# Patient Record
Sex: FEMALE | Race: BLACK OR AFRICAN AMERICAN
[De-identification: names, ages, dates, MRNs, and addresses within clinical notes are randomized per-mention and may not be internally consistent; named-entity substitution may affect disease eponyms.]

---

## 2021-05-03 ENCOUNTER — HOSPITAL ENCOUNTER (EMERGENCY)
Dept: HOSPITAL 53 - M ED | Age: 21
Discharge: HOME | End: 2021-05-03
Payer: COMMERCIAL

## 2021-05-03 VITALS — HEIGHT: 68 IN | BODY MASS INDEX: 24.69 KG/M2 | WEIGHT: 162.92 LBS

## 2021-05-03 VITALS — SYSTOLIC BLOOD PRESSURE: 133 MMHG | DIASTOLIC BLOOD PRESSURE: 69 MMHG

## 2021-05-03 DIAGNOSIS — O34.81: ICD-10-CM

## 2021-05-03 DIAGNOSIS — O26.891: Primary | ICD-10-CM

## 2021-05-03 DIAGNOSIS — R10.32: ICD-10-CM

## 2021-05-03 DIAGNOSIS — Z3A.01: ICD-10-CM

## 2021-05-03 LAB
ALBUMIN SERPL BCG-MCNC: 3.8 GM/DL (ref 3.2–5.2)
ALT SERPL W P-5'-P-CCNC: 23 U/L (ref 12–78)
B-HCG SERPL-ACNC: 2172 MIU/ML
BASOPHILS # BLD AUTO: 0 10^3/UL (ref 0–0.2)
BASOPHILS NFR BLD AUTO: 0.3 % (ref 0–1)
BILIRUB CONJ SERPL-MCNC: 0.2 MG/DL (ref 0–0.2)
BILIRUB SERPL-MCNC: 0.9 MG/DL (ref 0.2–1)
BUN SERPL-MCNC: 7 MG/DL (ref 7–18)
CALCIUM SERPL-MCNC: 9.3 MG/DL (ref 8.5–10.1)
CHLORIDE SERPL-SCNC: 106 MEQ/L (ref 98–107)
CO2 SERPL-SCNC: 26 MEQ/L (ref 21–32)
CREAT SERPL-MCNC: 0.74 MG/DL (ref 0.55–1.3)
EOSINOPHIL # BLD AUTO: 0.1 10^3/UL (ref 0–0.5)
EOSINOPHIL NFR BLD AUTO: 0.8 % (ref 0–3)
GFR SERPL CREATININE-BSD FRML MDRD: > 60 ML/MIN/{1.73_M2} (ref 60–?)
GLUCOSE SERPL-MCNC: 97 MG/DL (ref 70–100)
HCT VFR BLD AUTO: 35.6 % (ref 36–47)
HGB BLD-MCNC: 11.6 G/DL (ref 12–15.5)
LYMPHOCYTES # BLD AUTO: 2.6 10^3/UL (ref 1.5–5)
LYMPHOCYTES NFR BLD AUTO: 30.4 % (ref 24–44)
MCH RBC QN AUTO: 26.7 PG (ref 27–33)
MCHC RBC AUTO-ENTMCNC: 32.6 G/DL (ref 32–36.5)
MCV RBC AUTO: 81.8 FL (ref 80–96)
MONOCYTES # BLD AUTO: 0.9 10^3/UL (ref 0–0.8)
MONOCYTES NFR BLD AUTO: 10.6 % (ref 2–8)
NEUTROPHILS # BLD AUTO: 5 10^3/UL (ref 1.5–8.5)
NEUTROPHILS NFR BLD AUTO: 57.8 % (ref 36–66)
PLATELET # BLD AUTO: 281 10^3/UL (ref 150–450)
POTASSIUM SERPL-SCNC: 3.8 MEQ/L (ref 3.5–5.1)
PROT SERPL-MCNC: 6.9 GM/DL (ref 6.4–8.2)
RBC # BLD AUTO: 4.35 10^6/UL (ref 4–5.4)
SODIUM SERPL-SCNC: 140 MEQ/L (ref 136–145)
WBC # BLD AUTO: 8.6 10^3/UL (ref 4–10)

## 2021-05-03 NOTE — REP
INDICATION:

L sided lower abd pain, positive home preg test.



COMPARISON:

None.



TECHNIQUE:

Real-time sonographic evaluation of pelvis performed utilizing transabdominal and

endovaginal technique.



FINDINGS:

The uterus measures 7.9 x 3.3 x 4.9 cm.  Within the endometrial canal there is a 5 mm

sac-like structure.  There is no internal yolk sac or fetal pole.  This could

represent a viable intrauterine pregnancy.



Right ovary measures 3.8 x 1.7 x 2.3 cm and left ovary 4.0 x 2.4 x 2.4 cm.  Blood flow

is seen in each ovary with duplex Doppler evaluation.  Complex structure in the left

ovary probably represents a complex hemorrhagic corpus luteum 1.8 cm in diameter.

There is mild free fluid in the cul-de-sac.



IMPRESSION:

Sac-like structure in the endometrial canal 5 mm in diameter may represent a very

early viable intrauterine gestation with an estimated gestational age of 5 weeks 1

day.  There is no internal yolk sac or fetal pole.  Recommend follow-up ultrasound in

10-14 days to ensure fetal viability.  Recommend correlation with serial quantitative

beta HCG values to rule out ectopic pregnancy.



No evidence of ovarian torsion.  Mild free fluid.  There is a probable hemorrhagic

corpus luteum in the left ovary 1.8 cm.





<Electronically signed by Fernando Burns > 05/03/21 [id number]

## 2021-05-05 ENCOUNTER — HOSPITAL ENCOUNTER (OUTPATIENT)
Dept: HOSPITAL 53 - M LAB | Age: 21
End: 2021-05-05
Attending: PHYSICIAN ASSISTANT
Payer: COMMERCIAL

## 2021-05-05 DIAGNOSIS — O26.899: Primary | ICD-10-CM

## 2021-05-05 DIAGNOSIS — R10.30: ICD-10-CM

## 2021-05-05 DIAGNOSIS — Z3A.00: ICD-10-CM

## 2021-05-17 ENCOUNTER — HOSPITAL ENCOUNTER (EMERGENCY)
Dept: HOSPITAL 53 - M ED | Age: 21
Discharge: HOME | End: 2021-05-17
Payer: COMMERCIAL

## 2021-05-17 VITALS — SYSTOLIC BLOOD PRESSURE: 128 MMHG | DIASTOLIC BLOOD PRESSURE: 60 MMHG

## 2021-05-17 VITALS — BODY MASS INDEX: 23.41 KG/M2 | WEIGHT: 158.07 LBS | HEIGHT: 69 IN

## 2021-05-17 DIAGNOSIS — Z3A.01: ICD-10-CM

## 2021-05-17 DIAGNOSIS — O23.599: Primary | ICD-10-CM

## 2021-05-17 DIAGNOSIS — O21.1: ICD-10-CM

## 2021-05-17 LAB
ALBUMIN SERPL BCG-MCNC: 4 GM/DL (ref 3.2–5.2)
ALT SERPL W P-5'-P-CCNC: 18 U/L (ref 12–78)
BASOPHILS # BLD AUTO: 0.1 10^3/UL (ref 0–0.2)
BASOPHILS NFR BLD AUTO: 0.4 % (ref 0–1)
BILIRUB CONJ SERPL-MCNC: 0.3 MG/DL (ref 0–0.2)
BILIRUB SERPL-MCNC: 1.3 MG/DL (ref 0.2–1)
BUN SERPL-MCNC: 7 MG/DL (ref 7–18)
CALCIUM SERPL-MCNC: 9.4 MG/DL (ref 8.5–10.1)
CHLAMYDIA DNA AMPLIFICATION: NEGATIVE
CHLORIDE SERPL-SCNC: 104 MEQ/L (ref 98–107)
CO2 SERPL-SCNC: 27 MEQ/L (ref 21–32)
CREAT SERPL-MCNC: 0.61 MG/DL (ref 0.55–1.3)
EOSINOPHIL # BLD AUTO: 0.1 10^3/UL (ref 0–0.5)
EOSINOPHIL NFR BLD AUTO: 0.4 % (ref 0–3)
GFR SERPL CREATININE-BSD FRML MDRD: > 60 ML/MIN/{1.73_M2} (ref 60–?)
GLUCOSE SERPL-MCNC: 85 MG/DL (ref 70–100)
HCT VFR BLD AUTO: 38.5 % (ref 36–47)
HGB BLD-MCNC: 12.4 G/DL (ref 12–15.5)
LIPASE SERPL-CCNC: 82 U/L (ref 73–393)
LYMPHOCYTES # BLD AUTO: 3 10^3/UL (ref 1.5–5)
LYMPHOCYTES NFR BLD AUTO: 25 % (ref 24–44)
MCH RBC QN AUTO: 25.9 PG (ref 27–33)
MCHC RBC AUTO-ENTMCNC: 32.2 G/DL (ref 32–36.5)
MCV RBC AUTO: 80.4 FL (ref 80–96)
MONOCYTES # BLD AUTO: 1.1 10^3/UL (ref 0–0.8)
MONOCYTES NFR BLD AUTO: 9.1 % (ref 2–8)
N GONORRHOEA RRNA SPEC QL NAA+PROBE: NEGATIVE
NEUTROPHILS # BLD AUTO: 7.8 10^3/UL (ref 1.5–8.5)
NEUTROPHILS NFR BLD AUTO: 64.7 % (ref 36–66)
PLATELET # BLD AUTO: 338 10^3/UL (ref 150–450)
POTASSIUM SERPL-SCNC: 3.8 MEQ/L (ref 3.5–5.1)
PROT SERPL-MCNC: 7.3 GM/DL (ref 6.4–8.2)
RBC # BLD AUTO: 4.79 10^6/UL (ref 4–5.4)
SODIUM SERPL-SCNC: 137 MEQ/L (ref 136–145)
WBC # BLD AUTO: 12.1 10^3/UL (ref 4–10)

## 2021-05-17 PROCEDURE — 85025 COMPLETE CBC W/AUTO DIFF WBC: CPT

## 2021-05-17 PROCEDURE — 93041 RHYTHM ECG TRACING: CPT

## 2021-05-17 PROCEDURE — 86850 RBC ANTIBODY SCREEN: CPT

## 2021-05-17 PROCEDURE — 99284 EMERGENCY DEPT VISIT MOD MDM: CPT

## 2021-05-17 PROCEDURE — 86900 BLOOD TYPING SEROLOGIC ABO: CPT

## 2021-05-17 PROCEDURE — 80048 BASIC METABOLIC PNL TOTAL CA: CPT

## 2021-05-17 PROCEDURE — 96374 THER/PROPH/DIAG INJ IV PUSH: CPT

## 2021-05-17 PROCEDURE — 81001 URINALYSIS AUTO W/SCOPE: CPT

## 2021-05-17 PROCEDURE — 93976 VASCULAR STUDY: CPT

## 2021-05-17 PROCEDURE — 83690 ASSAY OF LIPASE: CPT

## 2021-05-17 PROCEDURE — 84702 CHORIONIC GONADOTROPIN TEST: CPT

## 2021-05-17 PROCEDURE — 87661 TRICHOMONAS VAGINALIS AMPLIF: CPT

## 2021-05-17 PROCEDURE — 96361 HYDRATE IV INFUSION ADD-ON: CPT

## 2021-05-17 PROCEDURE — 76801 OB US < 14 WKS SINGLE FETUS: CPT

## 2021-05-17 PROCEDURE — 86901 BLOOD TYPING SEROLOGIC RH(D): CPT

## 2021-05-17 PROCEDURE — 87210 SMEAR WET MOUNT SALINE/INK: CPT

## 2021-05-17 PROCEDURE — 80076 HEPATIC FUNCTION PANEL: CPT

## 2021-05-17 NOTE — REP
INDICATION:

VAGINAL BLEEDING.



COMPARISON:

05/03/2021.



TECHNIQUE:

Real-time sonographic evaluation of gravid uterus performed.



FINDINGS:

There is a single living intrauterine gestation.  The estimated gestational age is 7

weeks 1 day based on a crown-rump length of 10 mm, EDC 01/02/2022. Fetal heart rate is

129 beats per minute. There is no subchorionic hemorrhage.  A complex cystic structure

in the left ovary measures 2.4 cm in maximum diameter probably representing a corpus

luteum.  Blood flow seen in each ovary with Doppler evaluation, with no torsion.



IMPRESSION:

Viable intrauterine gestation with estimated gestational age 7 weeks 1 day, fetal

heart rate 129 beats per minute.





<Electronically signed by Fernando Burns > 05/17/21 [id number]

## 2021-05-20 ENCOUNTER — HOSPITAL ENCOUNTER (EMERGENCY)
Dept: HOSPITAL 53 - M ED | Age: 21
Discharge: HOME | End: 2021-05-20
Payer: COMMERCIAL

## 2021-05-20 VITALS — HEIGHT: 68 IN | BODY MASS INDEX: 25.06 KG/M2 | WEIGHT: 165.35 LBS

## 2021-05-20 VITALS — SYSTOLIC BLOOD PRESSURE: 121 MMHG | DIASTOLIC BLOOD PRESSURE: 59 MMHG

## 2021-05-20 DIAGNOSIS — O21.1: Primary | ICD-10-CM

## 2021-05-20 DIAGNOSIS — E86.0: ICD-10-CM

## 2021-05-20 DIAGNOSIS — Z3A.01: ICD-10-CM

## 2021-05-20 DIAGNOSIS — O99.281: ICD-10-CM

## 2021-05-20 LAB
BUN SERPL-MCNC: 6 MG/DL (ref 7–18)
CALCIUM SERPL-MCNC: 8.3 MG/DL (ref 8.5–10.1)
CHLORIDE SERPL-SCNC: 107 MEQ/L (ref 98–107)
CO2 SERPL-SCNC: 28 MEQ/L (ref 21–32)
CREAT SERPL-MCNC: 0.52 MG/DL (ref 0.55–1.3)
GFR SERPL CREATININE-BSD FRML MDRD: > 60 ML/MIN/{1.73_M2} (ref 60–?)
GLUCOSE SERPL-MCNC: 80 MG/DL (ref 70–100)
POTASSIUM SERPL-SCNC: 4.1 MEQ/L (ref 3.5–5.1)
SODIUM SERPL-SCNC: 138 MEQ/L (ref 136–145)

## 2021-10-20 ENCOUNTER — HOSPITAL ENCOUNTER (EMERGENCY)
Dept: HOSPITAL 53 - M ED | Age: 21
Discharge: HOME | End: 2021-10-20
Payer: COMMERCIAL

## 2021-10-20 VITALS — WEIGHT: 185.19 LBS | HEIGHT: 68 IN | BODY MASS INDEX: 28.07 KG/M2

## 2021-10-20 VITALS — DIASTOLIC BLOOD PRESSURE: 66 MMHG | SYSTOLIC BLOOD PRESSURE: 117 MMHG

## 2021-10-20 DIAGNOSIS — A60.00: Primary | ICD-10-CM

## 2021-10-20 DIAGNOSIS — N93.9: ICD-10-CM

## 2021-10-20 LAB
ALBUMIN SERPL BCG-MCNC: 3.3 GM/DL (ref 3.2–5.2)
ALT SERPL W P-5'-P-CCNC: 20 U/L (ref 12–78)
B-HCG SERPL QL: NEGATIVE
BASOPHILS # BLD AUTO: 0 10^3/UL (ref 0–0.2)
BASOPHILS NFR BLD AUTO: 0.3 % (ref 0–1)
BILIRUB CONJ SERPL-MCNC: 0.1 MG/DL (ref 0–0.2)
BILIRUB SERPL-MCNC: 0.4 MG/DL (ref 0.2–1)
EOSINOPHIL # BLD AUTO: 0.1 10^3/UL (ref 0–0.5)
EOSINOPHIL NFR BLD AUTO: 1.6 % (ref 0–3)
HCT VFR BLD AUTO: 37.7 % (ref 36–47)
HGB BLD-MCNC: 12.3 G/DL (ref 12–15.5)
LIPASE SERPL-CCNC: 78 U/L (ref 73–393)
LYMPHOCYTES # BLD AUTO: 1.6 10^3/UL (ref 1.5–5)
LYMPHOCYTES NFR BLD AUTO: 23.5 % (ref 24–44)
MCH RBC QN AUTO: 26.1 PG (ref 27–33)
MCHC RBC AUTO-ENTMCNC: 32.6 G/DL (ref 32–36.5)
MCV RBC AUTO: 80 FL (ref 80–96)
MONOCYTES # BLD AUTO: 0.7 10^3/UL (ref 0–0.8)
MONOCYTES NFR BLD AUTO: 10.5 % (ref 2–8)
N GONORRHOEA RRNA SPEC QL NAA+PROBE: NEGATIVE
NEUTROPHILS # BLD AUTO: 4.3 10^3/UL (ref 1.5–8.5)
NEUTROPHILS NFR BLD AUTO: 63.5 % (ref 36–66)
PLATELET # BLD AUTO: 262 10^3/UL (ref 150–450)
PROT SERPL-MCNC: 7.2 GM/DL (ref 6.4–8.2)
RBC # BLD AUTO: 4.71 10^6/UL (ref 4–5.4)
WBC # BLD AUTO: 6.8 10^3/UL (ref 4–10)

## 2021-10-20 NOTE — CCD
Summarization of Episode Note

                             Created on: 10/06/2021



[place] [employer]

External Reference #: [id number]

: 2000

Sex: Female



Demographics





                          Address                   [address]

[place]  [address]

 

                          Home Phone                [phone]

 

                          Preferred Language        Unknown

 

                          Marital Status            Unknown

 

                          Oriental orthodox Affiliation     Unknown

 

                          Race                      Unknown

 

                          Ethnic Group              Not  or 





Author





                          Author                    [facility]

ems

 

                          Organization              [facility]

ems

 

                          Address                   Unknown

 

                          Phone                     Unavailable







Support





                Name            Relationship    Address         Phone

 

                    [place] [employer]     GUAR                [address]

[place], [place]  [address] [phone]

 

                    [place] [employer]   [employer]ON                [address]

[place]  [address]                 [phone]







Care Team Providers





                    Care Team Member Name Role                Phone

 

                    Halina Wong    Unavailable         [phone]







PROBLEMS





          Type      Condition ICD9-CM Code FLT72-BY Code Onset Dates Condition S

tatus W/U 

Status              Risk                SNOMED Code         Notes

 

          Problem   Symptoms consistent with irritable bowel syndrome           

K58.9               Active    

confirmed                               [id number]             

 

       Problem Exercise-induced asthma        J45.990        Active confirmed   

     [id number]  







ALLERGIES





                    Allergen (clinical drug ingredient) Drug/Non Drug Allergy do

cumented on EMR 

Reaction            Allergy Type        Onset Date          Status

 

           Pollen     Pollen     Unknown    Drug Allergy            Active







ENCOUNTERS from 2000 to 2021-10-05





             Encounter    Location     Date         Provider     Diagnosis

 

                    [facility]          [id number] [place] [address] [phone] [place] [address] 05 

Oct, 2021                 Halina Wong            Low TSH level R79.89 and Low

 thyroxine (T4) level 

R79.89







IMMUNIZATIONS

No Information



SOCIAL HISTORY

Tobacco Use:



                    Social History Observation Description         Date

 

                    Details (start date - stop date) Never Smoker         



Sex Assigned At Birth:



                          Social History Observation Description

 

                          Sex Assigned At Birth     Unknown



Education:



                    Question            Answer              Notes

 

                    Level of Education: Finished College     



Audit



                    Question            Answer              Notes

 

                    Total Score:        0                    

 

                    Interpretation:     Alcohol Education    



Language:



                    Question            Answer              Notes

 

                    Languages spoken:   English              



Jew:



                    Question            Answer              Notes

 

                    Jew            33 None              



Sexual Hx:



                    Question            Answer              Notes

 

                    Had sex in the last 12 months (vaginal, oral, or anal)? Yes 

                 

 

                    LMP:                2021           

 

                    Have you ever had an STD? Yes                  

 

                    with                Men only             

 

                    Syphilis?           Yes                  

 

                    Chlamydia?          Yes                  



Drug and Alcohol



                    Question            Answer              Notes

 

                    Total Score:        2                    

 

                    Interpretation:     Low level            



Alcohol Screening:



                    Question            Answer              Notes

 

                    Did you have a drink containing alcohol in the past year? No

                   

 

                    Points              0                    

 

                    Interpretation      Negative             



Tobacco Use:



                    Question            Answer              Notes

 

                    Are you a:          never smoker         







REASON FOR REFERRAL from 2000 to 2021-10-05





                          Reason                    21 y old F with low TSH and 

low FT4 for further evaluation and management

of central vs subclinical hypothyroidism.

 

                          Diagnosis 1               Low TSH level (R79.89)

 

                          Diagnosis 2               Low thyroxine (T4) level (R7

9.89)

 

                          Referral Organization     [facility]

 

                          Referring Provider First Name Halina

 

                          Referring Provider Last Name Judith

 

                          Referring Provider Specialty Family Medicine

 

                          Referred Provider Specialty Endocrinology

 

                          Referral Priority         Routine







VITAL SIGNS

No information



MEDICATIONS





           Medication SIG (Take, Route, Frequency, Duration) Notes      Start Da

te End Date   

Status

 

                          Albuterol Sulfate  (90 Base) MCG/ACT 1 puff as 

needed Inhalation every 4 

hrs for 30 days                 01 Oct, 2021                    Active







PROCEDURES

No Information



RESULTS

No Results



REASON FOR VISIT

lab results 



MEDICAL (GENERAL) HISTORY





                    Type                Description         Date

 

                    Medical History     exercise-induced asthma  

 

                    Medical History     anxiety              

 

                    Surgical History    tonsillectomy       2017

 

                    Surgical History    D & C               2021

 

                    Hospitalization History see above            







Goals Section

No Information



Health Concerns

No Information



MEDICAL EQUIPMENT

No Information



MENTAL STATUS

No Information



FUNCTIONAL STATUS

No Information



ASSESSMENTS





             Encounter Date Diagnosis    Assessment Notes Treatment Notes Treatm

ent Clinical 

Notes

 

                05 Oct, 2021    Low TSH level (ICD-10 - R79.89)                 



                                        





 

                05 Oct, 2021    Low thyroxine (T4) level (ICD-10 - R79.89)      

           



                                        











PLAN OF TREATMENT

Medication



                Medication Name Sig             Start Date      Stop Date

 

                          Albuterol Sulfate  (90 Base) MCG/ACT 1 puff as 

needed Inhalation every 4 

hrs for 30 days           01 Oct, 2021               



Referrals



                          Referral Date             Details

 

                                                    21 y old F with low TSH and 

low FT4 for further evaluation and management of 

central vs subclinical hypothyroidism.



Next Appt



                                        Details

 

                                        Provider Name:Halina Wong, 2021

 07:30:00 AM, [address] [facility], 

[phone], [place], [address], [phone]







Insurance Providers





             Payer Name   Payer Address Payer Phone  Insured Name Patient Relati

onship to 

Insured                   Coverage Start Date       Coverage End Date

 

                    [place] [phone]

 [phone]

                [place] [employer]  [employer]

## 2021-10-20 NOTE — CCD
Summarization Of Episode

                             Created on: 10/20/2021



AZ RAYA

External Reference #: [id number]

: 2000

Sex: Undifferentiated



Demographics





                          Address                   [address]

[place]  [address]

 

                          Home Phone                [phone]

 

                          Preferred Language        English

 

                          Marital Status            Unknown

 

                          Rastafarian Affiliation     Unknown

 

                          Race                      Unknown

 

                          Ethnic Group              Not  or 





Author





                          Author                    [facility]

 

                          Organization              [facility]

 

                          Address                   Unknown

 

                          Phone                     Unavailable







Support





                Name            Relationship    Address         Phone

 

                    [facility] Next Of Kin         [address] [place]  [address]                    [phone]

 

                    AZ RAYA    Next Of Kin         [address]

[place]  [address]                 [phone]

 

                    DIPESHCOARIEL          Next Of Kin         [address] [facility]

[address]

[place]  [address]                    [phone]

 

                    ANGELIQUE RAYARIDALTON  Next Of Kin         [address]

[address]

[place]  [address]                 [phone]

 

                    ALBERT  DABRIEN  ECON                [address]

[place]  [address]                 Unavailable







Care Team Providers





                    Care Team Member Name Role                Phone

 

                    NO,  PCP            Unavailable         Unavailable

 

                    GINA AUGUSTINE        Unavailable         Unavailable

 

                    RAFI GONZALEZ    Unavailable         Unavailable

 

                    River, L Kashif CNM  Unavailable         Unavailable

 

                    River, L Kashif CNM  Unavailable         Unavailable

 

                    River, L Kashif CNM  Unavailable         Unavailable

 

                    River, L Kashif CNM  Unavailable         Unavailable

 

                    River, L Kashif CNM  Unavailable         Unavailable

 

                    River, L Kashif CNM  Unavailable         Unavailable

 

                    River, L Kashif CNM  Unavailable         Unavailable

 

                    River, L Kashif CNM  Unavailable         Unavailable

 

                    River, L Kashif CNM  Unavailable         Unavailable

 

                    River, L Kashif CNM  Unavailable         Unavailable

 

                    River, L Kashif CNM  Unavailable         Unavailable

 

                    River, L Kashif CNM  Unavailable         Unavailable

 

                    River, L Kashif CNM  Unavailable         Unavailable

 

                    River, L Kashif CNM  Unavailable         Unavailable

 

                    River, L Kashif CNM  Unavailable         Unavailable

 

                    River, L Kashif CNM  Unavailable         Unavailable

 

                    River, L Kashif CNM  Unavailable         Unavailable

 

                    River, L Kashif CNM  Unavailable         Unavailable

 

                    DAMIEN Zuñiga MD Unavailable         Unavailable

 

                    DAMIEN Zuñiga MD Unavailable         Unavailable

 

                    DAMIEN Zuñiga MD Unavailable         Unavailable

 

                    VICKY Rangel MD Unavailable         Unavailable

 

                    VICKY Rangel MD Unavailable         Unavailable

 

                    VICKY Rangel MD Unavailable         VICKY Quispe MD Unavailable         Unavailable

 

                    APRIL Wong MD Unavailable         Unavailable

 

                    APRIL Wong MD Unavailable         Unavailable

 

                    APRIL Wong MD Unavailable         Unavailable

 

                    APRIL Wong MD Unavailable         Unavailable

 

                    APRIL Wong MD Unavailable         Unavailable

 

                    APRIL Wong MD Unavailable         Unavailable

 

                    APRIL Wong MD Unavailable         Unavailable

 

                    APRIL Wong MD Unavailable         Unavailable

 

                    APRIL Wong MD Unavailable         Unavailable

 

                    APRIL Wong MD Unavailable         Unavailable

 

                    APRIL Wong MD Unavailable         Unavailable

 

                    APRIL Wong MD Unavailable         Unavailable

 

                    APRIL Wong MD Unavailable         Unavailable

 

                    APRIL Wong MD Unavailable         Unavailable

 

                    APRIL Wong MD Unavailable         Unavailable

 

                    APRIL Wong MD Unavailable         Unavailable

 

                    APRIL Wong MD Unavailable         Unavailable

 

                    APRIL Wong MD Unavailable         Unavailable

 

                    APRIL Wong MD Unavailable         Unavailable

 

                    APRIL Wong MD Unavailable         Unavailable

 

                    APRIL Wong MD Unavailable         Unavailable

 

                    APRIL Wong MD Unavailable         Unavailable

 

                    APRIL Wong MD Unavailable         Unavailable

 

                    APRIL Wong MD Unavailable         Unavailable

 

                    APRIL Wong MD Unavailable         Unavailable

 

                    APRIL Wong MD Unavailable         Unavailable

 

                    APRIL Wong MD Unavailable         Unavailable

 

                    APRIL Wong MD Unavailable         Unavailable

 

                    APRIL Wong MD Unavailable         Unavailable

 

                    APRIL Wong MD Unavailable         Unavailable

 

                    APRIL Wong MD Unavailable         Unavailable

 

                    APRIL Wong MD Unavailable         Unavailable

 

                    APRIL Wong MD Unavailable         Unavailable

 

                    APRIL Wong MD Unavailable         Unavailable

 

                    YANNA, B VAL NP Unavailable         Unavailable

 

                    YANNA, B VAL NP Unavailable         Unavailable

 

                    YANNA, B VAL NP Unavailable         Unavailable

 

                    YANNA, B VAL NP Unavailable         Unavailable

 

                    YNANA, B AVL NP Unavailable         Unavailable

 

                    YANNA, B VAL NP Unavailable         Unavailable

 

                    YANNA, B VAL NP Unavailable         Unavailable

 

                    YANNA, B VAL NP Unavailable         Unavailable

 

                    YANNA, B VAL NP Unavailable         Unavailable

 

                    YANNA, B VAL NP Unavailable         Unavailable

 

                    YANNA, B VAL NP Unavailable         Unavailable

 

                    YANNA, B VAL NP Unavailable         Unavailable

 

                    YANNA, B VAL NP Unavailable         Unavailable

 

                    YANNA, B VAL NP Unavailable         Unavailable

 

                    YANNA, B VAL NP Unavailable         Unavailable

 

                    YANNA, B VAL NP Unavailable         Unavailable

 

                    YANNA, B VAL NP Unavailable         Unavailable

 

                    YANNA, B VAL NP Unavailable         Unavailable

 

                    YANNA, B VAL NP Unavailable         Unavailable

 

                    YANNA, B VAL NP Unavailable         Unavailable

 

                    YANNA, B VAL NP Unavailable         Unavailable

 

                    YANNA, B VAL NP Unavailable         Unavailable

 

                    YANNA, B AVL NP Unavailable         Unavailable

 

                    YANNA, B VAL NP Unavailable         Unavailable

 

                    YANNA, B VAL NP Unavailable         Unavailable

 

                    YANNA, B VAL NP Unavailable         Unavailable

 

                    YANNA, B VAL NP Unavailable         Unavailable

 

                    YANNA, B VAL NP Unavailable         Unavailable

 

                    YANNA, B VAL NP Unavailable         Unavailable

 

                    YANNA, B VAL NP Unavailable         Unavailable

 

                    YANNA, B VAL NP Unavailable         Unavailable

 

                    YANNA, B VAL NP Unavailable         Unavailable

 

                    YANNA, B VAL NP Unavailable         Unavailable

 

                    YANNA, B VAL NP Unavailable         Unavailable

 

                    YANNA, B VAL NP Unavailable         Unavailable

 

                    YANNA, B VAL NP Unavailable         Unavailable

 

                    YANNA, B VAL NP Unavailable         Unavailable

 

                    YANNA, B VAL NP Unavailable         Unavailable

 

                    YANNA, B VAL NP Unavailable         Unavailable

 

                    YANNA, B VAL NP Unavailable         Unavailable

 

                    YANNA, B VAL NP Unavailable         Unavailable

 

                    YANNA, B VAL NP Unavailable         Unavailable

 

                    YANNA, B VAL NP Unavailable         Unavailable

 

                    YANNA, B VAL NP Unavailable         Unavailable

 

                    YANNA, B VAL NP Unavailable         Unavailable

 

                    YANNA, B VAL NP Unavailable         Unavailable

 

                    YANNA, B VAL NP Unavailable         Unavailable

 

                    YANNA, B VAL NP Unavailable         Unavailable

 

                    YANNA, B VAL NP Unavailable         Unavailable

 

                    YANNA, B VAL NP Unavailable         Unavailable

 

                    YANNA, B VAL NP Unavailable         Unavailable

 

                    YANNA, B VAL NP Unavailable         Unavailable

 

                    YANNA, B VAL NP Unavailable         Unavailable

 

                    YANNA, B VAL NP Unavailable         Unavailable

 

                    YANNA, B VAL NP Unavailable         Unavailable

 

                    YANNA, B VAL NP Unavailable         Unavailable

 

                    YANNA, B VAL NP Unavailable         Unavailable

 

                    YANNA, B VAL NP Unavailable         Unavailable

 

                    YANNA, B VAL NP Unavailable         Unavailable

 

                    YANNA, B VAL NP Unavailable         Unavailable

 

                    YANNA, B VAL NP Unavailable         Unavailable

 

                    YANNA, B VAL NP Unavailable         Unavailable

 

                    River, BILL Rowland CNM  Unavailable         Unavailable

 

                    River, L Kashif CNM  Unavailable         Unavailable

 

                    River, L Kashif CNM  Unavailable         Unavailable

 

                    River, L Kashif CNM  Unavailable         Unavailable

 

                    River, L Kashif CNM  Unavailable         Unavailable

 

                    River, L Kashif CNM  Unavailable         Unavailable

 

                    River, L Kashif CNM  Unavailable         Unavailable

 

                    River, L Kashif CNM  Unavailable         Unavailable

 

                    River, L Kashif CNM  Unavailable         Unavailable

 

                    River, L Kashif CNM  Unavailable         Unavailable

 

                    River, L Kashif CNM  Unavailable         Unavailable

 

                    River, L Kashif CNM  Unavailable         Unavailable

 

                    River, L Kashif CNM  Unavailable         Unavailable

 

                    River, L Kashif CNM  Unavailable         Unavailable

 

                    River, L Kashif CNM  Unavailable         Unavailable

 

                    River, L Kashif CNM  Unavailable         Unavailable

 

                    River, L Kashif CNM  Unavailable         Unavailable

 

                    River, L Kashif CNM  Unavailable         Unavailable

 

                    Lisa,  Safiyah    Unavailable         [phone]

 

                    Lisa,  Safiyah    Unavailable         [phone]

 

                    Lisa,  Safiyah    Unavailable         [phone]

 

                    Lisa,  Safiyah    Unavailable         [phone]

 

                    Lisa,  Safiyah    Unavailable         [phone]

 

                    Lisa,  Safiyah    Unavailable         [phone]

 

                    Lisa,  Safiyah    Unavailable         [phone]

 

                    Lisa,  Safiyah    Unavailable         [phone]

 

                    NOSOVIVICKY FITZGERALD JR, MD Unavailable         Unavailable

 

                    NOSOVIVICKY FITZGERALD JR, MD Unavailable         Unavailable

 

                    NOSOVIVICKY FITZGERALD JR, MD Unavailable         Unavailable

 

                    NOSOVIVICKY FITZGERALD JR, MD Unavailable         Unavailable

 

                    NOSOVIVICKY FITZGERALD JR, MD Unavailable         Unavailable

 

                    NOSOVIVICKY FITZGERALD JR, MD Unavailable         Unavailable

 

                    NOSOVITCH VICKY TALAVERA MD Unavailable         Unavailable

 

                    NOSOVIVICKY FITZGERALD JR, MD Unavailable         Unavailable

 

                    NOSOVIVICKY FITZGERALD JR, MD Unavailable         Unavailable

 

                    NOSOVIVICKY FITZGERALD JR, MD Unavailable         Unavailable

 

                    NOSOVIVICKY FITZGERALD JR, MD Unavailable         Unavailable

 

                    NOSOVIVICKY FITZGERALD JR, MD Unavailable         Unavailable

 

                    NOSOVIVICKY FITZGERALD JR, MD Unavailable         Unavailable

 

                    NOSOVIVICKY FITZGERALD JR, MD Unavailable         Unavailable

 

                    NOSOVIVICKY FITZGERALD JR, MD Unavailable         Unavailable

 

                    NOSOVITCH VICKY TALAVERA MD Unavailable         Unavailable

 

                    NOSOVIVICKY FITZGERALD JR, MD Unavailable         Unavailable

 

                    NOSOVIVICKY FITZGERALD JR, MD Unavailable         Unavailable

 

                    NOSOVIVICKY FITZGERALD JR, MD Unavailable         Unavailable

 

                    NOSOVITCH VICKY TALAVERA MD Unavailable         Unavailable

 

                    NOSOVIVICKY FITZGERALD JR, MD Unavailable         Unavailable

 

                    NOSOVITCH VICKY TALAVERA MD Unavailable         Unavailable

 

                    NOSOVITCH JR, T APPLE MD Unavailable         Unavailable

 

                    NOSOVITCH JR, VICKY APPLE MD Unavailable         Unavailable

 

                    NOSOVITCH JR, VICKY APPLE MD Unavailable         Unavailable

 

                    NOSOVITCH JR, T APPLE MD Unavailable         Unavailable

 

                    NOSOVITCH JR, T APPLE MD Unavailable         Unavailable

 

                    NOSOVITCH JR, T APPLE MD Unavailable         Unavailable

 

                    NOSOVITCH JR, T APPLE MD Unavailable         Unavailable

 

                    NOSOVITCH JR, T APPLE MD Unavailable         Unavailable

 

                    NOSOVITCH JR, VICKY APPLE MD Unavailable         Unavailable



                                  



Re-disclosure Warning

          The records that you are about to access may contain information from 
federally-assisted alcohol or drug abuse programs. If such information is 
present, then the following federally mandated warning applies: This information
has been disclosed to you from records protected by federal confidentiality 
rules (42 CFR part 2). The federal rules prohibit you from making any further 
disclosure of this information unless further disclosure is expressly permitted 
by the written consent of the person to whom it pertains or as otherwise 
permitted by 42 CFR part 2. A general authorization for the release of medical 
or other information is NOT sufficient for this purpose. The Federal rules 
restrict any use of the information to criminally investigate or prosecute any 
alcohol or drug abuse patient.The records that you are about to access may 
contain highly sensitive health information, the redisclosure of which is 
protected by Article 27-F of the [facility] Public Health law. If you 
continue you may have access to information: Regarding HIV / AIDS; Provided by 
facilities licensed or operated by the [facility] Office of Mental Health; 
or Provided by the [facility] Office for People With Developmental 
Disabilities. If such information is present, then the following [place] 
mandated warning applies: This information has been disclosed to you from 
confidential records which are protected by state law. State law prohibits you 
from making any further disclosure of this information without the specific 
written consent of the person to whom it pertains, or as otherwise permitted by 
law. Any unauthorized further disclosure in violation of state law may result in
a fine or [facility] sentence or both. A general authorization for the release of 
medical or other information is NOT sufficient authorization for further disc
losure.                                                                         
    



Allergies and Adverse Reactions

          



           Type       Description Substance  Reaction   Status     Data Source(s

)

 

           Propensity to adverse reactions NO KNOWN ALLERGIES NO KNOWN ALLERGIES

                       

[facility]



                                                                                
       



Encounters

          



           Encounter  Providers  Location   Date       Indications Data Source(s

)

 

                Outpatient      Attender: VAL RAINES NP Physical Therapy 10

/ 10:00:00 AM 

EDT                                                 [facility] ([facility]

 

                Unknown                         [address] [facility]

[place] [address] 10/05/2021 12:00:00 AM 

EDT                                                 eCW1 ([facility])

 

                Outpatient      Attender: Halina Wong MDConsultant: PCP NO   

              10/01/2021 10:30:00 

AM EDT - 10/01/2021 11:30:00 AM EDT                           [facility]

[facility]

 

                Outpatient                      [address] [facility] [facility]

[place] [address] 10/01/2021 12:00:00 AM

EDT                                                 eCW1 ([facility])

 

                Outpatient      Attender: Kashif Holman CNMConsultant: PCP NO     

            2021 12:30:00 PM 

EDT - 2021 01:30:00 PM EDT                           [facility]



 

                Outpatient      Attender: Josee Rangel MD [id number]    2021 12:00:00 AM EDT 

- 2021 12:50:12 PM EDT                           [facility]

al

 

           Outpatient                       2021 12:00:00 AM [facility]

 

           Outpatient Attender: Chery Zuñiga MD [id number]   2021 08:59

:15 AM [facility]

 

                          Outpatient                Attender: Rafi Benson

nder: RAFI GONZALEZAdmitter: RAFI GONZALEZ              [id number]         2021 12:00:00 AM EDT - 2021 

12:00:00 AM EDT 

Missed  with fetal demise before 20 completed weeks of gestation [O02.1]
                                        [facility]

 

                                        Missed  with fetal demise before

 20 completed weeks of gestation [O02.1]

 

 

                                        Patient discharged. 

 

                          Outpatient                Attender: Rafi Benson

nder: RAFI GONZALEZReferrer: APPLE COFFMAN JR              [id number]              2021 12:00:00 AM EDT -

 2021 11:25:18 AM 

[facility]

 

                Outpatient      Attender: APPLE COFFMAN JRReferrer: Kashif Holman CNM [id number]    

2021 12:00:00 AM EDT - 2021 09:39:42 AM [facility]

 

           Outpatient Attender: GINA AUGUSTINE            2021 12:00:00 AM [facility]

 

                Outpatient      Attender: APPLE COFFMAN JRReferrer: Kashif Holman

 [facility] [id number]    

2021 12:00:00 AM EDT - 2021 01:02:42 PM [facility]

 

                Outpatient      Attender: GINA AUGUSTINEReferrer: Kashif Holman CNM   

              2021 12:00:00 

AM [facility]

 

                Outpatient      Attender: Kashif Holman CNonsultant: PCP NO     

            2021 06:39:00 PM 

EDT - 2021 07:40:00 PM EDT                           [facility]

l

 

                                        Patient discharged. 

 

                Outpatient      [facility] PCP NO     

            2021 12:51:08 PM 

EDT - 2021 07:49:00 AM EDT                           [facility]

l

 

                                        Patient discharged. 



                                                                                
                                                                                
                                                                              



Immunizations

          



             Vaccine      Date         Status       Description  Data Source(s)

 

             COVID-19 VACCINE Pfizer 10/05/2021 12:00:00 AM EDT completed       

          [facility]

 

                                        Vaccine Series Complete: YESThis Data wa

s Submitted to [facility]. 

 

             COVID-19 VACCINE Pfizer 2021 12:00:00 AM EDT completed       

          [facility]

 

                                        Vaccine Series Complete: NOThis Data was

 Submitted to [facility]. 



                                                                                
                  



Medications

          



          Medication Brand Name Start Date Product Form Dose      Route     Admi

nistrative 

Instructions Pharmacy Instructions Status     Indications Reaction   Description

 Data 

Source(s)

 

                          Albuterol Sulfate  (90 Base) MCG/ACT Albuterol 

Sulfate  (90 Base) 

MCG/ACT 10/01/2021 12:00:00 AM EDT        1.0 {puff_as_needed}                  

    active               

Albuterol Sulfate  (90 Base) MCG/ACT eCW1 ([facility])

 

                          Albuterol Sulfate  (90 Base) MCG/ACT Albuterol 

Sulfate  (90 Base) 

MCG/ACT 10/01/2021 12:00:00 AM EDT        1.0 {puff_as_needed}                  

    active               

Albuterol Sulfate  (90 Base) MCG/ACT eCW1 ([facility])

 

                                        1 ML Rho(D) Immune Globulin 0.3 MG/ML Pr

efilled Syringe rho(D) immune globulin 

(HYPERRHO/RHOGAM) injection 300 mcg = 1,500 units rho(D) immune globulin 

(HYPERRHO/RHOGAM) injection 300 mcg = 1,500 units 2021 09:15:00 AM EDT    

                 

300 ug    Intramuscular                     completed                     300 mc

g, Intramuscular, Once, On Tue 

8/3/21 at 0915, For 1 dose<br>Refrigerate&nbsp;300 mcg = 1,500 units<br> [facility]

 

                                        Medication administered onsite 

 

                          Misoprostol 0.2 MG Oral Tablet miSOPROStol (CYTOTEC) t

ablet 400 mcg miSOPROStol 

(CYTOTEC) tablet 400 mcg 2021 08:55:00 AM EDT           400 ug    Buccal  

                      

completed                                       400 mcg, Buccal, Once, On Tue 8/

3/21 at 0900, For 1 dose [facility]

 

                                        Medication administered onsite 

 

                                        Oxycodone Hydrochloride 5 MG Oral Tablet

 oxyCODONE HCl 5 MG Oral Tablet 

(Roxicodone)              oxyCODONE HCl 5 MG Oral Tablet (Roxicodone) 2021

 12:00:00 AM 

EDT             5 mg    Oral                    aborted                 Take 1 t

ablet by mouth every 8 (eight) hours as 

needed  for Pain for up to 5 doses, Max Daily Dose: 15 mg [facility]

 

                          Ibuprofen 600 MG Oral Tablet Ibuprofen 600 MG Oral Tab

let (MOTRIN) Ibuprofen 600

 MG Oral Tablet (MOTRIN) 2021 12:00:00 AM EDT         600 mg  Oral        

            completed 

                                                    Take 1 tablet by mouth every

 6 (six) hours as needed  for Pain for up to 10 

days                                    [facility]

 

                          Acetaminophen 325 MG Oral Tablet Acetaminophen 325 MG 

Oral Tablet (Tylenol) 

Acetaminophen 325 MG Oral Tablet (Tylenol) 2021 12:00:00 AM EDT           

      650 mg          

Oral                             completed                        Take 2 tablets

 by mouth every 6 (six) hours as needed  

for Pain for up to 10 days              [facility]

 

                    Norethin Ace-Eth Estrad-FE 1-20 MG-MCG Oral Tablet (Junel FE

 1/20) 3260-6267-86        

2021 12:00:00 AM EDT         1 {tbl} Oral                    active       

           Take 1 tablet by mouth 

daily                                   [facility]

 

      Prenatal 19 Oral Tablet Chewable 77182-851-63             1 {tbl} Oral    

          aborted             

Chew 1 tablet by Mouth daily            [facility]



                                                                                
                                                                    



Insurance Providers

          



             Payer name   Policy type / Coverage type Policy ID    Covered party

 ID Covered 

party's relationship to weeks Policy Weeks             Plan Information

 

          [facility]         [id number]           Se

                [id number]

 

          [facility]           [id number]           SP           

       [id number]

 

          [employer]  PPO        [id number]           [id number]           SP    

              [id number]

 

          [facility]           [id number]           SP           

       [id number]

 

          [employer] [facility]           [id number]           18             

     [id number]

 

          [employer]  PPO        [id number]           [id number]           SP    

              [id number]



                                                                                
                                                          



Problems, Conditions, and Diagnoses

          



           Code       Display Name Description Problem Type Effective Dates Data

 Source(s)

 

                    K529                Noninfective gastroenteritis and colitis

, unspecified Noninfective 

gastroenteritis and colitis, unspecified Diagnosis           [facility]

 

                                               Encounter for supervision of

 other normal pregnancy, unspecified trimester

                          Encounter for supervision of other normal pregnancy, u

nspecified trimester 

Diagnosis                 2021 12:30:00 PM [facility]

 

                                                    Missed  with fetal d

emise before 20 completed weeks of gestation 

[O02.1]                                 Missed  with fetal demise before

 20 completed weeks of gestation

 [O02.1]            Diagnosis           2021 07:56:00 AM EDT [facility]

 

             Z3A12        12 weeks gestation of pregnancy 12 weeks gestation of 

pregnancy Diagnosis    

2021 06:39:00 PM EDT              [facility]

 

                                   Encounter for supervision of normal firs

t pregnancy, first trimester 

Encounter for supervision of normal first pregnancy, first trimester Diagnosis  

               

2021 06:39:00 PM [facility]

 

                                               Procedure and treatment not 

carried out due to patient leaving prior to 

being seen by health care provider      Procedure and treatment not carried out 

due 

to patient leaving prior to being seen by health care provider Diagnosis        

         

2021 07:49:00 AM [facility]

 

           J45.990    [id number]   Exercise-induced asthma Problem    10/01/2021 1

2:00:00 AM EDT eCW1

 ([facility])

 

             K58.9        [id number]     Symptoms consistent with irritable bowel 

syndrome Problem      

10/01/2021 12:00:00 AM EDT              eCW1 ([facility])



                                                                                
                                                                                
        



Surgeries/Procedures

          



             Procedure    Description  Date         Indications  Data Source(s)

 

             OFFICE CONSULTATION NEW/ESTAB PATIENT 80 MIN              10/19/202

1 12:00:00 AM EDT              

[facility] ([facility])



                                                                                
        



Results

          



                    ID                  Date                Data Source

 

                    [id number]     10/02/2021 01:44:00 PM EDT [facility]









          Name      Value     Range     Interpretation Code Description Data Scarlett

rce(s) Supporting 

Document(s)

 

           Tissue transglutaminase IgA Ab [Units/volume] in Serum <2 U/mL    0-3

                              [facility]                            

 

                                                                                

     Negative        0 -  3             

                                Weak Positive   4 - 10                          
                   Positive           >10                Tissue Transglutaminase
 (tTG) has been identified                as the endomysial antigen.  Studies 
have demonstr-                ated that endomysial IgA antibodies have over 99% 
               specificity for gluten sensitive enteropathy. 









                    ID                  Date                Data Source

 

                    [id number]     10/01/2021 11:40:00 AM EDT [facility]









          Name      Value     Range     Interpretation Code Description Data Scarlett

rce(s) Supporting 

Document(s)

 

                    Thyroxine (T4) free index in Serum or Plasma by calculation 

0.91 NG/DL          0.93 - 

1.70            L                               [facility]  









                    ID                  Date                Data Source

 

                    [id number]     10/01/2021 11:40:00 AM EDT [facility]









          Name      Value     Range     Interpretation Code Description Data Scarlett

rce(s) Supporting 

Document(s)

 

                          Thyrotropin [Units/volume] in Serum or Plasma by Detec

tion limit <= 0.05 mIU/L 

0.46 uIU/mL  0.47 - 5.01  L                         [facility]  









                    ID                  Date                Data Source

 

                    [id number]     10/01/2021 11:31:00 AM EDT [facility]









          Name      Value     Range     Interpretation Code Description Data Scarlett

rce(s) Supporting 

Document(s)

 

          COMPREHENSIVE METABOLIC PANEL                                         

[facility]  

 

                                            COMPREHENSIVE METABOLIC PANEL 

 

           Sodium [Moles/volume] in Serum or Plasma 140 mEq/L  134 - 153        

                [facility]                                 

 

           Potassium [Moles/volume] in Serum or Plasma 4.4 mEq/L  3.6 - 5.0     

                   [facility]                            

 

           Chloride [Moles/volume] in Serum or Plasma 104 mEq/L  98 - 107       

                  [facility]                                 

 

           Carbon dioxide, total [Moles/volume] in Serum or Plasma 29 MEQ/L   22

 - 30                          

[facility]                   

 

           Glucose [Mass/volume] in Serum or Plasma 85 MG/DL   70 - 99          

                [facility]                                 

 

          BUN       7 MG/DL   7 - 21                        [facility]

al  

 

           Creatinine [Mass/volume] in Serum or Plasma 0.6 MG/DL  0.7 - 1.5  L  

                   [facility]                            

 

          BUN/CREAT 12        8 - 27                        [facility]

al  

 

           Protein [Mass/volume] in Serum or Plasma 6.7 G/DL   6.3 - 8.2        

                [facility]                                 

 

           Albumin [Mass/volume] in Serum or Plasma 4.6 G/DL   3.9 - 5.0        

                [facility]                                 

 

           Globulin [Mass/volume] in Serum by calculation 2.1 GM/DL  2.4 - 3.2  

L                     [facility]                            

 

          A/G RATIO 2.2       0.8 - 2.0 H                   [facility]  

 

           Calcium [Mass/volume] in Serum or Plasma 9.5 MG/DL  8.4 - 10.2       

                [facility]                                 

 

           Bilirubin.total [Mass/volume] in Serum or Plasma <0.7 MG/DL 0.2 - 1.3

                        

[facility]                   

 

                    Alkaline phosphatase [Enzymatic activity/volume] in Serum or

 Plasma 89 U/L              38 - 

126                                             [facility]  

 

                          Aspartate aminotransferase [Enzymatic activity/volume]

 in Serum or Plasma 16 U/L

             5 - 40                                 [facility]  

 

                    Alanine aminotransferase [Enzymatic activity/volume] in Seru

m or Plasma 14 U/L              7

- 56                                            [facility]  

 

          Anion gap 3 in Serum or Plasma 7.0 mmol/L 8.0 - 16.0 L                

   [facility] 



 

          AGE       21 yrs                                  [facility]

al  

 

          NON-AA GFR >60 mL/min                               [facility]

ital  

 

          AFR AMER GFR >60 mL/min                               [facility]

spital  

 

                                                                     Male GFR In

terprentation                  20-49 yrs

    >60 mL/min   Normal                  50-59 yrs     >56 mL/min   Normal      
           60-69 yrs     >49 mL/min   Normal                  70-79yrs      >42 
mL/min   Normal                  80 and above  >35 mL/min   Normal              
     Female GFR  Interpretation                  20-39 yrs     >60 mL/min   
Normal                  40-49 yrs     >58 mL/min   Normal                  50-59
yrs     >51 mL/min   Normal                  60-69 yrs     >45 mL/min   Normal  
               70-79 yrs     >39 mL/min   Normal                  80 and above  
>32 mL/min   Normal 









                    ID                  Date                Data Source

 

                    [id number]     10/01/2021 11:02:00 AM EDT [facility]









          Name      Value     Range     Interpretation Code Description Data Scarlett

rce(s) Supporting 

Document(s)

 

          CBC W/AUTOMATED DIFF                                         [facility]  

 

                                            COMPLETE BLOOD COUNT 

 

           Leukocytes [#/volume] in Blood by Automated count 8.3 10^3/uL 4.2 - 1

1.0                       

[facility]                   

 

             Erythrocytes [#/volume] in Blood by Automated count 4.73 10^6/uL 4.

20 - 5.40                

                          [facility]     

 

           Hemoglobin [Mass/volume] in Blood 12.5 g/dL  12.0 - 16.0             

          [facility]                                 

 

           Hematocrit [Volume Fraction] of Blood by Automated count 39.6 %     3

7.0 - 47.0                       

[facility]                   

 

                    Erythrocyte mean corpuscular volume [Entitic volume] by Auto

mated count 83.7 fL             

81.0 - 101                                      [facility]  

 

                          Erythrocyte mean corpuscular hemoglobin [Entitic mass]

 by Automated count 26.4 

pg           27.0 - 34.0  L                         [facility]  

 

                                        Erythrocyte mean corpuscular hemoglobin 

concentration [Mass/volume] by Automated

 count     31.6 g/dL  31.0 - 36.0                       [facility]  

 

             Erythrocyte distribution width [Ratio] by Automated count 12.9 %   

    11.5 - 14.5                

                          [facility]     

 

           Platelets [#/volume] in Blood by Automated count 295 10^3/uL 150 - 45

0                        

[facility]                   

 

                    Platelet mean volume [Entitic volume] in Blood by Automated 

count 9.1 fL              7.4 - 

10.4                                            [facility]  

 

           Neutrophils/100 leukocytes in Blood by Automated count 59.4 %     37.

0 - 80.0                       

[facility]                   

 

           Lymphocytes/100 leukocytes in Blood by Manual count 28.2 %     25.0 -

 40.0                       

[facility]                   

 

           Monocytes/100 leukocytes in Blood by Automated count 9.4 %      3.0 -

 8.0  H                     

[facility]                   

 

           Eosinophils/100 leukocytes in Blood by Automated count 1.8 %      0.0

 - 7.0                        

[facility]                   

 

           Basophils/100 leukocytes in Blood by Automated count 0.8 %      0.0 -

 2.5                        

[facility]                   

 

          %IG       0.4 %     0.0 - 0.0 H                   [facility]

al  

 

          %NRBC     0.0 %     0.0 - 0.0                     [facility]

al  

 

           Neutrophils [#/volume] in Blood by Automated count 4.93 10^3/uL 2.00 

- 6.90                       

[facility]                   

 

           Lymphocytes [#/volume] in Blood by Automated count 2.34 10^3/uL 0.60 

- 3.40                       

[facility]                   

 

           Monocytes [#/volume] in Blood by Automated count 0.78 10^3/uL 0.00 - 

0.90                       

[facility]                   

 

           Eosinophils [#/volume] in Blood by Automated count 0.15 10^3/uL 0.00 

- 0.70                       

[facility]                   

 

           Basophils [#/volume] in Blood by Automated count 0.07 10^3/uL 0.00 - 

0.20                       

[facility]                   

 

          #IG       0.03 10^3/uL 0.00 - 0.10                     [facility]

ospital  

 

          #NRBC     0.00 10^3/uL 0.00 - 0.00                     [facility]

ospital  

 

          MANUAL DIFF NOT INDICATED                               [facility]  

 

          RBC MORPH NOT INDICATED                               [facility]

spital  









                    ID                  Date                Data Source

 

                    [id number]     2021 01:33:00 PM EDT [facility]









          Name      Value     Range     Interpretation Code Description Data Scarlett

rce(s) Supporting 

Document(s)

 

          HCG SERUM QUAL NEGATIVE  NORMAL: NEGATIVE                     [facility]  

 

          HCG SERUM QL REENTER NEGATIVE  NORMAL: NEGATIVE                     [facility]

[facility]  

 

                                        {      KIT LOT #             [id number]    

                                   ){   

   KIT EXP DATE          22                                      ){       
   PROCEDURAL CONTROL   VALID                                      ) 









                    ID                  Date                Data Source

 

                    [id number]           2021 06:01:10 PM EDT [facility]









          Name      Value     Range     Interpretation Code Description Data Scarlett

rce(s) Supporting 

Document(s)

 

          Progress Note                                         [facility] 

BBXANy7dNpYTYxUt92/EQDhdFOWsi4RtGUhcUMb8QEpvJTEoE6BfGRR0eV4gZSF2JPdNHnHqGsUvQWFe

lbm
VnTlmMGvRqQFDkTfyNSaUjEPdfJyzjxPRkJT6VsCB0RLVlB39cFYAvGQYlR5IvMBK3QBh+Ig7IPJWepJ

CiZO5UQnwT3OhfUye0KC6+af+GeUmoUaCu8NemG0foSLDEWguw3uEz1MZEaNDcSihmwFa/e8wt6nD5vE

8IcZimtR+Sn2w/7282Yaz+9xfbURHnnJX/Y29YJjx/
mr21yrFwrGxch1+wVuAQ6Jln+kDxVQYPD+v5SCsDi4Ag990Gl8Cw45LmVgwehcKS3vgE+5MFQhQrBcwq

YRqmyiXoVOStEyJaNHFRH28URUnhbEbDQYlpGkSrZKoQQx5TqxMQpnTq65Xk0tNHI/ViIwNcvHwBFD+F

ij1AE5kLFbnGSJiwSBCCMHdvjAnU7sFmC126TqWyi4
LcEteh1SBNvLPurvammG/NYjJCwJyFLiBINd7J8sXBhHl6BQ9QAufkrY9NlkjpEQVi4o/8dmVnVRuFa6

shIu3CuQLN0kMf0JOxh1Ip6uuow8yIVwjpWhbM41T7R/aoKOPscAKvrtJ9xfTw7ECKVEJKvkWBDtTAen

BS2mSHHy4P/u0D8rCsB4EftIQdHwjtybiqyUjV7vBD
5ou/XXRvd7tvhNJt8R7lAUlwOrNubKo+g/f7/uQrqA8xSArvYyK3O34zeRqj/MP9IKRkcuT9k9Rw5s0z

lPHm0ZwvO6YSV6Khx1ckpHsArjojTtykPMkcpmCLQQaA4HcMrWRCleSQTA39lmbjvOX34aZx7sroOxO4

T4GK5ZQFtwfvOjLBBVrwZb8JooeUvS3rkFYqSslEOv
bnGGmZWVMiZqezq/XLgz2yzYw0mGsZ11V7M0cpx0ao0IsgZtfqCZ4oCQoEHj5EeTybzs2rrHCsg/H9Fq

AyVMKpfZZmAnzGYdCBH3HVTunbiTbd9KQBD3ZF4bpBCA7YxeVHgsIrwS2Cp+x/nzHUdxaqZhU7przhoI

2u5Kx7+aiJY7IzJ3m6Ld0K1hsapGY8uLVTN3hWtXQI
kdkKnkDgpixgYARxOFYHEqJ3iOFEvnhgb0wRx2Hh2vtaoJZP/vkdNbkEEsZGN8InYc6WIRwlx2G2JbFn

l1VFFwkNVgHCTuqVkLPcUORszv67WD3CZmz2YvHUu7PaAOKGhUOSXLBNqoB2TVWBpHAu4nZbQlhmmArW

jBVRgjvX0jkCEzMI+0jPUXfH9I4YeUIjRelncmV1HR
LjdgsH1PEHDySLwj7aax6VMkEiShs6gMXZDbPjmgNJSuRWJBnBkacLZBQrcvuo4d4nFcCa8fheALGr7A

2qQtkygf8gFSEwhKQM23uDZV9fVqn/yb1dhapU+hhnlIdpFUWLyMsW93N08EpJcI/r0GAyxawRQHM8yM

Z9hFzm19/k/Za52Kdhnneo/bQIYhwerHd/e5tNQSDV
vkYPFl1Jg/1x6ml+/fwIWzuSyybHeuYvfK02uxROdT5RkY0pDrcBFb/z7kQ20S+n/gwmBTsCC3o6o3sW

4hW9eEAe0UnbgY7Ok/QiT/O7uU+rdA0aRhfe1zLUudMVH265loYRBXkyaIGggZkWRkiq0i9/hOCvG4YB

G1KlcehFT7GAX8E9KXuDafIfK9CSK6Ho1JfPXh9wye
XYOKuFhUay0276jwqcfPLyqq8NSFGkqSbarh9A8AgcVbOD1KgqPX5ovnJt8Ga9mpoSFcNrZ4lUJhk+2G

govtJKEEMA3oY3s0l5pVPhRV/S/vN6Ivhoi8hR/VNoxgDJl0XyQkaEpavRyPrQfcZC56jGyEXNpI5PWx

N8oWDFOBe1X5BcJckynJyEsB12R+FmEAbyobgodOlI
EbW4pkpZOBCnIVUWYLwIVNkaZkVfS0doFyMTy0VPp245R44f/iQV/sxshKykXyYZcODHwLWk3XXilLlO

N8SjhWs97hqL3OmGbyJX9ABpOsjpNy42mdEm2qmCcyjGHouNmCcHAx6e9uVmmxWdBsrxv3oRBV0lG/Ml

gYkikHJAH2ja4daBGwAPQJCSeztFDWf1nC5Y2URA41
7TsFqil2VDP+qej2V5RIXVeODugTDdkFIZRgQ/Aqho4wdU8u14urYOhc6XRKxmTqKGS8tFqOKB5sesoN

O1QZ8xHLqOkCRG3ztRV55vRkNgte0/E993u3rf+lzsfWd/gpz5KSSvKVKBbutXB5QGAzHHgKiBiEbtoE

OS5eSWbKmFZOAruquAXVK4T9D9Xf3+Df9K+wMNCmVu
ZEJ2cmVjqS0GHE2dp2ReLMu4YKYqm9KgAQeoBGd4XFwmMXVsC0H9gQMzDGSzOI6DINIiNO7AARXbrjId

XzPpGYGGLdUlPPAwRvNdb9AmJ4KuDMCzKFDXHVyeRESiO48hXAxwCg98DDnyODJwVqQpKRf5Pu3BEsCk

HKGaX75zfWFokEBzZHHpXNCAGkXpNUHvL7YnpSMjTL
dmT9UwB2LdFG6nzTQlXQ8aqAViC7GuH9PzogyvWXKCPcWlBBQmAUsiMCHiImXcKCofEMD+Bn7LPQR+Pg

6PYS8fu5NyDVs1GSBpe6EkUMtcVHtjDpUnPSu0CSAzQqrbNvj9NMX0EWT5WJDgHPC1LSp3KME4HbhmQA

ylMFHtBgWzHyJxEdx1MKY9GFMhNorcKpKvCLW9OZFy
TrecNQW9BAM2SpK9DDOuRAD0CIB6NlI0PBIgUVO2QKQ6QaM6XWHcCWS1USH6NGTjDmjyVWh9MT9PFQD6

BQYeREj8QPE8PsRlKNA7VSA6ZlQ7VeugIoOfIRioBnV1BslgIbFaYMs8PQI4JzQfZtr7FVLnFWI8Depc

ITH0YXhhOrU5HaNcHtj8TBH7MmO2KxmmJpUdXSI5Gs
W9MMYxUkXqHGF9WgR7BAGaJtV5SZO5HnC9QHRpSNsiAWC2VHPbTrntHmu4ORR5PVP8IDGbUiMwESZ0Hp

P3DYKrYIZpZWE9YwJ2YRQxPba8SBE0UwA2NHZxCgPaXJAoKiV4QOKcZkIeUWfjClO8OKLnDOX6LGR6Bq

W6WBOsPdXiLGSzMKDnPsoaJAD0QMAhPWB8BeMaKCCz
CT8GLTY7LMMcBBJcMAEiZUGgUoBuGuM3CNA3BHX6XJYwSeAuRBf5GZZ8JMGjWeCaOGn7NME9KTLkKhOj

ODu7YRZ2QEOtDfBnPUq7ZOY6CRFmJrHcWDw3PNL1TTKpLnNoNIz4RFL0MSWoFrZkQZr5OKE4XTNgRkTb

RMs8HCUXZkGlBlCqAFt2ZDT5GYUmQvAfDRe9PJN4JE
FhQjUjPIx3IRP4AUGiHny1NZApQnO9TGVoCUX5ZJH3NdE5VPQsGaGvOOO8CrEiFfLgImF0DNK6FNZ1AV

XeUZx4ILAzGpE9YucoYSBoCNHnRXH0TTllHwXnAOZbMuLnJlGmJBswXOI4TkI7SkbfMzMiHETvZtYcFx

OuXsX8HNM1PoI5RsCiAFC5DHryOSH4JOZzGcC4POV8
InW7VkabRkB1UZQ0LaO5ErogOTQlTFG1HzJkEsI2HEU0DkM7UcytLfL1SSF4EPKpJiniUoh8SOR6GWA6

WnCpDmXkLJv7BZAFEdLnCfn5QWe7FPE9SjwcCmi0OLM7NMO2QhubSdFgPQthFtQ7YeJtQlTrDTW3UpD4

LggzCcUuCSX1FlO1HIWpNPI7EFJ7LyJ4AGVhLBH0WH
o0QWG7NMPfZWA7CMH0FmJ0GBVbVIX3NEY9ZVVnKpesNot9TIQ3BSU3YZBePDidNYM1EkT4TPOfSXY1XZ

W8GwS5PQZvCXI7YTX1YBB6LKRxDAQ8RRE2KnO1TIPfWRS1EWWlYPB3TGOiFIFzLN4rKPnrpeYtHyoDTa

bsJVZzOfzNGwOjRHiRNyVqZEKkOYofII7Zs787TPBr
U2YgpGAyfz5OIGZyUM1Ud727OwZhUQ1GzpbuaB4HIDHgJI1Hw9JswkJyTHN5G2WitKbbpDoqaVX0HCJi

YAUhC2VmnTChOmLxFObvXYHoI0LnVItlMJXzPHzlPZRhT7SuevZFSx43QOweXF6hNVXrSUCvSLC4WYMw

XBtwYSOnF7u0SQwtK8QiI3fwAXZjX9ChiCMzMM0BWL
A+Ph6INO2co8YcEDh5DMGep8GpARuwSZx6SDygNSNeN8L9iKWmIz4rbF0QvAM3fFAmA6LowBSPeDVaW8

Vxl1KTx508J5YwcTZsM5LjG41kaT2hW4udmtTbn1rHxoXgIDbaPf6GUMGuUO0IuUJesLOjVYHxPiIaAM

HduKHcQABpJwB0FYhiILYvS4iwUNWqhnO5FMEaEi2T
KQAhSZ3Kq539YGYnT9NvkYGcpzU9CWQbJu7FCHO+Zw4SAY7gr4LpJLr5GAZfx6JoIDrkYFtqYmFhKOw2

NSNiPvraEtNeNHC1GSF5OTTsJKW7ITh7BYY1ZtOiCrO4UZGpXlMtIzKvJqd2KTO4TPCmJamtFzReNHO6

SUHlIzuuRBI1BSM4UtS5IYJjTFF4NVT4GsS0YRDoXY
K8CGH1NyL2CLWkIAV4OJRwWaXoZeAaOOu8OO3WSPQ0ORSsHIf7PFDlBAL6LvKpXdEvEUieYyT6TbFzVc

DkCYD7AdK1BSAuHoc6SOgwWoKhYdfbUSR8WGkvUtB6YDSxPYPyHXmkQeT5PrqnSoY2BHm9NYC1LnHmNc

D1ODRdGIY0VzCdIqC3MIb4GYF0VudjJbO5HGXxYDVR
QiGnRzXlFID9CZHgVfEjBVu0POL5QpAlTxBsQUB2BWRdQAM1KRTdLiRvYVE1ZuCeSnTzSvEiRMFdFDUz

ZbkfXkx9WMJ9ScKgSttzFGp4HJNfKFF3OHErDbAxKNYrECXmAHclAGW0GWTnTkQ5CLQlVGH7JPi5UVU5

MLDaIZcaHRZ0FtP3OWFyJbu2EOH5OTPrNWvjCVa9YL
n1JDV3XFVgTdPmYMp6JHO5HAHpIxEvYJn0FKP9SJYvOiKmSZx1YOZ7NIBhCmFiBPy3LUU8ULWgFqChTI

q1NFJ1KNLyPdPlFEd3XPJ3YDPbAfOjMVz7RIM5KIHnVwFnHR3VTCR7PZPfYhDnTCj3YRA0HTCaOuUlSH

v5NWE8VLEyZfLiJBq6DZJsSjhbAsBtQFJ7TxU2JDAd
CQO5UDD8WaPiUEJmXJW0CJSwYvR1ZfghEkbhJBI8MoE3TUNyOkCrVGmqNfE3AKFrXUBzJRH3KPXmXkAg

VkQfTPZoFcUIEaMsKHh3TSS6ZgPrWcVfLiCzWYQuZdVxSoRnBOV8UHvcXRD2KbEtWYO3YLUoIPZ4VoZy

EoQqVIvlThP8WtVkUhSsQPeyFqKsCCBuVSrjWaW4Ri
ywZvG9QQB6VkX1MrmrCvv7XZC3EXBpHofwPep8WXytNbX9IrTrSjy8TH4PRCF1XrtzLzy8XNm4BXP9To

cwKIy7NOz3FQX3OiRiCiKzTDhaIvN5QbNeZbU1WNZ4GpV3XZGjKCZ9SPS2KlP7JSAjSAT3CWF8EpT6JS

CzGMd7MFT7JuZ1WTOjVTX0RMF0HeT0VKFjZny9GFE7
IVYzLtqhWlk6FXVaNNCUVhAlTbJmJPEcIJL7GRAxLwEaMWBlSDZ2RNGlXTY5KBFzAHY3TXTdVgSaDSOx

ZYI2DJKwAXH5SLZaBVK1SYTjUMKXVxJxSG9xef5BDBFhYZPcAepPAwSzMBjQGxTjEHEsRPkxEJ8Kv366

RLLbU2ZwrTUfrg8GWWGdAD1Ja876TeUgNI6TybmjcV
mJw7lvIHhyHEYgO8UhT9XfvQI2HVJxM2KgLZWbN8g4KUbpIP3OCZEwYL12YY9eNAZPZxIvNHJsNqnoD5

JdAwJJFsUkHEBjTz8ynUHHl2maQkMpTTUtOgDgBGUxRTqxCD1RLuSvQQErNMCelGujCA3xwHKvRL7WmY

VtViAwDQogID4+CSgiaoNwVthVUxXaXRHuy4SrOQsk
GQf2QGmjRMUaG9W4sCJwDh3rfY0OlHE5iQUqL9AbfPCYhWCfS5Mcj3ECj762C3FymFWeLYHtzVOvGS1h

f8SslkydI1dgGM7hvFJyG83tuW0tWGaqRHNvB8DsffW0R9btxsGbFC5RYKG4Q4hbihDnPSGLSjUgOFUp

Y5fggXtdNAkhADHTQWqoQDZpK4HbquIOGAEwbdrewF
5kSHEoBJIuVw5WEVD+Pt7ZMV0pb2FmLCufDbQiMD2ztw1FPUM3RV6QqQe3VJDfK7ArVYCdVQSrt3LaDS

1OUJ3taUpeUNPkTSgxT8kewqv0hEOzYpWmEUG+Pn0NWQNrwEEuPW3RUmuZ4HeGrKMKwg+tqbo3ugGVCE

Rb9uWDvRRQMAZUfXPabH7ZJDPCLCETBKg0NVnlx+Au
U3WTIHiDhK6SDWf0HOMHxVWQZV5YUFAzVQdEzCdDhbi5Q9UpUB9QDxxe//2/741bT074iyzXuJ9sw86z

TJRB7QIEOG56PO3jgGBeGh+dv9SltRYVUpUxLk9LpC0R6eaCwn66dTA1QvVX2WuNqCYc/55jSz+HvzuR

EA0hqBYcuhnhKKLE34Wpbnhnh6+o3qytUS5tk2G/7f
TZ8+aP/avs13ihkTLVD0zIxr4ZAqYuPDCzwCiHsCVO414xsra5cmj/sNImga7oR1go4f4Lid7hFAnHmH

3BrvyDU91BGAt45BgDy10hFkAt19ivYR+PVl0l29UE10xy0s6/zn0jNSc0V2IqVU0aTIT55UhqSriw8B

PmH2+QLGa10sRpNKCv5QuZt/dHyoWZNbpHAPz0pC+S
HoKOJTfHtaT8NeyRjXtxkyGbK22sKA9N5Nvtn0vqpzULRzwApXRjjGq6Xo4a8DhkEilgz9ST3bhWozMa

eIOf4rbZAOZK+BeKd1ItJjxE+rVxRDsAE41G7K4xAdCuW+d8AaN9NfrPTHLwTx7uJI2uJZJpvG7AeP/0

pwRTQvB7RVgICZJZ6yf51jJ6RJ4QsucjaIqYHBYcCL
F0ffzZ7IU6JJfStTO9FD7RpdNURVBIbJ/KfnAQs0VV5hVIe0UqzT8CEClPqJRhx9tW4lQtnWhLe8c4zW

RXTlo4IwvY3YmmRkwjq+gq+ct7Z5I9jMxIrZKIqNQ2cnrnP3922Qd8FvV7LnrYHUfhmkqLYEjGJcaaaz

u1Z+2l5MugG6YWsauJRdzNc8UezlOXvFipygSWlKFS
rp5EU4VKBvUMFMh6Sh1X/TVX6Nj55pmwaTWPS5GFtMso9No5WJRPfjkekqc8up9uz41EApDRKw6ykbK0

XdFoWcUSRkvgTJ4fv5U03nkDlaCGdHKkyPe5iS7bu9CmjUqpdArfqnuZgqEZBwCIu1+0B/Oc4Mc8z/DW

S5BbXeEb3YcZX3wUVzXrYU2dZfyH+2WzaKU4TF1X/e
RqRRsz23Ok50s/1VWyBbGU0fqDdmZq3qp2jfW/pu3Q5Y8UmgALXeL/5ILjG8F3yOJcmVoqtrbd+kQYoo

KnzL2plSxj5wX0O5ePDw8EWG8OY3YOQ+YI9ZZ3VtgrXji3QVsDHQnjQ+EbkNfJG+DM6sO4SV538T/lSa

1R7nmV9odaxkERdL1cAgShy+3K0pr1vc7Xl3GSEuSG
8sSgy3JLeNQ4CSz5/MpL3t+f/vWZdmc+sJZtpePpu0toe3gaQckwBLc39IYzQWFU6F2V4c7SPwhF/iga

xn8FTIoZKnfUuLtTX7G5Xm7o7e2uKgmtKY4p4KCxjJMAI+SZRxWO9PB2otxbMD9CxwdwKnkcuP7lbT/L

AjCY3zGN9iQ4oqx/6HvqkfBNw0lyrOV988i/1j7Rvt
fJ61hc2ljYo3Ki7pl5t70wbs2+Vj+fKcObGQ6mt6vD6nlygBtk/u2vptsRM3kbbWvDlx2dtL25fQBrmX

faGPhoBXkYO0zvViL8Oo3cv+mX7eT5aB29gxj1FSE9QI6FQ+VtYqtMN+pmlPBsXXCI9Irg20ivE/J72U

sfVdpBSVekR2g0su04qCU74D46qg7V23FDSh62Z5rV
5noDkYuBvF2w18Fem52ucY9msS6Cl/63bc7Qomamq/h8Sik46Eo1R2XM9xRB4QjuGvDahORZwFkJpGmp

Jq3OW9TTGedw0Y/WIr7AAQ/yce0SO8pY/CI6iGH4kE1OcAnE5i3SRzgKl+jpoOj8yoHgYyYn5EBz7RqJ

VGwlqT+Dt+xk1lJzRRR6sROtKzL6qAhuy1J1ZE2tZA
neH3whqWK2CGZysN2MiPlafnglK+gmo1h/E5dDebiHIotDdIzyyPd8VgJ3aYUQ3XggBRf9SY/004YgJA

D7LiA4jEbuvEH1akJp9DJTFtDMx5JXK5gxysTasc8sEwsjAVrm93zH8ennZIlwIRXJIDpAO+iDJdatyH

IWkYd53SlosKSLypprKRfjtgFWxNtmIXfmMZX5A62M
C40oW9J0Xdb7P/F9Hp4+yciYtr4Vl0wmgyq6uK6nVwv7JE7qAEDRn/q1qOUZ952xgbyULcyMu7d1/YhG

JS4iVoKQ787dgVnq9Su2Hb3kGATQR+DUiR0gAMTMyUEgZ3urqA8gFW9PLVoGJ/8s06/VF+yebyq2qGDd

e/9DMcZAgRhh6ZAauSy2ufMwc3DyizYipcyRNz6lOM
Ko3nK4z0Vjy9Bad8obikyp6FIk9fBxCxx37ScOdgFO08/fFFUdbJjPgdDHjgLOMO8ZCiMEpL0mPfVtjr

Nj/hQx4HFzPVirIaSFrk6azw1cOJCdC0VwUeGBmIcyHI4pwkAdPGyHGDO5V3vHFWQkn7bYxmq6PZdIST

BrUX1/F2ohqyioFkcKwTqFoClG9vqAVD+LyLM7SAUS
pvkDbbaeNlvnS+ywoin7pp1jzdPMOMJPihto3Z0NKBzDj1pRlwxxYxLoJTTNfU62XpyCWCfAIopUn1lI

tiyc4H6XB0KHEGRg2Cr2EDGpsRlTd4grkjPZHg10I/m1HSO/9oI9Fa1f/XkUR8naeJbL3Tr1qjy/ptvl

Xe6tGEsDeuD1lETRiuNSS/C2WC7S1LbWP/9X7UYtJE
JkBJNWNZ046E/gaMPfeYXzlrqzN6s1MK0f0f538gx9rfg70IeSoipA/flmrStIomRJrzJavpzJ0AACzb

Z7j7gFb3pftIqylFbHIn77EFgLrkgQ0jy5wjH8F5I55xy+GvnpFn+Wb12/NdUmQwYQsreRso4aVgAmQR

FqXxAfa2A9CyWM9yYmiFJEGwVzKNHE+8RzZ60TAmNS
NPw39g3rOIVwC4Iz5pg1PPdT2MrRgPlFfK5r+qd9vgVif2kD3InuXrkctfvl0fA+DRBPSBMLGguajXhD

abszYo4i3Whmj0OlaVqkD29DEjPX8F1WkNqae+elKadiqWl9m6w6oMv3NgCqnZ9PqyN7rpq9hBT/PzJ7

XgYXHo/2O4uHvqhNj+HlR+uv37AEb8v9s8XWe3pd03
SFF0tZ2qIRHpZUYyNBf6qcN+HiR+gp2KUhMZIMcnkh9ale0Rkm7nqna6Nh8MrfboP1xQPmqITigZH9a/

Faaim6qEX4xdg56GdQQ9LHUnQsFnRfCgOYYoDHATXNzBssMl1AOKlfRrKMyAxTtWo2HLb1aAzI0YiMx5

owNRSbNvEYsUzyJRXnEvBZPxpw6GpnxSyBDj0xyzBL
zrjlb3ZN1FV+8A6mLt56xrD+oodjoOyYZ7WguLCx+McZkOtzBJltlxyD5hcgfSOPn6lPQb5GR+yhjwAd

ZoKK0RyYXtmYFRUy4XsNELNOdsYBZWGvPkPdj11ejIUBpKAWxF2LnqVU4hlXIL9rCHlrGNSiov1SehZ7

0UFmC42pRg0SOyWpJag5cGMJjeiWRxBNoPRYxAp2d0
GB76LtpZV02nMHr6/+0Iii+kXJu+S3rCbbzpf3txzIHZ3vOUHdF85+8eK/ZXQIehP1YVThtVfeylFlUy

leppdQitwEmlOdMhbJYqoz2/s1zSkhwdmvFNoW1vatQFTSn5Ws5n0vy6PQbn4mCxOq445Toeh5U1cJm5

AysGWTYLxhrV0U3d95bq3idDDSk2e4G8mn199Y1m73
K9JQXLPdojaZInprO5q45xwX+zGoTAFDg2AjFd/ekGb3iC3blwbto86CMKrvji7bhWmQQWtgCMN2vhhZ

aI6y07chIiJDt9IJ94vsRaN1QR38mO5a8hwqaUzwm2D26lp5pv4rL5ZQHrucusNztRFYfASPU1WZe6Qy

p8dyj3NFdQX2XxecCQRnNCvOwrMDtx5SmEtuZ/T9qE
YMnO0jLoYFpkJanfsGTcgAwxqJ9GgwbcOlWXXcy/DFb1sTfGgAsJV4eOhG2UEiFOOQhgFORR6wlmIwti

IVyUzbLFpRUkIJzW/lu1S3gx+IVoK1W0gIGm+ss5+Rh6crJjjOVjTXLoRbHnDdx2bXHwU3THP/QvF2GO

xqAzK0j61pVCxbaaBN7wUW3WCfY10TPDYKXKg2vD/+
Ns75fO3zJw8eJ+IzcedE6qmn44SvWukAdULkEwjgCB9QEYBg4hYyXBBdZ+RXpacrnhZ+Pgr82F278Ev2

S8WJvrG1wqpNqsTeo1uY15N4YEbTWCJkvafJYlmjjyXXKOLe5buTxxEcwU3pwBwRQ6p8PnyxQbfsogME

cEY5qh8Ex3CvjXCbUYDlQQyOXNTVPdZOkfX7xagsIX
aZ0IaPTPjlCV6YepupRJE682eTZjXLffvhIAM9x5BpBOxJynUFoAH7ZMEiVTNx0HPe8BzoKrEsVUYB+S

Vb+g+/fLd4JO3oS+wY1UsCiRdqrgxH2t/qTtX7aUN/Xefiw8TZx+of7K/VQbEClysJwPd0ED+CTbfIBt

Ika4FPjXB5yYWb6nKdo5xiazVmRdTNYrBRuJFQb7Wy
kOLQGN05xrlATBcRVMCPrYVVNIFWYivDK0KMZoyPqW3ZcYK83/tw5fwbLEm+/HgEE4KlQr8Vtz+zyi/2

UUfQHQdeq2ctZTHf8zdlXV0A19ftWA5MarTxmZr1SP3nCLBZv+6PoxpwqAJjXsiNUt16LRvIQZp//a93

zB8Hfcg00lIYPQWpmEgWY2QFvESBUoIQ943quW/eAX
rTu6cqHjfxQVOG0Z4urddPKUG9mj/ZxbzrHZdTF/MrqohGytS6Lap+YFfXcWM3V2VvnsDEKShl3NTyrY

ZCzclJnwK77TvPhoTl8Lx8ecbxGvIlru1tgAS9IlCHJYrYhxNCHfh90fO7c2I+93gWry6JV+5C50u15x

DHUV6HVs8El4Fdt+0+BYCxOFn87UjmGpfa1kZPbCXa
tKkNVxrg2CgovNh9AeAlx8S/7onaJxy1zD0092xZE0FfJ0iUiY3E9y06Cy+neGCgpyXdaIylYrGExsuN

jCcQu3yN/Pv9Tbxb3y8CgdPBjx5H2HSPJwcCCFsmR3YXiBa2a6q2n9AnbxgeCdakwOmRg3ei4brOq4YH

O5JEeDTf2j+lDWYPmg3/V6u5W189Y/BqkUfiA61O+C
ZWp7XlhiNU1C8E1sTe3+P0atlxOXCjUDU8DoD0h6ix2pnVlQvizp5QyowXebRIdBjrMSmrDNJkwqX6kM

Awe3qXvqI0cCPMOuL/dMBZzEhTUL4TTN1V1lOmFiv6QzXxJC5TVQXraLfyq0oYE8Mf9d2f2JbM8jQ5ks

p4s8L9fbkrl2zrRPgn7tnZFWuxPjICSyFzHxiqXSPx
GiIct4TaHUM5HOW7cPJNJ4rNn0rrmKWnokdYoYhlr7DyY0b7cytGr0tFXTUod39yy3X52A21T1jX4xw9

XyftvqBuQYjwg3WIV2RaS//KR/79OKpW4+joL3WZiIIoJwML/qhnZ1yV1CvowLfTIOos08P2OGhmVnNt

WIEFVaqdZPaSs2iqlA/Vjw9hEpcB1rUgXtWITAM9nS
Lenfskp+U5lHxJ1Btpc1GvG38ZjBcmyJaScfF2NW6NQnHpIb4EqNq7YDgKsyGlvLgYH3IUvUVgKiXmqv

+cu2YoZP6AzpBDvqyNgMeQ3k+wVrWnPWYed/SnepOFO4zAqbSHq1yR34sWJvXsjAUI+4xLVf+epfr93/

g9uU/VUYw9/SvqzzKoMYi+4LUba3XFv6BXQExKRhir
LeQ+y/K5lOuF+8qdH3gQv/ZLa7oAgcgEukISo24okNtwL5G7Ru7QPKkMdgBr40kSLy9IlWvRIB3C8Qok

jzTEHHG8R/DG9Ij36lkcsvNcs9M/QtUnKwxXp3nFaupuWoQhSOpMtDytxhb66f8Tn6bveGHK8SohcrxH

+zwaCbHbjmPKCI/7peH/VhAqmeteyJ6Db8Yhi0Y15A
4yvjKR5UF2SYuyxiILdGpATmp2b9TSKyfZVjq+CMJHG3QFM7guCI958uEo9bNDOX2O+So5bXnbhR2Fkq

sYOYZ0HRa42CBSzuNLE1rWmt0ahV58VmKEj70xa4W0euVaw16pgOBfWaGKDAZ+Mwj69sT78Tw5qvP7vN

SeG1g/q/wHQqcZ2RKJySG4gf18DMcP9f1g+rrKiN2Z
EZJhLkV6e2X/u+WKHG1h09/1I+r77zEn38zKlIiBZZt5lbzFkyf4+BPCd6N7DtgQBRLplifTXlV0d4Rd

h7X85tbxdsEtcU1kLeb/AgzradTF/Li7exdtrxhIgz5sdxpOZ2xxFsaYvUizWnB0pCm6jXvCcbc1uydI

kSpxJd6MDnykEzkOZqXi7D2hr6PL90RdnUanuvHnLs
XMIO7D8DCKVVye6IpuVPW89jclTX1A2sz3wMj4CRT6Z/EwbMX2wnsCqVuKa9addJ88kwO9QvC3s1zX77

rfg+UctrtkC8080rADd9rm/xq45kxpqWuDALPbggQgDO9eVEMsD71C4cRFFlYR4/s9RkTuIUm+nlB1od

LDFuE+kDEPIT16Ejcgbj/oMWMMjcMYetxTQY+bYzDm
8jcM4puT0uejKYaDvUajo+hyjK+a6X3WqEOX7y/g1jirK59wEkZ0iYNoirVNwwFIPrcp7e1nh1HfLg5/

1ubN7V8zAaC0oVjAK1Fre5T1J8NVkC0KuegM4u7BwVuzlsot7Y7Iiq+frkobT7T7k+3LtgzbCDxePEFq

iyAlS4HCUNxrwLvuU5hlH9XV9V/1jG8TPKhmKhDwUb
aecj04XFrtzIGBc6O3m96tZZVFVV9kY8bqp4HCpNktkI5hiscSU/2IGXhi3NBjdcuv++rukZzRn11mDq

20EN5/F2mSOk0Sqfbwocn/Boqi05k3rWYPSyRXlLx6oxjyTj+IrfI76512c6Tg/cw9OpJu9ddjWQaNWy

pCMe6OhkltogAKrD+pDuokIHIkUkMdexL7h6QhP8vE
9bJnnzr6xztnCyD+1B5jC3Ypv/7q9BI8fKLq3DX8kRvVf4J16x7S85vYSEMIzTMOINwQXxL+xln0Kxrb

w5QObEwbke/701Rv3s/ivJt8SomP6lmHt/S4zNGy1NIqXRvTnPFeb+WHjaIzRWnvJmM07wu/Z6kzGWVf

vUUChA89boGtZjynBf0eiS1GEyj0XyPVVsXNt88aWD
YE1DgWCX0xLHkP5j/8epIDzZbTLT9Gk0/RbQ/M6m5IG7MTP4KgbynYYbgSbtWwXbPT4py8yja9KWWHa1

jlzB4Og7Pmmc58EGS/B/owpqGmwhFLJ7XhNj7GdiUz11Yzhj2Meq1PW6vBfGF0/wBjV7lOmAl8L/ADkX

0zGuuHII9X7Guq+/Cz50N6vljbQMiPC0db5ofMUYvR
IRjnASqCNy0c6v+KhvWCG0FzLD1UNRAJdzbGj4KtwRvvJEBE49Pre7geU50aL2goYrwM8dMZG0V/0EzQ

KaPyEOk5SZnJQy3rcMSIv0xDP2WwdEgAfw+yoCVoR1sXrAr/udqK9AkSIOG+ADukL+rTw4TLXodSREcx

Y70sG58QL32PnSysqT2d4fYW9T1JqMnSZlA12E1m3w
C3hE2H4ey2dqUoPtN9vSkhvnVfJgz/3YqaK48AWqauY74MBbG0tiTv+ak/h37Bhpk1BoHxwfBz8MXnRn

J/A7AT/q/zbiwilufg2NdM2uPNKG9gMrMBWlxfKirkwdqow/afJwauHtT72O2A4E7Tz8veMx94aMfOoj

Xc9q+Utk8SVEK7PbXl+xN9Ptq41zNHrrbXxf8tc1AT
iq5WrpcBbwWnynx/K/lGfCf5lugMySsXiGSnI4K2luXr69+jr6d7N2RXI7gX+ThmX46M166qB4vSxEDH

Bl9IrbHkcPQJqulS/dw3mO4zZXOOKlQukEfKBMzXCP7dOg98NecN+EqShzgSpn8mKO/H1jOn/hg0f8CG

/Umg5NvJYsNtoWNR8X8UHIBUfa3T3x5m//KdqaY1Fi
ye/rv46muvUmrU9LU7GNuCX3YpKf6BcMqov894C/0Gropg0yuETy3/EufFR/H8772RzsR4UVp0MyafB+

vvqQF4PJmdk6OlrXX7bYKAqXBTuXPq6DBEI1IaNhREx6x08V/wEG528fibD/4vVHYMhy8BkqPbe5oKlI

9tnYH/V/RY0elXt6ZWgrq7s4P/Zftc2YeoM6UH72X5
VTjqFiWYKkUee261VUeoi1oKpaeE4nyz7+iJlGbdhR2ezw3EfbQ/Wb5Z9FLmRjPj6GF6hujgpMNu9zYp

D03d6LhswYqMnnT2nhrPHpTQB8Jz0j8u21ywR6+QSntI6TkEZmzlDaqxTt529bed+Hm/oIxa1cjJmI2s

W51SjLfBpzhNXgiTkZknbY/8KG9xFiL3Rc+5mbuXzP
tTPF+OPGaN7DIA6dI/Tyj1RkSIfVG6jGuW756kw6ocedL3058AyF2Z+YDledMokdlKe04lYDm6VJ99mf

DctCCIv5AqUm2oVymfgXEtaTsb2lP3/emfJ0udA4I930nlMQi7rYnlfpi2eH48ubIl7e/UXcD0p4lHkJ

R6wkHlo92aqM4bgI0h/c3O95sykFT92O53qix2D6dI
vfIpLwvS2awJf+uLbBIw5OIfRR1AyUyiW6TYMPzH8nAtq38+7+Y3zBQc07LXdo0/XL29cIdMxMIqJEWw

s57C3gP7HV5++JUy4vhPv+9Rnc/i3yZmAx5B/yZYb1cpoSQS2x+rW8k4EQOFewzon1g+YjPPXP4u3hFg

33oeDp7l1ZeVV/PiRKil2mAt5kXJgE94Fdq6LqRZca
4CE/EoOXjtuL21X1wB47l23rn4eIEfW6oYt4i4n0tiglfoXXhPID/Ly6LwqR/5vGseZ7/N1qS3ONHvQH

bxMom4fsD6Jcu1e64mcnB64TM4sdwt6H3bWC98LxApBTdOVKqLsLvncLWD1jgOfnu0U/k06mgUKfjxr4

f095XFGV7cUmB0k2TZSioLHz97Gqgm9G6kM9M45PEU
N4+K5NPMgXg1NwfEY9FhREaBIGknGYKINUqzzj7NsLmerqIQXt08tXuo4tRmMnjSUMbHMQgtQBlyODnZ

BGwkKdpB472RnvpqDBfSq7JuFjGeso8BXC1IqxAHJT/4sOvZcVS89P+O69B0sG9RwEy/tOdKK2HyiICx

EF1aMHn3Hcgou9E+yZnvvwAdDIreV/sc8egRmst+Pz
l8/wVg/SpjWhGPzoPgHKbKzPFqTzaOMUmdz0ZV9I0QHxD5xL0xiMvOwsDIbmuWU/kLLblKS5tpCsg6wd

qYtsvvvt28zD7Axu78eqrxHXxyiH9Wy8vQGBixP1e7j4Kkj5VpEwjoalBLnHpIexaB2bwzSKcQdpv2G7

VcSvc0D0981Do+wsH2issjC25SoCYoJqurKt8FGsTi
mvQJ1b19K22gl/Nei2YN7FoDeo552QpTXMk1QzdsD3HZC5Yq/frWSybHIYjOwfWnJ41Unb6AumCvSRT/

zlR5ahWB+0Fdsxs4WcH4WT+ajhPyzaQMNykGU0L1taSywzKcLfEGFClz3cqg77axuORfZcBMBMIfaw3c

34t5/IZqh2uYP3ox2b2xRGM0DOYeZ/nMidTD+BUNcN
y79uuCBtpXLvRyydBX4wRB+Eby63+swVqopSawtQQjs26t6FRj7v2mxiTi0L5S2c+J52zLKMJk4GXopg

Ib7gu0Y1JbsOxdB/SSGVhHh4LRTZrJffG2N0Q1jizQYsc5ZRoP9yqf3o4I4YeZ3nx+hxY6zlv3lMZDmV

xISLo6nyuHfq1VE6PO+TzO3Q+A8uc3Op7Rj4NzAUnC
Y4xmlj676cCIj/djALTe06rGnSfRr6mCa+affy6ud7112+/JRTyV6wAEAmg3spY2AA/faYDGSlQ7vikb

dmqhQh54qRCZK75OdmsUbEShLMREsPO31pKnbl5hzJ0f3Ee0akzT6nDT7QQxTYBbnkotLLPUcP/FrBJn

nNTj1ozOIHpUW71u7utWp4lMLQ3RRFo837p6utX1+P
dc6tNyEHfcnFszdC70/M+V7kU0VIIbV/rozxfag9hrphOi3J67d2C37x/cQ+AykJjeBnNSI3gaHT7q+m

HnfYQxej/S+mKOafKucQ0e0e+xxhUMtT3y2W4oA3IwT6JP4i+82F2Ia5Tz2AQe3uYkody9Phpo8l6FrX

yF30M5Cv3E5Qr+zSp9jf8wtyctyJ/I0wF6Q72x8s/h
TYRMzM5lz/n6GTULv7joveA8i6h+qB0qAxDr+B5YrVKpQcD7nq5Rwm5/WWk3HE7r9MVH+umENz0fm4hQ

lFoY8oX8dwO6pW4FGpqqSjh+F4ekVnddIB2VP869NlG5/bRFJGSZR2b8LmGY99MySBuXquz5u7duHTrC

kHn5Th02ytv0Cl46lD9je0xu51+3Hf/ddvlPvfdPyR
4H544kC/nunnlBudE+Cv+mqe0LvWOqBdhU55cPLsP/wsfCHYfBpEuTy0p+OXMxW8gQz05/RNO3SjZyA/

lcAFYHyNnVXpo520zNfBmvwmbA4x7ltJ2Rc7l0Z4zny+D5Emd2ySr1pg02hqz21soI8iWohwbB24bB/j

TLS8gza3vK39ceOeCDU/xNjHlUKF+2ib9sWM9sbvej
rVzrEIDr0jovj6Y3gf7jbIypKAriEcVRyoDtPHevYeiWghgwVd91d+7F24zQZnBXr3jq4z9px5fSP8Dg

8ynO2bo9fP2Mg/fHwGm3bUzRW3appM2iihggtEsWjqC2D/Od+7K89/ThwI4KhZvk0wqs2x97f/heDaDu

1VX2sOL7cUqwE+nm2N9owc80K7j5ykTrdr+HC2i6gF
ivgVG4PEMXisfHwlWWmPJdC0ezCVz9+GmpSkLvUhLgtgAAl3n57Nrta+FHgx8nQ+3T5IpjUNkZfIN4l/

BT6Cc/gh/QdPh/L2gifY3w1UCG0EB7Hu96g3TNhXIf3QbcXNDbsEgBNv6YVQPatwuSeSWaNL0LpByvfI

7VQ6H8Igx4temPaE/IsEqGT5e3n2Dh4FRr5XhGMsSW
0ZjnQC9Ia774NDDoTuE8nt6COwnQOxdlRtQv4fa850tApaCiDzaHoasfbqZQrMTQzH17RB8kOQU7cedC

jb7UxhyOeexZui785hLfJ1Z3iQ9bB929bxdmMcVHvUr90YbE9zdcKjeR/ZulMWo+sn/XjdUWbbLzVPen

7wQk2CbCAn6MNN9KZbe4vnyT75MjERS2pKjisow5GW
rLjDV8xzpBnQj3PzFPM8XlaIZgL02rqshldV3LB+YdvO8UQRnSVuylXs49x8sbJbh7uy6FiF/ykbDzp5

WM/9/uXgLiLSbm28S53l6s222Vre9ivJSBtMmnvxbj1doyhcxtRa4PM0UniBExds8az0JR/heB+6Bfn2

VtRKagm9D813JgKCT168KKErVScQ3O8grOaj0A4Ttr
Aj4Hex1PLWly060ct752c0RA+6g0L5g2rIpljgzO3ZtouaSe3Yiipu6E4V1VFzLtHAuoSnQgGc/txjoN

+smKoIhlE71eeQnVkqXIlFxT5QjeZkCnXUVuatU0Zu8LaX18iyrrzRLVVhQTMyc3DxSyA2J2PDZbyz1f

akm2/fSx3EuGloleU46GQ/JZDa+m1Nhq86mOxKfdO7
tPKl3I+5Bwq9+r4BklFO6T/Randall+mazo8cfFLObbwg5jkKNOyZV7vv+5+gWq0iWLKveddR7q2oe1MGXKq

2N/otarwxAPNsgZ+12k87qaQGafL3gK8WvOc8M8jW79/W0pgkzW4A8mw30H2Q8NzOiEb32q6SjYyoIdt

wIL097woN8IIF17H32yJ+2mkODYhJ28UJ1cBKHbJYL
eJwPejt2n1l7LcANCVR96FK7gkyIcCExQ5QYG1sWuMR1E/iB2AtjoPvn5om2/Rvdinq52wVuwM+gdrvx

WhqqRJcyBoASc6gkt7pDjJ1gkgO9ihEY6snvJxd8Ed50B6KU3ZoyFlm3QJLeHAK1lulxe7dA2pYaVWu/

DrTO5U3B70R4duBHM6bvcoQ7Ild3GQI7UXPT3i2M7H
ECuvZ59sk+6dp2JafklEpopL/VBWNn9DDTJLhI4SZBDj+jn/90Pb42NlK7JtdOAQWr29EgMoe3qAUzp3

Usi5IH/Fsngf33gLJU2Ll9Q+JUku1OnVzQriI/QxfAw5NDH9n0aLqqir5Px3W/LlhQ0vGi+j7CZkM/xI

Nvp8ZoXw09Daj4ncae1pfAhbju3f/RNqLsndQdaTqr
ws1eCthX2vjUZbHJvK2dltPucaQdEQwcsYsjihgTPZHi6OG6XqLEtxODjwiiDPyI2wA32c+hjih/JfAb

9Y5W7DvXFjAtO7/rZWvl6R99tqs4sEY8FPrzEbtDnLi+GTxie8Bb++qv6BHO0TV5nSaXzk4ae6OmryT0

Y65EB1dfL4iDiSilhr/96VXb01+LxVd6RRgfJOM0Tc
C4OhqftmV7E+pelJeS5Otd8fZZeIPSkkA5ojlkLvvrcuUFw+V4rk/vF5aiu3AzMBYXGCyZCBJA1jnT6/

GusQOkt3FBPHofFziZfuPY3eeRR/X921DcR4a/g809wMm30IJnQ0VLOwbwTB4Y9P8My3M5iUazKQl0Y7

p4sbDBtEp5iUK116BRUp5N/wUeNHKI7JM2soCC0/jn
yjh9b7Y/dM5YhiD5ubSGJESvk/Ok7zOgjjEyqYK6P/BS2yaQal/zICHA5GBq5QPp/cdgp7TTVxJD7wp8

Re54JS4f/bDl021x/Wy9cXP2xI4auRJSb3YfgMp5gdk2B7v8qFmPHJjYpZUSP45cxNpM6jKyDChLrJ5g

2mdm4Fx4klRcp5UXCBwJZZ/DVySY0plgYNxNUxopl8
abyan9Unvpe848dVy/2k0B5X8A+jM9cngAgFx07KcvxQREC2n3va+1WsfBwD61IwF1FxA1cM6Y3F4hbv

2POjkNxyygnGmknD/33v/PfW++V2wtatW2vckN1ycP1MwG/iiv9CiJ7eh/WzabLn8VrJdEHeQBClo5Dx

1pADCS7wv6q6lNr1bAP36LOoxwuFb5yHHLaPI+8WvY
R6XDGfqK8Vytms0u8Q1uB60hD/ELOWvXJArMAfGdtV6p3dQHtUHs0s+IWrxXk8+2E5/Rs93q/RqYe3D2

w+jDGncViF8tLfTdYsMhTPSm/HPWrG+EWHsZVu9WM7nQ6AxygsNH62DkCU4g70Q4/ZvTo5qtoG6RTStE

ucd9DpaLXPxla4BSIJkxmks6uaRWb+z4x9XRYZvB+w
rcOSnwm63ChTi/8wf55xd3e4aDxCrgd9OHeXrcUSLOqvx2PCK8YrQY8TPeJ/Z5qdU/pj16aU0BjUcQTs

uzwS0Egd1lKbrFzPUc8/TkKbq9S/wmKDnD6217iVEgYhcbBiOLOlC1xM2Tr8OKDhnUhtBQ5UXlrICBpa

e5LQ3tFRav0mQGc/GwbqhOEgv06q7I7Y1u40MEOI9d
Hf1B7wvpZZ+P+H2u1lP9IA3mwvV2Piq/uP5hzkl3zFzvSsgpI4bD5xMt+EhjSXyKMf3afIoUO4CqmMxV

HoQ3Kqd9fuh9o628ealxmksTkW2kbkw8kU/Ske3p5niO1LKGgX8truhW2DHExWcX50b4cUoLE9S3XpOK

3KQir4ceflBFkmOXDSeDETqQ8RhLtDGj9E+YhIBJYQ
GlIT+CCs/lCflLFZv/XM4gOKdFcAZtzmAdp4j196beHdr7CwL+5RS0yN6ZgL9CEGyP/ECJP/C3to8F9v

jZedEebBoS5M5AbM8eO5aCk4Jd3NzozXGh+lnxudO9RpRfzbJS9aMR1UKHCkVEIaCcKyxMzPAytWPJCk

jxzMeWrDSFBR1cdQuQYktTAkxVdPDK0FUCCulNbIzr
MisBJQ1TdOBX7E6ToJHkmLiDIOj5MD1ots+Y95va7zz62843qEcTFmhwFMkAwI6jCAmRVrMnMF7oTQ9+

cNYBJtPZuvT4HR9bboDFNB4oHPCLe12ApJo0dHIuhpKi6/yUPeX8tFNnZQTSIhGx1bjVDk4/PYL5yHS3

JBahSD+/Cb+V/B03q8wPHS2aI3LQqyJFZ7eBcwtUEu
O42bx54nAq7EevLFXP+OyUMyxUNr0RRlID8cOwsmHhnLx2EAl7arbOyInmSehTptqryeNDw8SdcFnUjt

xlHiipVqdGO9VDPQtUSAsL+en2PHzkES/QjU6Ae9GomM0sl4tDVRKcXJ0qRoLAS7eN19GOJB6JoEiH+C

u/I/SAwNd/PTdkkhNiZsR+R+seskPL2JSumpYEIvR2
7biLePLQppCxj/J37dD+fhaULAwN1HWg+kq85gAMLg4xID5bBfifUMXRmxbQtGVEck540+TzacypH0OJ

ASrW0JUf5czdPZMlO9u2UDKwaJP4KEc+5M2s+1eXuy78VZuXfBf+I0FQ6vhtVRBhMzTE/RIYyG6uBl4+

x2fHd6+Mo5pugduRWiL6AZEIH0U6Y8BmRmPwvttNP/
Rfpt1tOrVryewaZKHvPKNSJbRnEaLahQ7zOQ70rsRvdhzDU1gsB7c3/l7n+B5Kx2WkGcG5YOk5itzeLr

G8vcsdybGGFEJuN1JJ2/o9CEmHnNbBxxopo7zMGIIwmNuSPuy0BbvM9E0DgXc2A/Gx2gaafSZGJGTiHw

CcCKaqPpxKtayf3MeBBnoIM+NyxCYNRs6WN8HayUy3
SXm2BOW7NwXRuFIXJrTJO4nTUoVGEcEBm5xD1L0hGQpauyoycX9mUysq4ZL/6WlPxKpIfznyv5fROK0h

WnoVf/G0DPHDxlGVaJOAkCCqnoamkCUxJmTMm2CKtuLZACblXowelf81arDmVWYhh5jojX7RodJABbh/

96hI+6YOXS96hYo3W9GPB5RgKMiJhEHc2TamPx56YU
bt0o1CTeHVjviPl4vJZYVlOjfsDKHZ5bNLdgrQ+mkVTPb43B0h+Clji+1wP8n6VdzkhV/llMObnFISCP

C3UZFYTJiUbPaPcTcFF7g8QQ7qzP2gzPcCLO1Xl2Of/FR/SvyqSylgl4+BbY64qqJr+HLw0CFZSpkFE4

0HPVXkI5vbsuU7J6Uo/X/4P/wvwNr/AdLuRjcNCmVu
HQG6zbMjxV3ENI4hr3FwSHkcZuXgUM9uek6LLIE6FF2MjLr6DCWdO8HjXWOwKYAuz7NlEV5PQL7xuIpj

GwU8Fn4PYnIow4PuOLKkJNpKlRBJp30ULKj0R+o/+Qihz3ETT8UsuGCTTx6x/LPQLLomqDUTMyi6D8fr

HNaKzoOKBk5XkhEU4kAjH3P6dtX4LmaiMcnBforRNb
tu9q0zibMVPUoAw5wqmt/1Ir063WRX1Uvc0bsOPZlobB6IhqBoRLH5pDZmGv2rZahwY+puAvXo/Ql7pA

LSqRAIrVjv0jA/Yh7J9Sx4mpT9qSQZReUN5A3o9KJRLnDhDt8nosQlQtOjjOHtJ3X4Dxdf/jG2D6VImE

9aoXXHK1x0c3X0FCGt73v9JK3kNZ1y1WvmqVmuD/TO
Rsq44vcfm4iLWAngerKhsIEtWD0UUwBfJZ1bvi1WEXJbUWQbDijYEmf8BFdwWM3MjHPeR2VelvCIUDNj

khuieA3qVDgnZF7Ow750JmPvFE8ZGMYQINVoUVBqTJiPDhLqN1ZcJ4UasYT5PIYjX5BiPDFsG5s6ACsr

ZC4CBWUpXK68LT6iCRWBEgNzC6DqJAjuCRZmDWfdUF
4Ka669PgAtgVKaWYNiFeMdOZPdXYSfHYR2KX6XUJObMYXicPfpBB7ivSLcGQ9HeOPsPdIeRQvyHL6Gu1

50RmlsZTIgMTIgMCBSDQo+Rj2YEW1bx6NhBHquFOAjRC1mwn8ZVAxRLpRcQ8Q4aWDvWc8akR4EyLE6tW

NuJ6NRAGOlgeOCoDUrWh5TSJNwRm5uwH9MVSLTHONz
IQQnQYsxL1hKRR5IDGQEKRDiUDVbpbTrlOsQIkNnW7KQFPX8l9QdtSykHx0tXLsiHbJepIX9czcuYWHw

g1DtBH5QpqTdipsdCcTdMWSzufNtqEitBF4OuCAfqQZlHA84VYI+MfASHiWpD0PqznHNPRYzwiivyB6c

FXJ1VDQxBb5REBElPOchVZVcJB6YEfSxOmr1PG9iRZ
g5HUhxMQSqEGDfNBr+Hx5XKP4kp1PnYTktIxQcKU7epy8OKUiBFsGzC4J6uECcQe7flC9WaJY7jVJoN2

Y2rFWaD3Tol2BPn185T7JJYXIMOkdPqmcyxD7IxxGwVViuKn8DCONfxGz5jD7UXPkgKU9RUREgVY0yXR

14Sy6sgLLePfG9VLVsNc4OOgIyP7ZbFK1kZ06bKIXr
MyAwIFINCj4+NDqadzAxWphDIoL8SHMez9ZmNBrgAE2ZRY9ps7IwOSacPCTpy6AqEBo1WR2CNNVyQRFp

E7RzjLPrV9YFYy8KWAu4C7vlPNliFh1ArOZqIOEoNYznUE0Kh420OCl7LE7OSXW8IRBnNq1QPZVwYM4R

BXYiTNOyHDBTJzOkTEAiTeHiYNGfVBEZDr9IZpQzE1
nRUzluM6LqTWjkOk8PSoXvN0F0gXhBoIY2WTJ6QK4HTsHEYeZvJPe8I9F2uKGpD7Y1kQuVnLV6VN4MVD

6GURSwKG9+HyVbF3UPBWmHQJX7DP5OkFUeIX6PpMMCY3SdaRZrHo3wCSUbbBnseWy+BkCbZ1IBCKRXSW

G0WG9LhZYbRB3LeJLSF5PudHCuGe2uLSkgLgUxXW7z
ZT4+GQ9RKKOTJbBBYiAgDSwfVPkbLQMbGNt6K9J5YYFuF8KOA9K9C0s3d5kdwn5+BR6QUEOiI0XKGGJH

WlCsQApwNPejUYSbGRj8C5V3RCMlR3NRV1phS0l1VN5+PiANCiAgID4+DQo+Ym8NHR0tn4SpHKceVEZc

WK9pby8MKWwdYGHfO4SdIBIzAIorV5LptYysPU1CYX
faIKbxYO9BRETiHEX2GU3+JGgdwGKxAZ0VQgi/sSXqS6qocXXeBLmdiz4k86p/JsFfXQ7hEcRRUY7nM1

DtpCt8hdSZbb1DJ3lrLiymUq2+PGxmAMk6SzjudC1ecWBqxPd8hFY5sc6nGs4eUPokBTyadM7uftI7sL

2sOTOyXvU3apG8fJQ9PF0bYk6NHBSwTCxxNMV4LnYO
TXipwA4sVtScIe1yeLQ4fElnM7t3pw16Iy4zqeazFSs3JD9uMj9pOm0iPAArg7xocDR4IK8aVli+DQog

ZXSsFM0fRIY6PgZIVu9QSEI5P9b9hA3bwAT1PV3TCkKbJBEkGLBrWDXvECLpHQHhWWOiTOKxWHFxKGEd

ICAgICAgICAgICAgICAgICAgICAgICAgICAgICAgIC
AgICAgICAgICAgICAgICAgICAgICAgICAgICAgICAgICAgICAgICANCiAgICAgICAgICAgICAgICAgIC

AgICAgICAgICAgICAgICAgICAgICAgICAgICAgICAgICAgICAgICAgICAgICAgICAgICAgICAgICAgIC

AgICAgICAgICAgICAgICAgICAgICANCiAgICAgICAg
ICAgICAgICAgICAgICAgICAgICAgICAgICAgICAgICAgICAgICAgICAgICAgICAgICAgICAgICAgICAg

ICAgICAgICAgICAgICAgICAgICAgICAgICAgICAgICANCiAgICAgICAgICAgICAgICAgICAgICAgICAg

ICAgICAgICAgICAgICAgICAgICAgICAgICAgICAgIC
AgICAgICAgICAgICAgICAgICAgICAgICAgICAgICAgICAgICAgICAgICANCiAgICAgICAgICAgICAgIC

AgICAgICAgICAgICAgICAgICAgICAgICAgICAgICAgICAgICAgICAgICAgICAgICAgICAgICAgICAgIC

AgICAgICAgICAgICAgICAgICAgICAgICANCiAgICAg
ICAgICAgICAgICAgICAgICAgICAgICAgICAgICAgICAgICAgICAgICAgICAgICAgICAgICAgICAgICAg

ICAgICAgICAgICAgICAgICAgICAgICAgICAgICAgICAgICANCiAgICAgICAgICAgICAgICAgICAgICAg

ICAgICAgICAgICAgICAgICAgICAgICAgICAgICAgIC
AgICAgICAgICAgICAgICAgICAgICAgICAgICAgICAgICAgICAgICAgICAgICANCiAgICAgICAgICAgIC

AgICAgICAgICAgICAgICAgICAgICAgICAgICAgICAgICAgICAgICAgICAgICAgICAgICAgICAgICAgIC

AgICAgICAgICAgICAgICAgICAgICAgICAgICANCiAg
ICAgICAgICAgICAgICAgICAgICAgICAgICAgICAgICAgICAgICAgICAgICAgICAgICAgICAgICAgICAg

ICAgICAgICAgICAgICAgICAgICAgICAgICAgICAgICAgICAgICANCiAgICAgICAgICAgICAgICAgICAg

ICAgICAgICAgICAgICAgICAgICAgICAgICAgICAgIC
AgICAgICAgICAgICAgICAgICAgICAgICAgICAgICAgICAgICAgICAgICAgICAgICANCjw/oFNvM4jrjO

PpuvT6Q8ecPd9QVf8IIY9uy9NyRXGdDAghxjWpKqpMWzOyYLGoUzlJDiv3NYywZY7IwIEnT9GcZ2UtRP

hdRT9WXSVfUQWsiTGfEJYcPLAiCcQ4VMEkMLuhSI0V
yYTmPBtoSWQaPBVfYN4LCLDdU088ojCeNH8CRb1PTsIlQD1rju3TEIoiEIRzDcvHFce5BRzgCJ3IfULc

yTQgSIAsXPIDIhNpK4zny0ApYdCbNLWMTDmzHE5Cw9RhrVIlMBx+Tl3MZZ2qi2PfITrjMTBsSM9chb8W

KIpNZzXdP5CslOeoYXJud2rlDVJmSX8afCHoMED2SO
8uvIKxEL8jYyWFlGHxRHcpYXYyHRRsMG0pUa3wJKUkZGGeHfQjARNTJT4BMNAhMWBjjASwZMGvKQETGY

1HCWdqQAI1EETckkElnEByLSwjJB4NVQJjboLsPXutLUPTOLf+Ul3XGR4nl7TpBKkpTSBsVY8jqr0AYW

hQVlTxZ7F0wTJgH7D3SUooTl0VNIDzNDBzGIedCZBL
IKhgGQ6NTE2hfgP1SK2LvXWpBHUxKXIcoWFqONn6T29xeVHiGBklST2EIHN+Kiesha+Qh4IWOAxDLMvEAMa

AuYuHMBOTrRdW0AkL0KEp7IkO1UgIH51yWkvxbRgTFylJX4OVA3iXSJcKKWVRM0UgFThbS2ihmKqMUEl

WNGLDwVwE63aiQFaJXNuUNC6KNXdNi4MDSWjF0Gjxu
DaqWxkjiRlHNHuDMFPIK6NDFijxlDwxEAnxNuqTZ62cRzmOO7EHh5TQdAdSX2nro8VuTCiGp7BAGNnXz

2POFLrMASmSSEiICV0HPQoFpGmJAjvUFZxMDWpWQL6AEOzNEZqHH6YXwNgBKUnWTicQGlwPFJjEMKcbk

6NBXEuRDOeVMkpLsQbYHBsTKPoVZupHDQvBSYyJPF8
UDYlINOnYX2GVrCjVROmZOP2TsLfCACbXIRewv5UXIGzUFJmXiOqAEPeTKIuSAMbBHvlJKAuXFQkMFk3

NUTqPRCuGU0VKpTjNMMiZRFxPWRiTDVdOLAchj6OSGWcHPZuEiP2HAPbSSHhHBPzRWyhOJUkUKN1HtZm

YZAiLKFfBF9PJsDdRCBiSJT2EqTjUVOoOAZyno3SOD
GxGTJmGDkiNNGfQUFgKJNfQTytCRRxWPL8JUl2DGKoNMEvXW8THpCvJCGrEIEqJCFvIKKoVIDimk8DQH

RgAVCcLsMyBSCiQMOtNAEwKRwxLPFqWJK9BPWrPWWdZEJrEI4AUvJsSPGxMCA2BOyzITOoMATdzd9ZNM

ObLIBoIoO7PkXrCHJvKMMmJLhwBYReRRA6URY2OYXd
LTWzCR5YOnTfXYToCNh8XZOpEQFkGUFhby1HKRMtGJFtHBI4MvPqPOZhMGNeEFf7mjFfbDApEZz2EP2G

Z9MwuqZpXfOAMr8Df198MBYuPEZpEj9OY6tqLe7oDEVwKOBPVd2EPIy3Yft6MQX8CqOiTWOkCSVcOGgp

OGJhYThhMmQzNjdhZTU+CQhnCAHdMKdyQpK0CSTsL2
YvMJJ5WkNrCTVrBHA0N9QvHQ4wUEABBm0+LVjpbXHecVcgCYNFHbB4WuluPFsoTRIKUp3X









                    ID                  Date                Data Source

 

                    [id number]           2021 06:01:05 PM EDT [facility]









          Name      Value     Range     Interpretation Code Description Data Scarlett

rce(s) Supporting 

Document(s)

 

          Progress Note                                         [facility] 

HIOEMc1kWdCDViVj44/SAOytNRVdf0HaLEooHAr3YAtaMADhG0LmFOC5xP5gKKW4DQcUHrKeZkTgASUu

lbm
EvVhmIPyGiGCOiHxiWVaSpMUndYepfzDTsEJ9GlOM2VPGfU35qGCIaYIWdT7DiMHR0AzL+We1XZODmtC

IwFX2WHelR6Xewv+PG8XuB/ofJBjZGtUwG6UNYvdvcs+JTgUXnQUBF9SnAUlZxgkEuHlNl/vddkktRM6

vpQYQzAaiiG6hL1VqdTHtETFr368/OD69yTLk6y/1T
+JxNl+hRi1JFALtya0C/otRwEkc7DsJZ9B+W+MtSqoz9dxZGxSNdF/OfwWgcdK20g0SQ3cmpxvg5QnW2

a00ttBIeXjwbCZ2tGnRfP6J7nnYEYp7iwJ4FVT4biKZUsDGdGDPrsFR5iIthQE2o+RjUksdiOU9uApX5

vTetL8ZtEghsK5nMMnAbBp2ZEGKzLOlw4LSb9/c5PJ
ihI80vcgLl20jolRjKf4tvjdA7xZ0zWnvpUriMEnBEEqYnd9FiYiNcr1mIg3JVLfnri3pHx0WesItCHm

qa3HDCbIjCsIeOIELz0IvqoajW4XwN+lwPVzGe4KgYnoCQZeAvIB+M0KS61rzlMq5Kh8Nkyvo5wxSw9J

GsDeLzmyYEgbeMdjuAhP2Cm2uu7/5wNOj0pLFoX612
kVeqITb4cMdTflwXqnf1AnwYpS00Q15DSbGDOqf1Adha3kaVzKz8E/QYn7NoDNvksvSn+xqUL652rkZ/

pyfHETsRoj+oFnPDS2QC8YmsPhyniPbArLOPMyJVeSbhdH2D2sf2+DXTByUAtIVC7LlDWoBSAFmJB9r1

y8Gt/J9ql67jS84nweHkHknJqYqyBD66JqtJPMvMCJ
mrLDEF8PzYRu4tq6nKGsfosLwUDoEC+DFaSHIaizRFz/Vzej+NurUrDVYu1mQqTxhOK4Nhoqn6BCdlEU

g2C50nWgsLlsn5cQOqQIZATsSbutuIrOvLpjMRCq59jJxuuQcKGRvt9+mkADIzDPlHKAcL3nW0nwcQ/v

rNckROwjsz+Qw2kVq2t27UfQQ4RmMXEpoM4x/RLEx0
Rp9jyM5/K5aj53iZRjVLrbVG6cRjUrOtfNt4uWhEo8GFmZySlclYLNPNIi6rCUtCFrp/sxQIh2P2Dary

op2BP+WgpZxvDMdCh7+ILcQmhMddkKeuVSgVYCxSUEPHhHEd9VlL2cXZ7qyKAne5kKpUMGADCAzfJMEC

5gcDUZehLMTBUyeiRQK7MEHUNXJyI/ZnyMMznIpiP8
hwvUGyGRdKIYsBWVxUfbfVbBVq5bfDN4Lo+fqmrGfIn2I0QS0Pi8Iq0JVxkYp7Zp75nJkDVOmW1ShLF5

CZQW3fF7xWGf43oF0DgybCWzSoBC289u+td9QjsoAU0RXKWx1/IVMWjW/OLLp0dU1rwmBa5vgHQ27qZk

P12uPxu8iTHJCnYBtLLtI4DlpSO//LP9jkp+7053HT
xQbCJq/rUKeV+EIrEnNM6CrIr4aUsk0SakdmGAjOpo4mtKS4EoqEcp+Ix1JToInyj/hSWFv5bp5BRcCP

WEsyBJt8oZxiVjThwtHtlZ1FEACEqvh8UtRWqO4aKL04emUI46T0Y5bhfa3qCEQq2VbDsS7A+GujMPu8

9buWux+eEwPCZaEc9NhJsOomhjo2PrnZjJKkOxJrj+
Rv1c9nywNdHtp8lY1e2nSVMzj1nJodmN1mYOihOT2t75HlNeKIMgZsme7wb3gCZpXXcB8bw6j46FXrdZ

YoLtq0SGyBs6ieWdrqWAX8XLaXJ4NIVDE3tFCecGpW0vpzZ9oH+5RwuLLROyxPMKGgT1gTiSYcSa1FBr

8a/S8I2yMuPEfQsejGltprjpz8l01QZZMjtbMZcInp
A8PLsk9uXO/uENG7l9UJclFQu0FjPsPSkpXgJyw7hoPxA8LsRP6PiuztqIntbBzUFjhM+lHOJ8NN4maz

+AU3kDyDhbgmGmxXEsKAZLUDJpqSmMI+yKg+d66wlWufflY9qtWvHpWGAegOhRSp5mPO5QdQyWAWyiQy

YmGT4aAdcI+TAHuTU4I1Z/ZHaNkQTUgqfqZLzvqtv3
mRQLl7Bt+RtkFTpUX/SWhTDJWQE8bvHVVDSsVdzCZSJXC4XGP4ExaMo0v/Le7MPAB+miQKWtlw/VFmCy

ubglCJ5tI3TstsRdF+w6+pO12aeJPcSwW1qOHDEwwPHMdySBSjA/o+7Szt9PB22OoN/EG8FJJ0kEtkgQ

oQ6DbZiuMMBj3bgJOXTTMP0gN2r7ROWDX8C7uxvnFM
Xj5FT7ADFfF02cU5uAKGJSBU1aRqxhQ9DwK44STXD9Bilg7FLNeY7/D/60UlzV+Dli0m+avtyjTmEbu4

+Nyg+f+mEK3+JGhiBdGyVGdA7XR4eoNS50ghiI/Af1Lsedcneu+z/rV3t42XMX7sGyumkIbaiFiCGdPz

pzJ8sURa5apBL6FJum0YkDuBo0qdTa781yAibL/xIp
6/ruEgxMkPvwktIOEK28aq98+FLVreeELsTQ9d5o/JuJcLSpxIVospQMWN8ghzEQ/5XAQhtLC9TGJ/hC

fNYH3BBa3T0Kvg6jcFzYlZVP4Js9gqrkfoOJgFzvGv5hTfW5esR+WQkyeRm5jAsw7NKl6ZFORrsn8UOR

DRTwfnLteT7LHMXVlOYEVJf1wHXyr9fSzXyY2d3BCE
9BtkKYGqr4i05eE08BcEZM5XLe46o6cK/G4LleYpqR/HnCwH4AL5Yn8QgKGlCrdM4n3iHk949uGOdxgN

k9Zc/P8dcPVGqaJ12kZYYSiVL3ZfrTWGCdlg0OeTnvN7xvoIHiuJvZE10ZBS0fWrEHll36HkS+BEqsQL

H4f37pLMfQIUCaMzNz6B3jw29o36Gppw41aRuoi1gw
aeRZGtWlB+ozdxIE/qowT73feAi0yeOTZKH+WPmplfPVmm5rz1kDRevn+2xOySi6hkAI0IHnsh9tbyoa

ylXN3Mjji8JaJq5vCyJVRszrNg+XSoDY20LD+m9+P7ghY703t+uQJ/ef54hGfMRFr7BXdDd5akJLg6yY

hh7CIj8wnsE/If+qm+32BwK/szez/QdUm3w7ZGE1EO
4Dy/vHgZBfNVvIkOfo/9UwNx7gpSc0N458IZULgOnsJad1576424P3Y9cK+lfcyUjqhj62NboWcO7Dvc

0Z/0V+EyCrQzLyyrS+FW0GY018AfzFsMd49863Z00E0Pk4GydF9J6qb4pbL9uStXi3H/JUiiYMWmrxtN

d4/pGGIKh2/D9GslbW8qMfR7wl7zd/CtxHqM0wLyvf
BbewEN1YHR9t4HhcP3My7UNqhei0VBBHTayojDIHvsvXl3EyTtIWvlFucq2oRByU5ADBxsCr4vzo6o26

yjTb3v37Qs2ENiepyNTvkgjX44wkGK6BuarTIiPDdydf41MRvpiRuBvOcDPOn6T728seRYMQ8IFM7Qnu

8O7YU5PH1casDgyioqOhPhFWryBNIlTU/4yUt1K8uD
rPLaA5VCgcJgJ0rMi5bk1pMzNZYwtmYIR6GtbkiFSbSWBQd1IfuGHc4QxUmVfpj4o9s9eg1cNQXeEOvE

Lb+oUacdx+tzwaLbA4UCQoHNxSrKstLqZppugpFtLgVT5Q2KimRm1swNh20W5h6KkcBmXdJnKzRhWhKV

cJzIaqgcZ5VWYqP9qBOj3SISHuOaP9dvnXgblxAAgI
8Hwe4uxfo9XD7a1vlOEma5yUJO5c/c3QMPtyJ4znLFJNc1g5dMcjjjox9dupz8nApJN5+ob59luY+jro

iMj5GxWWDk+jf65pOPseBRuGR4knUU+yUb7NJ8/2tQL8EbkGQ++hQJMX6PNPAYbn9gNdG7gW1KTaiWqT

erg5m5gVsH9e8E7fCpoHaIIZ+PPqN4IW7A0EEZdWVD
VJ2NbL66qqShXOtP0GQOtXw2Ok7QCAD7Dt3PkihfD7pLNI7IDzyAzjxvWadBwWlhh9mLSeMehIh4oSwb

+k2O/tei6xjHaanBSdzWtn10EqIYoExhzXEvu4rhZgp8OxMJtb6LxGwYoKkaKWH2wPohR/DnfwBAxkRG

JSbgqtSdgXNaGK4RPqMkRR7ecw2LOZXeWF7zsi8MFL
Y6QL7EMXMuZV9MeHXdJ3NdT6PIBeViXZUbRGVsCL65OQYeHYEHKTtvAWFxP1Bnq791xtGritFrBCBaLy

9RVXQcVP4ZYWPsJYFxrRHaWJXyFZLpLfC1IATzPHgwIWZiJ9HcnpUehoNcKPHnQRHQRSquZMQyE8plu0

KnUGl6ON7LCO9XsvAwp1RtrwEoQ0ywP8VFND1QCWPi
Q6TLA7BfF2tySzZef6RfI5hiZdZht6RcXx8FOnRkOt9IGcBwAR2sth8GTnGlBE0xxz8DRAX7LS5YoYl6

TUBzK6RfHERnNKUmd0NuWU7NDQ6pnHlpGdP4Wl9+JFwyIER3thFnlX9NFDNsYK1k0aKL/n4z9x/w0Z1p

LiFopmfFVzlYiegkFyfAd2Hf+9OJnCrbPKsHaN9p61
fii+Ze697EUYEzK97OUsT3s7Q+q1YhCdHH2z/k9xhbRM8a2d68A0URdiMW/PMfYvuYVpvHth/OW4zKj3

SKmn2Sx8d6do59yYG2nMHJsw+DYTb/kBM1omwM9/cqucK2wo7OZ2ecfR2kg2S6fIxpzPUff52ca+WETl

ZrKx6ovCc8f8POnTILLFOzhRv7E5Uwb+WG8KhiTE7H
qsDWG1VEbnjscxls5OjVV508syqh07pD9QNlEl+swK2WtZa3F7F7a1wU6hrHnuJ9fo4TtkspndprxtkR

KrhF+GwqozgQ6zglvMh+GkB5D61ORJq4wOn6C7f2rkFUmRQ4a/rpeP3amjd1N0G7epj2uRdHpMbZoXE9

7Gw4OatRNPeui+dY5OC1gzscob/MwsF7FDpJ2PDyi3
ogpXRVOfvUACqLDEKC/8PzE6qzb0mZHaLWEgNpIW36zEMBwbF4c1Wqb62fbd54XzWW/SpzTTPayx5+Qg

b5tfAASgaM0yVyQZwzmJvGs3ffeXjsO+GKAYkqZsei1w3F9pqpd4FGfv5PirAQUo41YfFZdXkD6qR5cP

LZAbli9kPjKDyDSj8kezU94Ov0LTcHlZwl9n/XPmfj
edaw42hE1GThQsg5KLOBFeLZCBk9lJwdo2mVugc/iuH3+5ziWQ1qCF2gV13Fl2vhyu9xyMZisocrchd+

CQy/XfUXpJM+9yYRxWLBJA2mkxyO3uub4SQEjY/5hqq4hi8j3T4q434mdE5Knfw6HM6QKHpMReI4fKBF

gUWUVtQPfqrddtCqyxjpfUnhtDMsGU+QEHA0IZJu6u
fWYln6cKfIX/qxBqrqAwaTtNA43Mh5b7tLAKtP1rpYjKnhpPC+AoXuI7dN3gmKT9HYjEa+9Ugej103KF

w/nDBos243CRAbAZHJS3n0kc3OvJQUpZZiI/nUddYpG/gzo82FgJuv1xiYLcRj4YmXWIlQwu65zDMIP8

Pm2BcnHmJr7PBh6xKKT54g0/EsRwWAGleq0ai1aJXG
u6sG8Ov9WxIle5WwXH3LriCEmyh7zUuowZP3mINtL7k5TJoWXwsKEfNVlqjcmwIFMl2bEIO7N/IpWYiz

dvBtlxFcB93lg42nb+b40Gir2ZdhKmix8KStSCFBPi6OnUHnCJKMCoPaUW7xRS+qSvE32mA9V6LldY9C

50H3flZe0gWSVoLcVs4akMLKo8nHhk9CT3uTePXGgJ
aei8TXdWZAeaSnW4g2sWEFUxUW7YpMFExqu7IQ15N7xBpVuVO84IRMTSQ4n13O6T8ROA9TaZ1J/eBwMs

qussnaUozbYsOjtEXTs+NJx+QcPUZYwrmzCiaUTwB0QFcgBn14mpGbioGl+uB+gF5pUfULnAA+XMWzeC

8G+xyuFEfYTqXWQMex9VSlmqYAIJYdVrZ5feR+aOc8
JxvtR+Y99SZuidXmVTWibLyFEaLD2zKq/V3zXFKVoWgCeNAqJS5C4a4jiix/5Sk/krysten/[id number]

ziEHHDLyZkBfa47j1jqgLJ8VGjc4uiWqYQ3lOhX9RL3KicIeTkZXPEN0U3d6U9SdJW3GMsKugKZbR7lL

NL5BAmoU5L3pUlZLK5dn29d7uHh2aOfnTwNW1Ffl6m
GyGJo8JGz/mz7qqahxrS9X3o3Bltjt9NAgAsTqXjTeC8TDLoZYkjBLeqaAYv64PUxO81stadHC2Remo+

ukz8ob4L+kG1WM9yv5qZnm22hxT9iYcPsV4EIxfcfZZliNl9W6Jl1tCLkWbXzT/KziPNmvGfS2zyviTN

1R22RXv/Vwly34ZGQM5dgtfd76Jz7rJvQRcWnR02Ko
qWzdMLyplhN5S2aU2dL8+OJHVL1MxK3npOeLwq7rXi7QNBnDaVasXP7N5appKz5abN278yGlySKsHW5V

MJNjT7dABtiOoC2Af90UL/+Q1MPc3b5lYvOpEViOXfOKZ48SaJ3w2b0a6YsN5qr+fWOoHa/FNzJosSY8

e/cQcJEn9o5zvp6dZLdmcEIQk60wlz9q39LXbXnuKX
S7koTguFfhZY49g1gqkqW5GfbmvW/YODA4p9OmnxbCt2WmQXpULQOKTQRcV4S/YggSd4eULyfpXOyfSO

5O2ZK2rwd3ynoDu2uRA9gLj82pVH/U6vqOu2PNYx1r3NA+u1wF9MqE4sxqfe8p5rFYCrxHZyPKBnBqxr

4PN1H5HJt7bqB2epUods6WEqBKAct+MMvgBWNCmDSo
8hR1k9ZQZoRWDFBW/Htv0BGPIuERXXlH9MDfYb67lV32dLOYpeob5Ft8cx4bk7giWby6+inNr5+Ztlnp

NATPXeIJxY0Mn8tgH8iMhhhQMsIKZG9/9O5woQlfVusx5Nz71NdfnYkhFd4oFpuhGQxHw3BQGB+4FAlf

SuheSmHXQReQCcI+LfX4tQS8OIGlxfNOxv3QbgWhAa
98Yi0eK6UT9o9Oxk6XxBCH3z94evyPpMb0ebFFbsw5bSVsSrBbByikn2goxEsId4VkYFWyjc19xkODLQ

6HiyQ+2PDdDSPkrD4nyr6onYl/LiGFqejpDyoNOluCVZdQqfAgBt/awR9ry6rZtLxj6ZScHYt6nA0Wnu

L5fqGa/Fddun9mAH0MqvlhLJuhCvKmMo1VmZl7xd2v
y7ZD1+ZbmWry/Opc8mVQtPefL1v8nT88Ewkh2bEaoy0AK6W1wE/LDQx91cIGXMzssWGwVg/u9chBDGH0

7F/tmev71mJv1EDkQr2NsMQcfwQQjo8cP4tJ0141HwV9pV7ljk0nW/8V+b/Cyr8EjuvSyv7XYTr/0057

XKmD7p0i6n43+eR2sa8ZeQyz27m48oF0qfT4u9i6bO
hUn9wDl3UMDLT44WDtqzQA4qKxs1Cwa4Kt/IHyhJYF3/mA0q3zpw5PV1wnRd5x/6sGN5Bgnp7IIdtkH4

wO+SxUqb99Y5deXKQ+zTONmjyfV/yaCmveK0MLnGdb9H4bHsA9Dvpj9JPW+vWGPIc+M4Tb0uVop2bbrW

LSzCcMTH4u0osiEpsG4ac/Msr0RFPZK9pQAxzy1u8t
h6l7dt65oqnSIzf5iKLwu6gA0LUWp2x1P9e7Vz/MOLlj8Fj2hjTUubqp2RAw95VDrG6ceinGQlPewGyE

gXPQnvefe9P5RGtHxdZR2xP3ycCBAohBdj3Gm3CZ8XbjrxLU4d3HxTnkDY4nsQjk9j1aauNz+CUszQ6k

3sgHtIM7ZwgWUkhSv/SW/NgioIDN83XsjC6qvSeU1Z
aVmHmd4fcx20MxZxcPymrTGrQnpxWyAm+HkM0ToGsMo4bMmc8QZ0TIcFQy8XaXXaJMz5usttNBSaoyZp

tADJ6zJx29jN4Nhj10z7QdhPxJlurEI3j7whX965vJ0y/Ek484VPvyFF5uww+3GiWGlQZ0fbg2a6srEk

vBqcsGz0eUmdk16wo7lspi1hZYTxkmFWkfsySTm7iH
xrEj3D5PvopJ1R61+XiZ9CozEAjcgnRxfWKbMN4WXkWvKR7rnj6GPdAyAZ7qoj8SJLO5XA8INHBuHL9M

xJZpW6IbQ3RICaTbIOIwUUJySO19LHIkKSMAUQddKVHwQ3Iwm422uqUjnbWwZFPzHn4SZKKeSO6JIIEp

IYBppWDlDVIlRQPtUxV4TNRuYIjtFGTiJ7LkqgVehi
CzNUCjDFPYVMkjQGElV0zav0LxRRy3XF3ODH4XudSto8ObdgLpK3yiS4NBAH4EKFLrC9NNB6PoT3tjOw

Lvu6ScZ9xzDnRjv8QyKk4ZXnMsHl3QXjPuYV7wcd2RHGZjVR6nmr1QWVI0HD6HbKz2TPOzY1LpWIFgWS

Gpi9HoSX3BLU8lbRoaGlG1Yt1+EFvrYDS4kiZjeZ5F
EGFkXFtgAffKoi2k/wOxL+cOgGBbqBbGMTSiQ775uPyyaBHi+fGwIIv6juINvP19Q/HRwsQQMpYU8u+i

XHD38OxTXtOgB+O31dOiMM8jk9E5K4QzICpg3O5COEmJrvrjU3Aht8rM9DA/sh2pmwk9d9cltxkGzPii

MtonVDlr570UF0G7986/d7p3n/uds/B7tFmPDhCmsv
/z0epuR8v03cB/ul3t0K04aGubXW+xdot8fewpX5/wlm8iCWBiM10raoRp8LzqBaKLOojAO28pLXn/Io

bsdbIaC3hVo9as13SxzLqSMvcMFoWw33Lg27fBzi+pNX9aObvQLjuOihyX0LRablt0V5NKIhhBK+pGPQ

ba+LaRZViGGu6gGmeKIV0DLmnUsuXrZ4tduFm8utST
u+Yj8zWCPtsrTt3jCvV+aRityauwhfyAbUK7bnWfmvJ42SXVVMusYcqHuKptUFLE/Jz2DrqQzcXqUu7q

9sgsJ/wv4iLd2R82J8HEVJTvcuTqK+GxjP1E3LSp9f+Fqe8/0Xxn9PCpBW3CQur7URsnCWDjNU14aSuF

6FLrnBuBx1ovxJNyVcbIVQ3AkaMhdv4n6dfYwtc8BS
5JfNGDYrdyK0wnSRXRrVk/UYUMjNUdQDZvbZrSQHcXwewFQYWFYE8SOxbcP8/EnGXkMYXPw+CAN82fAj

qhGJdVfkt6o+wN+fNvpky7V+lctgiuF1rCUCBzdtRVQXw1WSmepfMLkZycouK8jgYinu5IZ8ke+ZKEvD

Hj9oyDiVSUQrtKwZk0grrR+sR6XpCGu08nIb/JO5OZ
EZyG0zQ4DHo2pk5V+eSR8hXv7bjD4hHMExBAwDhrxN0S4fQYH6tnIDSh1dWSN4B2Yq2/RkxmiAntGtLG

wNTXoTyjKJZK/rJsqLqmbT6xMHhQtNZKppTyF9YLZ41/Ll9WdmNqH6hGcor8rC7dqbLEmUkAGdfgENaL

JmHRKQiPjz89tkZ2CY5aSf35sB48PxtvmfTuRJ6Wu7
nY+LCJWLNyWQd2iJzPwtBzhPJJ2Xp7/P2J+7YxyPrM6W0yN8XpVnQKxtHg7JsnZfLfepPWe5VX+CrViA

nv4YtJNSW4YoI1db1IPGgdgerNdmMhe3UKZq6qRCT5APWQXJexWy2AHFniUSOsfpFqaD3i0QMeXCEdHV

ow/vztwQy56Rz3PyK9um5fNncbk1dqQXPnW8VAMHFY
h1z9o4aCuRkFj9jvlz1Tc2ScIYENHkJ5X6k4vFvKL9RVKdPZyCUglpZzQh1uxbuGtUlVipKfBgT9qjV3

8uvfvEKwlAGOdUeOopSYmmwiG6G51auOw5fmSiBGhlCqEeuAzt7nOBj73Yi8FJefSq1N/i4Jfy4GIv9o

hmB8NJUaZ0LjShatE8a2BoeiOuW7Z3t68/8u64dggp
86KmJ3rEf2QR9yQK3L+q9UpC6V3XsO0qQKuMa6YtgaTMOD2evLQrHlChtukYk2Kg4U5LBBhQsCTJHyEl

k1788LwEZX7n5NCHtg2vtQ+S9Tu3yz/kpPz3Sd9q5H75keDFflf2UAqYU0rFxan19UgCYjtdQK2lg0QF

PgZqm71VoCpkUI1CKcxGP+9zlK6/J3Ym9ziC9AOsR3
tDQsnowERXpbyyCV9ue7h5QxjNG0WxeTk667qA8Vup9yyMBSP7aqWVfWVICrEztb76VWtAVW9EPUjPmT

59vrhlzXTk69GiwBMXendVJioWpUrjBk0JE6xdOUSm7I3NXXPDgnYhtZvW62gTUGkpyeqbxxrAVBs5Ed

KLn+Y837pN9y7EbBSG8Uss+mMWpHt9VgTdOxxF41qY
fxn1aZodXfKrU50BsGq0COQxzghi3I12xCr+1Qjb/imZWFPeua4fMJyCxVufaIJ6tYFgjp0ayOScywhv

VyIjKmDKteF6Wo0YTARdRjGFzYLVaTJzhu4O3k3Ks1+GM0QpQIJRmHlNMfRcn0VazJly1uIEnCUgfMdt

vOUa8w7oMRMORilW4d5gW3FCS0XGLXlI/59N5F4RXk
f7i+bloMKQ2GYa3928xYiqIoNjhfHJ9v4UMkBDWKUtnpnnkqCUOdim+LulxJIvBo16EHSg3iyuaChFxf

8OrO9GSXLOnqf2ZZLALUwd0yowXg+GYihw5hVsySQU39DKzesAB+hvgh8QCbLnVFi8t9qUinkFzEOTeJ

4LTuZ1z2otWoMlbpG/7U1cQuvqkuUgzVnKYJj//yoI
FRcnpukZX7SkKEbHj6IdNuAW1DekJ/OS4M7JxBpsAlgB4kkbWgDgZY1sTjhln660w1oWive6KEWjLjIA

PTep7ZGMOkOLudEpbeDFp25VUBSqnfUT0rLAIR0Z5D9eF1mwpg5xiKXeD1LxuFy+N+mKIoiUQeXwMu6z

As1KHL/zV631JH9SdpJMUkgKtqU+wDMuXECkNy0R+c
ixNXLUG9eqK0ICrYK7A7mWqxiaSLT+fvqpJV/Ln48PXZQX8ELFIpBHY2UK+1g0NGjB32ew/WadnY1vm4

ZP0a6HbGdctoDNf4XtJEUq3Nn5U5ndoUgHu1R+ZlF0QaqnVj9B9/APULcsWzP79KCsuNy4/RKYLNRXqc

2aD6qYYH7RWYytfPx4fqrAZEtSTRZonuK8ltgPqAWp
d9TtLBbUkwRaFiP19iUbhJeAwuN9tkS9Hxa8WgWNqwDrjwhtvNv2VW48w1Kudvmxfj1Y0OgskLxD5xQV

rKIsevn5PN0FBzYF/hpUhTO/eu979tbzq0FgQRTY3xxf2ZUK2+SzAN//qU7ktsXYJcHVBC8gXO+LacbP

mIh4KgKVINGtWbmWxSbCd2oqZzX+A+nck4i+5rGlFb
kTysia25w+88jea2cxBVD/LvNn38LqNJMX4G+8+aNXpDQvS9mTov930ZEPUq9ncmFM3Xe8dF+cJTZtWv

CCqEYj38iqIEFw4MY0IdiDH7FFFuggQKWQNbTTO/0LKj5ybJjdpGBM4xxNm71JcrdcHGenR30W9DUXzd

zmu8R+s/wsGhYcY5p4o6a8k6AE96djFkanjtJoSHaE
rw/adz43EuklCHvm/sPmSS6bM63ETPhdRPYHPuRGx7JCK4rteQOGJF/aRkq2cqecde74wR9o6SUEy1Xf

oMcEZkErSwjicWn/AbH9kXLX/7ZHgwRuIdw8nsi2332+h89d5a0+Y4ajTWkptPs5oeNMfXyA9naPuosa

NGjVBtRuqytfQmL6wKvgv3uzvLQg3LZ6NUA3VvWXnw
5D9cvQ3E07Gll5L7qu7MAY8cfY78W4GrBGHq0TwP2mW2msRiVaA2d84IfVL3R/DM8dCicOsNb9U/VOuN

WQt/8YtR6Z8ST7/EcXhcn1eQa4TtD5pyuhTJlzmZqzntGE/44WKuqsStmUbRY4wPY3JbnH9o/po55AF9

+KRl9IHwQcm4T3T7eV+Zkt4Yjq7vSPprC/9LpcY5mn
wz4tPV0bLCkxJQEQgtYJ3wzzXggZ07sKu3g4ejscfWd+2ZY2RIwrXJt5ia+No+/PTP3fROLw85S4W3bL

+QRuF0Sg/MWvjaPvwxa+Fr+/WZkCLk6kWZ6ZHJ5vF275ev3N1zSWclfmCnfLKfVJ2WUaNaRL7maa9YLJ

SrRM1bjv4QOIJ5MN0WRSJhQI6NjQOsG5XyM3ADJtEx
SOCmOKLbWA16CCTuYCBUKVcyQFMoT5Zwo113tkDqcyCcLXNwOk1ZQFSnRU9BCANoSKEqiWApCUXxXQCp

UnW3LGBoWPfqPIRuC9SdytJnfkLvBWveWCPSXTgjYIKhH5cus1TcHCk9AV2YLL0SwvIoa9IebmReO4nb

Y8HVWT1YRDQpF8PBK9KoQ0jcYkUjp0TsA0xxDlZxc4
CjRt8WKeQfRo2GKqKzAT1cek2FDHAdJVIaExrRXdIcWPllDwjqdYOnFF2DfGP1XBMbB46rONSnGQVyK9

RoIDEyMzY+Cj9FTDHlkOVjKS8AWjnNgPisgbqDVO2e2Y/AvjXgAn4JHPTdFSALts1BXp5wKZBONaaWan

SVv3k2Sv39HE9p+RlYN4IcBUiJY3X7Bsj9Azo1Eef+
+SzbGPOooOl1l/MYxUZlU/OsG8lu8fnT3iGKEB3sq5sSBxDvNbO40493AFUdHy1D6q2hMh/34GbKr4x1

fc3RCwunyf35B9/ZSm2f81e8fxro86udpb/y5LdFQ7DWm8oqC8u+dYxfy77pL0DVhAh8B4Y+oPZjr6+S

0ZvkrUr+JdpDYpSEQYP9tENgKRgt8iHSUkuiotavBg
dHeBqtDoSlEvBd8VktjGgDlLBCdVCbJYV1Cf9Yd1CSFyMXdafXQ6/YlL9uq3UNfhQPUHKNMdVAoaMHB2

0ycPhzr72Oiy7+sSdpOFrSF2xRxu19V+EHW7Tl4bZJbZ+wSw3CdvENoePM86wSaxhnUDzo3cBwje7ZXA

syGgD/c9T0f4tvMkiT7IPXdTfP6aRfaeaOr7L42NUr
1Ay5NGWS72fhJi5YBxR4QbIaFXmAqig8zvGlEOW4IMc95u1YNyneqKs6qW8SSdYdsyJUQJSDHdh6+qow

mUg1CMAS+ZGv5hP5QUMAw1Lh8XEkjw7eOdSrSJZAUeLVCa4H4jVpJ6M9M3E48ftn7fTtBs7OK9sJuaUl

pnk8j8vlpkK9T06cqK2DgK+bpc7Tn5vV8lUQeseJE5
Ot8sOe518R5gXl7mt8aAPMVOd294Bs3rwIOvWBpUZc4L9S3ytbQ5asfXJ1TqEkfWXhIuhmydb5ITM6zn

wq6FC2r7LYnh0cJWJ5xtPv6Rz8BamzEdg+c5BULgoFcl0fcmwP/rRydh0cgu7g0r5sQCGBJ8egeFBNj8

b8Wn3WR0ef5mAY1FYQyTN/ZJh0oq9iZJyhOUFVeFjT
8svpGJUZbQ6tjG1IxXjFiRiTN3HN4wW8hm/oe0H7nGkkAYC3ITZbEM1j1ndGmOg4utDV6dS8MfrM2vY1

joKnWpY2RU1e9itTyiHp7lCk+yPQQvXushB9MfAHodDg5olqWI1N7sMrnj2rb+bKGPlwRq0TWTFRqfqN

8QcVBhUJYk0zxsrbm32SYgTQ0mkRKbsXjyRv8Mpn0K
ImfgIWJpdbxi9iEDaNpf3XGPkIwmdDL9kpLbmUB3SjjkHsikwYpdjDk+Gf2fIfGB7orSx5QF4Y4ah0zx

qN+1/iIfm9khVjXmjmm0W3w+gIwGnG+gOrw7gtkirSUh88uZzY+AGgf5boKgPd9mulYFsH9H1DfRu5Ii

dN5Ru9xl8JEuaObxIwQ8tECq7sBG2jfFxrcFCs585h
CUITPI/y0TgrHPnxZ5FbEUYD+GYD0a7Q3vAIziCg8sPcW4Tla3RQvfjhvvq0XHGvdunzbZ6P3Ap9tFv2

O2TPLfktD1D+z6s7BGfpU1QjB8RW7tUkR3i4hH4dW0Ydwb2zBo7O2G+gLSf1PEoCO+SqCZAZHWt1E4ci

tMuIfD/VVBaGoTt3Teu1DQgQ6I+xyUOC0c99/7tt0a
T2Kph1Lyyp/rDDHhFLGq0rbLsNs901yl64CmjpE6qx8XOXSEspjSdc9SaVOoiK3cLVh8i8h+SLdL6rAb

ZTrzPFg3R2wWK/tYRo/YH8+uif2qObi3vw6yAg/gaJC+ubNajr0UK/F4oE9A6MRV8oe5FrPYYnMWmxmc

LaZfuMToZwKKDlw5FtYPuoAWy2VGffEBDrP7L0eYUq
OAWcEF0CCMYbSC4CTVNvhgIaChEqWDCRBaKpDEEzEeNqa8ZvE9NiROXvNAJEUXijUJHyX29jXSbjJt38

PRsyGOXiQdIhDCu3Hi1JLkFzCEKdN38seMFgdNJtIMGdRSNXSSxxWAZqX7xlj6EeBNl1DZ9CKR4KuzMb

j9LllbPnN2rpB1RRHE9YBBXgW4PMZ0IdX3wwCbHmf2
AyF4zoOtHvb3XoDi4QBeJzGc5QUgHbJZ3jfl5VLZJrBOAzIvbDBdCbMrQ4CKAfIpGhBDL5NTNpVjxrOm

L8BQZ5WtX5LUQuGVh7OWL8YpBuVIIqMfFrJPXpTjRnYQmcXFy8VHO7EIYwQpAbNus6SEP5PMZ0QLBuCV

R4NTB1TvL5DXKlZJG7PYX7IyL9BWByBBV3DLM4QdD1
FQSuFny4KWT0QXE3DLGrSTamCJP1JHP9XGLzYIQ0TYWaJQCgEtN2XFZ4MbU6KrLiZgEtYFF5RgA1YEXb

Kyo8LGnlEvNrSshrLIWiQWE2BmC3EOEbMYUpBAydXdO5EcyoIuW1SLa3KNP4AuNrEtQ0CYToURY6EwGq

SuI0TEw0IIM4LaxdPwS3IZVpNNXTFiNhSiy4ISX1TZ
PrAepwUMM5NUZ5PoRiKoLlPLF0RGQ9DrX4KIDuNDI9VNE8GtEcMnzaHXZ6RSR3DoPbFsUuNoJyAHQyKJ

NgGnQoXCJuBEP7LwT7WBSyBID9NJG7HoAhTuPgUTXtLMY1IAC9HNMsLICiMCkcObW6JXYjKMtuAIFjWS

W2UUKvLbE7VWW3UDCaGdNyIKh9GMl4FYS2WHPbRpOx
GWb2WWM2AODhXxMuWFq2AXN3ULCxAuHjEPx6JAX8KMIxQkBqQFa4RME3INYtFnDuAGi6YEK9VPOrGkOk

YKf0ZVW9KNFqEyKiZRi2XSU2PKPcCqWoDM1YATK5SLGeFeNfSKm2HRH7RBXxDoGfXQz3WJY7MOGhKcTk

HLg9LGAiWfiyTuZbMPL7MtS1LDEbIPW5OZP8FpGuGh
ZbGYI9GQPeEkT4YweoVuenYUH6KrF7IIMgGoUqWNrrUmI2MEGdNDAjWPP3IYGiBcSyGkPeETPwEhANJa

HlIXh7UUFiUaMuHdEbZjHeGRNgEnQjXvJfQLH0GNprTTT7HlPhAOP1DXOnWLM3DmgiScE1ITM9PuS4Hg

yrKgP5PVP3OcKkDXRtSFkwHhI9HpkqFvA1IOI9MkF2
PpqpTxt0HRI4MRAdMastKgl0WEbfUtY5TsGsTue0JI2CALZ4ZgjvVpp9YOg5OQN2EmdiLSq6JBa2XFO9

HvTuPjTfLIimNlO1QaPgQfX8DNV0HuQ9VIUjEEW6MTO3KwI8CLMsKNU8XSV6NaX1HCRuBSs4BDDxYRH3

TYAbRXX5RVT1SmD6LWRjSyd9SRT4JCQiMrfaCaa2WZ
R6JbIGIaAaLRD8HPD3EuJ7QTZvUXG5WRB4YaU4QQZoUVE1NXYqNXS9BGIbVDZ7IRF0OpK1RSAvLHPqPW

O7LaE2NMHpZSLYKbHjAT7srm5FGANeVNBqSacPTvTfQSiDSuEkEPDpZNuuRN0Yw525JHLzY0KyrBOwes

4QCGZcPA1Cg765EcIyYP8MgphvlV5QDHMnAW2Xe8Qp
qeYtOYH8E1BylUwjvMbfwXV9AXBbGRKkZ8JkrEEoTaKyWWhqKZJwQ7EvLFbpNOCfENaqKAYrY9EcmfQY

Kx75LHskRK8mNYLoSIGkIUK5WANtEEepHZAcM5h1QCybR8VzU7stQSSnR5RnfVGfGR9YIWD+Nd9WXL7i

n3UcMVgoYQPpBJ7pxd7DXMQ5JF9EMHTgXD1PgKBuT8
WzynNlD1JmhHwlWH0FccHfVVwqKX6TCVQnXl8nvL4JroghvC4OlmYdLArvNt9FpH8ItgDsTG2ht0Icxy

wZBkUcQYDwWxktg9BYzSLlTSVpI5nao2ZRqLTbTOI2IC5BUDMaNJ5WnSP3eDDnARZcBQLBWIzjMDQuX4

DpdzYHZXHomnqsoU8uJROyDNIjWv4UJUL+Jc3WNI8z
c1XbMSpeTPYvXY6exm2CYUIqENy2TOC0CMEyToh1ISBoSiP8DuGxTLT4ZNC7JrR0BEypIfZbVTJeFXAz

EdZmXoWiWEU5NPW3LQRcLbg1GZVxWpQrQxvnJok2OUE4RgQ8JOClRRM6DEW7NqN8HVEwWQF6HSH7ZvI7

MGOzGTX7UEC4JgDwUaYyIzEhYGK6ZYILHuHqPBm2WC
O5UOE6ZCNsBYx7FLzgDfC1MpTcLmHvNJfdLoO7LjsdOkFkPUh0FEV3IyJnGqr9UUA7FhX1WpXvNaIySD

swTsW1IqVwKas2BPY1UyN9YobvNuWaEQC3QmN5ZPPzMuKqESF9ZwC2HLLxEkR7NDL4VgU6WHMwWJfsID

VhYfOrIjgdIrFmPAS2OKL3TWTrEyNiELZ9BrE6RFAf
OGR2BEGpQUF8VVFiFiTcIUHlZJM8XPKoJco9IPB4TXS3SPJvSda7MWb9WSW5RRCrBdTfWDLhJCT3TOSs

Omc8WFF1ZqBiVeYrCuVaAJP0BtR3OnjrWAA6VV8OZWU0FZDdKALfYYO8YNBtSKFlBou5FNM3XAD1RLBf

YuRnTDc7AVU1OBEjVqUkCKa8YUF2TLJcZrIwEGb7JR
P6QWCfJqGbOAw5ZOB6DGIqYqWgEYq9MWD4XYYcJxBqWVu2PNE6BCEoFkUbIMf7JLK4QXEqRlSoVQu9XB

GRThMcWiDmIQf1QMA8HCJmNnWzIPz7WJU5UPDzFsYmOQj7GRQ9VNLrIvj9XCWhKsT4PVLzHZT1HYW9Gu

Y0ABIyMxivDOX0HnTgMvPuEaQ1IOE0ZSN4ULYqLUi1
OZIoFpQ4IrvbITSmOTNuGJA8NHlkUdGlCETpHlYyQuXlPDssIKH6TzK9NIHvEqEdTVIjUiFgIsEzAlK1

LBT0NjC6MeNpBUA7QXmlMGS6LZGaAwLlSCoxQvN1UrEtOpRrEXnyPcQ8CiFgRIGhMDO1AdPwCpA3POM5

XhE5DmrcSsT6QCW9MERfDchbHcc9XMS5NLJ5HnIvNf
SaWLa7IWHPHxLpSap2YOh2GZZ2YpdtItk1PKL1FTL3GxjvRoQnRCitOxB7RvHhTsRzARW6ZjJ3XpsgKo

AsCIU3BmL5BKDfVVT6JDZ6MzF7EJBmPCL4ZFi2XUT4NZSfSGX6OHQ6UgM7INIbKRM0OMV7KCJyAyuwSy

y8CYC8ESW2FJDuDPmbDZTeZVS0JMLvFzHeJAJpXMT5
OBAyFxQcWIY9NOU7AGOaYaHdXFWzBUF7YEWnOkBuZHB5ZhL3WVFlWFD9AF0uIPzopbApFfoAOdG2ORRx

b8BzYPdiHEq9AOmuKASnR4L0mJOhUj5uoMUzm6TsvIP7s2YOUdEvJNDpKw9auZ4dpRUjWRPoXIvaNp0d

PA2CLELwWV9Ji4CutuLaPZQ1Z8TaiZykwLlqyZT4OB
JvNNAoB0FhaLAtIlBoCNpaBJElN0RuQWzwCJMwQItcGVNbJ1JstzRWYu86WZqiAU8xFUHqPHWcXIT7FK

BwYBtoVMHbW9c3WBgsX0NrS0hrWCIeV6RqySJmXA1OCPA+Rh9TGT4ph4YyXCthRfLuZP3yau7RAYN3XP

0APXEsBV9TfBZrL1CpkjSbS3NgvLwdGR5TkaDxJWms
AD4POMFbKk4mwM1DjpguqRmDd2mpO8NfT28wdG8oI2vwqmOyj6fTexByNKfoEx9MKQFuHZ8KyPRcpSSa

TBYsCjYyFNJovGChXVWlXnF4UIguUSRxZ6ejHIExxyYrGCGoXCWJYjZhLSQbYl1kmMIdz5BzjAO8g1Aw

MTYgMCBSDQogID4+WLzlkpVtWibGDcW1NQKig5RmQG
jeFWh3O3QeyEEprfDiCohxrGRMLDZcADWzH0qeuhn9qEBwFDLnQgWyUOLgM9KrRWVpSUF4Mq2+DQogIH

Z8jtFowA9TGJXkyOs8ZICA2mz8Y9vPemPHLNDt5NYrFBDQELYedSzoB0VXWVRTGEGNNxQ8aKLlOdyhE3

ZWtyAXfVODFwNgTLXABumS4RfWxlLaXe6Z4fMQ8W8l
qXtvaBowOjP/8z///3zdee+n9DMZbrLhXcY4AUCMrpDM0L2W+dYrFK7uNqqCZsjFB2dV8PKrGyO6yaZj

gA1fWD90iEF2tRyC9b/1mnn+O44t+wz+9yPziEnGA2v18stZ3VYfc4YohymsiT91Zr+Nupqo+Y3g/6J8

9rwF4/7+2e2CTygOslnlHbFeym/hDtW15rsPg94vDp
XwEOFw4J/K+U+fe/nsO9/dz41GiCE9a09lK+qUl4g/y34icv1Vt37+5mnO4on8AJ2s/3950qkxY5eJud

PTN+66fdwCK39oTvfjCsSVkC8rKct4X+pGUk4s/bjlx+tjyQmv/9T7UZsN+Qz1p/6cLZjlpWowXYgi69

3pqUT4QrSCNVeToJa2sN4tR7IxKGqxBH8iKZX4IqNr
pTM2nS6UP+ev2w1leBYXqGO36SJ0hZfFAk6CU0E8nOPDyzmP6OWHHsFu11vXgqyNk1d09RM4K3SE8qyp

SNK0Wd5Af0I/EbYnyeRdcWOZbdg8Cqk24iHq+tMlSQ0oSO2Qi6QrNFrexk6uFGQX8k0yHmuHIiYhihrj

Aetvxp+n58qEXohMr+hDcXfUMxREKZXgfJCuprXaJb
qwLrd+hASpdC1k0GkEHRB7ZAZyn0bHRIK4UctBLr+7DdeycAXKr5F5uwBvccsMNLEGXaHP0sB4MOyLjD

saqqHt+NbS7+mt5nd2HI8bxaIP5aiM1S1v1TLDj8YvamWlSVnVTN13jQ3TqYK/TS/F9iKmmywjqAIEyb

D9ngjoeBuLzSxh6rHR+XcjuNsR552lI8z+zM6DXK97
cW3Ti/SBXOOMLrDGUeqUQDHtrwW1GMV0kJpzCzp2XAA/JRMG0iOvTYZ5Yi+fbvDx9r9hfxHS6vm+epCe

H38cez5YUN3i2mPmbOy8Hi1rR9id3R/Q3/IFdCenKeQvMG9n97Su8D1OzUuMjamMWBW1jHJwwRqtmFTH

yXFyljyulWtXab/Ts/Kz6Dd4XiGO6rovh2osyw/X/a
LkcswGLsUh7P7lqmC18Dg26P44NM/g+tN0IRkPFDXO3y7PSDO2Js+SFrYYCKkLS2I1oXA0+Nu4Gp6H65

oAWx2DwoWJota5+Ogh1nLorf67ZG3T44R8W/wZ8adzj0XhTgX+Rmg4Y5Yz6k0Q3nhnLm6VT6QwuKblV8

EdvUXY3Zj6PEaaH6v+4yXvaz/99lSnUx/LHc9locY8
CEIdeX8mYc8ImAkkWeONmUw3smM0x6XW10F/BR6St6bcikqawuWvIopM4skiUJI2t2keCjToTy08eAcx

XbqknqRXnlOlKnxEdOfG97yvTSfMzGHkLroMe+CFtfxqzYCq4WmmmzL7oQBUe8cks60VKtdz4a+1j7Xv

tJ/twsEl1ml13eY7NX8fWnggt0/Tj+iGfLeXh8tgZt
R018sl2wa/40zrElUT5BiviwGxs5c9256nDNksIrrPwi3PPgVPkmrXgt5Kt9bdirS4RL+iP0+hTh1EWZ

+OB7QwaJBFAmjRK6x+ts1EDFi1zOW7T/Ih+T8nz12BVkQQHwggij8xV41QXS58K06r75S5QYCSX3gZHe

L0iywhUzUsW0q1XryqQ9fj9chwr8NaB9si9pLviayD
Pq6NRi/2bP5VMAJD7WA7VyaQ1ZZYfKaz0hChFnmDC1Xa7FIxCnl9F1CNquOTo0xwc1KnfsY/RF9hHC+j

/xJl+uV0O/TGE0YsldgmwrHbdxqMmJk86Eq8RmQW41isE+Dt+wk0ZhEf4V5yEb9gfLx+Q/GgvU8MO5fK

TinX6syhzW3FAZxEHYTyxeN2lXMXHAlV82+La0fE7n
xIAzEtjfIEmbrY2vxpFgm5sLyO935gmTUkPECscByPI82odvOEf+2u4GdcYJxIgfnIkNNGnD6aL2jbsp

TbyOpDJv22tsuQeF5pZctfjPO0V7NZNeka2BtuJ2RaxGwsEEmtojL39TLs9mYc4FyAm4w6y1zumk/IQr

qxaNCTptlaGpsV61mwUAV0S2X329ART81M4iN25h9N
sGvoMhpGR/LWd7KSO1L05AVtvJl13mG4dQ1RvXv3nRTL9DAcTXBuYT8+6jFoiicDbRwSb+V2k3Uwkpj3

R8lGMdZ7dEiwWWhhbu/9s1y/Sl+k/2J1uRLD4jriP9jr2hMM6YgLLwGCArVSkS24Jzpm8Bt+13dpHekI

BIQYPXgmbIq27mmpmr8+EY0A1CuVejUwpWtbblmRqY
5+KBqHP9XpdY4CoOJQp9VgGqXK6NJrKz9TAIL7NgLUtOZsKx5glUFaL3nv+9YIbyyCeAFAXaDOh8MPPU

tVPLrbkPbAPpNNaDmNqvCE1JPPRRRaDKgkZpHDzQX9v/vK4M6wvlHr37hpcgM7bh4mxBxhck1FWfNRB6

stV5Ew48vtri/GLg3pnyM3hQ0op0NVfTH1IhkxVV38
axxAsyZ0Di1R/c9jhRUFM7/NFnzBwzpPQVK7ca6Fvg0cxfqdASiBODM+x81hpgm5jwvIUC78tUSYG/8m

z1Ug2SzefzfNuumEGGolxUH0M1b3LG+J3Nk1ckjVmH5aSNCaWGlqYVmLdKNFux/i+3D9rIEdwcaSVDsK

Ts7BzjRWOW/1F42o9t32RnYL3HKUNn/HM1a+h9d6D8
8Y+C1e73i6Bvzao2t2gquqGCdWjkQZ3fgeGGjLtEHSoxXYou45rcrye7EDq47D8qMN5eZ7zAtbnz5BBV

N/nm4VagTBj+KuP9zdkDAEtUAap4IcU3fQcCIMvxbNYqkatN3UuaBoiCyoRQJZ4KmY+OV+4DXgLeoGBI

HRgFe+OvFqheLhfdHz31MUFTjowaAczXPaFCTkqr2l
+ro1BaR9zB7C04ySvtl7zRMkvPRIdKHEmpTa8jJJm74Z53WMLKM2ERf+FJSCqgDaSPZkX1IF9xAqBRvf

Dpk8R/thW/rzKV2n5RQ4jQfVxo2vyj66oC4v5V4bnqcUd/wPpctg+hn16AOr/uk79lBD+q4z4WOV+vW3

vSLW5WpqxBpg6HAc3qHE+tBDnCYxkdV6xTCueootnn
sb13rGJGVzLAJyUZ7be8h3TlL4ArvB2/AdW4DQ2w2qOw3XI9pNMQ97+Gj7nrcqphTelUnaCsqPsiIx9v

QB3W1HYtydL7ViwhxtGceQatO7fVOVRjUh2nwbXaKDSwrqWM1kOnhNSboaGUNA8CAsP/BM8nmJreQ7PB

yPLQafBmKqQu8K4gcGbmqEJXX9DVkD1AjjQbpmT6TF
63EgWdnWlF4NKqWjv0l+JXXpmJXq3H35d965ORWUQWmAyqXwq03JHPgRevNwWF2qFEXEY6psBgrd3ckO

JtKkNAkY5ZldF5gkieHG7lqPmD5KETK89aE1TWKXivgd8LJhZ0OpO3KlH6OWvJzcqXt3SrAao43wIT0p

YqqIa+ZtViuabP++yT+/d8NMa0Fb2pSFWi0lV6q0I2
4AXQ1ynF35x97E7zP/hH7Xaoz8IRoUX+9MHnanh4R7KF+gFLkZNKsmZTySxdSkd/bfLW638QkdJiqIyO

+a8Vh4K7z0gfo0j2PEVa5XAHo50hlbkkGzhsuwE2U1iriEgE3+swjairqvsd2P3GLFcH9fOSYt4KkZjf

LjvNGRIk8VxUSExlHRv1o5BB9Z7LomhudjLTR4u/sG
hvtm005n8eW5041cBSRX3uyVJImhaUMWxNIOOo3iJEhly7uBx1sqZfADljekA3Na55N6CWsgqLC1yw9r

3sbeaK/Pf8y8o0Gf2lAKXnOL6TUYE2CtyFqkr6Mr1FDBx5WvshafyPrCP110QvvDjQ8GHypbAjWs/tB3

mQBeIG4nPfvLULuDYXJVuHcE6u1xzHBBN1L1MkeGxd
PJD0gRpAmUXpZhTu3fwrA6sxQjnpvrxBsyTZGNw1Bna7a0HEhnoWMXOb4bPYAu1UBidsPby/FwzB2Lpv

0QB9bvc6MVFFxJLuVvYLPcs6uuGzV3j8P/uEkVMAFWgfl6sF+AsKAv6CaBk/YLytj2SRzrhJMJrNp22O

d/Ed74xD1NrifKV/JF81kZ8rto02MqVnanHOUfJPLt
Jb57wZiNmngqc60bu1hq5e7r6xoojkqWU2SZX51gGs90eSoGd7Ns5Qw2+2iKAq7aqWTRGQESsqYNZlO3

X6WTjmEJdl3kdORNackzVEkdoqU3L8tO7+UIc9OgFArkbutlRnHHpehgz3ImlKqrzlwGQZ64nDE92wCm

CaHKy/z+8lkhLCGPyqsbj74mBgfdRxjzTH09ORDH83
/uN+ak4NA2hc2kw9xu6K7dnk3ShEKu9q/CXw5eObkn6+DRF/O4b2rh5WRzLQubdR6pgE9zaur2Afy1F1

1nnAF05lP2kJhOs1UNYlB5+Bsgdw5dp+BTdgtV2Hik/wS8AlJqz3Bq3GQj9/UBWPnFoNPGy6CscGICku

nOLcXVQAtUgN1PFIomneEhEpqXnFaT6C70mrzM6GVE
WgxJWHfa9y7eiQ2SbODp0b4XkSU0mr2MnP0CcLonU7H2rqBB0Er6QWYtSzGOdZBvZvPKicxSZiMOPgL2

mqh0vjq36zrRMUTNsoV9yV0Kc/gmGTXGl5g1+382wK0Pjzu83UIFbPSlTUFHKzcADW7JTLhxrHjnXCC2

J+jdmeWdClAIAZemTln2VIIoMNSwL92gP/TyLk4kPf
L19O0HODZacnY/qJMc2yowkujBQRmFvhF5mAJI4CFPBC+O9mvbtAItcm7Y6K9xSK1CndmZ3KkERxzpuk

IxSqwRXSkhI/a7/zy6h2a1fh+IU/1pINyxP+RJhCpva8WjjNsDH2DNse5BS3KOPcBg68OQq3U4DDVvZh

zPtHvzWGbKNk8rJ/NTN+jT+F2cUN96JiGJKV9BuOnp
Bo1kjvHtqcUPzRl++31eOymwF0D9Oxvxpo78kuInBO8Br1reAIxUqj2tl1oZVwb18rfYS/4WFczvwRb9

OX70vqNjm7XrbauCjA8iUYQzvir1QrxXykqo34LRgJoGH3rs2UH1MpSLKMJrhVvNehm+HhO3EY77h3ME

no7CcNHJWWaZHCSO3U/wWCwMzqmjiRQzcJxvG54lkk
qcNP4e52GwFqnBk9Q+CW00+9Bs+G4Tbf4HQb0KJ7qYtKvbUdXuSUcAbO0SIAa/MzbmTT25li2iAGsJLG

PZ2L3Jg62J+7qHpwhK4YkuT3sSIlTkYuqZZtGQ97TF3EVQkZnRgccumJpY2zP5PYtA1zzdAJRc9XILAx

HiByJdO/fRzA0JUScCeCWNaU5cRnpsN0Fu6Gv8/wT7
dGVSyAooal9dAX8a58voXFmLJ8z1OPUIdemRKbQZTEmCjlwG2IvKOigaXHlCUm/HyX27XqEDAxUV5vum

sgmjOyVo4wdhRCwDgxRp33cg+8ROQeBEXb8NDL3+ro0Xa38mygexT352L60JNqtV0pBf2x4zJ4hC/xZg

VkA0SXABDxlU6tgJ3xK/BA4wEhVJUZ4825kyVe2q5B
6rJhEYjIVH2OPTCTMZHYCQq5NwO9wLmi7H+Lo+0W4WzgfTdPtMlZlfc+E2uK13qMx7cTLqXirWxkE5fc

RUB+BVNjeandqY6YxPNtBpjoAMW+BTYAMwC+bHRIHiARncUI07Y/EH9fvch1FsFC+qWpCb2oS9S4sT3J

iTfHycmRhpOTQFsrAnpcUd5zX8OzSRo9tlrXD+41LV
d0seqn57ic/uU/UUY0//kgazDGoMom+7vMxkMYnqnXfU9RHMv3TT4HjYVssgkLWjP7LRLaLA/pPo1k3X

SCEOEEV71WWqnkvqnruWH1pd5AjVHvW8AyEf4WPtBymEH2B3HbswtbOGOI6HkBJ1SN683jIlvHzP9S+J

ePpKtcGgqtYP76pA6VqYRoPqFtDtdy74k3FnYpgqYy
C6VZpbXyM+v8OS6EDezUCR642t+L8C+ZaxGTpzs/FGhucx0K3039osuAp8OA9PGffNtZA5uMYOtt8sHw

spolgs+f3S4krT6ncKfBjnohM5AFT2fjVVs7pBsDh7nmdjd900DZGm9jV7mMVXV+Cg9ONBNt+iRQzOn3

pZoarNLX9ucg+67U4zjOM5jwFwpCOjsa6AN9vjjWb5
o82AnQ0VwCHT55p9C0OOA0wd/jqzvn/vh2yVf3Z7W7Q/xObOHhsB4svDzvLM0Tw0nBPh4l96i11cSkg6

htIe9shGW7IOig+Le3CAUY4zxVeQkN2sFThl/NgZ+8FMyX4ZrnBsC1+x52fQHrd84juK+0+INV1YfyfC

U0zCR1UQvUgCsXpyoGphjzENH3ra9BDVsW27kf0W0+
h4JV+EpINdUo6w4yKZqdmlfOXJ+oTcs/LK1KteXaM9YHOINigoORhQxjGdcorU2wRkJ7K7BH61R71sUq

x1yZs4mO6fjhlIyKSAhn8RLZzWyQBJXLC9kR8Xcd9GCjzYtbjx+M4CNc1172gh5d3WdrdD9hZurUMC3g

Bbr+ogSI+juoXzU8YWL103mwILdRAzsRm/x6MeYTQ2
7pDYrxjVToQQ91MjJ+QtpdVAIB62HA4VADAQN+efHsJ0bEDebGJDXbGsACVVeXvIh5No1/voYoyvZdBN

v2PjOUQ/nmREnBGyNqH3AkUMxn43Q8UKxLhuwm25K5k65JOjX7giUsv+Tj5INed3jW2XhuUJIfaQZjDc

vI1rPqPysiFhIV93AlpzN3wqr7kUDN8ANZhcjKLY1o
j8TurFxd8XOlnXfJnma3Z61TU9IXuBn8DGF7ssAs8sbj51fb2Nm03iQZjM08CKUlhO6dG89Z5J6Yxhdz

H77MbZMN88NQbcRQmxPHi1VrceH4T6NygS5gqR/XotrWC/4vh1dSQ9/zbYlhx+iEZzuHwD/tU0Ue+P9M

tmRk3mWXkB4ImqEbY+CN4YzIhv9NvdmC5h1AxJaGQZ
ffZSI9bz/dCwPIsZYmdewJi1UPQY4hYbHIPWZmQfrClYP9uTNztcSF7duEwWqxRy7th60UjJokd3+6k/

0vv7XZ4inJQhPjgMgTgrYe99sv7OW20psTD8MTgP8+o3eQ4GR22oUDnlUpiv8I7Ffvm+0D368W9mWcRj

H4mU6yJKdT9GUuRACzA40FqrQb3r74bb6TPNaH6r+J
/DYNTLf8vyoGb+2LIAVK0H/EAbUbN2xAoAiPnhxQ8gDlj7cYtJzli/yRyx1Ir124x3kvUZod11mPpqv8

A9A9jnYysrI+M3GLon8yqT/7Js/loUiy9Eqbg+/0bxdh+t2gLc521A4tLouxXnwn1A1TuBv1dmy883Qz

weAN8AjwyevJPfXL4ld8aOigtgG28Bd7aB6tuJ4NYO
dR4MCoKj9+7hDQdlOcV2eVMaf3tGIGOV6ajHJR4g/dl+gxA4R0u8oLSI51ITGhT+A8KCJQBc7D4yZKSU

LoDvl8mBmV/dQEsOcGa9yNxIW8oEt+3jtktk+0C+5utDYmGdSefIsMR8hSwQr7xdbX5Jk5XUwBzeqom2

8+X5/xMwdvYDLwMv/X3gJcF5EzDGhD/DDhkIO/Iw7J
IQpZEBFdmElkTCx8OLkoC+tENV3gHyfMXeS5AD3oRLCx18Ua4hTY8RiaM1BSmc7QCWTDectTU2+RXa6U

++ZxSxK4BES/4RZtZOnA6TPP3/+EeF16VBh/3TqKyX4SB3/tRk+K8BdsH/AuyXKDUa7nqfE5uAaR1ZAn

1cKyosE00Z/+K77CdBC4ctu8tPnF4k3zI5PZYL6Vby
855G3Au4roo+OmHnIRNOiswS6lpxp+1Qhp00qw7H51I0Q9152ZLdHXfPsbzBtb4F2nYwAj0m1d/KjkW5

TL0lloIg103hL6C5Fvcs7fSciIcLiM4JxbnzHxnpRa/PPfWGEQyaNSP6BXhrL9Y3WqQ/d8Ba1MYK/Ohj

PX8ZpPagGkpoeq5Pj9+Harman/Fxn75kzi2tffvFvHc9
p/3p0e51k/MT5JscS5XkyDy6JafNIV9Oi0r4DU1V2yj+XnmaPD5+l/1+/Z1o3hDxUKzxU4jsLe5oQf7C

AG/A3Zub/WH2l3/Gp/hF3i+jJbXfuq51EtwJZSzioFsCq73BSP/sxp29+BPBTm1120t929spiN3MJQDi

7mAWcYQbX7uVoFove+ft5dPXp0GPKDgwpOgMsHCWx1
CfFKfEI7v28U3T8Z/lw8yGChtzHDTR+N6Kxidmd5IXAw4VKvDV5pJn9AEXSllW41Kz5hIbUOfFtH75w3

ROtb/HPFQIN6PXvCHgNQ9YaKEtHt0OIcNi8zrXz7Iv77AgAw+n3M6T0+94P6ppAPxhxA78RL4Ykc2oM3

21Q6Wj6l9CJEk6I7W4nCLxRr8mYPQ318Zh4/75dYtc
7+OWl1LqzXNBnQw8qgNLev9YqJXJ23yw+mn4QKhTqbZord30C6t7mqW7Fmfoh4bQP33U3jY7+iXGCgbp

9TFfH+i/ceQzsFlmls8jjz6ZkvlmA97rygu3bma9wavPgu26DG0zP6QMBRpC2kJ2oYop4W7Z2eNOwjZ+

+L1fRU0jj1pKkVfUvQqHWiYI/BxSqzrcEzJd60Yx5K
1sxZKbr6dSTwgTV3uBE05ww9brFviv3qeOXHA5J/50fu00+iMdpCrPvcPdnHQN+iS/ZceKIRepE1YtVh

Et6SBeSfQ4zjxO6++8lR12Obj65S1vb2/hw290FyX1HVdyUFs9+3N4bK67D1CqPQsGdvQ+y9R3U+x3vD

hAG2YmvYYOaifcMWQjiwr11gskLKYgcesw6Z+9zMLA
BDIp4kJYbOi55i0SQGsV5tLOUhrHlNzF1keyQY5Zm4mxyscJYXDgZ9HKQB9CztSdDK3UuuehjU+jgA7Q

7qG6560e5FNFXFKMzVIdezqsvatcYjPDJo/Zn1KwoV3qyte5Jlq12c99lX5lC7zC/5XTqkfH2BaX6sxX

A8NwGJt1DMmDtQJUU+nJT5EXyWePB/OLp3wDrSn4q/
v6As5aorjD1SQkH9g6BGvTBvicFN8IOOpGPIFcyp5zCbeH1qZ7Hb1MEXfZZ80hmUOLfjpp9oTixCNvZG

WdMqiKT4DKVoZoLNzTbzbS0QN+nH1AXQkNqgSqEjXANc6XOvMSHrCNHdhgZkb1zlOi7ynCL+7RKIUOa1

PyDH+fINRALhgZ+RryASCC/0FNXzq67nCCHc2G3Afk
UCIWcFOLhib2QUhTtNl1KrRa/baKbuzt1JYz+r+j1nUqVTAo+fHL7/Sravan+egTAGTmq4F7r0eugCv7J/S

vJroN69jKk3jWj6NxrwemLUXPnQLpvAcyyk81kW7Vtg53wlm9EW6LbDXbkWd+xy1Nls+5cCYeT1nawb2

bvC0JNWhxSqa58VBvrSdxVtQgaBMbpnjROq8M9bS7c
L+jTAO+GrytByh76Ji3uOy/LeZ3WC/MeedW1j6XNd8VWaszf6dvVhYCcV80xW28F+hBV0lf5hUDp45+y

aTafCr4rcOIcDVty7fI/aETO1hcOhNPwKbgXT3pPT1WY69SonB0Mvmx21NRNlH4QrtxOHs2zb9pg3EsF

NGDyDyllDG25UTNBl6jo+rXYAH31H4C6Vf2Zjl0YFV
lQ7A2WZ/jvxLz+LD1chnIvdlWqj8pNdp7e0LnrgKFk5pW1krk8c7rgIqCOhWZGDUO3jmQ2IjU5L/n1v1

XJ0IGFls/CmBYTR8fu+tL9D3SX+GP2/iqdz6PAOiTpLVLyeJ7kX09jcy36QGvMHiyO2flUV30UpF36Pk

nDXTdFqL6aPXivtaHwVrGAz1/oLPgElQapOW28X/fd
+RbSpoi8A8BYxgm8V9+rnhzYiOc0BS1qQ3Ri5iLikyaBUSMj1gyW5TX6Ar1fr4ADxPzu1c9e9nk1b0LP

I1vog0CwV2Ppk/z2j9wGzHWsMopCrLyEOUaCf/AFAtwjihhD08Y+7Haapv89pGEFInnHiTGYdXmTjJMc

Oa6ajuYItbD5osfeIxuK4kzj+JB1yTEfe51uHEymcI
n5yEdV0tzv7wY0J0lTtZmcJ0K1UVtbeeHMv0w8cUsf3+nUszOcU1ABH1kBxz6AmI/92eF/Zzaz2hGbor

3uF4dPsgqAX39yp7/uuf/kN0h/8R8ph6G+U3WOM/4Orh9fpbpWayvrdrayOLcSSVvQrgA7fj9/0KbKNm

T6qEN/b47Yuz8RBml/4Yb9BL5OyrHWeQfJfRyg6qbq
ml5/K7aPCp7H4xU8RIfx9Knq8Gj8vsnnUyh9B3hJ2pH8r8p/xOGEAfQ8tlnb41QQXN/gmogO6O/h+g36

wJkYz+T0XAyLlRtC4dbcHNl6/nxy0rD0eRbFDmyoZXd5rr3bflojSmyQ8DKH0zP+VySsfyic+35drvkg

vyV9ILw70pgY5r/VVNTBMtnc8jjWQD0GxkjiAddo1w
8jw9YIPMwh9Dn58sJa0gy9o8x0fQ63Vh8/bjv+u+6gt3smv0P6CJ4kBHrk1U4thMJwjEP+YUPdpdlEzR

1KcRzlyT6Oeup2FVKMhftlktBC/Bfoiq2Pw+axahFRe8SN11ol80tuBTbDkf1V+T0GBIp6R/00vxh0n3

Krpnd7m0f1rB6SjmQy7mfLv8Myt7zR5FWtH8zYryZc
Lo/olibtAI8Hz29qsQaZ0wfQkse0mbnpJS++zv3IkX943DX5goIuaSYVTy4aPI+W2vhwR32j7mCh9c5v

BiyqAYcejfIlyi9QQd750epvvFmjmYqef9+Oa2Y51b/aEpqdZo6bOxr92Fb4Xhb1OljX+qCzqBi4qCtu

RaX0etCgqN3Xz50ZEKymts/hA1AbTVAvo7WNYeOb79
twnNqK0hhxoewBjmUmn5KSl+v7Dmp/aRb0+HvkU3d/XAsk3SJDuszAcsCFHCZtTf8oLWhDCI/QVoMOcf

OB5HrJG88gdMzuuhRg1+HXYe/EI/ecEF5zLpBiNXtoIABMiBokX1AYyuE/zWojlhDcJZ9gIToOQazmxU

xab2+fWexTBmjQ7sek3yaymQx8dZas38GAT/gkVkoa
AYsl0mKgzF+JbQfo7ZzqTuL/esY1zr9vjL1n/IctpR3lEb4JRUHD8ak+p/47QYbZUk7K9ISqV8UDG3xk

IqIueql94X+SltaW9WWokseX3DkqHjgfkTC39Y33nTWha0vE0xuAlVB2SnBKa0T5+M0lvk+d6oJbfb87

y36mxAELg7qQVbILsu9nkOJdCzp2T5UxUfY/I47VFm
2094H5i+4qiickKi8grmlEC0U1191u7+rrwBEi7kK9jgrB/UXqeJgdhIfrP77VPI9XcMko886I2z439i

eUWoq34yEfFV1JMjKpq3dgJL9xiJdj49DW03xtKGxD2oGg47zHrE/w29xw8erqnBmYQn0NyGdfpvv+X3

jbFsv2m6R1WV56K13vLrtpY36aSjwh07mxs3T8YG8f
utknIr3l9YMJjy/QpwwWu49vbbhpCRlT6oe8mnpF3gd0x92+Lb0hV7Xpm+bTObf78nx6u/I3Q3Qrceqb

zSwgZof1AAkO8yr5/2HO/q363UT9Pu0FVMS6lnWqmjP4bjfWadA3Zaxb5D7qIX+nfZI3rAQbk8X+ZDZk

dWKMtVTgJ8sxX4MplSd7k0AXI+8HOxxaamF4Xh95pD
8w/1m1vbRCBNdg+aatg+IQ4ILmMmVJy2ZcxdgstWxu4nRlt2111xBVcnuWJys6ISf0wsvhXfx/g2nIXn

Zle29I5FjWZxIMIoh7I3fS1quYasTHLLcw2maReb/21q+4bKeji1C/UeuVIYhnG+S0ne/ve5a0WmyXM/

igae5X5buqab1r2s56x1yjhL87sjGqzhjdeWouUp9M
U0PPm9qefbqaI1gULy1tdh/eVr/8c0jv7ptMiK++iy7YGqkn5O2yIFe8jiwS+aFOomyEzh2zTPvYDIB4

SUbK2FhFfJVmTn7FqgL+Hnin39Ivixz/K3C7Cc4roizxK9QgPJ1Hfc7OBo2pqYfowE5FsSJg4i3vLd+u

gKF+O+ViE9+rUXeh+uQxxQfZeI2FvrUaIi5FdEMEZW
ts1VWD3Xf3kJO89rN+GP8Bopd6kD18Go4D/SamSEtEPZz+fVUWh+oVfAnx5vaZ0bbFejCmkCV+SFONf9

QwNdMiPcWBJOj4mXN24nr1uhvx43nuN6lHzRlJHrum6cdqlR4GsrwDYtEmnYt2O3RXU51x0v3TNzB7N/

BiHsuE/wStFl/Msi7L1ZWyiHo9F44v/htqt8NJian1
CJsN/RALvj/MGMk7xj0M2Gz08XEyFDdBw+Xyf58L8ztxR7I1F+Pao8k7q+0KGasWCUAmRdAT4rdeZ5i7

O+z50U8SzicgFaCIteVwJJ6qzmeB8HL3TmWR9yF/TV1R/lmoe6VkjTtZfUEEHjU+mkCp7vDeX+VWdeiC

0xMORDfxyqm6cF3yxVqWhzqPc2zkTwtIboHYJb3e5E
4O1ran50lzT7uA6TtUI6vO1s0/1VxrIk1/FCyZ12u0C7zPooi79ZD0DawKpxiX06n8o1kMNgPYtXlDpa

2alEq2ORZ2rOeGDJ6V60u+gO6cafjPfheRIkqnCCQLFcouNoZVx5MVtI2XJnR1fSgmB00ABWWWX92U05

xXvezc/ek11eYo2NUBrzxBTJuc1FZ48oP/GzgD3QmT
kVrg30al7fcUK1Wk3DA89i/YBeu4RgxarWONV6SB23uhXv0pJcvy949T/7X2EBpAGWLUKwANraaRB0zD

oxjAuwRx3QbIG1Gm96R+rKNYPzUEzGOPu/kwgsiQWGwMID5d8Wc66dGQbKnVux06llQd4ZYC2Lq3pOBd

nAlQd8U79Mw29x7dorLcZCsJLKcGFeqs1VQbT47z3S
SnKuzk3thqCNui2kzYYjytX1kOPqn0vlKlREWVh44KYS6MlJfWN1Ii8V0GO3/vrsv5/ZU6lLVryONl55

o1nyP4pr5GAWPlQ/pn8pM8nANCDwXuolbdm3AJwQg7ppQ+49YvT2JNQ4DVRxmPTrz2C+/9/3J87wkJnk

Y4YK+4pW6eksicpZiHHtmFbBjm9gxP94vdZGFI7ZIf
jqR1xn/RJf0IWPS+W3+UTHh2AHYcRIoBiGfTYu68FiyV0do/hf01R+2VNle/BZa8um0e8kEKH7yAo6iZ

LVWCeeV+gzscC65Uto4p5g1+kzaGmbQ1vBxkXV9zsd7pl/k3zDPM/zRz9UbfMs3iY92UK5lWW1y6Z911

OjrmZs0ae5nGJFaNmNplQ6ZIMvfl0Z6OpxMcMUNl+5
bAV3TbzWV6HrhoinKjqdLRLziHIi6nwci9zttlsh6b9Qgrk5F+XHyVmGqhG5vn0C/wDn4Dd+4bHCPPYu

zo0GAug/YF7KTfvNzAqubSRfJ1F+as4skzHExWIdo7VAG1q7CMrPQl8ddq0iuD3fESt4e+57V9+G+P+L

dDzpq/k/g0WR8QQyYGTPOVI9Lo/GBliu078H5fcAln
I4kitHkOPj9mKzzovzveACCe3yDSH9b6db0n/DYmlSzuQNhIdzWCabH4OOutJG8xb/FDG3fMES9EjW/9

ZKeyqhdi8YveoiLhTCqEyaBR43Pso0vJrL+Qxsn5QIO/bvfmCySXpq1CVkmxOm1+qel20sNzzx1T7rye

vb3MLhTZqc3i35NcEtwr+m2vEpfrPuLohSwU27S7LK
ENaCFNXreHgjI4HKBNsTEHxZK9Vq2aiXLjrofZKCvH/sAUBEwJCygN+RGUfyZPyF+j3NnevpxUOK0QG2

yxPik8Pwj/P/Hx8npgW/CHDuQG/DEq4hddvRukUfs0af+Oy7Qw82ngEkLKUCMMy26Rfc9/OIc7vlM4v8

rl4lcjeB94g+lmSW658D7tbGn4HVzE6Hc8CeknBl7N
QUCBhpP3YnomMxCDcdWKek5eEQGPSwS5aSomMtBGZ4zzyhRXC2e5eG3KzUbPq9DJ/5beY7PNlsyrJRnZ

PKoY/wx+Z4tEH279C7LR6mx9dAUszEkkbRep6mU6tOKMzYkBzzGFqw6/jaMsd2scyxyEabyrq0K9UIA7

7Q2qXmsPaL6BXD5qmo+PJzCUvTSkr3ZbT7IlJPTV6L
+gCcnFJcUhsRhF+vlN+K3s97P/E9ja5Kw/WStLDRrwsrdZcyqkNaJR92NwkaASB2cgjIMlzjrANFaOVU

gYiMgDWujyXVUug5tkUKDdolsKxOP3gVD7ZswpvfsJlx1ZRyIo82LAeoPnJXc4xJeUSSIQYBVmp+qHJM

ICjNBdhcvrQMYOo7MSwdIFRLDbqJjZUNGWdZGhSz1p
9CEjLTbgr/xE3KRGX/69dGDaEtjBGagkkWS6VB6GS0wkBePncIs93k2cpLJyTsCTjv+zS+dramUgMDfW

S5Qhx9Pi3aokoAMJ/wis1BM37WcpMfjVlROGhy+mFeeioAYtSuzAglSAHkddxqhlFj0W+uSlAfTkbeoH

GwBqd5l0J1osj+A82S9DbdVXLF+w6atQJxYqClo874
uIewxlSJjEa96pEH/qkNEIx1j0KEt7GMLxPb6/JBGjiOCiGDCd0y1Wrskk5rLFw1Tniq4fyo1nXv0Pvd

AUv4PjVscZa7AatOXHrJElrUZ5/r9wguNd8vhWQnpzbwNbxEyi2EMa1WBhRfu4MU0swxIr+nNCVISRmY

a/SnrOx1LPvkpSYdojFpG/Q8ubshSK9BmQ0IIg3bCe
PhRdVVV+qH5zEDg2wauunTpjud3M5SOqmFig8hlOsy4p3lx7G2Edj8ZC5eyh9SRisFZjRHBUyoWUtnMN

rbL1WrGwv40prsOV8wNji0K8Q3wuHj/4efsGLSwU4WLwpqwVJqSUpeN/5j3jMrHT2JWW9d+tClZAyU6Y

yJr07u1CoKvDNlFYKQBMk8ASLveDGS0yLgivz4P6cS
gK7Kgb7r9CzAs0f5saG8dfbAjUL4czIuOSJ0XAMUDhTF2Q6t2aGDut89PdSQrERzaCsPjrnqHG59WLFU

x1nrVCMX0IE8vwkld0GjB36RQV3hFeMGfU5ZWR/91N3R2MBTeUoQlcbWxMpIm88HgFq9Hz2ViVsk2qj1

mY7SFDWlbW6pI+to3yxzKKBFNCn6HuD0i4qThfCGQ1
bvOJ7PwEp9UPXpJLLLdXlozyjl/4X/wvzoPa/5axwb6L/jHsPI0YZO6cu3MbPSLrTNgalxXzKmtPKmE9

EDHxz7XlFMigJJk5H7GtwKEougOiLzsbfRAOWFDpTCGhR8xfxok8qINvSGP+Gl5UHJFvxKJgIN4TLxvG

xAQZKhXkXSxdrj2mY1BH1ambsEMWXcAVH9Z+QJpMu4
FhGaovlU4wiz82GMwnPqRznr04XIxR3tiVijeD8WzvbRKZu87uQWaFQgXFQrnpp3Le5FdYfk2S49t8rg

6r9d4hqMNbjcTpdXbYuUecuqRBy7bP2o576pM6Y+yMSGW17IUxNi11Rm/WIuZeGPgiuhw13A7Vd6SKuS

8E/UwOgaeO/Ia0FHj7ETUZ2OXBtakbwKUbKVtvDTp/
yDaAOKIHf8Udeajs+TK6LW1wgzNJC6t/wHV4kjIomtGi63MzTihMWUjscc9mG7eq06fF2Wi+v2U/iokN

EnDyPUL4shDirF3DIX5lj4WrVJovYMUnWB9anx9DUMzBWyUdD2J8hCNkDo0rcPWqg3WwsBH9p3YAJeEu

O3OeugAOSS9fW4GKPAEOSveRkjjinO7WDTIjHZTqPE
84IVghQO0OEGLPWOtxsJYpLMH8G6Knb1UbqaZfUHCiDs9IQVUrAiwpJ4TaSjZPMoSqD4EevkIZCv80KR

lyRC9zHLUmRAFeJEB2MKAbMVroGU8SfEZtlUPBqmzlGLDoA0H1QR8OWHSUXdBbG3UppyZHySliBuJnOG

AwIFINCj4+DIeyogNlXfqJQnXyYUQze2GwABn9LM2D
CFAwMVhrOX3Dp187V7T4IyE7uPZxH1gWDs7dpSG3qOFmI3Fer6GPe455J7HEQAEEVsxKfoiilY8FGMNE

x3cYIT8faM0FNKQilAk2qD6PJYGbX1pUA1rbiPUhWW2xkzM6HM1TLLpvn9OuvFZfEJDuEwBcE13rVDZl

sV8gQHuIEABysLi3uXktD1Y6iHDjSR0osdYvDI6+IA
9GZKIqGn9gyLPal1HamXH1v1PoSdSzHPBHZMgeZE6KDlLsRSRaW9dcJJCzKiOqUDMlYlMdBqZ7BZ9pKB

l7MZrdMEJaCHKmXQi+Ma4VRB3vp4RfJPunThJqOF1pmi3ZEUjSJbQsM1F5iHLnKy2tuW7MiXS6cSVnY6

J6yDRgU9Tyo5FFl482H2ETQHEZAvqQjotnoI6XesLo
LEfzEy7IQZRikNt5dY4FORbiAM4ESTQpHB9pIJ53Fn3lfOCzMoMdZJOxVg0NSrVnC6NwIE7vQ13jEVJa

OSAwIFINCj4+DWipndMtImiBHoPpEVKbz9CwMFmnYYi3KWN8PBVlAqt7OJY1WPLwRJArCMK4UDW2TdU2

GFqjFcF4PCS4ZMOnOaWxZiAtPSD7FAN2CBPsDll6BB
CjQnWmWehvYrg6VPN0AeC3BTBsBLW1RXD9EqV5HRWcAWP0XTS2QgL6IHByTCG2FXT4IkHyShwzLkp0FS

U5MBWPNmR9NTZ2RLDsGFH4ONWtNXChVqH9GOF5HfT2MvCfYqHqKAK2FaD9PKSdPny1ZJdmRtCkRvanPB

XuZXN9VgB3GDZpZQJoCXueTtQ0SocfJeF2OTt1HRH4
MnJiSnR2ACJiMKG7NiJnUtR3XUa0ZWG6HbksDmA0WUSqAPGYPgM3PXLqTyltTvw2TJB3LBT6LWEfKoUj

IMK4LcM7OSVpBLOoYFI0YeR0JITjEpi1XPA7AtA9ZCVlVsHrLBLlBtT8SGBbSaNmBRuvUsP2UCJvTXM7

ANG3YgA6BHQeYcIbTDXnWHPaLbocTDD2FMLcNAJ0Ik
RrFYMtQT8NTETbBZPbLGKcOmVrWmYcQZYjKRN9IOEiFhIkSFc3QIM1USAmKaUbVRw9FTE8JFZuHrOtSB

n7SGG9NXWpUxIzMZf5SXW6VNFkWeKqMWm3XOY3PUVfQgQxRKb4VLQ8ZVJpKfLhSXy7IWO9BSOsNpWdZL

r3XNZ3PHRdDFo3AAEuVnNbRIy1ZOO2GNHiAxLxFHx0
TGB0WUDlCsXtFIn0IDJkCkffCdIlYWE9IhZ2DCQmMOG9AUO4XsTwIaNtBZO0YTYgWtJ6YoqzDxljEMD3

MrO7XBGbVvRoUMjlZaT6THXwHURqYKJ7IFNiAiJeGgFqZHTnLaSBXiU5EqF1UfldSpHuBCYzAyMrNcKq

UsC1PJX7SlM0RqMqZLB8XGeoEES9VNMkQiJ7DCR2Sl
M3DeesOlL0IOJ1CnO5BdquUMBbSNV7ZhVmWqJ5EHX8WkJ9FhrwDuA1QUS9MCHsIodcVfw6QBJ8HKP9Tw

NmCvNbBXr0RIGNNzc8HGR0DmffIhk7AHs4MUO9GXJiDyw3IWaaDtW1WcWaUcWoFPstWsS0JpfjQfL0MJ

MbQAY6NNHoFQF2GHY1NkL5EFQuAIE1OLX5YdD3VGub
WZTxTJD1HkY5NLKhUJU9IMC0NfKrYxasIek1SWV0WYDcRoxrOLA8YY5OFEB6CUU1AcQ3ZSZjXLI9QWU5

GtL8PTMxAMV3OFUeQGS4UYNcIDB5AKF6DjZ8QSPaEGHpVEB9ThY0DJSlQKUXMeApWO8cak8LDaPbKVSj

CctGAou7JMhuXL7UeCTbR7IdxjUTSBEzscbrvW4uAX
daCU1Uh017SrFfWQ6VaiaqqSvEjNAwcMKHCsZpY0VdD8DcfSG4ZTVmP6TyYAVrK4d1PUdeTG0KAQTaIV

46UU5cZEPRNkQvI9QeEHbtTDi2PFbwRE5Pl127XaKxxTPlHTKrOdCtFPDiSSRqJGE6ZV6TRLXyWFTtpO

coOB3yrBIbCX0RxCAzFeUmNQl+Fk5SOH0tp1HbCBcp
MXGlBV4ork2QKYtGRrXvA5E7oWRkLu5fmZ3KhKX9jVJuI7PtrRAYaMRnT8Mko1KDw378E3SwiYNkOJc8

HCzuJy6WhiHqKZcjEg3BsD1ZnxEoXU9ps1ZrxszACqVjR3PfejL7O1vdnrPoTS5LLOP8I1dtvxKfMCMD

PqGjS0ilDFTfhfUsQxCwKGYERnOzP8EmbeCTTKIfil
abuR4nKZE1AGHgSf2KOp2PWrQvUO0api4CIpRzTIMsKynMEnajZHmtziPeWyzVNyAaOJCnZuuXLyw2CW

pzGJ1Ydn6oC3A8CNxaJDXZF1GelAXnEM3cP7USZ5aqLQyhSf6PFqEyJ8SjhfRxRUomP7PuULY3AKLaXw

1GVEBiAC6TMODuTaKnSGEFNfPoNSGcNrPfQkFkFKWC
DHcmDOKmM5SxDXC8KXXvKp0+MYbhNC7VY5FsVBX6PRq4SL2+EGntCA7DdJJLI4FkoLBwSRsvQ7LVFK1V

IVE1BD5WiRMuOE9WmVBIV9XxtJWuMi4jDZNwo1HlPf9uB0HYZMZLMFJkEMnbPEjaNXViXWk6J1H3UHFb

W4AWD301jOWbbVt8Ii0vG5CUMDuBHdCnGNnfYHytSV
YdNJo3L0G8PMUfC2NMP9ZxAwQuopEyD5X+WkYiSRRAZW9GFSWSEBu1Q6R4dJLgB0X8mQaRcTY4ST5WGR

2GxYXqiMWns20+VfIRUpDzT3QZO8HVXC4HHPt3T4K3uPMcY3Y5cEiHsAC1IX6RRD8GnZgksLEmMl1fXA

ogICA+Vg1KIf4XYfBaQQ6noe0EWtbbSUAwEqpWJrn3
L5mlqve3wPCwEqX7K0V4QnW1hZPdPZ6KF9D6qVZbHQZ8WAYjhZB+Qn4Rm3KhUDCaDCx1L7rxJRWyFAVd

WdLqeG39G++3pgmnrJR0E8j0EFXSzVQzqMuZogNeT5iGZFW1w7Z8OEr/Ph5LHCS7gIo8lNFeINYeKAh0

tV4ccKs3VhWkJN94AJCsZGkxzT4qTct0M8Zmd1McOm
0dDk4ybNGfTi9QIhGjIOC4nfKtCxXSPsF1yNgkzifmZDZ5D3s4iYD9Yk52f5firkAno7OmZoE8AHjaKM

MePgPvnkElXAI0liBrsY4pzbJiQs8NWMEyIUantgYtVxGXDk9RDzVtCR07ZtvinE0bkVJ+DQogICAgIC

AgICAgICAgICAgICAgICAgICAgICAgICAgICAgICAg
ICAgICAgICAgICAgICAgICAgICAgICAgICAgICAgICAgICAgICAgICAgICAgICAgICAgICAgICAgICAg

ICAgDQogICAgICAgICAgICAgICAgICAgICAgICAgICAgICAgICAgICAgICAgICAgICAgICAgICAgICAg

ICAgICAgICAgICAgICAgICAgICAgICAgICAgICAgIC
AgICAgICAgICAgICAgDQogICAgICAgICAgICAgICAgICAgICAgICAgICAgICAgICAgICAgICAgICAgIC

AgICAgICAgICAgICAgICAgICAgICAgICAgICAgICAgICAgICAgICAgICAgICAgICAgICAgICAgDQogIC

AgICAgICAgICAgICAgICAgICAgICAgICAgICAgICAg
ICAgICAgICAgICAgICAgICAgICAgICAgICAgICAgICAgICAgICAgICAgICAgICAgICAgICAgICAgICAg

ICAgICAgDQogICAgICAgICAgICAgICAgICAgICAgICAgICAgICAgICAgICAgICAgICAgICAgICAgICAg

ICAgICAgICAgICAgICAgICAgICAgICAgICAgICAgIC
AgICAgICAgICAgICAgICAgDQogICAgICAgICAgICAgICAgICAgICAgICAgICAgICAgICAgICAgICAgIC

AgICAgICAgICAgICAgICAgICAgICAgICAgICAgICAgICAgICAgICAgICAgICAgICAgICAgICAgICAgDQ

ogICAgICAgICAgICAgICAgICAgICAgICAgICAgICAg
ICAgICAgICAgICAgICAgICAgICAgICAgICAgICAgICAgICAgICAgICAgICAgICAgICAgICAgICAgICAg

ICAgICAgICAgDQogICAgICAgICAgICAgICAgICAgICAgICAgICAgICAgICAgICAgICAgICAgICAgICAg

ICAgICAgICAgICAgICAgICAgICAgICAgICAgICAgIC
AgICAgICAgICAgICAgICAgICAgDQogICAgICAgICAgICAgICAgICAgICAgICAgICAgICAgICAgICAgIC

AgICAgICAgICAgICAgICAgICAgICAgICAgICAgICAgICAgICAgICAgICAgICAgICAgICAgICAgICAgIC

AgDQogICAgICAgICAgICAgICAgICAgICAgICAgICAg
ICAgICAgICAgICAgICAgICAgICAgICAgICAgICAgICAgICAgICAgICAgICAgICAgICAgICAgICAgICAg

ZZMiCOKtTARnAWFiJHv4I9wsNBIuGCMgPE8zRHo2Dh2+IEmJLyXoHMF8kqMitV1PON4oj8OuGSuoUBKn

c4WfXCu8WY0GUJXtLZioIV0YJAlrck5UXIDqAECayQ
ZHw8vtUvYxBJH2TWIkMstdHB3VMRMbY1texoZtKFLgPVAGFRxeNEOXFJhuGCWVZTNvNFKjSmNgSWzhTU

8Mv8CzwNW2RIi+Gt6OBE0mv5DdEOahNRMyFJ7jar8OJDpHAlQvU3RbevR2HCM8ZUNzVu0JEVHyMYJebC

UnQPWfPZLSHpPmV1XitQ87IQTHXx4+DQplbmRvYmoN
PmK7JCUwx7AuZTv7BL1NOHLwDMw9vPVcRHXpU1Fgc1FuMz13TXWoKglcO9ynkqolZKncfzHVIMCeQR5r

XH6WOHW3FFjsAOWyTtWrTSPdClvdDYGEGGqUAvKlC3Cyo9QlImP0SDHkEwRpFEsjVICjQeM2BS00fWwj

HP0XIPQeAKIiCI10KUZ6SZAcPs7XAc7ORnMmLB0nxy
1JYuVmMWTfObxZXdz6ZKmcHS8IvJLwU3IaqOFdt4gGPbPoH6KSHIR3ZEZjJf2WZXUbUhDxZVMaCFedOT

8xYOFtNBDUtIyvhoH0DN7UBF8pexWrHR5HOmLgUv2nKl0RVaOqX1TrQ0XhYWObZAXNYKagGN7GRTwgJY

1gDJ0Wd4FVgZNumM5ghl7TPLRhHGPkRzxqtp9GKkhc
X2V8tIlhEKShRzmrSXXMQUryCF5TESVoMEP3TMPjShNwIXORGxUaL58wWL6SQ0Kqt81kGkV4LRPdAbHk

MBpjJS10vSmzyoSdjUVxkPmfKY5FJs4+DQplbmRvYmoNCnhyZWYNCjAgMzENCjAwMDAwMDAwMDAgNjU1

NlHoAx0IQRZqZWMyXTNyNnZdGYViVHFcIUadGTFmDO
K8PVT9DWVdQHOnTA7WFyGtAKVxJec5FGYoQWAnRESsrs5CPOBmJEJyIDW0PnGoPMQnTQFdMAjyBIUiCO

KcYNlkYWIfCDBeNL4RRdSzXFEnZIMuIcZtOOGiNHKjgo7CNOLqJVZeAgK8ATAeIVWrGLNwFVfyXNBvHL

X2MjBeGPNqEOQaSK4ANrUaHQPmMHczZMuaIDKjJVIo
nd9SUNUxFLKqMKN1BJZeYFGsIAZnVXdqIJSjQYEjHLa9RJHxBOTiTJ6RXdTpLDRkEOF6GMNmMQPtUVRq

ls3MTXHsEBMlQKw0OSTeTGWzHHYuDKokMNZmHXAaZPMzFVJmHEEgHN3PMuGkOYEwKIIiLjGqGRFkMSBo

fz7LPRSaGNMjJqD4FBVhOFEzKKUbEUbtVICuHUNcSb
a6KTDyZWFdSR8JIdRnZXKaQUD7QpDmNWBmMVCjbf4CGNXtUEKwYXafSIZfEJCcDYTeCIjbEYBmWEZ4Xd

T5NZErZPSmEU5PKvDvBIJbPsN5OjngPCJtZZWysq3JEVXpGLGvDSU3KFRaEUNiGVNvKAieGIKgGBP1DE

E5LGBrAXDyXZ7JAuJwYUGqGmF6GpbxPBEtBJMnpv3S
JUYfBQQgWfq0OqAfMVImTOMqJFafOAEpYTQ5KNW9CGXkJESuWI2LZnSrZAEeRlp0JcSdWGEyHGMsll2R

EKYnFIJyQNVgHBGjXAWbURLpCDnmAFOxIER3MZm6HNPpZBAbGZ0HVpYsMRPpIozjHIJgZREbAHGyby7Z

tVCuvUdatc9SPTtHQd9XdMwaPZMvJVvtBw6anOUuHS
IbADMOKw4GyyDfLSUkCICELEebGCLxHYVkNVFoAEQ9MGGnWqXsVpGzXcojPkHeRDKtOTNmScK5CaY8MA

YpNCF9KYR2AUMrFATyETF2DYTxCXNlIfX2J8L8EgX+TC3aOZq+Xe4Kg7KfkeB4mdXeKGjaUYF6EN8TGZ

KAC1XJZj==









                    ID                  Date                Data Source

 

                    [id number]           2021 11:00:11 AM EDT [facility]









          Name      Value     Range     Interpretation Code Description Data Scarlett

rce(s) Supporting 

Document(s)

 

          Progress Note                                         [facility] 

BOYRIl6uZrTPMsSf54/BWEvcOCTgc3VaIEloSJm4GPwoKAMhT7DbBNX9kM9zUVK1LFjFSxCmQgOdLTD3

lbm
ClLxpFCyAjGOFlUtmLGrOeFRabTyzonMBgSD2FnCH6SWMqN45yWNZxXBGaR5GfPVX6IBD+Yr7CWWWzzL

NqUM4TDxqH1JehYjiZVH8x7L9dVlA9fHCurhka6PLenncqTUWBUWcK7rAG9ZMyAglu4dTk62oJIU/fyX

Ezs9paYZjNe6jn6R6eQoHle9+XCV39qbtA/8uPOpFi
TELpttQtkGuieuTnkIK9EG7S72LZ/HVlLb5ndG55hxaYFpA0Fo99JcATxT3Ld3Hj+Kz3XPR+F76U+UoO

qmSIefK5xzM3PzzwWS7EmRFPFnWXjpj8D4TlmyPZkByRHjcGN8l0PEwWrBvH2gbiSU4fDhJp3QX8C9P3

Kar2Do+ylBSKlCDUCAOEIQtjhO+fl/iwJ94+OSJ8Lw
j1KYgk6ipAITMd+3TwAzheKyx6rNJPlodESDDYlG2KfYNe+YJLDKu7dnAtaIinLcnaR2Hg0jr0Yz9Uxb

4gQYAx6VoB3ShnpB6tV7kXQ0iYa5EbBxutTXneWFztY46Jx1yesYxSN60SHO7OQC7lY2UhJBIEcS5dGK

Vir/cF9lBFIf8S/I7G9rRby+LcaUvXnZhx/9qGcQU3
Wkeem7/ucBogCWHw1EPv3V30hqZIhDfkQU9Fj/Dsz6tTXNXQAtqpaE6nVTyhzZR0ljnWgbNQ41uT6vEf

H7PT0g1Cd62N3rHe2wShivKIsDjgUQv2ZfQ1jky/jhQyjFDP8qWA3LfepNNy9YjyHzqy9JE/6CIef9wW

chCacLDtpXC046Vw73jY9pWN7/gOZnpXOy236pyZ+2
vQ4vV1FHCiN7qDfrx4cIcn2hcwBjuJF9VD/W4bYUSlrncQUryZmUgGhpO8YFw/GeIPJYtmUY89yINAze

U6VtNHZ4HGX4L4cAHRKPDJdOP3KjGSVpQcdjUpYKij68YEJDLqmlEf0w+M+Ss7Hi2nTsEhnbIwdN6nFN

+rjyb3lh9eUkyUDq+sGN9aJZ7quvPxzXlxdoP6/Utx
dI4AYISzNv61k70QyAZh05g7X8kA2tzpsC7Md9Gy7rhu75LC+kaAtXTHgMzGvawrEv4wnfWUj594WrwW

3DXsJ9xjFdNw5WGFEMSxDzT+5TGO0DIrKJ+wEp9vo/ksPbpbMVG7QbJheVHIWmiJLcw+GYOvUZK4Avl3

CphKc4lzQm+MGm8wUps0XTmDSxFO3GHaEGOXxS2AyH
0L7FiIUi8wDn/2D8aVQbu8xUeVOKIAafJuxtRWCivjR1Y41Y4ye8FxN5gmgUfmOuO3fnnIMt9MWj93sP

2Ua1r00/RVM358wj67m066lK7gg6iidUQkSuUFCJZX53Z5oM71LQdoRLFgrumkJaH1i69ehWwid49T39

Vy6JzQI4LDXJx042VrdWMXvnxlA5u865ua7gx9ZP0a
5ixULZi3mQ+bns3VUatTHp4yREn5DAs0Ma/yuDoSY6yT2Oc1CIesGaOUYamFRPmWyKxJ37pfSgUik/NF

e9Xet4mTDDiaW320SSvR7La3sVtQ6ubat5oMxA7NDzPOD4TywPiZlx3y9kp+1bR3y/mnqxduY1LYQYP9

mtzekigThCPHIhvSXrZb4S3Ds4MKJVfnRxBEKm0XNB
0wWy294TvXCkD7empv75FhxeuLSkmPempFr9C8urf+cqMgqhEAwzUpVD1htvmzeGnPwmNerhpJo/J6DY

vwfQvxMWdt6fdpGrdOxcKQ3e4QBtCdvnzDubh7sRWHEj3pY9crahbhStUZ3YEpM4SykgBJFQ4/hrfrxl

rFlV8+m1KHVETjeBNue2q630dBqYGvt8eYVY9A+DS1
L9Rxu/AYD00Vas4vHcKmG6v8A56h1ZQmqLhMX2kESEt83DHXAoj5jHXnk2WUe1CYfA9r18Fwi27gc7KF

cguDOViFx0qhariZVIyq2EXoT62Jx02n38xoAo+bKMU361uoAC1MINzEOz/csrZlqzCflqT/sNHAlmxX

Ktf598p8jcHrErBfH2ben+9SoODrAQIkzCeO14KHIi
S/LlVR6KopttWSeRLZMjIsav6mgMAk22e7fbch2oP1/BV2XwaXu3zU+yrP7TfP2Ca2w/7E27T905DwVz

pwwRmfjUj+HzUnfPUK7OrRAkOKwGMvMeNT0UuiNWtOeeoI4FFYCNxEdgOn3LsnBJ0SquqdC+niVgyVP9

pPos5WWoy+uFz7wSlhRsJDie3t+ZpCCbk8X8mNubII
FEU3gzbBf67D5Sl4e57AYmkRtp8SGYfwcfDCYCy5bAPrFzGz4+cOXfCDMo4Ss0LhO0ebbi4zHy48TIJO

Ep64m4BAST6Z8Awp1kFs/XKjwGTWQbTwicSMIAV1g3fcShakMB+SeXuNoau2dCQhwr2i8br5JYlvJYML

NcIAYcjCGCFKYb/mjw6lGMP2vNgMENBLjLZgNUpoEU
sd44itWPVtFYdzZU0Sa/McnSdPS2TRSS1lJxdki2sZ1bi7802XiNF1yTz+ap4HmrTxwbN2zCi4Gv2zLG

MCPU1SCnOqWEnisermjseRsSDKUc1AFwQQSKQo95P4Pc//BmAS/CEDOsWsGRE3zcMirE5HWV1eg6GzRG

v3OLJkz7VeZBpbREx5QSbrLMPhV3B8fOBuTJSoXX9Z
DPIdVO3HQJRtxlUaGxHeZSIXWnYoXTDbLnOca3RyB2DnIAWaYSIWBMycFTLxE53xVUkaEt81LKzhYGVt

UeBhFIr6Qg8IObFjVBZvJ04zhJSmgCEcFSUqINUEIyFzRAGrD5WfyRBgGVdrC7DpY9YcNC6wvALsJP1h

aONfB4WqA2HafxuaHUMKQjBxOXInTVkmKCTaDeQgWW
xzZSA+Pm3AYUO+Bz6NSX9jr4TzJWj5CBDfz6KgUCetOVbbXtQpBPz3VGWpTsbtFzb9KLP2GZT3CZGrBH

E7YEt0VXP8EhtjEAggZBKhFjEvZoPsEui6KEP1WUVxJlfcXrRfGBZ8BBViYkikSFM5BPJ7SoS6BILrNY

Q9SFQ6DaB1SNVeXAA1HPL7BnR6MDBuXBU7YOA9LGKu
QuawLRm4GH5PPYY7ODLdXRi4XUC4OeWkIHJ2YHE5LeR5TwxjVwLcFNhmGoY9KycsQpPrFYb5NZY5XeKj

Njo3SJNoCAZ9BmkfDYW9DWffYpW5VtMwCno4MEX0HiV2LzhzMfSsXEJ9PgG4EHUjVdEnWWG9SpA7ESOu

DmP8MTV9KlV2KRQsJYveRFI5GLJnGfjvEwl8XTT4VK
J5ERGfLrFzMOX9MsG2UXHrNVEzLYV6DaG9FBAuHde6EHT8VjP0WOHqPtAcFGOaQlF8JLRbCeGiNQmvYo

C2QKEdZJU6LXO7EqH6CTNuUuToJDRmWRYxBwwhMYG6ONSaEVV1KmUlEGCbVM9BEMQ5UQZaFVPdOPLjVP

GxBhIpLpW3KAK5FOW3NNBgDnVqGHd2WYU3FUUkJmBw
TYb9OGH9BJTpUdQeUBb5XJZ9PLQiDcKvXZs4XII0GROiTcRvCJt3TWI5MMQcTcPxAJe7IIO6QTOdDzCa

GQf5YLW8NPGqLuFpVOy0FADGMfJyFpAaSJx2YVL6NCTdXgNsKRw6ZIT9SVSgIlBsTBl1PGU6PLMnMex6

KADgCmU5ZYKfWUB9BGS6WsA2UHSxVpQvLGS3KyNrCa
PsPsS2BVI5HUK6KMOsVQl6RPFyAmC6YtjsTXPkBPQnUEQ6AWziTiAuLXGhNzFeZjPoYEsgZOD1JjX1Bs

yxXuSnJCMqSxYlEpHxQqY5HAR8IgU7IrZvQRK7HFrdEWR9PCPoTjA9DSQ1NpA9KqrgEvA3ECA4OrZ5Oq

itUKByELK1LjJkYcS9JXS8PtA4KwktPqV6IIP2RMDb
ScqgOea3HZY9NXK5LtTyXyEjOEj6UDMWDnAhLmn2SSq2OMK3NwmeEar3OWJ2ASV7BsabTnTjZQflYzT4

AkWnXdGdOAZ4OfM6WyxgJzHlOFB0CxG3FKWrDKH1CHM3IsU1YGIgKEU0DRs9PYU2ODYyTEM6MTU0DkI8

UBGoAGB2QLO2UAZvWchlWom3APM1CFU9FQIdDSmnVN
C3QmG9MAGpGKM4GUN2HmW8RMZfURP9LGW7TGP7FIUvPDE1YDT4KvA4QMPdHET7LWGaEBM2OXUvMUDvMP

5pYIbadtHxIhmNOefvFYXrQmmHBkWkRXwDBzZqPCUbOEhjFF7He660UGVxY5XnjLAzyh0NCXPdEQ1Ad9

98EeBeMT4LhoqvnK3IIKDnVZ4Xz8LakrGkDPY2P6Oh
kLkqsSlkwUY7LDRqRBUpO0FihKYaFcTlASmiDIJjC1ChQGlaTBApGWnzPCWlH7KesqQQLp93EPdxFC5e

WAUuWLNzQDE7UBJtLJkzGJCuU5c7ARmvC6JzB1vrRYEpR3ThcIDfPE0YAHM+Qs5CZK2ll8OsKBe6XIZz

a6QdFGzxZDn2FSwgBVYcM4P0cKPzPm2tvK2QwWN8gF
QnO6YfbJKQjBJgJ6Ixr2OAa230Y0HmlRUnN4QhF73imM5aW5zjdmOzu2rWqoGfMZcoCe7CYOUuYY2YxM

BypXYgLEOqDiZeBRZtoMCjJTDoEhN4NHurJLAyL0liHWEcolL8RZEnAo8ADBZbVT5Zd431XWPzJ3WcdN

DwewH8SVSbTh4ITDP+Qj4YGL6tm8XdGZl5GQZbx0Ob
MFvkVYlxQzWuWIm8VDGrTwcwDqRfUFG9KYE4FERfJYF5BKt6VFJ2LaJpVyQ5GNDiYhIzDgWbAtv4QMA6

ZQTfZxlgMdMqHWY3JPAaZottMCA2UJS2AfR4FNXbYAX7YJO9LzN9COJkIUK7FFC2XrK7RSUzYAZ1JHFf

PiMlWeGqFSe3RT7JIUW2HRBnSIq7BXBmKDI4EvIpFz
NsSIreWfW3HkCaAgQlTYY6LdS1CCYnVse4PFpoNbHuMiymJPP1WCxnZbX8FXPqEJNjQGchHlN8QfjbHo

C0QSw0VSV5LpZeFzA1YESuPLK7CgMhNdO6PSe2TMH9SgctFkF4IDHtLXEQMqFgUhBnERF5YBApYxFkVW

k0OIE0GzHoIbTlGRP6UWYxBCL7OVIcFiIrAMC3QmOz
FfJhXbFpYQLsPQZrKiybTkj3JGJ6EdTiCcbdNHo4RRLkUUS6YEMzDeRtVNDbEQBgXSbsCSI2BNDfSqP9

ANBvSFB4PXl7NLM8KFEcITynAKO3MwH2YXLcBvg6IYF7LADqXJcsEJr6CQq9BZU8IVKqDyUzCPv7WVO0

OLDxUrQcCFc7EGU0LFEfHjYiPRg0UXC5RTVzHjVeZU
t1KDK5MZMgJzNsCHp3IZW4NOMuZmSqAOj5MPG3EYGxTvLmGAh8UXZ7NXLfZmFxQY2APMM0INJhJbZnCD

d9GAB1XFEhVpIqEBa7BMO8AVCnVuEdNVw7AUZfHlcwOjGiWWI4JgV0VFXfHCZ9AOP7StXrWATaBMT6AG

YpImE6KlvlSjrnZPQ5PnN6GJZePrMvECuaMlO4LSAn
HWJbSCW1XXBiAkYpBbSnUSQpEjHDYoPhWNl4PTI8AuQoIiCuGuZvZYMuEeTgIfEvIZK5EZzbHOO0BkPz

VXQ3MTFyRZH7JnPvYfDkMCyzKhZ1QfIuZaZpQMtdIyJxYJAoUUasNuS8LfimEaK5COQ5ZjA6UmyxWzr8

VQL0IJFeDhbjOiw1THvyIvP3CuBqPjm4KW1FRGZ7Lw
kmGve7XTi1KDB1SvkwUUf6WRv5DMO9WzJqWeRiWJdoPyQ9RqFzEgR9EDF8AdW3RYYfIAO2QRF9JpX1YL

ChWUO0PKW6WmA1ZFFwDXq7SPB2ClH9LZLzGUJ3ZDG8FwO9MSAxOmf1QHH1ABOxYxduTfo0HSPlYTFHYi

LoBcQuFDOaAZZ3CPSfFgCuXPYvCAR7MHYqUVX5XBDc
TBK8WPKtQjCyHNHkQPY2UEWvQEG0RQRjJZA7TMVdKBLRHiFeBZ2xlr8FHUQmUQVhLoaXJgJwORpAJmOv

QTHsNQzlPA6Pj992EJAwI8CsbPIqxu4SRHUmYI6Sn784AcNaFN3OtcqyeZkPn2ywEWswLFRxQ8HmK1Ku

kMO4VNOdR5IwDIFaB1t6NPkoYQ1XNWXvUG37AZ9jDC
VVWvSdOSZlKsthU4AmHzUJZxYwQZEzEd3chHHDc3akAdBxRJSlPoNuACXjXHjuDK8CExVyEHFcTJLtkZ

oeTF2whESmQW5QsPQnDoNrQEvhVK7+QOpjmiReWpiMXaAjLHQmv0NiYSxwVXq1HMavWHOvM9D9cHIfYc

5gxO7LxWS8cZQvT8TpaQABxGAcU0Mzf2SEd950K2Rj
eZPkVPGmvXQhYV9ia4LqmsiwX7ytJK9wbXSlU28agZ4iAQanMPGeW8WcwuH5Y6ncmpNpSJ5ACWL4D3ak

mfMuERTFLbWiAXQeT1lslNxuMEgbYGIZATylSTKuP9MbagHNEFMmkrqwfC6xAWEqOJXpVf6KECH+Pg0K

WD8bq5RxMYnuVfHvMH3otc8NOJY8JZ5PbKq1YZIjJ3
XyWNSiJTRmf1JwXJ4TWL3irYphLFXfYQagI2sblnk9yERvBeNtNMK+Gl9LJZOkqCHjTF6GZwcM4XpQbY

RFtv+xyht5qcTZVKIi5iUMcMZERYGOpXCbfX3DFRIEONFGMWd2PZlbm+JwJ0SHWPcKrY0ZXTw5PIKXqP

QKLK0TLABhOVxLmSjHjvx2A7MmIL6SOheg//732v
Y409fjtEtM7yh07vLVJP4YAECO01WI3epIMlLv+kf4ZygZSHAmHnNg8HtP2U3yrPdk87tKQ1XvUR9ZrN

uWGe/55jSz+CljrPBF9weWTduiclRQGY40Gksyesz2+n0vhlHR7bc3Q/7fTZ8+aP/uka26guaXEZV8uT

bn7TZrGgLXTygWfXvEJL778ymgf4upw/yAYkhl9cK7
yi1p2Ewo8hLRuEdO2GwchYQ26EMPi46VwXl87gNxQq80jzVQ+EMn2e24BY92cf2b0/wf7fQTn0J5OxJJ

9lBIF37HwsWfew0HXfT5+KFQo73gNxRMEw6CfSo/mHokBFOenLBLy5zV+ZDoAWXPvCnbJ1NblCiIhioz

BkF47nTR2E2Qzzg7mgtgIYFkvYlXGobFf4Ll2d8Zvq
Imssf4LE1fpZfpByiDVv1nwSGTFW+LdMq3KsUshT+xKiNEzPJ54D5Z4vAbXvG+c5HgF3HwtHERTkSq2y

ZA6tYOEhiI5FbH/2lpYRAyX0JSeXOMBF8ni29aC9LZ8BqsgibRlINJJwUTT6fpxW7UP2SVcWnYT4JQ9P

eoGCKKUCnI/SqrXWu1ZE9yXEv7ZieL1NVDwZxIRzx2
gA2zVkkXhUe8v9hMKOIyt0JvsG1LmkFpzgc+gq+ps9R4M6bSwLtSEImYB2jljmP3529Jw0TwY5EjlOEQ

bdleoMIKuSMnuqrbx6B+0q0PphE9CPkqkTXrfUq6DaaeCHcKqfzrKHeQJBfz3ZK1JURnCZYFy5Ad4E/C

XY6Js01eqdbJYWD9PVrMsb8Hw9TOBTgzjbzdl7ip6b
c08EBoJYNc8xgdA9LvWfFgVKGjrjPH3wo0F98vgKegKYtVUzkUg9mT5fc9LbgFrhnXlmxdjKkxQHBsYI

y0+0B/Oa9Am6h/SLV7NvBvPo7YmCI7gIXqIpRR5fRuvR+4SmjZD7GP0H/yHnOCfl11Xn62p/0XPxHaWX

3gbYggCv9lg6fmG/xa7A3C4YxcVIAyA/3WXyF8D6lN
PtdIjexfcj+rNJuyNusB2auNgo0yC7X2dGYl3YKO9UD1GSB+XE4CJ0EwqbKrl8FAjZLOzqF+EbkNfJG+

UV6tM9VO319Q/jHe7Z6fyZ2pmztpPAxV5kHrXdt+5B0fl3wn0Kg6JQQpRR5gGzp5RZzCL8GDq7/MpL3t

+f/vWZdmc+hTWhltXxr6mpo7qsDurnOVa26WNfHBKD
8Z7F0x6MGzzA/nwzzi7HHTvIJxnPdUoXG8P1Kb9r1n8hUkeeKO3a2GBreQUQT+HBXwXC5LJ6ryhkTP5J

vgcsBjcjxD4pmS/JLwOX2jDH2lO4atg/3GiqhaJTw2ssaZN394r/4e7VjvoE70qb3ywCp9Fm5zq6m30v

fr2+Vj+kPvIrSF9eg2hA2beszHmm/x9nmylTJ9fcjY
fOyh1umK42hAAhhHjyTXyvFPmJN9smGvN7Ra3ml+wJ5wF2qX49mak5MHG0YK6XT+VtYqtMN+abvWQvMZ

NH0Hye58quS/W82EriZhvCFPnkS9u6wk32kTC43Y50cp9H81LONj64H6jN7wlZwDwXxM8z66Uad08muB

4jzS4Bw/89hf9Jozudt/l3Wlp00Re6R3KK7vQE9Gvz
FtMkvDNFtEyZbDsvGs9YH6GKGcov8Q/ELt4JRT/lqt0AR7xB/TT7eIY5qL8BoTmF7w3CUvfBg+gpjIq2

xlAeLdWn7LIf2AfMSJqigV+Dt+be1mXySCJ6cOLoWjI7yCgws7H3CC8yRRzvB8fheFA7DOWiyU0DkAth

pmutO+gmo1h/G5rPurkUTdjNtKmhuBo1MaC7iPJP8K
ziZSt7BU/886WeBCH2OhK2tTrbcAP7lbMe2VNGMpCXm5AWQ5jfbaVhio8mDkzlTTyi72lP1asrKYuaPQ

XXWPdTB+aIWcriyBCCcTj42JcktOIOzyyxFQvmrvNWfKwlZGiqVXZ2Z52XH48dO5K7Cdn4K/F9Hp4+xk

wXjn7Vf7bhpbg1vS2uTqv6GM8yMRDSe/u0aJLP461b
bktLRorOx6b9/XfION9hKtTY024qgNfv6Ge2Mu7uOOZYE+UYhY4tBGJYvDJsX0rjqY5sHK2RWHmFC/8s

06/VF+oenro1zQRfh/3GIvCUcAyl5SVwqMf9zbDol2JfisVeatjWTy7iOGRo3gR9j6Tqy3Frz9chjxmi

2EIt3nUzDja03HrLddLL21/nHGAzsAvQaaKWivBYPG
5PIyHAjQ0gYvFwbkBj/gOe1CKmKMwbZhCPom8dhd3iNRNoZ9UxUlBWrYmzTJ8kbdJgLBzKTGT0Z7aKUR

Sig6mXiqq6PJkZLUSxBY7/X3sqtahkPanOiVgJoDnJ9pnHKZ+RmPD7FPSWkwnXnlccEpmfJ+uwxif2ge

3jnwISGIJMsaqi5O3LEAvEp8jIhdozXlNmUTFZzJ47
CkaLGShIYhxHw8cQngim5B4XX5XRXVEh2Cb1RDQcaVrQj3geljQICo61A/m1HSO/3aB0Nf1d/BcBL6wk

iMxG5Qq0tlu/kjoyKe1vCIfKnjJ7lKVYgyILT/M5JK7E6CqGB/7R8FTrKVUnLTQSZB885A/xjJUfsJWi

hxytY5u3HA6p8a773cs4vzv27PlJvqdS/ljdrUfOdu
UAszEdlwmD8HOUcdO2i6pQn2fkmXjidHpPYp85UAqEqnpZ2dt8ocF1L6Q78ff+GvnpFn+Wb12/TvItCg

UPiyeAtl4fPxZtDCXqAaYio9W2SnHZ3vJzbWZVGzMeOERM+6ViC41WLpAMECt32m3eOSDuN6Jx5hz9DK

kS0DkWhHgTfA9b+nb5peZnw4fM8PyyUtaqenkt9iY+
XVGPCURMMifhoFbYtnzaOc7n0Hqrh1CmzCquE70JSwFH6A0KnNbwb+biKkcrbSn1v0j3hCv0DcRmnH8D

lkI9hyj3sDC/UkK7XaRWQr/9Y9tZvkvXx+HlR+kg22MHx9h3t0UKt2ci50YCT9aL3dJNLdJOCfHDj7bn

E+HiR+oc9JDkVUBFtzbl6gdm4Zal6rcar5Xr9Gewpc
W5jCOtcLWujXQ0j/Fqacw6fYQ5kym67XpNP4HRGyMrZgNrDtVFMgDZVAMXmQjwCz1XOBswGiCClNvSlI

s6WMi7jJfM4MiMe9liYCLhUaCHfDrmOUBsPrETYkjg4LpjwAcXMd6swbANnbytv0JP0LR+7L0uFf76vw

Y+damcaRhJN0NkkEXt+XePzCvgGPljgyrM2vpzmHAQ
s7eDVd5NI+vwblKdBzUI6DiSOacSJJVf9JcYALOEwoHUDDBlEoDmu94wgCHRtKJBvT1MkdYR9asIZB3l

ZQeeRIKlpm1VvsD48FTqO48aLy2CBkKtZfd6hCSBbjrZJgNKzLXPdUz2s3GG94SdeJD79hLDa9/+0Iii

+kXJu+F7dHmotkl1ftvQDC7wANMsY42+8eK/KFVHzx
W1KMKwcUmgfrFiDqoodalBqegExsMyEijOMyra4/w6lFcahvooKIsU9kiqCXVAd2Nu4y9yb5OCvf7gYm

Hj115Qpxz5I9oBp0BtdBACNWbdlA5J6i29qv3acFNSw6b2D4zu677Y0t95H4RVSMRxrviHRassB8p95w

cA+eOpBGUGw8ZlEw/nhCb9jT5wevsvo25JOYgfbe3v
aNfIYWevFUC8bajFkP5y81otUsALc7TZ75yqPzD6KK96dQ3h5qhjmDqek1B73qy5so2bK6SFDdfprcEc

wPEJpKILP6XBm8Flh1ayi6EKcVF2QgnpLWFoMArRhtTFcu4FwTkeI/P5oJQZnB8dNyKMbuGkogfQWtfM

cvhY0YnjtbKzLUAgp/DLx6dTcEaIwQP8oFvC8TQwOL
DYbbMIKY8frfYznoWXkLgwOVlKWgEJiM/ta1Y5zj+RYfM6T5gTSe+ss5+Tb4dxVikSGvXXYcSmPaHfv8

xDAfS5VFA/NuY6AQmjQbA2i79pLSyoleSX8iSF2QIrX01ENUHBNNw1eD/+Dt67wT5jBu4nP+GerosK8h

lm93WxXhoThUUgSfxqSQ6MRMAa1qCdCGQiY+RXpacr
nhZ+Cbh56P104Uq3P8EUkyH6drcRijFpr8iW20T0XYgGAPHnfizMMfnojpIAPUTt1rvBvwDocV4onFqD

P7s0GpwiMsprowCWlME1cw6Fv7TaaWQhFWMgOWzNPTXMCqJVfaY0msvoFBdI1AfSMGpxGM0UwncrSDP2

19fGMeSFzmkyBQJ9b1ExQYmSluIScEW7XHJwBNDr4M
Lw7AttUyMpVPDP+SVb+g+/eSl7TR6pU+aX3KlKdIqseerV6v/iUhI4dXN/Hyleq5XVb+of7K/KItXHhx

fPuWx4MG+DGwfBGiGrt0UGqMB8qJJq6aAcc1ukkuUtOdFBBfACkTJWz5EeeNRZJL47jiqAPAiWJDKSmH

RCTZSHOhwHG1QCRvxUhY5ElYV17/yo0juiBQt+/VeO
M2OiSs4Pxt+zyi/6PGqVMXrxc2keBSYn0mhwYO5U29ytOS4XhtYclVd7OD2vFBBWm+3AjgjsyYGaVsfI

Ge83PFpPSAv//a13bL9Bugj72zBAICCzkQhND8SDrBBGKuMM216etU/eFLgNk4xoZcmoENXB1E4rfsbK

WWT7id/ZxbzrHZdTF/CleegXbdW4Ity+EThWcRD7M2
HuixWCOZxj1QSlsNKDxhcYobQ11JbHgpUg1Ra7wpknStBcef6knFB3OtIKWAmOhvRKCyp40sG4e0M+23

cEid2AN+8M25z92fOMXT7VGa9Db4Vbf+0+EBWsHBw63UcwCamg9cQYvEPmaPfVBdhm9ZxjyIm8DaIun4

K/2mwnVnr5yL3178yEO6IeO1iSuO8H3i74Ng+neGCg
vxAesPalBdYOgauQeJjXo2dB/Wc3Qcdz8y9GtjRLrb5W0FJIEjsMMLsyR8DPqMz1m4i6o4RezorzZlpy

pAfFh6cw9leSl9VQG8MShYUu0w+lDWYPmg3/M8f7O424M/EgrLcfO37D+WOAm4JnzmKR0W2D1vZn7+S4

gkfwLYOyAXZ6YwD9u8mw0phWbUndzd3UdugFrtSYqF
dcTFnzUWTyvmA0dBLfw4yZsjJ1oWSHGvR/cORKhReKUI7DBS9L9hBxOzr5RgBrWI6KBHPclBihf0cLF0

Lr5m3u5BwU6wG9bip6l9E8tzspq8odVJoa1niQXXxpMdRGLcKmTbjiACTfWjPez8OnXRF0VGQ3yFAQL6

wQe3zixFBqxueLoPttt4RpV4b3wdqUu0cHVSXrg34o
s4K25B35R4fH6ym6JpcujwMlEGuaq3JJA0RsC//KR/43GZeA5+gzK4THrQKgXcCK/bmuZ8bZ3SpxoQyK

EEoy88C7AMagEcKgGOBHMictDEpOd9rffV/Bjg9nKjaK0kVgRkECPUV4cWXkhwqcr+O0aSnF7Ipmx1Ep

M74HaTmceWiDcoK7RW5PNqAwQl8LqNp0JSyEyfGkkC
qUJ0JItRTwRjDhke+hs4XwHP1UgwIBglpXlUwF3a+wVrWnPWYed/QjbaIIK1yZhoLQp4rN39hUSwMlxR

OK+4xLVf+epfr93/g9uU/VUYw9/SvqzzKoMYi+6SQlm3IGh6TYTHfFNaufHwP+y/K5lOuF+6ujL4wHx/

SNb9jWvdlQzwBGt83kwZzlL3C5Qk3IWGnUauFh91oA
Oo8JvDiANW4U9UdlafCWILG2D/GR3Hm60wzifmJfe0D/GiBaVwkTo2eUqkjtIfFlJYwXyWoanuk88v3C

p0rtnQNW9XruukxW+zwaCbHbjmPKCI/7peH/NmLbswhxpS9Ji8Vdm9L82E9tvpSY8SR0ILqxojJDaJrC

Hwy1f3XXOxuVVqt+LXWWX4VDB2gsSP161rDi8dWRHK
7G+Oc7jYhidD4AjxiUJUF3IUz66EUFpmJKG0eGxc4xkX88UiXXa43kl1S3weTdz93xoZPlCnQJWXS+Dj

f48mR21Wk0swE7xHZaG4h/q/aHOchP0QHYjAM8aw14CKbC4t2r+hpYmI8HOPOeWfM1y8G/u+GCPA7e74

/1I+r23kYl62rNcOtEQEi7uwuDtaq0+TSLb7T7ZunR
GUXacbzAVtA0j1Slk1I86cklkuNemU8vYwk/AgzradTF/El4qqqvdcnOxu1psyqGB1qdKlsXcMknQpB1

eFs5bWuFica5pnjPdBacCk0XMehyUkhHQoDo9U5gc6YN67AzhXjgzsVdJsGFWT9J7QNTRPxh6TtxGDN6

7hnjLS9B4zv6lMo3SPL8Y/MwvPE1vsfMqIzAh2fvtT
09igM7DnO3g0fY39ebl+BkbgwdI5482zBSm0nv/bf07irtzDcVWNKgstFuMC0cVPSxZ14P3tAZRaEV7/

k2YsZlASg+kvE6hwZLNtL+dEVJUQ25Kjubjm/oMWMMjcMYetxTQY+cZlTc2lgL1tfI4bwxEZyMzNxls+

hyjK+p8J1AjGMY9t/a9nguK60zPwA2uJMtftRIbxPZ
Sazs4d6gw5EsRg6/0xvA1A1bZuF1vTzUU7Nzt5E1S0DOlG8IyjeU3e6LeFquzudu0I5Vja+ceuhqH9Q8

p+1HtjljCMszZCGlcaZhI9QEDUatyYfcC1piU9RX7E/7gW1TKHreDhQfSfffsq00CVsauPZRi6E0c44y

WDKNDR8rQ4muh8RYmJepkQ9podeLA/8WGAgl1EXshx
bq++giqXdMk07xJi07WC0/Z1eEYj8Myclsysk/Rouz69d3yTNLUeQUhSs2mlloNm+OqxI73519v0Qa/w

d7WtWg8yaaDLwMZtgMMd0FzcaufgTDeG+hEcdcVRQlZlPlicU8m3SjG2dY0lEqwnm7nbuqUeA+9Z6cL9

Ixj/9g8UO5kTOu0EI6tHeTv9B79w1D05cUQDAZeCVT
XBiBAeF+ebx8Xhlxa2UCwNeelb/037Yu8b/vgFe4OfsC7yvSm/I5dNEc9RYkAEuHnRMon+PCweVcKDwc

FoR42zs/W4ypEGLbgTTOqT72ykJjIilcFh3bjG3QWbw0BgWCYpRSl16fLGAJ1JpFSP2bIKfX4p/1yfRT

xMnBAK6Yd2/RbQ/H2o2EF7DYG4DgpxxMAdwYwbPrXm
RV3dr7dpq0HWOCl6mmfZ5Tm4Ddsn25MFZ/B/meelBortAXQ7LgEd4PguQd14Yddu7Fcv5XA5bYnAM5/a

PbB6gUmEw5L/FWbB4sZvfVYE1T0Qkz+/No39A4fkwsFIjOM9tr3xwSGCqRQHqiXOqXOz5l1y+CjgCYV6

EuNE9XEQIJslkDy5PcpWbpSCZV26Med5utP43dH2ac
CerI3yZXI2R/8SlPNzBoZTe7JWjBVi8fpJADi8bZU2QckKyVus+ojYDdJ5gOsWc/sudQ3LgZNBI+ADuk

L+oLq6TNGehGADlxH77nL38XG98LaTmrvP3t6pPN1K0WnScALjS23G9d2gB6uI9R0nc3ypFeYfL3dCrt

dfIbZac/1BobF29OAfzhR41UDoP9lhWl+ak/g74Mug
k5OlDenwPa0ETcMgD/A7AT/q/ngfrtglzr4ZhD9bLCSZ8uBzFUCtvcLttkxrbhy/lcSxkeLjX65V2H8P

7Wg2guIx83uVuOswVa3f+Rpi9LMTK3VpIg+aN6Ymk34vJRzfuIwz0du0NOge6IkvrHwcTfsoa/K/vRoW

v7qhrTlHhBrZHcA5Z9fbYh41+qy9o5S4HPV2mT+Uou
L41Q888cX9yDjRWWQk0YikVjjDRCycpV/yw0qN2rCOZVWeVqrKoIHNzIMB4yUt12EfsG+LnXblxKlp3x

PU/H1jOn/rb9y5WY/Iao6ApRYtZwpBQF7D2YDFKDpr2P7s9d//IjalF1Qrxy/xy98vnsHsjZ9GB1JEzR

C1RtSp0CfHzaj283W/2Jwxrj4poAWh1/EufFR/Y915
9XavZ1YYo9SqmpJ+kteYB0WMvak7ZhcRN3aQBTuFQLtRQd8JGYW9AzRkRVu1c80H/aQU570itkY/6lAO

VNci1UkwAfz5vHjY2ueVE/V/RN9bzOx4NQfzx1a5R/Zftc2YeoM6UH72X5KRvoBcQBVcBst615DYmyj0

wNkqhY7vxi5+ePdQakgN1vru0XkeM/Jz5C3LIdDkWs
2WY1bcntlRUs3dNvH91s5AzicPzOzrK5lcnVNyCFJ7Bz0p5x90shQ4+ZJadE9AfOQpsbHyftMp270yto

+Hm/kFcy7kgQrX2wH56PaVgHwgnBOghClHpoxS/5GG4dKfY0Or+5mbuXzPtTPF+UKNtQ7SVF4bJ/Ctt4

FuLJhIQ6zQuE825pq6vnvmX5608RzX4M+VYoeiOtjd
pHf93xHKz4FK95qjFjuVSCr6TnVs9bAabscGHhqGck4uI7/dohG5adW3E294fhMZx5hGzdmec7oW60hr

Hm0m/VTuF1h2oQmGV2wdFeh95pxJ0weS3e/y2J50aggZX89J54map6H5qNgeMzRpyY6jySi+lScXIo1Z

RgDC1HrGaoF4KPWGsN7jHdp13+7+H6jKVu48AKvz7/
PN39nDeIsQQqSFYsa43Q0iG0XL6++CLi2gvEw+9Rnc/s9sDmYm4L/eMNm6mywNXI3i+bI0q5FQAFndxx

u9o+KbBHQU6l6cYo62rxPq9u6SaDY/OhAEmp8nQt2zPQaH44Tte0ZeEUfg0QN/CeEZmawP61J0qA80i6

9cl5oWSlV3yZf3z2t9orkoyhOZqUPE/Bz6QmaP/5vV
ovZ4/J1xJ5SKVnWEifFou9zuU0Pqa9o19uzrX00QL8qekn0Q5pMW68YbTnVXxEDJgWxWmsjCQW8hgRta

t6H/g57elXMtgts3m291MLFK9oJmF3t5NWAypUJi64Kjcv2A2rZ1X67QBLR0+L7NNSbPn2JhhKI9XiLP

nSEGrlBZFVUCmnsc8HxZwirlKDFq31mGou4aMnYkaW
UIbIKGfwSZkoGIsZPUciTalC610MqmdzAJtFr6GnZdMmhs3DEV6XysRXYP/3fZwQhSR28Y+E62V2sI5E

hKf/cPaHO5EsyNPaDI6sFEh1Udqmc9P+yZnvvwAdDIreV/sp4uoVqki+Pzl8/wVg/WutBrBWtnIdQOdZ

dAUtWetXZKgtd8RL5F7RTtH6tZ0rtKiCnsBWseeVL/
mRBfdNK5wqXhi2giePmrylmf41lO8Wvg93lbudNUicaO4Qz0jENEgqR3j5d6Loc6GpJiwrowOGrXkNap

hJ5gfzUWbQyds1W3FoFzn9M8585If+leT7pcaxE79ExAWyKuefVa7BBkGlcdVB8d44L88bm/Zlb7XR3J

hZsg288KpPONi6DqyoO6WLM4Hf/ruMMsxNBPsEtoKy
L14Wok5ZdnGoICO/drT6rtZV+7Cstoo7JfS3OS+ldcGasxPMPhtLP5W1moDokrJiPwDSWLnk7vps83vg

sMTsMaLLHDGsln7c79z3/ZQjh2zNJ5vq7f7kXUF9VMVyW/nMidTD+NEJnFg18mhSSpuULbOaqdHK9iHX

+Eby63+ufInucIrbeZDgt15b4PWk4d8hzhSr3S8H5r
+Q91hJSPZj2QDpdjBu8im4N4SdiUwqZ/XILSnEx1HUHBaIveC6Q5M8vwvFIbd6HAzH0ecr9d3U0RwV2y

x+uhZ4upl4bCFMwSvVWRf7pxwPhj6UW9YJ+TzO3Q+D0qx2Uy9Ke8ZvIInGJ7asrg274nURj/gnHCXx92

eKrWsCs6pIp+dvcf1ej9507+/NDGlD3wVUNgi7dnA7
HE/amSDRBqM3gbppcjakIg60qFJYZ28SfmpNnKXcPAFTaXP82mSsba5ouG8c1Ig1qvwH5xUT0DJeCDUo

kwdgTWHNtM/PoFQpxSHh5ytSPYcBE13r5ynTq2oITA5NIOj140m0plG8+Xhk3sTwEJzpkCqqvB65/M+F

1pV5JRKmD/pwmffla4byluVy7H43a1P99e/cQ+MxzH
zzGwFNN0wyZA1e+mHnfYQxej/S+fQPzvKfnA1n6x+vlkPPxG3m3G3dM8RhJ7FH0y+89A8Yy5Sp1GOh8r

Ybjca8Hzcv4d1VcGyZ96O5Ya1L5Il+cBc5an0fwnhtgO/P6iR1P59b0e/fJDSGeV5yx/s9KLMPv7cfxu

J1d7h+mD5uTyIk+B3VxFBhZuS7ib8Jae2/TXq8AL8m
9UXC+ztGJv1eo8hLrNgP8yN5gsB1lW6OEyqkZgm+U3zpWuqrNO4XT252SnO2/aUUOLQIG4c8NpVD93Vi

VAkKalb0l4yrXGqStJa5Mt57zwy5Tc74fG5bu5oz02+3Hf/iippTfolLbB0J547nQ/nunnlBudE+Cv+w

aa5RmHNcPpcE58xZJbE/dorOYWvYhSuYj6k+NFXdV6
xLc14/CHN2RjZaN/oyLQDVdMkUEim681xKvTwtgbyK3n4wwH2Qy2w2P5jjr+X6Woc3lGa4sg19syy18s

eZ9tKhwxhM30cE/gESX2wsj8iI19boFyWPZ/xNjHlUKF+0hu1gKS6gbvmptVqpXEMr1kkpi1J8fn9xaS

tmGHshOlJRrwGoITsxDeyCfkogUv46b+4Z32bDUiKN
q6tq7a6mb8fNE7Vn0czX7bh3bP2Xk/dPnNw4tSlIJ6urlA1godclgDvIsyG7E/Od+7K89/QrfN9CyUjz

9nwt7t75k/mbRgCx7PE0fSI9vNkdQ+pk2K4rfl23X4v0tfQter+NB7r3cLxjxCU7DQZYuyoAbxICpCXx

F4bpIZy5+AeoQqDvFwWfbdMPd5w45Dooy+SJtc8dH+
5Z2EjiWUrSdAP4e/BT6Cc/gh/QdPh/U8aqpT7t4ELU7RT1Rd97h2NLuLKk5WrmRMUutMhXGq5XPEFlzs

jWzXLaWK6ZsNkkuR0SC2K3Ykp9buvTwH/LrZmNS3k8y1We3ANy3WfZMtFA8FrcRV5Wz834SWNoYnL7ps

9DFjkXTktoPmBc9br477hJinXoEcoPggppkiGOpCRU
wD74AP3gCPU2vqeHjb7UxhyOeexZui785hLfJ1Z3iQ9bB929bxdmMcVHvUr90YbE9zdcKjeR/ZulMWo+

sn/UpcGWqgDjWUcg6cKm1VoAMn8OIO5WSdb1arvP63QdMKH7fUiiawo6CGrOiFV1aprBcVo0WhCYU8Hp

iFPsJ97warmcbE4ER+YzwW0INTfNIszuFr58e0gtUo
q4fv1OlR/pjuIxt7SJ/9/nGuXoDUzs36P94l2w740Foy7xrPTOrKelxtwt1hfynnpkKf4NK6SrgRAoey

5zo3UY/heB+1Kdq6QzLIqna5A269QeKZN651JIRxXJtF9I3ryYxr7V5KynXr2Oei7NBNgu364al725i9

NQ+9s6O0t2jLelxgoR2XwrxaOi8Nzvyh2F0T1VCvPk
WNeyTlVzBt/txjoN+kxPoPzoI64qiOvTriSKrOyN9PsnXdBdJEXgpfS5Ti6WeS96ruaraASHMbGWNql8

FdKpV1L0JQYgrm9doli3/dSo2CyOnufgK82SC/JZDa+k7Kpx71nPnWiwN8vANb6V+5Bwq9+s8WshHT1F

/Randall+rnst9wtVWZeysl6moKUAtSG1dx+5+kDr8rTJM
vlznS4e1nv9RNLXx9N/otarwxAPNsgZ+54q68nsAHrdM7oL5OnOw3R4xQ50/Z3yfmqO9U2ew80B5X3Yv

JhDe11g5NxIriWkelHV523dyY1HAD76L50vW+3tuUCCiY76VK1rIICqQHHfIlYhav7b8c8ToDRRNK15J

Q3ycpUaKImD1LUM3oUdJX7J/nS5WlktEow4kw5/Nsh
yip13tLycL+kcfohThltYTymIfPIk3bsl8qLhK2gerD3uxLZ5gtzOuz4Yd81W8MD9CqmUbj7CRJkMXI2

dsyyo1lG2tUuTLs/HhSD7T9G25I4yeOJQ3mxgbO6Whl5PPG8XTAQ9x1J3FYHhfN56cm+0kl8JohyjVzp

jT/QFGNt5DBQWPjM2VELCs+jn/35Vs11IjS8TwyQZP
Bc38HrVak9dQLsj4Zyi1WQ/Mkmcr87bABQ9Di3Y+BXiw0MuJlCxwC/VylTj5GJR3o3tPkrhc1Ra5V/Ct

oZ8bMd+j7CZkM/aHLeh9ZgDq49Fcq8qihj4sgDonfs2t/TAqWsxtHuaMnsqt7kDupA3qsMYcFJsN5uri

WpofSzVVapjZndrwmVXCFp9XP8SrYZsyEDmrlpBTyG
4eB60b+hjih/VzRc0W2V4KnBTgJiB0/rEElc2J17pjz0tFS3PFbkXpfHpZx+UJyjp2Lx++tc0CJL0QF2

vDnTvr8as6MyeqJ4F53JX8ujM2oXsPybbg/91PVr95+PwNr5HCbjYQJ4LpJ8IcaphiS3P+snbQoG4Gmo

2vXJwZRMdvR2qjqzXkdqudIJh+V4rk/iN5goc9JpIJ
LOZNuQTFXF7rgT3/SsfXZte4GTRZelPsnWxdTK0uyBZ/Q109KhM3p/e099iDv28CSjC3PJYvcyZC9I6V

9Mi4U1aIzkIJn8N2n2kbKTtXa6yKT579GWSt4K/kBhPUXH1CT2vyFW0/slshk4b5Y/gP8FduM4vlDUCB

Iiw/Eu4bDwhxNfoAY1T/ZL6izGwj/cDYGT2SUy5IRi
/cvxo0MJVxMN3by7Qy42YN2r/iOs448c/Aw2gBY3yL1ghWZQt0MllXw8zew5X2x8uPkDYWrNdOGPI93b

sXhO3dWnHDbStD2k4wbs4Dq6ovRuz3VSIOnFKA/IVxGN0fmwBDaXJsqst7ubngl3Xtfnn679eDb/8i5N

5J2Y+hE9unzJaLg84ZadqKQIJ0g4ux+8ElzUnK15Vt
W4ZbM8mZ4O3X1oae3ZLrqQnfxmlWklsM/33v/PfW++A9yhdhV5dozJ1kfI7WbB/sfq5VuP2ds/AfwtPc

4DdFkPWmIRLqe9Ly7uENUW0de3o6yWg5yYD11FFknvqJs5wEOJyKR+8WbWJ3YIGpvA8Ryyrp4i6A7uA9

1zB/UDNAfQSBsDQwPrcP7l7eZNkDOj9u+IWrxXk8+2
E5/Rs93q/ErFx4C5d+oTYedEcZ1aNoSgPrNhEUQi/HPWrG+TXRiQVj0FS1hR2AybobJB11CoHD7u51Z7

/CiGr0oarM2DWBzIcxf8UjfHIAkit3HQALfrtgq5hhSHj+j8e6OHZOqX+sfdEErba36YqLn/0mw30vl2

k7xCqOirq6DFbXwmPEBAhox2WDE8OhZK9GRjZ/Z5qd
U/rw66rT0CwUvYVkjfjF7Nte2fZypCsSAa1/NcLva1O/fbXNwC4434dILjJqhnVcCGDsJ4sY1Pq9XWDk

pIrdMA8OGusRIBaab9FJ0cBOuz5eODh/MzuciFFxi06p5Z1C4g67KIDI6bFa6P3uqlKW+P+T8u6xX5YG

6dedE3Qaa/aP5nfdy7pPjrPrzdI3tY6kFb+CmeNOeX
Zn0znZlXX1BklDtTYlT0Sql9fms8z510sacyrhsJcG3gktx6tH/Nue1c8feT0EPSjG7rcwsG0PNKgCxY

14b6eEwAK6X4MwQJ0DHoz7jnhsGWjjTMEBdCHBaY4NcPpCEt4N+YhIBJYQGlIT+CCs/lCflLFZv/XM4g

OKdFcAZtzmAdp4j196beHdr7CwL+8SP4hP2DjW9BGN
eK/ECJP/M0ai2T8deEhxGzpPgP7B7HkB4zD7iDw4Tn9XtwsUBa+ngafkN1UoQrbfDN6oUF0HSORxXDZb

KzFuqKvHDfhTYVWrxdyHmMuZCSXU2pwZkIGerAGbzJzLSZ0TXUDucNbTzuKrwZHQ0IbQBQ0Q0BnCQewA

vLBQh7MG3cec+X06qt4am36823pXeUGioiNWfZyX4j
ZCwVIcMxJA1aDZ4+lEONGnNKheV2VL9eydHNMN4mDMXQu64IeIc7xKUfgaNj4/kQOtO7mHLbXCTVAyVz

1feEXg0/QKX3fXO3FDlcCD+/Cb+V/D40y5xJCN1uW2XKvqICX0eWllhOJrY61wf72yKt4LwxCPJM+CtH

OrqGZp7TSvVZ8kHfvcRntQj9NTr0sdjXaJihTjlByo
pabcRXl3IhnDqJmczrPzpfQqiWS7HXHGhFJXhF+gx9ISlyCP/LnJ0Rx3SbhR6ht5uQZCHdSB7lWjAIL6

dU23XZIR9BwJyD+Cu/I/SAwNd/PTdkkhNiZsR+R+plwvHK8BOvvoEGXrL29rfUgULLpuPli/J37dD+hh

lNIFzQ9REz+ue20gTKAa2qZN8xTeebNXIZkyqLjYLP
se330+GkhsaxY4ENPAeX5ILv7vtiETLzQ2l8NKRkaUZ4BAe+5M2s+4qWcm13WWeQtTw+B7ZE2uyvIRFn

EeOL/OIDcG4sNr2+x2fHd6+Bg3myltqRObH8PJYSZ3P3P6GgZnIiwjeVQ/Ojpt5eRtNgusjvKOFxOPKC

WtAwPxBfjB7sTM29ixTinidIP6gbM8m1/l7n+M8Rr2
TuEaQ3VWk6okedXlH1tevavdMTKTDyQ6NP0/r5TGbIqWmVfzigc4iJFWYpmRsKDwr6WitW7F7FiHt7H/

Jl6oisdQRWCXGpOaInQFnkHxeTpqly7AcMFvdPO+FxsPFVJr7KV5UzjNj2MYt8ZQQ1FhXUmSODYaLRK5

hAPyGIJrXUo6rP7E2jMQwoqlvshP0uRyst2GW/9LsK
aAsDyhxka8bDMN5xDffFe/J6DOKNelCHpGCUrBDrkhzqnQMdGhUIw5WPdwTROVyyLqrwcp16gdAgPQIt

s8traA4OzyZJYmf/96hI+4KORK22nGs5M8TEC8KdMYzCqBWk2EqcOn70MCad5w3OFhQVkyoKt6gDKGMj

SuazOKHD0lWXyucL+qcVTQs17Z2z+Clji+4wY1b2Mp
iphP/tvYYecEUEZHY1RPSKPZfJxTlEdMjHV5r2UJ3jvK8lfSgQAE3So4Ig/FR/SvyqSylgl4+HbN42yw

Ll+GXa0LHJBewUI83LUQGyD1qwluI7S5Nd/X/4P/wvwNr/VaRvPrbAWuEhIGB7epLkbM5TEY2dk2WjCI

shWcXqSV8vhp3FIFQ6XX7GiQb9KQQiN4GxSRJmWRNr
b4VxRM3JDF2wuWefAqE1Zi6RUoEch9RhTAEaKBdCtWYOn94OUSc9C+o/+Rzht8FJS3LzpZGXLw0k/YGQ

YQuqpKGJNiw2H9uhXBpOxfDMFf7NyjKZ2qGcP1L5jnO4LfdyMwtNawlJEkhz4q2ryqLILZwEl6npxh/8

Bh914MGF8Tuy2nnWHBiuuS4JotWtCNN8qHAyVx5yMe
ggW+puAvXo/Wj7cCIUvNSZdXwt1fZ/Wp5N8Fu9ydL7wVQPOdGP9B1k9KQKAzEkAr5vghEcSeRqlUEpQ5

U8Czat/kW4T9GAhB7azMLQG1l0q4L5NIAb69x7TI1zCV2c6KllfJpqX/QUOfh87gbkp5gUMVhrrnCqgR

LnBO8VHzMlKH0pns6CJAHvGYUcZuoILgk2MCgpSY4U
dRDkS5UjkrPDNTTsnylzhL8yJAlnFN3Oa996XpPsPM9LQZOLOLUcFMDdYPeQKhGzS0XjR3HuzXW6SVIi

G7HxXVJkT5x2IXrrYB8WBTHmMM72GZ8jFHSWZzRgX6KaYPsaOBToVIkcOE4Af894EoFetIKaQZZjDwMj

EJWjGYAlATL8BO6HNSFvPPMfkUypWJ8rzFNrDL6JmX
GyRlCnJZjaAF8Ql493AjhoTKJlYPHhMVWHFFr+Nt1IGR5av4GqKMozZNAnFN8wrc2GGAoCMlLqH9F3gB

ErLb2iiW4UcDN2tOPcX2PMHPTigpTLlIErNo8LWJIvZf7abB8ZNIJTSNBoCYLlOGvxK1mOLL2KOSGZXE

DxMXRelyUtjXnMEpOeU0KOHZL1t6EvwScrWo5oWUcm
PgOliTN6kytwDWYvs1RhJP6CtaTbxqtzRoMtZONcprDkyPojOG1PaUBzbJBcPT56HPV+MsAQIiKyX2Dz

mrULDYWyovrjbH2sHHB0QNOwHu3TLGHkGKpyHLOaCS8IQqNzZyy1TV6cTBi4IHljYJPhVDYwNFt+Pg0K

CJ3nk0LiUEizXfEpPQ6myq6ANLwSJwWxW7K5yBViKz
3xcU9UvKW3jBYfY4Z7eIOmI1Wsc4KCo935X1GRTEUSVdxDrzwvdB8LeaXwZTfxDs1VWZNfrGt8iM7SNT

acBQ9BVBDkOO2rZG93Sf6hcPLhTaJ3PBHoDl8IQaFyI3ZxBF9oJ65pEPUaSsUbYYCKWf2+DQplbmRvYm

eNZfI9AYBia0MvXRodMO4VIA3we6KzVBupKIYbl5Pk
YZb3WL8FUVLeLZZdB7FiwEEyD6XQGp8BCIc9W1dgTOuzMn5LkXRlHIWuTIloWX3Qp061XJb8QA1CTLI6

FUIvDv7SCQWfKW3IWPQpYGInCRLOCoMxZISkIhTnTYMtFNWNCj2TCnVrV9xHElgfT5PjGXapEn4RDbOl

F3S2vVgAtIJ1QYP0OR7GYmWUByFyAGq4O5S0dUIbV0
I8yXwJcAI3QR0UYZ7WJEHqFW9+PeHpR3EBNHvBLUV5FL9HqWTqWZ7HePZEH6SefCXqMm5wSAHnpZvipJ

k+UjXbX6CEXRHVTCX3TZ3QiWWhKL1EiKGZI4QyxNKkSr1mXOoaDyDyJJ8pEG7+WL2TXFYWLcYHPdAaRM

erNTigPJPpEOa4E2D2VMAcD0DOS1E2L1h2p8ncrk5+
FW2QXWWwV6XALCIRJyZdJFjrBZwkDDMrZWl7F0W7GSKkA7VXU4ohQ5a9IQ9+PiANCiAgID4+DQo+Pg0K

MU6ie9JjWXmqJOMlMO8skf2ZVRupQWBbZ4FxGRYiBOyxN4NeaBekLJ7AVShtGEpeIC6LMWSkRLX5QT7+

DRamsITvUN2VKir/kPYnN8wwmGWyUSudtb0h85a/Jy
AxHN6xYzYNCK8bA0WstDo1ojWIay8US8tqMtbcYl2+SFzzTOn3CiyhuL2rpWJcaMz7eOX0qa4zJd9yTQ

dcNMstcZ7mwzN2iS8gVRTwChP0jjE0oIG2GG7rIw3MIJOaUGhaZKJ9JaOEOZvuiI7wAvVjRt7piRW6qQ

ejK0y6jz09Zz1baqrbGUy6BS2gOw5rFd4gHNEoa3jr
vCN8XQ9pDti+XTupZGCaDF5fADF0HiJILd4UNTW6S1v3mB6woHZ1ES1LXlEzMMJxFBTpXOAeZGQlSKGg

ICAgICAgICAgICAgICAgICAgICAgICAgICAgICAgICAgICAgICAgICAgICAgICAgICAgICAgICAgICAg

ICAgICAgICAgICAgICAgICAgICAgICANCiAgICAgIC
AgICAgICAgICAgICAgICAgICAgICAgICAgICAgICAgICAgICAgICAgICAgICAgICAgICAgICAgICAgIC

AgICAgICAgICAgICAgICAgICAgICAgICAgICAgICAgICANCiAgICAgICAgICAgICAgICAgICAgICAgIC

AgICAgICAgICAgICAgICAgICAgICAgICAgICAgICAg
ICAgICAgICAgICAgICAgICAgICAgICAgICAgICAgICAgICAgICAgICAgICANCiAgICAgICAgICAgICAg

ICAgICAgICAgICAgICAgICAgICAgICAgICAgICAgICAgICAgICAgICAgICAgICAgICAgICAgICAgICAg

ICAgICAgICAgICAgICAgICAgICAgICAgICANCiAgIC
AgICAgICAgICAgICAgICAgICAgICAgICAgICAgICAgICAgICAgICAgICAgICAgICAgICAgICAgICAgIC

AgICAgICAgICAgICAgICAgICAgICAgICAgICAgICAgICAgICANCiAgICAgICAgICAgICAgICAgICAgIC

AgICAgICAgICAgICAgICAgICAgICAgICAgICAgICAg
ICAgICAgICAgICAgICAgICAgICAgICAgICAgICAgICAgICAgICAgICAgICAgICANCiAgICAgICAgICAg

ICAgICAgICAgICAgICAgICAgICAgICAgICAgICAgICAgICAgICAgICAgICAgICAgICAgICAgICAgICAg

ICAgICAgICAgICAgICAgICAgICAgICAgICAgICANCi
AgICAgICAgICAgICAgICAgICAgICAgICAgICAgICAgICAgICAgICAgICAgICAgICAgICAgICAgICAgIC

AgICAgICAgICAgICAgICAgICAgICAgICAgICAgICAgICAgICAgICANCiAgICAgICAgICAgICAgICAgIC

AgICAgICAgICAgICAgICAgICAgICAgICAgICAgICAg
ICAgICAgICAgICAgICAgICAgICAgICAgICAgICAgICAgICAgICAgICAgICAgICAgICANCiAgICAgICAg

ICAgICAgICAgICAgICAgICAgICAgICAgICAgICAgICAgICAgICAgICAgICAgICAgICAgICAgICAgICAg

ICAgICAgICAgICAgICAgICAgICAgICAgICAgICAgIC
ANCjw/aIPrN2igpARyxxF0C8afJv6KNe0VBH7of9YfEQZoZVcwhlAvYgdLGvXqKQBvOliADyo3JQtlKK

4McQApE8MgN3GkHWfmYW6YSXIjLJVctWXqBEAtBDVoLqF8FJDoWGcmND5RqZCtXEpcYKCwDRZkBC8UOG

ZeG613htBmVN0TDy1KGaIqMG2vih8HADhyHVOnLwdD
Fkv7YKuqHN2RtRBlaHNiVWWySYYDQvRvF6ybh1UzElKtYMYQTBnaJT6Ln9VwcNKgKPw+Zg4RWR3qs0Cm

IDsdBRGbLI2ejp8AOScUQbBpP0OrpAlfJIYxg7xrRGAyMJ8roBViPZZ2EUEsYmn6RQlgQR8mLSelJHVJ

JGQkaZS2ZbXmIuBhSNOnAZvoKMJYMDuKKvSdW6Mdz4
KkYgL8UNMwRuCcSQxwSBLdRxU5AJ57xFpvFZ3FWTBkLNWqGW61ACT2DJEsAe2SQg4LHpOoKP7wvi6WUd

OnKBRtFuhPUqj1SMbjED0FcXAnS0VmtGFgn0wWFuMxG9XMHHR5MIMqWv4KIMOcDyZkCYXwXJasTK6sUE

HoMLMToOifnoV4VX9RTP0hsyWmUE4GYqSjAg3fHn5V
NrSyT6MbP7EyDLTlAGBMPAweTT5BZVhuLX2vOK9Hi6DVnWWysW8sqr9RNPXdMGBdXrqehj3CWnqgS8O0

eJceCQTxRYruVBDFHCttHV4FOXNnSMP2ZQWsLAGwBIXEXkQpK97rFZ2NW8Ygl12rEzJ9AUZuRcMlQKme

IG13eQitnjIpfUWumSrjQO9BQq9+DQplbmRvYmoNCn
gmNPMATnJkSmFBEtPcLGPkFQDzRFWwUkY2JmCdXp0SMNDyXWUfGGYmXdKnNTRnNLXxJWxyQAZyDIM2Nt

K2EIUaAPYlDZ0KYuAcBOFiCTv1GBEmLQLfTBJwdi6AGFBkEIHeTAQ3GwNiKFFzILDzHRcpSVKzOUGnXU

ljHNJtVQVrVC2MRsDdNCEjWKCsDUUfMDKjFXNqij9U
PQLfQPNsUtO3XoRoMMYvEISsXAvfQOWmBKQoUXB5QHAfICZkAL5AVzTiTATuNHV6AebqRTAwLJArhf4U

VLIyAMCjHYU4RdFvSMRrCPYpEFqrBMGpDIK9Mxt5TGIyXWKqMO3DRwOgOERhWCS7ZrVrPTMpPGLkdc2U

MSRzGBGxFpFeUKTtBOXvXSAmZXffANApTVZ6ZcB9LF
SxALUfDC5BQlXrVBBlOTS1QLPhRNUkOSWrjj6SAZXjGGYxHfR7KiHsIRYsZAKnFIkaGARzCWE0XnSdWF

VcBGIqZB0BMpOyZFZnFLc9IKRgHPLqAIRyrr1UXJHpUHMhIUS9ICWkWPLfTCTvEIemQFMfPAM3SOLfRC

QoRWDmPG8HVuDlRMGbLGsbONqhQNHgPYEbug9NuJUn
zTrbck7SJMdDWo8YvQlfJXAiHYhgBy9pjGChMWAmYMGAAz2SqwAlFCNpGTYJBQnsLWWfSJCmFXQaQQZ9

NfpgLZZeGFYtJDK8YmXjGNXbXBPbBkn2JaU5BfD3IsX4Ayp4YTEwCgAzKIL2JCR8LkUrJ9A3ELG5SMz+

HR3mTVd+Zo4Ge5CjdhU0wnPkMOlhVCT4Hq7HGSJGL9MOUo==









                    ID                  Date                Data Source

 

                    [id number]           2021 02:57:12 PM EDT [facility]









          Name      Value     Range     Interpretation Code Description Data Scarlett

rce(s) Supporting 

Document(s)

 

          Progress Note                                         [facility] 

[id number]/UHVijLKRvt2ZnLDdbZTb6YMpxRRJkL5GpROF3fT0bVRK5INiEUqXcOfAxDVG5

lbm
AiTwdFSkDmOHMiXzlMSnJbQVbhTmtitJBwID0QsLM2PXEsK27cEFPlXYZtB9OnXJMzWuc+Px6UHUEydK

NtID7OCocI6Hloy+PG8XuB/BbYNgts7W8kQv7RfaWS7cwDPpqjtIEolyx9Vc4NEfpYaYqKwl91+NiVNG

W7mbUMkxB72pYfWUmwlKeIHre37fe8uA6vCxx6l/On
TVTyLMd2i6Fhl0gsbEf9V6eT2OSr15VBVgxqRd6+w/muYjjpBQ1Kbw/smVhyVXi0ZtsWeagX9t/Y+F+M

Y1Op3SLqcb8fmWQu2ZtWGoSmChHQlBjwNbm4uzDdScD5OnRMMo4D8JNM1uJxIOEPFFsuyZJhurX/rBY1

B9Nk7sXN/AqKdgNvJTHZkKUAghKCDgRdEvQh+PWHEj
4ds0+sUAOqqqH6FfRHMdHdmAfrv16e3mRCY8d5PNlkNLRtrbzKo08YRak7PQ94OcKAIrywaK8b+i4PBW

pI1izdq0XNLDC8EsfBkGy9Fg2fmME+dB747PRzLrjL7nyTpGYdgvlT4IL+unhU5PdQwJytIRkVYqaVdt

ldGtgHM9XrankkXReAOlNSubrBZTIbFpM6eyZ6a4/a
Osmct0l148kBHEY2dsRdM0I0lpB5Swt0BNvCd6WR+IRivza7W/VqDV04R8fFbcOyI5kQSU+Gx1o1Npll

pagWabi9SpCoEp9gxgTli6YgSNI58bN3BRxngcEpJnHAPM4TiUtIAj5LrYGHqqSAtqWdzDmKswvF1cdL

g5PTy/x3wD5+ilnrnJ9bGSS1Gl8UCblD6sZcZsbI/U
qlgUAQqfBO1kEaQ+mEMlFGRpeoUeYK3RAnS87wtcwMRXTTX3e3Sb1M0J8N80j3UoeLoiz2bHdIntuEVu

9GyOLLuF0pOgD7E/u+GSZJinWslICaLhluW3nAAUWopldM1pPagrZNrfQ83RlNYo84u0YVQlpobQ7r53

h8/Kh3klHeC9tmNBelq6Kt/XjD0M7kQh0cMT6m33qT
za30rNUr3G2+Cu/QziEA8jIdwgKRc6Gs87jd6YhbjnHd5BV5JxpJCHKf7pux80IE2Y6cKGoZsllpbLmz

Ml3fGHz77ivhI570SC5S6BGcpCwiFFFJhHfVZObmFihStgAbIYzyLI7J+wi/rzYCJY9l+qEYNqMiJapU

VpNGGcRSTFhxrQSU0d9HbAokhZ7hWVwvps7gzTdEy0
XJ3KvCcQhtaYYCt3kB1UFAavKPXBaUreO1PmLvV1q9Jb/BG2FQ2yilgudZIrVYLcj9drIFpjR6Xtzvby

ZexLWheUWiQGhCr9mgmq02zETYLZOptNC4WM66IHv0Lm5FR+pMDoBH8S0pX+20uAG31ymvSDQkUS2KTt

tGPsLCHu3vokTrD/N50MQ8lWLiJQcCvf7mAeOx3izz
67lqQSoqLH9qbd/81bJuxd/+ycY/96c/N9KCarXrBx5AGl1vbXcoTzwUXypDv1jY/FnAMGtKsAHZ2djA

zBOCfHqv8Q3JE7NGSLn9na00vpgR9Kjmh0oMht2oDwjOLhpypT0WY+8IWfVG/lvjRCZDCPIpdp6IpBz9

U4nStpgoGyU1IXDmR6z5uNHQYFlVheq//mWWlOK0xs
fNqOuqbw/kR7qJ9D0oM6UQoYd00Q8w8FkJh/3cHdxC+vuj9Nw0ccy8BkpneT9y0lla6nGYC/aGDc+/Laron

4+GI476kuKvYqOkbMJamDVsRhT4CVqty0Cm1l+2vBRykAl7B7KTct1UAffsAW0ZdmGKjPh0fZuPTzM1w

bUrQ+6DMNeFkTdxaFc6bRWEpTIMPoE4hEIeFe5jP6L
+UeqIRDIwAAKf0fUmOrEauJogOLcazncttft+5L0KYHpYNBNszoKn63MFXmCyNNfC1kS9OILBMdXa34u

wsEbStMTUiek/+Wffq9PYXWQxLv9A+RpD5+lMLdzmzSuCU9o+aSrrJ7hl1W9KM4fOaIaDMOHZRU+kT

OP6WAXhynRFo5LAbINnh1V0I4XKTOBr3UafKDopC99
seaSoAUz2jaw5872xIYJlkGHGe0NkzwsbvwECwZzNo3cFOYT0eXx5GCU6Fqgf8XPTWlK4ulrPcv07Ioo

MazTJG9jIauIa52SBSrzQVeWAtZcgrb3AOgveQCxiJyZBLWGgyvGeWsuoaE6q7tBxANTEBsRzxjayYaV

DRSdW3zxnj2949GGHttCYdEKRURdcAxFxSYoBJSPpq
JQnSEel/EPwMFp6EPhYgNvvvWikCB5KEuU9BDZV96VL0fa0bx5QHt7KqZ3ulaZf/qFZ9v5SYFtETj33f

yFKNc7S9QxfK9uJmvFr+RHxIMBA5lV0rI8vd1GLT3wgjvun0t9/8bSv9TvyqC1NtaF6l0vB6uBPXtmPB

6lUdrrJtbHMBRvU7GVW54X2zMyYaph43vm/MbIVuj2
dVt5AGORIgAP5PpDAN0H8akHlsnu8tWfOSOgIjS8fFW7y/yUe19i2Yrnq9J1qdntOW7deb1ryy1ml5up

BGMXk4BV61Pt3R2hQJJRUzXZSNiqPdZiDddWMkaG8JMGUJb4JY+/kLbqLnlqrzVTEBvcQWCR9OiWra/m

bSLmUDo4nDAVx+L2gwV/vKsqaWsHojS4sqPVIaHd6l
mzNEeJyBUVzQFptRvwiFfX12ZDiqBVyIXK8N+NDPgpqMeymFQjxQvXRGaDK+x2tWBWNVsJTRrqaqWX1V

k7RJylS1wURiLGuR+p6fB9nYVGlnR73k0YZ3dximAF8BKhouJ5He84+BDxgyMbIe/YUUpYsCTfcOPq++

Rk3KxcU3MPQy++OJGCEddhKS3eQvTE/Oj1/vbfSjFc
QbWfp2PkDoCksJM1AYi2NElaVmBLph9MpdMJx04LTi7c1kK4lpVi+CP/rTMT/wxIsMtKV/GNj5P/SzOe

jFspzir+H0e7qizD1K0QbAallPGoCIEJ0TwRijgEwTJ5CBjlOo4t2vZfhHL3iLIm7+7mM5Nhh0PsqnpC

Zdfjhgjyvcz0pKaM3ZI6/ZK+cPj6C4rPGAgLjo7Ly7
hT1n/kh6fb3O5p2MQf46bMmN/GBywm9OntIOoqV/2CU7Yd4b/Vrxihgwf1eCe57skwmbfgS+swxHx8B4

59yl1yqKxqva5KTWw4s+D78vjQE3DPAXAa526DNFEkYtgl4eN7+CqRHb62d14zCG267vAQJYuxhA2ENk

x+8mL80foKO0hnDbgEdGNKcB6+3srNc1D+Lum9SXw6
vTD+Prqy/tbjw18BxKs0BTGkwh+Lv5gFd64EPlFfU3l8QUg9U3FWDEuDblA97iOUFGCzAia3UjH7DIVZ

ZNlmboiZM978AcQSac33ZUiDFq5PNPDGXh3e3VpdID9qubf6eqiB9l67EL0O/lssUe+v7IZRqyDmqOe8

87ZW8O2PW8HPFQiK0i11caJrqSzLDhqZ8WNP6wL1t4
3dhAIx5ofpCzICSNgGwl+01PESMkup2c/l79A6NpASwKbWmRXKt4eeGIL3hp+9TMNPIWoZUAjbXcka3r

TtPiWNPrdfagTjjdcrJiyZIVH7+wMJwsTflZ8nx12v6BawZFi/wgx2LZT201fV2fYD72vSGIiTcXfOYu

lUsFdb8ATT3XPVtZD3RY4mm4OWrQqFrvOS9VzMJUgg
2w7bb0NwUZs6HYwMrd5GqhFZ+GlNTfaiR9ascerMDP29VjIepKS4Pl/GkoIz44kvIsbz6i5nP9RFZlHi

kTRS4Bwhv4N/SATsuXwEqxKr6ujpVsArmF+4i+LUBc/AzUJxljeIn98SB2eyT/DFr9Az/9gFd4rVhT46

y1dZBXYaaXssGe6vCsEp6sYK/jffjxeL5F34IKLdsA
qP3PBqRoWcFDcP8c8uchzn4oLgEdHBOr3DrPkCrYDt96HMxlJ+GxRXUOpJH7GAfLuR7PmUA2gLLAdc2y

pyR4EmMhnUqwtDrNOKWUHXYkuJPGWOYiFWXEwbSr7RULFYyWGeJoX8akEllvIqBoOS57K9ZasK4Nw/1Z

iuH5qDzPxDyclN8FinyILWoJL5nSIHW6H3gyci9mDp
T9CHJu5H6DqERbTfAb8RB2Ny7+ruw4NbwvDedlBfujdgPptW0YMDbt1J5F7TRRlQsU9tmyFvceT6w/JN

2nHayfwJ+/YqcQGPeCRdMwBVU7peFrpH1QFM8ud6WvJDg7BPNhn3QaURhoTHy7PDrwEDTiB4L1iQDxSC

RnWM4JDCAdZM2WNBGueqJdJsGlZCHHZkHwMWEiOmKq
e5IgP5LqSOKaFJDQXHdfPPZuI34oYKcrAo16PIgoRMRbKhKrCAb0Ny5EWzLjTFVuZ24cwWMeyRYzYWEu

CHOIAmHvTJZpH9XcfKMcYKolF5NzH9ZgBC7xjVJhYV1udAIdV9FfX6KzaaelBGQVNkQdKMBgLVotPRNb

SyBmYWxzZSA+Dc8HYXL+Wl3RUN7ik8FdZRx5RJPtf4
CdDAygNIm0J8UxaPUxnvFeSwiinLCQHZJhEAOzZ6iooed7rOEtMFIyQb7AHhZxe4WjXRBwSIqRbm8n91

8bNxL/fsD9D/scIsaW3Z9QGAUfiZInxCM1ixJb4NXgOxaLRE3fW7G707/Ln0JupuYzrDF3oK8cCa1xKX

vcV6Dpx6oQ5U3k9GPCQCqy/W/vKCsaM2qBX//BtsMi
uRm2/YgGbT/3bklH7TM8USWph2plp6F8rW7V4Y7gg8907cf/qAB6T3rj3kGzqydm30S5tYm/3X0GOrg5

8+vjwxqCeTvzVv015+UtRP9ab8XjnX21d0dG+dwX0e3N33u2SXnG+sOT/g+s/4STJYwolcwDyPVReRQ1

HaUbH723kvnXjgSu4IfhO+rjspXmrjXB31KxG1R7tH
Nj0uI1uj5uupwVwBerT14w4rM6B1mHue+yqYtyJYHXYOzYvLEChoVfCaMxx17DC1c70t02yqHsYB9voi

RVC5BVmIBjXLtopDiwkJV5QTQxk15RCfm8k22SIlpq8vNRMceXk+bGIYWIpdI+iRBqoCoP3S/c6plopL

NhmblY2IM7WDEpFe+m7CX9W1GO7lsIGAa70Ph4HjPX
B8lKBBkxd1SXgIVR4NpXBvNJgYDHGd1gmimwNddPzFFd1PR8ddSbxNshNYI0muBBoqagdGh4TMCMBAC4

VapYifZP4liIMoDpiHIxVALCA8YL/QVbw7VbQiqK6FwnSfjiQ8Y7AmjCX8+o0468hViHQhZK64qoI+XV

uFqyDye/v/kd3bkm5wUvOxTPXOgWCky6v96wDbAx3u
yNzWaQ4tpSump3HqsaSBCPTR6xHeIsMRk3NcBUwr/MeVsFtfQSAxdsaoAlO3NoCzeLEjLJIizqGjGZCK

QEfIrtby8e4IqlbuoZIgCn2zrvNpdeJLxqDs0UPxRLzygg/eEfzzjPs+d/yl9Rnsmg4bjRq1dDqXiTbi

yCDFxSwEedY+LV1PONJy5p096t41QqUwjXP7NkfgYp
k7gUqBeJ3fSBnD6JsLLqa1yZ54TT9z6vf1QNPzcftJLjJUBeAGc3Z+wyKBQn8e2ValqUz9ZJUYJbgsP3

hHM4/6gUDyrCPzIPiCjwgNnN8KIQHvVd1WMEHd4Hzn9/FRTyKtYh9k9OMdMBJeWL9MHHXTTEjuUQijfJ

fOVtaIusI6g4hQb1WXW7Yu/ggGDYgIuqgcZR48MS4e
SPxddvXWMCF4xhJvWeZOhB6g7iYNLeBpTJnBKjy3NPmLmxSmpoyYJVOON8gBXYG4+oRRlBcgJjJulfsB

LfjbJcwLvye378R/tGWAEt6U3MsANuDdlu0iuGoGPXpFfWsTL3ZfDlVkxqcFq6YluhnpXtMSc7JRuVYd

QrqdkkY0E/0EbjrZBs9rMnSDPFpEt7VWkGaC7yrOP+
z5fBbOJKuyhcCuWrjDNabuV8RCiNz1RP39Y0YXQsoD5NmiP3UVAwpPZrnWODwvZ9hLoFDOfrCdeyww2k

fvpFl5x6Mq+54Z0aTlQtc5n9w27U9YIBPb0ftfQuFBiBhNZEiTapTec9ROvyWvsNy4W7XgwnRsa+BUrj

iUzjRJRAM3vsZfL7Kr6b3JPe6h24g3YXkwOUCoRCbM
IZdkaoi+q5PB1agAoyYW5Xr5zyd3GHiqr4oEpFnDX5G1zl7hIfZb5Ewy92jKloguJbhIwHcBpVBDj93e

kT7aK1nNoyGMNkKyvDy05obUZRuh8PLYGoV5zUw9hn9tfM9bX40FUpbbm5ezrbymxWnuazNTRykZyL37

xuXFxFbAegZ6ZfEWDym91/xfgbirQ92VUiecD4a1K+
uOqIxcFjdWR/VPyALSNFUOAbcZUmMTl4uR7bysjxIb7bE9pAK4gzE2EnbQR49l9LLKiwPNlftwRnHyiP

DV0B0QbGJaSBK0eaO66p49UStY/boXl2sJMpGEBxtrQUbAHFkkAypjxu3vOj84E3immKq1blK8/mkOtb

TW8y092mbaIw6JpIAJL6YmjJ967S0KjwQZbAuGN/hc
osl1uN+sZCHhUqw7+vN6XmdC6FFcr5/kPzRw9rleB4R3dDdY/OQIieK4UNZBzbTHUKh8jF10ydd8Ci2q

XXtDTgjy8JQ+yN0h3q940MBBPszDgVZd1pmWyw5CN6iuuB6zZPoafuRNFqT+on5+E3kGrlh6zQk6AsGV

oFllRVHzSJw8dpM/BkYxDZeAdAYr8mHk4Er5/jbPpw
iE6U/OvWCE+iNuRbXGTu6CTESn1zLxIvEJ0as5JSfgTwklHQpykgmnhZviSEfhKQau18LvM/St36/eZQ

BkwO1lAvsfAWbvOhUddmvWolo4sQ4wJQ23NhLnoU6inJ3r4WE79ZWCrlL+dDmH4CyN9KwJN7YPeURCQ2

I0byDm8RENwbfb1C4kW+qRE3yY+7aXC0HvK5NKG5JI
7oC+PrshHB0l5WrJ+THZPghUhCBjZN/cqdkaT5pMosRDOLKf6+zOSYRkT5i/81SPdxPAWCZeOSICe3C6

ChUU2NflKJAE7SrIp3jxu/eyT19Rh5w2itk4n9rGl7M3WqtGNQrtqtXKzztplpDEWTN0Uo1bvVICg0fM

7YUVuMPGZeNQW6zEXp62Bny+FQnuWib1Iwl6xvvlXd
hAsU75C9N+HeYck6mySxku9BDa9cUIDK07pZk3tWcIiUofgrfXS05g34M/pyOHwUiYPY/PySaqkbb3np

XfP5WpdO0ezghjfCKw1tkNOsWgfjE1gdMB1sb3iaNzFoQQUytP0UJGXgrnXA+PYG41QXJK6QLu5EWmts

xfMyReVRmhi8PGeoh5aOf0t/Q1cx4XV4tRCHaKcDXb
Vbno8RUONgroy5C16IjwXH572sxUz6B5vKvqBHDi1tSusDT+5h6yuOxY74OjZMLb+aa3paCiDHWeJDGz

JWRsrl1AICiCyy9yMcUVbuQcYHj55h46ldYJ6Xqf1J65EmTGfz/ZmapiJ0SoNBvRQwscFhrUc67WMov9

IQsvCqG/yuJtTxot2B9jJLW2YnW+zraA5D1QLT094Z
QNO0aoSFUQ8zqrRoddvFNf2bw3mRzi614/c9gmvcphSv/CahvNdone9a68Ij4/091D9tYhyGSoVFPsF/

d1GuSPwFnuzQS4lfFVBJdpjSKbuUuMUb7KL5Wkkxk5KVQCBQn+st0Rc/wugBp1amR51UgWiaUQrQIOyK

Pl0R9pVi50zIt0lPkRQ6EzQp9q4GjIz71Y/M+yLCMW
cIe4z6PXJf2iM/aewXBZVMv/yGB87q3oSgIoAkxMuD4BKl6wLe8PWEXFj+xGq6At40Vw/prRg07wARin

LQybYCp7FgGcHSfGdMorCYYZWm+qDr7Dv60Vk1oSZe5YZQZkZd0eYfTzXjqFw9MFUl5eJt4XAPNEl+James

i2Ta90Fp1bHu5jKQ+Dj/1ZP41SHU7GFM3ki0HbESWz
CZrcmlHbEelFKgbpTLGrBhyTVpHpSZvKXiRpWRPkGDlvQU5IGHumKNpwZIHbX8BfymPumOPnDUPpUg7U

LJImIA7HZXTsaXMqQXMyYaFdWBRRKlZlVYLcOKOmgHJVt7ddSjHpQRI6NTDsPappHQ2TDZWyGL6Fy325

JB52ywT9VSUzBu9HBCSbJY2Mtj72bGZ5ALGfCyKrNE
RntzQvPYGepaY0KT3WJaEzHHB2eZDjHygUSP4XJSCqxBFyKY9LIDUpbPWuUS2+DQogID4+DQplbmRvYm

fFTmmbDXJgFvyDDaFnAPwrQdmgaIIlMH2HrTD9GAFdM40fALYvYRAcE3QzEML5ZHL+Pq6CHHLmuNFdXX

4YTzuU5R5ly2w7Hu4tBW6HIeDS5oWAU+tUSWKW8Ycc
mh3g2rFVreLYhVWihvDoIzpyd48/FC+jg4R3URtCdmSNV3Og8WIN/beNQZwMZy60FqvkBQekEy/br1Gs

hddpsV3MiAuc9D2td9+a7r919mAmn73nU0x5/xa2uAGG0JY6Jy+Lmbvv70ek42r9yYpdTWnIZy0oPjeS

p9nmT6/EZFQiTjtIf54n1r45NITmU3+/gni294WnQB
2tDE3xRC9QP34DK/fue7gGUzg5Gn8NuJ7OAkm4Oa4oOtQQKrltStdHeoKBkS1gOnQXQqKb+03UtwUVhC

PfUERJmcR3okjaCCtMLZUrdvGKLoge/rm8WI+FVgyyAFl9Knw4g/XWaNfhZ7peRNMFa7OFQQPC435c9S

EswZaOAFlWfsloNbewEuckCqz5rsw8h6o1bKejjVh2
FnhqhCu20TaaibdgdLZ6mt9iWhSKlacL3ZrSwAQQEIK/4rtudwp6fclOyl2AlUpe45rOSo7Z9e7pwFe4

kIfpAloyitacgWkFgyhNBdU0Qat1PDP2pJxjEgBJOqJawOPru4KDzMknH6VMAdZ72lgtWmeE7ktP2DK4

iBlkvz5efJmROc6M1SdZtkY2oEHxHlYyrnCUyPvbgp
kFXtb7oYOEihEtIiRI5mqG7hkhKf011YVf0tOX7sqrTwRbyph79r4Awb3boGix/Z9WI3DMeu4rxGrMzi

/vQ0bCFxC3LQ+nPgc8bDHdnp87riempvYbTQetfbkSQMaRB7GdGDBgpT+N1gm4vEpWkOoj0tm+ucjWmw

fJA0Ed8QNzMLCrU/6xP4FBSpfwY4Kvaau3PwuEtWhg
qFI4U+2bIjh/LuCUo1vRMZPU3Oxx3xEZE0cGixTq5g6XXQjgDAPlmwJ0r7uyHE1xFpU2tRmj5QG6uvEN

PFoyi9Nm1mB18h/luTi25mmO6ejp7zin87PosTwygyGqWVXV/6oiBJ6ScWABi+xC9eahwvTW4PvIkBYw

kN1rY11f4lx6LmixQOhACwgrWnKglE5BsC7pdKDkSX
3Sx2zBGgAdZUUSuKNjpmivte6CkySIJ3a1/XoiT/yUz61m9d3uN4Su87tpEQENqpUTJrxjupgfUtpTmK

XPGouAj4ugofCq93aKg8wIG1E9Lzp/667X9YCrGeg18zKnv/t6VelKFONZ/kuhkkK+DG08X7jmmf4xr+

FTOY3Upj+wfEKwhMHeC/KZGGsycQwNo9hAKNdhbn8G
+LKMFkSRXpQ5tZYYyIxPeTCvIrLA1Mu4gMy9LuessKBqP6PLBL/1k5Qjxq4oBL+gyCb3ZQPLrloMlMZ5

PVhvIXS0EOLyo7CaZ7IZ1L4Mj33x1lWi8/Hn84QfZX32S2UWpia/XGQmtn2o43OzVVQYdtTrwAU13Qvv

n8Rr8ZiuPHqvNaYLSEEnsJ9AQ5fSEwDNgYRNYeLkAP
KNaYKU79KLlihOUUmKm1kMxQw6z4mGvRwHyAkBUz0lrJho3IWVrkciPYidArRAVAwPoaSEzjuXMABdZ1

dU8Yxx0FMAnwU9vqqDpq/axYxu0ng6Gp5JcMy8tivSM5wDYW8SnVzJZpDFGWXyccj9X/sJLl2mVWUaMb

ZQcrzzeSxN52ljaGnfd5AU2VbJhzhQx+tP6IYlApvi
rooN8hP/dWQO0SfnLC7P5c+9JQnvTgGaOpnbeubMWzMtMCYc1IyXoKS1IMLLBqOYA8YrgOjvC2WODUHI

/MR4iUjirBY3E3dYMkRmAN3w+6Nf6VFvyWTuidt81WZSoUfLYVa7kQkLdfyfYAF1xtlFMaWXqHVuOetI

0oO9H4M2BCxJ6QnDVWQmKpksrz8fkLNgvW7kBFHV6t
q9VPAYzerOMAJWpPTuw6m6MVlK6IxSbJ/o+VzggOVkK0U+7A/4ByoOiX8psThzlKdnUiLNe6xJcjMKvB

33uLdRcX/9UggRt4BcCvKgy+4JUwGDpJjyRc82RhY+CEZLLqPBOOfKuawUhW9mL5j7vaqfdWl2M4afeL

lutq1tsUabnEn8ovxa5uo0yy1uLHUnXDc0Uck9Z8yr
UG7i8pYcVUL17NHTtR+eZSb2sX6TevoQOc6KRaEaHuMucQVguB7FLCA8YB7O1jRsVdgcKmXRT5x7W79r

6tBbm4S5YgRSVN+bYxM9n4ZQjDOidfehFqMMRIHvC3Vxflpu6T69XNGd4dI78/Q11zLLi7g3Qauyu37x

6u0SSFFXhUYNMzdRWDmCym2fLDS2T6kYw7BWCy0dqF
kAMNFzvfDpPlRXV/IHKCd8TlGNA97hQGTHV8jBJfcuLeJ/f9D+EG25ssowcmNu5wZHbd1KFvEVxIsprJ

tdkuJCmrpmD7gFPYEKDLute7Mcn0kQg6GYCqzfefx+/yPROA4vMSE4fPg1QUeX30gZlpmCfHy1fjeVbg

H1p1eAYmdbxyKQ8jO2u+0i763/9aRyJPO9M0droOrB
zRR7MhGjKgErIWd+G0foEq/WPxBBy6tn9tWErkNudZlOHFs938Mv1Vn3w/Z1dG33F3rKNvuZD334Pudm

ezz/9G5e0jBXHxf++0Xq8zs5/p4spRnmCAAkP91pGQGXwGcdr223s27krAKCSbx8AxOx8Y7xfIPfsz61

jSI5LWb76MfwB81s8NtP08ToijZayH3MRniavBzFKx
RwGY+BH6q6SIKwT4nABEFHy2F4RRAmu16ZXWtcFZBipYDDJbKlx2vTLLzFZm0yonFrXx7fN8xf7bRqpE

dR6jBRXaemYLSvwqVoO/54tb9FBKlmx4o5iIe7T9lGS0pcOSsiqNkzOSA6Zo2I6EcsPFOlJkONW5wQOs

ZtSuPApp4vXEs5gmpXnGjUlN8wAdzciOFrCOwbezuo
JhgNoBjjWp0v39ZoLnOiszAqW/6wSpPxBDmJQqcM3Ly4xNURYPsluK7KiWyg5PMQzMh39omJKNmWWm81

NePjVpvlcRbxmZU2pjAifP5ds0A+6QvG1N1qhZC3jRFMDc0gpLWcG72FplQ5atYNBCa3dKe68wO8Ptxb

BKe4sotcx0SDMSnsZrlN3Q5q8yGojatxr3pLnHM/eY
N+nt/Kspb9ip0wgqEF1UyRhabOjf7GCM1Lj1S3yC2D8dUcmCfWKeyqv/LlAs66WjC8C1mLjJ7t+VTtMv

Q+QgnVNSJofExO1dq8od6Ow7fyhliV5svquVR8Y1hqg9C09D+trsqq2a/64S0gc5FbZ+BA3t5IiFxZea

oOXcD3GU61PqLS8j5e4L2D+5HBoeqA21ly7lR874jP
miepsrTowr7qK7F47EhPxYNKF60/soebmFOCbWuF60TEZkXg1dSzj1hg0/2FNrKTNt2QLF1yc5PvNMWf

MNinnbCqCvkPMesiAJNbZblCOwKlFVtQCkGuSOPaVDdaCK0IPQeiGIxfHDNiQ1JftuBspOXtUWAxBo4D

WRTrLH6SMILthEZhFIHqAmUiZRAZFpUuJFQcAVFpqU
BYp0mvPmWdFEV1FNErCyljUU9RMLBhCY2Nm163JR60kxU1VVQjYf2JCEPqKR9Xbw04mSJ1CAVlHoQrGG

RdvxWvEZReeoS2AE9WOoKoSKW0dVYqVyfQBA7QBGUnbSEoSH6RDJYjeIPrRJ5+DQogID4+DQplbmRvYm

cUFvScRLByz6XbKOkxYPt5X0DirZHwmlRzJflkpMVK
KKNgHTQaM1jvhtq4nQBzLXKcOr8XLiWya7GdVNIpLJgJrg1zACJrCaV+91k3E3btZzsZCVVQGOVuE/mK

v70302TLsl2PFndKJXTBOJOppyweT4qUvpoFr2IVJFK0wjrCLyneteq12o1C3dV//ybiVNTiwe1l//s/

l3ShihoFD//iPHTzvftuD3+yTg9/P73imyiz9+nWef
Xz6Wl3XV8l1cjs64W2qpenLWx8Tpdiu/DPutemuOzCe2zYtfiNp+l4ll4/feo8f/cJDm84cBuNt7k/+m

p/8ucjZ/UV79YUHfDg7ZBcyPuC843uKl9B2QqVs66mV2m6PqZwp03epORBfQmIZ8QO6iZJgS9lNkk4O/

NmEOha3+XziWi5VNM5CTxw+0UrJvvu1+aKNu+KZr4F
ewO/Tf8RFOJCG97UPx4GYy9hlNJC9QUJPztLrMJrfXX0K8w7+du8/XknePiCNajlBqycHdLJXB5Btqu7

8M2YD8iW7ZDCxFtss28OAXCNSXzVUyuNyBx+m8Qu++f8TGrkAe/HPmWAgiOjmNB/sf7rLqoAoJfXwyBS

3ohkvSt2XP8Hz31WcZp+ZRT2GK4rnPDjywMOnepNXx
JqrKHM2WYuQjaQ8vVEX0EytZBXpd8j3akYCihwnim3ImgbuaMwVF6BeOOA3PBbFcYToZ3FsEXCYc4J+9

Abs6TqMWgBLyMZcF743muTJ3dEeTbRtECZyHoNBjSaWeVjgGDOyo45f4q1tBG1GFPoiq0aNCV07xgzwl

ykCFq+uE94dPTvp5D+0kC4AHEKSuzQOrJTkFER9Tpw
AsR+GqNtdQnPKVPuEI2No0XyH7LU9rItN2UAw+or5uAel4kUqKrNWjHfmvWakF7lngspHCHYuyvnqLri

X7okyDC1tNHIhMrgyNBnymCrfeIIj5uXGFMi+fBTGjXkJOLBJf99C3NDtfn9WIFjRV5faFeV+n19dZ8v

1/SUsCg2hkXdzl++vdc49pt5343rmpmggUpQ9Jvnnm
2lc2YB/rUXlgJfgeMz8LZuhUwqTmuXgmHiRRrCXjI8QO67hTRhiD/TySodtnFUWEBTKA1PhhAWXWxPqz

5nAeS5FqKEpAJLo3inxx4njPNZbE/fEdppY6rjcDDX/wDyoaHpOQ9NXRKziRXsWNjDQ4Y64Mqm4C4C3h

WvINoK8shhMK5B0+56AvtUuWl457ugV4WVU7IBMuie
A1bwn+07YuhV9fOwsHLQEVZYNs7F2BSMRpMGt9q0rWPqEckE0g2doAsR8vy46s/ppNRvWeSOIIkbWWZr

gMQOts0YqiYKxyqxl4ZMY5HYZhIObFOlr5mpwqPIPIAQSFQCHIeywLNDN2YONNd2q7OllbjDP8TdDxCn

o3AGrYPuiheu1AlyDA84Q7T4bYIJcFv7ScD3UA3ggk
Z+LjKj0ESPyB1eGn8KqgLFMhA/5N77je8nbQsWzjoy1NPWFuvfpspmb2k0y7z0QtZdT+0bhXoY9fQdkO

JX8lqcwqnBIauTYACXCtLmUfkoLOjbMao/eiqPhsJI+wo4kWfDwnCAmfC92lHqKqX3AbwJ1WH2TCudYM

fO5CvJLOOyDLHCrJBin9NQFYWJmAGkQVy+8EqvSOlA
MMsNbV/WF+c6TIFpvihV+7DiRsIqn77rr/feieOkiNc5NpACAOqcHGrUqZppxBMCLgpJPhCK4HF8h76B

Is85byvVjH9HamXHEGVOG7Jq7BZVEcV9kDkpUszTWIzyykBbo/ydzzAfdYRoGry1MxiZ5GWbRQiPfkg6

pf7iUYOGHHLDqFSExRlFpNSbv7V6Hb5TenCGX3WITv
lhsGopiBZzskLOBtadcEvNi8/MBYxAQ+Laura+O9HFfEXtf0ZRBETROL4c50eGpUUZn722PeVOSKyuck0

qLLtOolrJphAJj6Kaga5IZLQc1H+QAmmRzeiUdNIfbFGQ0p3BCtJVIk6eH1nf3qoU+hqlVZDfeQ8sOh0

K7fHW4i62whNscoxr3f7RLnP1yQ9kkwEahQdR5azIE
zUc6ui6HtViT5ytjgytpy+02uzzbhbubHX225/yMo+6KN9nuQjh4R9S5c750YHek8guXjpmMceLF0AV7

MkquPKaXZKvwQ6E2360c99T/ja2fERqfcwp7DVGBGrAdC3xFV3cCrbiFkN2saJUUxwcyvP5QDgPd9FPg

ORL8FFFuJXzQURiciCjVNuUoRz9HXzQuQoit0FTdNa
w0UwJeiDc0tqPN8uUucc5M6CgqPrJLQJgwDDnWGihw3KUCNoJtOTupG8DVmy8XDQu/A91LkQhP7KSHQa

xUrAuUj9snwj62qSqtOSxSvHS6mJpFnMJxZ9IJcqwg41OztlFotSAY9d/KZamPTAGGa06ovAQ3X3x1q7

9mLfD8zOH3sgZ6aOfBwcVOzl2KHQ1sLyu7Bz7GshuB
VYZLpqY1IsH4h6XAi5YeoTH2yF6L5ZKEZrmjVy06Sl2LoYZYdduAEbylnHNIL5wk+TRIktBnsSVXIHax

+XzEibNFQLpRCdbpxqdOndGLl6wVYpbRAiTbeBmWvHmUVVoLn8PvV0SeC8LRVVWIi+E3XdfnduvPPevh

v+eRjbzZM4PFndOX/6tiwcvQh6AYtzEqJgMfI6uJaq
PcKKuDntWJCVgJ5SSr4i0qhoTvQJEJHc3SQgLuxDhI28VYdYNGZF3UIRDBPBTAIYgqI67S0uP2rJ7CTM

WOkIXNW1mFONWS7HOyT5kUsGsWlb1askOQx0ZHG7ZKPTuA/cC6684tOnWgciQHjNWWOUFBAonVcQcQ5M

P4xOF+aZLjEjsO2pMtAEcSR8paxByhdO0fONDhgLj3
Ck8tQAyX7xTtbssch8koLPm5DdP4hhP9d0HyP3FOcwcdLaaXVOrv9aA6swRnvneOmIivsmWjjjXSPNsI

WNiCehEuMHBJr2fPGkwNmq4GoW6JyzHfr5CyFWCXeh1+5NXPymjIMSukeF8EuHClvTVoFsJOmqYTMRaS

F4Psky9CGLE2ROzdIib0edvd08Xg9oWQoVLouenNxi
ZOZ4Nw8WkGHNrkk0ERU/eHmzkLRFed6XIx5jlp0Qi8LOnFkMJfnsKQpsJ7OrLiPGeorukzKR38QDLSDK

w9qYQCBcW4ZQWF2t2ayBQkgLo6B22xF8gMhVt7E4YhkNALXUyw8Gi928ub8onS/wPTX1EvbCiBPBNyfa

YEyKTSXU2ZnledPbPXdRkVFFcwt7stlDgloHFmBMzH
y2HJEHPsJ2LWJec4zVqtpUUf4ip0irFIhM6KFlYlv5uGOif2gLOmWRCvaXWBp6HNwaYHyaMGtxfSVDPm

FDo2iykOeTTga7FpzZzIzwGmzE+icbA+T3XcvxuYndH5NG09FS+6LxjiMLrq2IGhuOgnEOz/F75Vrf/k

hCeOzandGY8Hpfq7X6T6FUujg4fPnc6I+iHzYDSa72
FIKjCdjVZCn/3VRhlgTszz1CHKhAvRHXQH7JBlGoqSXFH9gtHBVlwYdySOvcmx/T5o/eJYZ/3sZQ+qNC

AvcLSQu4pcWMAHkWjB/nDuyrl4woa7mK3jiEjeLCWbcSS3k18SLCWlZrvZZ1EdtOFKwsKYDdSdXRshLi

qKEoOEgA4szFnhfkYtEQBrcxAjmpGeqlJrEINsX4Ew
ptYavwg9iuj3tAzbQ9T+/EM0CKboV77dMT2/qRWz6ehw/Raq/d+0/WgNg9qp48F9vLIL4q+8+O3UkCwm

dJqdwk38Im55m4gqUDYA30/6iK745LqdRpQ9/D63DyHsHQ42++wSAQ1ExF42aFRCthGf/h75g0wAfIZU

bobx/m/Vc9BMuU2Z0OmlJQIGbEAavOLr3hT8t6yzJl
MshcVvkQ+XpXtTs/nF1HBbObbed9aCZYGw4Xxn2wRISHcv3sYftPbRxKS3NDxKF26ZlZqCw7hPsXVASp

XmIipOCh/Bpoa+0k96GjiwLUcx2NoTSonkRcyEvGeMs65E/cFsKC22wvuFoknxqb34tpSzX03asfDRLE

3vYtF5TeApulEHk0e9STE2rV+LlQd60aJJl+Zs94pq
7X7Aknhg9WfKxeyA9Sa0zF2zd40bW2QvZJ4eY2+y6nmdKGPfcofPwY8ULrKzJKQbIWg3TWSPBvCjTRvS

yc3WQGt+VwTLDyWsMbNOPUOVBzK9+sMqN31OHZ0AB6PVULLjEwrfDmIGVs9zLdGb9FnEwHJiVJ3nhG0F

Ws97Eo7bte6/fWBu6SMNu53VeuG89UCmv97ZyQw7Bq
CjiXlpv+JSPbxbTcKDHoK4RJ6A+r7VSntqekm7plc2jutmwYWyeXHSXKUwZpk7H5sf1vqwyxtjvSC3Na

LIDYy7PcUjL5IoyMlz5V4TNkKuNJB+m+DoSR+4b2WSKwf0fe/xx3NK6AoAieDrBbod9c/MLhecPRbcXt

XPrff0QytaT810xdPNxYYhBC1bbRvXQjRfqt5LeCcr
gj7zrTNgzicSudWZ+aIDRnnyRFnmZSXCAohT2JDnt3fMM1AAtTboYQPr5Q8hOBC+ebVG5lQqA90x75Ch

S3o694fqBEURQx9PxunJflgQ566kz/3j8dOgf067Se90TVqol79lzM5oB4qu2WOHPml81tid9x/O25GW

YJzctzUljPKsKQ5SYvQbLZ1dfl7JUNSxYIJeUlrSRs
LmQAeHYwCdPLDiTKkpPL8ALAzlQEbzTLZsY3DfbfOsqXRjTUApBp4ABCDuET4LFVBodKMcQUZiDuRtQD

ZNRdEoWCNlCKOuxGVJq8thXlJjGFN5SZXjWhtuAT3URYDmXD7Rn337VT94apWhCVIpNZEVAvLaNOXpK5

DqsOGoJXgnS9UwN3HbPR5rsQRaDW0ujSVlT8CgH4Xz
dmljZVJHQiAvSSBmYWxzZSAvSyBmYWxzZSA+Lo0HVFH+Ew9QSR6lm2AoCOhpIhInSV1ghc9UUAL2OW4D

lBp4IAUbE1IvDLTuYUByi1VnCP5VTE6diJblOLJdHy3QWnIdm9OgIFUqSMsThn1YOD+bQBC9V+p/mGMi

NWF3+c5eADN7VMElWehreyUBzBFZQtJOumv1+y6swX
btqUYG1sei6wrfVXIESTwng486gnZPynz/+qGg5ZY+muP6dV35g/3RD4vglVFMC+slVlFLbZPq13W4lA

9ofuOKa5+s+Jjawz7QPSNftKTU5a2Ytz4++kXas8R2cZTk/WBnEsMwDpuXX2x/zc9EWF3nWMSbcS3SYR

CfT0qh9qasbwUtPX2dYnOyDZry3PeGBWuyRUIjet4k
nHGU9eX5aiC21USIG5bUCDFCuFk9MIUJSCRjLwQ1JB+ErxTZxYDk/Qnc+Imc2XKbLQpSCXMO5UeuEZHX

SQA0u6viIHhgu4qAX36A+48OQLySDjB89+ho7G9phTN2dCgr+qhBu+2wxszp0AiDzlHwJ6QcSWw0PNq9

KPL3wS6M/d+A55UfwlcnqJE/VfQoHdSfBxfF8O6gHj
Xqk8ffnVhzyPwOVDo4JM2MOiRjsfb8EW2ESvrzmHks7jncPgUT9Bv3Lb6XAXYaW/TaaseSoqm2dywgxQ

GzB0vy5KJNrjR5COVrao7wtjQQSkmYkcgwZnBTp2TE/FOjm7KNrz9rRHbRt0Ol4G5y94AOWs9B4zi3Fr

DtgztyczcJz+FuN11oVUOnzfEiLd3Y104e1ZAh/MbV
u0rb5i5X4rLfIEjTfhIEy7gYYGF/jbj167BUDFsu5gYoa6pGcgQvLCkb/V8ZBDhuVWzuaqLfjHJwEV0J

LwYuTW5kre0BWVIhIKNdAqmIUfMpIKfIEfHnIBIiZJgzEU5JOWlrGQwgBCOkW5SbckGdgPXlZFIpRz7C

HIRdSY0BKPKrxZUuVKJmGqKiLGVLBcClFSPwFWSzoE
USu1zvPySnJQX8UBLvOrcaUS8PRZOhQF9Cv398YM05glPbKgPxGAHGQaPoAAIiY4LmbHRxOGeeO4MdV9

OjFR9roFCmJX8emHKvQ9AwK5TwprvbRLCDMlZvDFHnRHetKBDoTjCwLAftYTK+Lp3NVZE+Kk9UBA3uh6

SrIUmlVEShMK8swn0SQGPxVCu5YEI8ORRyExg4EDI5
NGElPIVpTPX3WOI2MlB4UGtoIaJ6CQC6KGHmKkQrKlHeNPJ4TFJ2ZZRxPwy5FKNnWiMaKjtvXzr3PKR3

BgE2MNDeRFE3PFK0WwZ5OHPyYVG7XPS8IaQ2EBSaNLK4EXI4FqQzNdznPdt0KBY1BDLQTeQfREd0GQY5

ZAP0XDRqNIGgXMF5KrpjYwY1XVbgEtQ0EzMjNfB4UY
VnEWW5JrolMkXeJJT3LMG4AVZxZjG2SHY9JuT6BtGyBeGgTWy4WCJ0VvxeFpl9VFkoTtV9ClltYdCkZF

rkQeR3FymkTNH4JED8SuX5EmocKsNrUX4DIAHmFadgJzm4LUI5EHW0BldzNGR0ARBkDuU2VGEzEUD1YY

LhRTU7NAYoPDY4LQW9FTP5UVGnSDY1EDBsNeXoCpKd
DSFuRCRnKyE2QhLhGKG5SFU5BgN6AAThVXZ2PQIkOkO1CQMbUbr0KHU9LcK7RJUeRnWwIACoMFLBEeLl

QCQoZLIkAKQqRcQdGsSuEQJnDIH3YPCrMeJhRFt6KOL5FATxFcJlFIs1SMT7ESAqTsQgXWm0TJC5JOPg

RcOoEWh1FHQ4JOItPaKsPNc0ZQZ1XYJlWoUjYEc3XB
Y1VQEnYyMbZBw2WAE1ZEZaEjKnLZo7SEV4ZEOmJNciAGp6BZW7SCVmYzWcJEr7ONK9DIDbXqNpTDc2WW

M1CGPmVsZvIRD7GOJrEeEuJIX1NSY5BsV0WVQyBUI5CCT4VZO6YIMzBlDmOKxaOqSoOlFuWZU8DMZ9UV

WsRcIvJnU8LTL9AjN5WWIkMZA3ZVTlYpQaKwYqNaSc
HI8VQWB4ExDdBHR0SXGxHvMpXgZwKxPpCWH9FCJ1WSSkJGL3TWegGAV2RbAfFzIeKIC4MnY3JowkKwG4

ARB6JuG1WlrbSlX3JXLmQZBuZyNaMGY2FqU1RprbPnW9GDZ9IqUcEjbrLre6AYW7SNHyQnfxBzCdVRps

LnT6MnvcLTfhDQm3VEJ4JynpFub6HMx2XAO2KXRpFc
h1MDuyChY7OaXcOeXnTPypGoR3NoitYbC7OQUjZAP1AVOgKUB7IDW9XyS7YXMdBTU9OFI8JzE9QMbvIE

FcOGX8VtY0BYFdPFG4RQJ9NoRqRkjyKdt1BVC4RVDfOvfgCBW8EQ0NYQS8FZPnLWC6LKH4FnN5HYUmCX

H0UCZ2UeP9ABrkXtUiRJP3AvC1XOGxPQV2OUT9UuV9
DAMxQBU9KHElAOXsJV5PGN7tb4KlVTcsKKGuJO8fit1UYSL9KI8KMJRiDJ2MhXDiA6IspzKJNGTozizu

dE9pXRxuAUGpK6XzzrJCTG2tR9JzbFQbRCynVGGeQ3JgH8AzgJY1SJMwY5TbGIGgQ8r3VQkgOZ8PEPNu

AP02OC3mYTQCTtJsUKQgDuvkC0YmKfPAVaAqVVQhSv
3ltXRGw4yqAmDxMHZdHsLxGTW8TXevTZ3SXpPtXUQuMEKdoYiiXO4okDZvKP3UeRYeVpJgUKgcDV4+DQ

gulhOfJhiJEtS5FDOdm9YoCSipTGy8VQznNEGgM1C2rMMuDc7xgW0SlZF5gPLmK7HdqMZCjMYnJ1Sbc4

AFt814F3RauANiA4CqZ39gfP5cX0hmjdEgs0mFboRp
VUmuWy4ULGNcJE4BuVNrmRRcKGTeCzRaGMQuiDApPCXsNaC0BVrrUWZyN6ivSLMckgVxDHIfXMFPEiXj

ZBDzQm9guHVnl6CozDA8b7MnDAFdRBJDWVcdNZ3+FNtggeMhMfvLAnR0TYKwy0MtTBceQBwfKsGcMXq0

VWQuEzhiEzUpPBP8TRD8WLNvUUI3VJb4UGI6NmDuJc
X4BXPgWvPbByXfJlv4SNQ8ZZNsOnovFuFgZIK8BEBiPuimONB5IYP2ZpM9BRLeRYM4VCN2MeV4QJNsMA

C0WXZ1MdK8DOZgNRI3ASDcTyMqXvDfSPf4YQ5XAXN1XCOeOZv7BZVlODP0QsAqUjWgYOqaPpZ8OgUuUo

JaGQS4MzH8BQUcKrc5OKmmKnDfPwelALN7GZdvRpT1
KVWfANRyEFiuMxF8BqgzYhL2PRs7XRE1MnHvCmG1ELJcBTP3AfNnXaV0XBo1JQR0DqnwPvV1GWAwYIYU

EgGiBbEqKOH9VENzQgPpCSn0MWI3ClGtGaOxPQH0VOYzCBS4NLWtXyEhBXQ4JeBzVvKgQtDvSARzBZMg

AnayBnr5WVH2WrMcHkviVNy4XTOpKPW6AKXeXtWoGN
NbFSBrFWnqUHT9SRGtJoY8FJFrUNO7OLi1IXQ7SNJrMVznDTF4GzW0MPRsUqv3AHT3ERVbVXfmLAv5KU

k6THA9YLZsQmLvCZx4HJL8XFXgGcOvJRn1TWW7CYVlMjApGBk8DZN4ESPvNySpWVd7CNB8YEMeTcOaRM

l8HOL0KSRlNaYjAVq8OCK1RWOwKnOkRXg6XVZ4HFGn
PfHmHA9MEVU4UITuVbZuVZo4VZA8AOJbFlGdRRx6BMU9DDOfVmQkQHi5SKIxFzxrUoNvXIZ4SxV4MYXn

QGZ6JNJ9XwXdORPwVGH5UPSpYpV6NlamSaqlDOC3CiG2VCSuObBwIJjmNuR0OTMySDTmDZC7MHHcIvCz

XoCnTMSfWxJUJpFyTKh4RTE4FuIvWiTvGdPgUZHyYo
WtSiXqCRZ2UXraSGZ2IfWvSOB6GSCdWFU4LjTuYwKeMQtpJtM1MoGvUjYmAEiwTxVzAMKmLKpxMaL4Uv

rxVaH6OZI8RlU0OrtjMos8CCD8XUNlBzjjHth1UMemGqC8DzKbKde6BD4ACWP3EztjCtt8QIn7MDH3Wi

egFUc8HAv4XSE3NhHuFoPxQWgxOvG6FaInZhO4ZUW3
JvG1HYUqLQF7BOI2RcT3EBLfYWZ1YPP3ObU2GSOrVMt8KBP8NfL5ZDHgAJX6VWK2TfG5PJAiSve6WZD6

ZWVfHiitAez7JGDbQZMLIkAsXiTfSHHkJJG7MMKfAhXiDOBpLTF1TNEqRPC4GDDcMDZ7AICgNqNcHURf

HLJ9DDVmSCI9GATkIUH7LAEeHKVCUdYsCM4ncf8LKL
uaAPCdWxgSFyFjSCcOLaRfKBAaFOzsTT2Ql716QGHiR4SbsCTmqc7WFYLyYX0Ok938GoZuGL2NdporpV

wHs6doUVsiKWCcY7YvW2VlwUI1EUCkN3YcCXXuQ7z2SJifGZ5PNWYuGA17PE9tTOQFKuGpQWGaTgwoM3

KgRlMJFhBfBPEmQv8dnICTg5jyTsLzDXIdIzNlQCCg
LJvcAH8PSxWcFRVnDYJdwYatRU9myJBxAP3LcJFjPzVwHFacWH2+GThtelLdCqoWSaF2NKTgd5LdYVvi

RTa7TLpiHKBgK0D4jIMxCk6mxN5LwVN2zGQtZ9VbrJYXhNKxL5Pqp6CWx542J2XwcWOlATFtuCDlQC3w

i2YpwzzwA5mlHB3kbIApH87qzU4tZBpmKDLgX8Jjbx
C8M1nkgyDpKT7IGOA2N3hjuxNnZQSQWsPiXMLeD7bgoZclNVN6DPQeZl6NKSRlEU3Mi751FDVjF8LfvC

JqfyNpFJYxZCIFQsPyYf8IYxKqJT1guf2HKpJnCRTzVaaRTnQwTGqhChrugEXeQK2FjAJ9SYIoP70tYN

ScTNLyP6TpGYIhHaF6PJ8FTA0kxYhhWIV8DXS9Sc0T
WqKsw8WrHDUmBMhUiwg5UDwEMwCKaWataM/o5HQ89c66wYPKTOZMQfWoxtNlOWCawGSSBSNVVE0Oclij

oXqk5bv9KTGbsVHawy3eRYIJASyvDQMphEJDJNS3/zb6919xEnoB5/v+53+zl3q2iyj8wjkqNm0353Am

5fOCGZsORQen29rP54x1tiyfPLZeSCVndxrjqPn9EI
9DJYxtojgrr8nsRqEYFLc980Iptyy/zBAwwz1RTZLawdGKpgBtvWrS2Fld4Z6tvOsR3s2V4mPdI0RAaR

KaS6MMTHA+5vzOGCbdETPWAw5vlZ5Ew3jKxpoM7H5ucZEX5wOQluE5Elc/5nYZF5c264Q+ALdifervL1

o4Y/qPM/US5T9FiNWagW9HJ49lteCUeJXrVZ0Byu31
Fj83hLXB5Kq2G+9+fBV8pT7zfSIeZwOqolMjja+RBCZJG7a3P7xGF5F17tLMsxxdxhEZR4+SUgO9vKvM

A0i75L+DCZJ5Y5YFFZxeNByNeN1YisBiUKlkHjcQ04hXE9W5oFEPDrTjuG16M9dfV+wwPrUZtkML/BGC

DQHfmFVqM9tHFVDsmb4gGg5snp/MrijU5n2OsBokvE
gzlFU8JIQGdG/gWlgh/gmfWgFOyId+AQBGis7qMC1KgNGlLtnPEopQPtFZxXIyuU7m5ej1Nv/AdvGPWj

wGWMdr0Ug98t37V+9t1McX5YN2aiqhUzwrfEFuJuHay+TKoL1opXr0mIFWS1RUn8VzCImRd4faTNqZF2

LUPDAYwnWphZg3Ih2lvVYXYpsR57GP8rXMCmDmLkbQ
fGuAdP1kQKe2jCF86dtGilgwXEv9FHpkDlwcGNyr+H6v1LRlBcfdr+bnjM8TcBd5GHPjBVg/j0vpHmtc

hyxAnz87Ms1YB4xdgs+9g8Ap5HJ78UchY5Y7+U5XvHHdwGINF/kIGyCo7CG1aEYmlua3qqO7EHtHx7UC

vHY6igazBOwI/b0OS/+Wgcz2fURxAG+TNknHlZyO/Z
yNP7QUAdVgl++BN328qygdP2J29BuGPLTm9MJyH/Nm6KxsYVA3evJ7bLBdwA5kR+UdY5Gw7Uw1pr6ax1

gr9Q72r+1lB4V+mwKqwnWxPLkuOC4A0o/y4zJO2Hu2zCexllOaBrrQNS91/Xgh6WmFWOHM39G930NL+G

bbYQ+0z32B6O7KTirsrj+N6uHS8Ac2TmZwCJ+yDexp
6hB52DI/Y1+w95cP3ZoUrNbgUdjWS+Hvp7D4WIknlBy9NSqA111L29EjhOSJB9sTXeCTXF7gsYhbQgxu

8trso5WBlUsEnJ5j4WEwy/WJxXX4X/mw4lB/ZQHLmz8/1l7W/xtUzBjZqvJli1oL4beIeNKDcd3G3jdX

C9PxOw/Chf6b4Cp9N7Nk98XzGjfUhRE3B2PPC9bHDn
wKK4u78Zmb7c+wF7CX/sy+bgK7gVejzonvb1ZjWY4w21SDdvOopOYs3NU1DsqXAeLbggUXzDntMaaEm9

Dy7qWsBvDZuQF2MOejbBD2Vg+tQBAyKn3QSVWuIcNZ3MRdGydB3Zb5bdmR/HyqIdEbL6WJ1J9gR6WcTi

Ajkdvam7ta2Dvd93G3R2Ug5YgWppM3l8nAx9Fk1Kvg
NocPewmfBH0VjIflEXP5LmoDaoEkGm3QrurC0UF3FbEpwJAXqi9I3CPpYQibvhTs7RnYV4MWW9S93eRj

HvNKzHg3Bj/vXUy5ccAn2xftQI7NjzStbfhjFmmHkxUT+FH5rWkDF/MjJKQXiei8TaiCZMWcju18ZoIP

B+S44fM4CZegNZQWdwjkD/SpOSKH0kYTBrU+FDUQhV
DBXz3Rc+B6mC/2KQ0iLG3T5wHgtZulGgdhc+FLeBJnRSf5YqVMyWSvCXOl1UIGawFBehrfrpoVMlH2ww

2sQbxf+B24Rm2ENRMXNjDhixlhGV1ylnMNh+VDZToNZ6IYKLnyG4fWh7F08KYgyrvfnmdvhFvzGrx8WZ

8CfSAJWvn3zA40NZ39F4xfKfu60sbnx5nfZa0N2nbU
VqUr1Cbf0+hfCFrVXuYWBdgfTClSAs9Xil01mFHR0xWbe5IxKCwjGA2SEqkRomPNJ+TmzY27gQTsK4Ev

kVFN4yw8Rd3z0CIEm04M0tK6xcvv2myyt69glAtmm/z+GkF97Z5oPnvzoYyi2KTaC2R7DSqR++ACGA4H

8C2/uMyYux9O6RCM2IGTAzld+46WC6NuxWvbrKtxOp
vDkgDEYZvyryzRgGq9s+43DnvFzedLpHeit3CcgvimFNFqBAaf3hYicHE77CHAWsuMSCgmH+RDDkRxdp

uutEoVXZmNtAvfLGweCIdqjUBmNZvAuqifaglmiy8nHOQ25fuaMeLNUc8kqginXtMFSkbqn9iuwhNgEu

UXCcYMHUbQMTd9DwHN6PaMxdfTCLRoRdvviRHZkjQ0
9R6KI9h9ttUtbtPzOE4Xy9Jwp5Z4EzCnEQuuLFfVrjaSSNUkiNuZEi3YMAvL4oY2TS9xSkggnQYIZxl2

eWxKhDAJv0rUM0mt0GlLPZl5an2CF96N7mNfmmqshoasTEesv/46LSnXqa+dlwPziSbH0z6aDgEyR3cq

UJqviadjNunDq68HIZ3SDTWGzGFbdw8vumk5twtOUw
IJzdpJys1v6+y0KeTpKYqjPzahGehhDZZwFGuzHi2Rv/sNF/3RyxO1XIIbX/z+BroiBSnvMq2cnfEVB9

Yh9iC+QyjCfvx+it9PhE/KQMiJLST7aUdKtgY4DC9jLNItwKjRp6bB6GgZ7oVF2IR0umRF8vC+xKtb+A

BDHwgfYNP+dDAObzF8Bka3PNIYKjBFrSNQfiETd/DO
7p77UVrM75tsF+5H41PhAJe5N6j0G/rQranCjhPHvhKY3tJ6/tfP78ARUlLSoFo6lvefxQbXCEGrzRXb

0oT2RdKj0nQQW2hMjPY36PEbgreIYcSpKCrVSaULEXI7ulGqJfJ0D7i4E/sFUOi+igZQVNgt/HEPS0WB

NW4YoCcK05VcWr7hfSVlkdR91xR+9IShuW5kZsZ+LP
y+irRR1vy4bL7Wx6F0dkUCQzDuxiDfQhcFTsN/68yoDwvZCa+eoCjFrqZHkV7Zz3kaHI+lKK9BOPmzV5

SzxjJBd4blTojWesT588GMZzC74LaxbhObrPhG4LW51JOLqqmrFUehbS6JE+zowP7Iq9ajiE5ZfqhuQC

+jmrLID1OTl60zu0Z42RAk2NvEJi7rqb76TAgi7/EL
I7dPlhe9zzKxo39fdYpgbzouBW4cwVQnqPUR8njKUG4YMp8zhVUm4YkOQCiKZofGmdaKaTMaaqANJuje

0yRixXGIOh4pik+wepIahQv5DzLFVcOYT47UuSFy1hXZtSwgvXpzXBnbT/bU9aQQ5qYY16Nd03Ln3EWz

RcFFIhfMBSxUI6svluO9j6Ca5Q16Nxop0MaUR9jZl+
FF+CHc9OK5xZy3LqseMkickpvcSxcwJcJjDaWemLscP594v3zb7tLZh6uUhBgvKWYyoohWZyEEo2xisb

En8R7Tqk6IFUSL+CXDKU/IS+R6Bj62N5h4t7D1g0pnTG7L8OKYBJeDp4vooEGWyc8x/ldeI0f9PWHglH

gNsDY7zhUde/8Nbiit33q1V8gom68NjVBuNgrPHbeX
1WtVdkQ8PVEgHvYYWYEDcn8TEfmJklC9UAlMmtirbhWdBzddS2DfAIlpzAiV/jgMLrUz5L8Qytyt7K9E

ZbNPmsQaASQB2zhVNOfRG44lCEp48Tm411M64/6c1OnKmQvhWLbZhJ/tL7znS56ytkSSEqtmcDG9CKE2

VMKDVXYZw7uWeL6dA70Yi/YuB6GTd1GQ6ndgGEejKb
FlFaytYE4c0kwhMCyIvvclnKTpOSKkfIfiMoRoOUq0Q2hCn1XgdAdvZpnIpPvNSe1Bb8s1XjOoZZed1N

ezdlvbJXWcX1ZjmR/kLDFUMOW0TEqziHUf+8OHXA6yNXOHybsp2wyrpK6f5Ug6yR4c8g4VITA8W50XH9

BndoQmpcLAMIpnxmh6hrAw6eM7IpzubfWEUa3Lx5EH
2U2+OEX8Z6UdAxvsYx3vaDy2EBOVv9IUyy13laWSE24vHc91hv49RfpNd5aOkrn/qQbcDq7PHJN7hJoZ

IUmOq48VxD5E1cVJUz4i/jVME0r91Fg3Z3fzuQr9H3mQg0dzwYhjsGx/SJ3jiW48ZeraS5aEWMcbqr7+

PV8RPo/5ColgPqsVini+JntG12SZ3pxweMSVqZ2TuD
xSEYCPjH2hiJbyitwEZP2AAi6LnXITnB0MX00Bg4asgkWZAVaaRO/C6XonRZHje3qlS6Tuq9NwmwRBv3

xGAffYB4Hhom094tk8F483a/bU9O8tI9036jsKKTXbDWZ0cqCaSUCuni2eVdjo3DGFoOyEghyUewWAv8

2nDy8S6nBz8mlaIBdkRKCeqsOZwZJbqcS8+gOC4OKO
Kg3GxYPHHdaVrC6KlIQIUK1U2nflIEBnk+a9GOiZ2NsZFWyMAkrcfxx4Uo7pCfW0OSJIm+rqRXUNSQzh

jCLc5AGVwstoi/8HOFuhWai65Jpi8Xr7/FXzPE199/dSA98XgNYril7wk2r9YYk1VnpKjMpcl3341TqF

YaohymI7aHc9IL+72J5bm7PUh2drdi0QE6uxxusoGM
tmXV2eSaoi1oSCgoS1v9sOK34q1tWajKSZI2DMgTmpOc+ugLro4BtpZNSQw1ZuKiR21+n3Cy2sa45KEY

K0W7KgTuKSEu6gVf9GqfIK/1XHDoTM0YhmfMkYCXECnxOHGrprEzvlJtWZlddvMpnJGvOuRADicbmHBH

XJvQRuZDmy73YV02DyzXHulYlC5JflMuIowMwDqX6T
/DPhsAE7cyARg2nmTlLEOwpuWBNOtq2fNetalH6VgJxPmKOLTkoB8KQTdSXNMzyyM5oxvWQ5GZnEtW3M

g9kmBihD3Ayfq4RvKCl+eyr7mwbyFX5BOvAsdv0kak2nfRwOHCa2xzPr8SKTbv15wf5VSXhGKsjfwCsS

dqhLADROjFlLdCuegjQqsZG9Fr3y3sP2ZbqGnVvzq7
WxIimk2vuFh7QeQ1FMO90rY/oh9hHMc+4F5wSg13+mIM318cg4m2g5FoK7cm09I34wFOA7/TysdwdWvg

rfFpUdSKZzlwb94W9nklc42lMb/sZQouUJeT6doz33k6Xfu08kUGf/wmfHbwdR+6Sjs5tp8D1ZeYnV77

dSIX2UX2QLfzrsY+bG7sQ+Ew+PpZT5fxyjy/Vh85VY
oa5amYJFfs8YW6WB9yZkwEmdHfjhjHPux0UNfQ87gsBXLF93NGoHdgLfA+QdOBo/WcsWp9WSAzYsuoKN

0E0MVyYEq7iSHCmP+pqiRKhsDAOYINF88juBWZO7Rg7kZkyTbBRXNfu8joqXITXzuaynZMrTDKyBCnq9

EmNCVxwTDD/WHO9lNeAFNkjAE8lFf6dL29bv+xaHgw
f+5iN01gUAoAAByFWUYSWqPxpoX59qb8IGfrrl/UDDFx4+asUkionQzuST3vHYejyGEbbvwMAd7wKo1K

eP/PVT01QhbRNU+xKRfDciTxEl9gGFzV9RJZ0aWAHPnr8QuaM9fo6ml1K0tT2ktz5n4h64v7Sq+wMPZD

2cpWdCefhL6yqCJqnAISZuQK9f0LpjZxgRlO/pO5kf
m0oYFktqn9Bq0gIQ3AmkPhqb5lUKbfHVxwqhhJu2KGS1DI3+TBpaXmepj8+5pR1e3huhnP0cg/OnuLdX

fLgUfu6CFxQNPG0sdykXcUOXor8qYdhlftucerg+3NUL6F0GzS5Rculf3Ggad4C13HqGqf3ehwTID/we

esKW6hOheGclA69QaatNydVyC10fh9luFiDN3OejPw
eMgWMZKrfZ+v1T9oRYNXZFsyoVBkXMBOX9j+qw9yVRbw2LKMnkrX0uIpbbaZTojYhRcksvWY+gMcx4kh

QFEnZKLZRoyDF+MRgGW89laeHEX3FsCKELZfX7cqKMFV/dSmoBbeu6PkklbLHbRL3nrDQjKlSBg8wu9Z

Gd2hG9+xd35k05F+PLUrswFx2YMrrqHtYAmNV1Jhap
4a+aaSEMp0D2AK5Kk6C4PFAcr5wqAU7o39Zyli3XeJv8ynX9ciay+T9YELOlKzWBh0fvqsNAAXK7OrZU

p0ZXEbVcvoSQKYWsDbkzCB7ceTy0XE8L6MMThHbFTwoMLOMv7iGT5mJXPasDYcwQKwNhZZSKXKWhZtge

/SwLodyCwkipGD8tSfMVhfHdBF+0IsMSUn5cWHFTIP
SEYt1bVDcKGXYHwDy+NTKhCFZVm8EEwtqYURxJ5R3j2zhTkcajEm+KR9rxwNpgvYKbWOFy9mjv1NqXKe

3mvxAJXvDSwikVC+bOebQ45/qJgzetGHXdKL/EqUCAYUh557s1s1QXbi4TqE63r2TZy7dmvRk8iSAiDZ

5Vjbm297lmcWopNDqlLEZ/UAfnwWu6vUZ6ILCqLt2n
77Nv5RXcX0YbHBP7hw2+zdBiY82tenZrXezZtrmch/g4/HnG5/7vgt+EP8/MF4YltId4wdZTyWaefqZi

cthbtlWKnH7OiKMfxwHIYSyq/8DBhcL350FFo8RkM3oDPCfLTYvqe4qW3TU67hIeBdUniI0B8crkwNGP

zjfkvmAQVnr5589bWDLaE0G6smxoPNQT069pqaXsxz
iA21hoFY3rskyV3+zHcmqpf5iEGRRxi1yc4XKI0PWoQWu7K/weykHMiszidRGLYvxbzvkY+HZFNw0Ql9

Xq3rrZpWvA+wgr4gmTa38DR7v5LZbu4mrBjyk0tIsRD2DSweWuUx6iTMFY3iXxPoLbvBAdK4LQgas5f4

kQq1bYD8Lu9ZBfh2TtG8ws8+AfIBhABKyfFZ9mVPmI
GtCY4D9xfgLwvXkjdE4DUfCmLI2FOG4ii1VDSD/1qCKOFM+a9dK1+y6d9+7740W6bW2JKUYrJB2nqAaN

BmxemfI1If2gfeBCbtTy9BBS6+brv3/dadrcCg0xzUG3A+R9TuTCbmVxKfKPRnQVFMaAP6goxkXmokPF

hknuHNuueHeOhAJASuAsFy980UK+o1lSd1/vcPdIP9
/KoR7Cmz4q7JLG7e6UQkOkd3QkfLcKaoFBVKppAXsJPUnHQBbEnYf1yTh7T/I4qRbKGXSybaYLhl8vbC

OTQUrvOBdiicUamn2GXHC5zQmVo630+GpMLRGIOiU3DSe1qaPpmC6W4RRRKtyPEfPV1nFhxsyyeuiGns

uIPk+xDneodO4z6M9TI8g3FNkq72rNahGBKGWZBpzx
OBSKPMXgZ0sBO3IMSxX4ilgIEGUlRVaeOIOwSJNpGDKuysUrOIDCyLNwlPNHMbB2xwwJPIqoH8UdLHmQ

ewhlVkNN++GdadbxtA5VE5rZhDLrCKlXUumtbSnSp9/DHbRioGwdVblE48wWo9l2s91xsS+jjrfAet1d

17qurc1jOaioXKtG7pL1yNOKtQcAPDeDVJe384ZTja
yxzTvmsHtWDpjzJHJJ/49a5azSLtbCff7WXBFqON+5vch/ay0IW8KlgWBL4aA+8oJqe0//wEfhi472pA

5Chwl0B1veA9X1Wf+CviXnlKwpX1+d4pei3um8dHG6mX5rJbBhKnEhd19No2DfUcrt+QclB/O+LfiuxB

fKMIkwlcjT8wyCieHrJyqfGYbskDPzmOtGbwHOv3B1
ioPHDo6bsCrPlbqRoDmivFIWwITUF5MAhlpZJgEashHFvF+DfTQM5TFcJ5ClK7qioVi0XWsymEGZVbJb

lyrBAL+SlSAGFpgzXUbVEhHUrJOzQZTEm5H45OdJx5JbtBTEnzG/cxIJ7YD4FO8gCbO2jDGi4s29j09V

QONchu0HngByZcHowDcf7zcwerjJfGKaqWzIP5Idni
NJ4+tPta0Ltq/ZM3SKF0YPygkU6WJeA0KolQOZ+o1y2xDFuXyVATI7z5KmPGoICqT2tZ8Lovgb8NSl9J

PS6+x2RDQY2BvtmP0QcSLpD9YikwfGf9gexpeTM246pliO69/62A9TRk79c8UAZljnVCfbHwULxz5mci

B0E89t1PH3dL/P+6WAcr1o9uvhb6691GAYgfwypMey
0jQ2ar1tYQSUVLEMFXDFkiP/dhLLGORosLd5RZHQmeangO3mF6ZCkTFq1ntqBMfgZ9uWPzkEOKQcqEDR

UQOJOlf0Q0fzuiGXPTIXAo+1lF/6GGUCKkA57YGAeqBQrzjdxl6VDmKB1QRS/0jGGIZsZGrR/B40PrBA

COQ6boabKE3XdpNsCRO1xKypFuPW0AoUqJ7Tr4/kDn
7CsqBfVU614eUck1RH7wlwMjspEwDUwFOIJWIXoJjOGy7FmWdS0aAuYAAwV3JdPu+dgOCiNaa6IH1tu0

iGTKUjJ/ZOSNs3xBnB35WlJhTXwgu3NLll5MTg9qeioHnrnTPE8ApHamAqtvoJQSjHUg5DQm3VIjX9Gg

o6sKo3MuECptOJ1Diy78a//n2Gfn9oyup7ftxh4zgy
3b6TNM+YWXJ3q4g22SyhfHh5cLzWSNZzEyHi2Vn8/NaoBN3DprdqiGg75Lt8f95YH0NAJHXFcHVcRirn

Q0K1VDxIJAtUXgBRPTVQCBUppUCXT4HDkfVnw6YdtMutq74fj6RstZMBkd21klyz/oM49UlXVTwwNH21

Bthp0ogwBubIceVhzuGzPHQm2ckGOCJjIpGDhGKIbt
hFMkgPS+dxJwvN2t5xzuOtMQSqRn7FdHGHUGRhEPNCHxJvDMxlDBMfROpXY6P2oF3oNvKxsbOyCH4cpE

RLLMIerJDQoxuRfJUM3Pvci+O/5DfLtn1CpBWqS2pD9S2mGmlzugPxC7LeSzjcvbNwoIMtf4jBf3bIBg

CfKpuagWBRA7MZkFoUXDvswGuu7URSjQOVWY8GW4Cj
uAWwQ9cIEduGYIpbr3TlOUQBAIlcWP4FqRdk0ZG+L+S2u7cNjLg6vl/6bOAJB6yFvwbDpfQ8Tkvoq0fn

io9YqQ84nBqlj6yF8LyhC8+uRnv7sIikHtduC8vc2rKD/8u8RsaOlKesh6b+g2YN/AVq7P11ZFvzmu6c

q449kWuiykDMDaOUZXrBHBpt0CCU7tGzii3kRsQpV1
I1cFJWF+GZDvrlmAoSf65f3uqfA0T/k6lXD8241s9Z5ydmgrw80kv1w3kHPSwP69o6HbsNMbwEJUAEA3

MdxLRO1zqKqaJHPWTZGGTBvOltlocfkjjQLe9a9Tu1vu+Inhzg0LGawqIorykTDyfpHyLaX8BHEfrkRq

7EiXxyF8/P2amuallASHDlbw6LsTWNgOVIkRmiOVQf
OMO0KNzwwQospDXur3xU0k6LN8AHHaSXxPVaSGRl1SbeyWI5d75lOsir7znreEfp+9csJV8B3ijYdP/U

fyk/AHR4YjpxszVoATaEviBL3LJzRgP/ravaLYHMMf5d91p4NGVZx5gpJ9cEaUoXwSwmZrebply87c3X

y4lbEDaluYbeIp7IZ9JfrqlpCXF17erC97qMSUx3hx
pbqLEOu01qLpkHgLpIkuDUu9InT9sleX+A8x/wGQ8+vuc8pbPhBVAWPz9/8c4Eofh9fKG5DzDfyZFrzV

yHcDZ1vxOEsPTWCS+uhhea7oIki7IA9m1ZhBySrjuQT7jqfeANqkDLZPTDca9gJbpO19nYbnNvJJcPMF

TeKUqW5jvylyOsdrTI+3DgDyTL0o1j79OYVgGaPM7c
u8U1TOP9Y7FduxEAHwi6vU9J+TZgh1uQVW68RFkc0k+L86Gmq3jliEwdou+Cprb7MsjOUz8zTkWG/74l

W8/CLcf1DcFWEIRO8GZII1yrIFKRxFYDI2XMG1u+6XjkWnnHzy+fqk5B4zkM1Bt0eeTZmj1tcbJ4C4nL

1nFJV+KoINrl4zynSD0zTuNRQbFtdf9ec8O5gvPr0z
WpcBcXz1tgbCb4yqIYyKqdGoiYITrik+JAUDd4Dz4NGeMuNZmCbnunGTIJUZ1CdGS4xUNC8mJuHFNtqA

p36Q1EXH+kfUlhUtoVcvE1loPMpt9IS5XokZ1AY+TWVKvuuSP3ncv7mVM9mVwLjFkEkyw2OpoJduO8Zn

qH2kohWRl4741PcJWKJofQeyKtCaJvv9UwqYArfmz4
KyVM+n1euuUMs4KOHCvmj7cOSEuYbt7KrFueiNBlsDRrrH1lPR6UVCBLFN96sW3pXT7T0BgGvWvB2maG

UGb+SMFO7rfsch8DZ1s/DX4ek/lIU3QL8LOzuHarX2CBNYpUNIs3GbTTUhAG2ts0BjI2HiaCsNMWlZ2p

DM1chltwOoZ9TshClL60jqNCG8qfROQIM+D+G64Pvd
x/7WBb50ZvOhbphT5At/SQKQVD+onOPOaSHWukYAjbWVoKHsjlNnCjUreOCvH4m4JmVYC7TXwklFe7pY

3y12toeVMgZ4IreTuekCW4PBta0svQREK6gIX0TXgEQGZhzkDbh2sL3RwqXmk4/dUCeulEIg8wbdrJ4h

DizDRKQ1bOdM5EaZbX6zG2GeB2Rg/Roj9xeW0XW2L4
41QmOJvd25BM5HCruuRlMjn4LpI+hzeb+ZyZplFvehR+cSqELYMSrwex4LWw/LN5a6Z3A2Hi2FpZ1d0g

8/xLZ9tYadRguXmPrImsWHgrZi5w3EFGPr3HkY84VKd1ENP73/N4+35Q0uWUyne/R/w81fiiC9zzErON

CwZZpKhrz3mP46y2MhBs7NfDBkAxxcveqEUE8no1gt
aHtJAUs/hd/gKH518XCElqNasFpmDj6BKyg15SGAXmgfKA4mq+EF//3esJx42Cihswbr/OZZU9n+egYe

IrPztf+JJ6H36a9QilB6iKRJufra85zWH3Wn6DSchRZhjX8BFKvNe2GmXKmoaXVEPxwYtCiYIzrVdhPm

GEBh1Encg8eet0/tZLwke796Fz/iRDlrYlfOmLyVFB
x4BGIIAcYi2k8GnBn09mdvrP0z8dAh14fCNQXQ0jlOWMlnRqKdLtuzdlukkXEkk4rkhVsHqeEErqqJrg

rHr00o+nKCNXE5euto91fZQY1p0dHh8bIND8+lNx2sXE+c92NfaTiOIChrqzMfFUC4AZN9ghX/Q6GbR3

sO8nLP9dHXyKx/jLJ9nyQKuzpIQhYEetfb4POz8tT+
0Qw8CfnfwgiJJf3II4ccHkv8HPGAiIDvPztS71WOYaNSsQJh0neCenBD+cIE+mctm6TOxbx4Dm6DRXG0

KrGFogW6in3L/xV/iXiaKPqz5es0uUwvyrqoQw9y7inLtmMJ+Rephvz/YTS8XhMGRXW9ps446RawNqwy

vB6eXGQUKPzygtc0B5x7qFbeWVqQ2UQOgHSdaNbJrQ
eoqFFV2VZ72R+mfYgbYBqFPD3f2QH7WgPJiLbqQDmLalxNXUh8obngAUmZ2o2I1HUPqqNDlyxMynmbRR

CLouGpBn8qWvIiPALxwoaZmy2nLWB+XMoRJQY8Sno0EY4QFvjScWXd3MRq+/8gUEScHtv52+eYPZ9z1l

Wh1lJdWpy9PCSv95qGTz3qv/+IDDSz8456fk6MktgK
58Inx95tu0oHU+eMObU3gftm0khb9wMMTcbZzyHKVqXIK1ROxFPy9yNfNAmIi7f29irfbNX8zhozGRWN

8RrnmG92hlWMCop73T+5P/49DHWX/KPeA/kLs/psUCBVLcE8/vYgH4HEiBydB5Kcu/i7HwoIxvPuVMWc

GNqIpo0XtvsJPoAKFUJykbChua7JgsWoaLXa8NwBJw
iY2pkMVciuEPwe4CSbrH8OOdubtrxd12TATbM1IK1LmUnp4xUPLK5CyfULTaIUylxRSoipptLFiJfL2y

CbhSHYG4tRNLIHrLHGUjrguPS8PFiffbnhsW9NClRJ/K/RpGm8to1wJ+TnPjpL5oxpqW8jPHoj6sHRUX

9qba6kUEqmbEUfUfZ/WK0gzLaYKd/AC7P3p4XwntsE
t6660qjqj165IVf7y2qhjyt0vl6/KP5oJnGkjLz740hnio0oeSNm8hvW8m7xDf35rjcwKlM7//mXhhaM

dccT/zamqa4T5ZNEEoUdUV+knoeAfgXl9kjAmPeV2k+pt1VkIhflJ9ABeL2PCW95XwDBrHtsmbsNhlbx

/3hfKV4+rHx5WH63swAeh0BICNuY4WvljnvxbNts8T
X4W/eU2OiKHH3f+T9BVA1e8NiYKOChIgwZRRrcPE9z6J4cFyE9554N5hpqDMgekretc2jL8v2m6iC8tE

5eNs2n85d0YCY6XPZX7ePFm83HbcDZ2a2Dyx5KRpaKdGODH5m+Ep+7snqQeK6uyKUwWQDg5lqCq2l1xE

5EiPid7pRvtd2Xa6aeSvkHsA+VgAveXTi9oI04Ld9r
l4lk/ekTjlu2lxiwy+W/ina15e/Qh91wHPcpm0+bJLH+xCZ8p1cKYmhMe+4/rttcn4SZw18+0ET097fJ

oGq50EKX8FJ2lMDom0zF1jWaoWSLOJPZn7HJfyNwqKy6TppEuirCHozETd98HEaBeIGWKeiI2qgMwj59

hFumT46D45T0GmhY7Z2yUVjRJxq9DodyzeaYUH1hwu
cTqC66K97St9vrqG5Xyvu9hvrh2dDn0do1dYk5U4lOccAU4euKr8pIV7ykm540hr400e138/n/P9uVLx

I6mC1o6oPNyfbGlu1uMim6M7EC1GLfhoxeMwTSpTTdRDdbvzbJIBA3b7CDQunGRmVkeHraSVHaO6DkD2

zSYswFgjj3Td/ar3xkXwLxeq1jijP5fcKb2e6PyJXv
V/VQ4HJSRi8T9A+EvSHGkLMExzOvSBAwWToU4qCcIWeFlnlcSAh69kkwXWu2pgnpY9xQyMtazNRpc7NN

cgq9mLXM2OaoQkaFZz8S32Ab04zn3som+6i72DKx3qnbx2oslp0yYxylZUDcNFDmASQRTcwbQiIxQJkN

UqFtig72x+2KyTwrPdq08AosRtKeAxBXb5cFKcCkme
1+Pr7Tyn8REf8Y8z9l1XrvcUx4wnkCh3tOP+MnBMJyWb5utF+Cc3hjWC8DO5oPFGgrXxE368U6i0/qjH

3r0ffIbJlFtjsCgJznvnCn+4U/xP87C8JSAHcG4OjYHJm6mYOUlZIuowmUctDTEKD/zTkcH7FJntihSL

6Gk9WhY+qDDfrKS7/uq2FcVPD3QRrs3yQSdydNlf4T
jAMEqZNpTHnLqdkfuKxRBM9aQkkWetPqvHqBJAmA57JOSV5oxvAeJjmxRSjIY7lJesqB+Z+FRV6tME+p

BC6VOBST+CiYqyNOCJQ6SsluSwL+FJCYTsTH0dMMDq7eCnPmW6Ybaqn+d/hdpSuk3F4RdUZzW7D4lMIw

9+5TIN1dLBwUpf8acqWXOY7QnZDzAlYkrIlIHcAzWm
J/wOUzADRfaChSXYvXBqv2pZ8/C2Ooonlp+TOKI6TM7leab11KqpayakxBi3NqgT9koPpJsBmGVlNdGK

6LGO1tWzRltCG4qNMgo/7rrrIEXeks+01KOTYfumJcNAznlxFDobO6RHPwyhRJQK1ZO/bfQFcwXd/Cqu

3eq+3fidc6xO2jTHbgV5fVX9b/irfzvZ2+XQYrj2zF
Cga/YNu+6tm/hP1VqsW1cK8r7ut+PMKcAikoOHvCinHQs2RkOsO4LgweodSYWUzjVWNfTaAuFii+c+g1

MrASlsQSWyyySl0rp3jb3pQvkrWtWxGqrBBeurPlWMbko7OFCYhpVxM+Rt2odXSH066uuaKHpr1Q12oM

tC9iiUUa8533YhwvPAoXzaEklnDhBx1ELvHNgyw3po
5RA5v2mwRF+ZUSz9y8nFonyKwMJwK2MDrYoB38c5MD/qModeZCoqS2YuA9S3QNB8r4gBr2Gr09N5bniD

VjZB2UVbFEgdUcIzQb1NIx3HWqXZN82F6ZD6KORCgX54iSPr68vbUg3yj+AI4khfi1nJ8y5mrQgikTkA

Tjmk5yBPR77un8fxjIv8MhSl72wbNYD65vZJUjNvTi
iEENqllz6YTbu2TfYCJgsLABvICbLNdfAGVAlQyQQT75WGnosDwf2WHFMZfZEN6gqpGF3AX299Jj30RK

3ItWIwuZIO+w0ibAQFNobkV5RzFTvs59Kjbpxf4AnuNksMGxHbDwoRp3KNPjQgdMxMVpVuCrztJOk9FR

Kfo7QGPcdRH0h60o6YaWjlfqyAIZvaqnq+eXcUue9i
7drjM25NxK9Du+R+RKTGR54v06tkdhG1QzlRpNSmSY5D7+6JWETrQGNXNbywLxL4XA67C4bw9Nm3qXRU

23uKYOSgqjbjrej3CMKo5ayZh4h5s23x2789inoeaXkoaNz0BFEECwFp8a57do7kdvcpQwbIrb/gP5MA

kJ/VPttFw29Bvu8xknejE2+QypAdVMKJ4L35sR5kLh
qbNVRcimVY5wFi1QL+k0z5UPoXCK6DLWmiVqAyge3RWPfrWKvvqpKqAVdYwZQmi3IcaOfUMrR3cp9rkP

Gxc2mN5Gnh0CxfgWgaX/oiaqka7opk14KtFw/Mm5+O11ZORlUDapbsnUFU6/MZPQuJR3H3hGGnUZMylw

tv3cvhLJG/5doAj6j4CxaRuedEwvNVA8zzQk4AX6wL
25YfN1EqtIaA4kjjaKTMih1G6RbeCE7W2o/YtxECRsrekjZVq63lUUs5m9ko4+94K77kDCxpQcRP1fa8

mU4Z39ikXlqqDWr0ejh9z5mvm7tU+82t3/3m8a/AQQm5a7zQcuX86dSZVjpbYJYiiRDvpOQxEAFo2O75

z8StpVwFv9Sp6jPcSnTLvIWRpIjBjel0OU8cV0SpJs
IdaRlhH+Ka7r4hK083bukyhPd37TzWwzM30PXx4cXf7beFHP9sqBsMxzQE4ob4o/i6+s+KEk2MUXah19

R7HU+xDcIG+7HV58XypwD0Y+t43I1fPils+0vfEeWYNWJXqMUOfvUo+lNxVc3bz4SqBGPyo4otC2oupo

HDeUr9xRJaDeNcfdP1A5M3Gs9PUQsHMeyIP5TF5z4R
vCYmvB1pwVSGq42uTnZj1u8ioSsriqiGfwBarOUl7yRT6o2dT/pO5p8W3YKZX/yXnfCLsoySQZWtNpsF

L7U5o7aC5jbMqjh+HZMCXJsx1N5Ff15TDtM2Bh9tQn1zcGhheOzmTv/M0zLvTlKpt/vcwKTUgYb1Bs2I

sGlMCI8og+OpmzAcDFKHOJMNpPXzk12xdFkSlzisrw
QIj9OdTmh/lfLFEcbGHjn6IoRJIksKQMg42EXdjX7HRzH9M6eZ+TxXBDKz58bNgVEwsnZHQfexeJXXMt

/+ywq3Y/lD1W7jo/g5yD5vuB+aLBO1kkRM+FozQ2pLGGRlMf4n3bT3rTp5WXPXP6C8Yrw9EGDmGfDiL3

auzvxc0OJvZgiP+3H+529Pqg0HQ4zWeDL38ue3UDXu
j6rYbShK0b2h5EtmI9ywy92Qrfr/ixrTb/yeUj8iYLv9G4kBRKByDip2By0F6XlFs1QmcnFCIhHJQ0Wl

59k1f00uCCO678ptbJYg3dndreTC6B8j4W9vgha9yvi/N1teBMqMrhM9pm20gAIm8HXRTe5ggjxQHrED

6Ni54+xhhaFg3Z/4LPpe4b8MuG57+xAP2H3JOd/rws
Hb369PKer+/oGw/Jpx49GsoEPN/8BW8MVrH7OgoUH8TUHW5MCfdrvfXeHoYktsWcvtaubHfxNajDpjVy

iv51lehBmdVM/a4hX7zvjamwrQdMkos8aFyw78no35h7upRxx5mzpmMvDeh2KEy2Trd8WW8nGju37dF4

r7ogGy5Gd1NSz4pY7ooy87mrDbZ/tfsss8C2v2nJ60
P/K0miEHI5wcLV/CHEGpBmFERRHKzQ4L1DLrTFP6ru+ID+Y8BUYnsTmNGOOQ5vlXdJoNQouNhpBZFvL1

Uk56UMoDl873eWbEL0SpHE/KD0uBZpLIP5JDOYihYWiktWN1OD7NMxTVojVeskZrZCtnF/MDg76ugKj5

ayzXymR9Puz3mMJLhoVpnaIvTLn3UdD0Jw7y8BWe5z
V8+c7q0GUOaakk9MHK956jl74hFSdM0V+kLIVKaezE4Ap0l9TEQr98wkAuJ76wnzVnPfujy1fJ/W3PqX

dlR7sNHES14wS4OAZWQixCpn47XKVoIktaijpd+gI6FBuFm1C3Di7uxTwFGtuoabJYp94/faxLpLyo+c

0ja5UeL/7xs3hFFctf+GlIt/6Wgigc9H+r1gJkEiHM
eR1jxW+Nk4AZp7jnRNfuv3Hw0crKiZHqdR/aXNB2xF5x6s4vEUUs9htd7dlo6GPC1GrkbamPDLkLzyhz

du9VfJ0+wf1dDQixeE4nfLohM+4az/p9Z3R0V+ihme5wOI4aJ2vdyM70daRASazco1+2iQlnAnkkVtqt

mmGI6FV8AYceQ2k3qZHl8Ysw1mfhjk0nK/MORhdR8Z
vqM+oz5/beHdjCpQC6lBqP42c/yRtseSg7l/ibmrtYk961nl0HP0d5d/9r25f+H5ztnp/8WiTXlxFyuf

vIZBx4mJNPVXqrlh8e2IG8gTpD6HYuKcIXeaKR4+7yzSlzuRl8H7KE+Wx+uGmelOxG3Mu/3W7LoJ/Y2B

VsPIWdwPS6vnlrOWiA8roi7HKQu9heJESe7EK6bgAn
Lp/uc0D2a6hVGqfNouDa6maWvPVfD7XXVHLKPHOXRN/ugq4hFhT0UUz5FEVIrvI2ZkhdE2dp7XUAa+RA

1YKxNfZwhPOnMH4JbAP+JqtbUg+REK+v0uiQYaCK0cF4Wv8mVWOVjIOvKNcz+bjnTg0xC4a7Mksnl4D9

LEn3t2jdti+oXpobypWRTHB1yL/AU5d9H2/5XWIx5N
KUoUJHs/xaf5/wvvQIi0Db63jgs9/j+jU2Gf0kZ/Sy8Uz7yeeqdce2uPDL4110Ifq3pNirg/yfEn4dO2

7uXz+p5vMMwbsDC53KQTtrNdKfc5CWwLhoIMCDdcoJfJoY/XXB3JI1vmfXzWxLA0lDChyCzVsASKOm3I

N7Mwdcd+wfrR8Bkk1pLkS5ZUKb3DE7QSKj30CVz5lq
PDW8GG+JvPhM1sjbVet6uraIO9e+DWtfmij+k/fNLhjAE0NP8y4zPdfuWxOL0ZoFHqQnckNILOqfA9Hw

SaWfA+Gi1Gg1XupFo0NEn9O5s4SPTETQdP6/abQ7hqBS6WH/Z3lh2cv/BARctMIli/HDFi+mw2V+Vf9R

4PLfR+PnlffsIUo/mw3Asm4nTb4YOJ9SmiOX+LNb9H
ajSHWAd2eygXYUqLhg6xDrmb9ZInubybLDwaNDRUrHe2KgnJscyqmg4IVATf8WsD3VDYp4XBgPXxBgAX

Zi8mv7Wg1LMEunkredIcTjTPpw34ybUF6o+CZKhAtNg35Ib4POdVnPPDrarA6BDGIS4vBco1EDdU2ER5

A9GwUoRgEYFE0Uimpcd87DqCB9MlSKYWdsw8Gytvfu
i6V6TpPVuzx/xCvyztFLE6EpIIVQTadOlQxBENzjAgXV3Vi9srZb9gvH46D4fegxFmGzG0IyWXp/Fp8r

yAn5XA6XbcIQ6X4LKZCh3WmFD712ICjUfH1p8zdKvQjj5P0F52ObS7b2UnD3xniX0svaLBRHlP2Q4PkS

jjEia9UcXvC4Xg2VmZnJWGnRSdwxFVhb45dbs6lkLA
A/pCN8H9eKcuQRaN5p4Al0e+ILeCcFdEtnJPynSdSu5W2Dttx8z5oX406PH2Iq9ZnvyGKSqcztD6G91y

APYZkHOT/CeSlFoelvppXpdCbBTy4Jgy2x+6t0KnZO9uuNy3s+pr86h8+EPvtEwHyKCTJdrkh1Bt/TuL

K4nqoGndZjWQCyhoIM4539vq9y05ObUjZHId3N5WMK
weo6501vUcST7zQK82qNdKxCyvMKFRlsPObN07jBIzOXmPNoEXVYF9B1TntO7MlnEd7qjHhmn3wD8VlH

s/b6LXEFV47wlclrMM53zlGf56ZduYskkHzR93WXBWinYGVFJZa/z+d8/y29J/UZjlVfo76DRkFHNlcP

q6dJ3b6lPSWtPT1lg+S0RywutIwdO2ZoF+Y25ci9Fd
xak/Qg1tfyIHjWRPl8wl7Xhi4J6fgdOFZ9YviFDdi6TJjhznQeCCSOkClByy4rt3fC4/B9MG+D2L4Ig7

EUUQ0jfIshl5pCUyD3eF0vib+5CftzH/wa+3SxtE/Il/CvFx4xrBQLo54pASpJb3mHsTO3/P7TJlu5/z

x3znVcCRhWgcyO3bgjCm5A17wN1kZ8QtxZiRnQ65wr
EnHuuymoH1oFOwYU6PNEqyQYLysoQLpvp3EW08IRjs4zVnBGyK0Gl9Sl7KInq/Y+a4RGYR+sVDNhurAW

QOwPHwnL5tHfrLdHFbs5qVETekXQBT1+Urtx4i8jkvBBnB23XoMNqkYVD4CMAiIPskj5bAhx+gFlNGKl

wa/bjkf7YhgS1WrUX2M4wS9cikzC+HUpcz1G2l0gx0
hDDtj2g6xdHEuiviBzzZnqkebIvR04dHS2dGTubaqhjWa2F1GfrO7j4g2YIEtomaCIKzz6qfTrP0Ygwe

PaoZYdISN3LGeNgbWYbxG7KBo0q6jA8SNkt8wRanLRizw1wMol4+MadyyaoVzhsyq3Td8V730fuDv3uG

EHsT+h7rEspyjs08Dh+U2mwFFRY1zKBYR+3VEUPQI8
XNeKkCq1uXYZkDJ6+CHaC/LyYGq2jaxRe6Wln/Q41HnSIU3+Th7ErvWNtZNE0nwAASSH94ScgtC6OIqD

CzoQaF9EBTqi99QMKjNt384GqVn3D6Mq5qMhF7CCsFSYeuE5QJ1b/JkE+Iz+eJz3BX8ebRHiYeoLLFPJ

tKYooltBZeJsaba6lx5ID+VTBRRSoYkKcKX5uk9OeN
geo6xiO87W1+B3cvGx253my5Yj/4WfzMoP20yZqcVY7Yb3qCmLnkC96Xa+6aLgJkggvwhljeQc3sDqRz

TDZE/vB5bqTEeC5fXbmRpeyLFD371T930N+dsN5+5N+HzUkNuAdd+N1qKcMxunWZXWwCRFlSG7bydCyX

pJKmC3ne/fQGK13Ga32J6uZ/bDCugM/8KGg2k3d982
JhDqCv7jEdPdOnB2deFYIFk3vHz7AheW0gNC0D4v8fmwuuuflx5KC8oczHf5Sh0YjOWWqnFHFRHn0C5n

0Aos4E4qjvOUpiG+WbRHOhl7nDYeDYPUrTmH532AflQsulilq43REzwS61/Cbc5zc8ClNKbjpoNHi2I6

wUp3QH3cNWB4MJsm76DN69beea5YYNhh8Jdz5N9Hf2
GbjUicqURuE7Q+/f0UJx8LzpQAxMm29ZYVtP47mfy7oveJkgQVBLFTdZuXVw9LgdCpNWaDaOt89KIUL7

kHPI+4DsPlSH+Q9EtN0GovRQ4oXzSPcDktSc5jwxqD/yFDHTX9dCbRzDV2tnbu78QhhNQtiY8trO7XYE

UwM6JncGS23N91yh3x8pgoVaWv2kUIno405wqXV8Vw
ei4+bbnMh8B+PQeFImj/OlOb/n4c3j0wFlXv59kOL9KZf7LDGtiXvPRx/sdt+67Dl7jG3Et875sM8AaV

Mth72p+S6uX0qFDcqygEzVsntYfO/XgFJ6lqjjIYfh29SmiC18EY2+fgucq30ApNE8JzQDVl0sD+BKkW

0xZJ20cOqY7KuPvheMoyGFcpBJuUYmT63XlxoYXF12
tdXESVcuuJNYpL7gvlMk+RSlCLcyzzHmsDvDyJYIhbwSQQ3c4Cxzxc/dqT+eX0V48fjz42Nrzxh+3rLr

0F/GOHX15OUt1fhHXokhh8gHikVbeV1+VJmxvdfqnM/g4E108xaIhUx/y/QDcQ3GTLJ8B4vCOJRooJ+9

A+OY9+74FPtiZlqqgoCU4WPYpwXPLw1eGJ0lYkQ7Wp
RPprgOM/YPgSTN+kSrOdaPqp2IZbHLcsVMvqdgjlP3/0lyQRdeX8636d61eIh+F/IFCfH/8Y6e+RrsP8

X+NaZdN5k39/qWkUS7P5zLOpFZ7BZmSY8bESCDfApSQ07Al3SBWDUldK/p/T068//wPPTfqWbDvFiC8P

g6nsPd1qxk1vDXy2EfEz/oAMYG8JNuO0BipEk8R7it
r05kPl8qfzsxJpRGW6JOeOk0FClxKDstU/c/lGtZk9tv4YdTpVy4Ndiss+WsqX3Ocbn8PbbobaN4OV+b

deeu9Fmk0hJWgR3QYrVNye6/wklGhvoexxI3//kNsopmmACl5qkhY3OUYp4Z0m8FP3RJ7+pYX2dhCZ/m

LlIYL7whLxx2GF/q/j/62O/B/c0YeSH7NmH+kmzjIw
jJCui/9bnEl3/491+S/o6FQ9/b+Wf7lusDPkSVODkQuCzN7NzmSWgPE+Tni0l48e4c5/aGyQRlZh7Bgh

fjrvmfZMFmQlkTbv/doY5pE56yBle0QxvZ6n82sOim8deZrSV4EOSr1V4V+DUWcA1XEgfAg/xvIzVFie

sXmO25vGswaJCsbSOmS10t0e54mdDf8fjKpp8IvsJA
Yhi9ZZ4AsD4sk0J7QNpAhcU+2Hrog+SS0oO6QYHyj7vhM10rcnaHlpU42HcuCzUpHWjQwoCOJh3tuXsd

TpKnznG8ejdiNoVTieybsK7vI+bp2i64HdgQm2auQba1mRklUlg1voG43o3EOc6+sAt+rg+dcGvh9Fnd

46cPzVg8kDgt8TwcP7o1UV55UFtke16Xc5X+ut43tC
5hOt042Zu7n8ZUx1yO4eB5y9B4BdvT1u8X1XNM/dC4pgP2MRyVkE69ikWE9R7CA+V7OK/I4lGPlB6X3g

fb5Dd5hahpYKbTWGxv5wp4prLnpML4kN5f3jgDRh+fsNTt3TKiV5nd/oVYWqcIpTH2iyAXn3L+Ac8sek

1ms+SxrZjmW73xfN/ShdrG6Vq39AzZB3A2Nwz/d3am
3imm90fo1/LKnz0/97W7SmvI1t+2ed1XYZ57MptCQAs4/A84XYfm1Y/qg0dMwcESaz4gyK04kHC2JBrl

10tImdM8k2zoBTsX6zn36xh865wS/U8UfLVS6XniF8OG86aDnuaZiIUQx9T/M/wKZ5IgsjnABCGRcI2r

PIlWh4gx31xg7mcomQ9VU8DpUeQ8ntkWAbFWUccPJW
GhHzy3f2cHSSguF1WKsT3wZ1sw8WwKLVHIF+gJT4dzVmjGikT0BUMIa/xBUYL+HentIjObu9yf4D+DuS

q87T0L7Tcdr2ZI+hoGz2QpzsDlDNGlsNw8KNTTe9LifXhmxC2LSlAyioQ/5OjiNbZIzaiAsCUrq8b68I

FD93kdO7ss++j4Dj528gzi+m+6fhOMfraCsgjL
0ukpmSyVDtShlS2IR6Jn2cAsSp0aOU4DpNshYlwQ4QMZdiegu6v1ieOvbUy24HwcCCik098HKGIYu89p

aqlPaq1DYBDAq+U5DjCyQzaTF3yLFshAV43l2tkAM+p2nHL+D5ePs8XswqKoW5+P+lFg7YldZ5UJ0r22

RvRDowfcR/1Z5u3ohCbLB91w5hi2h1WXft3RtxsWQ0
eWpscLU9VgggHZ2/DqKbzYW5ubgOgzaCwQW+FyPfvQhc/5l39N9nufugMQe1urvIq5A+NLBcaz3lROQ1

hjSi+7fJgnY2TDAHWz5lniWws772x0AjW5S+fPuzSP+XDn0rwxBm56WP7J1VrTGcv/F+Zlc4Lbpp42qs

NV7PpS6H/GtmN6EPwAx6Mqv0/Ik5LYhHmyPkqsRiN1
6RwEuCh70+oHd+alq1SHulgFY/yoxpAKir/kpAaObDjBat4o22UCeh5sDpY8uE3B2Gmu6NXiFt2wX/hv

6lN5RNv4zb/HmGE3F2MzhpwLcct4Y/yNLLEJY/l1nVrFWB9YI01JIRl8bL32sEuqzbu98o4tEsKMfhva

4O3z5AXfaed+H2iwfz+FMvfb85hO3zo48aAxEKufst
L1odLe7+EIrSy0MdVExpb5RJ58hGThX+iipToVr+OYf103rSCbBQPuKCGGcSGlBOiocsFWAlK8lJX6Nb

txfGYJ/5XcyY5w2q4Av5T4C/Marisela/pH9bgHn1eaggyZVzFCb8dMPDmuR2lLtbjxE1o+ygaPBOHjM5rQaS

PeusDnhhU7zyMmY45Ty+l3U5lwaym800+r4b7T+pF0
L9tSPCd6b44OX26lQhyAF371x/UScaq3xnzP0v9/iogs1tUb5ucV+lHC1ntkEfpw4nFpCtVfVNaIR2nK

+GyWDz8y0MjaGv8Q8IZqTNPhTA+4kbH1OYeNtyitz79PSOsbI2aZAAUXvobLAUrel2DOKKDzdJDhUtMl

RCZJ+nHywyGjWZvn5HL9J9JROELKWdmyUui++LafXG
OYib824AV7Nbcc+dui6Hm0efL/oW22e0c25hghD1Jbg/D28IOuuEnkqdY5f/NvI/m+Kr+RJUV0rRFp4N

4zqYtmOsqm245u/BZ6T/oQ+Y7dAqfGeQFb99cZsvULCLct0aHBpfsTkrWBP1k0PpcC/3D0BRuvbQmcRc

ixOktx0XJoWTp/zSeZZ/B6kj37abS563Rpd7Il3Jxp
m7P780B3GTybrPc/8wtlmF65Ym1eDlN4R5recfxye9SZMoORLWeYIAer/O9/npbM6/QfIM16+GN47RBV

SVP5wKOckBlwoJoQPk/F+Ecjs+w5YM26J8r2Jpnn4S8zBUgqj8SdKTmmSQ9f0w0P0UhzsjhRELT0A6CS

tnHeprOrj9/2+AjZQtehMAn4EYdAJ/UFmRVYC7Zvak
3FRL9j3yT4f4fj/7Hc70tJAnrfuWiw+34/i/HZf/q/f+i19EdNHAi6M1ky69ocfrnAy+qYOfpdkIGQYU

7XojoJ6N5l2gVyP7bjJrnrGJfT+z+DyN2FJk4KFhcWPrKTMm/I1rgRGiwxQ3LVa/gBT05vaDlrCdv4xe

9xM/t6PbXp/xlceXJ0lbQkvTvK5CjeqoGBKaW2Ss6s
xcXe8lJb1z06iv8pW96czsLXfzg+KxTMu1J3hEnqAncmIll/Y3wYxioU2aHOilHS4q+1kvvKNKQk3LoK

AjYmGsqBNLrWaRf2oWuv0RyiduCyg+1NNPtMuUveKP+F6BQatKfob4+arSUPxZ1IAzTR2R31VmxJ2maZ

t4H16w8vVqok6bTNVVf4Ly2PIeTmdPX7SKEMvyb86D
0LkaBD+TQ+N3LuwYeu5Le32y99nv/2E7bNQpCbDb/cF7/TjFzeLlidoqRtNAUW9DvbdLutmae6eyhA42

5HVwDmzbEnlJvjZD0clqF+9VeezB0zqoXkO1sQJVmKd3jHlQagB982sfovC/QpQw/oNBMuu3Yyr2BUAP

OMUxs0c3tZv4aSobhDy+ffwZHQrMZH/gdc0U+2N5mE
/SdYPEPwACjBJWbX4Wb2dby2/Jr0D5+T0zj/YJViMqNGdyDHIA98K7ODrdc1iSxGR42wTe9rq5HW4xjY

8y1g+2igxBvK3WFqW380B2uYTbp9lz4tOpSe5fMgzhcL97cVzeL0kyk/CAXAulyhjUY8/AJaknl+g8MT

4NdQ46f/Y5d1AxVzr7AWdUXwl7rxDpwkiY/0+0iVwf
2v0uVG63NYXqHe3/ss31yrssO+Y9uKdAGZHXl1+a9c2pnZCw6hq8GiIU1KcAml9TI87IvLIjzFZjjbWt

SPpeidKZNuItwxYkG/ZC2Z3x87KpljJhgksUHL09R3+XauSvvhtBPssHU/af1hL+g75cKbO0lO4wu+hM

ZzPOdFbjlPh/zwiLcwkgRTc8gmnA15Y1357X5siTmO
NRr+wYDhc5BC805r1Dd61KEEG80z/T8jVaIUgS5wY1Tx3KuFWRdSikjLc/cqVbuE9/gN1yXRYk8e/75r

Jjvp95jC1t+ebILhVD/BxLAfwtQsz29No/jj3pJeamxKIYHOcIgeElHWw3sG5O4xTmtPbpRnjVloYkQG

vHASSLuF+aR4xiM0k2PJNXKmW/IBXTpVt9iFlc9YSf
w8d0aZURy7pbgRs5Y6mFPNLCJw+Vy9YSBElCwDceXNmXjkE4lzXeksdiNf662jUyEjALNG1XOYTzn+jr

MqmKPrxnmBtkKqH04HSLk7q6PFro1cmg80iA9jm6W3fOPldLjhW69exd1BmMoMGypq5Pc5jA8i8uHN8Z

+aa/qJ2HovP+t8/L1pF8s5Ksl86zlq0Xg1mcgTzRis
svG57Zl1jWgxuhJonCVD5na8M4TyUI7gbl1FIpk5q/zy4gqqJE+myXazSjnPeUwnWsNTcia8Lqwv1ysD

+KqWXE1AOPPZAjV+TgcraRjyFaEanHpY96YtGk2dghq61+W7B4depYPNebUhk2yn6ie29KBcv5WLH60F

zD6MgOoW+wNQ4pDPHcWkeWAZdf+FmoPojuxhmEgQZF
uGtNwBlgajv8awe+tA/PVN0EilYn7h8NT3uPg39hjnhi1hJt+N8nBvjs2XpGv8+qoDTpGszXlZ/vHS9d

WUHpg7Ht/TxVEn3JH1iKm1YWDeLH2XP4078/DU6Oq6rcerwJNaGP78EkONi8btadczHJGk7lsX/3+Lxd

aGymHrasbAh2bnL8MXCVLaJP96mmYfrk2rgXwNyrn/
cZIbi5Uzw56a47m7FVV6Y3DPzcMZgcH6nHrfXOqV7eziGhPJikCEPVmV6MxvWGkvPWCvMu0/Kj0aZHZT

1vw+lAbVpotMkA+7o9xt99Qix9dGwqG6DF15LNqgt34rd77zjALz9F2jpQDN7T02LLpO8m4gkAb7WWcJ

Zz7PZj86ephH0G/SIrg9vtqtI+VRw7oGvN8t+nAfV7
Muf9Z2DfALODGyKy1PRh7UF9N6hkcpKwlAmv3udyLu0yf7Uqjr082vhe1v23v7yWYEYRve8exDkxPQAa

t79mOARAShf4mQh/rkLxJD4E2Wkr567WttebNa2uk6sjtf/6rRqQd6XK56vZoKVaetxb8SSU5Cr48zSh

43MDd9YTINe/xduzo6dgTCZ5mH9OqbNfHzTEkpuzGj
fedrQl1+VgTNraowLzSh1dqUxAChGqYjWBtmG+Eb7vHFmiGBsB/r8KGxzj5T/DhiHKEJVGnGg+n8f/KW

i+/3g5ojmSMBl3WdI9VBYCowhV1I3IlAP9WFdD1bS6nnqJB3CUQdfh38cDrqteW49c88UtbOO6fPlo59

ZyGp6I4G/s09RYLozTrnJgueLc4tSN+TNYIRSjXC/G
tlkyWYEKbXLnfqpY8KzdCNykIoKONAqhSDp5TJlMpPvy00JUzzoVFO0NQtrIB0EI11779wWCo7t45tre

PW0q0q+Qrvilsy6/FE8/S5N+KgAX4AyXEk1/10Cl9NhY3y+g0Y5PY3QV3dR4IN+IXyDEFLDw9e/zvahy

oL+XDzAfsYRoejv/03P//+g68zmIkbMc1zjruW5gls
aNPLaGKesX92Uu46+D8Rz1N+AOYcDjvXTX4W/BKvVpsCofJM+isDUrAz37Z0VWK+aUr6j0Rnyff0+h/b

WQ+0oz+G+Xca4Ze44bQ+NCEWgKczRzYO210RDx8yUbyQm/ExziPj9DNMUnnJDsaWmOfkpX88kU3XbxrC

oNbLTXaX+zSSx2lgtVHH0TNBdM+h4IbAM/cz8SK9Ms
VIdpdnSKe6J0vFdeENFs/MAJLtbitq9vwEVe+bnL+6NdaQle64bMwoJMlIPUXyR/EajP/e8eUzitfL/j

TWhVo4gvOXjvx8Wx+Gc4c3/AGiS0es5RVH/jm9VQ2jNcht3G7d0n9Lk/Qvqcvl+kULzUl86gA7W0nlsL

6nv4Qk4e6z2uaLlO3vJueX8x+tsj+n6QbzJbA12lTF
1EG/FsRDodlBKm/oU6EfkEgC0hC6jULelAgINqqys5oSgR2Pd1WkNUyaiFvgelhdgv+Qcqr5sUAxS/Ok

MfaT+YmQ2bcQF+fbGl4tHPT5OHkTg0i/orqoOyCm4SpleByXff1xxJVyJQFCWQHP5jOwK7QwD/icGTzq

4kRETmhgvq4DD2tzoq6ywWOCXF993my3Xsi7p1Gd9p
LiosyM+OXZXbImcU5HXunCxK9Ye3gbkJBoyZ+f/cXrsMqvgBPS3Cmflg3eENGwqd8adsOvMeKLgUliZn

HKTDHgtBL0yjmgI0YFuAc87Ae0bPucmZXAlyVxO7wynxrvgbKyuFGqJe2qPwcxU620PA+vwDLU5hxeUf

mJeL5ys1qDL7VEJgZPaEELlLzgb58VaUj4QJnTB14B
1FQxjLbeQsxc08TfbpHWtWBjprmsV9JQ5EmXB7pevwxXCxyoUVHq2zZawLyJF8nPFHko2gRcZ21cGX0E

w08Q9Gj0zNHQIETQG41+KjrwMS5JwgaQLVCm1iGdugIc9TbTpsJ20YomkdhMT4aQgZGG7PyHTdce5R9S

4fyAMqyjakMY6PDnK+GQUAwi4OCfvMSl3IAJZ13aNv
odTcZ8JiljKpyJ5Wkr4YQHIjdD1SXx+CnTiC/l/6JmFF/aAydaBylLMx2Qfd67m/7+1F0+bFmqwF/Qvm

bW15WDmfn5GFoYbcw3iAMmX3zc+yPgWYM7oQnTuUe15GX89iWIxnHz+0qkrvvCSRrxA2zLdIm4eqNjsw

WBcyrPZQpNp8iQLVdGI2AO7ykyDBQNRNmxjH/6e66/
usBzgKw70uIdLLgFbWmUsi96CAp5vJFyOYCKFANw6diRpIlrTBCFsvEGfYXUBiJZNrhqoMXIyJv0OalT

i4XkWbKBCGsLT6Nq79g9YQBSQusl31+744ifm6dPp6nzErCrMnYD1bJEskCqTTCcgf+Gy9iIgh2XU8FX

15YKU74ZqejiCr+WHgSesNd2DCWmQYZ32Kaz8XA/1S
Ax/2MJ9wWnbhhKxJIczD2uzQP1/ft1EsG0jWkqBRg+9xsZr95caO16DzFX+LPNhCXv8nT+sONewOleZz

qzYPnvpDuOdbUAii33iRMKw4ewsH8GyXGokvLBbjsQrwEJkG65Krqa+epRgc9HACbFBTxxO3eBz+0Oei

hrkcc0J6SgXQc5ay+k/yBjWqMpxbdhzIutbEv5Imrh
IhhlMRVnBT7/2U6/2sC8h7Eb4A0xvwLmJCx1V8wTK9OO32G5SOyEudCxlyhahjVVr25X7wpEvqcep8FY

BhiDUdlFRdjfIPu9UTD3oMOlq/URMfpYFk6S4vvpte1dNtzZoWhCBzVY3Ar54VcttSi+yIvbY1ftW6aA

Zt4i8pNo95xXMaBpEFQuKqezy7j+1WifLT16gNTuxe
hehnIkWoQIlvPGXd60G5ckSFuhEDqaYWtlm6mP2BUWD3h6HBFtmSMOzpxmKhUTxtZaFKW9DCPbAjQxWU

XPDMTPb1DaYXmyqbrHuu7qRSWdR7aOFuAmSv2NGBiyuEh64wi1C6c/PSJq+zAACyiRdMgAB3Vp7JyoKU

dj22iB2oey55AYMgBJPMNQgqNJeYAZxKdnioadihAI
KqbBa04QoRkrcVC4fCRZrYCTnDaZvaM+9TCP5psxlZg9ZC5o+aZXxx2Xj64FY22IgOyCv44KGT/oDXmY

e5c9QEonGUwmtfloXdd3zhq1IH+ZN/mU1gBTAQ0hh+FnQA80pfvqKmr2iWa7FtThKX0n3R65XZih6mim

+XKoWCGwTWLhNYoE33w3UC897Pm5rqFtDBcfUss2k6
7VEdQL2VT1mwTtolSIr/JN9/OU8DLVwEQyYuUEL3maHseY9GYU5sm8JnNZkeMJQuCX3fxq6HXVA4IB1N

jCl0RYSuV0IuYULuHJQog3VhKS1KBG6raNpoNoh1Jc0TWlFir2IgRYLmEUwHvAOO5PoXSUXZ4VzA5tFV

M5xCE6B2SPDKFukZJus3EcW0olUWOllSqs0uXwqbby
013j1bBg3wgAxMpT915ji3GZsA2W4TStc1ykMF+kMwwGrYte7+5ttMGg7nv1gR3fB87p7lhyw8LEdeFy

4vz91jmdvrPJ0Q2vkzTyHOE05Oh/dOJNcQYcXemb6kcZ/f37STI1LtNXOv3Ayf4bp1z+6AZbev/VH0df

TAePqgOO++fpZ1oXXgg6Db/tyhAdZqm7tEFnLl/3uj
iFZBtyMtZfgxHT7YZhAnjk7sYxltfQAcA82ELbLqGGa3SoycS8LFRGe9WNoO4bbGdJ97N0fOJ2EhayIA

bZHquZTCZTKGXf2ghMoXtQENZTpQTy+BHe5RTVEQASBIAbaqhMfDBHmooOfYYdKnLYxxHCCTd6ZeRIFG

KtVtjGgR51tnbGyLW1OGXnMk0nOSH/F07pM+v/SeOY
Rw6s/9i7iKVmMW1+DyK/G9T+KnUs6ni/OsEJhckkMooWVzTN0CTvXhPD8kxr9BWgCxQPUsGpyAWoz7RM

ijAK8ApRExQ8ZawhYXFQXkixpdgO8eMOtnMK9Pv441VaCtDB3YDAKZAIFqHFAyGNyFRqIgE5HmJ3UvlX

Z7GESiD8HbOGCgK2u7VGubUA7AUQJdLB72GC0uELYF
SxJzZ9TdSClcCXZvHNgvEH7Gv874PuZyeAVvBAFkEeJcXHKwQDAzWZJ6GN4QODUzXFJfcYmlNM9zuSVv

XT0PjQNgBhMzMKhuPW9Nt212JnqqTDPoQaFuASQUOEg+Pa7UVQ8fa7MtEZfwUtUxST1bux2FUTfVFgNr

O7W5jWFjRh1bkY5QdYY0zTGdN7FKLSDotnIXyEYqDc
8PGTTmOp7rhI6OUGMNDHNhLSWpJRqrC9oPCT9YIBWZKBJtPMTvpzCexJyXBdZhX8RCGHP3p1CpwJmkUc

1eNPpaHiWdhFS4mmohYTFww6QyEL6JsgSyqbtgNkKmYFLvdmRhfPqgXX7DiGUeoPWhUA97ISQ+PiANCi

EgM5CedrSIPVOnisddiJ7aLFIqCCCxUj0ZHJQgSTpb
ZWKiEH0CQxRyHyg4EB0nMaOkLod6OOIoVBUfSpt3TFPgNfQrFup0KYXkNWDwNxy8KMI9CAPvYmXdZXO0

ZWGnKEO0CHW3CBThCWK9SCL3MxVsEBC3WBA3VLZoNKX1QWX1INQqMHN2XSE8FaMiEgAgUXY4LmYpXzAf

OSE6AOMcMGEsENQ5DEFfFjTrSIT2SVWzYMCpYOF8DS
YxBUL4KEW8KqLgEOZ8HAV7SlQmYBN8FAH7PEJoXUP7RJD5IANtTqDnZOI9CoXpPABiORW3VkRsIop3SF

R2FCHsJUP1TPA7WVZpIiTxIKR8ZJWfZYDrRJV7PwVxIVLrSJHzTeVbArJ9BY5pQMn8NFrhYIXuOQZhSE

GrSgIuHe3rVE3UEj5PDrLdMT8oik6BDzGpUZSrPdvD
Qha3ARawRK3VdHNaU3VeztZiL6FuwLirVR4LtSXsHT2SHAVyYq3ovC9XZJVRQIRjYEQeKYcrAF6jb6Ic

aqhdZVAisrQfwVfdAP0MQQLsOXYgT8LsOVKmvQUvzaLsXApbKrUyBCOiRCpfUZ8Bb0CdbRZpNWTaDjJn

MCBSDQo+Wi3BHF7og9QgFRpnOGQqUD3ufr4DApJ5AE
YzJlUgIGQ0CWPiHxzwIrD1QPU8PlF9ZRPxNKt4GGX3SqExALGfExBvHJStFjFuUUwxBCm2TOX1VMIbVq

DoVdk9XJV0GNH3WTNfITM8JXU5AfE8FUTrUQP4LQW3KyT7MLWdSTS1QZT5LzO0FQDoNpl6NSV0MTH3WM

AeWDm4UYGsKZh9HSO5WiVrZLG2NTQ8UkA4IwpoTzYk
HMdcTkQ9BugtItRcIBc4SBS7XcXaWxw0FXRhTYN3RxpzRFV3IKlqSvV2GqUgSsv7YNY0VvL2EyivLnGa

YCZ1FjL8XZCnSjOkHFB3AxC3RLDkBhG8SEO7QvR4CIBhOYxnAsfeHdf8CXP6RCQ2PnasJXE7FVBnQeD8

WISaMKI5XZDtLEI8FCGlNOT1LKU1BMA8HIWjEVG6KZ
DqNgVgBaNrAUKdLDItOuD0NaLbNUT0FBR1GaJ4KBWhUGM6LNTpRyJ3OHBsIfp5VSB3IlC1EKWmMrOhWB

ApJTNREfLsDHU0OBNoKjD7VMC0UYVtVqBiSLo5GTn6LAV6MCDfDcSuNMz9TXQ3CEVqVfBzOJs5DRM5CN

CxHhGwNLe7JDV7BRVwZcXzDAj5GDM8IPDlEjKhRNw9
NER2JRBnScWsIWp8IGU2GNIuBiCaAPe6MKR2RMFnUyXzLT4CJfEuEEi5ZLY6DZIwAmSuRNv6FIS1BFSf

SyYqLVj0FGG4IUCgLpe0FLKsOdM4SGKgSBU9REM2TtA4ZQQmJwSsUGQ8UhIwRsSzMdA8OBS5TYS9DJZv

MAk7AIBqVnM7BiddMOXqSRJjFTR1WMuzZhPpLPQaQm
YfGwLtOKa9QkDjXVU6KERrIrUdPsKtArJqYSW9NKX0TSDjMWG5KTybPRC5EsRrEmXdGFM9QxZ6HzbzQi

K4ARO6OoF9GymvTuH6GGHeYPJiXiGyNDQ0IhY4HbziRpS2VHI6OtYpAltnZnp5EFA6DNJpCxqtJtLxKL

nyStJ2HntwVTe9GabxGyl0AOw9BKA1IvksDQd6ZSq7
PKJ1KjVeMrBtOQnjNiW0BsUpEzZ5NTL2YmT1VKGfLSA5BSZ1DxI3CWSmXYT9CMU1OyY4NNNbIGc6MRXn

NDL7CQIpMMR1BNK9LuL1XMDmEyu5WMC6BTXiPbakKtg1FVV8HyLVAjY4ZkR9BLJlELY5QGD0NzY6VZUz

CTR3TVC2LVR5AMVhTVE8ZJB6WpN2SWItNSX5AZCuEJ
V5LOUpRPPvGH1kNNuoifSyBwsPEsM9WIKzl6YpCDy0WF6SFPFsYAdyWE9Ci608SZPzQ3NurMOlil8ASS

NmYk4hrD8noEAuDEDoNLodIBQgeKhoAJwmTA9Ae1YvfvVjRWY9Z6FifBodcRyfzAJ5VQSbKTDkZ7DosT

ZgAoVuGRmuYK3FpTZtsrB7Pa2AZXNyUf7adYWRh0yi
RpZfDNSjXpEzWDY7LHhwZH7RZeKlY0j5ADavF0TvD0nnOMIjC1TyfGYsJN6YLy3BNaWcCS3ukv0BEobn

ABPbYenYTps1DAxyKL9QpSKnS6BtuhQiU6KaoHqeRE7UszXpDCquGO4MPRDmVm7akQ5HbniueKjPkBMb

yHFbKJ6jd1RigoizI9irCT2waOKqK00rqG6aCOdmAU
4HqOFjxQPtPNIkSjRzOSKahPDlCWSbNkS1ZOpiQS9RqQK2fZNiFqTrENCRMZnaZO6Rk105GJFqM3QmcL

RvciAyNiAwIFINCj4+HUhcyfLxOpdOOdB7PEKwy5EjAFktWK1SXA4gs7RgYSenJXPpv0DbOVf6GV0HAE

MrHZTiQ0OtyKVsN5BNId9VUSl9R3lqUWfkPd4IzUAg
TAAvXHsaAZ6So595BPf8AA9LMHViZkBgLKJLUxVrNUQuVzSxBLdcKPPOMSgyNMBzE9XnMWN8TQGsZe4C

UYIrBU9FCsCqNdKdNPC+Bi6UWYXlOH7rniOlxNH3ZHQ+Cu5MZSZiYJx5W8N6GEScOXs1E3QQX9ZREFMt

CSnuPJuaOOTlIEr0L6V6FDGtU6DSO3Zouwsgfy4+IC
0OV77TPWJcHVw5K8K2lCKaX5L4qIyJpIH9RU4ESO9MwZl3cXCyeS1+LJ3KH6XJHaQnEXz0C1S3tFZcZ7

G2iMmFkBC5XC8IWQ2IdEJgQXYuvmHlYp5iI8ZODNdJGsBQIMJ6HT5VtEWdDC7AnMYRK1XwfLGxTq4eQV

rdsXWzvC4jUf1uUTryYS0AVmMUDOxRHJP4AO1PpIPs
QY4ZbKROW1WonKNfEh5eFOwuhPFbao3+SP6PKHSzCb5IVx7+EHjvobBlDxvOXpX8TGXba8BiOGa0UE8X

LP0puPswYVF3Is8AvLL2sKKbZ5eSAI9XiAJxK45oyEOjGVJdTj8WOdQ7nmIbwN8BHD85nCTbn1O1HXMp

I3xzEUktm93vKAatEQeVKV2hMVSVURskMLvhWWR2Jz
ZrfrioMEKtEr4EUaRnFUw8fV0udZX9PKM1PocewDWcOWmpDwFwJuNbZeN1pTfboax6PWjoHP6iETodtb

ptZXRhLyc+HZdbLGDgDEElHzfYYNApwP2newU4jpPfBMovvMGwQo7eu0q7KvfdAa3zYn5xUTg0VnPcYd

CtQUDfAh3sbF29LBenqkOlPa0WGlDtKUC2Y6JfZwuW
ADWOA+QBphAUsriAn3iBWrXGKtPd9JFHXpYLAeFWGmDZSyDEFzTUQeCHGhDYHgBTVqANRcKQTbDWDaYBOm

ICAgICAgICAgICAgICAgICAgICAgICAgICAgICAgICAgICAgICAgICAgICAgICAgICAgICAgICAgICAg

CU0ASPSmFKVmBUOqZVBsJRJyRZTtFPMrEIFrHTUwKV
AgICAgICAgICAgICAgICAgICAgICAgICAgICAgICAgICAgICAgICAgICAgICAgICAgICAgICAgICAgIC

ElKGTnNNXfIPVjIJ8DFZObGEFvCMGtWGLwKPYoUROwWTAoEOAePCZyRUIfPIKzEYNfXCDrLHMsJDCcJU

AgICAgICAgICAgICAgICAgICAgICAgICAgICAgICAg
XVZyJWRaKORaOXNvHFYbUXTtEMSmDB0KBXRaWEGcNDFvUNFiZMQhTALtROOxWKJlNRPtJOOoAQFsFXMz

ICAgICAgICAgICAgICAgICAgICAgICAgICAgICAgICAgICAgICAgICAgICAgICAgICAgICAgICAgICAg

AOPsHB2HAPKcLUUzHECmXTStTELgBAHhBFViPSNtLL
AgICAgICAgICAgICAgICAgICAgICAgICAgICAgICAgICAgICAgICAgICAgICAgICAgICAgICAgICAgIC

JnUAYhQEYvWALyPZFeDV5WCXBwCKTdGMAhPKSfPXGgFURwZDUiUVSgWPWrOJDqANYuZUKsTUKtEEJnRD

AgICAgICAgICAgICAgICAgICAgICAgICAgICAgICAg
NNMsFMLjHCRcMXIaQVWaDIWuZRVzBSWlOD5VHFEbCXDxGRQyIOLbVHWoFQKfODXuMXQvYLAcYNEsSBPi

ICAgICAgICAgICAgICAgICAgICAgICAgICAgICAgICAgICAgICAgICAgICAgICAgICAgICAgICAgICAg

FWJuSKFiWQ8SGQBfSDVvPMQiVZSlJWPgMWOlVEYmDL
AgICAgICAgICAgICAgICAgICAgICAgICAgICAgICAgICAgICAgICAgICAgICAgICAgICAgICAgICAgIC

XlKLKfHLJuZOMcGBEuXUDsIU8AZDXmWRPrXOHaUPGpARZiOPIjBBYfOTIqGVZbGPPhISRwUWJkRHScEJ

AgICAgICAgICAgICAgICAgICAgICAgICAgICAgICAg
GZYeIOKfXKSzENOwOXHzUEGvIFEvMUPhNFJoSU8BNSXtTNHrOFYwBXUpMLKcYIWfKVOsAARwEDGrWJUl

ICAgICAgICAgICAgICAgICAgICAgICAgICAgICAgICAgICAgICAgICAgICAgICAgICAgICAgICAgICAg

PNJpEVYxDLSvVP0XXU22yWNdx1W5WQMuYQ5qnia/Pg
2KGSdyjjLcoXCpGJ3MWrClCX2uvc2FHeJuJS3vma8UKFpCQjLxF7F9lCWnMGAlLLHSMyMgA64jBMmgCb

70IRwnUWNwNgGhOPe8Fv4EXpYiT0lmFGUnPbO3CCKxUoJ2FATqPrV0WEZeBnKpQKDiTLAkLKZaYFFXRJ

3RKeCfC2SkjA80IKWPYv1+DQplbmRvYmoNCjMwIDAg
y3HmOHy6VI5OCGKtJlqpt7EjIuWkEWPSCLwnGY9BNDH1PVUmUDGgAg0MBJVeI238olBmUF8ADk9OYlPt

HK4pvl4OGtXdCCMzCryAOlp5QKzgVW1GnQZlCSvLuz1djcDcneMKf7PncoUkdRWCxYIgIJwlLHLDBOPa

p86jjgTcQLZASWKnlZQ8NvVnJlGsJPKiXXrxBQVZNO
lMCvRsT7Yeb5DyIaE2YJUkXiAqHBlsEKLhEgS6LO72lWotKW5ZUECnTRHaUL31SBMjNQAuYo9GIj1ZEj

CaSP7afo9IGvRcWHSrVmfTFcx6VKdvQR4LgFJjS8EbdLYyz8xWPlZuX2TKQBA0KEAbAk9TQCZoTjVzVN

JmAYqaFI6qQTTaPFONuDvkbdI1HV6OTT3gheZrLL1E
QfVaZc2uQz2PGbNvX3EcZ5LpRMFbGYYGIXueNJ4WISjvRM5fPE4Yz7HVqBZknO5edp7EKHEkNOAuTusm

hw9OIlamQ4W1bGsvOJTuDbZuSAWPVBtmAR2JXOXsLSJ8WNHzPHInLEBRHsYbW43uKH3TZ8Jap44dTtY0

GOGhRxDjBQnhFK93vSugrsUqwIMtbHdgVH6CGo8+DQ
oroeRmEwvRAznuJZPKAmYjUuYWSfNdBRYnJGWeYRQvEqS3TnUiAl5HKJRbCPToAUSyRgHyLRMtQQQpSE

nyNGQjQND6JgX3KNSuPOLjVI6LOaCxHOBkDPKzTWTvNOJjDKBvgr5KYTZyUDAyZBV4LfQxRMHjRZFnZC

yiXDWuRBEiGwl2HDLhWPViVI9AUwIiDXVpWWC3Sbde
ASQgISEtbq7OLTHmRFFbZdX7UCUrCSKrKJXoOVyqHVOzCHJ6Lby2BXLfYVQfQI1ONxPtXLIcUSk3UDfi

KMNiMPAtut6POVPjXDQcLRY4FYGqWPJrZVIxWUdwQOIfRGTzREX6BIKiXSSuBL7KSkDaFJJiLHV0CeNf

QZKrVKZcun2NPEImHKPgPcFpZFXaZUZkXKQcSGyfIQ
FcITAfUxF3ALQeNKZoFJ2VKqKqPDJcCVM5KewfSMAfKWWmuv5RLDJjVXKpNZu7JXIgTKHeJRZuOXmsIG

FzYFS0MPR8OJUyCSJuJD7ENbClPYIdQLE8FPYpGWTuVCAdtj6EMDCoWJUrNuZbUHLpKSVaVSWaYMthWE

MfVEM3Aqd5KHRcAFOsGL8EHcJiSNZhByqjAEPxBRPx
YVJuvl2UPTGrOMOgEbW8AxBgCAOcWWFeKBosALApZKQ7Igz3QLNzAOMjZA5CZwEyJGVmTutnDfihLODy

XJTjnf3NUFWyPVZmQLPbNXKrDFQwVBXbNRlbOSFlYBQ1KxW0TUKpASAaEU3ZEtNvYMSbMzs5UWbvMYXm

JCPhfw7HPQQxFXEgCVPkVSBnYPNbJXElEXmpQRSeXL
JoGRMiPNAaUBRxLI8TFtGrOEMgHEA4NQPmOUUnBUAmho0NICYhLPX7NEW9DvFxWXHrFWFbVBfzOAKgKI

EaRcCuFLPaSQVqZD2DPfMtFHoyPKZCUui0UMyfJ5v4KJDrBr0DC5Ucr3BdEuOtGLIMJGtzYA0stbKsNM

PjWo7VG4wVQfx1SVk3QjE0VEY0LVHxRWJ6LMGlLIJc
XJKoQsFaKjBeXU2qJQmjZTZcAYu0LSYsOZI5GPT8SSUhGyFeG3VuWUZaBEQcRsOeJZ1QZc4YVhV2NNO7

lJTdUw5HDOG4HsQHPdDnPR6TRXz=









                    ID                  Date                Data Source

 

                    [id number]           2021 10:33:25 AM EDT [facility]









          Name      Value     Range     Interpretation Code Description Data Scarlett

rce(s) Supporting 

Document(s)

 

          Operative Note                                         [facility] 

IGOFBz6aHqVDHcVk27/GCWpfAXFlj3NbGRfcIJe7CKrgKONvA8VkRAH6mO1vWSM5RAhIToWfIeYdUYR0

lbm
MbOgmKHgCtUQOeOnlQNbTiDVdfJzphlDReRM3OaAH9IDGlG83iTLHkONAeU5CcZOBoDIu+Dl2RBWPegU

QiXF0RDowM0O1jQllMBd5mgo/IcRN7hcbA7QuGz83qeKsdAPzQGsGZ8HK8i13YtTiiu57z6z8io0S1+H

Mrl7wtEI6vuJwK1qt1NU2LShj8v0fYlqfn8WZ/6tcw
fwMpZL8bO9q3kerkSlf/Ha7DRV6LTFS5Xg8Qrw/o7F5nAnyIZfNi/xY5tU1wtMEjIocA/eGb/o+q/7Oy

TFnaSoABMfrGERmve9owqiLJFANMeMAPf7qV59suq0zAEuAZGhVF8ClqLo3s9tiBFuFcX4Agrc0Kz7KU

ggdc/StWCh4NXjhDsWJSkdpWNAWVand3YEiZ/PrjEm
984szzGfFiuRbecZTppC6ql9xrO2MVvV8MhE6ubnmtloSOli8kO+5TEetVa2Bsep+nrfCbG4P7rUO7l0

eIMvLrfQKNmOeLGQdalfGGdfKuTGElFgHelV4hynBtJRWvYiGJxQrt0+t0pFG5M9h3M8pSNa91NX6CJx

4yFU5PF8DRClnWdoRJg9gC8c2YC7AayuKfsT/JgLw1
4unRjiVA9h7A89Dzus3xq8gnozd7fw3CbdWwnOG5zNpVz9Bb8VmBrGKMquGXXt9j0fX67bgSuhR6WoIv

WW0lp8lG3ekm7WgrchuSTU8svl2FlOjxG4SF1kCKrnN5mUrm2yhHBPprH/F+Q4/O2MaWjNlNzXLqSsIV

lt9v3Nth3Or7v4TaHzL8ysVIpB46TWoKl+iTHHMznR
dOWzvw5W2C3QhZigK3v6C83qTV9RSlvPmfg9T3ZRAor+wT5BJcU2QFs+vE4YRgY1qo4KzSyGCvU7ZLqY

iekVy3cWSAcQDeJYF3BN6xxUZyC/epHVQZY6wTaaIfcbMEtXVtLPJ0jUEwY2SXCPPetrtRufrt9Ef9It

xVP5U7fs7O/ZH5+u22rvBHeKJ3CWcjbUp9D6w7X7KU
n8eWx3hRRlBhUsNvQz7Y615R0ym8Mvyw3jHgtWEDldu8Kb+Fzif6ALSLjOPAW6wHwBqaWc6fTHJ9NQIz

Lyo5/s35lkf7NNZ0OfQ5jHUdsbBIMpEGdXVUG7ttaPSmFVOqkzAPjgxWqo50sBH6aux0sjVLEoHNHl9W

lYq2tN7BWgCxYnZRnUYGZEd7nNWtMjQKzwdSJMM+cC
/kUschT4LmGCynVmHkHvBhC7sGhhxYJJ29nh/zm+ls3+TpUbomLw5c1Es4ZUwFX6B4QON5u39t2n4h6w

qtqKM/z8vihtCYsvEXfpZBVnW4RY5bjnPIPJ1i1dy+YmSW2y88+nC1VxkaC6IWygiUz1kUI3d4N7uOMm

v0jJ6fdlrDhoIHmQ/zt2HF4h9nzDN4S6opb04dFGz1
2dZ+aoHbrttyy8+Us4YjNcTPr9SyJ3ukdcwO+oO4Q2LVz7/E409I21EuK9/bwCqy3kcvz4o7sCaNJNfh

/I6cEHcKM0L0anmkJuPjX3sty2fizP89/c4Nt1Uy5Z0JXOl6jYe8o/Jtone3uumaPoKB5qzAZ0ZgJaI6

8jdFYCH76hN6YZCereLbHiJvo9jeiX+jmk8hyHHS1n
5NclRJiGfp7IO64JN1ebX+b/B7x5mcx4ej70SwhiCVpnpOwfF/L/6JVeYXrY29lOERK4pmndZLo3KqlF

rmfYsqAYaCXMEZct0goSYfVRwXi6EieMJHGAsJKQyRSsmpwVXin6MdOl+YoE0XrNjJsNsGDYymAx0h7J

kR85B7eXf1xSXvBUSxgyvK5Ov93tTpfbdhV9IUrP2X
PWYtDbVOgAMUYBauYleNUPiPJxMmeTB7OHpoEKdhRmPwuquKYQFZQI/XjCWhNpdd+kdhaKCfmU5BpMZr

m0dIaA8qxCdakdMnjuLjc11l3ViWbLn8QAZIHAIbCDZNJfXsIMJbby+aOsIIZfP5Nnn/XuFbyf9KMjq+

zb8LvMpPNWOD2NoLwVCnFWLcUA0UTKDw8WvwpXUezF
hLJMoD+BYndPwBLizvZ5xWVQnx0Voh2eAU7DlWlokSBRHm5fr5XJaEWiWFw1unI/NJDmnkNhv8mW5PZd

2hs3LfS162ihXCfaiJgl/8YXhFqM1oy5OyIsi8bVG4Rb0PnsWXiMjHNYGL9i7vHVoAwqVnJArnZnLGtM

7LXzWHfPXkS8GZq+Ov4zrPbxUz1XumyzJBp4uoTdjr
nCIZ5fhSqxjFdAcluTXp42B4Zms+mqt4X/GRpa0HP+zAylL2VSIGYKQhL2WJRTCIdLfXIoitZpxlCV71

afKxlsx9Dmx9GUeIx2pPqVI/VhDKBOoDoN5YC5q+r9Fqf+08oNn9GLb0dwaIWPr66/SSt5IwalyFSipd

3Ctg4jXt3jkZCKCKWKqSDA1avskHS8JkJm3lWnDij1
tqqP3+lI2y5rbtSgpzy2G8u/K8fXZ++qHd+jPcEweyxupWJmvp5qMYC+w3rJVQzMy1QW4RqwWGdRMX7V

HHZxWF2J2F+FON3UhVQQGyKeVzNLDT6a2BTMHUwXOq+GHO4RH65azRglM0b0+HBNVcpHnRle5fkZZLGv

25wossYNYX6R64+RRph57/spfw+XB80Ae5JSC0FTmt
n5+ewYs7p+rATVF69OUGeKP7WmP9UpBBMAA7Rigo4C0nDVRlozP2Ty2Hlw0aNc6outu0rp72pd9CUSWW

WYhi0HwfmBRQVUbr3bnb5f5djGj7ybNfaCZ5txbe5w22J2l97k0CYp6y0hycGKENr2igtx93n0N4HWB2

eA3MyXzsPns32pteXnN4jEt/TPyaHM208bdgOvH7iA
SUztcEbq6feBXKb7cLFOPDH7JcAUhTsSAY86E6iHcUbhS2Rog2A7WQtcq9cvg7ukZnKLZmYsRALgHQrG

u+6WJAVtbC3u/7dRgqximuGTDAXjlWlJC9asudL4u6QUGHTEwTyCh1W44bNuS3nr8z54Ft8raNMSy9Ff

Cr6JdE1y2QH7tNcYcGFpX+t6tpD0hB4Him01KNEBHh
4707xem5P+N52OpGScQ0o6VXSvo7yfPzCyNGGQ9+xJHwvCZIZuNagtqZ3Ny1iVvocYymY+oTUc1Z56bm

47LDt4wvVJdvkIdFUTOLc0ZVaOA0sxIG8w4DK4dAMjbCnEy9xB6Y52d6Sqc3oJQ6EPi0yN4VpsT1ivSG

j2W11OGJM568HqzUOY7aWasmGq51b+E3oHANX8Hgi7
tcA48MrxWVL2/CvclhdYqLhJYXtPIJ0iAiFxdyMR4rcqxHneBMdb4UK0rjUsaZSDbHlMB1MFaTUiB/NC

rpxIpVWB96Yuw2BjxoXAwVMxmqnlQ5vH8ZvzTY8U0D1uqYw67I4dX0O97wyv8UcZDXhb0Nu6Yj4eIRve

bGrysV6g1I4JkC5m9JctakAF0QNdu84oJO2naBh/rT
3pahXz0urhKVREHwYGO83DD/dmnTXyifi40CXqwHUgvQEf9I1FDaV5SGKbJltXls/cGmjgIlGcP4E/mB

nvi75Vzwu+yYWCyGsP5CtrSTv194MW7k5u/UgRGXsBseWq2Mpn+ricvTf342Knbsoo6oQv4STFNsJUlq

63gNGd8ul4xhtfTaRT9CfS1WGq1jJ35PciEGGnYoXK
aIpVqSUjv8fitcIalsfsY+1PxFVyMUS6CW1BkAHs6Jh0+Pp5hxZ18CMSpqEcvj/5ni7DGi633PX7CE2n

hbthovIxh+YmRw6fCw0utCW51tet7wfgWbWgJr5tXX4mAJIWZ6Ck4h/lU0w5cXKix4gH99suIr5JK9LD

sGUg2HffxqU2pyNokLAhu0sLqN2gh+t/R7k4E3p/Ib
Q7Sb+KZViAX1POz6SB3ggm7Tl/xlBm+NZgTp6ZuyFSj8TdBuRTTgGG6olZy2RCks1owCeAvjMVyFfKIa

//BUlVlmIbboIbwQHMuA9o1AlM+JGyLjymyyAA0NIJZlYXHO9PljhTNrpB95E8pX3cILP0UaIVuilB4d

KogGLAqa+mc3psZByt8twYXnVYjti5XqFRuMDIj7PJ
X5w7vhby8MIqtbtNM/4cX4FXBi6pj06X/ajxAtWx2fv50kySRf2DUVcCIaQKfs3eSQ1DxZidOIzFetiq

6R4qffJAGiAnRWzfq6L7Us/rYtWFTrGLthuzTreBGeSV0IFaLpNL1yur8PFUPxFZ9bok5SVJR9KY5ATA

AtLO0XcLOlE5OpE6EHOrPyGQEaKDXrCZ77YJDqEZVJ
LAucUQGiX8Gup513hxUjdpCaNWPlMk9DWTJeZR8FLWZeWYEneZBvLMBuHQKnIvU8DOPhYFmiVBOkF7Pz

mdAhexFcNGNaPMZFJCxbNYGwG1xgn5AuYDk5CM3HQC6BaiBet0MbaxEyE2koG1NHDE3DNNJbK0UUX2Cd

R4ltViAyj8MtZ5xxGwTjj8PsRt6KBiPuCe9UUcDhPJ
8gqy6YAlNqSH6qzg7KXDT4NX6OjPg0BJEqR9YfCBOcWVWbn4OcQX7LND1zzRwoEAO5PY4+IAyxSEY7mn

JnsZ0MUXSfM2ad50VVmtibb6iiAhMShPJlQdbvn19hBoc2wSgu3PTLSSiyXMbvifL/vpSoJd/f6AtOCO

e6jEeULzVm4c9vvkNwer5ZK7r5g9HLz4vDwSWR/0F9
+21rqhv9Fow0QEdlRbtKGwH50DwR4m6x5gND7Dx57JT15n5lo6+2u0+XIccmNbuEd9B6x3Tl93dQ3s/O

u96bu643n94D0eZh1nHzTwScYgK/OfHjrmp/27aCR3TxzgkkjqkrWls4omvRPmXax+4oqxi6JNtI3YOF

kwrqVYu+D1MOLbzyZXMMqwkNMFtU5RltIXUZhML1ur
XALTRlIdTueAkIWlB1JABhgKvB9fqSWNj1JtS/SRBb6IjFGXGPYIIQCPNRPepjBH25aTEEg+a8oOg5Ai

c3T18jTJZMFhX6nyxasGyLjYHf1TFOC94uQHCbjKkHyLsX20h62Ak6MrheL/WC1k+Vmdf+9IgBKqKsit

loWyWuKD81Diq8eErRIje5VLYWZOmsOhu4SuvRsv2w
9pGj6eLDHmAEf/mZQvN4g2N+UULBBJfuOBt3IjjejVrEYHxaXhakbhpEaniYIMSMff1E1qFNr5P4kHoz

vJqjP6FRyao7lcCdelTtH8jhoHKR94NZcp2texHM+dsQs5DAFr/yVW0M0ypqQYX9WtvOUljcNqVsM9cx

e9yA00ZYGJ1FvN5XN9erMfklT1kmm4FRc36DwSyth0
0enFukh1PjyJQ3DIlsKdVMPNsUPEQYFHs2IiiqS3qVlJrcNU1VUF1pqpr7fjFKiYTwHttKH40BEV4nrv

cxck3x3YA29IysYOGe6AYSWCYPERqKdrRVGYFdM4cNx74Y06ZkxddzAOs6Adpm+rtuJCZZcP4YUceayb

ggu9WgahxRsKa22QpUzxyOqCEgmsjppxTytlOxlmB9
eUZBXH0FZXcDZZfYHbWPjZTLBrJbwnoNHPYumVhdikbrrEGADRCHTb/UIyLo6Ezz2YajDo5K1eiM5DqQ

F70vzqzoZp9p6CpDWIR1yEsdt16yEE/mrpauIKm5fKFYMr8EaerDtPIJuCn4D1Q3w8RJi3pbeYCzhkQ+

1pqxoOlXZfJthyileT4tqhNswBqui5s6LvZFrjC3dS
hKX5CIni2zCDnKmiFU5zyvwMWUmbOB7V1dpCH8cBRqLbIWGR53Se1u6aPY6XpHqDctAMECUehJPBSWyH

f2670H5bmiuN+pqDLOue6OZ2pRPS+dLLu2kq9kbknKgoRLTFe4nbiejTBcMlAogQBBkRuqpwtwisFjaQ

mwGpGTkchr5M6Ksw4+FAfToLGvuCGizeAWe9iKkpVw
HlOTryEx1eZlgD2AO/FmSTtGLtpkAFWL3auVYwKsMF1uK1r1U8Yuu9x1rTCR/yJFnKYSoUABWtRikST5

iiRGnFhSaaM63t5AykGHMEGZW3Sx7vHhv983GG8TIYspe+tXw/zyx1rFVnUPiXcWiNqUoYGO63dqM3w/

L3WSuYcwrp81Y9CmV0jeAIDIJuowApOficcu+Z8RZP
W/GP0zCCeCWpEPvEq6rG3R6mbWGUgaCL434wmglulsvN7AH3/2rHghPspdYAeMlg7Wih21Tq9TXzK9gW

MRPNj48jGMRnIkMmz/KrG4tActKn2sy7xmCKMYSJ0MsbG8L6HOrhkhxVmsEmDZcd7a1CDWMB4yeGfMqQ

plJK4UhcG284iSJUN5Lm78knj140EHERRWy/xhNMum
3pSVieONje6+2w3tl709MJhvDy1nqSWfq2NQrNHotGgQUYHLAs8Mi6BUba1EAWw+ltAPJBS+dd5W3VG/

d2Eexz/HofzZTJzZuI/eu+l3go2FKTf049Et444SvHcSGL5OurkLLvcr6nrEdrsbHngEwZfpWZ287XHB

N7RAw48tkrECg4fagADZS/fWvbEhj5jMiNaztoKNf2
Pgh9AYmY5e8/64+dIqjJYzQiVjXCKXu34Jy/4C5zU8/e3o9yYAXDvbejCyjBEhLY2ZBqQdCF9ihn8TUc

TvLU7peq2SVED6NO1RPMKsDI8CuTWvY3GrL3ZYTsHlUFLfEMRzKB21DGCpMJRVPGwdIOVmJ0Myi383ry

QxwiFeGKDnZv9FIPSpHA8QLIEmQFUxeVWrHBZcXGZe
ArD5YQRhVVpfBLImL9TqlhOtxgUrEDIdTKAYGRjrGOIoJ7pap4YlNQy5HO5NOQ6ZdgUmd0OrddNwQ2wf

X0XOFJ0VBUHxG6CWM0NeU4gfWuVuf1CiY6miGjXln8ApRf1USwQqGn1TCjNqCP3igu5OSGIaHF2uxj1M

CMSbAOo4EVX8JHCaEbe4BUC6ONFwEHWzXPO5LMU9Mt
C2TGiyMqF8DHH8SLAjYuPpYiZeZBD3XUW5XQHnImc7AEWyPhZqGmzpVmc8DRL7InF5CPYpJSI7YLG7In

D5REBtQDL5SIS8ZkU5SPZmFUF2ICS1BcWaWwekAgf1CAQ3CKTGHvYpLGu3WLT9LMX9KXGgIDGpRQH1Xe

veXmY7LKddUvO2QjVzDiX1PSJsYPP2XgkbEaKpTQI4
EVF7CPRwEzH2RGH0VxI5TzEyAoEbYSp5OME7HobzZxj3HKygPoL6HtisBcLcGUveBsP2EcqoXVT8IGY5

PqR3ZlvzKhBiOV2CZIDgQagyGbd0BFT6KYY5LbmpRWW5ZWScQeT4WRIiPED1JSJzLGB4UNSqWFG3ODK2

HPF7PRKlCUS4WUJuMiEhJjUhCECcKFKyTwR4DnSjSK
B4IKD3XyT8GUHwAZB1SOLrNxC6TXPlSme1ICQ2ZbD1NEYaTyUzGSDvWKFLUjTeYAQoPHTsBSWmOsGvNm

UsRSSbSMS7UZIgNtTtEEt9ZLE0AUCiTdQyMFm9EFN0UVCbAxYgJXk0FTS6UGPeNwRlYXe7AQL9IOUlKm

DeAWl0VTX4EHNqRtZxQGi8NJD5GLEeXnVlJZk7UCB6
BLTuFjVwPAg1QEG6KZUiNOduDJk8SOR2HCIqIpToBDu7CHT4GGFrCkJwJXg7FUI4RYUvVaQfJPO4KGHa

UhKbMDN6OUY7QpG9UMPdKEQ1LRB5MES4SZZiIlUmFSobXdJyTrYgRSF6DYY3LXYrGoNkRmZ7TKO2BgE6

TJWoSSM4IWGwXeGcByScPhAxGO3EHTA3KpAoZNK8VK
RnAbSlCmEvRyNrPNW5FNS3YMIlEPG1PMxdEII5ViReArCcDDO3DgF8MqweEoX3OKV0ChR1DmtqGdF1PF

DlHBYsCzKqPFB2JdA8QlzgLlS3YJF7QeNnPqgxXww8QOD1OHBtZvoiCyDjPRppEkX4JzxeHDwlMPy0TN

G0EgltLwf9WKf1YEV4DBIxZaw5JGnnFwL6YxDhMlFw
ZTiyMvJ1BdpnLmK6ZPYwBIW4XXLaBLA2UCD9LkK1ZNBrNJZ8CAA1CaR4OWhwJIQcAIP8IxG8WZZoSSJ1

BPI9AyAbVtbjOqk6DTU9TGFhQkzeZPU3GQ5RKIK2VQNhEDJ7XKC2KmE8WGQiQFM8GHG1QnS4OFpoWaQr

ITO6NkU3EJSzRQX6DYR2XdK6SAOcAJS9HCKdQFGcQO
3CBT7mp3RpAPg7PPSvl3YcCEegICg6THrjNZNhZ8T8iWXoGp0ljPXxh4NesNL1j4IFFnTmZYCpUn9skS

7ygBZiIGPiYQhUFzChWLJcYFZiUC41TLfxER5UGABGPHmsdBMtEKN2W9Jpv3HtviTnAXFeEc8FIPPfXL

4ZmZEfgmCgXk7PYPAeRO1Hf847GnGkuUYeUCLnOwAq
MOBuUJFtJGM5RT2YYXMbXI7LrTFnpUCUgcfiHYTdN5X7EK1YXYXNUgZvVl5PWlPmDV5vcm4TKQUuHQBr

YhzIUgLaXMqIHvRgLZGyKVlmLL9Pe628H8J9PvH2lAXdRRU6XHM9oQPlLkLiQVLxccDoGDOvBZvtZL3y

p6AsuddbG8tlRK5keWRnQ71zeZ6dPNeoDIPcU3Uogv
L0T2hesbAzKP3YPQP9H3iflxLzGTNRIiWuBVFyD5nspRxzTPziBMXTYXfsOCFzL5AxvhTHWBRezbroxF

4vJXmhYYBPODyxLG9+EFijkdRiZamZZmRbTXQfa1LyFAveWKrpAuFyJBy9BPSeElnoRrYtDIP8OIN6YW

ZwPWX0FHc5ZPE4NuWiLdD1KJTyQwEyTqGsGhf1EPP3
GHKpNjjjDiVpBPQ0IUOxIzzoZEH3STA4RfG6DVJjSNP9IZN7WoP1FYDxTUG9EJY0DuP6POWbQDB1HIZs

IqOmHoSrQDy5YN4KVUJ8LGKuJYg7CBSeWXT8AtIrPpZrFIjsKwV5QhNfGwFsMGG9EbY8SAUwSlp4JTud

LcMiYcraRDU0IQnyExN4NVZlTGWsWMlcFjD4OlcfGc
S3RXs4PNR4EoFnByF9OXZfXNX6HuZoDdQ3GRu4JNI1GqkpTsY7XCGxHFFFGbRhCcCyMFL7SKPaNhMcHY

l6YBH8RmNoObPoPJO6CFSpTBO1ZAZgHtUaHMK8PeExYuJnIeIiSUSnNLQeXlpzIhx2OPT0JoIgNlijVT

d5AWCdAWY1TVRmPkUcFMXoVHVcHSqcLJS7VLGaKwX1
GWMwOKZ4VCo7DRL1LQLsLSjcTEF0BjH1MNPwGqf1PIV7FRYsUAztQOj2CRl5CAJ1RFFzYkSlHVg4YDQ4

ZLFyWdXtODt4YLJ1VSSqGiVmCYb1UAQ2APChHsQqDHh3XPL4TLRmWtVbOBr2IHV7AGKwJgSxFWy6WWR4

WTFuIhMzSFp1HQJ1TNIwEwFmAB3ASKY4GPPqDhVcHS
w3IRE8CQXeYzGlCMt1IJH7GYScJvAfRLt9IKMvVoskPvCxDIE3UuY5EHVyDAA1NFT6MsYaPDNsDKN6WE

ZsLiT3IjejTcgnDXL2VbX9GDLxUhEsVCvnGaV1PKFeRKUwMZN1OOQzHnRxQoXyQJKvEtGGKbFtAFo3TR

L2LsNnAhNtClZxWPWdHaDlWmMpXQK2XGdsZEA1RbMz
ZHY7JZJtWSY7LyLaCdFsWHqmDoY8AoQnBuLrHXpnUlMiWFWeBFjfZgK8NaadQuK2TJD5WnJ4PakuDlp1

MVX2ZVNmJgkpNca4JLywYeQ1RlPuJga7NI2XIWA1UkdjFxk2WOe7YYC7JrjuJDd2PFy7FNA1WsUjHfQh

FDmjLtI4IhLhGkT6IUS9PvI6ASFvXUX8YBG2NvE6AI
PbCQB6LIH8XsH9KTXkXWg8ORW0YxP8LQHlLER6KSX9UmQ5SLFsMna7CKX8XTTxQbolJdk3DUTwHSWWJq

NiIvCjHRShFME3DOLjMsLoMSAuCBN0FVKcTNK1NGMdCFR7SARuTkApUPWyYSC4YJIjIQB0FXZyRAI2WJ

LwHJZQJxClHW6jse6FFNIbMJYsNgvJYyYaGSdNImZo
RQRsECglHF6Cl596RQXiP3LrjUHgqd8JHDEeLA1Dq312SeRlYF8VfjbnxBnYv1ndQCuyZJMkL4NdZ5Zh

rTZ1NPPlH0QpTPGvU8k7ZSkyFN1KSUWpWZ36VB2sMVMHRxKsJWRmFhhoH7QzMwHBRcOrFIZhUu1arLYD

p1osYlZqEDCdOzSbJRSrBPoyWG6CDgJuGDCqGKKxvM
fuBL5ivPArHE5FbMNaDpLgCTrnEO6+NCqsqrJvExqFIgPwCYEun4MxVWsmAFo6JAnpYFIoR4P0hAGkYv

9bqK6JjWP9sILiZ9WonCFYlYPqD2Gek6QLl918F8NulFCjXRCmtVJtEH8im2LtepesM7whZM1zyMEuM5

5ekP3qVXplPPAuI7SnmaT1P0adcaArPX5VDGI5N1gh
hlQrWQYSWvYlEWClV5tebNtsQSYpGDJfLa6HSTQcVK4Fk829YMZpX2HmcVXbmwDzPcAzCNXIFeCvLb3S

SsKwGE5nfq0RYBYyREHvMkrYTvRnBnS7PADoXjBuYLF6NMUlTvReWFi2DFG7XyO5JTVlGCq2UNufDrLo

TbrlEvDlRACtLaEeWIivETu0MSH0URExEeQiXwv5EJ
U2UAX7YIDtVPD0VUJ7LpR1VIOmXDH1HBA7QhY9TENlJIN2FOD1PuO6BOAoExLbDHAvYnD1WFPoQTydBS

N8VWU0GTWrTrSzKFi7ULJ8FyPhIpFpDAfyRhB0BjWnYcB8EBLkTQP9BellNwOqNLB1DAV4VZUbQkNxXP

YrWZF0MrYiDzViWFg0EDN7DodqDuy1RTofKlP2Lecf
LjPoIJuxVvV6SlrsQYH4VRA7SoX0VdgdWyZjCV1SSJQdEtKvEok7EEHrEwQ6LHIiQVQ7QWHhGcX5FWSi

YjRnWUN8YsH5EZZgEOP2FOTpBtC2QCZmHpLtXQE7NWZgBohiRAT1BCT3SLQ3FFqaIdJrXAYjSWT1DPRp

GfZzEKT0AGX1UVKuCnJwIUNcIAY0POTdApz8JQX0Yz
TLFhJgFJI8OKOsZLYrNTnyBhtvOZD9GVW6WGFoOzPhBPq5LKK8GEBtUkPmDVg1FLO1FPEeJdKwKWf3FI

A6KPPrBbTcCKx0DOR0AOHcKpYaILk0SHG3FHXqPnYjHRu4FGA4MXUaReIaNPt4SGB5FYUlQsEdRYn5JO

L2RLFwMJaiXDa0GRV1JFXoCnCaQBd3KGO2QWPiLgLi
JOq2HIV2EYLaUiMsHSX4WXIlFxUrLYC0BFN8UbP1MHFdIGA6BTV7HTS8ZOJoKwTrZJcbXuNdOpAuAQH3

CPK8SBUrCnWoDdZ2DLH8ReV7HGZnZAY6MYXyXeZnSpJrWqXsXM3RWCA8ChRjZXR7UNFyYlTfNrWmGlRu

XMU1ZEZ5MIYzHQP3RZteKQJ6SbFsKoMfEXzlTwZ8My
ChYpUxMDupFmJ4VyXcZlDsJRMsRXSkTfJcHBA0AiB4SxxmCrP4SMI5XbWbCjefLkq2KEW1ZDTgMcelJt

SfNOmiLpS0PtjsXIzoETt2OAK7AyoyPap8JTt8GFB4DZGgWwy1MCqfSaH0SqYeLmLyQDxuVzG0IcmrRi

D3KSDcBGP5DPTnUBX5WRK3AyV8KAGwIPZ6JGU0UpL6
LVsiWXE8SKG4SqC8KUSkEMA3CQY2IaAxEahqPqo0RXM9RKKnPpiiRrUfGK7ROPE1QWIcWjBuMDYdOPS4

FFYjGwRrNDAbTRL7FArrWhClHDKoKEK0ZGJaFbOaLJRpDHJ2GBAxCjAnOZM9RfGrBT6JHU1ao8PyDKfe

HGSbPC9coa6XWWB8BA6CIVWpIB5FgUMrE7VqctUBOW
WmgfdkkW7qGFxoUZUfY8GhhjUCUH3kM4KsbKHcQOBbnRUZLoRgRPJfIPGvIV53PXvmGP2TMMXIOZipsE

EoMWO4J0Rbn8YvdiJdWOTmPt8FNLNkQM3CmDSfjzPfDi5GFITlRZ8Qc635QyCsyGKpMNOuVdWaQPZlCX

CfUHC7OA5OQJZrAS1SfQWetCSIiqimMKSzB9Y8SV1W
DTUAFtMnMf9LEuOoHY1wkl4SAJErMRNeHffTKpZzACyNNzPeXZCgGKtvPM3Cl499F0Q9DsO1oEJfYRK8

NSW4tWCwEjBlMVGfhzSxLUMdGBjwOi2lXV3HpmGfMDeoRt2MpN4QdkUoZG1xl9MzvfeVZuFmIQZsDrwm

q2QWaEQfMHVmB6qov4PJpUErTGG3XN3ATLCgIX4BdH
K0vCRmVANjKDTOKOrsWASlN1DjldFUGQYzlkhmxW8yEJP1SOBmQr0OYHS+Fr5EMP4ri4MlLRmzDqAvDA

3ngj9MFARlOMq4NSO5MHKbTaa9GLR6BCSuONMiLQO0EOF9TxH2PLwuSuG4FOA9RXYxRmGcXrGbNFN9GC

G5STTkRzi5OTEmPpTsEqikQqx7REQ2YeV1DEFiRXX7
UAD0BwQ2KASxRUC7GSI3FxI2LTOfEKH6CDA2BpHzZyuoJei0NUE8GWACJkRfSTm7LGP0CGS9WKXmPTVo

FZA7VixhFfW8LFwhAcD8QoYsEtE5RFLsZBV9JismBxHcRDS5CMY7FERtAzI4MYP3AoF0TkNzSlMyDKz6

MSE5BvfdVkx3MAckFdL1WdjeOkBzQOptWrW3QehrZH
T3OAS8JlH9MnwnHtNsJJ8DDHAgIacpThq4MUX5WIH0LperPJO8JQItRkI8OCLwHYI8TYPdDTK5TLUjNB

W8XVZ5GCU1XBMeMHL9NFVeHtKwPoTxNEYqQALwEqF1QfIdVRP3XFI2VaL8OHZwPOD7IHPoNbT0VZHxUx

j3SKU5NhQ2WYOuCgNwGARhWIUXLtClEZYfWURtZRIa
YvGfCvEhRKRhEXW7XPZbPxNxBJx9VLB0TXGrLvVxVEl7ROM6KSYvVnKbQOy0MTI7GWJbHnCaNJf9AAQ5

AADeFfCtEPa5ZOQ5VEUmVnDaDVx8XPL4XTFoXkHiAHs6GZO9SQAuHlQtNId8CAU1LZJsDDhcUCb4MJU8

BOGoApLfHUz3CJU2WMCySeKbQEr2HDB8FSVjUgGlKR
U8JETkMtUqQSV3WKV3HxZ5HYEhLUG9MVM3LAD2DLSeDeDhELqkPsHyJuFmUQR8EAP9RZVsFpIlGzH4WR

F4MgZ3NIEvJMY1DLBiTdAaLeKoOwKjIK3BAMT9RmXpYUQ3YCIoNgRmDuUuZmUmWUG4LCR5TVRkUEI7TH

qfXPD9GxLqXiXwYPA8EkY6XkntPqC6PMW6UcV0Jahz
TjP7RCOuSSCfOeHxJFR6KrC2VblfQcF1RUX7VrOgGcxeVtg3LWR6BCSpCgmsGbMqLWptDgC8XuogIOkx

PHz4VOE6MadyNgo3XKk1WTC4KSPhQta2ZQfiRfA6EpInZdArUOjtFdQ6KzrcZjK1XKAaVYU9GXLfOGB6

RLA9KeB7MWAgJDG7HDD8FpF8ZZppVJPkSCX4DqW7RH
ArEJD7AMF9LcBlGufuIhw8OAC8POMxQhtpCDI0KK9GSWH8RHDtOBL6VTH7XjJ1TBGiFLT9ACB4RaY2YE

niZmPeJWF1TrU0DESdNQW2OBR5ErR3YKJpZJW8WBWnMJZgAC3OOO0mf7ZxGFiaZUTlWM0xzf6QJXT7SB

3EHBGyNA2VoOAcM2JxfpNITAGrlqfmhN4qZOnkNNWe
S2ZzbuTMZP4tL5NimXFySReqYCQvY2WvE8QpmOJ1ZRDzX6GkBXItH4s7WZbbRR8OXLDnBV27LH1uXEZK

JgOuPHNlTfrvE6NeLsMNPtZoWFTnSw2ctDLOj2tzClHbGTJgYdAbDOT5FZlyBP0ANmWvHJGbDKDlkPxp

WS6hqGBvKO8NeEPnMlGyUNzoZJ5+DQplbmRvYmoNCj
I7JURhz2DtKWfhTPg4ZUhzBXAbV4A3tZMfRy7wfI3YeEX9bSZyA8TviHDAdTVeD8Uyl4UXm367B1RmtN

RzV0IvY67ylD7fO4jzdqQxh8mMtvGsWGntTw7HGNZwEY0MsYYdpSUuMFDkRvPoXPXfpUOkQEKwAhA9JX

nnBNVxR7csKIXqzvZfKvObZFDPOrYzYQDgOr8okQEq
o8WrzVT0d5LgMRrgFPKJFVshPC4+ERpqyhYwIofXTbQaBDHuj6BpBTfmEJatIYbrvuPePhoXYyBbYLAf

VotFBcEoWMbGDtUjYHEgYVRkE2FeuERbQ0ESSr4NJYa9J4svJEjgCc0PfRQoGRChLArwFPNpM9AmeiVp

MAkgC5RpXVTmRQQyKg2HOWRfGAXiY3HrSNRcJBIcOg
9OVBNdZCHoX3RcHZM1BZQkTb0OHYCeDUIrA1HtNHB8KTWoPd9+ROymDXUsQ4bWFyycY5FkHMpqNb0IDd

YsWWMsFZe6P5R3PCBfNMn2N4NFB2BNTNQjZHyhHZgdEXDrCLv0R7L8FFLcS9NND2Knvrxqpb9+IC9TU0

5ZMZTtNMt0X8S6gQSsC8M4rVrCfZI0JD5XEQ3LtQv6
aXBseT4+XH9TC9DCMxVqPCi3C4R2kGOaB7J2xEqNpFI6SV7EJI9HkUIoTQHnajPzCa4yO3JNGVpMMbPC

RZQ0QF6BaDYfGF7SlYJOQ7IweILtXm4mMNbxySZkxC3zMe1aMFyxFXWsL0ZIZ1HPEL5EOZn2A2O7yVFn

X2I8bZeRkDH5EQ3SVO2ZlTbqyKOeKu0nLGsuWFAnQD
4+DQogID4+IFzhetQhWuxRXlZrVSJoi0OrHPz2WX2ACS6zsFasJVC4Sp2VxTX1oIYzT6lUEO3AsSThJ3

9awMWvKMMlIh5WTwX3sjAwwZ8CWQ20wFYls1C7VAHgM6cnWEite13qCLmaPVvLDM8mWGZIIJewLUlgOM

G0JhMqcpziGJDmPj2NRtTrAAw2iD8ygPT1OOA3Ugjr
dYVmVOudWaMzJnWuDzZ6iEtrvyr0UBhjYL9uDVgvxarqSHUhHwu+NJviDATdRVTlYjbQNLRnjB1vgwO1

tfLtAQwuyAJmQk3vr9v2PaigIa9oKg1tSZv6KdUgAnAnPHSbOu0jbF88JJfeckXhDp1LGcDwHSO8K1Wk

ZjpSREY+OKjmPUmuvKi9uQYvBASfJx8FYHWzLKWfRR
AgICAgICAgICAgICAgICAgICAgICAgICAgICAgICAgICAgICAgICAgICAgICAgICAgICAgICAgICAgIC

JjIGUiOKNwCOTrAFFvGMAtJXWhZXXnSVVaMTMcNIGyUZ5DXNHuIQEhKDWaJLMkTIHbRNIcXJAfGJFsSI

AgICAgICAgICAgICAgICAgICAgICAgICAgICAgICAg
SOVgOCWoTXBoAHMwDQRcLMDbISQaHORwPOOaFFJgCYWcPLFdYERhEURjBM9AWEGnQZOjKVOpCCKdAIOj

ICAgICAgICAgICAgICAgICAgICAgICAgICAgICAgICAgICAgICAgICAgICAgICAgICAgICAgICAgICAg

MJDwNKQgDSGlIBTmBMUqCAMvDHYcIBLyDC9RDOFxUK
AgICAgICAgICAgICAgICAgICAgICAgICAgICAgICAgICAgICAgICAgICAgICAgICAgICAgICAgICAgIC

ZmUQXoZXOnZVGrLNInXVHkHDGkTECgPLGzPQZvSVWiZLEeQK5ZXLZfWOAzMBCmANOiCCKmLFDgRFCyPG

AgICAgICAgICAgICAgICAgICAgICAgICAgICAgICAg
FOXwCKAfHCWpGDGcRIItJEOySMHiLGWoNUNePAHzJKTbGYXrSDMbTLHqYGNhRW3QDQNuKGUlYGYrHVMy

ICAgICAgICAgICAgICAgICAgICAgICAgICAgICAgICAgICAgICAgICAgICAgICAgICAgICAgICAgICAg

LZIiJFHcIUMwPNKvNPEvXVFzYDBaENOkLVEzFK6LVA
AgICAgICAgICAgICAgICAgICAgICAgICAgICAgICAgICAgICAgICAgICAgICAgICAgICAgICAgICAgIC

KaCRNzOPWnARZjJBCvXAVpPDMePNOvEEUiVRTuFWNqRWKmFLGuMH0QWQBeKWYhMLHxLINtNRFtGTVvMG

AgICAgICAgICAgICAgICAgICAgICAgICAgICAgICAg
ARJmZFKoDXZvHWGlGTXbKGNeJHAuXIVcXGMgSKFfGTZhYVYcNZZoIEUkSECrJUCcHK8ZGPDpRZTgRNZb

ICAgICAgICAgICAgICAgICAgICAgICAgICAgICAgICAgICAgICAgICAgICAgICAgICAgICAgICAgICAg

ICAgICAgICAgICAgICAgICAgICAgICAgICAgICAgIA
0KICAgICAgICAgICAgICAgICAgICAgICAgICAgICAgICAgICAgICAgICAgICAgICAgICAgICAgICAgIC

XeOFOeXWTjVFMrHZGfLOEsOHXxAKIwOSOnVTMzKPXdUJPiCJQlUYEbYG5CBG70lVRmg8R9MJIeZX7ccu

c/Yl9BNRdhvnKchMBbPV7TDqRiIU7uwd9QThRdKN0g
ew8QPEeSBuSmC1J6jRDqAHIyQWTIHyDfK64rFWpyRi33QYmqVYUgIgWuPXn5Qw0RQgUsX3myNSDkHzC0

VUZjVxG6YCRtCzJrZOnuZA3At2IgrIJySCa+Ji6LGK6cg9KkJQbcXoKxBL4tyz1UNLeXCyPxO4GxhaE8

DRDuVZFzQa4QKVUvZLZneQUnTcGiGWTQLlPkD7TfrM
95NMETCg2+XKwmcqLfDcjBRiTcQPDpl5TtJYw8NA7UNSAyDLt4fLTmF2JqueE7aHQeQG7ifAJjVyvkE3

AdaJxhbCNEk9WatI1nSW0BATJ5FVuzHt6mJPHqVJVfDbC4HBGXDN7FACMkCYVooKVjQMLzUUNWFW3CAA

cxIWQ3KDYtsqBpwUKwFBkeDS6SZNGlcaZhQoZpUBJI
DQo+Xr0YMJ9nr8HtKRgwIRZtOU1dir8SWEgEWgIfZ0K1sCCsX4B9MCvaPf3NLXBaZSPmVvCpNPVZNZpa

QS8TOB1gzxC1ZO3RsHGdTSTcTYQkzAQtSJt5J19tpMJaTZxgYD2UZYK+Kiesha+Eq9TUEDcFQFmXVTyHyUk

LWIWHuMjN3FyJ8GLz7WrU7AeDX50iChustYxJMsrMO
2UTV2wOMZeAVAUFV2AfWDqpG3ffqHeHmZtLPFPMyYlX51kuMVaUCMuFFIvHYFjMt3YOFYnO4FtlyPpuO

mcoaSgMHLnDWQAFP4CFMybvfJvhKUefRidCZ85nRofXJ2AUe4MJfCyOX0api7VkNBtXm9QIZWoQT5LOD

XyDGRnALMqKOH0EUXuXkXyIPieWNCkCAAfZQY5VVRx
CHGrIH6TVdNcMDJzXZN3UxZgJZGwBRDvuq0AACAlKCTxFbNeUvOxTKTiPXPmKHslJMVcMKYvLTS5HPDw

ZGUxQY0HVgAwVLOpTUQaPBHtQCGnQCQquf5FMJMzKTErXmD3GREgLYMkVOAvRMndPZCbCTI4VLE1OSXj

KMIlVW8LGaYqQCAqCTXhOvCuIEFnIXRloj0BDIOyMW
IjZwWdDQFrFMTxAHWrSYonNWEpGMP8QKQ8CRNwBXLdCU7NTfOwGOQmKDM0NBveMSQlXXKawq4BSZLsHV

OyCvF5XxKaNMIuLIExNDvhTDYpYOF9HeP9LAEyBRNpVC2AKuVvKNBfYWh6WEhmDPDxMOQlwh4TCHQyVU

AnVAweYkUbLLSoDXYmUAzaIPBiGPM4PGZ7LCFnAFAo
JC5AYuSlECAeFUhjHEJxIFElVZWtun8LHXTkPEBmOTV6JsRnDVWpRELdNSgfKQWqIJNjGWJrTYKuZSPi

OU8HVpKtYMEeKJF6QfWkSHJeUSKskh7JGDHrHQCmUKX2ViDiYATdGRDgPQsuQOCgUAXzPPXoVIHdUVVz

JT7RZoCyNMWoSOI7UPYjCXNnSBBjaw3ZUMZvLYTtZr
s3GMLlDVJcVEUiVOx4goKitLHdEIf7LV8TR4WvkmKwUpXSFo3Vr754UAR0MULaQj2VS9onMf6qEUJyHB

FZQb6ZDGx1NiH9NSNlNQImPhPqJBDcWTWyTCesRTycJlf7CHJ1PhL+LOe7AAx7TwMiOTPdIISmOjJyH2

Y5KSU6FCV8XvegPfK9JH7cCNNYWh3+CArzxBDtyMgeCQKJFmTkSMk8RRxvXMFPKv5U









                    ID                  Date                Data Source

 

                    [id number]           2021 11:59:46 PM EDT [facility]









          Name      Value     Range     Interpretation Code Description Data Scarlett

rce(s) Supporting 

Document(s)

 

          History and Physical                                         [facility] 

XWPUNz3jMwXTExFf55/VOXcbVEJxq6JhSEzkOOb0VTkvOUJrM7YiPID2iO9iDLE1OWtYYnToChOqPJJ4

lbm
VdPlyTItHzAVKmLurJQoCeYOidJlyutOPsVU8IjCW0XIQbG59wKLGvPRLhE2TrDYRuYzY+Eu0AIJPtdE

ZeKF5CWnsO5Y1rm2gAGI0a7U2sTDVCb5knxfRwlltJNKL24wCGMHdhFHb8LFb7aLIJTeWw1/0ac3jKtr

DSmVDHIjmjLrq7+eHkZOvd366hDVPM9k/9FL6hGHBP
Kf5f/AaMT1Mg+fOfyPoyzlaXrf+4ObC5zMEQfK7h/8JMc3716usPgaFIyHP+wa07SfHawPqBBKW/POh/

R/r/PUUstEXxl2CQrZOSlKaiYwksvBXzEmEAT3kEtJijbPpZnzWThG27M779eKh5bmQ6m+AhVux0xxW2

p83aeIQOAr9P2GkVsKu1F4zZV8JBFOCkMMSLaFhx+e
O5qb3zw7+rSXKZGAINGo0XMBqhCfv53oyf2XGfBrxGDcU8u7vK3HsknP7wM2ZiM43ZvEfEWfTqk5Ske/

YRpNWt0q9/qBqlVAE9yVqjbM3cAXHAEdDI5dbFssNE30r7JpoU3l0lbfuO4xoaWugr1RcYAktKmdDrg0

CfGoerO3FXDa7HkrIYiz5HmyaJgMZoCP2wBgPjT9za
yQZWS1vDidURL0VOeSCzn1Sy7EMRtAYPbnz3yUXNSyupYdjQ1qwkibzPf24QG3drA/MmOvbD6Cc76LVd

zCcfi2ULluQU2cQFcIiD4p7ff0mHp4gw/CAnuuKdEIM3IcA14vSszfDri3o7JZheJtZxAxVZwVrL1shs

/KL3/uQi/+iCr55svEZK/FZT3CR2s7Y8nWtIo2cbtj
WapClxgQfMtRIhYd2N0tDERGexzSxZIqLi69pfQY3RDESc8IBrwRk4oUswy4wVDy/vHDA3CzfKktQahy

O2LoQWBJF/WNpB6l3ydL7mXLlOs7BpqN5wyQHfXQhSuuifRkWDKtcd9JBDenJ/vTRwnOciizKWLFPyiv

MJ7qu9IdxNZVqLfpuCRAHnnExpccuAaxQv4dikeq0Y
72z217NJS91MQ5TzAbT3K8AwHoeN6zCVTi7nZQQLeedhhdGPJxUJpyeshdKcYx0s4qH8hjIHgOKowO90

ZKPjXP2S9HEGKKQ0zL0onRcKo6/7YNzKnIHZW0a9hsTicRApF+7oGujmO1MWrjGMPJTz/eC51yP9zpWP

nYEO42dL6rZqwAirF/QXa87lDTDj0xHqPT5sHYeltU
SJQXNTxWNPpO6FiWWihvjEd0//CWTHeT3LBhpPqxlfq9goFiijvXpM0Z2nG+bZscuTuDiia/5/VKDfdg

IQz8h6D0nOWgloeDR0jbfxVYfhLl+hjHeLRsKRPHw45+9BcMf+8W/S/8Gf/2JGIzuaSP/5MXUZUkpQuR

Qkeh6nkiKMh/HUugata5jkbU5PRd6nJErBDqg6BLnu
pITz4V5CecXgiF/XBjAd0EISJBZvdLUzmkWoISSNSa3sV1N0Nl+UzwIf6qPiKYzcVs/zwJDS72AWFFs7

NRwvHRcsqcSa/x3Pl5hF4g52igEc7oPauPXVMvp5C/0lVKLbEVo+ZTEFqZAu3oMTE+pCWcUi0Oj78PmX

P//xhJyd3/knuc2SD+T67PPt+bveRVfMrsVGYYHZUF
OalK5YYkEBNlo4AXhGdLUJclamxs0WFVuAqHdySohbFOBUBf8HJG9VXs/PJEEDiWaL727Mj0rB8+hnvR

gqkAaqa+fYrY+95esri3cOIQDiPpXgdXXYH15RecjHLXt5jc/1G0ZtGqCIxTkcQUCE8PBpJC9Hg6Kozy

AeVgcTshYNjvyXYApZW/DSwj9ERcqEcYtCZ7SD9OLx
FAKKb42gwkJqbYPT8Mmy/qB+S1jjfAOIwkHLRp6g3wGhS/X5N0vzCxUhl1MC6HwF975BeRAQAQB3AH2j

GZUrrwPLMbJMc8tyQVTdNChx81JDP/IKqr+Gv1ydAb+16LUBeqtVQbFAMQ+HuWgeKFataJCGmhRMtMA3

Qe7iInVzKoe+y3FmRCw+xgU7Q86AemQkDaQ0Sc4MQF
8JZqjl+lBpnhlHB7L07cPPmF+FOuStDbS5amoHQfbEdUhI0LEYKnIBFzWKf9At6aPyTQucL9oPwkAjW2

dzzE9vCBW1PDxP/Db8Bz14JehoERSXCw4H3yCghBBa6aaFEHCAHa9ROWWY4eytEFBbTAMH44h1g9JZqv

t1p+kUohbNL7BBoDdw1cPXwBTmzH+C6Y2eVOt4kJUx
rOD7zO8YJM+nRkKCHMxabbF8VgZwI+FE2z8hvCpt9+7IrBxKm4RmTM+trqCZIIPIHVVA0aU7vJKC/w2q

t6EiiWWeF/ZcDpS9G02hkF+V0OtIYhflQ8TciCqaanQ9s/BOOCZybNHXDPm/YTbVdaV17MdJEp8OG1Qz

CTwbtM+9r2ibkFg5OMFR6O8L7lHvIMmA1jTQCKtZWu
PmVFzLsMfylJxzUiFNS55jcClGM2rUBMaIpleXK9hoBFczuLjVDjucSn6QDeR+Fk8W+UsYH263BOBWwl

WLA/xRqDpGY9jXrFGwdUWXbpSg1XJb2ReivUk9QU+iDzIanHf+XoxR80Fe9EIrSVj14ncDHQ2PoPF8L6

mV4C/ce6CETEpM+/T9DMwlW9wflaHoDiL8f7jBM8Vy
uBuRUb1GwMLL7rPTkRVzy+2TSB2GHjbUO1d5cWc6od9nbEHDqmtBhbcaPb6NGM26LqXjRAe3mGT6MkY/

IkSNQ2gnWsnWyJntNWj6aleb0tYjzIhU9cxf68DNe7+S3LoO5CJaIRDnKE2eURkuE6Ei9yVRv5qZ0Rbp

KpzFOqs8ZjBmxptdG8mNZHtaBkuAvnd8Zv+W8+z5uK
4oS6JyBGA+ecBpLTiIFkYSEi8EHwFa4j66JjWuAm7IdCLUl1OBcrLRLWk++y5k1S3lrxFvrUxj2K7dsu

F9Nvp1LWQyybtmdEz5iVDJ/7M+9lkOIfQKBKWeLajWK2cG4tbb06X2ts/GrV+dyb3hu38CgH2J1A0mOZ

VBHyNNRde96cTR87fxJdKIc5M87f4LyBENBvFrvyIO
y9Y/cjkpMqV7x7fPRUjPXs2nvhe54A6Z0AitbKAQRaKNGlBE/mSmhM/YhGubTOuPdd0Y1cBCtB/MM3q0

Ce1pvc3W8eFWAM6++2RINxJfvUxL8lGUvSqJU9YY8uwHmwOLTRXPHYqOr57w+TD1lH+3Ma6oKhipxk8x

T3C+6LXIzW1+TeV24CicaBX2PptJoWtVvffdq2T4Sv
pSG7nqeAzevL4Tzyn7HWdZxYx+LUb4vfA5vYsR5immGjdrb7oRnQ6FrS2frQKsz0fPRJmBXWYGIwnCpS

b1DFvdYVAMHR0nDE4AgrK88xu+YmJz3ZQh2TALP2JcGTPH6eHSAvOqhEE9ui9A3eCx2kDb1AtfFPos3h

OLpboGCGjiVWaZX2PITss55IE6ULxj+9qybtjfcTpm
eyhbrM08QPiW5SxKtDQvnWyTsPAp0+rWBsNYcNC/+mc7crPi6r5/ovb7ON4qPJ/SEKdxU/743MSIMeTj

Uq14i6ZJf7BcMKPZIRPGBJG+X9m16ozyNqJw6vg9DhkanyShWx1Yce93GTuBnD6SwfSCRj6pzPhUAaVt

gGlcjBgDRduSZmYtkydyRLcZXB3hG0KOpkHOklyekS
JJq7g674fhXxG3UDcnyerEa23PZcFyvvrEnv0YUaby+pkzvgTWbyyHT22ujdwrOq/prw22177tZeshl1

6jfWyM1fuC9BX2xSD6uUz2YvL/Gc62xXNIaSRy5wLOGZwk/R/4bVI1GtsaBSExhBpNsZwe0v2WqhTMOt

abPA5YisSJ8MZ3VG2LV+3yZgrEtaFZlYiyhwAoIx8Y
yd2mSIh9TH2RQocsHUvRxQPMtwR8UME9mPnBT7Vr5bBIZmu3pFIhrV1tjJdJSqjzvftuKloQPIhfmbsw

Qx/UDh85JE2Yp4YcikmqO6N7sy5tR/K7OFq9KgZAmGrMHETClI+uewFCpmHdkitAyH9z1+o+63txZvEv

JWcUISPESFiFtCDtU1yGVjig+CQbsk8lALX5PiXZ6K
vZCJZOB3ZEozyat8uGjDHDXDQLBot3I5eo2ghzrTd7uLHFc7Xsl1qy1f9+03XlLtVwpo3H4s/p4jEj0+

FkdIivIl8vLNKOAKt2kwagPlRUVXipWqmbKH/NXJHbujBZzoW27dK8Yvp8QwQl5EnTZbDqRIW5jxJyhP

4TZX7ym1YkWTm6MIGrr7HpYJwhSVu1QAhsDTEcC5M4
tOUzJTZdWN1BHEYrKH5LNRWeyhFzMxLqNCWXBxLuKHGySnJds4UkQ7IrNYOoCDEFILjiIFUjS66gRGuk

Lv48JRteVFBoYoGzMGo1Xl8XFhKlWDXgE58kxQMjoZGrPHGwKKBYIpFcVYKyI5MoqTJmSMreD9MrL4Lt

WA2jxZAyPF0aeDYfY8KaY1AalognROXQLaTcLBSfIV
xzZSAvSyBmYWxzZSA+Ei1QBBX+Ih1UAX3ue0HvGGj7DHQbq0DnBAomXWh4K5SsgDOzbnOiNiecmQNACT

XxUVEnF6goblo9jBFoAjp6Yk4PFtKzx3UhNZUyPPpKks0wkH1zOxX+ijF1U75kQMKc0L3Zw1RTnRdv7Z

VlwIOHqIj8KMjG3e9myklAJgFx8xtj27ukZDWS1lNU
S4UI7ASMRGnCIt5ejcU8Bl4ga2zFCiykmJo582EHjUidgC//VpjAQH9xpuxIREa0JrjVg+46pEkjS25J

TkpT9jevnDYcM5RPkU+E7omhWgTaaWMds3ai8Xzsxvk5WM7910kcsmiLZ/O90Wj0WKuADL5t/VGwzwd/

OpFB94RxuadcEJplcTdQXA+iSHuA8oJ/kuG/CeNc1+
im1QJQ1DcvZ5jJV010EgrmIQrZbZrTRuLJo6AxikRuSasvQRyByKZY2FYUp2kc+AvR9RF68pKcc+xkgJ

Dr6cLbJ4Q3E/fr6vZLYlVwwDVNmxDOZvnC/GHkSq2qubd1NZGPTxG7u9VZ2VPYgHLZzvgTgvaXoJf3Zw

djRqC3LTTBZ548Wk4NA8EIYV1+hD2MOyp8UswS9T3p
YPEdDA/hb7nrqMeraIArHeprIlQIqylp3Azw1y4hlEC94LSxk6SF0U2DEaNLTBEgvLAFqA7cdSondkGW

KtDQU5j9UDdGKQ3a9wRnT3PUg5AZGYHzYruLhCSNVE+A8Di+yV3FBy1HcQZv2NLSbRsnAa7DKRabblu0

0tJDatSJQFr2R/KYIh568hif5xgtJafXOvF4JEjj/t
kYcy0oEXbc0ZIjnbwKcmiDwHpIyb6bKwaA30JMR7AolDZADl/5efmqtz2TswsdUXPLwlVLInA/AmeiyD

IWFf795CfYsOwXNFPGrU+iHuWWop1qzpgOsEG5mrrXUJ0mZFTp/tFfjIbr9gzsRlRZW40l2+A3Lp1jQu

+2B8xZX53xiXo1wd8FU/Fy2b7yZViu/7rE1GF8Cut1
X/PtDWEdodtZSCjvJqA6xBjJKeWUVz9D29+rV0//27zbmyl4lo9A23jZPevmyzi5LGyQRTMki2JXK1sV

EHB2sNRe7NAbybOwzEgTWr8To5G1hf1h0WHJ2bVPzlqi8xYdtplquaNbNZrlK/MfKywzf79qqcZt88ph

82Jo52SOqZ/Zvw3YLS9Ff46XH2EegQeJpVTaGHDTyq
3RayRRSUgTgduZ0RywgBPgNHiTe8cSj384GJf+nDPECDcmpt3lvksER9TisNbHchEwcN2cPlOcqoF1Wr

rO6Fw9Dtj6B/Tl857xU95hGw8PSHGiH06EvPwNq/CJ9C5FG6QLVZQk27iMvKWhY9TNO3S09Y41X7DRJT

KW8SA1DJFFs7RYKqDxKrYvTAkirYtxf8qS8/qtYoOm
tAuv7+2AX4wFjGnYobA3UxWW01UxNDk77Kw7ScQKXtjJAk+Hrw8sl8PGRSo3nGHvHM8ZL8mJTEkWQU0+

eAxxfoRaH1+UUeod9pBXj52NH1fUPy9AOXhyyGMNC1nBoyk+IrrmnP7h0O7MF7J+9NI9go4WO3M0TEZH

m+U5+A8J14aCbI75PsmyUOhkyItNv1DiFuJY5fJyx7
oZHDLrd0p+LJboE4gXN6lNNOtYZlIkOJ9GxNbqPygky8j8QUfz5C2uq/z8crwdFZsfbbPc3XXwxUnZcU

T9sO/EjdJo26kW7yo0aZocv5g5Mf0KbfnBgOaHHf05MyQi/Ew+zk1s6XtqI0ITHbIOFJvvvJCkNbtD5u

51gpNue8JpNtf8z1xUHrX0vNm1yLObs0dJB18WLdFy
O096S02UzQMGZizyRYbjdmJeHtLweUMwyrSnWIN3WFv02JouuQry49YOU4oOc9AhwZc+ZDRh9vOwKzxS

7DuLcEXHJ7DMg98vzOiL0pSaIYN5SVPAiqKol7NvpORt07dRH8UrxHWGqbxcGtppoYDRRwIFimG+/Yaya

VEFcQFSVKC2R12BpMdHAHUGF8iC4sjVwrNxJramBjK
flcQTVauaS6dCCnxMtLNyxyAFmwiCqElc5sMYe+7tDe0a2HvJ6nL2OA2rHgQQOVgHOXGs/FWATfrUP5G

iZOwQBg4j0V3aoj1EpKIoHH0T/unLCX3YMbmdQ9hLEH1IQGrsUeqSRb/KkGwn1y9PFSkmy0GbZaY3KEL

AYGdClDfPOVqNSJr9tq4RAAFMAQ8sojjgU6yifAlcw
drGczT/pZmliWy5ZPNKJ/4MWM6Afm8mfuB3MPal5bsPqdVuNXuTElbDf9AiYtiBWnZPFOWyUtPkHeyat

YicioelvPOYyF8AL8dYmQHx/Fm5w7DrZbX8NIknO4HQ/vbZFyF12othE2nMSh05hrLrsW0B1Y6IrudTC

urwHjRxhu9M5nvJv0gedhIPmxjEz1HMkYK+BVWZ99d
mjEMFKhguuDLSB0hWOCgr0lAJ1NYY7k09XJYJ7PPBUkFdSDqkEJyloR4ym36v3j1mFC6VtlhtY2uVdFb

tBJ4xzux+NnbBfZ6iguuub6xkQdugUcAvxiCO9oK5D9G7lmj93cwNi02j0tGlTff8HETzBzomJSsJlk0

sopRVMf+3yoRNGOai0t5bImxWCHeuhA7n01nUfHFwT
eEzui6+Hinojosa+5o53lUi5hKHxecade94DZhSNtQ8dk0k29cNt4IMd3s/vT6ZxYAomUeFmnic48KMxmLzCX

Whnncjr1lJunrKB3mZGo+KqldXRTaoAlj8oEWRmVWD+25Sxind4MkVQGg1eY8CVwJr9FygtIl+IsO9Vz

3lqzbz5Yvgh/0YIMQaRRv3C9vW+5MvwchbOulovCda
50wET6ejh0N+SG4VYaNH9bVZ2TlveQUiOF6u1A2xiK2Ty/9Qw/r7eUeLOfTXL7CVslDDjVwXSPc6hQPs

kzkbBsojsSVpt7+uF0EwEnfzbejmWwnHJhB/vHpLg/J2iqYFFW/hVYyq0r4FDaz7+llIH7RREOyRm2P+

3ch2mQnApAJTzi4RCaezqv7Cc1zlBdcxLMkzmgeFpJ
hwlKQt2Z5ol8IIumxVjy5qdONA56htNPencMAwyoHoC65wJdDYbf0/ZLS0Haw1dzU714mZq+SYU51Mgd

GihwBTLLQ22HR/ZQZQUNmf7ryZWjwa6PeIyjxcrR5g8vCW/t1t7wcWEPXQ4k4JcHSkwtcKI9T9W4b2Pb

uHoQgzz668g+SZRbVXpG+eeqSGub+0nQ52sTa95WPf
SdJ6hlpn4f4CrrvgpuX7bsnXVqUTTNAdlal0ynGsexIMm1cIySX8Br9/qn5S2L5HiicSSn8AI54LDVcg

qe9jO9esL0QHxU2iQvVL2J/fmEfyIwh81tNSKiNTqzz6WoizCdBS5+f4aqczGpFoyAwMUSgyJFshvqmE

mEfTIKpZY1aIw+sxEtOO+9p4bs6RJvx0Z8ApNUvGmF
3j/tDgz7jc7feFaetBW+YoFaLK5SnMwlqOijE6v8LgmpUtwxXfCEmmEqtHtZkIN/iY1EHwL7uVLKdxxQ

nl9F10piQ3LololjIBQ1pQ0HZ9Y9S4/D9m5rug6qsXs2qAenBU7EgV/4hjACPTSUlPEvemgouXvRbpBw

E/g4/lqLYN+oTVL7aEB5UxjysWi73BK9lwyk4iaW81
BLIvP8HihXzwlv+ez8ReFuSt09CgpztQ6yU8IpsPT+2mQQHvbKO3p8ImFgnoBNuu/kyjJaEI1nkAuwYQ

fEM/80UAsGn0fdENLe/UkiQ1OrL5ZgP2WSuhUpUsNmrwH4wUgbiHvfNutz/KRnnXzUbX/3x4iEUBIkjA

qbvdms1V9q5+/1+R58/N8cZuAJNIkhimHzaZGnOF6P
ThGzJL4ihq7TDgWuKR8tra5ALOE6TM7LVNAxHZ6KzVPlA5GlS5OMNiApXBAeAIAnRU09ONOiEOKRJCog

EATlJ6Sxj729zvEcgxGyIPOzRm2PQDNeDQ3PSFRbSYJygKOcKTLvQPXrReC2VGWpEFquEJBaP8RpacSo

fgDtRXKpEUKQTRtbTROhO3fuw1JqBXi9OP5MGN0Jrz
Teq5DubeGsF0fhO8DCVK1ONXEqU4QIS2PjW7qfQvJsz6GrA3plPgJjp0GmZg0KTsPzHb5HPpNdUV1gjq

5XSNHgIU1ncm2ZALL5QI7LpJl9RSFpD2IiWYIiGVDhv8RpRL9NFP3llScqHru8Xb6+EVasAUE8liBmrH

4DAEXiQG9o3VMR/n7A/To4KcpXncq8eCMTWPXmHhfx
Bwjo39Rv+0FZy/st8L83WJuq+mN6mhQQnz5COJIbsMGvedJOKfgWcLPttEuLgwpmcq5+C7OtTpEI2a+u

cbH1Axcvov//llw/aiWYB7n/ooeuf5/bvIw4FBMwbcmPYcbJWtoDc8Nvo2k4ijccPWh2Rr59ayW5lwje

y8Vk/+JtVf+h6gH6HCoByxjPXooAuJcfdR1m+1+1/L
fgK4Fc7mDWeALD0LBJTgTdR3fwSMKP9PHq+gKszOmFqZxBW91VLoDnTPAFDYFdXDzLwzxDcmkQWwSIfe

I/CqmHMpzEeQkhNv7t5I1wxXtPK2j4mNIiIL56/EOsXMsAK3/dAoq/gqq9ga+LXQjfVl2BXbXWOJPc8h

Hir1+E5jok8cf2PbVNzFtKtAaseAkYtBHJotpkH2fh
hpdWWokIyoeaosxYt78khyzSd7OYpAG/Oe5GDpqT9nzqSJb/KZTAjhgJ7ejxaRmxpWitBUBlo0mOyRfw

bLfV0tevwhJ6tINFPoOVsNklFKhhIuti8aiN09aVSKIFfJZiJjRiLyjpEXNWgF3go8isJ6BSdagUsIhq

ZCKGeEV58iMDxnA99HMIAmiKiyUBZeBRd5qJfzgpcS
82WEY6GRtJcMnwm0Qx2Qtp7AyfU5AsB54SCRoc53npyaJUOMOhDirsOMPUt307uNDt/6CuuTHzA/Qotc

jYrqzQu1cst1jR1HZYvb53W4LbDqCTekOiN1Js+U35fl5+qJei/wmGi5k7mq9059T7sHgXn0qXwmwuSp

yvwfu+wba9zJGh/ncNhWMtfgMlw3khRn07Ztd5hFRR
X0ZRsJJMtUuj+lqvXufxqqkhTxMYGeKFENxcJNZAUPskoVI82HtnFHVWb4Oc93gHkLO3/xEgXhdJtD3h

S+XlsOFZIGA8C4cCnE4r4ITfzJGDNmWtZa4i3YPOF5GnqDTCOpupWEZNMKeE7kHE/0WJT8MXG4EN6NJA

jaAvZ4vJKk2f0znauwFJKSDPE3d2CIj9q19eD4kHqq
tc8jmvVdX2Kz1ps23fcSjzyWqLzvyTJ7259B2IpA8yFqISWOFEDjsn7BUh5TSHQ4P3+/W3NIsxyKLPoq

EjXo8GqjrDZ9KR6i9X8z/twyUUMRpiv/+RjftxaQPlgjYFdURSBhT9XvAeuCkOeps3oX2U5MGVHK5+eN

1j60NQGmvA4pJ8iwpx/zovDzbXQft/6Ux28lKrVQCQ
owBcbb6viDo/hZ36XmCT0eqd/VWtjIjNeQSO48dMa3nTrREYiRDyDXrayLiwno/ZDFCQF35Bk96ieSp3

buFyX4u9IkfKBpTavmPkC1waUJ4G6yFh7vLCLAubDm4vVvFYL7UgQS3qGwSSDtoqQa57dtXUFujwXVAd

7IOjqZXL94OtNS/Gtalmiut2cl6rzcQLDynJ9IbJZI
cIV7sn2gCWr+x+qVDhY9DO/z1Yf5zxKdCASoXQqRYu3/TyHC8FywOVqCQEx99sWezQ/X2Rs6bhXF5QBA

5PCZvKGQwph57bhAYSlt0PVu07zHAaeaW6Je6ckI/sh2Pr5y9znuULA1ruqJVrQrupwTvDNC3p95S7x+

IW9kWnaDiirGLrgK7Aoa01R1sCwA7iPUQHhkoKb80p
3GI74vW18zpX0b6lvSPiOcOTzZA5Q5JOqKLMkXh4t/thZRRferuFQAdlimMHz6caVvvpg1cCd5yAdRe+

n86k4/l8vopZPt9h/7cOZ28YANp6vL7MH9/Ma9dihwvHnQB3zdliB4wkfFSS45xxSNkRk0imj3xrTqZx

X7SDlt0CKHrjdiYBVtY2XH+x9Rf2Bvca8cpnxC7lkr
MM7qsCrAH+DJ1Y8NHIX4LlAd4TamA0jf6A9xAKoOY/Qz5MrhQvXYfzO8RpEfsh8B3rOx5aVEJLBXkgH+

BuoDrYflkJh3TumJYVSAOxfVHB8Om+FNxaZxcCcqc0NThWYEr9Bevdm0E3sXO3sZbfT9n6xDqi4fIQ4v

EJSdR7Q1ZbU8CVJ1wGYm30UvVgmlEPDPrpeeOV8KZt
OwOWpXpXKZpPKgnE0wuq2VTbfzrvGOeEIWoNyqcYsjmcMjqoRk1gdrgIXFKDevMnKI5fjacjRTaafihT

YK4UQEM+PQJIsvBF3uqqOrjYhB8MiNtuAHrdarxqOkSJBIV/dSjIJGBOs2CtPz3HQqICdt634KcrgVCc

bmopka7MozE6k1Gwu0ypmRudUFUU5tCSWH7P6YXBNQ
YmfhUSmHTPwzXwTFxiH3b4tnFeTchrNNODBeE3Ciq6WrNVIDdvylISpAMIjk2AjlSWgj0tNO7CGhWCN0

FM3SOb7bLyuQ+7WSGmf26UiFfyOLULSQ7ymKKUc/z7C19M6TWuXtizYsnfHuGz0Z8AdKL9e9hwQ8XzzZ

eePrdB/zWVHzl3Jryvt43ZEottkGhU0jFqFRrxLYo7
kTsDaBb4vxPu3+XMZus28dOZ0ozF0QItIQ3FtVt9d/4dRt6/6XjlYv2wu7It4UuPTy+DyFiPo7x69G8h

NxdkcXPq+qTA9akf5P7wOxRAIPaPnBSBBmQw1hKucoJzpvTtikueRQMMeWhjodaCprA+0qijTqAeow0o

ynW4GGmVd17OhpV+dTpIRl3T76pZkMQ6fqzB1YHYNd
JipD0p+bB35BZ9dcleU8ljYhcEyp0Y3M3MivDm7sbFju8Bk+LdZaF9XyNbLsiKgDzxt56LeA9sM6ksjw

LpGz845R8Z7cJZ20iB5Ugoqr2A+E0PwAaIBlsjqrBtFw9w/pux6pAm+kOMeRDJvh7to2O1Soa6DfBa6+

C0t5LTP403q0+Kr4EzLd0ch9cRqK2ZbhQ75caeBarG
Turkish+UzksOsFNRiyu89n5z2saSFbYJ5+CYwxpwYpS5Ub5hY9s7zYYIa2Vx4ooxJg8RDbM+vB/L53Q5xN+z

JkpEx3aF2YGGG3AH+mML+exX5pllpZakrnbRrCkuxtTBpgbP2EnUjhEh7QblhLqd4cVtm1yF/EwksVYA

b5bJKI4oBSLuu61QC8UFZXxpXxQgTcwARSuXQYVTGf
0yiEwZi/SHqPSJEVfO+71kbIbfDEuIXmDd3T2ncZJy24ELNUSClmr88f4SZMFHO0FaaCggx+k2wvEbk1

TdLaNYptjqSe4f6+EMkyagBSHCh79yyCOjAun7UOyMcmvm4HS1jHcTCdFxNEL9e5COXYAQPzGITfgrGD

UB8c5SgLK3f5WGJ7aS1tkqcuo6gO5L4zUtRPt8Hv25
Key77QZqbPDBuPG0qaozibhH3WWUFm+oOaAL5HSfQ18FAZw6QNutLMEwvzV+PtRQtZBcZ0MPQ/tmRFSA

zRCiBsPdbEnu+aPmFRqWSWSyfs1HaZTKlMzdvfYzKkWtCtfm4TF0Ce3gymobsX+w+OV+rNgIrhxoKTPJ

voOnOSSx8giK/YdkPPqsqkmRNczl089uD05+JEPNLN
sWnHSOfwsuKMBmqFGDcONkVZwk+7XOYdX0ziO7eWArQr7+lUqaEPmTX+pQiEY+ZSnCtYsSd4fOTD8RY+

QUK/0E+xCYkwkhO20pJW/HrpWSbPGkMpI/itgFmGr6w1Rky7ffNVphKRg2/fJYAp9mW5E2vPADzeKIJZ

4v3gAl6ihkQZU9j7yfCQGKlaNmn/+L3I8ld+u6fPPX
5K5B3CPAKWjl4WPfypf1DZQVjX2SQARHY8aNg33r2YyW0yIVLVwa04T5GtyjE/YyCPfScKVa3FYwVqR/

N5TrPkAimhX2wHm+9JIP/tnvmj+NXo/ulTlagje5mWcdP58foDsa/GfgXksV8I0BLN7on2LbITXbGPps

bmRvYmoNCjkgMCBvYmoNCiAgPDwNCiAgICAvVHlwZS
6CQBkjFGxwXOOxT1AslkSajUTsYBQwJq2MPBHmNH1ZHVGqyRGgFILtMiJbNAAMGhYfVXKpROGtmQTJn7

bvNcGiJUE6BILaOmqzGV8DFPRqQU6Xw586KL70tdN6WLRuUl2FWGGjLT0Bsx19iRE9NVMmTuChQKOwjv

AmUTDeaqH3QI1YDpZdKRM7vVNnPycIXE2RUSZuyCHz
LY8OJJPnlILxNU8+DQogID4+GNnpuzOwRalMYsCbVHUyg2BrZYbvNSy6S4KbfFVqshKlWjhbkXUWWGVk

TNDwU5pxigp2eOHlIREuFx0HFrMct3JiNFYwHXfTfyNq328qFCE3L9M/gU+XKNP8p9hiGgQYazPoOPYV

mjltjnoQRnXlRMHWO4mZ3p/ajp4IyigLzN5DbA3P2o
OC6ZiI0lX2c+piy/Psn/09t1mgoXCortu/Jqnvjebe3//m3T4W+RkE2u0AOos6/nbMls07w7Cg49uorm

8c04bvOm45/zo6LS7/rQC8iBnfqQSK5GFpR2xC0Ygik8Q5N+/zbJwvf/3Ve/66XG0aN0MuupZv99VfS/

d7yLw0L+WVmHmn5+UgcJWm4B7bYlZLpa+dvvBO//C0
48ehFktkpAgtTPZAdtjw3JSdKJwp6LSSAIWkS/JlXSHxsIoHRsmtyeNAW9EEntDOTy0iiFlEnZv/2sEN

TjXf399z9e/v2l4Oo3NSRnrTIsSRnfHSIbytIb95QlMMg14s6xCzHLfdF3dgFMC9QA9caTuuFR66wNlW

ynKQY89uLi7Tj6qRaCsFh0oDV0ObqT2rN8HHCxFrbj
4P5lwCHW+syfAXS3LFaKJBfP7SDoQCGtvbfuWA100P3Q5DRpKzFJvpnxnBtc8qKi3cZelFI966oVCMEF

dIU1YjBuP7c4KmctKSiMk4mRj0LNm8LQLOqfpAA98CoVkrqhK3yK/aUNpPQNBnTSKHiKxGwAdKJ3FGWH

lJDUpUN74n0WWx3SiTM7nnprPKaJU/ShOiIxOX0/tJ
DyMr1l25xqr/uXd+b6VnE86tv7RV+nhPpckFiFqTwgM9CQbTRa9jVpf5sM5UFj+7bzEo/5Twd1qWIdZN

NMfwgmfahh2t9v7NInjw/7pG3U+MhTX1whdHYvvG9KMFUhgooeLYmKrseNuInise2UjRhiGVCX06pvDI

DI0R7rBJjt5B8SeXI0r79dDj51TauReA7jwsYBgqHs
kgr5V/UVkUYX3EAph8ECJ64msH/uZqtsoHon+N/1D0b/BPVV9/a1khoBnKalQh8jFMQvi7DmRYDd4CQN

wcj8ex4EPJbzftc9C344IgLk63ULdTn8/H4085WpFHl8iU/UhcBSDckvonMvhClfaykTJM4b3jnDFXgB

yB8hf2ZY7Poi1jxPM7YZQJUHYpuIsUNUjVJ2TMoPI9
NwerUlt9v40IcZikoVDf95Jq0L5BekHsWunRJnumBXM74wZrCCIkB7HpPKQaarwJ3xjqiZP6bF8z0U1A

gWej3+wAINrPhm34AKNGOfiTC5SCzdXlM1iAOGHKVi9jJERZ2i/L1MPxwckBmJFliNE6BJtBmUgDcMrV

/K7108eccdykXCKKTOvoHBsZBXgye8k1vcfPEvNf8P
liv5r7rz4b51ZOkHrkI1P7wKpCb9iIwqg4aMJPK7PBGJC2BiOu51QgRWu3uuOdDgyC91WH8fV0Q4rVyq

yR0jmHbbogj1LEQORlRZ0dvNUfrAKfN8Te7RlqaleeLWhES1jZUztUuou3DQWbmTEa3lKPKHie9nCY2T

mujp2ZyvDgPLiEBFZOXy2RDuDLGJhdcvdYufGiVhAW
GbTfqmpdjdKKNFYAn1MJngUHkyla4CACoJafMTdka5WRyYG40Frf1pwJnH3kvvY857yzOkMRnlXgrPKv

rxiJqAL943XQEHw/TjpKvJyf2BpHSagXK3FcVE0W3DyISfBlP6uSLB1AUJRYmIHxXRSYgPnufhXfnBIV

80xNHoqyYGAHhQJiw2U01UaeKpNhFispZvEPtujh1W
wEkn2F8YVblpPY8tc1eywELEcoczEhNuPb4mhN3+6+6t9Z7rGViHyKIt9qrXmUrk1+6mxczKeLzPlBRK

c5vIlPTZ2mLCumjO0sZh+3D27RQFyV9nJPAKVGC5I7856PPafgdLOAV2VifB8TfngYGxZ1bfg3FfrSWt

kUSTRwtueXjEUg4HbUsoXQUldUr7BxFUbAErUJVFIa
ph3rDcZyWvR1uLy2yF4S5jhbN/GcRBBmCpVfjiCgQEuaCiEolkkByjuTH9MklKtyiHXc8iW1+SARJspn

QemD1S/gQYGoDlLsWmc8n5FHCsoXrCNUdcq72pj56oH3drSQ6ciZSzqC8JCoXYouzIl89eO6oXMtVfaR

6fOJL6UTwxcl9WwwPGvOESXZpdsnU8NtzyTDfXmgxg
ZM2pVsYsxab3VAu97yxK1CksRXXIIJstHatWKi1AmZujLqYRMY9YOGrzQB0OAACaqLEh2VfKoj+94EIe

mtNy1HzzDOgPCXJPf2AlejWLFYBTqD+6A9rPHnkr72yeWSQhl0aFeBmu5YBigtAe6ZSnJeLvZDg9n+Dz

aaRFgVZLUpRjLc1/RtCCt3gHSfxWQ2iIo91T+nfQvC
fsBS8BnxHOI8OfCZ0eZvqyY05o2lBMncrfBCaGjU4htBNYqypUTdsaHAjUMO63ecQTAlzluAUF88RJFc

1EbbYdSG5ZOcbbOAyJeZA2RrMEJlgm3E9GkBfzq8CMpi5TpzPiA8T7rA2nsZ9aXkTie3MhOwrp5R/m5x

1FRLHAgdnbQIj8NL0KgbMUwVSlX1GfQzZiRypCiF8O
nTHrZsTTuerIe7dVswWXglWe2NppC0OAtFHpdAReOgeLbNMtbtXP6ReHXy1FhvwVzFluWPoS0BhT/soz

mXqD2hDXwNEGOngVmCPNkW6tkERfaYTiwhXiGpInaPQOFrvV1Kcb110RB8ZcddQgUAPicWeRXUGGEP54

4e2fHfJSBPpTBXMPQRdTGYemMPRtssUFxnYvW3KsMW
rocio/ubHTfQHFWIjV1UM6pLMP049n5mPG9DFlPqH+suoAKbD3wxESBUeeHVDhTHxwRd6+zqPXiu61H8as

TgbRykOjklhBgxA6BYe14hlILbnvIVvLrYC9Fi9rJZqzMTz9Z3AEbxeKgV1YgIesUc2sk97B/TQXVT3N

vjx15V9S/3S/k9iAMcx315Fk09rMphM8iWd0cGkPre
yS7sagcBf5X3UJUe884BrypM6a0y4V2zqluq8KUp42m59HrIiVsrxt5nQSm0M+DttK9jZm1/zbqvcFtK

14pbo+tUiss3J4U4Iu5kR5Dfut0pDzS/RN/6g849rqhljpOVB1CJBa7SHtjAjE+1iPFzLEN2eZ0Npgz+

6O3SwFYsgemAajUI6W/y3dOrL+9JB4KqAEVvqv8WhK
6WOFomrjXavxxfPYITHNi42DOvZ37XY6qhexr8xeFBBvAOSkE8/aIUFhARqfTkmUmuGLavatHTsuzXyg

YzBY21St0WNLTJk5LbJdMnMML1fUIrue5owB7dA/sV07lJWwWMi6mNVOppASIdHRzHjy/qKfZCSDFVGo

rHZS0t5rzK6vleL4MmMZ42L6DkpvHIMM797OGWs6xQ
tuux+YZzEYktHPSsBAgDqMKgRDXDY9JcpKtPa/5RZsiVpFULpZAvs7gEYEq+aSgu4tn/g6pGlVee0fEW

qOFSzkVrVOf90JORRTaFlUnoY+2BDLpQ7d6KEKWWzkBG4I1n4OS3/ofmw92qLnReMUE5FRHhpDbGZhvO

n89uMmHBEMTxqzQnmEd5OhTqvGFRVYlFSYZmANoWYu
SmonqNgurJAZASwH00xxgDvs1Vi3jbib/X0xV+zF2DWvAoaD1onXDkkv1IBxjsrybKrfTFpiP9NMqn2K

EAg6fxUagHMis2RvSConsxXiOg0oKUVrNN/LlZhRFS2pcWYo3s2WSqYZFobtTMVX6wtbm3DXS36/wrR1

ezVak0XzdbCuSpSgZPJig619PAs4gMorax+IAeNvj9
zCwnvHgQMnTMgiKNk3vCieul+vBQsv+XIPSrSLt7sZL3uL6L91FnvKZ75GbnX4kRLDBSqIAemyvmiuDI

YX86vsR654oH+mwD2EQpxLEMsVr00N6LZZ5l5l319V+5nZw1O31P0hSvhm7ruKjOhkOV76QggAhqPb6A

URcIM0OfEqR72s1PCMrTA7nIUMR85jcTssr2rdmRsm
Zw5I76zMguLIQepJ3u/6YYAC0eIHTy2izZbZPI3jakiN7FN2AT9obv6XKoUhBa19Z49Rz81EAy4At3sT

sv9THNtK+YbDQKVGsR+y16KX2rgbQKDFRUe9Ls+9qud+A7HLJj/dmfMT+fV/b9PvPu1EHT4ej4TjFSZc

JMxrfyMkHtdQTlBeTYUul3OmDQltVKf6OVchRHWdX5
F7bTQqRUXcYI6PMWPcZF3GSWEzsqAxFvFeHCTUUyNsRLUcFpQeo2UyU4FuUEOeGJAASIvfDLJwK32dBQ

muJw35CDmiFSYcMjUkEDi6Xe7KOvLtSTEfT25avQYvhYOhKQFmNEMGKPitNWAhW6kav3CvASg5BE4ZGA

8YuyJtp3TsjcZfU9ygG5UOGY2CCUOuF8PXN8WuN3yb
OkCsj1XzQ7veEvAbn6FuIi3WKfWsTl9UUzAwGV1wwk7RLIUdBAQmKmrYHzAoGIjqAqnbqCZuOX0IkJE6

QXDhX62zKYVvQTAyW8BtPMWdBBK+Wl6WEQFsnMNoVH0FTkfJ2XulzjWQOV5l1B6bzlaYSZz3Z4GWLQbx

dYoLyNgd8VS32iJaQ3kHMd6/DH5z57qXFuREy613bH
Se9pQgUUqugTZhp2uZX/944RuJvaR7Qp0h9tkcjaK6je8/gaj1028tice/Hoap267bs+XFeDBamidPej

qS4ZV1FNulj3k68jw5r477q9jWv/OU5VI0BYr8VtmL0bwQBYN7c54qJrV7f6/h8F5JATN5sT2Gu4vC9/

CM79tavhtJu1l1pwvsZvmO8hSfj/ag/3Z3ixGx8+Ut
SKsSlYQaIaFt8BSO1qJz04XEUpIoxFDMPARsXRPQkIFm3S7GgyxSwwm1ojm3kyO3TUOhkVN+KhtrGWjj

vboT8WXb4cv3YnJsN4EUTjgFZTqXPzEz3YkGPp7cApusL2TuRLX8XaO6HVpPEkNGchps8xVYeh20F9zz

NrYqdBjaEtqtGW/7Gww27L3NOJB5rlAapcri8kYINQ
7orlZEI0JvvdUadzrxEY7ovRJiuz5hPzZK64goiorcTfVDSaG0JhpEjKKgEhKqVqbNtMaaKi0LmYwGbB

5If0EhImNNlcAt6YHZdvEWmBErtPEgKHZg8vTTSlIbDsEAzZrHJtNHUPGa0oKqZybFjCvNofMXnxZMzt

P6uOzyq8Nm/2FmkwdidMfXsucONYcOsUf5tXk+aFMG
LcTZRudxGLAoGmbJDbQa3t3C4rTvUCY4AkgAivjnZDH7SaS4DavFKeGbvpq9I20pZd+kNnE0KPNXoCaP

Och5NDPBROIVwALoWVrNnFnDnQJHFnSxircbYIpgf2imvvPDzonnEDyINgnOj4bQWTEX3HYltGw3XMbf

7Aq0EFVwTBkuE8F8jm8dGqT+G6ozXK8k418GfDouEx
YjYTLP9sR+WJB25sZl685Io6NTZPgjX3jBLiuTytXwbHL9eDY6uHvVHEw3DV+OcQmlmAeEzXe6XSjRka

j8mlSXDAekybWzDNheUZMo4QYQCb8I+j4dyCAwaDJxDAZDJDZC7zv2UhevJCIV8b/9HhVIUwR4sV5oT5

zKCvtNLYYLjwDUezkTAHAbjF6pkWcRlzX+QhMucaBT
iTG8d5/nOIT9gylOEyUqXNgSBNoSXTwjMPo9TmQbgTzG3LtulonCTcVwrqzt2q0r7md+wvmNJdM6dDIQ

pueeo1kUdoxaVufOjnJUlGVZFdSwewJhWcW9erwNd7waMGy44YMwNAVXddJ5GMtjbhtS+T12qq1muLW6

msX/57dfdQu6tzNcF9b4HnMXj4RDj/8ONUPj1kZgr2
Kxl9mPWTxXQ8V/oB5SgSHe/EfBxSoegpucbbYobwgD4mcjGL5oAGXEBoDzfaU759IT/GSFDPos7JXT4V

k07aBAjHkphDWUSiBfoj4yRIZwCLDmEvt9r24LvFUmgbcAj2UDjOfMDMIFNNQKLcd4fWY+F4Ktb5Cf7g

XH1pO68Z29JsqL9xXhw5J8Pbj8VdZ0wbFVGnDa2jGJ
ZDRRg/gC6+IRKNR7G1H1GJgc13Qkel4TSabgcWn4Y2dJUsPyrgDqgOn9VoNU/TRn/PyF55Ho7/em/7IO

xfWjYFQ+ujRmdzZme9zooneBbSh4z/luEVDThwbRzde4FQdakK6Uz5NkezUL6JFojlCpVh9hRUGO0cLV

hTRkwtLSxzYbuyNBvKBuoS2HhME71VwwifXAUQsEyG
86dLiMj2gphLKYPkrWuSdTBueMij8RGaf4OKko9HBgbzUp3WJlBfrREDonVHyBo8nY+JZOtt9D+m0zs1

2EyADmb3RDeNJEZn5mmY8jwOpTBVSqrHhrspoDQSGdAMiGzS/SCGdVtwRbqVXtEsUVnO+2FVO01vI+oI

4iUJZzcH6E1H27xsPnwP8KvkPJ3iG7OWwkxR/hfFNz
/xhhMVo8GeCTblJ+4CmzinWTEufnHiTnYMr8/ql2pMy1dc7heUVDQwvD+2E3TTxeojUnXYJdOpPlVbOk

8d/RUWifhmR4QGit83HaScoZSeIx7OogPA8J3pFHiFXird8Ok6qUdOR6wmO1BqONJncZxYlRp3qfmTgv

p62mYSF0bKwDkLxqz2ndVTKAr2ec4lmsJqgOWza1lA
5IW6WK0eD+DX5tcnqE3sNwmqPtXmNRGvyLj2RrIpo7AVVNNLib3ZZX/s5QJly8wvwU4z9ji6OCeQlf7A

OoR0cL1hWTzcZIvFZ+H0HGZnySZfdiIupxbD3SFrVeW2ZxsL7OSyxfF/49U11zl0pd7auKlqy5rnC+ki

S2lgMJ7s2qCDKK4q5LO/KXekm7lOFOV93ceHT9tQO0
6JSrYmZwY4nGQBRRTNBYXcrlHQEPTEB74oqGJ07ICpy2iXk5nG5WNpP1m5UADVwlJ0ErT4bHaIHHRH2w

Jsxa+P1D7kbNQqzAYzdeT3xlHD6VqWPi20Ra7XNbPp4NO3ieg5Tw+bX7uENSfqmnb4Kqxsms9QCFvD+x

q+Sq9iFWTMIeu8P2UxpNuZ2OJ32xk5djg6lYi142F6
7yYTjlO4iCdK7CSd4YdzXJkPk44K2uX+s5SSCdB9sXPAEhXbAUY9Q6iWEVy8cTYVl9ZJM42l59g2RxLQ

JtOBYVstrd20QnlWvutaG6xOIrKJmk7Eh/TnKfXuIxq3sqkvoUCH6dCJEkdUc1fEkaibCo5lAxuHqdwu

/ebHldxxD/Ds/tX1sXQz/yvi+tD9qmycNc94t1T27L
0d2QRv5fSNE//wOgQpOnETlbsoWafTIlZB5ZFfSyPK1dqe9CGCChKYGnJcwFZgUoYEsFCnLkSWVhBQlj

OV1UZUzxCGzyVYZyN3BpuvHcoXTvTJFmLe5XXUEgNV7PGTUyiTOeMLLuFhZfEGVNJuUpLDFuROOxnVWZ

l6rxFfOfOZZ3BLRxYrhjQA4JXMNpEU6Od914ZT23df
PwGxOwPAVHWzOlWUOxR4KeaUPfQFthW4YjE0TpFQ3pfIBdIS3uqALsV6EwP7SbpgmhUOKRZrYcMJSeCF

xzZSAvSyBmYWxzZSA+Dk4WNIL+Bb9KTC1it4OvIRsfLURiSD8fjg2OGGCsQDa1GRT9CBNyFxl8SYW7HG

NtLAPbHCP8INR8PuG1RRqvDqZ1GYI9EZBmNgNtAyFr
RYW0LHO3RRJfOqd8WRYtJlYuKbrtZcf4UDG7RqC7ONEjDHF2QER4NyN9IZCjBLU6VSN3MdO6YVBhFNN2

PAD2WcXjOtvhYnf8HAS4KODIUpFoUPl9ZGK8OSS7UPKuKCUpLTE9JsohInO9ZXfxXpL1JcVkIiD4PESa

PAC7DlbyDiIjEFO6WTF1SWUzYjU9WAT5NwH0KbVlCr
ZsAEp5MFU3KmjqQwt2RBiqChS6JikeEkMnYGmtVhF9MkibXTZ5QPJ8BtI9CtftYwZzKJ2GLTOsBlzhWv

s4EBI6VSN0NvubGWE3YWCnZfU6AIFpKBI2XMNiMKP8DPPrLKH2MJL5ADT4BNAcRAA2LIFnDsUqXlPpJY

RfEFZpOsU4JkMeXFW8KVR4CnY1RDNtWEM4JUQeNmA3
WLOeCwf5OMN4PiQ1ZZRiCoQrFHOaSZSVVjMiFWZbTQCuMDSdEtCxZwWwNPNbBJX0JCKvZrAmIEh5IJN0

SJQvWgPpZGx7SXH4ADGaRkSiUEg2XAW6VTJgOlPkLBi4IVK0UCDnCaQkOFt9QQI0WYBpFkFtWIg3IJZ9

IZSvKfVkSRy4RYZ8FPHlFxUeFPg4LLM6YCRuBLfnFW
e7TVL0WXPtPsFgUJx7SSU2IIClFfShHKz5XGY4JONcAsIeWTL6XQMcXwEjQEB5CWX2GxA5HJFpZJY1FK

Y8IND5ZJRrNtPxIDbdBtSwGwOuOVI3BOP0POFpHeTwMlC4UDP1ZsS5FOSmNDA8GKAcJtZzTtKuRcYmAL

5TZAQ3VjZbIUR3JJRgYuIoIoIdNzGlHDZ9SOC1VXJr
LJL8CHsvWIQ2UdFvYlUvAKM9VwI8CmpaVbD2IOH5AbN1BqcdJgC3JOFnHFDoMdDgEII9UoU1VoalVvT4

CNG9BqUvXowtJzn5QKG3QWPuXqrnBqByGZuxPoB4FtsoEQxhVDa9ETQ3XfjcKey6LFo3WVS9WZCiGqm5

XBauTxR3JcQdZaDzNCidZxF0ZgqhAlK5ACDnUXK4ND
NjWRK0GWA8SrS1HVXvULN0PNF1JgZ1VLmuMFFiOHE2NqF6YAXyKIS6QUC7EcPcCpuzPzs7CIN0HOIbZm

obXAN4FZ8OJJG6PPLxWOP3HGJ0XoK5UFAcVSD4RWN6SyZ7FNvzFjAmSPA0YjG9QUZjZNT6SBY0PzG5CQ

GmFFS7IPXsDOAzHJ8GSQ8zn4KgAVdeEZIsDA3ygs6P
MKK1SL1MCECgWC6JrKYgT0WusgPRGCPiastnqS5kYUntPWIhU1QcgcBHYM6cM7HwdMDpRAqsZMMgF2Dl

G3NtwQS3EYAvP3LnOKVjI0h3DVlxIL2VLDDgXZ04FI3jPUOXAyTiYUYiWaayX6QySiJDCtWxEWVaEu8z

gCJVa3gfLuDoHYJhGnToKTR8JEkpKD1MWwGiLPIoWX
TvsBmwWR3kqGNeEP9RiNAgRoPiTSngHC9+OElalsDeGuqLVfU6DVAyu7IhKDlrIKk2ZLroAUVfU7J3oC

OjXf1tvS9AwUO8iCLnA5RchNWRzSKcN3Doo4OQt713E5IeyUIlB1TqD25mzM3yV1wdybWri6sJsfEqRW

txBn2JRGPaAA4ZhQIlxLSbNGZoTdNtYTMryVVaKXGv
EkM4JOqtXRUkY5hvVOXowbQsVXFfWANZXyZgKHAoNh2zmPOgv2ZatHH5c7AkYJFiZVRAUTxoUD1+DQpl

sjVfOapXLbR6EJCqs5AjPOvyFQxtNsAhHNp8UJCxFqajJvEaYHM0YRD8CBQmQAS5WSr1ANK4TdPvQmF4

GUDzRzLcPoHbSfw2EEZ1EPLiPlluCkBePMI1RZNaGa
rbBAS7AKO6RsW0COQpFIH8ROU0XyU9YWRwDOI5IIT3ZdW1GABqOXE3TCIpRmWpQcUtHMw8CX1FBUJ1SO

UuVNl5LLIyZUZ9SrZuQwBdXPijYhU9XzNgGoEdFCH0StM8FWOeEjq8QYvfOzEaMppxAJV6UQlsTtR4PW

MiXDNjFPxuQeJ7QxmhUfZ8PRs1YYW0IdVeDyR0JYMu
HXB1CsCdQoG2JHm7QXC9XplfYwS1NHQtGWYVWrOeXcGgESV6OEVrHzAtHWj3MIO1HtBkSfCjSYN6TFEu

UQC1PEKtEyDsQAR8QjBsTbAwErXfONAxAPQeWxvoFjx8NCM0HuXmWifqZJq0DZZyFAH5XBHxOuKxHCAb

VAAeOHvbSSC8NMSuCkO3AHOgQPL7YQu1LMK7YZJeSD
fyLHG8XhZ2TULjVsp9ENX4KVYuNZsqPKm5WZi0IFD3HBDuOoAaLCm1CPU8YSItXiMtTJk8SYS3TNKgZl

ZfNCt5AYF9ANJzAsYzZIr3PXP8SNJtEuFxJVs4EEL3ANRuTdPdTFs4FBP9EGIxQoNfNJy9OTC6MHKkCb

ZvKE5MAUG9TIJuYjJsVWa9ROE4ROAyPuKqWVj4PIH3
EEKsJcFuHWc3PQGiBcczHpKgSEZ2LcH4SWDeLHX5LGG1PtChMNXoJUD3ZHMxGkC9MgqwVtnrKMT3OvO4

JIGbXtHfNYzgJcV4NABeOPFeKAV5WIKyBoByGmVlRHIqWmCADqPzQYf4WZX5RvCaXnViGmZwFVQsNuKj

CuEvXUS3XWmnZKV7FpWfWSW5IZHuFKX0MbDaCjKjJH
phYbZ5JjOxBpUhUAewDoKcNMGxERmyJiY2OguxTzF0WPC2BbF6MafuShv4AHO7YSApYvoeSyi2GZijDr

B4XmXnHru3TQ1DAKG3YoybDhu2HSl6NQE1CnonBBu8JQk8EIQ7ArAfJyBnZNebPdF2RkSwIpF1KLR7Vg

U2MDIwGGO6FRE2IwH4HPRwIWN2MGN9IsJ8QQQbKWh7
DDT6RtR4OBTtVCG4BDU1QuZ2UVLxOex2SXC9CJSeScraKmc7GMSfMDHKKbHsQvKoMYLwJOV3OPIzJhEr

DYOhTHV5AXFtNGC0ZAQwHKL2SXIjAwBmYVTgKAB6RJRcTHI4ZDMeKJK4IYBhHAFFVbCfLS5fjw7BKOdf

XAAaKfdIFfXrXHbIXyWgVTZhTAgjXX7Iw709EYLbX9
SggNQtox9FTYCnKV4Bw626WdJiYA1IvriwfJkGr5rsHOblUUJyG9CoS8RlxBI1FIDoA5LlWOVsQ5h3KB

dzMP7VMHJnOA16ED2cLHHSJnEwCAMdAqcwY0QcQzUFGmZpCYLuIy5tlUQAi8ymTeCzHTWdQkMzNPPjDU

jqXF8QUnVtZPUnFNDitCjqVR1ptTVmXY8DfOMxTqXa
DQogID4+WKtfnqEjVmzTQqH9BVRkn2XmVAvtOZu5XYtfDPXdC7T8uQYbNz5taN4CcWC5jNAdJ8VxaYMC

tNWbN2Hgt6LQf180X1WleSIvETJhmBVtYV1dk6PgjderC6eqRP0nzZSvO33eyB0xXHjvRSDdF0LgpqZ4

B4ajuvAmNJ7GKZE1G1snntJwPSBEOtArVVAsO6wieE
wdAMB8OMAcNm4KQYByNV0Hh146XYNvJ6HyqNHeulOtBXWxCMBITzRsMk6RPmSoWU4scm9UQyMbUNJaXw

iQWbXbWMyvXuncfNUjGY0PqNG0SQZyO10dCGIjSDUvB5TxUMBgLWS4VZ2TAM0khDnkAFP5Iqi8Hd2POn

Zwt2GxXKLdNXrNtah6KGtXZyHGgGuywR/d8ID68g84
kZNRZmQFWG1QHFxYCWhGEOPSPGJAPcDdjTyxmQcO7rq6BJRslXSuej4mIQKMvKUhoANFcGTBZBrO6Q5v

9K0547E4vCyv3f//8/drjGeD5f3uUqis2ZrgzrSaTKTvKRviCfVwJYLhe62y+MaxAnXXPCj6Y3lzdqVs

WQE1y0AqfNBtSmCxGLGnz4z8OQaX07lKY5vVzVD9yu
BLN8zKxB9RHG/DQj+/1RGiT04WR64RvJPXuE0h4bndPtd355YfGMisYjwTkmqzo6gudhkbdY3HSDlHNq

xfvGQNlGkAdzmo/JkLLp23+a2BA5R6J1u616jxM8MCXRGjCla3DnNwWOd81phrh55a2lPv0jzgIJxPrk

gUf0PDNuFv+4o5UDC3/KHABGPb7Cf6Vr+AV7V/x2gY
3QSXj34p0vBjOzwspShup5ROpzW4NOHDU6CPCXJ8F/52kBHZwoPNsGQiyiDIPRku9gdIk0P0LdjB3Y0N

0Qouz6XSsVA3qj7M28BS/I3zytv4j34bci4HCYhXV0XE0gYFtYKlWMnKfuMrlLx4olQMseN/BiJiL+Y9

W17FZNIeFu7AoNCoWl+UA42CAjvEUXCM6pGd6BebDh
dOG8t3Ut41Ve9DPyuZn8Azrw9rSMaCqT9a20p31VWGNfHkZd8d+U/lJ5QREvX/FSaux7sr3yCZrXFqir

/FPdcWkNHNfyc9EbjZT1caSuHRBFxJptUG4jfS7tuNmfmXxBDolWagIyxFhOgVEK/YHjvTNULb1NcdYS

3hCWJQkTyfu6WIonE2WX7K9fUIpKG1AlxFQpiL6+D7
jTUpbG0dbW/sMKv3McLseEMofUClrxYddq3YwA3psWdxubNucK/R3gU/5CBQ0Rff8LiN3R+E6/F7g/iK

RVRdOF9wf6sol+Fl+BpKLvWuBEtXroyvhOtXUKNRDqnkfxjJao4rx5M/xOJsu+II8miRNBbny4yPv3VG

hSNSDA/DL+F1xEndLqPR0K/Ye+wTYZAwVXhY+Jt4n/
Cy5C13Fd93pIYZ0kOZ4T/mL63XSMYKj8QUcGBeqfLv15i4r9WQYVSOCNf9AkmeWtR/VZ6O86whkGHrvY

K+SUtkQkvrbU60V/0pBUobRFulz7Sh/g5oOH2YCcfTl5wzy/J3KcZ3f+C2rvWWDKYcgp1hm1VZ7Hb2GG

xAOokly3Tj0AulJ4WXJXYKVSBgjTPa8MloTHChUm6W
JgM41Tm/2XjyDhAr0psjpqpLV1cX69HOW4UW+N0m/gAGzyNTGlDwazuBIxxmvSuFz9CNNl44SlefsRqq

E+7b2Q05HXzptcArJsdkpW8VTq3kIiJSTYgUENkAl0KlhGfcw35WTkEA7p5WB9P21roWD7GEBLtLMmOq

tQy6dO4tkv4BstO84O/nZ3llC1cqHbkQgpgJtSP1sS
RNNVwZKfp8MH0Ad9FDNBYcuoiHYFaIBLMMMABlQs5uwxNmhTwZM6O/icfFH/GrSTYpKuVLxVJcGiZNla

6QHpU+oe4Be5ljjX9kQyNhdeBgJl8j3zQ5g1JAkLxHxnh8WV3BKx5669YIKgbJlGB1WX7Ye9fv5E2oYy

xCtkqlTL2QjlgmTPP0GsrXKffPnSnoSE2n7OF1jfNa
PXnKee9JAFjbG/sAf3pUHw1pNN976aG/lQEUzoZoSDCgiKg7Y5ifnztC1uG9veP2EyYnoz6Dau6DJVoL

3hc/Dcp0OEmUqkWbDeB0U42QCtu64F+eBHniI/GdhIN4FoPUG9HUz4Pxzxj9tn4MwLNMOtKgBH8ZUHFg

UW/pH9mU0vt/nyP9d4iSH2b1nJbiGBBxTMfOC+Os6C
OmrgCtdqy7XbW1q8SYGmDIrlO5v7zTFTO8iLRcPM3QteA+HMoRWgiME4uCGnd8Gv1AT6fK6xZQCZjA80

GZRWptz4boOmP84MYXPM4XOPxRak6NV3U0tVdcQDsQUw46wu0xTWxNfYCiUKAzln1pvDLNSN/fle4LMW

i8nRvR7cROrg8ODloKdMPfpP7CgH3L8la7xnIF3nJk
vXOeLI0hT1dhOKupCiFBP0AwbGxeXGl8WMaEDBhvubdoCPyxtY7vpn3x9Ak4TJzrqSj1dLI2s2OaiHqg

E7Syu3ilrdB+KKSdTftrX0IlFAdF7wX3ALtTGNaIx2ID7QGnFGyTKfLdZ4WA+U5agxadMD1UvURIikjL

pJ79IuZgNyDGL7hD8tD5aZYfHJltX9RRIgDzA+7CXk
slj07H6pmVtwI3q/ISkIBZSMN91igJhbqC3xadlyhTOha5RgrYYuN5sSuK6m+U6GZRHjQot8U5CpIiVO

YgmQ9NBgIHRz8+hRx4dLPHYCigaa8S1TT+IlMbN8PNkCJnhtaaerNwInTRsOfbP5PQ7SHlujBtCwoCZ+

mOmCNGtsaingElYhifDaOAcYtB+AahIURsZxBrDcIY
hUJMDZn2LQUpn5X2dGgUpwBP+Z6MEGy9JkX5Jox4Fy9l16vUieUM2qBhUBgqeV63KjoM2+ngc/RsffVs

Xjg69P0AhGsRLaf+hajIvdRbAzoNtWjGwjdzaovmuGfoiEuxGK96JkC6BPJOBCYBbw+4gFey1F9LPtBQ

SyOTWrVvKIaN1q0DCCE45HiaZPT3Exqh5zLFLNq3Ce
sGjBD+StfAfgEk6Go9/dJ3I1hKpJQCMbdL6kZ9HAtB6yTU8YX+B3haN8LN5WO+qGBSWMVcIa4hT+IbhC

b1yQaT3SSPNhyJ6TqLTNpJC1/wF3ytv+NMHwkVbeeS21Lia4vsh2pgYo/Je00BbPkFTDPTzR4Q8Vt9n/

OBQzlCyLqQwUO2fMetK4Zs/taiHsh+zd54y6NcstqW
sXh0/WAnlfzEgBGsOb7ptw0SRWTHQW8uOUOy8D5DaCnTsMWA5OvbYDYVDd7VuKG89u8Xi6WljAKSGMuF

/BhqiwUl18fdwvKWVAX2i0IDXNZ9Ba/o3K7vZT8vOY5x6NMcsIooY62WohqJHSk5L/kVH6tAxDtyh0Fk

Qqt5awnDh6BS+u1L47FVUMJMFmteKaRs8NFul73L+r
EP0cM36tMFHLa2uiyB1GfYMsCyltUHrGBglwF1HTgBkVwRI9caDSsOTYpZogJinC+G6FJ80+0YokvxNc

pmRZigo12KQC4Ja7/JFV2YY8/NTJ6Lm0aILU2qbMy4JCJEj9+Cp1DQ1KFK6TvKBA9hba55CIsu8/ELP0

kUxbn6mjQwnl5WsNhboamtYf9azMlpZRBxtXWLs1xu
FP1hcTWi4SYBYQiRHdtSkomLfDM3olTQCfip3TMcwZZBTj9xqt+bfgKseLb9BhPRPsMWO85WlYXy6hOm

ORnCSfMAmnRIz+5G9cba6FBK6rCO27BL6CAThNjFwaZSLBzjHl8pAd6iWpzC1042YrbA5pOqZgxdRsLU

7UJFgeS0BsqvNZugXRyDrihcaTOYU+IbhIZQMHc+Nv
ijyvTidKeNs0mEoTHnCLWyawuWWbH7c3jlntXi5hPMv9jdXRCT/SDgaDrsHHX5AR/gM1b9K0DtnKyQkc

Rl1QULNQSXd1tgmrRm6g48h/+GY0eWtLGvac0dlbYx7aiiB64M6u6Ses7sTQ5XsU3WYXv6j5JMGEaP9b

sT11oAtYqZFfUN0HK1NyhdRa6uko3vRw9DIBVU0ktZ
rMMRTdDaKbRSC5VO/cdjqiyW1WZG1hZ7+q1pspsv2j4XCEjatUNy1PNxWMnP+aNlz73wAF86SgnloJ4G

3dq0oXZHpUV+Y4Z+57KLJDt3t+nJL31Vf/VQJl1PMiIglvpltGPDaxA4dy42rMFqp4yYLX+LCCM6AKwb

Qdy1rVq8L5haARb0uOlNBQLXNxSrDq8Cb5oHoe11WH
6Lx+OsMJx2m1HrOEKZnKhqy9W6fRgEW+DSoIVkkJ0EEs05SGiSgbphILvIhvntVlbnjAj/zZ9cxiaODn

4cazo+fuEK3hGIjbGCPkYl89jBUXZkF0/0kIJBW5+qq7drgdo6fzziILopXoDaoYMrTBHJedKqQo6i4k

QbNGknMOa9+F4uqwQ042CcP3CFjyMoUn5/b/XQwae5
YXFk9v1B7VFlTiTpbuLjSV6CvO+qoICWUz+l9f6avAwLxsMezjLC9U2iG6OpW+0U+0Gfy7vW3BD/wfX1

M6pHDw1CRfLYfFss1e+ca0CQerTLyJKu74tLf1uUz9N+AaUOl3mVZk7ioTye+SRG+vZsTegtoOigKh8C

jMFinmdxMJaa5/XanSPDeSZCJhU6tlh8TNQjtwvKw2
VnnYT4jmCneO45jipK1mbgQwPVS3hlMRp2kwazpl26GY3O96JGa1qH87Z/08+MgfE4a+3Xz0aRMLPKic

yxEFQKWj2kefqiaSwuY34QjaqMWkwoGQVlsYmbeHR7lduN0MTnPtkxHjL0anZrrTGnxgP6+mKR2eBEhc

pWsCYBUivwpc0Q/NFOEJW9udkM0Htvi1T1A7CgukJf
QOseGODRkY8qsElqlui1LSxeJ0ONf+jchnuQ2iSA93ZnivMAaQjFJ9IsEYoyjI/WvfSqFrP5L/pz5u2X

F7o+vxijSItRDe4cixEAsCRm9JIXR33cAPdcFtAaNkw5uZab2/htTa8vC5wfYte9ClhQ+OXpZr5VlVYt

B+v/ArNSm8LvZE6rM+OLf+9Q37E3BR4puPnMkGilpw
Rox6oBRT67PHYcpUn7FsTeF43hQ0AxanyK2IGuNkPUOyXktyKZwcTwSIxy+D4d7SG2QxbU4k6hayhNxK

V3pGs46LJJSqZWteidYjPZ3pY5PVoJkhI2tqePFDgngeG8Qz8ERQ31cynIDXMOxU3CNuN8LKyhwrniqX

65JcpIQ8u7drb61wYQ+JSIsIT3tMAqLUBYJITGeJDK
IlQ0QXRoNk0vQDCa1rJYiDKvZvp6cSUUAJrnyZf4OdCmVI1k0rhffkMiE2y66F2dzdJJ5YLxHxlFL0b+

FCjsVzBCUsaUcF2kkSwiO4dd4FDyNkvDfRSxE7ZBpPZXVqM4EPQTK6zFdE/pszm3xeNOU6qRvXegd6yd

Byrq4vvua0RbS0WZB75JO/fQ5mGB4+nq5xKe48+oJb
169d6+d+o8BoK5bV82K81HVWq85EPW7g4mbYMIU4FIByBRCJM17TzOPr/310u8/USIHAhuyP14RLb33k

VCXvtcqJb/gM+X2xvj6aYGj6BHsn4YjKhuAfXSZL3OD6VVgmXhY+bG7sQ+Ew+WwMc7bxs6hX/TjH9ROW

X6bvVPMivZ0bnLy7KkucH11NA8w3mNX5euY3+u7pgU
lv9+xkaO3tdFIigBjjrUkTlbwAPjEQCghqDNmbt3SQxVfUAL1UDUczI80IuKxBTvzQ9ZVAsZpugBjeOt

DvZeqHMTaFWfPAtcOLAQkhKLc9sdNaGXk+2YYiailuSL7ZcNzI2t7C0t7Z5BILEnmpjrE4ZnPjfDIyzb

p4WPTm8dvzVDYvmmVJVo3b9whjALnq5ZPIh7Nj3fju
q08sKuDF7PrmjSNx1wiwjlxZIGy6Ksb69xyw/kR9V6P9JOFrDfk81fi+QaYHlp47iWdytRvJKIgBN3m6

P1ha1wvw0Ry6GhsC5dkmg71jk9W2Y6SCTf6jGZyZJqkurdlvwqyLgNK+pb4ZsB7zTmlQ+F95W+k/lhqW

RfvEM7vfiFZahCj4+rmuFsBmqyCSXjeEOnqybdw4zZ
djbqYh9nUmxzh1+8rL2p3BzwjZ5oc/XShRd2eXuOjuHOzzVCge07pch4PxROLTid1gWKi38Iz6adZCmo

57zT753xpG5mF+loVmuRH0o1ALp1sxlo9ip5eQmeK2UaV3W56Vca7SkVWiL7hZ2L0TcWeYFiW0HqD2iN

DTsBW2jt9BZJ/f4XLSJjc7AUEzaFQEP7aeyjYSyuiO
fBJDR00YLKIs2EmRho7RLo2JLfoulj3m7OC8EBpC0IKaXfdgihvh7xnAB2DjPdXVIGiyoIO11mDZjODe

c3DrCT4FeAHEkq6kxpE9IHyAD0xCgWxxx5xvM6cntohknfuLojXgpfG4z3J4uZT+zbJEUHu1pYgOxr/P

OG0rgtFUp/6NI4IisPfMHhSu2tgERauKmEzJhPPPcC
TlVet0ymGn4zHkod3oEFqt/fzTuWXeRkmiOduO5MEPLYQ1O81A2CQa9UKvbfbAHFDz/jmzJrJK79Rw4b

M2/LrYD0627HtVVhyaaqxqnwkWqkZC3efHs+l7Q6OcFAAd3CdrSizONKnaDNO9keTwQd//asKikuyEcx

11+arCpWnvEFBA2zWSBcDkrRnNPrSHKDu4vsbZP7xJ
GLh/ec+/3fyRKZkpegnibYVv0/qrSsq4mxqrpAXsWNC1j/oLDm2CwUG+fcOHHopptHLx/tdzmzCotsl5

40ru1D6AJySfNdHmIu1mGNG8/df20YKlUDwebqYf9OEHauVWPfjOJYVLKdTWktibBX6gWyt9lsfVFwJI

zkvI6EYGuEi9NiwMpxTRGEoBuWCucLruHdYmeTL0O9
Zi9CKO9NROvnIhmKTvtMq7qtbNZTBCNfIp/Bve++Ty89aQHPPlNyO/qzrSRep4ZgriDEOhlpdV3Mo1xB

aEh9XNiI+rJjhIrLtCe9tVZop6ZgYtfHpC0Gub1zdRyk0sdExHSA6bOOdQEWzv483+DO9SbpADR2C0Ha

qtQyUhGpAvtHmWhuek157xpGza+sVzfcO9gosmlAyT
P7nt7uhbH8s7+NfXZKNtC+DizBZss3qwxf+SzhivM+IehEFYeJ5ZLNX+dtLHUJD6PXlJY3uRuXJYPf0J

bAwelVFe7EzTfHeXn1oNU1RevjkftnqeyM4VGUjCcJLDqfwfR/vyzo5cR99nDvqOZ4bLXiXbnDRO9gKe

6uxE88ba53bFdLrJnb+Dtnz5vcuw8Cl/LatySN4xJd
1cNkGVc9Pu6oKk9d2eXwNRfag60NxuEso8+93/GvRV/y4viM7g0aZiwk6jqyWIFxPRoEw+yvMyn3UUZL

b34se+5lL/7hvd/1OmGOEQZBh7HXenZXxYsEUcQcL8IcyorEWif320qzV7opbnblK5hOgX8QwL7pmlRv

j37eF8e5EOiwgwu+b4JfhT/IYLoHfcXiRL6MM4yWDx
y9UEw1lyXFCo1pSm5Vuy3CmaNvIF51qlUxzf8GqK8demlKhPHfL8dXXUwRJ7ZZZ7vxa7XwDqTw/V6XXT

g1FFEwxaTGCmdaEgwxgB9FtC9Rpn2L0dlwalK2i2HEs1/ARO1Es5g48LxAJ6Z44TUSYc+JkHSRw3FEM5

0Yqyg2jJ+lCxfaTVQsC25ahp/lEpyD26Hx0J6djl15
nXeJz+NHz1rnRn0xher1WaNCcP76bB4Wh4j33p07L1KUSeZcIzlt5xYFvBEnoohWRF1pAL4qNKc/wrlV

W1CxsIIRiXgKitHUhb+jXwcZthSDjMFrbjpK6w32aDotdaKZQjTlGCPFcX8RADHX7TaSB+njeTj9OhNq

bH8vlDGQuKniuUriaVKaaw7r98203umT9n92bANvn6
69Xl7Glo37UFNkLDw46pc2h0+8/+ZLaP/SYNGrtzGOAKYG9P1CdbDSRRsZTEFXuhT9fjvZrt+dYbdkkq

jccElIgVmOiamLokQ5P/mE/0sJ3DIVO5qeDiD5NrkFXJbkQhn9nJE+eVbPWHDtOsDFV6pBOpgfbpPOLH

mIFytFJIZK52uFfh/a5GkRUEfLb2MSZPu2igGu+Pjg
eVAm3J+MdnyHAOO3mVrXm777+HtTZQYTTlS/3DVu3ugM+7qxPLZWJKuEb8dKRIBEAqUzLQatYa0jrtXE

qGFSJ7jK2a+6IqZEmCtog9Z0FEhtNXBqGADbbsEizlEXx9TIHBBY63hmJWgVpWlMpD6Eg8MYchArCh8E

vhIFmO4xbtxZcmjmzakVDscU4HK9WVrYjRnuiLfwJP
SZzcDtALssQcBvHZVGFd7KJaZTx+XKfS1FDhQMesUSg8z+vfOLjm+Y/4MDzMV+XBolarHrl0s/L4xz9K

w6EwztmQ23UKxJJvQ0SFzJLy1/jQHyd46s168NQAgbfQ0PxLrR9ZWFPd3Sln/K5QSpfBcfPeut/LDjEe

waBxyUd9NNPU7CM1Ir3EnEbZqF3nOi7gjOEChes9Fb
YcNe90b+JPMp9U31UMlcbTTi0HxUTa365r7TIqceAwvbOblihco8aVWsfIivQauVvFV96nDVT7942MOr

vJSb4/wdKhQuRMumGRP9lE97D9Auheo+fxEpNSJfbdEu6XvS+AW2C/LgOLOhdxc/lkwt7IZNE10Vq6gp

ndrZTlPQf1Kcwu3Z6sIRYBG8eujmpaqqUdJySLFyXa
zXzim2ePEv1xwRCDqsqoykAp/KnlW9e+W3YzSXAa+dRvXGt6BwirjxE061p3r0u4djBn/bJz50+8K5VU

PP9/3SNnTaxbf/BWBm7EUCxmBJBVduzKh5wpgdx1n+rvYi/0d8BB5EipEE7Phq2qrprXg25y+wD/XXqZ

Pss+drxG0rzkdDaX7oQ1H/2XkX5F2Vd9V44eUnAur2
eF9x6oTbQ/LfaXlhQaZ9RHqnAEu4wq0sJI/lza8LrFNLbzlhzXmjdFWj5CglxNAcMxkyGylnE6fibgW3

VN5tsmxhS/MtZczrn3Ne7pwzu8XS5xEusyGwXfUBiiiVhfCMgHuLBR2DFulgktm7ooSoMVOVkOtKXzQ4

qAy5MJVvp6HMAZdiCiyhd/Eyked3mGGkml9gdMeDHd
2OuuASNyuC/TfQcPTL5fKYftNyldN58OpwU5f5guE380cWkH5cZcw0Hhx05wn5gCPaFCb757aP2vq5qM

2tiCmCtQr43tDXCwu4+NnOHr4ucH7rb6LxPXw2dQWwY3/+PU1gO8OMV1oIkMFLKZtEUlveS5FfS5nZS/

ou5Vy4YWsDJdBsroZNGBun1lqLk86Y6Hido793fekA
PD3jL+t/8+WDT1+6aXNf9fskRSinJHIP3Uj7b66nAX+oExi5cK1/zvl+04NKApdocBeynJHRS4hxZDvm

9jdDP5scTeG00jUMGQK8aem+A7S5hpYVHZ0KE0VDp0ILr5vq8JawH0aVMp08q+BF0m1E6HvRhecZRJOK

KjrsdycSNzAcZqcnar5KgFY2ssPvp0vjDm4Wevsjlt
9woupdg5IvZwiRgTyJ4p/EZsNIrRU99x2LY0IuNOIgMkceNqLBPGnSRyQRI4nKGkJAFA3RExgtUQiH7e

AXxMyS2MViqqHnNmgI/tQLKQv0nkXKQgbxPD3BEOlXEBnz6GIdeOFOsRFPzI7/4DbDnaHDyhvXwd4HSb

4LtTJ3Wh2Cl7T3rCtbbK+DVs8ItFl6HMv1g0vUsvSK
lxdwJPIR1cCvXzBoGYu2Ycm2ZwvN7jMn72co+auRLVfMHXtHjbyr/rp2Ee46fC6k8TqMr6oxbX08mnXr

h3UP3C3tt2VwpoJr5TMhGFSIeZcKZWYa+nTXIUl9UehnpYIa5DVh9X0+Nx0HRxVHOXPAiZ/xCzrDckpY

EdDVsHFKzxuJRLJaAgMjee3qGq4eQO3CB5drn1ewmA
kH2gakota0i1B4pefl0ssNVNhPoxEgbN6nXG32rpTl2NlxuwvQueEXt3gaRtzekdY5VABNtIaOFiOPnA

7xXnXUfoBQSCiGkvwGTAQyU6MfoetYNaIpmVbFYrZPWq4PdPkLVCNitXcHGTrfMEO8biMg6wsrRA06L1

ClEQIcwwQJ1IlgwhTd+0L3oPnEzIkQdfB4TX/t23cy
Uyo++H4X7nKp50ttbsk+ZmROIVcIbIVqEcUyYcxmRWVVjWmDSUiUpbatZg2EUQgLoFMdxJquGVlO9teV

tphrKJoDGJZFMIsBROs3N7KqXXafc0nC0sP01+5TCfD1Q9XZUSdyMVFU1Zmv4Iyoj2IBlVf62cis4Uq3

O3rLu8z50K0UTDl84FW6O4Q70FU9uCig5E7C2O+fNy
xyrbtU7fML36NIcy8iETHeeeQrd2K/+6qTPe+zlDyjjeZmH1ksiIsuXCXjKhMf2tYWZJgme7Oqmsz6qs

ZczygiF9E6oBhaBo2aq2UjpPTqOZ+8/wHX4f4bWekdyaoQ2C+Jfa0gGnjl+O3Fl889digTY8FIJ6telx

m1eGWuOv0psmAbLfE8aHYlGpWaXlOGxChT8s6/yscV
2unhvpYxfKk2xKOZlKQByDNXBt63FahHYtwJGKDkjtDAB8zdF0aGUEiorEwssdETHlR3YTsiGvHq9NKI

w+ZXdlKKtv9zUnJ3K/rWMR0CRQQDbJGFwxW+7qbhHPq3QL3ZRsen50zZnt9mU0Pjrv1XAogqwH2CqsZy

+lp3UYMmCAYEh2LXUjkLBzDhpuLTTRohvc9+WbbZcL
pXYwjoLKFCK5wspCcriO4cxmUz2Ah8VDdtjzhB+FARgbEcKPT4utC7a03qrmwlgPQYVMQ7AMyrfRLYR1

8RqWwylsMaBbHjLwFMO346KB3S6y/bUx32XEhpyXqkWaVPxmi/gsqxCysfupJuNUV3fUjR5o5rmdoG44

E1AGUV4ma1VI5k40G9vsjZrjTrQWuVfHQoqI+3KHzF
qGrFryOFh6AAPhJ9q1EPZQvDOJQfkoXHrwm7r8bgsifhupV+xQaLUVT7c8G81IDGrZtIu67k+xYq2MNa

WtvWvGrkgLatASIfbfVUKzrhMQcnW+z6w3G+DkJV+A3QbWUozGj8/hp+cqfSet4Um4/opt1iVi18+oIg

hgxRhvWSyqNtgwKmovhiG7/tvUS6eUAg9R9wus4oSD
7zM9WWW9SXbWvCk+WZpJUjlbFDbFkegweAo8FnnVbbljA2yahHH0WC+wYet8RZ4ylTIpmuc10dMzf70v

P5U86uF4RbDOGDnn9Z8YHzj9U6Bh7ZXUBqJ1VzZS9D0L1zGzNyduXT9CGSSP9iLnW9KXc25039n/eddk

qOlc3vd51rD2xKm1gzagS8k//CFYydLggY1BJIbdGi
5cokXY75Ga4Zs4Mha4sqeZRkhyXI9PBaRY0rkM3xzAgdREzOcfP2opWiCriwxsURKeuvhYQcnebCjEQu

wxg5ZdWk8QpVVNcK7RPGkqfTN0InNJ2U3VnB7utEA4JD2zTS2RzB4qzNoPfMgDkhxOZSZtfrFrUtwros

4XA6jXiT0Fkv+DF2WUTFMhREdnu1HiBEsmzQgnc+ic
yAdgkxt6w/qX4fKo0jVJOIvTGz4gRcsOqLRmyZkG1w1ZUfZbQJj7O0rDrLX/SKV34KiKpWz7yY53rkxK

6ooTnpEkDQUVfO3enEttuNSbnpwoH1LssEelV8MNt/KcZmziG19WjVV1JnFGIpGm7aBNJtqnpd2ZqOje

EeS0rXJsaUT9EPPv7yAWNsV9yRtMYkbYGZamPJ+YnA
2SuvXid0/nC9GoUl2yjpf27kPd5COoe6fqWZ7TJCy8m5pTyPWoS85FGc+unO4PKGNNJJiZFEdd3TQSBY

rWgOncef6NhG+c3fmWOmUtzJzoiq09TfY38xbM708F8wgp4SDzEDgl/HKp6ec+HH0wcfr2Geu9GG/gvu

a5k0/dzlfaXi+5iXxOmGen0p36hLM456kj/9T7Y/IG
gzbgMcM9s1/9n0/j9LZTyeQjSnOVIZQGS5Zr0poU3tMBRRRnlVaa034LUvJCuzB6/oYEgLSTGL3+UP+N

D/C0pAkVZ6VR9HaFPBbyn3FN9FBN6kYZC8blE1/+oj45Sml4+f7JlxYQzJ50jWGuuu+ba214rmQ1VArW

DVg06yNZulsb1e2ry+a12KrEeGAl4gcUYsZOwqLvJK
9zRIKURzDjGwNeKH6mdROsWSE3gyFO8lPleL8/zZ0pw/k6upVavloB0aB9mdTpv3dvlBRGTbAInYMvOO

vwNVGSolWBQAJkf6TCZO03ou2GFplQvBnHloa1fmIzYTuebNCoWnTpmXM4gfkELBYr0Sw8TfG+uxtZTN

Dt29Hjw6wv9BDdoouD58xqIp2utJ7bki/QJHFFB5j5
ZN8kGSsxor2Lilpj0E6tVe0q7sXVHDDRye+FMv3OITDZ7kJ+yaznbgNLFqC21y40IU0QDQVO/IwJDQFH

bJ0CwHx55pcC8vprsDHm4rvwfpRA86Yen3YrTrZoMSLNQ7mjCwtUV9j2cPpcaAULuLzNr/zJPzn4wdxX

+0ecZe7elt52SphD5VMo4F4HDcbx5KUIrkODoiZEpV
cLQKZzFkJIXuKc16CUfQ7E5wcBjAbUlecC2O8ri6OWQb6qULolPOFMGVOQ8he9AoKUL7BSjkQgS5cScI

9+4vbftSusPdkbTNYzzdghmc3HIKCDM7cIWjd7GR56yCWbt5dImYmwNevHQSSA9Fm5QgOF4ny0CrhPo5

gGwu18k4iFGj+IHfNYWI2xxk6HV0vKv+dyCeX5gPa8
zl5b+bsXYh7qSew1HgP3uk0Id174skIhMghiEFdp+27/6dHxnWFk665174E74afZwKEMwjP9mWbjRXDk

eCVQIdtBT43iDUT1NYypNWDJyYUSJMmWtWXlCySDt2uIV78Fx3HYNMyk4gJjdV8AZfCDr7oktfdbA11K

2xP/g/CJ1W8JtgT/9SlNewYaaGQNL88lagLr/PUGmE
Z7LnU/eVRY5yy8tzSbLWIikVaYoGqoUvEXNSXUfgmsEdikPQ/VxpiWJf01nBLnoJ7IyOLNvuw15XunPi

N4oLgyK9mm21/7EFnYaLAjIFPHWxN1OcHpWXcs1uN0ALu6xJvO6qHRWSCGJ9auJap2pBpin5avaVgCyp

tDrHMDxoWtK8kcUzOYYxlL37Hed+rjM9rDsLA4RtNL
1A8f5frMiXpz0af5qEZoUqI+meoJyoWdEkcJdTmitbPNIMV0NOz3RNsaKhNCPLJm7Z215Zt/bPueLjay

qj0s076YPUMe6tQDLYjawwg98sC+97hj9PMZwlS8mo+3dorm6awGRoeji/DPvqAsGLP7ZRcBO0bq34He

KClLXxqmUA94NZ3qTUpHfvPlePwpG5StV9CXouGHZL
T2iprAK/reAsaXmk9ENzb9m01vWt/vxaXdLm/A5sMCMWTv53v+o9xfvGUsQ5ElsYzKC89fuVaaQcgb7w

00SqNLnhyC3mEX+cyR96XbWvMyFrqzr5cX6INEaz5/6h724gEk+BA/td6M+Kqa4aYYbnHcMKvsodxu+X

+awQGputiA6vHy1XlJtYHymPMhNog6NxXC1r1FIIfT
USoKiqRToTPahG70vQ29NZraEfHfBk3chb+9uNXTnG6aNGzN6ifqP7W+HTGZu6/f37RvGEdQG8oK0mY0

eQgbGE2BNjM8nAOPe4lk2KzShxoqCFSE07dp0PoB7sprCceEKNNTGiRSAMnRvuXKn9kVwi0XTpSPdUSp

qedJ0nvFq9vk15j0/B2xojhHHnF8pHDm6LyGsHs35L
KZJWoCcxWY76UCJIaIBNUVrBZJfi8nnqgundKJXRyQhPbpRl47zBAEwh+lIbbDp9MVyqB03w4hggM2vk

8Ew3kmfeiFLR0xJAmp7FJxgZ9hP3uba/JhqwHRS0bD5LfErUILsGso7VPy+l8Sw2DSg4n2EnLH2RF1eT

Jf04EF/i+6fyeziriVB2ZxP41yMZAYhkhIeHBQHO9s
S2AbknQ8gQcFCSwoKWGSuY1NIrdqHE1dzXfGuUXKbtqeBakbZwgI9uXGPiwfCj38tlQbiwasyyqX7b0a

QYW3xSy7YN2O9m8PrwxaC6Y/GKrpscExOAQMIMiJLCUL3REfSLp3Q54Vewe7GQetTJXxylbLdVRD6JaM

sVurkAc/MQphg9phcKNS0ffQnDA7p5pHCLq5/eOnf+
x+ZiSbiHsTPasHDUsRRNhvI/fmTRaPOgUTy+fKNQD0uyV8RDY6djvND4gZJ8bFqZZgAYDSyYcUZKCsFi

lVsg+7sWez0+K7XZAXMkaYszgacSDM4HU9n+eJh5NmKnt4ErqCUlJbFHVfyKsCdOMDSbsX9P1est6Tb8

3Baqv8cfjyAfAHxdQH5ZpMipa9QHkeWEBg7PqoW1im
igT0hLoMF3buhHwkxZf7k6k1s90cHlOyh+0jUQHP8J0q8/4p2lv2ybb0mnnmDSeYxwaSyKomfrOFxbv+

zsyNxFMwafXRAqqjhn+qtvZ6aU78AiuCX98jEuhyiHgmClcWKHhz3PtB5149LiJJvsVlQwknRu0PRxc4

i/8nLmLCJgirPGfUXNk7VZVr0yrEEMCeKQVySnHxaA
IhUzxe311Uqs0g9Rf7qfQG27sApDs8/n2gX76QNSBcHgaeYtVPiaQqP3eK8aLyiP/eiabfCidh1v0ibP

ykEN0kIgXsEUqC0uu1y3l27VZS1uDxcayuZhz7Q4QVJ5ovaEX1vlZ0m27GdKt8p5LAi3QjDI1dgFf6u7

oVX+FFmzAsCyH9sr96o8vL7nIaKIyR9GmDl7enwj5z
3bMuKluY+t8/Hy9dmpcm7Jdafilyel9e7IGXzBmkOnA5IqsdK9DlKBEahiZjgUJMxfxn9FXnm6AkPAQk

hDVPhJTeMSsdHIQYkHtEL9q6FJgMGP0j4MVvAjaEOU0EtErh3i5PAshqfFjAGgYDZOmzpfNjAUcV99XG

fEg+FLSyojyFLsO9Vz7Alo9FM7DXgZg3UBCwayqvR9
BCqgqSTd3WWeqy+tgovjshpjSRxo918zUromQZG6Rjixhu5+G9/nf3a8xlSia9yCG7Kuz3SxMRPWw2T/

qZ95lM5NTMths0e+1yEvk1lvmqsWfaHuukQuTHqMTS5FS4+fFzgghjJJUlwchBdr1koaFN8m+Kc9FBoj

wIVPjPHYW+afj3hCj3WxwwQmk+nJ6vuQuYg8+Y4ew8
vLw6hCLJ0ykx4DZLY4B01eU/77CwCda/JbEhuqFGQVgvwo5m1iEumT3fYsv3KvNOYTFVr5Ce/yY5s1mx

6nvlxeFUzgUxIgZzxvrRWSxPZgcVMlwAkyaw7BxQ1WDc1MPmsOBReVPpj2+lRvkjsuj96N8XLz03jINL

xvbE6H7766Oa5VHh7qo+LTOS4APaxlpSpx0rYrfUdf
C2NeTyuE+amFvtSdoD6W1G/5Koat/83Dk5eP1+GEnULemEO/7kqw8gaq8yWnMTBvhLyiNSh16GBcrbeN

BotNGk94dUcc7KHgvCom4+NNA9PA1x2y7/R72o5qPEeyafW13xroF1OG9h0cVDrcH6H7W/Oh7XUn6etp

xtJdLVqwQsV2ruWI16z0lQe8djWbB3xSRCtEl0mAjU
82XSybgA28foy+W6FObpIwloasdOallRXW0j1k1Sr1egVtGWbIIAp9aJQrmPY3kCzQ8gG7ty457dZBrf

6zpGnNHCmgcb93dq66fhawjv9rJwFCLdaeo5UR1g8EnM5RVEWR8xdKZS9dC9KX5iar13Y1xxk5f+k95c

/Ww9Jh9+e+D3uEd4QzLCht/ObK4SKqmpinbtoEgaP2
yoKWsOS7yNZj2B6oVwzPxOcZ9lgpzwqvOFlILSDNLanE+8MM7d5VmAZidO5xmXDjT1auxzd0qBnx8+a1

MQZvId32FKf2rrMu5tOrZ/xY8r8G5XUQV/jVspFJmngKMYHhYoaN2rYEBW8tdaGBaOE84AT8HbDQ8egL

IkPmlmqe3dtbcxuQSxOE00uhbOuuErkT52qFnHU/Pg
7kfI0TA+O/qOF6onWkhardtEg+dYnHu3mHPyT3vieXUMe/cY+MSMBFpNfCwkxTbA33cety323uHpSDDP

BLTzkB2oga7Kdegfy5EgqYNmUFIKHtA3qvmk/iNkL44UYDhhMqPqodY3dG1v+Se0Qe4+vTVyKrfdyTwe

nABp5vL031kMX51U8GnsZUq+gq7sTnTZHSSADcEm7D
IKYLTNu9Xgud3xkVK9oN4VK+iPN/kyy4r1h8bNtjq/WbY3vTtzg/EyXA/x7cc9xP44lAzDrrOak61eHc

aky/7cFkjul9lsQcbNEujCTRiq6IjFaUsmT9WRz43bW3Cy5Yya0l0L6192FIVNt+pvNAyw365PaZZOU7

15VB+0/3qs7f57+KtLDBXDBJztaf++Oktksig47GvB
ULKxBLRDayY/trK0sasea4sah95urTZM61d+3yR6m4/fTR2amEq5jCzsOGwGmqlPPCat/8foHhI2w/QM

0nSnumvGSUPqjNbmBfaKsruIaWjfVplZ0LfqNONZgVYfu9McesvC2VqXLwabw5axhwM+kceJtl2etA5j

BZNrtCitqswOOo4xc+9as2Q2EeMYVUfzbA8zZqfh3J
1W70S6cvhEcwdiwLhu0D1xmH6vmLiZ9e7841tas7Vbykziq6M9+nNp85o+1I4QedFEotJPqUTSeH7McE

TrDZBIX/IJr2WD3dN3kEqVNBFxJHrCuIeEDbimfnUh7kHD9445VKy8Zc4zFNw5p9XocZ5vh0PlC5DVO0

G3gGldnUdhySWQZr6VEpDdafGso2BJ0H7Ft5bO8v1/
TAcWe3NuD9dvWExtA9DWFeLiOiDn1EwSAvOi8c1F/Bbj/Hu4y7/x3YtbItli5SPdY80ug09EWEClQE/x

QNAKIx26fAiQc0S5j53AXwDEa+5NkkZFKsnFAyfm1Y1/acakd+yOyATRvxWnUILGpkh7ck92WzO9Imd0

Pq+BQq1nziwOQkkggj0KnhrmHJnYFpMdx8NKzVK2pT
uhY1/7Hjbnbbh+/37fhCKGUdPwzrPrDyZIej/I85pc2qcMnzm5Kx+ACaUJskAYKFamsURkik5nhJWwQw

ND9+u6Pypcc3wSuwNxXHsK3nSI1TmI6sPi7jexymWEDbHAoqOrcAGFkqX+xKRsLnjtkTjpjBvuHyeNaH

PlW7ZZXTTp8Cf4AtfwD5ryuCt4wj8jD+Yqscg3jpNf
FJAeTnznn5DU7TO0EB5wHPVjbbyn4VNm5opsd97yQmfdmeTC1kXyN+t6HiwhmlgKzMT03Ms0/xX5N5s/

NW7+2+2zNu8a+1bbC/3Rwp8aA/0va5/ztnu+dKpfjF0GNVSM2BDn9UB9RQP4zEz7R7JeyxBmeyXs4g4k

maJzvR2+21tTLw2XBV+lItrtk7G5hiVyFPXGfn2147
SwDTgwkNLSFDV3Ovo+nDMNNOrXgzE7ss4K0IVyIxzc1RG5cx3+YUdD8d5KV0e4cx6Cx8aipoNFA9a9co

nRGxjtYOXTvZVC0YJtCmT2Bbj5EUAUr3Z1K3Vzwfl4cWnjOExv9IAZfBDXI8E6kCOjF7cOtlIC1PQAz+

owH13mDVvbeZb0uvpNQjojKtULYe2BHQhUk0n0s8vq
ym9024yCqVtpnqbt+iItyayltSEVW26vkeUEL4+Ga2yvmSgXuEHKRoFT3Tj9/MzhRZv6Zm33x2pi/j+d

K3Ql8vtp1k9Z9sancb7tq6cBWXh453Unn6a5twT7rvPDht3cuOxR5Bg8Fe9u/BJb8NHxgkOYZHA6crdz

e7mgQVQktyT4Mo/lMlJ2Adb4TZ74Unsfud6/5Zs++/
f/as+7N9Gm6093yL714YlkmoRBoYQnWwsOheDOW1MqE7zPHXz3AI4+1Wr5tRjEgkcbdqW12eI0fwQb4B

EsV/ZO9LfV1pa3kqvQFnr3jPUhg6/ASwYAIJuShFbRabQgv3boPO5Ulw/mQcyv6jKDzgWrh3WPCFVmFS

2PHLq/E3lfqZWafuYL+1PfXHoYBOKyxaiB+2cCEdNf
Kr+g9Fl/02Gr+ga6+sawX742+w5Sjjs5UB1JA8D4OOF0BOw/hGYGDlB2tRNubSfGLWAErCbnBVhDVASt

pLEqPEWoayLTvVqVottIHJOjZz2r+I+XrWn5yWTGkogrCYZeEQOOVtoOzhxVIUDamFUgLtAwKaKy7m11

vaiezfOB9j+DQxQ2keETI8kV+Jz+2RAHpTHnxmjbIR
6bZ6O6b/Btk6VDcwiA+E1Li3FEarwXZepRCFtFJfEwAo43d0PCT/T37siksz5J7fvxpRuTSDa2bnNaXh

HK8W3kCBdoeGFOWx7E0A+0jhRqx/OwFtDge2QYMaR18crgcYJS1iskOdJqMBRrzBLGBc4VvWB234NOl5

vv/5+nfdPmVV3A3Kh4A2T142UuZ7lmeN9vcaBWUFsK
9D7LzPkpYlwOHxxAmeTIkTdH2IU+jSmjO1zYV/HpQ/BZFgAaB++cschqRcBnZnUReKnc1sOTBLalpAx+

2ND9DwOQT30V25jyu9c+qR2j2Ds4Qx+YjpmHINKypod3M76QmJbqfBsE+mwwSsvxvSSulTzkMrELdjXd

+Y/UR9g/QmMR9xUH7/8r0anQiLL3badGKeOlP1HF3p
1Cn41jsheqoaOyL5OuqWEpDXaznchKfgvPkwvTJGnshoZJ967pC+XIpJFvZUC6cfCwEK829ItuPOHYDA

7Hx1LSK3Yi1vE2FpebEiQxiL9TuSJCX9pavRqUC+ZsrM38Z3Io+HR/a10kNaIyBywqrQqL3iQuxuQhx+

GW3nN7fGMbYdWx5fYyzsjUtic4y5ZQ7DJht39mmdqL
kE5lGtycoq1zAfvW0iXZy0OkkRiM1Rz0Y+k1K/EK/kLjW7EaIerZijczlYBkVsnOO8C45f2dKZlaVmqn

hbsiLaN7jHtEvRWBdoyzhwtbDF3SRr1K9Ijc+DeZuEr+G3DzjL8qxQpshGcHKhAN2V7iwg+S0enI5XxE

+tWfaBX9E42VFD9W0zDrTw0+RNwvU8/GPgBogk2gQC
TP9590z/QfI0Z09XvohR+1I+tKt7HknDcEJ/Cm3IAYOl+dBIC4GOHfbrOXYWcejQLSlONaE1UGR27JG4

bmjhpZCEcnMghLKF7h9zm2oF1ZSy3I0VEdJEDUMb8Ks4DSgREg1yQg3LWikYHl53rSS+OuMk1hnWuWk/

plOS+QZPRZEqOP/oMaDBcAUIu0aJD4Ptk2GgUJ8g/Y
PyDkWT3Xv1Jq11ZR4RigcJg0m2QgvJJy5kqWcAfE9J+L9RS4xvQuV2CF5SqMHBWIVY2ZwST56oSQ+rsB

L4bBCgQbwidJq8RxBtiM1d9w8WRGgy2yYAZxn3b0AjZ9TywkHkoWKoKHX7ZDjH1yTZhyB3YMd8m9pZ7R

Vij1eCirF1sqm4yvmum+E+pumxdTmryai0BW2U621a
xVq2nRKSaE+c4DUrdkysT6Yk+T5ldQPMT0zEZVF+7LOAFQR2RLqToQ/7mXTRHXCj+Be0F+jZSvByfVEL

52PbX+MowFIZJid0GzujpOCJ4zOMeO1upvxY1nBeY2NbrRUtprwIWsgyo/Sa5qtZK+Zbz/otQd9ikpTs

6tPBJ3gTcXw3CyqeABrL2NhX1VH840noG/r4ePZNk5
lusOlnN57r7tXCqH6vFHfkOJdAoDVXuOn04bWlY2jzHD50hndTl1TqnK8/JGWtnmWKxtKeq3Dv/2TtpL

dC92uilxEA1Qd2lNoKqdO62Mr+8yTkLttqkRjkbxZt0dHmzsBUTH/kL6yDmByuaJW8ZJ8JPwJl2eouni

2F25z2mn8w5ZtbMnxG518J9+nZWrJlyEag+aImIENp
7FFQ0HpCeHB9t7/AY+GZLFK8JKBeZ/bDzXAW/Zpy0UchsssIUrV3P/MFmHh3N1PtMiQthsWv6A2Fdxch

5TOH5EZotgbRzw6WcyrQTEj2Ftu7GcxldsSuKWlraBSrjreuSqmC3yaCQAbE8zD7Fold1cQpx+ByaSos

Yjbb186FLdhyytS+J9+JfFKIz3+M8Vhma0OfnR7b0n
oM/4D0FCClLhv4hqpInDyMG9pC0fKMpM1COxEfidQjhjGX+BA5PtyJ7VLzwDy34ajkwlE8ayW1JDZOFm

8blcTzSU67SqIc2tenqlNdQCbSZsz8uPKZfeF2uirsoQOitau4NKblXEm5Qy0AQyHwW8wjC/hCoNLI7K

y9NqSxJya/MUIsRULqTlNrcUtxwTJTFgfRD8Huou15
Qx4PuA4DCwQ0MzBrEv/7hud9MIpauiuQfvNUrg2nK36Px6gR4dl8VNlwjmi6pE5WFyQ+daY2/Oxve5vO

HsEO33IuZ9MC/wAfpNAYUUM3/I/u1d4Hpg2F0N0252fXggFZSSH4tF1toBCslCm1DdvEzcIlWO9n0rv4

Tph+L09PGz/kFaA+B27Ca8fO80zsDwOCTTTJE7f+uA
w0J9NdyO9Gf5wns/5T4XG15/LK21TbN1GyPQMucdXrN5j8ZW6MdKLBROjogrBbqY9ihbX5VYCOpEi/74

NnVPg9IZDL/IZM5m7XKLZF8hvr0W/iQ/u8g6wAyZrkP5fxT+stDUK7SNb74dFYO9h6yz4hn7t3WOVUaW

5P2tzo/nNl/f2YAKikG5lHoLL/8awUKB9nCNkrM2JW
kyYOOWJ5wCU9A3s8bDT/fpHgWpc7KtgcSK18r5mEbOTNZev2SUiiZE9+h+FPaN7BhbWE4hUlpSnNiGwk

eNZilDdef/0c5fDITDFh1a0/sMKlU4O/QKA+P/xh0m8tYDd6t4FaphM6K/1++2JNJ8v2GtAoBdgmuODS

o0fxyiBlIaYoj+fXEMge+Ykf+n9OT+9//KcUbZZLsJ
1RWvNCuizdh/lByMsiO6XetrG4/meiKWsGaVTvB/SZ/aS1Z3Oc5/XwaBcK8zv7eRNfmIAvYaixH5fiBt

Ulcb2lPG9/bTcJ1tMfp7ImB8CqdTUe+tCxI2aNXOllcVc+Pm+VvGjXXAy3Pp7pRXwae13FMJ6CTnboPE

vcyN/foW/2UMNraBH6sDGesAyHdtiPwk0Thf3nrJ4O
mC6U80syWphZf9sNL+i0/+v4f6sj/lt6lwQT7VO7KEwMMolTJvWj8k3MC9Bq/2Nd/wR3UrDZ/2/jpp43

Kl9IFFO7wn6cjZegJ1YRkdZ/p45568oN7L8/Tt2ui6xpuIrpgejxjeCAFaNzdNyp/waWdbIj90P7dZqu

z+EmxDOu1XvFCbGjbBTTaS7t/zmpRdgKOwa2Y+PXW3
3SSfeuRFOuQPlBr5zdTbjjT5tG0HSu67g4kj+BcY+0l+smWAO7xji4iJ/PAfr3GnOTq6Khyf2JuudI4A

E6EStVG45+GLL1PKZdM40gJBc1OnsQqMUEesU8xUMA4oZirtfqQiBr1gUxAdElqB7P5HeJkrZ5RSqol1

pcADMhxVOmp8jiETv6x5ZRaUzxliWE0SwKtFb0KnwQ
wOZb2degqdOxfLfB1ToR1kxoz93HfgK3eS9xJzzsMul7lu7SiRQhGznypF+8m7YpKmnpcaPJ+jHpO6Yz

rNt6Dvg90vaaH7+BwYhaSd+nmkjrL+WcxY9hCa08c7AkAOQ/l7m7UY42pjkUNnwVJVgInL2vyqzU9Bjq

f6o0flPAdP+dSPX4JnaY31c32QyiYiMJ87yxvUcGs1
1NaOodNnu5ql5OlrWu/ZavQ+pbkCiseNJC69iAdf2RrUbRs72lxMapz33spYoGHA5sou5/FotA80NlS5

2BOy61SliKWdYt+zn3XXWp9B/aS9tFdlUQfb/gu771xs+oNTGw9NXR9m77NK+zr1bMVHsBEFnEl5ZQ1i

idBlhBGp8CWeuldL03cQvadT4Kbd2fH8w/YX2Gy5Qg
IA9K9/O1mUdCDV4kcn3pHxg+gASiILsivwdJccC0QwBkkgb5N6CYxFYDy0zwoys+bSfSRcIbfI/RbewF

hqXbYQJf0+qsWpoIjYamsbKuxOQhG3uV7YE+0dphoYIF86mdId7BQeygYoJJvS6aH2NA4Rge2sYrseC6

0P1Y5GIdXHla/yPEqx1UXabZk0QJT4x/o2mxH2jzxm
O4fQ0X6oPhJDPQaIZ5efo/Rno3/0fVu2yN8ZiW49qz3XGZ55baRBt9YDUGiqTC8Wo7qKSAQRuFT4r2fT

rrw3zYT5jH94m/W9uH0aF6G2M7bFQpTK4zkXwBK72/XHCdBdxbHsqTPu8Iq+rPSJsXAxh4YP6SEhFeAX

B5OonjqxjWgJE8rTT63+fy/Yp0td99KJ0UTri/0I6f
S5thBLrn5E/dVlCvrA2qp1dPx/LqBFcxehAxuVO6RaQ7fQa8DD3sB4qyTfqvbbN3y0Ifx6I1JqVpvu2T

iY4fWkWJ/3aH1sPK/V/pURH3rqvPQbckEKZHWkF5ZvF8ce8uEsicBouL20yTndP3HPFKPo7ufcWgw471

q9RyT5O+fPvzSH+hBr3wCaVz54XK9U3KaTHduIP+q4
x6m/I4v2nxir5haz4ls85bV8lKBL+vpyFs01kPxXx3J7W+NbAgvn5j0gBnjdHi4W48eCaCpSSavEgXUg

oZLG8jQtosQCyTetYek00evbnSm2ochCIh9pKWpZWZgPmVLnfdRLY1eQD8UFFhk37wgzgB2B7PR1DqIq

jRpo3RzUjqUEYvb8UfyrCKo5E7vXp9apiDJBMloJ8j
QmxX5ApKaW+D5f3HgzxiE3lzQLiCL91E9K0D9oJVvxdeNZkpP0ZyRT+Pwa9qDPpqS4dB6lGw0Vp09GWQ

QX0KIzKNzypJKC92hhd2oNiGqGkLtmqYv6odIV3hbGHUJLOgfTxessoSujpntCh4l8+EK1Ow4xbVVD2i

fowjzuVtxvaiHSahb+13wm3LWkiy14t7OmB9w4xALu
8p9Q8Zc49NzyrOrt1dVDIXwBilLM9LeePrlZ/ZzaX8kblyH8iJFc99KTTugibcQJc7IbuPDgki9xeG4x

0COxV9ntwr+4uTxuNExpWPnhnl0A6kAkXmNwGIDbfQwotPsg8BdI+kpD+T72/FAShUQ0PuJ/s0E+K0j8

mIRCEq8Md+l7pKtWQAv+oeGKGKSJ+GmcoKGC+Pwn7J
gPHqi+IEv9AG0WAT0IjmMJAV9l4xc06JAzlpULg7DiM7+r6YVm/McVpz+nUjSs2DH936WhxWIntcyM7S

wHjrw5zhxgvDT/ofopnhHZiz11hn4hPDhvcJl7gPuPlX+jPyVRrOVTh79ds3v++e+GfoGel/bKCLs0oP

VJ1mjz+h4ir3cybPU+1ydJJ34AGorYZm7smB7fx9g4
epzv2FRfQTA4tSrNFLw+tsK54sZKv/lVW6rPwHR2+9mPRCgxYnz+Wcgaaek+mkmmbEXf/z5q2l0vWy8a

QjN7LS4Qgw8eR/KZXyMQHRsGPJfh/B9/npbM6/QfIM1y+LV46UTFWCS7sQRoqWibvNyKIy/J+Fchc+h7

HR76B2d3GvZa6Ed4cTNfJpV0xEuKtRtMn4wZ8maD48
6TQqm4vfmfDNJaZ5Smo+/qxJHuPMFrmBr1iDbqSoMp6UosfRkarGrKbA5MudV80+uWo3UO11KsOMDg6E

uf/bcfzfjsv/1Xv/s8wqddrjZ9Z4k7mgso77Miz/2JRR1anFUSSG2bjadFmJ8wydXiVsvrDdNxDK0UvW

/Bsr8nku4FXc0XsDkSww/jTH6GQXP/o8oLM/OqD+dP
8bpbI9Un1L+7ny76Vur3/gOWfZPgcQdvbvkB5Ul8qphrGEQlf7kD97l13MN8+1+bTxuj+rRS93Mkg2Wf

yCWOEp3U3Oc7AGcNC0cViJ6sCEuXmhJ90OVX/ljyld7izpvj2Jy0SvW4Z+tV2R3fm3NhaRdCxTwm7np+

AcJc6rXtszMd/O9jbYJB7+CKX296UDlRVgI9AW/Arv
o71A+54vKSPBxGvJT47xTySe9SqnP3xLGhIJ9gK+DYjgSEnltJ5OpyTh+JkcGqdXUAdQ/eu974U/X8rX

l+edOG8MBNeGH6mQb/UnVLZPjlvqMS6NaTdfpTqWX558G0mOfFrZ5zSLG9pzJLVdB5gnbvG/eD/aO5mY

wyBtJ4HqlJ6WKsNMcFABj7qoRF64aqjDhoTaC0TE4T
mAvXgTkuh61L8px0+U8FnPC5if9VtO+AY7ZQoNifobln0BsRSzr1WnmjmGyKzkaWT4gvA+b4q457vfng

Laq9Pge8se4EfRqhyNO5RapIvlEGYJ18E+gTgaNgcvYA9XupqHpzd+A/lN+hQSM8ksHG2rMF6ALU9TW1

R+GncXK7IVrpcvFi6hVvtUMlr4WNvYZndtXrPBc1Xg
5qNMmLppC4bSxF+Dc00tcRuLgJ5XtcCHPy6gMLiaYuG/V82m33q2/eME3xUh7gbx21g1honeRWCc3Gxy

vTupcFZjy1i+Y2G8KF85EU+tCxg8FBw1Yn0cFwZN7uSB44S9zmog7hyPInHDcLIIkiJxQbi5M35wjyGV

2uYXmAdk0Es6t2Bi9mq3WLVp+UjK/tMawv/X+1tC2j
7Uz+0XnelsxpnOavwk/l/mxtEb9PmVUL0bwvW7AN29W8u0VkrhKVXcl0AV6mKJ74LfcyD5UHRDqhwQH4

H90AwEyyO1Iebp28XwsLDK29L2CE0rytw1mz4BeOzQZOf2FC+z9750svqW00RdhH4pCeVCgbjYBmquyq

wbOiqn8b7KFJmpf0RxzAlMwEmx6Dcv6VPM9mum7DC0
NjNDb2dWCcy3tABNJ5gzzY5azNlNW2BKZLdIKrRN2+UuYxDbn02RKEmnh1DM1rqRlbG+1eVVjpilyy/h

CM5BAB1xDvSXNU/MKw0924asJz/dGa1ePwapoyiWNrkFXQ44ou2/iUjL1q6bfiVsLnlrweK723MlbsxM

ZzYZz/a2c0Z9d5ltn+rdR1OYmj1C+y2Pypup4FzsMf
kD61xN8dUeqdR2Xo99TwzgJws4O1g/p6fG4Jp2zaLIf4LjHK6lR9H/xn32fD524cg7IlTdhR8yxSlSrZ

s4S3Gl67mDvb/py5m+BkA/1OE3QrkNncWpuRmQ4/vFHdjhUzz5FWez9OQ/y4wU0/CjcvQ32nJ56GABig

SapU7N6+nrVO1/bb9YSa9j/hO2jT2dV1g9MmuEM2U1
fHbhQBwik2kVhRfYoXe2inOS/+5KdE60onsdqryK9EKvKYwnYKO9dY2W34e7/wKN9So3mPfeg0IqRpJ4

SNqr6Ia57Gz5EaZonWH/OavAULa43Dm4ykoPj36ErEY2xB195GzCEA1Fm+Q/wCXy9/E9fAF5ghRINGX+

2yG09dMCXcrOwlljYAbXX/bly8Q41txH8i21X6x9zl
A/9qZwmaUovGdritYe6dfe0MpAUoKEDbeMlvb5KynSH+T8OR1gLP0bgz56h5N42S13u/4F0+ahfPBgvg

/euqkNMTU8zJxfyAUjjdJDRKWzCzZOGHqsNa6Om0wnTylGP1C0j3QdFO3la1SDMHeQR6CzwyKvxoO1Ga

vSwduRCmpHetmp+YipA4w6ZAlTWZB/XT1IX1q/DeLX
gGduJb4GY+VBp/REWgwbKPt6hmu5I9nxahvM7j1Ym5TKatR4gni7n5/SJQ8BZZ/QmPCxMHhA/BXWTWF6

z3rnEF5t5CDuwxmeyEBCMSjta/tgbM3qcNIPb1bG1uoZLNtw7FTcEP2nqsFh/RYjq75c1S3+uaJ34rbK

0NW68QZHEpEnTjsE/qbdX6toKgH1z2XPUY5rCKEbyv
OD8M5pc57LADCbVOCfmdM13naOKpon32z4vynQfi3Gy1yWRcsRyv6dI11TQMlyLgjw3DN+HDzNLr6SEL

iCV7VEwSo2OcDjKHfYp/m9MNHMWPRtvb0TKQOTOuSv+Xwe/6eg+c1i3urzW2JkhIiH6lXZNwpoEeP3Tk

OO6pCuNxuA0TnoAo5DCabpeo5lNsjxZ9o4mS8ZiJJQ
1rIjyUwWhAn4W3C/ed/DRJzuGYlZmEfFyyaDR4kbyE7FCfe1Hzc7LMmzcjLXOUivxcS4HziV18pQrHAY

1nsYU12N3eYdXqk5yBDcbFQIOy6Gc/vKSyV4gPocbXw90S08gk1xAHD6EyIun+6sy8/F08/SpJ+JOVO7

zwBYoo2tqvTCvmMuoJDpa4YO4lG0JjfA48XG7qZoEy
P5ZVoijqvAG19SgGwVHGX6du/puf//8H3eiAT0JqmzTxt44QoD9SpC/jrKv4LdSF//whnpxS8Qp5VHMH

U0PCr/Bzrzp9LToN86ojOI3QDukliLuqmEr08h7EVnhp0/fazHrT7UqvX/A6e0cw4vBqiKQ9tlVRtYsn

J52aSWkvx9C/JWX7SkEkA0XlEuhJrXNSqKUs8XtyMs
m+r0kQQFOjddz1C+Zxmh+ewOqEA+G3yKz/lxWTuNN4a/VE+DLGEc+oYjgEvH6wNUdhPysY26ls58s3g4

IHSDO/j3zmpBD9gI/SqwObqnL5oOmYRmVkGq332OhGTYYgHeABQe4FL0m5yi9Z57Ky5ZL+6IRUn06Vub

mPNkLgwlIA/u6bWwTr7aWCruN6o26had8fE0CIq1yx
S4BkOo0wsL42JiST5d3iwBLLq1vgz21T9w0M+HDJ+/fXo1uFKMotFdCwm1FXfR+vedkvLfO9jKb29Z9D

IFY1uV8IsBkImSwXNpMojtnJ+KH4Rtbejm+Or8Vh5KZFHzjoJWbUFSsFYVUmdqjWvtruHaTNQOANz9Nt

4RduiOnhXgQnMFRV37bGqb1VgvAfdwHa0VL1SMOcQ/
iaGTzq+bOGFAorb/2TX9+uf76l4czkApT/vgvjg0tSr3DT7nKhpjxB+NWBZvPjbD7JVsiNaU4Ne2naoF

YbdZ+f/xOKrZgk8dKMot0bO3bYtmPac+/WnAC2xNXKhwKKhztiBI3RQiHNNg4SgiP4FvBHzOnQhMIBzi

fUN2YjNJm5mJ7GVPXDIopLFz2g+wkkpeovGC9h/XIX
Dzs78qJH/WiFlNHaHYoZdSGRcVK+r3TYqoJbmtEwWC0wyRQmUfLwWZVoHjraVTJurkVcJW8iPzKqUOiM

0IduhtfMmxKP6jodaenhWNdszG4n8E025jqWwvQh7V5NFfA79yJ/9jlQcMnyn+E8X/jUqX2g0FTAz0y0

FrtBDAn8pcRdGVOccvSn+19V4XFAEohpoNR43oauYd
aCl9PjsCiVFFfYsxVJyF60hGlsqcvXdnKu+1NvBGw/rbcuZKK1Q90J8tU8h/bO1vV3GRDAi8kZpWWpcb

AUYVdiToKUpsoThG5BgelG61fJfFAyHlhiKGtdz2N8LeToY+5hp1U5N7WyfBuZQBTo1a/AswX4KKhLWH

+oGiQ21JEP3GS9Jl0tqKQBJhFV5t1ddvoiUYl4Vh8+
Uy6SE0TS7ge2sK3F0wurVYhkonxia/Xzk6+ACeHEhrKlmcB7kAdQtcvsOHyu2YbFrrL7yJkkG4dEy6Sa

byJgYEs8hxI4SrXouymbT/s4bMiF+ydoYVMBCCz8eYCsewpmR8QeQVU9BWG24DtkgmWJgVDwcdyN++kb

c4Ey+BKoA/wrkgiVGo6cz9KlHm65JmvoL2fCuNmAWW
tsfWJ0bOOZ3R1lbRsNwcyrhTqrpZ9xy3uR4fMpfRLw/vvPEvgvkCUzW2FA/ZZCs6FHWdDKA9G6xc96CF

VsIH4QL692GVMu+gwnP6QeRD/laoXJ2z4ykgCh425MfGc//jSR6Vz0lnW2qytGl0SVGofhzF0WdFvRNV

pUDQ511iBkuPZTcTTqjXbvKjB6QTRa7JqnoOY+s2yD
nFV0VBN61GA/I2jZnVbBEyIV65YU+xmwBmJhcwkfq954IJMPPNxhja1qTRCA9R93Kuck0fsTh343Tirv

RVelyLlWfMgP36HqPSAZ1UH+wG4phEmUXQJ7JAq7zTZLb+6rJ7c1k3Fy+bj8WOF/IHJn76xH8z2QTfjt

rrdiBnPWsBwYALoJoH1orvg5WREg/J2R3Hp2NC9XH6
SJh/zSw2V2zSxzHCWdsHHRoynAkpGczhBjRCxxFRdYRGioqLwCy1nFE7ZzMkMQ5xOGJepGvAcLEnKnrE

8yK4vksa45rt93QLVnuEy1aVZBskhBh2PT+b7BAlreMpGW6iultmUVsZXzO4QrMk5IeBiq/5XSeiPvEW

54XBfLFIYNvQRKrUD3Qa6SQUviHL444OvPGPhrco2+
2U4ixIP5tFmxIC5LSst23/9ba8yV8BPSuQLgfniFMoNX/N44MKf1h/ixNlj5f5Si8VdPq03iM9+ZYMnO

WV6jusySntLW3CSE1jwr/+D/BDr0DBN11L/ndk4r+e9xay4v4RkUYpFbpon+ovAJNZgKk/4QxvVOMxC4

z9DmB/EuD94Q4PUK9hRQ2Z72OBGPwyVCf3Cw73WiTu
BwBXngK8//n6tvVXrmV1C6nNd/4C/ObsXO4WHV7gj4JcDJJgXPltohHgQupANjTzWTLjs3DzDHvnQKb3

B3AtgQHvkgDsRktkbMFPREDvGEBvW2sprxj3lXEcICV+Ut2XVHJoaZIaWC1BSxpRxlKtbaCqBJlk+8He

DPbxf6ZZXxqgBJDj7Ra2uL2WVtIrnRsTaEs1+2lOaZ
deMErvn5aH/rRPJL3ao29pmjWe0OjkaG5FGmiYNed3QbVRBrb74k0eBZmdnUjUBFFvrWh6r7FOn6eqX9

z2dc5gjrny1EWy0c/izY2R51d7bRSnK62qop1AQhN4ya8mth8keZX3hTfwT5xnnb7rKlr39BKUvt/7Y9

RVuQXd8GV1hYuAYe+6ouGjZb18DytFoNNy0fbsUF0+
64KnbgjF1hdI66NlPzLaWfakR9zU7jhQNOMgNuHQACiscOwzxRWnVKICknlX1cGBERW2sSsAy7VRvSTk

UGnSY3RGDIAJ2EzzXlgAlzBQZRoWAXresCtVSfRjRS4pHqmkJYRmvNUvRrxBGJZEUnWwrlHrMAIZCqBP

yXVSZkjArNWM97S8uivHOn05LuvzOWHk/lorew27K4
L+j2T9iyMFvz5/jdiDU/c1Di5/GN/5pA65+QHzjqruKUugapWlhVGsWR7WRkCoYE7vjh5MGrAqBAVlWt

zXQue1FBphMN4PkDArK5VfomPORJJhiphvhQ1sXMosFT4Hi647TpSdEX2ZTPSGRPQuSGWiVKvQPkRyJ2

FwB1JvtHL7JEGdX1HjJLIhU5l3XDcmEH8QIBQwBT73
HT7hMFZIHfKiS8ByYJwdBVJzJLrfCC8Dg722DjHtyDQoKQNhTuXuHMXbPQGzKOV5SB0XCDSmPDOmzXmn

SX1bxRQiKC5YfBOzSrWzXRthFG1Ky663VkgoKAXpLnPjXLYLZMv+Gr3FZV1lj3VkJTdiIcDmGD8atp6W

GVbTEeFmQ0F5gFYlOu9qgY6LrNC4lIXpW0GBUFLstg
TGvHHrAb2ZLLBoJx4sfX7ZIUAEUBQmTMRjBZafL7oZVQ3GPAOIIUUyBYGeuiDcuIpRPnOsV3UCCRJ6n9

RslSfbEz1zIXkkCxGzvVV3tzipMHYit4HxVQ8HzcXnxlkxJmJqWWIquzEjiCvgXT8HlLOysDSwUO70RZ

A+VuVPVzDfG4PxwwYMBJGbqaxjqF1xXBEqXOWeDl2I
SWNzGWdcQJMoMJ8BYtTeTlw5FJ7eClBsZzf0PLUkBPWfFjv1XGBvAyPdXwr2AXSwIWViSss4UZY0FAUr

WkSdVPJ6TJHlFFT4CBR3WOAjWGGjBLL6PQWtFRZ0TSE5HyRlUOQ9DJP0GtRkFsv7AGX9ZKZkUDQ1WXX5

BDBtIRX0UBG4XgUaAjNnRTZ2ZfDvJdTxXER8QKAbJP
UcXJR0NKXzDqChQOJ7WCHfNOSiFNS5QJFdWVF3VYE7BnUrDLZ7EFX7OeNfLZA2LXP0CELpJjVeYHK0Jy

VeJACdEQM7HyIwLps1QBJ1WGXvBNC9EDQ9LRPeQvYaUTS5XWSiQVAnEOO6UtNrGQHbIASbVeEmLjZ7XN

2qIFy6VMlpERHqZZCvFQVnNwGzDu3xJX1OOw7FDvNw
BB8opb1TZbUpFCNmSwzWDhw1KLckQU9NtNNzR3BroxEtM9AtaOoqMW2MnXQpQT8DFNEnLb9cmA8UWXOY

VOLsGKJbRCyaMP9ko0RoqvxrRJLvgcWyaZjpKK8RMLNlDNOyC2BzBOEhzMMovoYvWGsnPnHfHEVkZUgz

WL8Wo0PipDBaSDOzOzMoEWTVQDx+Hd3DRD8xj9EgUI
fuQXLsFT2ewq8VPsS3EXWgPdCnAMM6JZNmGeifUxR9MOV6XtN1JUQsWZt6QNY8FnRcVVDuZrPvZWNcLw

IlYLbkIIm2GKW4OJPjKlNgVnf5ZPI1OSG2KBCrPZV0HYL5CxF1SMRkOOA6GXV2BgL8JANyLKF8PQP9Bs

L5HCRuRya5PAN4NKX5DVRrNBc3XARnEQu3RFR0NzPz
WZC5HJF1YdH8IthyMbHaTDndWeU4WkdzAtTlVZo6ROC3YlPnMnd5CMWmMHG8JohtYSD4HKjjEdA8OyMy

Oht2MZC6ItI4CkuxYhAqSXV7ZbN6ETEfAiCgOFB3IlT3BGRbBlH2IMA1MsX1CEHuGChaUlaxCxu1QGT5

FGT6VxqmNTA9UTXhUqA6YKIiWLW2AZRxQZY0HDNpLG
P4PIU9MMD4OQRxQHJ6TPYtHsUhEiGjLJGqBRBqCiB5IeMwXSQ2XEX2UaP2WMClCOX5XAPsRgA1UMLtAy

p4EEO4CfA4NVVfIpGiRNVxAIWXGeQbRTW5PWNqOoM6NTR0QEOaKhJgUJo8CLw6UOG7EBMmWfQcRJk9YH

L0YAJvJqSqISo1VVW4CQTuPrCvQTn1IBR7CLEsGxUq
VBm7CRK6VWEdGhKeMIj7HCX8WZYuBbPyMRx7GGL6OSSjBnUbJIy7GVI5TJFjYnVfCU6GEjUlYLd0JLR1

HVAkLfYgJZi1XSJ1PPDpMgBpEWm8UGM2ZNOzSfm7CVDjDwZ1WSKdYOR2MJO1OwO8VYVuBjPyQMH2HtZf

QeLuYjQ8AQK6BPV7SXYaCPc0TJIbAgY4RklxBBStXX
LwVEU5WGqgQfOhVDAoClUiUfEkQGm7BwAqCUJ3XMJnNjRzBfHiYcWsQDZ3NOW9EPJmJYY5YOiqYDN3Wd

PoZuFrTEY3DnT9YfxmNlO2QIO4ZvH9ZndwMuA8QUPkDWIzVwFsMHE8QrO5InqwEbF0VSF8HsEfTrdkZx

f6TAY8INFlTqzvAaBoSIgqVjD8NqwoCNg9NknsTnq5
PFj0HGW6PmcqBUx9PFg5DOF3IoDdCiKqWXecAlV0YmSkUrF5XBH0TnU5TWQjFCK8IKP7LcE2FUPzQZC2

QFG0QpV6WYReKMd6NLUlUYL4HFMeAQU5PMC7MkB8ISUxGoe0ZQK1QYLnCygyVsm4RVZ5OzNHOyN7JwQ0

NGVxSTK5DAO3LvZ1QAXpZTF5VCC5IIC7XSCwINE4RS
B0JgB7DIRdVOH6HWKtHZQ4VQEtKBPuQI1dXKvstrEcUyvUGwY2MVYrp0ElKYh9UF9MJSFgFHfnEZ9Qs0

02WXHhJ0ZpkIRjsf1NGTTpOk0fwF4psQRlFISwCElsWGKkyUfuXWqzGL9Vt5XatyAoTCM0D5XmaAmlzB

lakAJ6BUEwLVWvS7QsyDKkBvCdVMrfOI2AxAKborG8
Co5KSWSoTd1ihBLEb9qiNsMpEBWqPtSrOVX6OGxnEE4SRyRaM9g5LSzhU6XxX2znGWCfP8SyvOMzIO5F

Mk0SLeVwKG8izt8NZnjyUGAxNacETku6IRmxCN3FnOWiF7CrymXvJ6JpfBspZV8TlhToAYxvLK2NTDXg

Li9fcR6IvznmaGtWmGWhyALwSV6xc5SudzihH9ixTU
3smERbY10scL2uFWxvTF2BcNXtwGLlWVWcGtMtERWamCZbQDWbFiH4YEzhNN3SnUY9eETsPzUcJPYSIJ

nsEX7Jz766TPAiJ0VldSIdjnLcPdGeETVOAd8+LEtyjhTdEcoANvT5WFWei6TvSBmtNE2GBE9vb3CqWN

ruCBZzk9EeOYl5XW2FXUPbJHUfO5PqrSFyP8IPGz8C
IDy1V4iuUZoiEt5RvWZsLXUeAKapLK1Jx993GAg4VS0DOTAaAcIiBFFIPkUwHPQtLrDjLPcuNEYFGIoa

MUSmH1WxFMP4HSLnPv6ENFAxDO1YZgVnDkVdHKQ+Dw1MMMZfGM5ipxAlmSQ2ZET+Gv6RTDXqDPd5O0I8

JGZmVFp7D2TYF5JEMVQhASlaYEmyQFWeHHp1D3K5TZ
GhA5AQU8Xfxujevd2+UZ1US08OUQIcNKw9A8C4iSAeV2S7hQwMuTW9RO8AML7OfUu7wATthC9+IC9TU0

GYHmDeNJy3D5P0tZBmU9M4hJlPfBG1ZQ4TVB1FkPTcWVDgycOwGo2iI2ZQZWlPQnQXFGU8HO8GuCNsVY

0FwFTXT3DpvPOqOv1wCNhcqTDwtJ4xUo1mOPibVW9X
LyZPAPvOWVO0DP1FtVWzAR3ClPZNL2XlzRNbVv2dXOfcdZAfui0+JI5WNIWsEh0XZw5+DQplbmRvYmoN

YaH6AJAho8HfGGu3OV0VNU8esVarMHY7Du1RnRA5wZQbZ3fLPY8WjGAmX83rrWWyJGJsUe1HYeJ1daFh

dS9AHZ35jMTuq1I7JCRsE9hkJNkss75hGYszOHqUSJ
8rNYBAIRojIRxbMPG3XxOqjavmAPSkEe6SCyTqJKx8tW4hhIJ5UKX0UdgirFXnJLwtDoBvXiUfTsQ1zN

dosra2FZsdSP1eDTftlaxbVKFzYam+YXquFXEhFFChYatBWBIcjP5fbuH5rlXwPCequCOcYs5nl8a1Zk

kvUn7wQi1pSLv3BpYlEbVqMYTqYp5dlM02SExlljGt
Sy3PByZnVRL0P6EfKejNNYM+QPnhRMlvdHj3wOMsDVEcYv1NLUWoWAZlPBZiNSZaDHZcJZPcXIDnTPUt

ICAgICAgICAgICAgICAgICAgICAgICAgICAgICAgICAgICAgICAgICAgICAgICAgICAgICAgICAgICAg

LVQfHOQkBBZqIJZwLVOfZP4KBNOzPDSmFFIrWJYgIZ
AgICAgICAgICAgICAgICAgICAgICAgICAgICAgICAgICAgICAgICAgICAgICAgICAgICAgICAgICAgIC

OiQBUnNTEvAXJeYTJoTYMbVVXtGWCvTFXmIF4EYGBwUKApAGCiQTOoEXLgIOEkJTLdXMDwURZcQVIoFK

AgICAgICAgICAgICAgICAgICAgICAgICAgICAgICAg
UMSkZDOkOGYnWHRvSBFkGNUeYFEcTFYhICLkMOIpGFMgEGNcJG5GDNEaQEThFGPtMSGkBLMnHQRtRFWr

ICAgICAgICAgICAgICAgICAgICAgICAgICAgICAgICAgICAgICAgICAgICAgICAgICAgICAgICAgICAg

VHBhOWJcOBMtKCQiOZAfVTSkQN5SMFBvUEZpSONoEQ
AgICAgICAgICAgICAgICAgICAgICAgICAgICAgICAgICAgICAgICAgICAgICAgICAgICAgICAgICAgIC

KcKGUrQBZuWQOzGUTxVGGnYJSxLVWvFIFdOAKyQF0YOVRgOOWzVCOtAXBcOTFvCFNxEAKuDDPgJTKhRQ

AgICAgICAgICAgICAgICAgICAgICAgICAgICAgICAg
QJUgSWLmLSPcCWQoYULoKITgRFZzCOQpUZIhBMJuEKGpGLNwUAMpFP0RMVNzMRNjJVGmEISjTMVbRLDv

ICAgICAgICAgICAgICAgICAgICAgICAgICAgICAgICAgICAgICAgICAgICAgICAgICAgICAgICAgICAg

PNWoQJEeAMPiFNSmUONtLUBfIJRhPU7FCREkAZElDW
AgICAgICAgICAgICAgICAgICAgICAgICAgICAgICAgICAgICAgICAgICAgICAgICAgICAgICAgICAgIC

YqYCOsYFIgPHUdAAVkXYRyEQVlIJXgLKSaBSZvJKGcAF6OXARrQEEhPJJbQSWoFSLbAOEeKUOrGURzDP

AgICAgICAgICAgICAgICAgICAgICAgICAgICAgICAg
UXDbYKGxSSMqSCExORPpCGRjEESuRCSvIKWaBPZzDWCrCZOiAAKsVMRzDT5TBKQmYWBeURGiYKOnSNNl

ICAgICAgICAgICAgICAgICAgICAgICAgICAgICAgICAgICAgICAgICAgICAgICAgICAgICAgICAgICAg

WUFmWHQjJWKjSDNbMKAxSQSrBNHoGSMwDS5GLS31qU
Vsy6E4LBFwJN0pfbh/Ga7HMMymhkYlfQNkVC1VCuJkSM0vvv2OKyBvFQ9nif8XXMhPCvYmC6G1jACwRY

FpVLTGFpBnZ58jGObdPl71PXpnAUKzRdYaVYy0Ps3YOeAiT7itHWUhJoU7KIAdAgV4IKYqVsN9BZGkXs

SeJVXoOLWkRXUtOGZPRE6MTsSrL1YdtG08BBBTRw4+
GSvmkqIjHjtEVqFyAPAnl6PnASs3CV9DMXSoBlyxi8YhUcHyEJQACNukVQ7VYFZ6TENmHOYgCz8JTIMx

E363ytGoIR6BZf7GPnAiQE0twp8TXlUfWDMqLcmBXgr8XIqkBO7UrPZtDXjQYzLgHzgkNWbldYDfmMNz

GwRKpV6iQ1NogybtIDCjSAQuTP3pLcTrLgWdTFk3Ry
cbSG4pEAvySU1HPGS0ESulIZIeCWGeI3lRCtJbXKKjAOTtlDgiHA2FTjAzS7IjplClcDAqZEOyIHMALm

4+QWolnqWuNhwHObVuGHRno4MqZUl3JV7BULVmJFbfUG5GZCEdiB8gAKypNO1LIsRsQCIgZKUSOdGkF5

4uvSHwLNr1Y9PjGdPdUTVaGuhdJNDkRJacSjDuUBMu
WyBdDQogID4+ID4+PReaFG5YKYyhfzJzKKPgMj2EORQcAXPmAF2mKQKxQCJzL1Q9dZpxCBMLYpJbF3wy

tdhxTY2gWPYrP006yDgxsdQcGRUaMROmKo9NWLLvLJD0ONZoeLYbGzwyOUGNPYpmEE8FgLUgABS2tL5p

NQvzTWZiVQOhW4vLFqIvyFodCC05yHzexnUqbYFlSK
o+Ih4MRN9rn5NjZTg2dmAjJUyoALPmEOkyJRUrFJQkZMFdQDV5ZET0DKNBPmNgTOFkOFIzYJkiJITvPI

Stsk4PDJKuHKY9TDx5NqXfJBDmATViXLolHNFwLABpOVO2CJTsWUGsXV3XPhZvJPXtXOVcTOzkTKHwPF

Igab4MGZJjNEGcChGzDFVtTAVrNCUxZJcxDUAoWZMg
TTY1SXNjIWCsDT3SAeFtGAIvFTOoFePtKEZnKWYsyv2ETANlOUIfVgN6BwOoVITwWPTtTNmdDQBuVJC4

HYRjGVPcSENoTS1ARoHyQARiAOm9KsTvNKTnQBCnpp9CTODaYRBkKjr1RyGcPXGiGWSrSVffKQPhDACx

PTGvXNPjLJSiEI1EYwCcLNMwZDQuPPSnJFZsWLNtva
6JBXCpQTVgTWZ7MuMbUNHbXBSaONeiFBJgZEI0KnJdTUIfHCCtPS1HRbMbZYByMES5NMxyUTWyPJFqls

6BYYDfUFHsOhtzHeLaQHXpZZYmAGrqJTYkHFA1WdwfJUWkLDFhEL8XMpYlOAVhBVq0QHWpYUSfEBHfzn

0FACArEMTyDKU2ABNiVIFgBNOjLKclLAYgRQB7KFAp
JZOsTBBoCB2DOyZfRZEsJya9VODmHSOmFESeae1PMGHfQBVpXYifYiVnNNVaFYYgBSclHGRlJUXqUaE6

UFHvVUEbTW2DPwJuSDGdZBP0IFHaDSKgIJDxip6STLNjRLV1FWY0HFKfWCSjFITrCAjaQCGrSBIpIXyt

UWCbZNUbVO6JVbZlMEBbIKV0XuBgLTLfJVAtdj5VSR
PvPTF6NrM2YJGxURQdCDBgZRdyABAiFDYtVpOdOTFqJOVmJC8VCdOuVUEuIGX4ESRkIYMaRMLwsh9DRT

YpIVH4Gjz4GBQoTNIsMILqVTy1psMfiQFsDTf9SI1YA1UcesSgUqJWEe7Bt351XSVuPNQhGa9ZV2ydAq

9vGXBpINEHRz9HGDe9HpzuBFE8ZwI7HnXpZKU7BkPd
WtdoG1L5RVCqYzHkDyU+WLo7ALAaIZrnWsypDuL6IhzvLYRsWZMfDlVnGsD1ASUrIR0gMOSGKb4+DQpz

wTAuhIrqUFZECyG6LJr8HSoeBBNMRc2L









                    ID                  Date                Data Source

 

                    [id number]           2021 09:32:43 AM EDT [facility]









          Name      Value     Range     Interpretation Code Description Data Scarlett

rce(s) Supporting 

Document(s)

 

          History and Physical                                         [facility] 

RJENOu0mJvRXZfEl41/RNPqwZYIwl3TxYMtvENj1PQzlANUzK5HmTZU6jP7nWBF3MGrQBmJmHjLvKXPu

lbm
BrOmuGIrOhASDoPqjYKhGhPXfzBhztyEAuBZ5TvAK9LKQeX90eJBMsULHlE8GhTKUnQMM+Yl0HSZPcfN

SpHM1SWemB7D5mw+RYEP5q2K8oTOFyY+xtzYf3KCnumEthnzf80pnkGPRNW6bYkjFmvzNmDMn5/anlZS

m9lHXIdlSU7peXc5om5ZeAd41PudoZ0t2S9ZbRNOTc
4v/6o6kI4tOwy/eC33nsiqbhiT/qTnEIDHBTExfdl1x+7H8jPvgVbvQdiflebS+JPItm2hIDRZwYgzuj

sMG/NRkkV0lDbGKqYREFozs8BA4tuf0QKBkbmdOcFi2K64HJx7fipNqt1BkSjshL3gvVV5zqQvF57nlW

I0UGrW9v4nimBOw7KR2F9iocnxCiQ7eDVIDYeSUdAW
02UJdvMklGenebAfS9NqL7sFseNlhqKqo0f+3FnfLqPCL3j4Jkz3MitMzy89OnfTuZG6fZfVium5s59T

y6NtfZ3pgCm19u5DGTFSlfsMUEg83xFWUMC+hwIL3PIuSeAG2cR/Wwrp8ZJqU6psvjoRstsmbnG2ePcz

8PmypDP2yJPHuaK5PBCVq0HZmbQkXSclIcg5aRaN6d
jPNNW2/pFXfUMm2VAkQaqhxhg0mFnMMlxzZ4mTwGZa2MkE5UDdAmas3abt3uD5feel0sS2Cyhc8y6oSP

imGmxO7IV/0iQ/PYdcbsHOk7hm1bNxutePuEtZTQtNTK3GtA9koTrB+GEd/f3paMijBIx4YFHlrkU2zt

a4il1rkkm/zxrl65iyVTpXHCMBAZ8Na05hw+c1hcLH
FKH23PP637p7WRWtub0ZF+EQ70cAiUvNXCns5/K55hiB5oSXnxAkZ6x9+e7BZWmqG9zVkQpxT5XxBvVm

+IzZ4OmJ5o/TDcIuE8gJ0TCD0aSEfVH+sCPSRZDKsYxycziXBDN6M17eFVY+qF/f7WgVcBaiwUNEJB9N

p8SRne97RnDlDaiUnLbwLF1lBrw84qX/Qca/vSz0m2
yIS3kpbZwcct87H4rwaPEdT2CYodoUZLcWvVs9DUMpNQDVKezGn+RfSxtQMBZK6m3J2XwoPoSuDpLS5h

SEN0qw6N69sYLbp7ArNXvAXh1wEa1Z0VDYWDkhV0zSWibv+dZowdHvFZQv73E+GWzKHwUnRicwIWJbUo

8HDSVnCS3hVHFNiZn30V5tEH9eh62O4nrFAZzphIw8
X8RxvYCrP2IPi9Q4/n0pbDC2L72ic+/sMquUrn0CA6nY9lv8i5l8ASgEreMIxC5RZRKzQYo0J21TO9+K

lT8XSKfGdyTIizyaDVE+aT+Gth43zc3MGWJOvcGiqfCYWY4ebts2+qgloolNvbbk1QmQWWAw/f7fcwHI

l5MCx6b7vRkTbexN6phJxIEC7smIV7GKcRVM98WKII
2a715aGvPx5X39iHcuexG9Yb69MwEcYNgZ/9z0cnkRsDFMyjTjSOsCh5PBg6WLg+g8BeUIWn3vANnUUc

7zrmtI8max3TpOPLJt5yk+rc0EM+0Bii57QidDssyTJB3BYaY5zbn3faPBcCpVUoyX2KTdVt5M1g3U5a

0RzAB8ltf6v+7SqoWe853t711w++3K11vk5MxHJC5Z
oCi8CAlbgeKIROPC9P6nYvLPZ5tkBn4rZNSleOIONL8W19TZyIvsnifSmzA2jx4ROJs1gaQsyMcEDqn2

wzUZLI4aVg7gcRcVRrEUPOvZsupJKYsHuBSTfiVuV3pAxJImpg+VsjPSfEdGIJs2CXskPnyPw+xWHQlg

u4h/U8D/JE0n56v5SDaR39aar+sURPQ93fwCnCCvGn
HMZXSUq3jIROE0aIY1HTUDyE/ZbUPoxINFIppQ+RztIBgXiHdJojZm0YYRpJ60b8qHwoYxtDPRYHe5Or

1uo8aRASk6xcDjzQ6DcTxiag0tNnpPLTT2QYZ3MD25mu4hR1EGPRSzCvf3In5bfJhsIa2xDhddxR+DbB

1hIAjcyL5kwB5LBt5sHu3K6rf9gEjlzY5hqrKxQ9Ou
LmxLosWcVQOm4Hb18BVmnltCCFGCXG+W3zds0TNhbKtyvnWAjzzeYiQZ6rAytdM3BJ7w2kbVQRyESRb7

QPjAJ6/3FPPev0vqCs1qAxYQTQUB2lPg4opINpmoceLbZFy0M/qqW0SCATmgVKqhkpcIDWeya9u1eEtb

m2Q/HPUaAigyKb13YxFGVGXp8shCz/QthvQHBj48Sb
sjpEunxw+SIweVGeqjFxoznktygsJkj4XhE5Nt/jsH6zDPJ6v3h5rthA+aFLehf5OaMickCjpbvuSUIK

wR4xm4oS3P7st+f5VOM1LxfyzCoCzwLY12nS5x/jlOWLhUXusijfzutophvkRlWbEVbnoEDC7XamCpow

4eUpOxh8sIkMADV/hzaxccNdpnQldcNekBUdheYBC/
QEO8ZHeMsxcoSXM4HAbNsLphicHLdXaNz0dF7MIL4xSQVoj6eUMMDT59ACps3XwoAJHxa84Z2EnCXfRT

b/DBd9sFMYthcWKaF3pi7sx/KcrKdbFvNN6oJNt/PO3+b8Qz8Yl52WCDTjoziFFQuNrPv66bn2CeHb+4

MPBqDL1ir/IOiPH5bstkj0kQkLTMX1zDNNk9dpyNph
AN9ncNAmk4JjSU+oAaoajGBHakUf23He3+sNDewtAIeZWW2oVKPPVrMfr3S0k5eN3FAt5lN2r2VBQCTO

2ZD+IElSbELCd9lCRi/qgBxIVf9E1ugUUycKfJtlSI6iYrOHSjXU/lpPN0lAz0rXQe9jbxzQpOhost1N

913mDYxNZF/BkTkx37A9vlky8HK1GP9l2of+wo6NiY
GTmLOypAd2nHyopWqkCvIabT19tMjMAWV3FeKkWb2DVk6KCcMT+q56pGCjywEThjJ82MO6/LtOrFee2X

chL/dULk/E5jRODI9r+rgMQPyTRVUCbNkzGb8wnwh/jI6gz9e8Le707WnkcVFDGAbicG0boIxKR79vIs

LcO+27TOgxWdHK8LWlSuTBEIzdsnfQG/LPP/4aBLH+
2z/Z4Hun+lf5oNC/Fs8uGOsvqESMrlBAl4NUp7ejFrSEZccoMbtdxq0ryCWQ9D3mgVZb/UVzQA1Xo2o1

r7jmbIy1Mjhe+Xx6Xta3/0IXff6memyUGWA9Scp5s8RpECIhw1r2K2etfYfXWheTnguh37HY/wzv7PMi

a4E3/BF1dvRTv5hI/mCXOYWSL3TYAaz/B3r4ibSF3F
UrN0D6GACZNy0FKW1mRoboI112VCkZuJW5JBb8TGvdUFum9/n+qjV6tOnSzDoLtA6eWsjIb0ZYyZlSzn

bBQ+sejAlUSf2BJiKqsqqqkDAH21/W1TIOdoVuvRIGrGfbPuxUGItsCASOzzbkteyi/wRHm4MJA5neNP

bHkJAhZztlJRKj5BcFdhRxUwgn7n2e0OgyyWG+ZVk3
1A52RXmQwxAU40oCaPBKL9WhRbC4LcinXU7E6jQWa3dxRKw72C+7yhu5A2k6/uR6J+r3VYtoTdxgc37k

sBuSHle0mFnBBgzU1D0RP8qg4KCm2zlKm+edjORRtx5+dzdoqCg7yeyK0PUka6ATZyn+P1bUDaGidXwY

OUoHCK64q7h1TA5BAp8ETF+X3ABVTU7oj1Nn5rEMyW
EVYDJlcmzDj2HDW/ccAktPkF3zK+dLmWqLlkGKHkS0b0vMyHFgeoCzSxlD7/un61xyQdpHz1EHB8xlVc

kCIFtDefpewvoGNaraK8AT2/YS8ZOGYQUsju8c/OApbbDqF4Zq6FXowg2o+6UYaDq7V0xmn9LjYLz+Xa

AnzwJArnK4lKk8w74ddtJvqtvsI2/SzLUFdeSWFriM
8RUCAdmgCm1SUcObdQ4kNk73E28ZFlfSpgjX6lLF5XDjBtnG13gGZNmf1PGK6cD3p6OfnZ5ezVcxy5gy

ZkBii7Jbpt7YJEap4co1QmeRzl2fsYZDxlyuwc8GP4vS0QqGVZWX65rXpmElI/3idZjE6o1ESOIfkzqu

kFHfl8ZJy8NZZ6bqIPnRByoYZNyOZmdu63xJH4w8JQ
MHeCsil+CXQ0FUpppDg9kmtZ9kBmNbhoCBPkxFoDOTIHAhXryYdJ9hfeX2xgVSVbr8N3pl74w+957lEf

XHpq024e4/krFg0+FBx7wuabsEkFpnRA0d357jtaYvSKDJc2CFnT2uLpCXTedVXCUiW/T0GS72Oq4cfo

NJTpBfVYX2khWzeG8XJV0mp2PnYLm0PNYaa5RgFGsm
CHg0NFlcMYBvT7T4pYWtAXZgPI6VVTTdPX5RVJGdjdXsIjYnSSJIRzYgJJKuCaXuh5WhP7CrMAVaFZKJ

FOoaBVIlW82gEPprIl37BXagGEAmRpSkXJq0Vn5MFzAwSXKwR18noSMezYZtGXVyWVUIZuCdHBKbG6Oj

sYUhXBmbN1MgP3ViDT1zqMRqGJ4kqTDrK5JlZ8Zkdl
ljZVJHQiAvSSBmYWxzZSAvSyBmYWxzZSA+Zb2BVUJ+Hq9FXQ4fw6ZdLJh6NCDwl0TwTCnuDXe8H0ZxmD

SujdRbExdwyVQAKMSiMXCiD9envmc3bBDbKfonJs0BFmFuk0FwLMXrCEpUiy7wZ47vzBA+L9D/wKfCC9

CLdlDdCBHD9gf52HDLYfhV1p4+jAMOji8mswC0n/yb
/jFsKmMMA77VQBEjMu5Ag8Vhh5ApSS2/kkdjQty//4RQRHVXlmkl5vQyEGc+IH/+T9pcGmlefyxBgQ3s

y5USFasbZJHqypq6gQe7h+lVc05+GY3nN7+Fic3docXsoUN2Holp+vDu0TdfP9068OwMc2EaE+THF8+h

nZ4ccazq/VGwz4X/XVayB0Wpamc3Rv+3MZMwsQ0OUy
I+3xv/QMb/RsrxzHQ7J9EIVwreyOgYphIW7+jiMc0G4kopLRayng/kDbE5IkAvv90cEFjgKLG4j89r3D

ejuuJOWwh54L6wzGLR9+8Bw4+xmZrpVgGjKMDiBVRQenLEOh1RfXa+B9t+AZkmJvzJs0YgrvLiK2BjEH

jkkLO6mrSVNNX4rhMoc9dsqPZjwZ7Myii3qk0MvUdg
/Qq9jBMQouMeGMfuo3UdX3urACqLXPN54OBK1e2WSRHubsXUnOUBZk6CB31NFgCVqIU9zDkAirwRBFka

DR/ADJPM2hhN6wrLqIVJ1XAgBNtUMrIVFZ8uwBtkTbHHKOIUpQaOzUbnDIbIXRDF2DXFYxjEhN2AHDo0

1E79zW7mH4lGu1i4OSGIdxzQpWTIsWl007bJQ6uKWR
TMA2RnAcx/Q/Fuz74XeH5hJnjp6IMCD6rLYIyqnwfAs0XCCLaSkbSIUGi57U5ypFV5HAAWcOGAKNBAYN

4pe5LNFKGVOGsBtnkOLQscNTuCHvtFoyuqMVkAgSq0LCiUVWAjwL9Eijxz/UIonNdl8xryUyYEb57Wa+

c2J03zvclup+deGuqNIX5WIpOudhspaQMI3rlbT+2y
7XYPdaH/HV7SNcGMnQYtrV4RZVA5uOIDLagLon5u0I/tGhj/a8zKMMuNe7ath4izMLbv1jNQ2paLHFre

tyHbDbj2xt0LBTrhJEqwj7ij+EPCnWf1pjaG7cQtepDDEnlKkOnKSyqqcP/wYn2nvEH3I34nGeE+8pTS

Lu18yDy/Tqq/nZuc/nTdXwhA4Dx4PZ76K/eqDG6m8P
Z3ry5IU2bj/Nsf6ARVsqQbdAahC3REAUcsHV436o/31bRa3GT8H5AgQUx934xb2EX2AsyD1vr4NKdO+C

yr3XPmXB2efCFJnLgX3bugwOw6r250wLH2nLhgwyrGNDuW2djU2fA4E6l/cTWo8HI/nWE8sLl/gCN+id

0wLhd3DbSrMfnUfkUMYOA5RLLfDAVyU+f+MiV844ZU
wpTEyd63C6VJmsBOn++yOffpUrKapEZCeG9nZaYdq3LRvHZHmGPd42yM14C3tn5k6yyCGJHSveDc+TZX

qXhvpBXfo1ls9JMubNGR1Mu7MRJJnD1mKfxtsQznDUW5IreihxemSE3Ok4ke3vrErW/1gqpvMY0r8qHR

5XGz/7vZ2JwWqiofu+wmHF8fM1O3aShL7/IMXUq4BU
8uG1ywRhY1QCs1cBedOjQVIKTy8o0fDDAO3czyn5qsxTKxrVKhP66Ez+IuuGdanl60JFk1A4/4UAsIeT

NaHv02NA9FMcQcYDBo0Zu+MclVT0UM/Ru7XvxVqdWxrTlKFIJZWMKHNc9HiIT+Jx/7ufXogLVg5oALGo

xD65UtQI1v0Ctt7/rK5Z7VIYGUTsrB5LHaJOqlK3e2
zVN5eqnJl1jcHfGLy5kiG8C3QbSffjFfvJIxTXvAmLhPkFQtedVTcpkDSov7jDaIDw8p+SXsp4CSiy49

Vg1FPTCqVobuwDzpHjJK1avxdOXjup9TTmU3aJ1JiP4wLOH0DRBPk5WuIKIwD1rgpRMcvxJSY4maJAje

BYZ2G1N1HC/0Gnuv9LllAhBxHxL8gN66Gu1CYKNAH2
u4cCuCXxBq7TeRGZbvbLLXltxNoigE4q7qVAd4iMpuFdB9DhunV9t4ThN370hUzU2KL5FT85wbSJnnYQ

tutj+clEw/mMSn9GIVkjDf3zcEoCm0fyNrB3bgemeScepYTane7+Pv5zgw67w0g+RNytRLWy0W4/rf0C

AHNk5TbNjGB8PQFRqpYE3O4LDJifOIGC21HtQldDCT
TFDb8+k20j5w1IW24AhZJ8vOrKsXP3ZM1HL/kCDLZWxwnpUqUtFpUmgYaXPfO1Mmsks0IN2v0va2XgPp

LeA0L1NfaihYKuX3OXQKXonhwrxMdVYLWjdKeGv4mMWfBer3VGuhLMeL/NIPOor51IAbAnCceBq0qkQn

m3QP7G9palzQFyapDdGnrRuPiV8hPe8B9+du0C9DyA
GlRqG+PkOpqzVAqhlFY6S+J7kuvxeabQg1+DKfmxgH19oWhVrbcAiWU/jCGktbMkyZ0er5hzaU0RpWLB

iYTCu5zRTrsabEfovpyBE2WiqtLDKKzcIdZeRy9HihZJ9n4W7NGoixDjh6FDOe9hvTwmwDqykLvwItcj

wWpSggiUILpdsrFmvkFIrkD6ZGq0loba0bN/Y1RMjp
v1S82O0d/pLbj9KrcV04Nl21nX8oKJAy9G4Nz/v6oOnXfZMk6yfVFyfZrk+9jH88sq/Vx+K+wC/SFaY9

ChpM4NNYesPGjdeOpZmvi0OeAJjF60FIwjQAuRPOhR1fAh8ybbcEX2V3e4EXTN6pcgAhVBo2LFaA2y9u

MCMck3lLJ2nynzL/H7KhFelG+FMpzNJYoy8tcBe6hD
Kr2cinWgKlGI6GqykJu80o5HCyN2YsziM8wVk4VlqSXwEJO+n2AtdqXjHw/9Yr/WjbTylRmPBp6NOphC

WbAoCZB9avXlE2m6UOiohmLJgyey4WeFrLd1rel1cQaV0FzQ4cP8is/ojloeCAY/sBTUv7wXUUpE32jp

Dj5eOWY/Zw2f85w01hJgWvGcC1qdcVJmiiqvSQCdBZ
6wLiduMinJJNtumuqwPmAdVJ25UpJUNkwuYcFBusnnDRGAWqPsz32G239AGu91qtbYzP8Jer6XQl4Kdq

XgSLAoYyezsWqhc9SOopmulgnLxEdEq404atRzECw46X1FKACDBqrwwOmUfVojk5l9qI+n/rElR99cFr

Gce4xicqMbEwhux5p0jOiY9MdnbQYjs496NX32u5xg
wt6piGLWNOSdALwJTsl2tZ1d4fS4bF84UkUPoQGkYIla1CKx8GjlGqcodf8fshXVF+7t/qHQboPssEhe

ykq3m0pYbnJHy3k/RAU2sAoGB1J4EGHu/3kARnPbdYzzAfPtTgwPbOSUDzettF3IwbvJUxAxQRQF7vps

zNmNWRP3f0un8szArjqbx9A/rLLeUzvAz9nw7XE289
TIYZlN6tuWX1/4QTaPRFPruUZWyRwvg+IHLdsHahjtD4QyTqXa2M4sgAssuBjU0lqzXFBX5DWv3SCxi0

gG0lRCUygZY2yr+v54p+xI0Q7cPKUSIoBHKS6VPrAS2m24qKIqoaJrDeTvJV1Zgtf+Audzt731QNve/o

xuBr2xH2CjQTGLN0ty9HsRrenGolzor/pOVT2s9Xo+
zZlHJ3Si5txjMbmKW+mvCqvOWfV0PPHuo/qAOuvb1Tjlk/m0ync/T4IU0xknD2NjZ/QMPaQC+o+VXuQD

rB5TqFgShMnGI11p3MvWRFfPEGBFOHhzOhYA05PQaZT/1QHGuqtCCnH+a/SLid3Tr50zATo/Sok4/63p

/8G6OmEmpVuducGsZgey/Zi7y1whZr/wfkgcPLDQpl
imZxvGExFY2CRsKdCM0dkh3CBcYnZI5ied2UYBT7VM0BQYRkJC1TkSBkA7CbZ9HFEuDhPWFmTFYvKZ80

BWEjPCEIYXmaVRXtB5Rky967ndZstfIgJMIiSu0ZUUXbZQ4HOZTvGDHtwCHuMNHmUZTaLgB0JHNtWUtn

UDMdH6JxuqLlmvLpFOQuWPUVEEqxKIZeT6khe8PfAM
n4HS9LHC7KesOzv5BtnvOhW8ofB2ZUDC3GMVZrN9OCI9JfB2huMkGgy2UnY0tcGeBri1PoJs8YRdApMa

3VXjPhWF8hdx0BLIRkQA6voc3JZAJ2RW6QoIy1KENqP3TkBQCuSJTlh1LgKV2TCT0zsNvrUgv0EK2+DQ

bjLCB8oxTuvQ2LQAPsVY3m8FKX/n7A/Wz4QuyFezkg
QUaFGelmgQp7nEUh4YV5/hHzXMfs0b22mjBt/Zr+6QVpYXjo9kafpVCQI+fDLiOJfIaceTgzpLhJEv5+

S3QtFpCIye5IZ05vnoco0ib/Vbf1cZM63/CDzFT2943qyq25HUh83gyNwf1nv/Lt9iG8HQb9IfmkE6V0

Ym5ouUwHXMyrLary/Mk9poYnV4ehxHv/hgu0tbOVKu
4UOuhd5O2V0sPVr5lOlw4G9xBzf4J31KrOZ4Y4UUBJdT59Xj0yb6vEXVgujyY5O1Z5iVmxLfwOBK9O5k

oxUxBpB322uz/1U13Bk5MLCEz2mWpdWNpDt45I4eMXY5XYK1LPEj6EOcTZ8LQjc5FxE4Diz+VtEI7mAn

fu7tMfifFZBSzl+QbAinw4xpjknODKGASr3YDYpUbW
GLdWin5bCYbhBBuxSF0Wa3BAq+h83BOTwzKjnUxNVkjpZ93btOYKEB39qBi5R2qlh+ANgj4RSWtJYTx+

e/ymUetLR0C7MRXLyAw00HMYIAzNol9hvtnlNwYdCPQEGpiqTjAuU7H7TtYh5P6eMhUCBgVLnOvSNmFm

rQ1PSAmYMWWTQIHpAlgEiYzSpqWfWMN781795I2ANp
EKeoKKT3Eg2j4KcEHtGWgrrXLfUqMQzsFxVxypjwAvfcmgstLCZhXloa9XT3XuciOjtZd/ciH6h173AO

Yt1RKVNibX/anlJ8kwGOQ15AnPZTPfal2cCjQqgwxM2Wz+Xb1bVO+bKWT/7eBxTf6oHcp2lSmFqYTz6X

U7qTDKmpvowpzautarUak/6isxmfO271r4Z9KU6acG
75wCIME8Fr3jQD3EjEedPu7FXXhTvX5BCVgOTrrul1VREiLps6OmeixfZGYFZL/YVvhllH0lx3ry2Ci9

3hYNzA0d9qZ4W6XTu1MY/e3eodJfjqCpyhBDKjEo5OmGj36eahCYP3yvaOfU9op9HFhlEcNVH/8LH52T

IB3JvBoCNipJ0v0IdIv90p6f7QprbS17STfG+rhKXu
n4l97tOHrh1t5NK8iS44xZ1QaG8FOwuB1ksUPsPXym5gOujOKcsvpyn3UVpfHeV7Yp3JETneqAVhzFt4

go73TtnIIHkq37RvHVmk+mjy38fte2pZ4ZLA6XiVv1/sMoS4WhcpT/0oendCOOGFEHSs6k2NQR0ZB76B

uz07tUIEvtQEbJiT8tgk4C0y16Ym63Wg1rAcW3y+jj
6qAMaKoSHlWjVNsASHiVO0e3jpP0WI5Hle5oxmhQd4xw0TzN5MtEfaSITbwFjawKuUMZsWIcIXHn+2iE

UEAUNwcqPlqc0pBKd4IF7ARfk/rA1NBNzlFgHuvifNeYpHMU0vFu2CVP/FQMh+URGvYckABKR7UFO3qX

B9eWiJvToEvRu4K6sF9WKzj8vw/mq+PVSWPuTJTlpe
OjtAMMX9xYPuCYVk3E1VGjA/thqHAb2E51yt786pBeNxUwNGoU+Bu6O0Yx8WiWWP5bWMhBw0wP5Il7H0

js21Tmh9qNAgtxAcWMgafp0V8xcxD3jpxnT3fOapnRmhBCqUTg/fvdzpE7nyaFYMlrpTyVZ/YiqZ3Qq9

oA8Pgay5A2hX3UylSzoWswFdIhxN+F+VOUFOgyILK3
iDxUMolPrVygJW/l4I8ghGqu7hP+BrXoSerCZqxSHHRhrQIjbCwJT02MrBF9pOVgSR7ipPoEUbINF/vw

aGwwriz4lS2ubBoa5/PwmXy+Wf9A0GwinbrgcsMU3+txdq6Jccn7ZQP1U9d1uKQsFm/zjHM+39m07tOc

2Y578eObzzJm3Mtbyd3Brr4V7vgMkmFnRD/SK9jKah
7FYnZZiRzv9FsfteUhZxFwyXZbQ4sQcskP+vR7IScI7S2kiQOxZMa8kwRF3pT8Q+jh01lqhrR7vGU4Oe

2NQmVWZ3iBO5QOvXDhFlGXLOKtKVYNBVhfxcpgFE2Yo1ddq9T+bEUmRRzWD6i76ggFkOpW4JG8BY7W3p

psMR/jEhKGUwkVMsjM7c+UhT8tYjD8JjPhzjQCHTrN
TBRmRMotbGLJQ91OyhvVHbOn2DweE1sHDrNSCQpPhO5MdDi83g4FK9cUwRcbPIK80MY99ghDIkegWnfA

WS/xHU7ZdRGo3GPRBGsYGO9VPFPCPqmPDvkA0VZ5R26tjepgCUOgU4MKo/+qRYIaLaglp6ZH6t59fSqw

qX6i6gjmZDuO9gS8eQAwq/V5s7v+PVLi8Io3icRb6p
AEG2sgf1TgIcGjihG2Uhij8keb8zn+DjqsTZ+rOcLwoGtg0qpEbptOkBVCZiid30EMBLzeh1QkAYgFz4

1fHhOdGK674rUndD7cw8GQ3XFWtbEGj/cmYL+NLXso9hR27FQiJHdh4DcvaK/lQvMI9RgwhlsTCZiC5X

uV5m5YjnFkUMvi0+r3dN48Bvja9gEE01dsFPGYtygb
rD5Ri4BJXneezj+I5eRKX3CWENj1gWdNxe47Bm+lGojRo5rUQqjR/9a/fc+5e7jOkvtfoQucIB9Yg2Xl

rFqXFF7a6GG0bP0meqDVew/BfNM4W86HgWxgeEYxZMHUGw+QnK5OgI+OVs63BOYqr07JntQF8MQ9lSWu

snRs+HIrw+LNABwNGs9VlhD72GzpEPVCuD7ZcpJ7NW
KcZQeAfqTv5NzpAiiv5y8A/QS7gOg0UOGjSSZyMIbVssvKlcQCf+uy5HiMr53nk1InWuW1slatyZTxkO

H6DvzsjA+mlrYn63Xuggmy0G18WHEDZUkD3hYoQfHcbwCvIKJiZYxVunxRz6QjTb5C2+N9Dkk5K8BJal

x/4nZJ+P56kF5TxM7Zp90QR+/25twwI6+J0Lw3gXav
Lzc/MMhO5pxYjcftOKG9OsEulNidkV+I6QDmxIwZPNYu1NedakQ6iJF7bE0/eLqjuSvH3FBEm7NNiOy8

C5OxL+97+FxqH/RaQqgqpZIZYC7qkDXUMj6mXP0EU4yVZoJiMiOnULNtGKwrlyaN9+y9hSlGh1IEEpb2

dwrMOKtIFN5oryzQaGe5zjdby7WVOBfHyYITCJwilw
gqyIjvo6dMBwFYIZg6XzPhciO1Hlz4VgH84/7mLBfw5DxDU2woHtaQrAUBpVlXEGw1DKc+yUAS6M7WFI

uc9Dn362lD/Wru7bssMiPdLEuLlBpibgvv66niM6S/Bixy0+fczqw21qIdbMkjB6KnhUmrrmXAzMjyII

c2A6yRkIXHQ5RHOW37emsz4a9e7Rf1VIaEYcgd27ZU
WYW41R6R4IxqUP54Rk1wYAX20ARKsTXTeDE9EfkAKzOHD0Nu9c0eQIpYbSh4WstUUmqxCONvktzs9C2a

PjUraZFtMTTsQmMhD6OCjUPWTRm5qqOA7kSSliNtrk6BY0LXyqqD1EhRhRpPPaJWyb0y0Am5CY9urxyp

gIryeln7LwjH1m4dOZTKtzu1QLCuyEGLfwNhmMQkg0
WfyiGwfn2MH3m4RjvyVdsMdRBH+VLgAaR9Jd5T+BMn6/Ik2a/L+nPBQjtV90dYfFNtzJGI+FnrmMYL4m

hoM7FQVOfk7Cmu1N3dJvKQbiCHfL3GKFcelSs4oLz3zz8SKNzjbKS45kNZcJpuLbehCAHnkKMNNqhV3N

bBgrr6fkJvAimS0M3sYBUmlfuvswmtyvxFbZJNNQu7
6X0F2cv/IvOxZLZuyDt/v06OQR03SVcjXWlDwUeWA7qjHtUnURJG9hZZlqO3YxlbutiJoiZGh06OqIRC

oRub6mZRD2Oqtl4XwMaD/l9h3qWM7/dL5q+/vh8DyDq0ryh+xTwVc9eUOAIMgcZLskHp3hwjVxh8SzRt

44GBTrSnJDC6qrKdmN3CCS4il5ZyWPn3RYAen0XmKE
pyNWs4NWuqRWTsC9D5eVUbXIVjUT8CSXOmNN6VKFBixhXyCmNsCCIMBaVjRBJqVhHpx9PlQ9TkWCRySL

RXKIsgZOUcB56eVFmqEp05YBssWVKoMhGsGGt8Yq3VVcFqDIZtY02jpKNtwXRrNRNkADUFVzRaXVUoL4

BrsQVkWOejC3UbQ7YnJW9gzWEqSF7fbRNbX8VoA2Oc
dmljZVJHQiAvSSBmYWxzZSAvSyBmYWxzZSA+Gh1QNZG+Fn0NYF5oi3JzTJzvWZDhAK2ovz9YLLC7EA3J

tGf7CFDrC5GlNGUpKDXfd6PlXA1RRS6ksWsiDnY3CE0+WCnpMXL7fyTvvT3EIGBeJF6q5ybNdv/gvgOf

Oyhxv0w1P2KEPrR9vnZQzEUhIlvfuIgDHkywGasrV7
zPw6uCzcNgS7yU4asDsaWl4hA6m9CFn/eV5UtRsk87p5iEipTCUKV5+uiiwRi2hy6+U8WcPgOp32kwny

108/8hbTtjckFbuzlgl6RX8WykzZoe1q+Xn0G2dc+lIM7dOs1fsJUkl1aM+fPvx1X03L/vinxcLH/9Vb

5281aXMmF4RL4w9ieG6i134Io5+VhoVwzAgFp9vkSk
owSwVxa7As4y8ZF7+GGtBNvctBVqRe7ofCsv8LxERa7tDRYgCL+t9SUnb9B/11BiHKQIdOTQRJEDTynW

UDQfKRibDV4MD0c/uIsbGujFLz+Rib+nU/zWX9FWOvaEAaDAhkKRUjlpYe26MuTGI+apz11XgJa6aD6I

Dulde3o5BdU35epc2qN09VM3SQGGxHQaNK49Qsl4xW
ejILMB+4z4Dem5AXFobjIm8Q3FCpXtuVd8Ui90Ah5L081xKWSc1unMWMqpCVlRYVwMwqLGsrbx1G5yfc

a3DGpHaC7PBPzp5jiPoojt2lTS6DIiyvrBU7QERo4ETDYh9CWGXPZM09jmrCqcUKtmwLDGFVO8hsDvt8

6IiUmNshcK+ZXCYMuA5UyM93cW1LCmyqVq+1lmAss+
StPXiTUl+pCmbuqr1K0mrn1y7GowNA2ks4u1snBZhbJZweoujI/V1RMd7R0Lfe4b0/r8ou2LN7cGHLz/

sI+qTRcQJg7YcmbsLALKvAYf7FZDT54/+lC2jOT7So8uJpkM4JbuPIS8lsrWMEBaHtS6JTZueGSyrH3o

Z8Vyhdq97QNZPDyZgbmvVo+aDwVlb7QjDofP+3biG+
NdcKigRBOtGmH8gVriWBXpUHnAj1X/CoP6IJQ5OjT2FxZD1w+mfyj+N/LsCq41p5nnkB5DwEacuds3pw

ApOJBJ7hhEzoz3eN87jx8zuPx7I5/nInbpO758I8sI6zd83wzeETz2wUd7P0pyLxhut08xySYmtfCfns

Koc9kFm0ohW0HZeNr8ufcamBpRNYrffQs11056mRuG
I8xf3nHX/FU8i6Jt/OEwvzfcsKaRzVnHWOXAgEVeA2V07K9wfpzTZuXXqMZ5VqTNCFkhGmj+m4BmyXTH

L+jccYctTZFZ2v6FG6Ind1CdZnDnlEUP0UUk0Wtd2gnyvX7AYCTNEkIGIuiZ5D7uIN5UbZ9ActCCl7Rs

OzcCNTz1Nccr2OYXh93A9dTcgRP4fOT07erRYWG4g6
9VGlbGObG0pm3IL6uOnxnDaEEu2V3X4QIOij8HOBUymv2CSz+azXQ0NvULQZ+FVng0vyYwAcflwR/z5X

i6+WHlgW4e+fH1yLgtf3GHRQfPYemwZForCIIV1iMIPshjTY+x5RHW1UOQWA9Qxs9pauAWzqVAab07Ik

T9IM7QUygsZ+nW3CofJBfh+mRdeY0pATGwYPAjQVbF
pEtBT3ChRQYfdEUgYrpdaxId+tbFijOwnM8TAu4tGV2rNfppsm4OHcjUc7J8gdT7GfjJqHOmvv/MIYmM

dPE4H53EX8r1p7KG5k6aDPPe3nRhyt6kwbdMnaOT1bJWYarqOqxyMLD755xnHWvtG6gURZx2HSYvv6Ub

gKohcUPD6x9wbrYfWUv4orWkSRlq1Mdg+7NjX35PW1
GAPOfnCDVoPia6C2XklGtTdsei0Yo3BAIhducKwszVv0wRoQduT0udDTo6qVIvVip2MyFB6Mojupg9Jx

qA05e1eTzgz3ZBf+X62AG+dzEWlVXzt7j4FBnwQy/UVfJyt94Qy9G2ovgkOs0uoZGLUM2kdNmlX5A7lK

S8XmDWFjYaCfHBwnwL5EYdYrseqttTTEoEXURahFEr
gAuJhi1fykVs9lWp+hMK1RsqWo52Vy6Ys7mCqVAf+lX1KcZeNMYTcyAnQ0pKWu7YWkI/FUiy3mNWqqHq

dneSgvIWY2vzTVkskLLtVhlUeNsZqAP4BA1ppTG2++Hz1+kxi1KjBLVBxpB6B7ULD0IiTGnQY8wV5mhE

HamQkrbOfKFeQc3oYD15YUJcGWPrHO6J5SMcfVNGtw
Ysr5wY4yxwfM1I79xfuGeF5koNBiAhAF8WYL8jUlUf6ysqTAZySLz5CBiCQ0JPRNxvtqDuHVMADHNv1F

1ughR0+6x/uywTSwtalRXtNRf4c1jFlASdshaWnPlBs+yia0iKPdguxKzH6yF+EyVD3KN/zE+WGwsSEI

+mOl5l5tXTYGW8NtIt0Ms5Z9YqvO5ATKfE92Kaa+Lf
gMzJ4aUlHDuNTAvR1UXSIzr5I2qBLTZjhWmQaBnWtu5bmmQvRMCIWMZZc616I8pQWV6x3yhTwI32VIQP

SKdNLahJfFeQijuUrx9xjEMXOyqCjBEEqfmr3+BTVV4T/VBdKfQblRJzOWMoCdH+o8U33ALGac9PAA0d

oH4m9hTKsZISOIZdAIjLu0OdR3F8YmzpeJAmAJPWcG
OrE4I6S5pMoxjchynt7JCud8BRRwIbys9zZmonDU6zx1TivV79xcQLVEfXgigQpoBPQIA1yupghBZnbP

A3jq9ClJk8wB5elN9pznszquU5JsGlbsTGsIjDFTDVVWkKlmMTNDv+j/EHWtGbppaMpSgDoCZr60hweW

gpaHHaSmY+miuOemw1ADdRSRG0bjQcgIHs4C0mBlnO
YCzAvkpS/RdKVHGs5u2Ywa4iBHwCwyEMIw9ywViN1+TBvUXwxlEEMYMmWAKa894u3q45WuMJUVS1qvc1

zRbu+TIMtGJKUvXPDsFyDoIoFBbhk4NqkWkg4j3yuds4C89eL0hsUBmKAI+Iwq9vRhl/2Hs6KhpW5nNb

ihkdn7RFJ41IGVhzLTmerMxmQP17l6djOf2u82TIlO
TUaq/6p/c6GqkXxGhAWKiK62P1Ywukm1ODcKhqcLbbCc8Ty9z/G10kclDSEfjA9hHv1V63RcjmsTgbvD

XxHP5X1eK0G2WjypeWu6ojm5Wt8i52WI4vpj/t95vOtsedaZfdJkaeo4y1PrYH1hBU/oYQLAf0I0lgff

c0930vNy5gk7V2lN+890/gIUfzS0yi1QgXSvrh+H0P
nOfDBAEG/uxrixozm9PscrVOOp3xvfVx5cerfBYLIMBZL15v9ZIyX2JPrYxjwcJxGGu+9Eoqp4T3NBhF

SQszbH+AMLOmTgoFPQSJjM20m8YeJoLCpWqnht45Qmt0X7pjRNW+QRxkY+OCr593SX+dfwrR67b9iF7S

KHzNipkZj3nwjvidJbBSHo6cBqXYgTt3QFRZwZVDNW
r+X82jIb36JjmHv+xNWUahZtnnrIQhF62+6Q8NolBIGeeyDYI6oJO+ASZC1XivA7avvpE1h124K0JEXJ

PG0WYgRiiHjSofjN2A6BYMK+X81E8SiWCa8ZpVwGSk1RQdncGUf/rJH5rXMjdPHntIlVYyXMtsnQPyN+

k5hZk/3hRBlAYSzJVuFy8+MFCVV0UMMH1W6lwHLJPB
6UxIXPniUSZ39H/10iDGcijHHGNSl5PewE9hedLNx9L3ZK7BqvErJFIfg9AUb8CS5E0fTx/aGgkILK3l

w/R02RnxedQpQkxSznsCb9VUdp4HP5gyv63p2RPAi7m2u8P7DNleZKkdwx+cLMDaSY+NeUl/Ul5pgS7a

Nb8CULLOQfo0KRPMcw/NW6FjphzQk7aovCLC9VBuBL
7lTyE/UAyjvkg7pw1ccZFc8I5QJQYw5TVwAOnpUb20g4jarPHDwmBz4z6yk6ObQn+6S0a4sNgWVDB2DG

qfcCzZ+jX1zjWkv0DKCU0W5Nhfceg6UEv32jsmvnqgGh6Jeb7zjgZSGXQbyVbwL0tyhTm/1/l62C6S5U

/UFfX2eCWR8JP1gV9hbEq41G0TYz8rQsa5XZFiqdyx
IzmdrOQFJclmNPTRza+/W8ineFokfzj0kj9mID9YTr8MHEwwXmb/zPTAoIqnq1D4rZTjCSABOJVPWfWQ

IzxhBE74bD/OVEjH0x6bgT079CV4WbhwCouGG5JwDKEa5tScmvR1+LE7kmK4U6XdD3rg0IG7xlzc+w3E

McY8bAuoF/X7tz08kBtXXzRwNAJ7hzTunQ4WQU8io4
MxECofBRLcPW6bja9WXON5WI2KEJQuUR2IlSRuV8ZkM7PEWfEbVORmEBYpJW22AIKxXRHHOHugGNZaE2

Ntt780kbIuvzKvAWUdZw1QFYAuON6PBCTnKJDijHFuDZCeBJXwCdT1YRWxOBnaWXSuR4ZzxvBcksRyAF

BjPKBpRh7IYRAeGC0Ehv39zXM3GCYnEoMiPJBjrzSc
MAVjrlG7KA3CXjUqNTX7bTTtDzyGDB4RHNZyfUCzTY1OFTElhNHcSO7+DQogID4+DQplbmRvYmoNCjEy

HJNwn6UcRYljPVv0Q9SidTPoytLvSbgzlKWBXDQnZPDaY1upqfs5fJJfGJY4Ng1FSnUhg3YiYRKwVWwK

vf2pRY2XLeL+r9T/uP7xfVNjbxRZWMAxBgURYfQTyS
PtjENaxzaxXUp/EZ5od6WpwEVrJ3NY70Sixrj7iC2p1o1qA6UN/i2WiMp0KGkr/13/yHWOpA2Pen+Ym2

m+dw1j6wdcpOH3r9c1cLuYIwlc9TJiw2vmkG3vZ+ZNrz+bv+a3S48m3fTq/HZ3QC2Mhn5U2TF8ayBaE0

taH07niy06c5I09xJHGZUYc/puk/he3/SeOCbnzPTH
+vW8ZaJK6D+axSJV4wZwvy6ksxH2gLLBelbtmqnjKX49kifutgE5arGZGx9XWVZV9N7ZArXLRuQ6GKM1

fgkOKdYZBacU/FSChQkaiI/EtEwSJul0RaE9bWs8aj5KWmdKhPGEXFBEDlAVGJq93o7OI/LEiiQz4UoB

4P17CL6DijJT0HJg/hs9bTBdT903twwXfEYxhwjFpo
zUE98QUv4714ZFqE5huSyhx2z8/ENYUuqa5MzRJLiqdAbdpmcQCRf0JBiKY/YqH9ufcS48uPBUsMT9h6

YhuDJAfCxKK3ONGxG1b6TWFgIlcbTSZSc5ZFvknw1ciNgPYx1TXaKdavQIYNQ+zEGV6OUAXOZOrRsI20

SwZyC7lk7i4MKHpN+x6CHseSVC76+JC8quU5ujl1P9
k2/CJMzJBDmxnMxcqsqxL+rHIG0AdOLjm3zwXqdYUlQkiok9tAaGL6LC2CUtvM1XHYZ0cfL0Ev7wrybB

/YRvjJbAYtwXZV6dBLEqLkLXZR+FEEOWWNpkgi+HojdKCWHYoZJL5cSjMIiHRh0xGgkSBstpIKBlB/7G

aXvVDcWNyuLRdZxI2sQBHjA1ki00FDaGKUoqWRvMa0
Bc1N5Z1q2sS1daZfTV9Fy9glbYYXMx9OtD7d54+QuCQ4kuPNmvT4S2V7QZj7VGbNKkYNCVQP3bRgJWkc

6YCxKbN4BE4FvwUf9iDylyZb2VEjsOd1YVY11dYaoupcWCXthKWlOY/EfPaUlSKitzAtZxvlwTTdxA5H

ZAxcPa/+8IQJWBtddRBwq2zvFrIFeoGL3dS6PniZkX
EawLvi3wZHIkeMf/dHRWMZ8pdJV48/5OEdITZEXKgSaEoa/glGgQURoHYzSVKXeJ+AYtehNd84cbfS4T

YiasltZz0zHHqb0Vkjw0VzDVWGm+ATinQScuBQg2WayHMBldCJxlKi+8aIT3GMAsqS0KO11TlqjJSSPx

KzhXEyViebWY/k7Z90kEPjVzIdf/x1AMcoqkDMBfDb
hWxoMrxSD62l3HZkbmOiY7Vev6nLKeNRSonsCvOeAsLjAnq2SJc3dlhPxsS1lyL8e6V1TCzrvjGDGY2P

o3LfAWQ+fvG5mj4YvFuRgrD+i+KbBLDA55oiGI5BFTxi4PUxzIoMgTBv/yumNOagaG26gmnXOMeAM0xT

te1AXBcI8hqemcj0Mzx6C4Nabdr+HB++WJpIikg3Hy
Nh35FSIi1uezrtkXXRPuG2IDAwMR7upjk33eSZT/OfjljHdmbjQSs40wfz/I9RxKbX2LvVRU5vEaLrAZ

SjT6aI82oOJOWyFW83v/Aq3Q41xRhSmCiWgYphaBZbNYfUv4HJvw+OWzSqfFNK+zHjhWsGmz85W16+Yj

TUSklU70pNvDAB1+dfHcd0McGTthOeDBUcaFVhFjk0
gfuiWxDlrGTi4RH5ZvSOo49eim17EAkepWOctLm2CUFslnmsO8Z2ouwE4Nfth65Zah8Mn83YvIjMAM6g

fyktCpniU+tLUglyR+56di1hWfefbfhSSLEJUFRPrDM78OcsKEnyV1XwjgVuCoXPxO4aGqb3AgeAtCQS

03th8go8JqLhsLoe4qnG/MSk7nMLehR+uagfNI0eYI
wKjotiGd6jUEs2l7X8W/qTl/tLaUw/uhioZdtU+9ESN6uMolDiT59Vjsp5T/KCZ4C0+1XaTRxEqjCah/

6QjhiWnVKlYZngXC8F2eUCO+pobcLB5TlN/lDjd3jQwFxDfJAyCtrvFlPgXauEhlVhsWH/dVxAW5hJh7

PLeJOMhr+Elj1qAOAOgC0XMDA66Wkq1oNyN1MwdN7T
lCt1C5+5Z74IK7wgwKiwN4Ux2MVjWNbAKvS/h7frBuaKDUJizgBEjToWutRHh08oFpIGn1h11QR/5W3D

O43YWnrMriuh+ui4DUrEQNl3FAJVWmmX7ZuM1ZkpaVLQ82Fx7dCKRbx1R7VxiJtb5QouZqS8UHtD4RA0

/Cedric/Pw+4Z1XVYjt32FcujhJn8CoLduk5UlOsp+YbO
q/jDC72n7cHDGS0krCGKsNA10KQhdaHeN0pDGqCZ7PFuulTq2/wZOBy+paiLvuAdQv3p0ZG+lvTAv8t9

40IKBYsqY9PpS0yRjJPFLG4mAyeg+J9U5hlFFecUHznoQ7gxKLaHDqJGi1uRcJX2AZuvGlFx8EP+6s/R

8MHPnd2VEM9X6l4mt8VJxy3Hslg5Wqh2R8XBvga7Uh
6HAyUKGz/I7zS5680xwWc7Hlj9Lw4uWiFvzmufob7RQisx8sMi9KcMzbH7jzriH5yUXBEpCaV9V8c6vT

CLE65vcrhYL4L8pU+6q8IxJAUkQKMUovip10YizHuBotR5jRVwZWte0Ce/ZuLwYvQ+l6wbybzRBC7iMB

NzuG+kUyzNp5Nm72eVQi+Z65bv81s3qfHs1toRTYDK
/ShI8wKW+fVZSDBzwGH8Gu8RTFc+zoyxyZMyw59z0gYOWApStyzhebDntXohYlf6e2wakA7gF3xsEqO9

9VkcLdx6IbXBtAOXkX+N5bzn3qldf/anE6Byd0kl9LJQPrkYMrw4p2Fpk+E8IT//NFXU8LVYVcQmWPF8

tqXemF5HUH7fp2HrFBtjWlQyQG7zqi4IOMK4YO3ZWH
DgWB8CqMKqQ6PaC9HZLmTfGPVaLAMhIW13FWSqGMZWOXssULIyN1Xxw351itGgqkPkSMOdFt1LNAJwFE

6MYWQrYYYvbZAtSIIeBVJdRsY8ZWRtPYwvGCAzU2DuoyOjbaBjUEMfJFIwOa3AAVQkFD4Ymk77lVG5YQ

RoGrMnJRMebxBuIYSqqtX0AC7EIdTsMMS5jFJiEllI
MN5LKUYvrEHbHO2ZDWAyjLToSC3+DQogID4+TAdjorPrLpjAQeT3UXPxd8RbXPrxBEnvRoYlFMp0WKUi

HtspJzo2VNY3ZWW7ENKfOIO5MPr3QHI7ZrzlODqyBKNsPqCnRdHiPul7PXX8BWEcLlpaEcVhIOL1ZZXq

NyolEXH0DDD6RzT7JNNmUMX2OWZ4FmN0XFDxYMW4KB
G9YsY2XEXfCZU0USM7LDGfEqwhJBp6EK3ZEDE6MVOdUPz3MNU4VaFaQYI6EUS8XyE8FwjrIcDgBXauMz

M4HkeoYsCuRSj0KLV7IdOpIou7BSHlVRU4HcxyMEU3HSmkEkZ3KgQwWst5WRS8UsY7XskrEuFfUDT7Bf

Z7STIqMlZvWZR6ZkQ6MFZwAqU8RYN9CnW7KWSrQCkd
VBU0YEAtEjmbQia9HAP9MIG0LFSvLoIqTNN1UrG2JBBcJMRiEFA1VoE9ZGNrFjv5NCU5YfH6FDAyTkPc

MJYlGfU6RDQyQbAjRBwfRdE5SKWsMTF0KPU8CaO1DGXaWvNgNKVjVHLcYurwETP6GBWmFDS3RnOjXQGd

IK2APLE1WGFnMGFsPDYgNBIiVcIsHyS2WFC1XRI9OO
XbWdTsISj5UBB7FTVvHgOfKJz8OJE9OJVoOmRyMKd4AZM2ANEzEqOjMIe5ZJF3YRRwDcDcAIr4XGQ4GG

SvLrPdFJw4UZZ9UONoOsTlMPx7ODX5IXFtPpUeMRx2UOBRCiZcEbBaWQl3EHL6BONzKjLoIUv8PSX1WT

MzMvXuXTz2HGH8GZNkYhz5AWBwXmS1YMYgQJM6NYK1
YyR4VXLaRpHnMWV3SqIgYsQsFoE6OPQ6NRN1XYSzJXe4UFJkOaD8CwzcTITvVPDvETJ5TJgrNbNyMOUm

EpJjDmErAJwyWMJ9LoG6RkszIkCrIIVuUuHmJvVgUmP3WAS8VmW5KgRjTRC1UClpUDD5WWPlYdT8EEV9

JcB6PsrmYlY9MIM7LyQ1TbuuICLdXJS0WoLiOzH3BS
X2PyH3JonnGvR0BPB9QBKcEiozWkf5NLP0IBU7LjZoBoFdQPs8AFGTWnXtAkz4LHi8ISJ3OyrvCam6IR

I8JUU6YvfyGdCpKJjzLmV7OhExGqXkLLN3OlF1YuzqDzNaBKM8VoE6UKHiUMV1OUM0QbP6SBCaEVH2CO

i3SFW7HEQaYIB4KNJ5PjF3TMUoEWI5LMS2EPJpRzif
Pav8BHR0JSS5WXLgNFcgCVG0YjT5SWUoYAC8RTK0ZjZ8TAHsXWS0NKW2BLW2ZZFrOGY9MRT6XmK2YIUk

CFV5YUTzBJF9HEHhPPIsIB1cFCsyglBhXegHNkE9HGCiu7PxSVyhXNy8URlbYPTpO3A4kJMnMu1xoSZp

f4FhhCU8p8RPUcCuCNEnIe5gaI8pkZYrCPFzMJuDXh
NqUQYzSKQtAH93ODehZI5FKYZKZVldkKJgZUC5K1Zag9WfmiYhYYSpOm8MJVKkUU4KoIVcpkLkOf5DSN

TxJB1Mi856OqJycUNgIQOvJbDmAOEkSVCvZGX4QJ9QSHNhUF8KlCEqlFSMbipoSYSnT0O5TK1CZRUVVv

AfRp5HPmChKW2dzg3NOAIjBEBqByvOTqCrYPrXUaCv
ZVHpQKjkPF6Te474D9L8PaS7pKHlYWF0WCI5cYKjIdDxGOTznkDxAHYtGAjfIN2me1XuzwoyX9leOO6f

aWMkX19whR7qJJjgTFRjN2AhveD1X0qfybVvRL9XECA6V8kndlFpJRTTOdHoCCHbH9zdeQauPXZ0PBYq

Ax5BMISoWO3Dv136ZRFfK6JotGAvhpSlMVCoQHHNKj
AuWf8LIpAaJD8hcx7RXCjaBOXjSmwWMvAmBvB1SUJiOyXaSNM5JRUwOpCtZOq5AAN7SwJ6BHWvSYf2EX

tlDyDzLrpmQvCjZKTlKyNdKTlaZXy0SLE3BNTwLcVnEgp0KGZ7LXG8NCVrGSK1MIB6LvL7RKEtBMZ7NY

T7ItQ3MYMuRPA0BKR7JtQ3DCOtWbMdSAMqPbQ8QBJv
CTvsOSV6JRE4OVWzOnNuRCf7DNH5SwEoOhRrBSyuJiU8YuWoMpR0WRTfMFN4DfsyAbQnGPD7YQQ1UMAu

AmMlSWHwQWS3UsNyRtVkFVq0VCJ2FypgCdn9ZXjrOjS3DkheGmMlPBcsPoB7KaeiBOO6JDV4OwC0Uivy

QxVdWG3TTUAwVoBlFij9ZUHfQlZ0BVOeCLR1EFHpDt
T1VJWgRpWbBFO1WnT7KXJbIGH1CVNkPrC7MUYkQjFmSTL1UIUwKnbaKLE5OKM5IUH5XNxzWlKnDESdDK

V7JVPlDdWxSKW8VJT0YJCbWfIfASBqHQQ3ANCtKrg0DPN4UyCDCiHeWVT7KKKmZCTpYEdiGfacNQB8YP

L7ZFSlLmVbIOj4LIS5HAAeGdMbGRe5JWD6SFHbEzTh
KWx4OSW3YSBxXlNoMZt1LAI1OONyBxAtZRs4DNM2SUHhCoGyJDo2POE3RPUjTlJfNAn5BFM6NOLvLhMv

DPg1ESP9SEUvFFxzKTh9MAD8NBCmGsWmODz8CUL9FHVdZpRzTEx5GJJ0SLWjUyIoDMF7UAEqNdMtSGL2

TKB8CdK1VLIiGLW8VEH3AAQ5GJEnQoXxEDjjRuJnVd
RdXGA6BPF8ZEYsMrHjCiE0WVX8YbY0KQRzXPV1TRTcDqIeRgYaMuFdNH7WJYH3FaSpCDH7NPCtIlJxQg

VgRwZhNDJ6XWE5RLSfMFN5KSvjZGC3MgMaIhGdYPylEqQ6DxYwHsCeHJrxBaE8EuQyRsGzOFXdORVkVj

UbYOJ4QzS7DcjhEfV8BHQ3IhMwTadzGnp2RQC9WVVt
UuhoQcTfALfvHzL8PkaeNMzqMTe1RTQ1FnhfTts9FPg9VGX1JRMpTuq7ETqdLdF4ZmNgBsYmYTybTgD2

BuowMsR8BTAtHDT6STQsSZA9BWL3ZqH5JWGnISC7ATM7SuC6MXktCIB9SBC9HeD9BUYtGEF8LHH6QpYy

NokaEtd0WJC4OLKcMhsqBaWsDW7TWBJ4DWIfBaSsCC
SpMTS4DJSmOmQoOIPnIHZ9ZGgsAeVoQCXtVDH9LDExCeJqWTBrFGR6QIQeNlInMXV1SzVfHY1EAR6sd9

WdSYzvFFWtOX2dkz0XPGZ6NW1LFYAtGC8PcYFjQ3XimsMFQHYmccwknJ9mGKkkKDExQ1EtacBZKO8wP7

RwdDQmULPqjXDUTvLyRVAiGFRqVV18IBwoOP8MRWWZ
PZwoxCCiSIT7J8Eyn9DiuhHcCNAlRv7KUTQxIY5YrAEeynCnHm2KULOaFT0Ym016WbRiqDJcYJBhZtGj

MJFhSHPgPWE7MQ7OUOOwJA1GqJHrjEPKudbrRSHuG9Y1MQ6PGUIERuJfGd8ZNkHqEO4mah2OAPehFVKh

IkaBLpMdIUhETkUgLOFcHIjbJI6Ah292D5T5DwW9jF
VpNZV1FCU1mOWpZgVqTCMydbSrUNSeBPtxLs3xSG3CcgFbESgeAi1WwO0ZddJnDL2an7EgyndAHwChFZ

MdStgst7GArBYbGZRbK5dqb5BDnBMcLHB3SS7MIDSjKU9AwYO5aVZoJVioSKYWWIrqFHXfH8EjaqLNXA

VsrxwrmD0gKAI8OXCcRe2IIBP+Vb7KXL8df2BdMPhi
OULiJH8mwr8WKUA4KC9MtLi9RBIzH6IxFCPjISTrt9OnQU8RBJ3wxTmvGzM3LPitV4hieiw6iBDkBisb

MjQ+Ou1QKOOcmHMkWY1LHshE7Y5OsTBX1bH51o53g+a0tpFIgG7htMYCdoUvkN9nu3SgTNSOeQOkPAVE

w6iAO+ZRsSjgpwZHDKSPIMId6KHVIXtjKwsCRONJHC
kHGQWT+96Y493W8+Ny716u//xQ86sq2tYJr68I8fzPK5uyQsCFnP9EaDQ8lQev6VJ/UKvnI0wmkP2wBS

MyfABLk3gWtnChThckTRhbHALKUK9hdjBG3M5rqnnO0D5BrkDJj6HzsG1LpvIWtGiO/laMn32wxJ4D5D

9QDsTQ5M87l98UkLC+6hw6MQRtMZOcH2eXC5bunUiP
3L5kGV45KmDPY1gKMYxZ/5vto9yl1elK54weEZBcL6i/u2z+JRfddyHegTXYPuEgUNuVT/827p5NiZ27

78ymffxOMFocs7Jpda/ae6mPxm4fd4qNNh6oKddYjJs7ni83BiFdZ5zLtOFsEsrkxE5G3Gc2wA3JG6+B

JqG2wYykEoH98r6XWnPvLPOLafw7dATmiS0PDdlvQf
dmGsqW84jOF8E6nNCDAeIaxFW0Z3rk9+woPrUZdkIL/GKNGHyeekOfW0uFPHUjpn0rNz6hGM+XhbUWrP

0WCXG4Ch/9pU5vbdNAcP/ADXCz+Nv98lLlTc0EjSOlX+8v83lNrQO9MIcLxmV4bXugBP2bIQ4F4W2xFX

gSdop/6OgDMiagNL05cs/Lf9beh7PwifvgAfiI3W3d
BgmspRFz/vuYlQIlz1T7H1NN3YH3rOixJIBu8TbkzHqILzD+ZoA5XXfShHdyIMawIh6NWPBnjJD3vE7c

dNZqL0NSrNqlsUIsgDPpsY4pAn+l8Vj4gjtpf2cT0iTIZ1n1D4+dKm9mkGQK+/NBKolvZMbfG6Pwsjiv

O8qQ4cWY8wctxtnSB+WJdR94MshkB4yV2h3LU83L/i
Htc80xnYmupwaHsDQ//GT6C45Ch2EkUf3VoQagpzRwbQOaDHsxgWp+p8Ns7O+8FUgYHU35Mf8qt/i4tE

a7aeB1XBHvTEUQ1GQ6Ty0RdRyzKfdN/ZU2vn0JMuhAkTyPn3X8lKbre2FA5A0ftUnsK4zVpeEk98UHhR

vYLLaMrWS/ZA+wvWw/QlcYAIHPb7CdqOvvBeA88bD8
jpcWSzfKK+OheZYdqaaw4Puq413yeeMNhfE/DYlB1tlS9YpzaB1iV/x+LK5srudYV37wgrzzfdebcx50

mi6LOpGtTRyUug15eJ5j69Dh0QzPMXdUrWxtQ2dTi2VpLOwGrEe2NKqY2/2U29NreVIYR3qBttKHNE/O

p2atDJiip5w2IczW7csh5R0R3pn4blcH5aD/Xj6qON
RfWMDyxo+Lw1b2n8vVTsx88lVs0UwG/qgDDJVT2InGySDp8VI9ixUmYkEwkms+iLeGh6iqbDKv02RAPO

Ee5UU4NZeeWvPai9S7/mxmbzlbE5OmyF3MZpAo0A7oFBiRsgD8NdYDfYYNGhahpVuiLdpKZyFTmqFMAS

HPoU4lDIAUuNB4UzpX4rD/XO6Wo1l/4leTbFJUypeK
akg9DZapOTY3Tv7vcV8PeR+LI4GefdxjsiJi/IWfehMVm8fu7Zl8FmG3+l5cIlZpH7KGmvFDBZRPXF1M

LkmKyABmCJmeUK4Sqp9F66ABZxNyoMQiAn5wCSBsqQGxD/Rjo+GoVIx9/bByFxqh7PBFjTWlMDwMepjb

YU9fF2Tv2PS3Dp2X6/DmcdbNxiEhmB0ZI1ozVYZadT
+J3aW4+Fb9D41CQHyM+QzqH3R6IPmcZAkp8HmmojjC4nLB6dptWsXirPHOmMd/abkd+Su309upHRMfXC

+FBguDsMIz3nHhgOYYXa6QY3Wzi5X09ZZuEavQMjbn+TgvortSEd0uGKPm4xfMdRjPTTR9xSY1c83QsJ

yOZTkwRdDeOlq5L/gMeuK0ky9+FJ/Z6w1Dyr1Yxg0X
85OHGUDfegHrDRhHW0xCpGqRrXNaShnGUoK4SzLZZXwYd5UjDpVi8mkf0olJVvmKI5GZPBkxUDJZnTwr

CbRrNtOPJvUFrRia79KWwlKsrMrLU4pqdtyHBEWMm3vxA1P5q+AX6DC7MCsfjtU6bACctxQzwK/hLtjA

xd08LzVHTi2yU3a30yVgqmtSQ0e5OxbVVX9Lp+r4Ym
1JdCs0Yy+/xkh/nMqeBn8CpuYywgq5EFk5NPFiR0HytRIA+JNkNh7LhFoc7DxosEODabtqxL+CcdrTWp

VUBJZ2FZcIZjwnItPHrcgRuSz0br18DfyMvyFZcYM2ArX/2ZB4oYAawxTuow6LNfkM8GnVRJ9ALIagn7

JWZNUP9JmBEwbboz5JtrPxMzkzlAo7/3mxCxxECGKX
huCp5V4pWFvI1i+egKWQya0GQw1PT9uq1EHc/LuL29lNQoWbyBHTpHgT4+T9YrQcSyTaE8C/KaZBL78F

KvIt7bNT5q1hE5yzgQP3T1yROA6pZzKbVeCjYFNZgIvCKLYnnHCGuVd8FNeWEt9KTjQPBwU4JA1nKbpd

yXFeLis0uPlewDEWkyrAFc/Ql7QdfCO1/Hdlhngo33
Tr3Oz44xf0JAdK0FxprHHkNd0FJY0Vcy9VuqHPKWHcVpKMG5PtY18FN7FBussOAGvIFRWpmEBQN8Xrwo

U4NcSGOXYXphlscsKEtNfCEvYSAakRV+XMwWP1jBT/Qhorpoup1r2INBjOumQ7GBBwIN2/ExjOxoQ0wr

swkXq7cH0IiIzjLMflJF2x2xyR8GWmCk2AVpDEBEMr
MnKA7V1HPD1Sy3s6m2pNloOiWRWQ6gtuV/damrFa1qiZ9aTFi2F73WH/fq0uSaGZX+JzHsKqLXLO3FeG

X6hhDv2g+kRX4WjnMUrknM4cFpIVPgI/a1EPZL/O3omK5Ancc+ZYPLp+VTfcSBwOHYjQy8VoytQPQOdH

hliAUDD0LobehUbEasR6RBMCuQyvHkRMeA3xBuJd6I
5IIMYiLML+hYbBb0Bcq/AC6UWLJf9H7OOCIvpRY4P9pkObl74UF3U0ZeSbnA0PW8/SnvXMD02YwKoG0Y

O0HO/Lwu+rF49TcWZB0MP0L1MwNmUWPWXeSNREDpO7TZnml3r1JLfxlG9ex8chOg7h3Gx7bBATUbCU2W

BufAkakythvNU8vHpcZuWJnCgpdSgkwk+4S6L5Kw2b
cfwTojdk1EsmP/RgV9PBRzUO31CUGuHUa2XFBzKkXwFVG87DJflBG3S8n8mqcJVf3Bvbbibfj59HYkgN

JNTp+HHzGLjoZ3bSrzVHPpiRy1HWS0hqkjzB88ox1YDSZVN8Da2su8QSBeN1LWdKo1VEh9wgdFvXdMiW

xGqDHIDqAoR1HSRCr7GE43meViJToKeBNFVag6lt6q
lWIW/kRNSrPTRdwRTUGmUo00vj7ghbcQmyaZlbMAmrmfe+zAKvJhuCp5yfp4SL0dlQ4nbloa4v4tS9wQ

IpjOXK2WR8AXJiCQ6IoKOTEsXMBtOLbj1vYcLR5R6EWeuAEc1ilil/uvFajqhFTEXcgPgGofDmfIMQYL

8MfQ20AoYshkFqRzDD4GX+yXDKE/SHOC3MZ+4ZLt4V
Hf2sRjHXlmX0OgUZegTlfSommSZ7i8jc/0xV1yKL1A2wDmLdaE1f7L0aC8LtCro4Dk+QOiop91QvzFjI

aoPZAVAiK4t2zCcXDLIsMEMvHajuLZWRe6a7UhyI8mUbkrn55ShPvhyHeKAXi1psh5Y/vmQPqPj1ZLAz

9k48N3QL2VDiMsbgJT/wcpNgjaKpXnE2yh+9xrMuxx
dPvh1wC0Y30JvFmDabMW1oY27b9PJEAuqD45zzW0deyJJlS6uQb0AE8mSwxNTGXp8QN2zun0S4HsPbA8

pqiD4gkPLFPZ8ipJ6wgKsqq8sMkaUFIOtlBx8l4hmXBEqBKh7Hs5FzzGroMotJKrPcgrFYt1Wjay7LiB

ui9vqvSIML8kSMar8OWVLuoDMZJEPT4SEgjySLaj/I
Uvl6LAVxE216GY2t3Nga6kH8xRMKLVSpOJlfWBMqS9ixl9hb97C0WzJLZSrOjW2A3CpJyZ6ZqfNGUci4

Ys5Yyo9p0k0poRSC8889CqfC3oggvN+SULEX1cEzl6+6egyw68jzLof65et3Ywjopi0Q7NHrRkfMZ4BV

TIpbB1b4a/vLqNj90RW/NzFgRvZICzAUfup33hrDpw
N0sf/g+olKyvlR94CU+wqoE730H01oCdFN+NOtwiFjRwwvB9YbDS2vPjwzeAPGc1qeikC5NjN6VoKAUx

j9kjRA3xgJRVIYkbuQZ56pLxDoAUbwwNUZ4VvP57wmqwY36jfHYbUKduqtEOrs/zxLhGXxLHWdWcqNLL

dgm2nQzlXZB98Avdo63GweQOx21g/aU8gFsRo216w/
SByLUAECvoORqUNS7eMdpsUJEB5Ab5PhMgbjW3AtjUwwnXvHDqmVmZy5yhiBnf1iRNcj6d/FnZ0Lmnq8

CgZvgSlDJkzaMiUxY/DWfol+GntjIewwDXssTu4X7zjeVOiP0YIGJaISp2S5cuI+pj2xiVkU9dUa7ppR

jP5BXQO8QzlwyNE2BNQw/hXoIp5K0Zpn28iTypCUK0
4F/Ejn5iiUCA+0Tz7FQDSB3ab1QcI1XKX345iLLv5pZwns7LCLd24IjxU8/oFpq2NMmo5HAdcZWxO1rT

LUz9ZNbYbK6F+P4YDQB94dJyat7mU/97A9H3bQLp5mfIAdFLTT+EMOcvTrU0kcrlf8fW5fwu+ZZrcPad

Ta7DTqgHwOEfNqqTZDwroManYk/qS7zhJoJCtThsPr
BdaMMYohcu6LpMjfitLuSGAjwKzidherJukISGOeocpQqacnCHvKFZppd1FjV1unS7smSVRXZkkalM8h

DdJJ5BFebwibbdewSuxO3w9sbrZ1yZVJNwlJVHmtfIPL7oj6xIbSDKqspn7PLlVBMkH3xWR+x57foE8A

bdGt9oos460q1lSpTgW5GoHi6QOcWri5v/PmeYvwgv
KSolkDeR4mNTyTTxkBTOpZhj/BdoPChLE0ld6y5EY7shPRTtgZWVGhfUrxpFSn6eORB+07Bv3a+eilrc

fGrbc/erxwFHTi8ZVsNO5BE9Ve34rp/Qjz+qp7dK/qGWgkB4BNCk59h4773peY9d7w6m3XjfsvAr1zEn

x3F1a+AyxKcKmNAkglLc0krMGfd/vrJV7+8MtWI6x6
JginaCi9jmEcyS9Qv6/NP5bsRHY5lYb/WmjVPkzE/DY9h0dM83vk1Er/Pdhjh7L8Eo+Su3R9dLnWH0zJ

P2y+Oyky6Th91vZuo2gnVmga3i4XvD1q6N23aYVJnGTvV8T3QMwCqe0JWTM4iVuRzWhM05mvVGY7FkEh

edLFhXGk9AoDAuI3KMSzkHm/lP1iCEvsLWEVXyjsID
RReIwQRB2lvpHSvEfhDUfQDpEfbjoHI8ATlKRCPhhPfcmePeQznAX2F6Dsy3XtcrH2v9wKBYr4N5+t2r

srVurNeIxfzLYHu7taAWKLPloNTOz6E1BwxECgI3YUth6Ml3xdEj83xOgPL2DfW/URh3nuYfxsJeCcJk

zk06ohCvwPNa8B0xEUciioY4in+FRF8nb6cfEkQQDh
zWPc6l8dll7m+3pvH0jptHhh80kkYl63py9XBB6ZYMX5vNSrEEymZsplA5yjZcNNexi9tAF1vXzyg+F9

wB6eruSpHUcskl5ciBPLXpTpx3fayClXJjkDBHyofYzmVcrafhVbsdmLr0uBFefzQp1y94r7Masuzyzf

/YQm6M4tMmYisPCxBYHhG70zFl+O6MRI9cb75nJx07
Nt5asDFvx4KmR8Eecze3mUUnnQftYIpu9WDc2iryr4ZL5mvxlZRweK4F44Bpp7IZOGFS4ap5X9JhFbZe

dUZHyQLCVCCtACRgi2RSIzOuFJWxhaRNVAWpWX0C7v8yuJCzwe5CBS+L28dMCWgaNn5rz1ACq5XShlGw

u1v/UUMXOpp9HXfYGt77ylN6wwLI2OFPbQ0ODf8cEB
hypMD0YYYf0EPWV7wxFR4Be9L6dRVHWdNC5iDsJo6y0t3P8r31mqlmkdSheSyjfQzn4RcgEQ+mpWHcRq

dlDhQQt//GG7DkxGsj+1KN5W2aIrNjaL9lzzXTbqGpZxOmFBSsKC/TszBanYB2sPKnv4lNGehdvu5NRC

uSbdbBbjuqZHtxHGFY73B4VQE3TPFIbkPNVMV4ubIN
oL666RL8lNpgZbsL64EpWpaNqypVilpbvkDEqIiiuoym+gPZ2EKYXVP7SofBpvLWDlPBMU23bImji/Pa

fYxavaLvk9JunzJnThMHRENzoOXDfL1mZtDRfXesVs3GeaxV5+2OLhPee+umzfTONohosoifQE014x0v

axHmsw//hB3MYU48+vmMx64bQCYHyJDDti1lAAN/td
hohJDuayM93dwfC67PcFdPgAdZm88ZTDI/ugS9BYTDTgfbnFf6TP7WfLiUcvSBhK7IwWJrjyg3H2EFto

wlneuZiCyddgitQJJdWeXTzz8dMPYGLrKhCYaAKguPoPv4CHgQ4RB7LWVnBGglcIgtXLKtUqimlnVMCS

zLwNl7NfJa1Hxy2sgTrEKfzGECfosNvq2cijzUKZ9D
feb4EkxcXuCg8QK7E+3Znbp4Laxx1mgBntRUkHmrKbo/Cxejkwima2qfPrHPY4DQEp+DMl2193bHO1im

sFWK5P1Uke7BeLfENIlvJddugrh125UmByA7iMcnRPxccc4mFq7g1nY5lbqI8qF5VywKXBcL+O8hRVSg

8yhnY+N4xdoW8WveCTixE6naTRUVzEYlEBUBRbbN9u
beFCHUe5L3KeTjIEjtVgNkQeil5uwI/UdhguXcnfSnZ3RIRek8HNvcgd0Q/kVof0gC07JRwmaF5r5XuV

VMFFE6zUZnxmK96uUt1dYWEjEzc+Mngr2wfoT2Gn/SgnmVE5OOo8FUvYLp+Hw1iKK8k7wSIFF+usK9Zs

yfnm1+91/MyJG4i03947i/sArof1FnXEpEoTqlIj+1
iBao7UzTQdS73bn8eUi6z7766XCETT+kZo9WAzcWYtQrgOPAxF7FouSrqWZz30L+cLE2zn+cdHLhWmyz

Osl/lpIjlq36bQYl4Bs1nkva+e4XpbZ0AXJqWsQYyEM4KZbfIBMx6Msj1v4WDAN2rUQ1Bxx0OgyAkQg2

9mlPvn43Dvf4adgUXnzCoCMmEW2aOLdKVSV7amHlyC
cHi/12UXDm/RUXhdkdAONubKrihrzvQaI3IlB4o2iUkkjI0f5SGk1tVEV+Uy5a29NaUYRR27FQXS3oAj

2WUpliATP7Xu0s2mu2wOKEcRvFdNL2xvi5fZpxA6DNd9d1dl517lVjKylcVf2rrQ99qkhd8J1FKaBz6u

K9Gvbo+6T/5R5GVHoKmRSvy7jAJaxdqh5aEA2khFI4
xO343feyMXJBufXRLmnwLlxIRdP8rMlyHa2SCbgwrgNfX7E71qBMnilCQZWPoEQAGlVbIMYTZ3/vFWeh

xshm4ZhLiijwan5pGzissb1YsnYAsbl+eGR998drE0Mv6p6qopUe8id4Yusc8UAHnTFm24Dwjvc++98S

LavzAS7YlclGWZyEO7E8EoRDKFiWKD3GCdaJ8hzpXn
G2rWFOym7VkmDGMkRiGgWvZj9f5EJnL/tnF92NCI09sG9S4LDrYN49xWAn9otS5o4hFMeubAqJTO4BLC

AxxjFOeJXiLHUmTCKQD39yMcZ6z0SlbRdyMiUYEXBo4gELl+HmchoDa3HeHzDMWJKM5hGiHjXo4+3lSZ

wYQT1S+7nUi86vJ+YfcWYTLMVcPuWupW5MVQfEdZxt
eZc1hoDNJpIfYTnqW3w50JTMxnolltlV8KgihFdTiFtVdmBKstEbDxOYXETGU2gmlHCQKoSPIyC+HsEO

PtkAlOK2DgxPNgu3kjUuCeDhfboHGW5QL8jT9X2iDbLscd1zvOfMQaPOpGByhSHHfPfQKSP8YG2GRdfF

YwG8EHkISgaYQkdp/v/KLjG+Z//nGajN1O92yizVox
9y0B6gz13r2C1ybuEo2enyFDNJ8E9eDeug+g2LVzh0j7FhtJmriijslpQgJ7PXTVU5MorpD3zEdpDKjR

Lwg/5HjY+RmCyrDspHlY84UEpKn0heZrvrD7RSq7QzSPFyofZBhO1Mv4wG/L8Ti5R88RZzhmeGn0j1ZI

[id number]oCdaDkHF36gH
KJEx/71Lsk2mH8JlrY3PjQ5bfHrY9of19X9ochmu4vvUeIzKvm7Tp2HPZ+rq9PKUyLyDymzA+sguz0YI

QS15Nl2UthngLQ9CMxxDezr4U1bOMFXF1rumtotjaIrFzD5IlNMqEinZ4feNt2ouxHazaw4lwSc1FwYs

3rNQ8O2LQCp8oOQIWfNzhcU+vGK8+phg9o2dkK+/aJ
D96+gY9S5WQ5m9IYpVst5V/MmGj4vYKr+RPF9JfyL48zndokif+rvch/py4DG4UnaJI84zu5RMbk2l14

I+xD/XXqJPss+tdch0byetAaE/lY3J19f8G23/Mr8K11kohZue5nG4x7sLvHwOxyXdoH8Z1VXFkRcu3y

lRzO+2elqjPzOKVnmuaeGqbmBFx2TkiitVdKHlfDQl
lSiifvxQ8OR2nmzujE/MmSHbbbFLb35qviwBU1XYbbZbgWGCwczoTh/IMpNOnN19HANyezoa7jgRzN7H

Dc04FYzS2mQb7V4Wyf7JKRSuwUkbhvSBamDR8nWLvrDsok4ZRTt4YxwANGnkD/isQs6HE2oROnhtye0O

42DxpFaq2iJf219tKr/2uZ8k6O/C92wh7e7PwBAh45
huJ8Wa4oyojo0hGtFT68JCZOxz1+xrYQSlfmD1esBEwUsA1n0kzVkn5ET/rNFhCNUzbw2TYEvxOWMybP

yXsTUjIMOJi5kj3ME2LP/Hfb7COUywg5FzNaJij10yoqv55x/DVPzfjr+t9++qKZ7r4x9Ib5MujZksrK

VDF9cu+b37mNN3dGi50d674nfx+3qVAE1xjdjsiuiG
oBsocUUtj5rmbEVorAAL73hTMYHO7te/+IvE0qNLDFJ+KAsxWpBfBokJg6uyfWPMMEg39iUWZ9E4Q7pk

Hq+URJnaPImqjsVaVZWZuVBbhiDyRo+K2l/OqnobeSs+RliuEbB1txMllw7ytIVfumWRH7g4ANHKCfPK

68r7XROHhQKExTq5sSDGJTEcKXydAMjdYUbKHD70OB
vznMfpM7/TQu8wT73UjlwHrLA2U/WZHEBz3jHRLOpisKKtJRigIYywh8mFsjjFOmtXRf29659uFqfFKi

ogFlU8FSc0TiOF9H8+KeFD/q/Vvq68DJX7JqTP0Axk1jHvEkmQIsmtyNRMP6JBwVUSzCUy4VNw7AQuT7

Bdb1lQt+vlBJKFPP9vJx10c/4u3LHu0gwkt5faLy9L
wp134kTRioYRS9F+zl0BebzNr6kWhCUUKySuMA2Bq++WHKlr7WhqrtiAq12Um8y33ON9XPEDJRoSMuT/

zYbdQzRwzCCMQoSoKffeWXOSoLWUfoJ8nM2pxSE5PJ6dklWNpuNyQ1rhtuzs6xLR2bgnW5voDFMyYyyB

Ar8bpTT91y3Ru7zgggXfHkVaEwRchRAggcoS7UVCRo
h7XQxMFvd4YWBJMubBDF9uFqnuPLTYkMMWmkH8PVMImlisKXXsTJFaJytlYGWNtdCfTCuomCkG8g6PK7

micM490KnXxKuYfbZUD0pxys/Oc+XT9pX3TnlNFiAL6JX/u7ivuGkx++S6W0EYe69ozncP/NaBbMmSGa

RLyIsTwZazgROPDxtsNANEUn30dWHsKLBoRwTBcbkm
nUJfZ3UFNpJhwAJqJJxMSBrfPVMp7S5PsYDgaitzN9Tm75q8MDzG0PmGQZhwiJrYF587r7l0ux9XGqhr

15jev3uw7GNkNomQ423xVZxz61mA2W3W58JD/hgAn0qcW1c+GQI8jdUmC8jDA1YZzdvrAJ6scegxli3B

++5vGKkqwzW3skMapHtVqwfGhJYVdZPEl86FRSKozn
YOlcAw3hSNwfklvLUU7i2rcCb/dqqRxw/znSF2+55rA7Z6wYtDjgujT9d3Xyn4lL+St+j+ZbA14csvZY

dGRI8cnofi6hOxsCX8ejzCcRpJ8cSLoEdHiNuICyS0H631/mkPJ5GnzsIHysun4sgPZxWBCcUmLFV73N

s3RSAoDXwNokdpUWPdgIteHW6HlqXirs4IMUuM7Fgu
rISVe9hvRchEGpmAVwY9mA3G1CJ8AHyfLPcWJzTFXxgO+11jnGWS/KB8SIszx70nMmV5sNktcqo8POza

kcs4SbCOm+d09MAGbBDQEusBNLglHCcVodXGKPbhfwt5+QeGhiyhOZ3TbXRmOFRzboLnpvO6iyvQp56x

9Em7bfOOX+9KHiaRPChH2zbi2jX1tvWFlq2OENgZ9H
tczmVOSWDyLktpKwYMfPpOiYCGVPsb7Su7H2Z/kACa1MZgvzGjqCXESaey/oXJbTbqsl8GdBVN3bsuE5

w6gjx+O6eR0VsJN92e4U3+c2eQ0lMdHzbLrLAeUskDkw7f3BQ6AfbmCetWEgTiNFcY3uOGRU70JJKCfr

GSCgnp1wfzoqoljttZ/ipsSX1D7mAH942ZXvlrVvO9
q+COf8ZBnLdyImIpdejPjHmLeiLeRBSkpDjujNc/5wnK+DUFW+ZlCx1FziLj+/hl/wqeNbQ/In37fn31

lZQ72+ZWhtZ8RrdpIayZqljWdxihdPQvdvoI0oiui4g3ysn+hOA6lK6dBV1CRrP9SsyISplYlrhAWw9h

uuzdOsUCec2uhbBB8zTdLOYsY+WRcoeJA6jzVTfkkh
l5kVwj456j1Jc/gBrKcX4YEpn5M5ulqDFC2Ml9VVoVqL8GDLfoN4N5jGdKmjCNN2HYBBb2uRcY4R8c69

o65jibcztuNnb+isp/mBNnAlZvddYU9zdvXXSduKSeP3hPZpeCc1RtzYJ/8Rw3Iq2wgu9stxKRuvsOK7

NPyNgruyDTsublhNehYebD7t7VoS/oykhLNTwlkpYc
txPoZrCEzfLg5JSEa9crWOiAT2vUSfTvJQOJmEwdP9aYT2ZjEROOU72GN4tCG6DXSgIeOdPgujSTLI+e

zCZSlixxb5PK1w/i34aUw+IB+DAHBUdETkao9CKaYrT8Sjrr+iMqUgK+om3G3cw6gUSziHiYXoZqt2uU

aggsTWSkGxmjl29goULTYaUV5xetJPlIZO67Gx33Wa
eqS40biiH7rpVwtAVJUBTdb7fSWy3gDBVfcknG4OhlKjsW3Sdm3AnMqogV46O/EE8MrNjQtLj0VHgefy

hig9IcJceCgSl3ZMF2NDRGVEG4fXlIHWq6XqWEgkmPZriDT+HsZ5pmfKCu7/yV6WWBq3gtpjM3xEs4IA

Od0niJR6XbYiPV7upxHRtG98MZe+yHM4OXTSDHVaTQ
dh38SJ8HPpxpKwWeq5VwX+hzeb+WnXzcXmveh6jOqSpzuqT3/6o4tu8/PFhFTr/9GKp+ZceX/0+tce2d

hcMKX/gntbJk0/q0tjyww+0XTarvUu0/p8XoEFn16ap06P7ynUW0ezAyqpD7v/Yvp/n+EsDbAXExmyqG

QIGzytnk/XsjDFdsveQJl1uc3y7iDszgcrU9CanKBj
rHYp4rt07Y6cLfV3UH6Z5tjZePrvMjuVZfAcroYP1m0jqJw/xy245fhdC278yuVjm+w0JRhRD/f87Nzz

w/gPOsPYuednJ8+YO6Y5nt5tKA/MU8kCNnkSFzn3WuTTvAiDxhDjCJdxJeMYMXCKEsCeJFjKISUx4/EW

n3bPiod2/qamqv37ddyYWFejaoUiJ7Qvh78jCOuxcp
9LJiLD6Zc12RJBzWaDdXhAhNoGGkOdzJgybd6QUKWhhRFV0BhakJByc0nHMYzZO6zbQ5tVGFiqb+KDaY

+H9eqimhHSV/y0VHz/1pDLVyZr954SRRD4vJD4b1O+WZhBazhmcWE5aUxl6zT4vAA5MpzwTQS0BiRJBg

iY5zl8vi1aHRIKy7Jy5SXTuuuUV9Ljj75maMmyjIlv
l1UpFIwYEYQ7cbrP5zJ4MlNZqoxa6kivkzsTSyxUPRvY7JdPZMXvLIgLcIq8M90nf1vwX1Ar1IaCnV/K

ypI/VvaQq7jQ8oL9M4V1MeK4VplS6xh8UdFR0aRAGZZsNVokDQE7lJeNOeFs6BMbU0eoEBuzlbUDLSoI

TkPbhynnH52IId3a1OjYEGObr7QCBeZV0FZkqHbAMj
hsKif6LJF5gkRhRXKDwv5BznRjMZySKFPgtoYLpGgAIPVUtbyoG8DHUWc71cg68ZU+3KqML9YB7DPd4/

D0CVPAfQ4Ecja2aPyhgtC3fOJn73cqZAh1N3dPBn1JjtIEpjVG5DEHK8ki1o0j+5mboX1oJ8kbtx314D

Niq0BftVYV52z2eyVSqwcin0h+apzekc0qpYlo9Trf
5SHX+DIMvKvhyAoB4cqoAsaVzlEQAAVm4+ScdDjBhKgPyNKmjaqr0Dsthl9cLJW47dDHD5gvArTot2uC

bM+84Nzsttgf/R+EPsj6Y+4h/5SwbgMnSrtF8j29Tw+yq3gkNXFf6rFj/aucDo/w7iLQxFhiiAZDCAvD

+VPVTpyzMLaSVBY9eAN+G/PYErZptkabFONsE+MshO
8QRgw5CN0uCRWQmB8F53l/nHIIyWApEkVxpdVfMRqLRmFlKnUdZTrdvJN6pxgO2GFxWDvAyhjAVqAHDH

sQHTDNJkpSoHvdhpOG4gtE+W3MDzbNvurwB72sFdpIRpzG0laWQ7j1QrYRzEEhI/OadLCJXupl5V2MzY

zJgBXp5NdzXpqIAotFASKjnOTRVu+YwgYrn+h796xV
++wq9wN5b+/x9t0uriZgcb3szQoWjFz4u71qn2k54uZDe6wm57x6uOgK1htzH/K7c71aiFXkImYSxOnl

+AkGcetUuBHKCGLXq3Ub1hyortOZoWtooqqN1OcrHZoJlC6s0Tjnaa7Hex/LYBlCvtfsSK/HhL7aXeg/

to4mrxM+n/FxyKdRn84V0J6+XKek+Q5czxZyVT9c2L
LPu3YNx6U8zC+lxlZ9bz+bY2f0mewNhrNOakT2v46Ts1Wtoxek/fSvC3sh/8CB/R3065cVtz8j6zzjey

Y54ww8k2P7LNyvDrayV8VZH0xEbfEi6uwt+tF6HcUUmzJzPBe7RPYthSIeZBvLrAHoR3O6TtETPjAci2

Py/yu7lHXnuhcg9xoiF7Cpdd1o7BmXPU2/LzqtwaxQ
xTdYnx0J2GofsrC3DbnfLSJgeRxIVuV+xTHFOccyxzrHNsc+ziMW1VYfLqXkSR7hNayYx9Agfo3+vXh6

6ZKCJYWNhffYHnbcXXp/1/i0L1B1lbT3xPSerqTeTGrAUDQ45bbCzLKUB0AqBinMcHaSLBRAnIDIwQZn

1KRWajEOZPgYJru3D8V0w0kdDS09tEo+zJbgPT5QQq
0bMt2KR+5wNDw//GVVnmwwRfba8iDaSAG22v0GjW+gy1zMWD09eafZXZrcMtsqd7AX96Q7IN0yjrmDqm

biJ0wNOSlEvjlYE/U4Ln7mpFcPCZ4GUb6d2BiYVMxMKNOoElzcNh1EYzWvRVXZAPEFsEvzFMxjhNDnmX

ocfDB2Q8ApLuAeYcBRoiscjweJFYkxVaD5s4jLMZ6R
lACUcwM6kZbvt0DLAsxsczkmtNOwIvoqgMNyIhFw8ekW0xfIMnGYMdPZLpGm8Dd4oVUnCDjEZUkmNMZQ

Q3BZ9ocWkLdeVTQ0+Sa7Z1m4tTujCkQE3qRfNuiDgdFQY9+dO/8bg0Hr7MDKRZ6XNSwIgjWO+R8/smi0

wyXzYg1G+5YzCywYIwFSbCvJNs5HnEmHZEPs5PpWgC
DToKNzBp6KNZK5GHd4Rv9abkMq+g2JSfiE3Wwrt0JOJS7K43gD7UCz2DEDkbtjCNiX9dgUeZnDbpRC5z

JocKO8aQwpEsGjv9wOtmNcInqCimEKtBK0kGhHxwTYnmENnf13wfwwIQsA6b3BDMSGjzC1S450ESmnsc

+ie15+YMyAPmW/HH/vpqC1qjyJHp+bEwiUZ9+05/66
2S9ft+0f0NsP2BNwJi5vKTfuQSx71GIWx8ggk0+1AMLemLH9P9KrWkiVlXFMZHx55tnnVuvJjk4DBpNL

OXXRV8LwrQ/9pFZrHjyymWUWeW9gTXFogng8KKcl6NprfKlWEwOp7OKY7MhUmeJl0ftPpKZ6uHb84mSh

j+kWpLNBZsD9QZf1XQ6DS7eQsZe2Ey3X50W2reJW7g
Wpruralwe+0tcnLS7cCJN0p0716DUUnjD1PTgx4y8wyUm2kUhYWo89kLlh3pNgnNYksQSIxpdfaDLeKd

beykrPq/muQw6cWqmuDFxr42jtO0o0fpxcsRZ9pk4Xz1W95zwjicix9rO2b34e2U/cgXNF0Sn6wTXM84

juGxI3i2ipIEYbnM+mbrL8SXuGN7ANu7KVEdWIQI1F
+/jE6CjRzYfZEQfRmB2P8Bs9XRCDL6rpYSvbV8sjseZMlZZ7HReqqnfy2vef20lmeNNIXp0Zh4b/Kug3

LkhUFzJpu1FC6Hu3bsU9NWdYgwUcJLmxNJxSGEzojKIJ02PzucFxENw9tS/cIjCW/5tpxy+hkw3DsWFf

danYp3K8MzJ0yjaRpM3q2+JdrghQbyZzpby86Rwch2
poAin7Ai01kuP5TMGSD9Skdb1golKMw+y2AtggAPrwBE9quSHGJdhOR38lS9rTSzR+oDy8SRtaz/JBPm

80Hi6Hq+zAhY+K5yqj3G8Va596f8/Ham2X409lmUh5b285op9Uni5/q9QTanF6/tMZAHkHjrJP/vvzAJ

1r8OQl/DIgY4GjaE0uKhbj5cTi3G7XiKaaOI3WGCoP
D+DudlsfomKPWJ9kq+MzN4yI3+3G6NmWJjzBEhKTfU3ysyuPcc7w2zMZaDLAn9xQBsm0miNiQzESi8it

DP9o4D7T1gDXvzolxtdvmrn2VzMF3YOdtqIpCg8DdAds58p1aIwvzysnMiT8fosg8Lj8mNBKz/kqhq3/

DfhKcFT63GMjMf3DF9/R1uLSN+9wNiWE0tJ31ILLIA
wZ3oBB6yY0e2Qz/eL8uWKZQI/GRQpO5DSUFnIvpX3059uhog5wT0G81zd40utwjsSm/wyO2jSDMami3T

9oWezhdH6b5MoWUMtctZtyaLkjyamBYI7iOrClVgjrHE2fawUoVfwnRRYSrqT5YV8FJKa+NWLJtuegV1

gbnyqukzkyahz24GYXOohnIVFNIxI8iYhPt5CsOmx8
npbtgIxdvqwMl3Wuvr5TSmn758kUOZpz9OnPuo4LR7r3pIsn0bQ2iUrWFwnAvuf1adDeaM09bjmmYDqy

y/ZH2O5eH6nF1iB4053ljkogxA1Mmc968zI1F740+tayT5xYQMzD1FC07HGpbmPGPfHio8mokx1aKCZ4

pi1puaEVsNxCdOv3VO4wa7Zgbb+lAt1wLI5uc+K9zL
oLb4pC7xQCGxJoQs9sPgt4oRor3aSc+I/bijA54L8K/Bir3RjsbIKSsy49p7ZxfKg8Uw/Z91JTRjvQ2V

eqCeQtmwYn4EoJqTr7+tUb7yae5DTlGEF0+H3Ma2AurefnCt2+DrKAbQU/47+EZ54Ukd36xSy2nR1VcX

bHdkjUN4nuz4vUCt6qp2PWq0TWMSV4+emELTbGxubb
huFByeBlAnT9utqee0/lPs2Q9J5az6L0NITZN47aqgEZp+GBT8jQhPjKUPx3+T0q4V9uz3J3ZS7c96HV

Xkg69LGK27kL6vWrz0vgOecIMfid4WqB10RD2sgiXMyjwbp1hemG5QMKM3eSgiRbiLwEsluPNA8N759/

K8B8o0bCfrhB4teo3TDCxV0XVmT+ZKn6gfJtqo0toj
r1XsGccH1oUZ/jg9o3mNzl7A1bXgVPmdEa/6a2E1ShRfuQe/TqkzUmHw+qCp4KCuycNiVE9J3r8CJYyw

UdQx8eBaDDeWU7H/DvSt8Xq5x8cELbMDjG0r/65ZDEwCMz0ju6xXitFVnXQlPe809nsxKCX3qi21w10x

0rQapc2jOqewm0qMGi66ND53c+yilDnhB2Fwc2X/eR
6yTHnT5TxjTB8R+Mo6PWs7Xiw6nZUSgCeWlSW65MhghACHWEH05SX1ti5CRvUcF7OKLwe4o9eI4c0kv9

taPf4vvmVyNxyqPjH45epafQnx7dxqjRpffnUfYn7pdm0JJ40whJlWMOmh07nT1px96c1g/A9ousqgjh

jj/QY6R12JgWSEO0n/xHr5f5vsmb2P3dgj4lA6nDXm
jJN2GyIapMdMpPwp8ryLosgq2wlB0FHKPIgQbgGFQzvkQx0kvKgm2lqntKW7gGYACyPVmUezcEm56fOt

xsFTxFYPUDWEXB+bKTOYscot/aTM6JqvqZNvgjRcxNebqS6vsv7A01o/ocxPw2iurKweioyLRRusCbjk

KghCsmXi/jm3QlC6t8iSw/OygT2SSwalqw6TrySEil
d9INFjN/PRNjBzpOKACLc7tDNclG6V3VlJ8CBDnTToawiCEx2rFb3riuH78qOcGQjWhblIjWlo7dlcDr

VVsTkWqTk+m1Gg0gZfQoJICZ9R/68g5rKmVWRvIkG2K33MaVt4G6DyK/xpKS0NOS7mxz2YXWprl+GNZ9

INGXNzd9q0cNjGsG3SPibkhjl+GyHDDoCjyT4iKZ6s
I4uDhuKujnylb8djgFusuo6hoZfGGFA6hG+bOcT2KUvyPx4yoMwrK4Od6xqVaXvXhh1QOUmD0uwswnLU

N9w+XxrA+lRvpdwo8fsYqTp1/fZroZkeebH4drD/976w13HkZXDY/ESrtXeeiiOqy+nLWgS0TKYHxcxQ

t7fQ2dV0yxee8HzKADxm89La1fqK/sjNmZVytXOq/2
XuO/RgFm845x9928H6veS3maRD/es9u7y/+l7XP/p340fw0+MAQiebm8RKInBxqsiJVPpgV7GLzSbeRi

Qe2QwzRuNwUFcgO97+5bfObqwZk9B3JE+Wx+nIuvaPuW3Rf/9U2DzL6a8IPyRYUbqMHx4FTPHq5E8UC4

0ogF8gjISCjkbKhxHHW1npv7EwgW5W4VU5AhbNUR62
rv9efzVPWFcrx1m7WKKZL27H29NNF3BGiJgZyGKrjxtE07dhdZl1W4yp9AAYqgOc9RMrsR1ANDqCLtGb

7NheLZlA6fM4vVPA0Ki2MB+sqcZAV2z9GYKXsa11L18Rabuq2pvjykBk1DYgLtc1liv6stfEo67imO1X

/BH7BdKsZcb/cNpTfo8zFxNzae/AZ/v770u4ViIhuq
Me/3H8Tzo/SFTwidR6ESeUcoLqbou5kXS3DwkxRj5nTUuew+0Bbm5md/iF0YB97fiUv4E5mgdm6xVX2G

IhufcFmVN+iMqwBXOM5/ms/9I1B+fZu7jMo+x+OnU9BmxcVy67qm3Wu2EH/Zftaf2Zillj3Tq7Qib28f

YNyFbX2TT1UFUlpZgvVhgi5lydZbCZo0fsYGsYdfIW
UWJaxbRFpj2TPFWPc+yJwiKkMJGDXH9E7QUbDTKnmc+NXV+lrPnPdJcs3eJ6if+jGQRMqPTj9AP0r2xA

4O+VX9Z5/FbgtCE2V4Z61J+t1eZ8b4J7haFSKohIS/9D+rRn/JzTQE9Eh3AAiHaBgp1Ee8Ur4VXNdfUQ

T1moVRwFUN7StDrTd3NyqD2d53UDoysIbJHKIrI1XN
hpJQOcLZUkky6gSbvKkEXF7cw1AiLFhv6WbdZfdpX99FVsWJTQoz5CMGbPGVTRy/gc/pkPL2Ya83Tf8g

EdZi+sN4/xJMewTpJIw/iuEp+Kh0V9kQ16MqVXTY8I4ykGhY1o8Xsgm6eLFl3/Nu4zHAONsPVNkVrTOT

IzFPBtKBiJXsFVjFXtEew/eU8SzgiiGqKlQduAvBgg
Msg8AnVvD+Y6mixR98ArKksHmMiuUpTSi+oL4gKs4/+oh8G92HKoTByGbU6axn+dl1Qb6DDud7I5BRCR

20etRn9xQ88TyTi7LTcXK2Y9TJLZakmMaXisQcny8XJtLVSYFzepxCsiK3FYJgnFR7a5dk8MNQFgF0Gc

ep7gVpOijJfoLoCsxtxF/q7F37JYvmRFl7pOWWvM7S
X4Ox/JGFO6WYs1ancdYTjU5u9urkV38RIxA4B42ssW9PiOGo5Tmy13CuR0OsKdTxvmLK2bd7Pv2n4vW4

0oUqFR8ltfJb3z6TXVUNwE2suGiuuqSy1Pxea9cgs9i1EZmVqp6Dto1HWHEqDyuElS0xwXdisnTQGOVX

g+iG+PTnFDVjfk0sNYLU3gszF+C9dlyRlNj4E0cBzc
G1TPxZgTD/p6p5hNas5vLyRRxjG6cM5pgBdlCjupPKIdy7sRDHwEQmTHwh93ld10+f5jVkFoHrg3CSLT

zvsDwx4pVSdn2gy3u+pLYQyRNsw8MKvZFKTlNObzZw3Y4NCBMPnqN52y8iRELLgGUw7EuAsbDQhs6BJ2

JDcjlEFRFUs5MmQPsUdofJJ4fakZkLnowmMY3bJ/M2
CV/FgFU4yECnCZPuG3LsYsY1uA5N5/A9N2F/HtAsUN5xuY4B+sURLnhycQ1qMY/Hz6InlsgwOW0oBq3V

rVA4559W/59AeFfehDJzk/xeyHHU1U2albfkcwK6R3PlczXdfAYIsqltzACna0XmbyeYQCrLAdAkt5cB

4dCtha2wrkxyC3d+Ji4k9OLjoGwjH2tbHQR8GmxsrM
lUOvnpPBVwPkG7sMRHjef/IHOFH7ynCOrnudMz49FIDb/gqShSBeffmwYOGTiO+Y15vVWgHEj8P1utwr

3FCeCDAMy6S3ojc3SFQG0f+TONT+oy6gvygZ/TKdctKN/AeQc31AH+C0rGehWqY6pPlPou04iAvRtoSS

XhY1Kh9ylEcKdeb2GMesUaP+6jHMbxPl/BkEDp17eW
dmF3nKmHMfg/COLgW0ZaPE3g4Cfcl9HDx5o7YL3Mm3bK4QhSQ1osE9l5+Trm0Sx6rfzWstbFI+STOO4o

G0v92dgkHAcK8m2OavG2/aJpva5XNbodgDU9mMVnYc9MZNTEU2DRbybga8sH8MI0oy7Zd1FzrHFq7ru3

cD62/EThkcxXPLLP37RoadtFGI4QHddW77by0K8qZD
U2vzvXcndBCRMSompXZD7PgIM+1brTDnV51ej5ol6B1ED1IguKl7XxW0C3KJ2gXN7xL45plgTuC8qGwL

r2GunOLvH1hx44WcofyLR+nlAQ6WY6LCqNva3FNjXDD+JzE6kt+KdD07s0eSV5k5pauZqOIad4/0/WTo

pi0Q6WkbeHcU8VJ2hxMIcjR35n0y7D4hZkAli/4kYo
Dl3W9dE9SEiEzWUaGCsdpbf5O9TqjICOKp84WunRER+378z6NS+DWcNiyH9wWP3iqLSrvkkVxb1zXxOO

Uw4UGH1X6LvLp/gUCVbYoILCojLn43Y/3AxnwX/00Z41/l4j/RUXH2CVYCi8b9DKg3WLbxfHCt5fDBaD

Ic8WjE4v7luiHQquaDo/yGPzK2HLm1CZJvhvQnXuW9
rFJnz/HkFzFO6xeAZ90A9Jj3HUCn7+tsnex1sBYbFF4g07HAEi6tnhb/KdyCeF+Sa0WA9W5tySCqvelg

3A8A+xYwUod7QUS9ouQScf+AIq1R37rlIoOZU+4MwnW2bvfR6yP7Z32T1kvvspI67XSbhfhquBV7HK/f

Q+uyhnJlAPX0FIJq7iBaeXV5XCcWV0Qum4cCnTq4nh
hihRqrkTK7UdroC7D2HhLkxljvNwvgIfLOaz3O4ZgyX+YGXzu2PvCXRRKwBsZoge7orR8SI48BQO5vqb

U+ErAIei5ZRtXcS0F/VuI4sws3S2ybiQgFfGqYlGotIJ3WreyX7Iy9RH8CiaK7pkIUPyF0fp945Yegvt

7IUu2G0R0ECk+A/cvpjJONJ9z/+1iv3I7Ws2Rt39/3
9MyHyLIDH0E/zQ3vf1OejZN5WYLTv/C+MZZn+a44Tp9/D2yBXJFaRf9/Y35Tx0rI8rIbHO3IHEqTFun5

fpL0ImkfqI4NryDfbCGA5Ped/0FS08oBJVtpAjTWgE9oBDG+YIHZcrTGHH3pb2DrlgHyepffJOBVf080

9GZrN1IsMo8A8FOC/4jWMSvH8eCv0iYgcvV1hSYJg8
9yH37YDbyNHZh2MZCq7UiXXQO+ds+vrXKrKrbfcdwQ2I7j7N2n+fgHPnNcQriJf/703MQwBorsJO9xg1

wLlofKEeh3yWw1pxT4kzJeovBHMEO1A+FlTQo0m4PCis2LH9M7A+GiOogdbwuJdQlTcCpH5rBghddTil

hRfrDxuc5mhW/xJhsPMRYr27ggkHkcK4DbSh42IbVU
0d0rWY/4k00jhec6bbk4/F+JWI5zH+KqZsY3dT5RwnkEn7UcKMWcr+DYHskZ/4of/n9PT+w32X1Sg0VI

sZpTUvpMvSfYj/nNkpcZew1vzO269lkgRmpHk8uuPy7YC9+5pSfZ9/5+LKUNfhRlAIGZ8vhJoP3gFtn1

B4kM6Tj9N/jduxWC+D07BfL5R9FlJs9y7HB+VrBjJv
y9Eh69h2BX9SRnnZJKaX1JDhE8Ly1reKXAAtQJaaNq6+Xg6hOzsDPd1trqE8GZI+2R4v9GT0IL7+h3Qv

xnOQfmfqNFO2/kTGnkGn/V/H/1sd+T/GaPAJlanr89PN5VAyUxEoU/+3OJPu/h/r8p/R0al6+n8bN/W8

CWt/kYPtKO8FUj9c/QsxeOe5arqx/mvykdz86/E0u8
SMp+It12B1h3JfsoQvtgV364+DnYvpP4bhf7CdaR8k57pPxd5guezAB7MYUb0K3R+RQIpL9WHm0Hl/3v

FcFWjwsunVk5LgCkPhZzObBnCj2L3p06fkNa7TgIdfqMfaNWCIi2UB3IpO8hb4T6FWvZplAg1TgxJ/Krishnamurthy

+rCEYFCYouXhKsxrEjOuLmhmcal6GcePLt6Wt9TmsN
MLunD9dfVNv0drovdxVnumJ8jFskGsRy4eaJ+KN28YueSuAnR33Qf7YVTh7axLCr5UO+vtqIcwFU27Eh

YL3oZnolIH1Sy6UmoEJEkDFztX1v9k6ilrLHT6uRa8B8xQfUiW4Y34khtv4nAYZnv1w+tt7M6phPQlof

kQPKJHLAjJ6X70aNEMbUSio+1URafxE/0Kz6Z0isGF
zPfkj4A435hnMF8XG1cWUyikna3UuGu6EI4GVNx7k9Jn3R8mj/GlGeC8hyymday9Qqv3CwdrG7e3lWbN

2B8Hajh7jo65pXBuT36xb2VXtmkkjZnGD6/lbEdH2/Qjt++cWF6A5m0YJDmwHiYCc28DV4ARJTfF43Kg

PU7tvh36MP7wqT7hdgrAFcqj5h/zlbyLRNMHw+X+vT
18gdTUrvAT0oUL/nL/x1eVNeUrppUfbs/LWdkr4/T6AuomcQNEkbFAQ0L42/N4oTkCHYd1L+G6KXSr6f

EAkVfuef5s7uSQRsJ/FjGwS3lLRpcTht41z4itAaHArHfzgbEVEqya1zcB6Ew5yhomsDMjLd93hMxoJu

OKqiNfD1fzwemkQMBoSr7iSCRWMTO+ZlsUvlpuwb57
sEPueGc/l07dvmW8K/Em0R+qfysLLzeXlHPYEOt8MSVqrRmWzh4Ys0gJQKQMsVZU0keBY7PTTcOoUF8Y

PzQFLVc7N2wEMntUUeEnc/Bekv+J8Uoxy0mlq+mr4+JFr3ZpNcDFIs5YQxEOUKTD/UR6w9myn3OKQlCJ

uHWcZGeCoFmE/8WXTQcZlsJynAkJbbNp40OwlPOCb/
0lUPHOtc1PbnpSQ+zrFTMSgd+JwC4gWcQyNqvUrpVslRaNQX03IjQ3/Tjp/L93Pt+Ib29fYofsZ/QTt+

nrxZyNL8jk3j37v9OZeN/3AjN4gXkmgO3UG/741A2jE09qyuAUjGguaWy+poVoa8k1u+/wLpuUiR+zpe

pHVsxEwj/mcj3/0I9H+4OmTfE9aYPtGJ03aHIhm18k
b8j2Pjvyj8GSyPOpG691YvTRKd1gT17ZkP5IkkECskPz214+0GVd9BAdkG+Zynt6lPtvpV3rEDl2d2tF

KutuZf2rTUY/6OiP6O/ApkAklm3bw9Uoo4hEl8yylVAiX2ixGbr77yoDxYpYP/0RgxiME56RUgvR6HcJ

LYLBC9P26mT+giKWdP9J5q2FvLFA6+Mbr9qMAH2mVM
heJEO+BXAWHiOuOo4wkf/qZ8UwvpPJxKoZFpQrhMUIkBckDqXYK5JBcN05MkAwwcKpqq+zJYNsltsjnE

O0ZYqo9W6v0n0mgVItrxJld+8WAev/F2btsE9GP+8vr6DmH9TtX7RYe92lBCOde4DBaxaTo5oSOC3gFp

E/GLFMmN8wMqLDgm5xlhaRi99X5nyFQsTTQ9guBLJL
M+o9o5Vbs6+1To0swwb+4e5r3Zlka/Xy2u5rxP+ARMYFe8AndRyfjGewKvPGhaG4QwFWxnCk34zI5RLT

53oI2M9F3AcLaNuG2IGJM3ZTjJ4/jeSlpnku0eP8Vy++60/1RnRj0swGUb+r8HFlioLLHqKl8FzucUwP

r2Np4p6rMJ96bA9bTAhkMR+63MhHmbjPu2m8j4UjwS
JJtScVQS012Cjy4Nlu8OG+bOsY5jHz9SPZuDYk5nD0raKeh6Uj7m8tDgp3t2QLUnPu9LGdykNJl3Qbov

O4piH5QAJNMxfd5Fkhv242lNvl+zQEzmypXbCoKTw0Hi2ZzgCs6bh4jN0D+RBPaNHnYNvE4Cg6saS/pE

17n8m38f+xk8D+6f5HqHQkaKynhjO5d/MPI/l7JW85
AO1tjHCc1N0xdCmq/Wee055n+hZ6T/5PtvrKP7N7JrCm29uf/APMWu0bkQaZ6uwb/NSYswV5dJWj/vv1

6n3DYk+aXVAigiV3hcaevyG/m58yz/zq4Q01fTXr97MfUMKXhcDFdqJnj8SzwZoqfAz/1mv2oCspcEVD

9z/kBfk+ukyk/5n3DNxeACd47u+30E3+enszn/Bskz
WF5KLuiAtSS6P7F9XAjAKZgeb1Nu055Qgw74GEBKGAi1JwS7nnmU2WMOAka1mTCs7ZMS7q/Tof2T4/Tn

7yDqz9L9RDqueYjj5wR3/18G9cHdbXdnRg9Y2dA/FmhlENRvDNxmQtMIZr+b/Wd7Q50iO/pza2UwCgo2

uh3qiwZ/dlz+i64020Q1RsDHYUSP4R1diG8J6cP4fz
5+slWuMPjJcI57RgPlZiOnItOoTirgCPUnBH/V4WeGe3/8hA/uQN+QRKRF2khMacwpNdInTK771IO3o+

nbeEtQs1sK1d/71A8yp88X1+U1kfkHGXMhoIVaOCjzo00SmRB5K/O8u/Y5cLrbjarQ6/0v0CVBufYt63

oRFEnkgae7oS4SoZPuX+Ny1GJWKT19JBrIjHpy1vH1
G+fiGV5xzGL6hww3tKNTzGuJd3gQcl4RxfreEU1k8+wa0ARct/g9neaSQBZqtMjwB9+LBwOZEpxW9ad4

H+4H4g9QH7FWjVDMIevm7BgdssE1VCp5+LGcPae7f4fzS8b5odN9xX1Drl/z6Hh8hMhP6o/Mnzf9+1K+

znSQ4jpoQmdG/yP7auRWo6UsZsWPnplTzOYKM6Hl0d
HaW+JapMMNfI+8YHvmR9HQg+YeyMh1i/vE9xiA7FSJ+YcYHHWa0bBQT30VXtz5PLTBWywNQRZVc9KMWi

9exrQfDNyug+3wg3n3HMwpyUfRk+c7wJ/XdqW72cpc39HkKZxB+PO0gVR9NQEOH8tx/ZvxuT26/07rCp

Zu5xHfRUpcaIhSIUjXHYofqJWo8DxF3g08BvKCs3GT
Y+kL6WAyadPeN8xa4fxH2lLxyzrmToj8XIJd3AApebBoDzmTnrvXPqyz+hGc1RjK3VV7YgpFgihSkeVV

wBeP1VrrK8b9DElAFTc9iQzZKYaODGlUCGx1gmkJFtT/j3pXkW4K/37t2MIvn92eBp+wzcQt96iW3Kzq

l3GSh82f5iX7/RA7WERtRxs02TKgMees/mAC43G5eD
c0mg/bZZHm505E8ki29z4XLjSn91mjffWguJ5P7s9wGHQua10n+9DizPjjc6J6d5yO4m/WEP6/3t8S0v

ahfm6/3GyuW735XpUn0l1sDsH30WrBwpEpJT4347Ej/VFuGjYIk0Jh1rBd0T6nwMtikrhxjvLHkTb5hK

L1174M2Gv5v5ISxh8HJuzvaO9ar/WbN3bua+mv0MuD
DJRNA/N9on9M90cTbM1B7qyYZwXQ/KhKBvjtEiu4Mbh4Cc/vUUSwefdagndccao6Xz3rGr6P4g85th1R

q1Ap/CeOTDaDX1uO0KVQsziSoYkukLZnuUGZCETh4U8a9BI4nTj2MAVdFllKUK+R7SsqM86u9SpIfhhB

7pZylCEu9sRfNBDz6tKME567jpUJijY21HStR4glsY
VIDPPfo+j+p7cxND0R1dp0ylFhvrioa761QgdEjCnU6gwO5ej7Z1cMOusEdkG03AenzO0FXBkBDp/G/Z

RmrP3tuZ4719ek6+YAX5Pm2iZ4h9X3g8gsHY8lXjfuyPtNWyOM4AQ93+oge2kN835T60GDb/m8DhrCoR

aBxjTGBa9o3igIamuwcvL9ll6FW0BB6EznHdoWhQ3/
PsW/dNVXz2F3Er/ex0tQHT8o6fmd5l8jT25Q5El0lloe1y7+TtX+F/qaDXQI4p4dP+sMImgV30T/vQxW

p6eKTuip9gnFPCjvYWdZrmvl3Y2CuiwE15o6Biq+dP6FRRGJBsHHJ+dwOb+E499GJF4tJ1oPVpcEX5Q2

K8gYre4XY74U35KDYRkb21TVNIWRI82/z4iqAHp5dU
fOz/eJm16ZqaGYGJs+V1bglv8HHYlMA6lyXsNlZe/Yxy4xTGIe3XpaDnGLmMN/nidBp77zvy5qP0XMwh

fz0z0+cpduZquU4kdl9G1Eod7TRSAVyGP2EZdcX2ZkcBJNO+Amanda+wgnuqCe9baH+/PM+uuVU2cyumX7h

fAoctCUayFcuOlFfaovnSbdJovP8QrgBEz5WAvapO0
Pked+RBt3c45BrbY0511BECcwjlmQVrl8U56u6sVBnb2HsIgAT08UB4e6Yzzj7M9pYkvU+vi7Db2g8R8

BvEOtqk/TrK05ZE+LpF9HYeeSi7662GH1OuauA552YP9+IGdyf2Jo+/LOschCewDGhM+FgYPiL/GuilM

3007otqcCA8xSRUBHED1xCcK8t5+dZz3nAMHu5cusW
61ECoDBYyfR7xzBxMRtH/y8S7EYprNb9VzXj93MkkpKgxZeZmX6Or7bX+VY+TCqoRg65ZRVBm4YYz77L

T2Cr19/SxMTJAvXDPT0w+odspS1ceG/R5vv/FeybbT+IXrvQaIjoimdzi0ZAaGZLcjnGwOxhoxWpQMfE

iXYaiymY+LPcrYcKxA0mW/l2FSYlb162DnTECFTyNX
NJ/P4/8UNN//U9rDLMCdRt7QGC5kvUR5LTyju3vHXU5oLnwgyH7Wo+rAaxXVJuAgGOI10fVou3ZvBEtT

EWdWwMBqxMsC73k2M+13NmnqKFDbWKP62bOX2Rq5U9cCUYn0PqriNeSGZO8gBdB5BSSfg4OO072hTjor

Zd7fDCtCXFoW2+M8x3KejABPmufSzU+dyQZOt/VMGz
E3T9lKY4K2vztVm9J280+hour4dZaVisUgPTay103u1tDaMT7sDRu1m3Gi9oG1LUltRdTt5K1tYU1OL3

DavoPvRXUF+wyxFLAHu0hen/M/Pff/d072pAXcGeHb3wisN05xKIbh8W+KPHL5baUpA3/tMBMM2JQmBe

ROiuiy98VH4PhlOu2kv7ZhgFuKFjFSHvDI9xp5jG4O
0EXzp6O+ms/ULhW1e5yZ/c5FEQL92qRazMaM4YulH03jJCf8m8b+4Njig1ms48vwnEPsgNob7lOyy8O7

vSL4grz3UkRe9YOx6a5qNZ/uGVFKY7GG9DrtPCCb7Sv9k+gyfWAGKl4nI5TCHWkx9H6FNrMt1/Qn5y21

A0RuyZe/d/kA+uz5wE2HmgsksmoJgvUp/SeQid1bXG
VGUVF7JTomFCaPnvUrxU4Wk+Ik0zz4QddPCsv4ETZciWGrOXKvIZ3+URwIz9Zb/QHpNn2/CLsWBbJ021

2mR0ysZCgf0V79hPk4Z3kHsulp44Chm/rbI/xgcVBik4s/S3MHg400AuNoYhcBC/8icdNT2ER84OKhjF

eKVVQRaQvzCtyRdkdDvCBVznKSikA2Yda7We7Gm2nz
wNY6TaM6cA2QozLFAtqwV4emHi2YSIlTJNbgE1q/7BjHKtX+jQsCkJfOga6jgJhjlUBEQGNXMtA5RvY6

WbZ60of4SnmOYTT7C9udO4/xYp18RlUQ4Pjh7d0fvjQlRyCgmEHBCgw5jGyuJbJ0NthAImE3DL9vm+1y

Ouw2K/+/sfPEHxxqSTHjlGaKozOUiRZXn39X5COIKP
MgsRUdVEUWIrlgrppUGH5gztqPGYlmyVDxdkbBMMRiG0iYsv6tYnoKNJqiQ8el7zajt2pO1UnDS3NWgr

0s5YiiWa4ZFgnq7qQEpNBwWEOnRZnlqPsB9Wqi5ps/WRs9vK5VHkul0kRe96DbqbojNvHygXONQ/RSKm

QQmdOhA4AXRAgfvsEUuhdOHqNy5wZzkITpUeYNExZC
MHlWv2YN61jA7G+DljahXV5VoqcdRgcl9MNHTSndyc/se+/7Ca5lI1/R2uLsP9NrsVgp75J4d51zBFCg

NflMMPfjq7Vka4PJcYnmOLHWTgZZMF3bohfCjNKbmHVRk57lrWQwm8hIY7zY9JC1fT9s/1y+iYzTBR3t

qnVR8ZgoduqJQKbVE+EinuKsLhUlnLt22AXhUUomF9
HbY5L6T86Aq5y9+FAApdYL2tyduc8uSRmwdLRRr/8p9wc3BWjN8rhI2wYjv+ngCzEmx0C6716rk7GaWX

P7KpmgCr5z30VhaRusJH/lO+GvWEcpQSTD807fipJd//DuFRmCBH3s4Ee1I8g1eKA7lCV1UhwXvnXWvR

Ynz6CqSes/EJDw40ERf4bnEisPweX+zKwhI+I3qSCQ
pEmC6dYoK1f43FCGOxyY1ZyF7iOnjWK3gQ0SsLIOsvjm6zsSaFuFHGML/3g+nnvfmsgP0RUdN+tkWGqE

H7sgVkO5ZGnHlRcyRlFyQJNNM7JtU5UqWDyAr9JBdfj1htCkdzAuAIWHF+FupNoF5zpwALXp+plmM8uY

pgLKRxoE8qoO0R7RGxhTQbG/co2BpGzyP2N5jdX0uc
epjII5M7WA41w/2NTToMshN69pLvRmCGOadHu1Y0pFhNclXetD2vaOCFc+DtMFuidUUW/HkxmqSqUxWB

9x4QAyXbxO+Yjw1yDXmvn4nWATajSOrZy+zDhrZn5dFhDnxvapJNtKsZ6tDGvp5h4vKOTGEo2tDuWa2k

jHnfOSy1WbnRHrjqhRQpG/XiZIR6X2vle0wymjHpWi
oBOizlldhKIVn/gojWva1W1y8tVv04AUtjqWHs/hKQ2YOOn4YdG1aRewFGvK4mMxpdHp4ituUjqAtz1f

aZhuuBpEVwjz+4tLo7R5sWCsbeHeZV7KNbffKsC45m1F6XPvIeudk7RBuV2a1RiCgU1wp1IgZwKgZnXm

jG5QE9Hv4hT2ppth18tlH8MVApvJe4wSBLnalQw7QS
+54SFiqyPOGQ2ofuZGBJrIh7O2TtIs6IEDdf/ze05EY+Idvu+MyzMpT32TMde6EfyQ2TRCovnIv2qXQx

E7hKFffs2mriDDAxKv8RVflnO23DHEah3VaX7R3mQhDk1u65pqSKn0cjTysgM58VOf0i+9sWmnYktsco

sSuKff4hQKSk/lYkSYJaMIGrD0Yw0qm0DybljbVTh9
8r8jE1+zSr1lnfnZUMCQz84ij2z/AMzkAC6+qiVC2iddCUKuN5J4AyN4iUncHghdICBABHv/ASz/CVC6

B/D+I0rhKGS2/hzgwxdvDlU+foPp6V+H5wfcJZeexgMkwTSlCY6MVoTzBK3bjg0BGaFgWKMgHgrAIvAk

SNmvSjwdvCPiHE6WbSZ1GVWsZ99dCKQlWNQiR6SaTG
M3Nj4+EJqwWRX4kyMjeO2MUTN68LbktuPFMnH4hUnNFXkTonXOMrRNOnmK5AGUL4wHUWPFkcUzUT1bEJ

OSeNpqY/rJLpurDyWNOHld723kPsg+Bay9FENyKTgd5o1GXJI2d6a51BawUXqJzbpj001rgs/SIQl03V

vlI09mus1uBqC6pu4M4AkVy9Sdv0st4imRMW2MchF3
3c6L89S5i0eYHPNUSJGO+vZTr9q/2HAlpY9kr9ttE1+GYx9z25N6BowxXUmrmers0kV4Cnw+fFY4KDQ7

rhyUjPyUbwxA6RowcLygAFsxYJDfLErMDMTJloczPesnmE04EtuvD1vGqPHPTHSqCtyIiObNinZwDkqz

3xujY1sqcAj9RfOlWMRJrgLzkArlWXqERdbwjPYnA9
kPrwm9oEZB20wjIPTNBPBoYxOUXvphBLDpuQdYnhXOVJDDFJnJ7Ol8KA1saNONsTrkatqaK+Lx3E/6/N

K40aIYtU/a5pF528exkxOXy7T5zsIT4zyUhJsbAF7YFO6nf9JoCTBeTHbnkkJwXtdNHqLjXJZqi7TdLN

e2PK5RWYDbZKbqWY6Yb833KKQcH3QywVGvix5AWXNz
Gv6iuT6iqTRtBEQKXUOHP7WbhNNoWBsyOQ7Ly9PvneRaQTF9U7LugXcvsVhpmJR7EPEnVDXhE1KybKGo

GhIrQYuiPJ5QfIXrmwHrOq7ZODHiPc7zaISPg1umQtZjNLPnVbYbAALzGVfmUL2ULeHsD2n9HUdfC3Ay

J1adMILoW9XgzQFvIL7HGOHiLa4vcDEcoCZkZWYcEK
OcHc0NEs6YEfXvZL7gkt7AKcPbOSOaEvgSZdb5ESqjHX6CtRHvN5LljuQqW5JfxSrkXO7SHLQBl643VL

ajKMIdFmToYWYzapUrGYQZPLJCL7IxjNAwP0WOKWVgR7jLSULhC2tkHU89fTA3FFuaYG8OTFWEsIO9GL

5XalVtRPl6Y9OwO0szyFC2FEdWAR6iRZowO4EuESBw
djrgUJacLG85kLF5NRFhU4GwuUljuTFoyVVxKi0hURavQG2Gz126AZEoV4JtoQYxzpIgVuIvPGKWLzHl

A4FPQQVcBLPnD1ajUVd6NDDvKTQsGzS3GQ4cOHMeOwW5RO4jTDnxQqW4CM3yTShaVcW2ZG1mPMWaYlUj

OS7gLuwiSzK7Cz4dKlVdXgOaMB3zQvVdSjP0Ty8rMp
KdQlA4Xd8hJgPhZpJ7KG5bUoNvRsP1Ym1lLwVuDpX8Hp3oTiUvNnAwRg4fNybrBjSdIc3zBbmvSuHuRY

3jAvlgGkCjKy0uZINhSkobL13nENGzJxS3Jg6gHOCcGrL9Ea7oBYXsYiU9Zu0pSRBcLvMtM34oFOEoTb

FzJG6eNWtqAdM9GB0uXLmfQnA7Zs0zUKIzNjajHb2e
QQYfMpRpBA8yVVWnZmQhPU1iLYG6RVurYRUeJTT4RmLeXqSwUKRiRCl+Yp7JWY3em1JfTVdpCAWrRG3p

sn5FSKiPXoPjN9T5mJXaRt5hjN6CiLO6jJHnV4C3uXBxD0Sux1IXi611Z0RTQRJWYehRpcemiJ8LjlBd

ACzoUl3SUOTlwMa2iR3EMHzbHI2ATEAaNE1gZB54Qh
5sdGBzZbQyDBEdSi3BEtThN2QyAY3tA82zHFEsQZTaMLADLz9+UMofzcSvKlkOZyL9LGWep8CoGZkzFE

u0OZT9TLDfJvp2FOI5YPYrQQRwTOW9JOV8BuU1LGgvSuO5UYZ4RCBoBpKqXnZlQFW0XRV4WVKgVdg9DX

TbHcZwLtfaPcl3OVE1RvJ8BROrIOY1SHH8EyV8APWa
EHX0DOS3PxL0KYBfPKS6DGE4VeXcDyqrWco6BRT9HWQFWvN0WHZ1BEJzBYH6UPDeOYNcCeE9ATM9YlV1

RvJiYgSfAKA0OsB3FAWwIyh3EFkbDqFnYahxIEAlLBP8WlN8IBAlOXHyYLaiHfW7GjsyBmM4MEt5FXL7

HvRxXsT3FLYbEZF4WlMjOcB3YDj4GCX4BcuaBeA9TJ
UoLXXZJcG4ZUVsZdqnHxn0LLU7YSE8GUIbNdWnRDP3DyZ0JLAbFXOxXJO7ZwB1WKJxWth0XCN2TpF5MA

NlYpHaKSNoSjC5YBSrJkAfJElqLzY5GFOrHAC2XSI0MyK9FPOfTzXiKCGzYCGdPsybXYC8WWMkCNB9Nc

VhLSPdIE2IJKJcGCUjXPKeHtWcBlZvCSEuYDJ8RJBu
ClSeHNf0ACU0FXDtAzPdEOi1QNP2EZKwHjTzUPl1UME6IDIiKjVbESo4UNE4YRWsVoYlMCi7HJD3DTXv

XqHhAOa0DUM7AOUhEpOuYLr7KBJ2WNCqAmEgLSw6ILQ7ODLeFKc3WWSlGjAdWTf5JQY0PYQjQmMlUPn2

PNA5MYNkXwLvAFn7ZNUzPodtRmMeUQB7WcQ8TGIzXM
G9DSJ3AkCwXiXsWNE0NCQaOiK3KjtoByjxPHC7YkI3ANNcKgGaGCpsDfY1TAKtHRBuWWC6EEOmGcLjLc

AfCQQtDyBTIrR2CdP0IblqNfLbKSHfAyTqRvXcIfG7EMO4UhK2WeJuYKR4WXbvIMU4ARRqJuL9GTB3Qs

Y7EosnNjK9OKK8NqL5WotzTFUnXPC1UiByXmP1VRF4
ZqT3CrdqZcR7BBB8TMNcHbbkLnc1NLY3LNQ3SeHdGuIoNUb2RDBYKdh2EEA9HuzpVcf1PDi5VZQ6KXTk

Bnp7PWzhWoU8WxScWgUzNPllExY4QufpGpI5XGJfDPO0TVClAJI8VQF3EjU6WEQkETM7FNV0YbC4ZGdd

OSFwCQA3PeB9SPHiWMM8LFW9GjNjRkwdNhd8MIU7AO
OuWfyoHYH5NJ8SHOY6CAO0QeH9INYeFLX7RKQ1QzR6QBToZEX4UHHwUYX7QMVxIOM5GLV1OjI0KRWvBV

WjPEJ4KeD0YJNnNUNMPeGjWC2jho4OReBpAFHyAsmZSqv9JFktHX2YwEOuZ4WewtVHBXGbiyrkmP0aUD

xtFT1St662JfDuRO9AtbdnmMeWlPTgtOFWRsRmE9Sw
E9CyjEC3IWIlA2ZeWZOzI6f9UNjiSC9TXZSwZQ71BK6aEXMOLdWrQ6RjKBrdUGs4WPzeJR4Eq303RiXz

lFAhPPChSvVfPUQlDAYeXEU2ID8VODBqDPLtxCoaCG4zcIJeIL0TkBWaFvOvGPb+Zi7QJJ2dq0GbQIal

DkGvRF8pbw0YFAyERsCpV5V7oLNrPs6roY0XoCS2vM
LmU2XviTKVuZOgU9Gcj2JLj772P4AeaRXtSSj7OOxvBs4CwtIlIXynDo5IxE4XauAvUL4pb8ElmvhMAc

HdY9SoshW2O1tsipBpMO9SMAG8Q7bikrVrHVRRRaTvA1hbMJUgrpNoSGFbAZCKOrJdI6VpmoIVUTSsul

unwM8hWZI5PFZoFo2SSd1YPvNnGD5tbh1HLnjgHHJz
AixUDtriTOixfnNgMxmFFbYpNXDmMxkGKnj0FWhnMA6Hjc0xA7Z7PLzmGOUEC9FmgDXiAQ5fC8JHD2kp

QVzrWr2UXwWeX3PosdZdXDwlS5HjUHS6QVVuPn5ZIAGqGK4XFMLfDEIdSUYHMtPzRCWjKmBpZfMqMAAL

NGgrAGGvT2DjZKX0TGKcDt6+RSkgDE4NV5OjGKR9UI
w8ID4+CXwhVY7ZbKIXA5JpjXYzRKprT7HMWB4XUTR7DH5DjVWoGV1PnMFLJ5XwkCMlNa5jVOCqa9VgIz

5dF6GMKYEOUBVnLOquSIfdNHWvVOv9D0L6PGScW5TCI566kOFehDh4Jl5uA8DZOQuNSbCaLGsdBDnxZE

FtPEt1C2Q3LDUwH1MUN1IuYsAbtePzX0Q+PiAvUEFU
HZ9AGDOHKBi6F0S1cIGiR8G8yTaIiZX3WH5NNE1IwEXdfVZoi39+KfOIQnXcT5PCE1EMDD2WOYd6P6B3

rLOvY8V5yTuXtKR3NN2JAF6LaKlbxDGbNi1dOFvxZML+Kz8KRu5LMtBhCU8jpk9IQgcyLXXxTemBQgs9

B1ozwwb2lEWaGqG7Q6A2HkO8pCKyOB1DC0J4lXVhWK
L2ZLVmfFZ+Nm0Cp5VlZXDsJXi5P1qzAFWgHBGnKfOvbC87R++8wvfzzFQ2Q2k7VGXZtMZvxMoTxnOnV7

wNXPD2n7R6CIp/Lj6ZSAQ5eRo8dDYqJWQpOQl0tT3zeWi7GuReND27GYZcYKqpfL2eIuj8C0Lzg3GeLc

4cYl6pdTMrEn4JNbMeFAN9awGqRiBTVcY6xTtvympa
BLR3H8c5uWK4Fk16a5lousQnm7XtBdR2JIjqHGSjKtZqtnCdICU9mpKvkU0chxXyWo7FZSNcMUhvwkMo

ApITRo7AFsRpCZ56HryxtJ9jvII+DQogICAgICAgICAgICAgICAgICAgICAgICAgICAgICAgICAgICAg

ICAgICAgICAgICAgICAgICAgICAgICAgICAgICAgIC
AgICAgICAgICAgICAgICAgICAgICAgICAgICAgICAgDQogICAgICAgICAgICAgICAgICAgICAgICAgIC

AgICAgICAgICAgICAgICAgICAgICAgICAgICAgICAgICAgICAgICAgICAgICAgICAgICAgICAgICAgIC

AgICAgICAgICAgICAgDQogICAgICAgICAgICAgICAg
ICAgICAgICAgICAgICAgICAgICAgICAgICAgICAgICAgICAgICAgICAgICAgICAgICAgICAgICAgICAg

ICAgICAgICAgICAgICAgICAgICAgICAgDQogICAgICAgICAgICAgICAgICAgICAgICAgICAgICAgICAg

ICAgICAgICAgICAgICAgICAgICAgICAgICAgICAgIC
AgICAgICAgICAgICAgICAgICAgICAgICAgICAgICAgICAgDQogICAgICAgICAgICAgICAgICAgICAgIC

AgICAgICAgICAgICAgICAgICAgICAgICAgICAgICAgICAgICAgICAgICAgICAgICAgICAgICAgICAgIC

AgICAgICAgICAgICAgICAgDQogICAgICAgICAgICAg
ICAgICAgICAgICAgICAgICAgICAgICAgICAgICAgICAgICAgICAgICAgICAgICAgICAgICAgICAgICAg

ICAgICAgICAgICAgICAgICAgICAgICAgICAgDQogICAgICAgICAgICAgICAgICAgICAgICAgICAgICAg

ICAgICAgICAgICAgICAgICAgICAgICAgICAgICAgIC
AgICAgICAgICAgICAgICAgICAgICAgICAgICAgICAgICAgICAgDQogICAgICAgICAgICAgICAgICAgIC

AgICAgICAgICAgICAgICAgICAgICAgICAgICAgICAgICAgICAgICAgICAgICAgICAgICAgICAgICAgIC

AgICAgICAgICAgICAgICAgICAgDQogICAgICAgICAg
ICAgICAgICAgICAgICAgICAgICAgICAgICAgICAgICAgICAgICAgICAgICAgICAgICAgICAgICAgICAg

ICAgICAgICAgICAgICAgICAgICAgICAgICAgICAgDQogICAgICAgICAgICAgICAgICAgICAgICAgICAg

ICAgICAgICAgICAgICAgICAgICAgICAgICAgICAgIC
FmFMEpKQKjGSEfSZOuORLpQWCrRLDdQWDeSLOhXWDgZIHeKPWgEBJrOVi4Y6hlAOQvEBQpZH2uULq8Qu

8+LWaMIoHbIHW8jiOpdL1MCH2oc9DgJOktZUOpc2WqUNm5ZG1EZPYuVGclHO2BBMbqdz4VJEMaSVKgfT

KEk2vgSdZqEPF1YAKoQnuhSR0VAMUkN8stlbNdCNQm
BYXQSAkdZTJPRKhbDVHGEJAoLTSaIvTcRoVbRSSxDD0RVBPiK008pdJwLV9OBe8ECiOhCR9yil5HFwAv

DDMpKniTWvg8RRqcQQ8UeRQisGEwJKKsLOPNQcFkD8hlc8KuGzTgXIYXYNusUF5Ns8TxyYWzZAm+Pg0K

WU5rx4ExLJfiIJPzVT3wjx0RKKtYMmSnA3MsaSdbQW
aaQJBgnAGHITSnsTIhSZcoq7EugifpYCYeADNoIT4aMmGzZjPsLDg0XbZcAV1hRJwxND2PDDR1UFibMK

AeCPStV6tKKrMdDQRtRPVmbDjyOK8WGhIpN5QzwkXjqXHmJFGpMQBMIv6+DQplbmRvYmoNCjMyIDAgb2

IyLSz8IH1AWQRcHZzhJY3PHRWqdK9qNCwlQC0UAnMz
YZIeWXNUEaSyJ26fvCSuHMh2R5FkAkQxXALbFootZSWtHTpfVpKfJRChCfHkZWaoUO2+ID4+FTinRI5K

XXbmytElWPKeWt9HORQfMUPmMR1lUXVlFNHkF8C2sVlfTSHJCzPiT8apxaskGC2sQIPjO532lHhbrhXz

KWSsIBXbFv6GIQLcWPR4BKZffBTsQzjkBWUZOJxaIY
3KiYWnCXQ8cX3cOCqhKUPhYPSbA1mBJlDibKkoWI87eNgcamJzoOPqWJz+Jv8SUD3wm5HtADi9wmTqYG

oiELSyVHapZPUtNJSqYFDjVYZ9DJV1UYZQOoEsTZImUJDfZEdgUCCgMPTogk1SPTPjWAQ2JAmwQIIaMQ

FtFKYbTZswSLErGRVmPHK8CJQdCGDxVI1DBvTwCZTc
RNEmNQjjOCXzFAWazv4WUQMxDSKcGbQqFuPqYXTkLQOzCZwdJCIyJBBiOFC6IIQzETJhYE0TLdRbEOUc

UJOkDUAaEEWkGVXpau3KWLBeGFXjMmQ4MENbHYIbENJzEOzuTCFvLPM4RQQ7ISBqVTJkJY6TYbDfQLPr

XDl5ZCSiILAmPKXadp8VSJZfCCThJzz5ZLMaUFSmIN
VaHYklVUDwPFRsYTe9GMRmLRXrLF9GZrHdDEIkSOJrNAYpEYLeKZSzqk1RLUUkZPDpMPZ4BXHgVFGcIT

RgHVptECAhNNR5JBVpMSOaFVCvPS1CToDiFHGsHSO6UJtwFNKiNRHgoq2TQKZkGEHlIibgQKTsDOTjCI

ScHKjlUCVbYCQ6Eil6GEVlOCCkFO5QGtNzLCYgZPj4
NHHvOSWxYTIuuy9HAJTcNGOhCGL6KVYmYEBbWHBbEBvgAIJaSQS2DZc1IIOcMDBmHD6BBzXhZKPrScg6

SLRoLIKmNGDxtj8LBUUpPGWfAFk7LBJrQTPiUIMwAYzbRQAyPCYfCuKmQHUyJXVlKN7ZTcKxLGVfQXS4

FFetKZDiLWPhal9MMCOsAQW4DQU6RmXzFBUvCIDgFX
pkMVViGDTgXwGpCYAqVLGuPM9VErUgTHHiDXX9MLnlLXIoKGAvgh8MUEFpFWW2LhDhLUQbZQHyMBFmEL

gnNPLrPIOkVjc8BOWsMQTsYX8FHtZoJXRgGUL3DmCfBWMyQDKjwd7KOIUqCHT5Aji5BaWhLSAdHHEaSM

h7tsQzrVPeITk7CY5YY0JbqrDyDpUBTg7Qx365UDMp
VVPrEp4UZ8pcRf3hZOEdZUUPQf8CIFe4WuZ4RjYaDOK6RZO0DqYaWVW1EuN1NKKlJbGtDdN6LCS+IDxm

RehvCEL7Lrc8MmT7WNY3CqjzMKdxEdO5VEDlJnbyVv2vUMYWQl5+OSimuTHjlFgtQWCVZeR2HCXqIVme

CZQFGa2J









                    ID                  Date                Data Source

 

                    [id number]           2021 08:59:15 AM EDT [facility]









          Name      Value     Range     Interpretation Code Description Data [facility](s) Supporting 

Document(s)

 

          History and Physical                                         [facility] 

UATQBs0vXeVCAaZc05/OPZegLQHjh5WtBJulFAp2XUhtMYNpB9AwCXH3rW4jZUJ3QIgMDuMhCmZnWFZz

lbm
ZdTbxILxFqSDTzYplKYlKiPMvvIzxekYBoSL6BkAA6JHPqN10mKICnMZGgH2CwUMFlVQJ+Mt9EKSTxkY

VyVO6LKpuY5F2oq4fPQE8f9U6eZONPl7wcozMz6u7FLTmjm0SGptVbPMD8wZ0hDiPBavMx50/Mo2cjyq

MRBVBoeeWf5x91rO+CEDd9of8kPUsP//3XByXfwY4y
/l/7gKAU0ypgz96y44zaGO65/wHzEA1TRzkt3dz9C2e/v+BjaR7doavmfqq7+vZDJjVN3bWpnScwx7UP

jTFkltUBP6MjS0uUV8MpLDTuypQCiKtObhVRXzZpUZG0fsY2zWCRaW7zThYA5K3zJsqzJXRYSQd58UoP

R0uGErh6+NHYFI7REcoQd7FcPTPjmvGYbZUlY4KiFY
/8UNInI/nnCoV0Ifd+wsR1/BNlj3o8O+TMLapNQMh2jcw81ucVbj1G1g/K+YUQDbkeO7kCWJEdn5z65O

g4AhjKla+Ma78n8LEI6YZZTWqD0992jV7NnztmQX5OVdIlnBraBRmfJMhYYgz5Nu0p0WkTAGb/LcvSlh

3SlvTSJ8hRITkb97TDFIu2SR6gTrBOapDws0gFoP4c
PMYFC29hGvKGCruVpPo5VfhEytYNEcZxWYVk95BcRigzfNitTnvKwwfqU920N7cvegklu3M6kQVu3W4r

Ikuct/nJ9/YXdkmKg9tipFblOJkyNlT2PZhjjXPQBauSjLeKru6M7y6LJ28CsteNYGeP0zfAlpcGPg2A

Z+cXg/cnF/qcsN61gBeAlSrDGZBnDSgphBI3BkS4BT
BZL5arC8vkN30ycBcd2Vo+OGo6lFtVpNJSdvp/BDn8MEwTalJZOSX3Z5+z9L9QluB0Gf7EpsN00eWeSm

+SdX5ATWtS/tBsKFd4mHx8dQEbDA7LyvVqodSFrUWtHOW9MUEfFJY8y9fNXtSil849rLRmWyduCr3F3c

lNCqg0a/C0HIH5QquOH5qv1JlV4NwiYw6Yng34Sg+W
fbASvwCHP6st9pOEmCnH+r5o2qx0f8NDGRekyuJXKIxZqhKjPMGW2YdqaGFJB6LKmGTs9xRyXJ8HITvq

Nj9C79qbqNgGW3SlGjy5aq5qH4/9aS7mWGYLc+D1t5AwepUhZisUmYfJRnJJwVFqM1ZKWUIXdMct+Ug1

Gaz4wKCMEVYMiNswvvOpuPrGivhu0UP1ROYJVsYXBC
+ElPJ6yKokND8NLZDo4YM0oVmEhSXWZ0cAygO70KrGNFAP6UtP7Sb5GU+YzO4kiMYh7q/G9+lz/MjO5o

rqph1xw89vOSTYdsIWKGKpG5C2rvQUPvr0JAaRf2iFQcWpp0cro/7xV8+7FX/6Fpj74E5/oeABRgcbPX

9NbEIkUlyvhVw/Pi/EziX04Go+gPuijuVCT/OZZGwV
9tpTa/joxWNfAMmiU9gbgZc7fLjBW3J164hQUowD6yssXJDoUeHmRHDcwrYSWEZMPD4i4nWZiw3aZUcz

/TQOEYUcYtYAbLeweba9qIX0qFb2p5aa7qAKjw37lP0kmMgbJELUB0+9VPJrQATzr90hpi2J28XdaUUF

fbJSwEXkQmRbzyOZvKEWKwwoX+Aa0pduHQephEatx6
wMRuc/n7Cz8+Cs7hYhBq2iw97dtk9qSDZE2jfn2XgFd/pBRIm0k7UGTLEFPLWx34LNYUDQAweBrtQGDY

KqEKOZm09+BIuoiYeLkM+Q2zESW7l5TzAOXXFcNVb8Mk00CGTjtb9qxcJvOMWcFoYkfxWE06tKPnWnZR

CknVlrKi6CGiCI/w9SRZSSd6LsV0W9eoT5I/GNdBi0
5PioWD4lbvwunBG/nPAYtDkn8EZ1kJXhEjDE3onpC0kQSF8O4WZu8dbwehZ1XsiVGx1BriA4Qz4eWcwM

BeOJO6NV4Q6aDiYHBv6owJuzTcwwZtjrfCSXjYvSiIdClB0qmVDYHDN27SFOpACMGQG3xMnXDnX557Ey

IHdVnCm48aPcFSGKCeJzjZXl1nMOetANWWCzDCPHug
9xtdyFnS1K+V7nxBVk+bwA7Fp3DdYVgfhA5hfqTaM8PpSbvUgfGqRVWe2Og75VJfcwbIZTTMYH+Y8skm

4QWagFuijeRDyws1Eg5H+iMevo46RXqiR0FzjLrGYiY5zUUkMjLImDPl69wtMWXzvwPaVZ7laW0f6yaU

FGvx4frPPsoUQ/TDgYjG6cH7ZFHwNzaq3B8IG7BfLe
Mt/4cD4HnoHNnwB7OglSeDi245+l+YyedoQQFKI2Mom5e2Wvjlx6o1oDDi/FD0Q9C9pQQcCEmpuxnYwu

Ot9ecJoSu4KHTdpP1A94JKD4LGF7sX3j6bAd+m8TfrCsGdj9h1PehCNxA2j5Uz0RhP8PTvhhR1YcYNF0

OYF9N1zbHUWtsjdgodprEOsU0vJvnl7mrtrDKImnS9
0C4CAbilOrtm3AmwXUac4Lf7o21VSHntB7RO/Jmm6Wj9+RsMqWnJNeVq51tJBUmgG5eZcj9pyM3J41Ab

ftOS6GeiAyVFXNq8pNNoqI2feNUwCs24zAFzu6vsXiTX7OTg1lRCBBNFiBlMMwC9tFGgj5zq20ALPX6g

g7IMYWqteeyRptRzfvY8KypPQyzmQO0xbBCJh8rYwL
5iD3VYUVGPiwu8+FjrOwZSZLhb5X1kKmywnFoBk4FMWxVGzmztOHaUHACTcMr7yWDdgmPFNDyZY2crck

auaQd6dBFQTZUeUIcpHF3Bk5fD0ZJ1sfjwL5OG4vlHXQKWZa9q9CK/e08Wr5SBQQdUtuPnYK17I6m6JS

QcPkn0fLX8zORAYFcqvxUY7dIHsnIes6m2Q+X/GyuR
Yvonne/35cProkxJk9l7Lew9d6/QunrCzdLGcZ8/YDGRnGkPcxignL3MDqguXTKIi2aZBBIBj55+yRUAktB

WTJTK2zKRh4nwUsMt491MDgBYVHqcCwM6NnL4WuCb7OZn+FPJyT+PbZIuq5FIr/6GPtYm+4tzeR33gCL

PlBRO1+X1kGj/M5ou08+oexcbscEGR6ENnc1fSp78+
JZk34Pl0NhGgaRN1Lwd+bdNG+gmKH/Fzz+J5e8bH3bbRHB/kg0L/iqjfOXNSpcyteuUQOkLdnaf0L6EJ

sgGPGdQQI62L9TjM3Dk21MUD+ehMVUrO51u4At8dtFM3PQdg8TrhIUuZckYMltSAEOTVIqPyqkfrAeM+

+aZfsdOrSCHStVaXs3Pr+Gc8jA4dC0sfy1fgKP5Fwr
Bw8BigmWa7DHC0p17oghLVlgrI9xv2q306kr/JLyld7QMAHsIX0Ub1L+obe7I39MtuP3RVnvyuz9b38y

9Xv7PHKub5lsw+V9aBYJZO6jLsVYWSTQUBzfGaar4Yo2DY38bMGGxjSQigLF/y+/u0VfBEenwPzROUeE

tTDlP3AC9msnAO6qUM1Qzn8cMcg4hUnWSlaQNxzXkL
OR9dWDV1e6BHQK1UPrFp7eGPsirSU+xXlvXD/y1FjsLEvhPcdhCctHEcItsXRE80zNkD2QoAouVZulYS

9xGS4xgRsev57uRq78hUwWB1B7Tdna6065FKA0aLj0qH0k3CIC6VjFqYGBEYMuPL+tOp8agwZ8yWa4gt

2slIJUrOp/X7xEx8twKh1i0jTvpO1Ht9nCnN28gkQW
8zPFJixAn1wNRWDP+EsMHGJU8UrxOfXikupVUbOQooPKKgVvc1cfyATQ6Y72jnDvFkedFT9YcipsIwX7

CDIA60xHg7fBQjhwTSR0HiHjeAb7PJZ3cmFvr3Oc/SawA4rydq1kdt4Ju0FM3LYE8V0J+WTz1keNQk+F

rjHLEAKrb9o06XEb5a/dfsbkeODNw1RYPMq0mKIKud
bbAaz+CqbCpnVaus74mS7l+hptHQZAtVzWbXsypDuFKrgf5Hd4ozKMml4H9dHmv7fmZWWrqaCNveQKmR

IhjqgtixDGZEJ2FG+5vpduilCBmq4P0kbQ7RND1o5CvgQrU4Qw0E0M5pZHh4k+BXHGLwml53BZ9dCkFz

WdFCTb1axbIK3yie4xiOM8hq72nF8oXCmQ3gdM/OLP
3RXl6lTEVEO5RY0pfZFZLJQ/YGsqLBBoYu29UlHntXdw2HrgHSFIrJKy00+JoZIXUFVEhQlqKdOgSLaJ

HbPtXmTd1uPEkRBjl4R/kME8PK4z/5tT+Zf/EIbpMRy2hFcgvBT6cIRsQfQX5914SKjt2uxPZ8NqJS9y

GkDtApEPHLbF3h4WqJ/IhibF3GcY0NBF3yj5ZwSDYp
BNeuxqHiUgoHTkTjMJDqKguICbSpAGwCVlZxSIFmWUvvJJ4TKGixNKbvMHQdY1LovcKydOMkQEXeCo4X

IFOuDG3JHPOdnGRsSDGeQjKjRKLMQiCrRJDwXIWmvLQTg0kgOoHtKME2KKXbFevlVX8NRVWaPF4Jh916

IN34ovE7KDBfRx8CRELvQK8Wgn25lWU8UNBePbGjHK
RuibXmHYJrvfF9PB8NUpCcRGT4gPZuNsbQUU9GPPSluTTbXE7UAFKmxPBlPP3+DQogID4+DQplbmRvYm

tDVpXnPLHiPgaUVjBlHMflQxtltMVdDQ6QeFF3LGHhB20uXYPnCBSfU8NlWXI9Zmi+Eo0CCZZmrPMeVE

0XTbjB4K3jkkwEFK4KkN/ju9XWGVOs8eSDGqE+rLGB
MKldBhLgajSdzUHDUeAP6Xf+m3xq+yvlqaarWKvnouKBalWjB2E70ekWb3wJG2a8s/+wgyLjnDP7/+5j

UUk2/cD+/Qp6ms5Cn5pPS4LCv8+ik4LlSXnmYFm0iFVHyfUzZO9JfwsU8wd/mxpgnrwZ92hsje8sb/n1

2UA2zBU/rg5PingMj4dzjwsUqCUNN1074fmwZf4F/3
USHt76I6pl1IZ9eAdJINY6fbodxG71/oGN/58ZaBqTkL3yQg05h+MpWuIoQ0ago0cqHlp17cuf99E+6l

cP6rjZiR16ojJnkBXU8aa5g8Uyg6+7Ghct33R/sdMBXny/Bww/ryxhuZvSetHDoLRNTlG4eRIrn2AV6k

+34xowjqy0BgKFatsSODIUPHzQqSK7iamuebpVNbyX
LaJCqEsa6MwgyOBNdft7RQAu0VeZVdUmVQlrZa6ezT7FSd/I9ROsoawAoBuAzh/xH5qILyBLqT5KxZE8

k954uztYeei0Bsi6PCsJoKRCjIuKNsaKfcmEmg5PwBi7Hr6N5hDELwfjEsBBBGiYbHUYNJZ1W9WfFE6O

vcAANLjLbRrhvpytmQUbUukIO2jZoii8ucLEhSXOHa
s5lnAqJ2hHOncIgVXVF2fSDYDmZIfH52+ygZRGt2LaUT/R644JoQkpf7u2bQoos+wSoqzCzvGcR4xNIr

A3h05PSXqJxQb0WFKlHSEybrEpVMkJKBCIZE2qHgCOhehkxqb0XmGIWNDH/XsGgyX1QKr+GTYB3vGSBN

xLN7v52NO0jwDMPiO9+e1iUV+11C0nh7Jl7J8fesGI
za/Zg1ZNu8cNJu7d1Vb6k2r61p2zruy/1FIyGlKRKjAz3Y0UHLwXV4Zcfkzr8r3q/86ugfG/iY4Ul88R

jm21m5FX3rnrWYlRxmzBGFkqBvb9jNR24bTqgh4NV3Ao4zRL07abW4ShLLZECmNjTFbBO7fES6oKL5ZQ

2ykmZNRXgRn21JlKgyY/WAP1HT49wjK/W8AovU6vjg
j2AEPLzbHUAzPfqE3gz7QSiZRyfF9cYrDIf6LHeoCVyrqKsIA9Nbj7Md0i1DdV+bIwMpnZa6GhvKn6Lp

PdBlZaBo1+gd0P1dCzZPgAx/bIYaCv8UDdrlg77BT1e7Lhk2/8jozeMp8Zx6zlNGcnKkrNLUSYMxOTIQ

l5gFBvxahfFIIkLoYu6LoxTfv8A3F9NFQFqgaJS6zC
YVLypBAGH3a45nyzHE18fCWxYO4p15e+zd5eHw5gd/fypY8wtQFCVv4T2xBunUQ2erpsVm1UQdV45qIm

Jmbg7nfd+pDCpapyb2L9eh8IuF6eZAVRoIOvsTQI0bhF1A03H22xA6EfeDYmAW+PiO1PEPPd4TstHx/w

nO6O/fDDKPF2BBo8xwar24azahFia8GdkepSYgCU2/
i6cusUzC984X8iwbS68BivQI5u+6SiYTVgOPj6WdE4ekBhUwlM2rjihmVN0BBMFyUNOI9xlaGFy+qPls

jtPC4q0ID7zgyoLF5VqYDBSQi1zTs/hclAlGCHtPnrQeXmegG9JYX8f35h7WngoA7DG4ASbwsHSZRdp2

6h3Nm/J9/sLVJdrOQvtmCDYuLbXVHUfKRFAQabaC8Y
cnUKMOSRbosyUqMREbhmC1n2QNNHLT3NjrVSElyGHSu9O1XEaqlHMVVULzT0YignUfCCd9s7SuUAdL/C

Bqu+8lrn98Ofw7m7qpoS0GT19JAMsGapCz+A1i3EzrmMNtQDOyTngqrSXdVtJGVdmSDPQtMAKp+8BiWi

mI4sHzoJW6BfJMn02MUvyzGsFwaZTSyiU/7nNNP0o1
nYqLCZmc3yc06CQ8JZvcGIax5aW8DYMZb2pyCR4gt7oAnncyD1QPe65AVrqWssyL1YvxhYw3FH231Y1D

63Z8Sso+STgYi3o9TGEpnc5Vs3p1zX+TSlo4RPnEi9pNU9g4PrCBEOJhMJWLmF10qepyWc5xT6SYnCj5

ydYp7cWtSifImXt+W8/xhaLfBzuCMOJbGz9NzOOdmA
YQDewhYoQ2VsHwsXvqY02KCYdv7glakTCKAuN6m2myoXzzzBEJKTUy6r/OQWohZSNCA7vLkJQkaH4LrB

DoNMlrcyvmqBuAEvhsck0rKKuZcChKSuEh1EE/E8VKFVlKJjy3HIisOWWNFMLnAD9Jd2cp5FaOvL05i4

eisDk/wvWIpwcJKmo1HlnDkwYSG1G9vyx3nMYZCLS5
jsvVD28igG9VND4trgx2bCStH9Ca4P82m6RKkidZA/LF783XDqrPUOEYv8x0ugLrOH6cErAulsfSWvfv

0nqnpFR1HjjEezlO/nehOhMFfOHh0waB/Ls3mMIUJKeC5oyK6hS7J77+I+H0j4zrFc4bCG4puzgM7lEi

dxW+3aGZSsuM/ZD97vVrUJexC6JizJDUkB+3xGFcbA
+Me+dA1izlbQyjOo7XwCWc+/C+rCb32bK8PwNA/Edp0o6IBh1pYWGUvk5xG1pW+AuQ1JD+Dys7nteolW

/L6L+2zgecW4EE8UqjW+MO2xuG+AD/Sw7aQMa7nkW1HcQtyXrSr1UhtoI/f7ScTJ1nlm7CQizkFYKBB/

PQDT1XiuFGHnd4CXySSa3femgSTzepyKiLcuhr3FRc
1ayPYTS3+RA7UMP7tP1zB/dTj1UFGpkWoKoxKd9ekdq6vSTqHlfmAnNvtAp5SvU/vbN9DDw/U6FzmaBf

Ko/9Yr/RsktPOYeDDt7VVarCEqJgTHGEuyGbG1P8EZnglzJNfpw+2stOweR105N17DoEpImD1sQ9ml/o

jlkeBAY/xLRYe7vFWzLIs03IH84kABgiYL0b8z18q1
tOCvre44Rw0dmrCxWGsQuUYA5BG6JMOZY8DCdMVBNhJROPho2dUEL4EKmurIX4IhOfnbppskBZWCeA26

kMJK+uxXSFROX6CxfFLzk6EesPXltAwLPA4oDxm2dHlw0kCp6JGUnaNiEiXMtje9wP1U9xbaByENC0ct

5p1LQLd1dIiK0ZaykJsyqWVOmxcpk0UidKIiSqCZx6
yV5i1i7/rJXy1RVVypa0huWlmuS3EF2FtvhinjrqtbGRx4AhLIko3cUxGV5V8vffsSCP/9VLqHOcjpPn

DG3zv3H/om9ksj6UR/Xi3lYFyIHwJ1b52W5gU7eTQfGH3iW0xLBsSI8u2FTOnw3MidyRJ1B/JJxxqQat

KIWoCrrZVPHA43pkTU4XWxsLeJ1fdoHrty2iwenRiY
nIwKeIE1eTv+kvdedVr5pvzj5MxsF7E4OnXFhL/B3j/uEHy9Wv4ambG4UCL8GqneLInNhHHy6KMaMZaT

QfxuUlobUuJSOpMidkrnVZY+hj3KHxWFWjSt6h1OdxiU94O6bj5ggq0EU2ug8LDWVrGq7NbV7b/rWTKM

pOByYCWbRTJQIwLxL3TKcwBAulA/7Wjmcj3Hr0hoVc
U4/UzFlN8ZumLx6Ld1o70Elah2cjp+s8I70edK6YAuBCwxKl60XhPyBDa+z1ERNUg/0aQvMvcN+PH8bD

Amfezjt2mmhF43Rfy2Yv5cTs2v4prnT96mwKwJAsiE+4B05f+A6iYY+ZJyxkSGh3TsWklsAJOnOjesPT

DSxtAJG7x/zpj471Z2z0zSxnDacn6ak36Tk6WLWyYS
uC2/i07E9G0z8+71+Q58/C5d0yVoCNrivyGvkOUhFE3WUgJnRR4tzx4MFaVtGG6zqn3QVNM7HG2SJIPy

GT3AuELnY7DpK4HJXwMyHURaQTPjCG47JPYtTOZAKIqvJQHrZ7Agl062qtQkmnShHYYyTu6IWHJfAM7J

BTUnHAOkxRPuYUXkPQLvUgH1FEWpLNjqEYXgE9Yejc
IjldOxBBWxYERRUHtjHVKmE9ldx9DpCLy9GY6IUU3TckIez1BhviMkX4keG5WMQR9CGTBuQ7FHE6NgU4

hkTpMqz6YpC0pzKrJsb2HmOf3CElLnVf0QBvTjYM1ajo3MWBAsRD0mqc9RLIA3JI9CqJr3SHBsE4FkBC

EpSYMgp8SuUB6KZH1ghPfuMgc7Vu6+WHceQLV0qeVt
dB4WCQFjWJ2g8ECF/n7A/Nq9GynCXiohYMgVFjwikAn5zOZm6GN7/iEmDMlg5n79xuVj/Zr+1FKkZHsm

9wnwbpYPYozHHcLOjHemeSakaSxBXz4+Ft44LTqX2M8uwjtcBpSo5V0/Sy5v0+wpnhmo5FzM16x640jq

J6op6JLf7Y1jPlyM2H3lj/BhurHo6iu9lRcyQM9So5
JarJbbb8/eVfV/hZdLtCL74Dvb7Dzy6QqRcHIgbs2Nh00wzabOja0ZhZhSaIz0fGAslmW2cIbswWp1Ee

8ls/uDBPdq5uT1ZQlZV4N9PSZwLlqbxgBJA4abLKzi27J2RkosXaDDEFySt2H7Cs/YAujltSFO19wUI/

BJxT/Ebd7CvEQINLZ7BvjbA/goWZOGkHJYdLBoYDEl
ixks/vpVKL+IXl7Yg4ZZ6rkIdoLLVYRbMm9m7DuY4rXvKf7Hj5KT/qig0/QSdDqoYtlW/LPBTVVlA29B

yWNV8X42rEg3F4zbwnIYio9zRDhPTIXgr6/LavUd1lUqPTVlZgXgkTEUJWlRUWtNb88uZLjECQjtiUCy

HLEUabgoYqXNuDwhpjynp4AR6cIgoKF3WwTuCbbWuK
YRAE/oOnmJYMjgKWA791251R2FVsJLjsBKz8Hu5z09aGTPeaIqLV0grL7TYV/d1YxWlQqUt8lyjjALGu

6ZZgZdllqcKKp6N3n20ZxouT0grml4MkWmszzIllkvaK0ugFmCzHoE4mt9MyWlTuBx2KMoNRQE/Kb8sC

g/1lPI2/Yjx4h0aRap9LYqqPYLz6PpE5CpAWaz2p8T
gQi6xcHq+97AoKDW8Xmb6l73JR89iK6wfwG8OsMXZjGOBlu543TMLVp1JJ+7bzyMCOXRY8AVaRUqkdON

0TVXtfeewIuoN+DdgZDir3vAu8d19o56fVWuT1h2ds6Z6hUdYJW/88dgpblj2ZluYUBNHSe6trqkC/ph

AjoBCFmxxmZ0Gh7YHesE2DAL/2QI22CWOlKtzvJTwp
Z5R5Rfo45snwhlpBVGYRqwgtg8KPHrz923vCYjmDrd6s8XyTkTb7YTfUoMnWchg4csgRDWSEn1Tfo+kj

PFEBhRrrCOFS0XwbgDq84nRsiZeUk/BiHwNb9PGAZ2PPvsb1H0oHtUxZFRk16mFEeEuZjD4O6/pHFCKy

pJ+yRGTwlYksLQKyHvs7PYRDDErzBlx51hQVttVwbf
t3wW7TC26W3gU1Js2W+n7c1rUqw/eg7tYRuReZ/j5Eeg78ASYzXiZ0mPR5DWwOaI0v/OjIF7QTz0Jf3M

hRWvdYrWDNzHUkBHc9OFDf5xV0LtX2BGKWoFYkDu54mXCvTelxyyrTusns82UgR8sZKuxLoVgnJOp9XJ

BEQouVAgUiCBtUadRxRY3ccJd/TSwo3XQjnfQpWziq
NfW3gYq9JAQw/RioP8mrd8qt5gocJWUEdB8VkHMCsMb7wq1dMDBpk46ZOfU0vk/u8a12BTHuYlVbJDoP

nKt0hMBaAK/GMzdiqRqLSSK1YASdT2Pc6T1bC1Wze6UqsJ4roimUIRqpg6LbCJkLfKRxRST99sRrLFlt

Y9ijpIe/G/XhnZ02aLglHPmw0ZJQKXc5xbQFTviXDH
o/ZcyKPaB+EDogXhfZ5h0n60PkQ/gcVDtRAShxaLm2jw4dcJY8HemObfpUVuOQ51jnwH9rOPUKjN/wFI

jfME4zWHQ/SKAEQOouzRZVjjpNlXk82pDvrhA34u4me9j2GE92DLKsp+O6CO3EB9+OsZAtsX2umTP3d6

CH5TU3SKLnc6u2BbufgdsBOAj73pLGG34/OAnGc+50
JytGq4du46jzK+T7mwNrgh5gQAXFt301fgsknvRnwvIB1K/2KJfCXxpUKqGl8WKx/0nWlg6qUah8U+Rh

7PdlwmgW6p2N7Q5+ywiJHkquXp8Z8vTuK5Ay2cFPHYhhZBEwkMaMuzL3GtUgRdjLl3XhFUbK8GCDl1sK

Ke3a6NU3qAfL0H26ViKB0/z6T3W3YkQvj4K6WQiwPR
6mQxH+QvxGdSZiZDHsjUPpgTU3oTPM612gV5nFUGTTjsosHX4VMeEkTNrk2dcUSHZWxjhqYxgCiF1WJD

ELJQCZqWEQinOlaeYAeVN6m88RouX+HvXPePcI48dZeu6GG11KGY6kHMdCWoRnsLQHCUA3I8737XM5wp

AXIKR4mcSTjFPYbGPFDk/duVCSvFCyql9EP9ML5FIW
1Jm4b2ekxObgbYuE4lYFkwgVjefe4ZDemYRJ+gXrz3OKifxShTPXZYszHmdWD9QdQ8DhcH9KGHRU206G

QpeK+3FgbKDAU9YmkOFHHS2p33dSJKge5GMzUZyT6bCjxF3wotCAIYNO8D76KOn6NRg9nIeES4tpE3Iy

dPyZgko4y+iBFVja7LI4i6hB8vHNYusWa3TKsU29RM
k+azBe01uMPdrQ98adBeeTgJhjEr0slnp2EYGtcmBla3x+IFpyIsuiukM8Bh1gA8haq3E12x06iZTRuo

3bEaQwVC2VpqUVXa+qtrmn1my5EY8DQ1mdoalKlh3EmGp0MEMF9+cj/Qy0dukYe0/Dljyp9a1Y0zAVFD

eXkLlLPSPqZawcEev9WVg47JyQxuw7AzxNH9QF+nYR
qlWh3aeqJQeJa2ylygxvTUlC+oua7BzraDpAIr7ynV0dSlsXYVHZdoIC/VrOB3bAwG3ZUT8Ytzct+gkc

oVwqI0DIU1Ouw3jqzRTWfesZ7yWjTN3PFUfyGgHWbszE96CTW+hM2jUToUhUL7mef+UIwkMFRpcmc8c3

wxmoNKBYhYkOn4ecBdcEsk5x1tPQvTTk/moJrglufP
0E6bnxln9q+Z0u/z9fvm9I8fbz9tpIScU88CWmtD+hwSAb2KATfTXZTdRtDTEQK+E/E0+NKYg78Urdh9

8ZZeSykEvpWtV2Du7lKKEfj2w6y7xiN0KlvLw3l/N5Z9eT/I14Y4bW+qUhaHs1uKopNpitsB/bIFfTxM

/20kmYLbpffNyJXs3jFzScXmd9ES8zdpvHKP9HVLsK
eH3SkP+JlIohUAJ+kvdvsILnaA9o/OFd57SId0rcaZS/X0ozl7CvORSdy6GbXCWnlRCMnVtNyH+10k2q

QRyuhuxiO92Z67BHJYyyOYyWiFD/0bwQphAQRLg1gbyYS70138D1ktO3Q1EGscPLUYBYpN1kVV020jZw

/hxhpBx3DVduwLV2lU7xZlLRzp7w2luMDSLf5wWHtk
4oRnZZTs1JTGBrstcOrx/wsi6nbHoe4OS5XzfPHJ04wxLlw3dJOH8ucFNx8eex6TRChGfBVZHZrWR9Dq

MhkzQCfp/yPKnReJjfY10FNW+iZRmKOKe61dMl01YnYDeszPYyU+56JnRZRCjBYSWXxjJYnx76khuzZK

TLaun1PZAm0qlER9RuMYoXBnUclI2V60Itlr/PI/iG
+0+OAgmPjBit6BTzjOanl6FvRooIXWr2aCWjgCgoSAUdNK1u8EGh42EBNhWBwUDfEDdibgBOGnsXLUzR

LbDUxed0ojRdc2QtMqhUMcPi39UhS3RhGb86YqLXj8OABQwv5D/G1uiDoCKvKXgHDXtTWbQQPTTxshdw

3YHKju7gMCLMS/WMcG7JWzTkYmVpYNIVhVdY3l0Mlk
DjJ14iTcj9nVsSicBYHucG6+Ii66CmiWOmDARrth2B2Boj8/gxySUubM426/PloRYhz4NqR3pn0ULckU

U2HECmtMDhaJlTmjutp6o9bs8g1ByzaY1PLMoqBT3Sy5nvYIHjXgALGT48IW/6tkuimBaS4M+MrxsO7o

H/293zU/bF4797WURr1SWpj75gzhH9F+Bl//BKEq8/
VKDlIwMYB8rnKomO1NPC4cw1CwRNq2WPTvj4LtWQmkDWr5HAkhEPZkJ7I7eBTdKWDjQK7FUUOlEH3GVL

YdbaXgFbFhRBHNLeDcFEGoYyIjb9RdY4FqYQXgGDIJLSquBIFrX09pTEpmMa28TNjpNXZiFzMvASk0Db

7TPuIqMNCaU53dqUJkcQHgFVRsHRNKEkIwDCHeM9Si
mSYoRZquE4GhW9EeSX7mfNWzSE0suGHzP9PuB8AslxctPHVRExTtFLNjNAdxMRImPzHcKOvtRMY+Pg0K

ICA+Zi6JPK1yg9MeYOieEJZyIX5sjx9XLBG3FR6VcPi6BHRvK7EnSILhKLOvt1YzHW0LCR1kfXzuRvT6

Nj4+XKeyAOD5rfWzsN6AUUCzDUzm55zQnz7u/0M/LS
cY8N6e6GoVtiXwbIDLtJAAjRlK9XXh4sWxBlISEEe/m/7ry9HtMBJ7y5shJzE4VPMXOBGE341RqkDA7B

//mPPa0jro4d/87yMH1SHq/s86zZXzVKvdlkJT9ZDi/ejVYjU+vmLx2lhxI93Uy2mokU/De33P19//fX

R69T0/QnwO99j4Jo7rL11ri1btx40Vi1K++IOB7frq
Rlx5oS9F9oKAh0W2J/ljU8A6V04DfnZcaSXxhBhJ6LSweLmk/nT6Qp3+eZeRWGxSuEQHSblidP5tEM0Q

j8KztZM0zElOkLAEZX8pMsGuNGPr4thSFQhjKmL0f+AOfvl0ZJujZuHH5oAGBlLm89/IxD/TxY2mD8XX

GjjGQlFWndZVZjt8Tw20RxKHc+tMJ65c0gujRpiPBj
yVR+AO5SZdbuLMcXMlqSHal9elRa9unK70BAXppSneHq1wC4n5IHcLRvIG81f3/DCHJ5C7rP2fBELatD

FqrW9TamjB+catbkE0HSXFdieLwbSNwtgD6d3Eynw5HVwGqS0ElIVxaJ+H6qSFbNri9Z4CHF/IxUBwUV

WBVJLFCZRwcHx5GSTel9PMUHUiWZGib3TdyeayHiKi
8NPWaeVm162musqDGff8fR4u17nfSH2D0DmUAyvWJ0AvXafmWNOY87QLQWl/s2I80FZSI0NI+oXjCWrH

E+uiJmQa1wu5TTbOpXepS/g4qGLBdv/bTzEs/gWLboqZaUgYArNCCdXVZ7bm8/UEklo6//VNepAmyZp0

NqvAX7Rhl4wbORxyKF342qNs2qxNVbFThXUegh4vAp
5fVlWRPFXMj7O6Nin1wEhQGnIdx6217T2r9IzlCJh0mfld4dXobIi3AwcuMJ/MD3SRY/XF/PPzHmm1fI

ngipvi2x6I/7c2f8krxTdSzcbWHaC7pa553a9HvoKcFOEg11wbwLedReoVbTMD/0yrkRxj33Cq4nIAee

bFlAixoyMLZ3oV82ykCcMUhmC1nGOP1OJk9I8tWgkF
BBGKawPeBdgAb9FnJkwc0H3tAGrXX42TNax62lQbH0REgK+mbxDWTH/ZjhO6L3uc4S5ZMXZ7lvxQ0XRI

PWxnV+vKSRICmWzE+PJUhH9EAoaw+IUIjGOEZP2jvtIoosWAO4i/UUyMhsOGyfgUojNGJsRXlIPfKbnf

JCFfgBbLFlell3WBU0M9S1zM67ybEbyKQp3fsLwZlU
JtoxLx++VSe589DjP7VNxH54V54Ijk1tfGaEy9nbSkXfzYiuh5jZQi+Sf0W2gwZxPgT3w/OKThCawq8E

7eiQ8deaJInKcAxrmbRUJGwftDy50FIXZ5JkMPizx/VntL7KQqlIgVd0UhQOp2UvZgyWbL2jz5WBRyKU

UW+roGyew1aPP8KXBIY8XgS8efqONCaDD1J56OOA+H
m6XPKukHOuV3KagI0Fn+HXwwc8z3GixOwDSsV2JWWrGsCCKqBLgV9u0Nt3s2iVZ/l7YPadrPG1uuq8Tz

HomSf0gsl4i9fdGaRTXdBg3w7/rxM1j7hoaq0Tjkm4vrZtAL265Q5kY0ytzh1NQ0xOsbU8gf5+QihaB2

UKNG/eaIrdoBXl71eIwC2sbBQGOBcM5SGNyzoQyqNh
cNQSP7x6uhoAcXYu1WDBwGXMu8Ptzqqg1uM+nSBtHmklxF4Tzclc2E1RWh47auP5deqOX1gl4A4+hmwG

e5TBrs8U024P3MRLfMpu8q9FaPoUnL2zRLB2R7rAzVaB2UWLFbGTqn6bv14OgbFxzL8TaCqjhLfGRsIk

nGARD4N3r5jSvKF2Tzt70CPKSYML30Oq5G3G5LaNlE
IxrpbBxMloAz8PSn9cHMM1gbPdKeGuidDT3AxfYOJdEy3iN9CYof5HwvqdHotqStud/zP757mE/mo7Iv

qClEkt9JJbkx13aHY3VnugXypa+pyHFf9fG2+KLYZrpgTqpH6W9oRfjYRFVpkPb1KbeQZU8uJfDAvmP1

6CDeyLzqKGMyPMdknnqsFgUDmjvR0xD31jD3amOFB5
iWk4OENnMxSKkAh53RDjaBDjIRevSSGQwQ1ZD8YxXH57YD7jTwDkzcDpNIv2jqvH6ylXyj5QGyzlPEQP

EbnKSCCePQe1eWyBxwRQOYnzWmVvdYEOuUPQ2K5thmAVS7m/onulZxoRWWSFnONYfzljSZSLSViR/Vx5

o1ax3dh5nZ63RAU0ejSvn1uuJYA/thfrLcWJCAeBz1
eYjRJcKqIKtNMZbhvrY/LFJj39AbWgSgDHG0I/8+yRmAxmUsnkYS22ggE9iUlAn6fM0YfWcFTkS2cIkp

0FbNLQ5fQNHOCWw60Vp9tAhN1S6vumPM74NkEfKHl0JJeL3GciGNyL6q7eymGYradehBAYQer1m/Dj5V

5bXhg+pKod+olJjLGUJJiPY/dpvZZdHtrHyZz9RY0t
kaev5TdUjRjxotoRvrDB4/qaVR6GUxjqMNlPZwAJBmVp08qozS1Vd0nJsRn8Ipp7OEv0PYLpn4xunukq

W0VVcBtrkcLfG1FkrRhJUyLhBM92t5DgjLVe5OIsjAzOTTO8iDRxLfIYOdnQS9Y6G/RWxpfY9CRKVRAh

ZaE2vvNpIMlYsIUNABWzvYulpJQWAMa/vbHsj45AVW
RauQlLPWTs0cZq1Ryx5oih6tFmFZW0rU/sTGYAluwBtC83CobWpcXyfVRXVLkwyzZ9JnekHWii8tRKJf

HUGbdeO0dyElyAVHpRlymwNFV0f/HpsgHayChjX0mm+ta++OVukOcdueFbSWX1yVgv3Evj/zQXBhRCGY

B9GqvATzvy6jp0elEqEa2vOB7emItIuYzroCdEid95
L+AKQhJVp7wWJK0v5ITZXRwsxWDqQr1OJLlWVADW1mvnSFS+qWJOYfPwa6WG0tmbtpEgJAF61weY1asP

mwItG93XLqlBrpvlkZIMBr5i+Pg3u9gWXy+rA7gBu4HC8s0Na+yiZqmE/Fz9vf2osiXpdKa2B+/T7JV8

XtP/Q289Qk1iGCtWMv3eHm3eqSdNc5yq8JsDDpvygk
nwKN4FhhAw5Hoz2Cwsz34WgWL2jrnkj7NwGoZeEMduomjhbqubl/zgBQZYDV4AFPpwjqsjJDN+3hnjOo

flyl2SoCpMjE+1Kl3GDsFIRAdHyzqrCof9WQdKzmkIsHHybjKxOYipQe5vBJzPnGeSUXMiUKSnQo83t5

0uJBu6QkMfmAfVhagWDdWf9qXbGhqw7ZVwHrs5G1n7
lbtZns9ySXVNcR7CK0MDZfHxYj1FWux8QtJreLMcC0AbdTjBcwWn9dvnDdEigSKy1O1M8bGK10zR+ssO

QPHpbcfg+2p0aTPH4ecnpQVB4ZNAOdKPq40zcIZzlzeas5oWvzY8eJXkeMToTxe+CLVoxv8je38O3xzH

hFRUXyWu67bbdyLaDbGUQzIoMxSKnf6SUrbjf6Uqkh
ynUHwrEuyOFXpY3q90eyS4CExXoSX64DbnxkmOHiINV8GlEgQop3Wo9EV91HjQkGv7oRqajmaNgKLneh

dtD2P3169zf2aSmOTd7F82WB2PDjHJ+/dQ7c/pFDX+RJ/BVXCGrUmozGTVii8Ff9ls8L+RSbwBZT9+qp

0/z4RqbuDhw75PoG1WnSgXjrXPQqC16CFK9aQv+2pD
9T0iDvYyfTA0bhwHCa2j4JMLqhI+KoqR7TXwTCxDO5QERdvN8j+/KnkJ+RsXKm66reR7OZrq6eN6OORD

j1VTVumOQUW7pSDyMkMSIAmIBH+f0fsYXBtkOZMPr8stWodDeI7F5grzs56gWUmITIynhUbsLkfnDrA0

1YRwy8nt86jbtO/O30gteh8vIQDsw2OC7jj6UB/+8a
/lqfr3u0c1aM6nt6vgtlbj2WSzXK80o7qzKGz0pZgQdDtC5YJihjx5TOSM7p9AzTIrw9ylqeFpn60TX4

HD/d2o7xC/1wsYBKBpHQ/wDxkWf3ke6RecFxpcByWcKHgvqLIWCe2vFx+/PBCAq5ELt0qOk00xXDsp3q

PUH6js0X6cMWIy4SlNvCWi8/K5vl6/KCbR2S3niHos
ar837ZohPPV12N+Yl8/A8Rz63WMGzdjsUpcBLtEJ1ZMzOiAO0jrm4TXCAlSNLpUjrOIlFnSFoIHhDtBZ

XeIEtrGH4BQKzaNNmuLDTdX1QgadBycGCzYZYwPh2MUWYiHS4QJKWfsSLsCMNlHiZnCFBUKoGnJDHxUO

YqfZWHu1ijNtRpHNY9VHPmJvuqJH1MYXGxXJ8Gy231
UL91okIcFAEzABTIOwMfLMRjG9BajTZjYYziZ4ThD3OnGP5ngOHxUQ3iuRDsK8FiZ5VqepkrRDTVHdKi

SSBmYWxzZSAvSyBmYWxzZSA+Dz6JTXV+Sr2SZB8qy0LbVJycRaPkPA5wmp3CSHU7CA0JePl9NLSyC7Gd

TBMyMFUpr3OcFL3QKF9itYeaThG0NM7+UAatIFO4wo
ViaX8HYNMaUDrkG7iWek/U/1PjLpTe0ueXIsRFoKsnEEGrwBDS74xhoW0lXsnr/pr+0W86vpYtw6+d9U

rUjhdWSrefnKN4wHAHjZ0g//tkduLQcRyz+Kj5O6l4JvJjmyqIYF4wijstD7+ySVd/62jNV2tjiNggBy

7w0b5Sj1HD6PG13/Xniw+9/pVf7j2GTxwpssq1Rwqd
g7zeloMa6jOxN7/4yWztZ3zEv67djSKk+Z++92b7h661HuwFP4x503yyYmKeAWxXIsS7ikd5v2c/u3E9

Lx/IvcNBwSMNEdIUulzKKeBFhyHha2ZjRFkXSgVhqUUqnBeERjl1ln+NZ0b1TbTDKC1QsGDEQ/HLcrCm

gQ6+t8XHS9DuwaIvXnl+WQrqVFpRjEDsGFTYdt0mfX
m549xbx5FcEQRscGS3tXzEHR12sG+IUIZB5DawWLkMi4bACwOlYXf04l7nSJZA/e+8ckjXDbzNWs+odf

4MbMRaPcV0ipRVd7cRqgsLCQXg7YCoz7FvCkSR7rgZvvXZSj5ckL3YSwFGaJSxu7+noIPezW27P3FdLb

AIkkD96QOCPpCfDBGNjqQ6tPDKgg0G2uDqsbqPsBUF
hqk2NvQ2tGgdBGkwsVT7NQafpEBQp9MqStnRn9RTLfFwTJoElb6LfMuZOG8zmDZdqnwZsuetZ1qOZJ0C

qOJbh6vcXntACz5jlws0bHcDRyALluM4gfyjD7mFyqlzCHzbpCxxeBUQFiWS7KoJFxCBMvNDptGgXeUr

M3GVGEA3JvUTMO1VTKtIMMrReDi30xLS1yf0UXp3Ox
zHGsEGjRK9krV9wLkwHW0UxrUSYs0cBKKUOMX+GRjmo9b879I39qbDy4HwWI84oUcj4aJce2GFDTLYvP

igtsgPjcAde/JsmWiRS8MRFXiyC0tJITxLsdRkiqP9fGO4JMIrfBGhSayV+ViAYupGazLxMeNlfGrCF8

cLPMWoOvf8SrveKMHLBo5QHKeF2qmxQIwCl9UczHxO
SkSihA2qIxYnDvQ3tN5bF9Mi17CAF+/BfLNXYYChFMLUymIRVRrLMDCiMLwTiSKbWWOz/2+ED5dlhYZa

3djBjnnYveLaMdn1HxknXGeAmcsgovRZI8vfD5s+Nxqhp8EQmDAzvL+rNy1GeTrXE5QjSU4qVt1olVD0

7ReEB+onSDUjURiFFECzTj5n1HwbEhmUj36jrNMUvl
WZu4SKBD6KJqiQnlJiQhPYq/Hf83x/m3cZCwwD//cptpC//XXGsFOJC0K7sUXcCSD+d4PNMDHIOWp8TS

THICAMWD8Tn8knTZOfiXpGJWnY7WOFqpRvOLJY4+9Jz9jmtRCIPBNeL4lxsCVVtZ9JQXUiWgzaMtoP6Y

4QAYwMQ99yRYC7c1N/NwhVR1l6vBhWb0/XFpDUH3lu
ABFmieoKBlnBz4Z6OcO85hwA+8/J4K/kUO1uy3VO9FRvdeDYFD9OekQb3C9b+WzWj65LJ8zK6qUNjjkn

UIPxBebJ7+EWdA4XaQBgY29b8xsx1f64q3kmlPPQcbWIrJuc2ePZhLSDsz65K/pbgkGB0sPTaq+iNsXN

z0RZBtt6GCTW1tDj6PTv3yhrrEH04xtn01RlI97aGK
eOkJ5s1xdm16RPTlKVT9tAH9dgDx9n1aQRWWYLghUcYfxSDtBqqbhkrBi+F7RlvlmiaFDngUO2apiupn

wbzVBvUqrJJPHnck8X8CBWgCf+DV8QPouRGhlSLynBg+0m9so81CWABpjLYld7fyoC5qDzOaphMbBbww

RWYjvHkXiQRCKtMGg+i67SbaTfjRXBgMMWSYlmIXCO
VRTrxALBxowKwmMb9QSNDjinkRsd+rVE/BDd3JRFzHv1XrwppOaQKmuVvsRIJVnvQ6M5dUy0OXcZrq2w

to3jZv1NFMfwm7JcPiLetq62S9tVniF5W/rZVcFspRrpOi0n4zbRSRnIL9XSDHN4C005TFsd4lSuKwOs

6zUUozTJeUF9I6po84WlwshoDLlPc3bnFU8R21BJXQ
d/7CKNBLQN3s2t7CpsAO/rY4V5u1L6UM7kf3BkfAyBYhMOSAUS7B85LjO8pwrzz+cjkqPdenO2V6mc9g

dkVXnED+br88a5Dk8ax0t0cRKgW5wKcAbOS/Q4B0BTFYjtwU7i4R+MNIkKN6s1iZ4L5qf4JFtCVy1sXq

NXFT19kBzgtwt26Gkl8QObNIAhKEI/J218RChklYPy
4Ws+jDQpM1i9/rgDNs/H99DwNdIq9QxDunRRHBlUksEq2nFjvwZ3CO5R8CFZYl4YkWrdydODc8ZgF2hF

0amEXFvHtVpYM20l4pAANEzs/xlUFgoa9HOakmcMuin8x3/acOAVTfcubBiAcFsJ/SjRdpap8qFfmoQw

18LxaTGcUPioFqKjum8blUbhMS4NB84Ma5HZDIjd8b
yTgcDzEjUj6QHLZwRVEUXJx7Kr5G6FEZL70EBv0aZw06SaDZloAvqfm3IDpdJW34gV/fpRaFfI2QNa6h

hbpVBQPFpKDRVgGLQUP0osuEa2C+q4XVJaW40ZZDrCPXE5Jegy1LRQ7laOmYnUnULVDl/hqlZzUYHSAn

QvRZfZttvSGs+XikM9IsoqcvBm3A5+TkYraJYqj11u
owiey01Lt4o23Um+MbeSl3Ts4dkB0472T5/a2pp6RvIm3Uy/gZT4xaRghJfbt7ZYErzdy6aOO29hjVVc

lvgeO1nIboqk6Wc0tmR4Z8IlM1y59SugV1QGvM4/GoPa7UUlo9rBFw5NIpQoPqup9ANVB90EN4VHWLgP

Lfp+nBbnhexygdE/BsK/zMCTaYNnd4o0lqAuMLYUUi
Vqi74tBqpD/Am0N0/lxPk9eb50Zn2X9pn+KigG2ZH8NxwrO/x8T6xezL//EBF2PQLBIkKwKSG7zcBccB

6CGV3hk7SuFCupIrUhFD4hzo5ZBKY4HL8IUAHgOF7JmBSwS7DtC5GWAlIeESBnYKCsQS15NNPsJOGCET

ajLYQjX0Umb744fcKaneDlYHRyFf3PYDRrWV9ZBSJp
LBRyyOGaQFTwLTFvAjS7BWLyFPhiCTBpN3TjvzTnqmQmLYXwBSCsEk0VEGErAD4Bav07qXW5MMKeIjUm

AVArpsQlWYXupcZ8LC1RSbDdKBF3aYTxTuoRNW9AZRWsnQJgVL2AZHFbmOZoNE9+DQogID4+DQplbmRv

RtqMZvU4ENCdy2NgENtuYKbjAsUcUHb5UQQaBsycJj
r0YUV1DNG3AXUtDFZ6YCh8EXT8VbcgYXdaILGaKuQnQvJzNcl8OQV7DRHjSsqwUuZlETR0WLTeWkygRW

U5IGW7ZsN3TWXyDGP1IRO3IrG9DLIyQDU1VDO4UkL6KLKiRPD4OGJ0GQMlPsopUTj9VI7ERPF7OQMbNY

h9IML0YhWdJMM3XHG5PyD6DxpfNdHcAFxcKiS8Lwcg
GtMbOAb4EUW2YnPdIhp7VMYvPLK6GjphOVP9TBjhPwH8EuHrIqn8JXL8RjZ6KfzcLvYuGDI3XgN8SQQm

CdShEDU6MsH8OXSvRtQ1UKY6NvN0WDBqGPfcALW9FARqAklfCup1DXQ0LAD4NDJkRtHeLAC0XgB7USDr

MHEqGXP2ZzD8MYMqBgy9HOS3KjQ2IFFtXaEiNBVuLh
U2YZJsPwTsVNqpEuS9NKGfJTI5OZM7TjJ8PFMzLjEfENXgSUHyWsibCZI1JJBvZJV1EgZaQLRmDY3LHT

V9KFPqHRMkIIUxUEZoBbCfZpW9AAC8VEQ6THTgPnPsIOx3KBU8RJCoGtXjLVn6GXA1MLToSqSxXCm7KM

F7JXFnMwAxULx4DLK1EHTpYiBdGYj7BAZ1TIKrKsMm
BJc0VZH6FVWxPmTjIOs3FCX8BGMaRaYjDLt6YMUNUpXeBpToAXy7PYZ4NDRlLcKkAXu4OGX1VRSrIwId

TIi0QMM3EKObWyw1VDTaVhY8ZFEgSCZ3OPX1WrT0IOTbTyVqMNP9DtAoDeTrJfR3FBL7USI4ITVfVWd2

FRXnWfA8IisjSEPuBLNmKYN0UXuoFmHvGIIpZhQdWd
LkWFldBQK7GzE0FsftQeQlQBXuPhFnMcGbYrO3XNH0LgL3LwQtZOF2IQzyRAW1CIYjRtX7DYV6XrR3Ao

yzUaA3FER7CbZ4SmwwNSVaLAL7DyCcLyH7BFL4CzV7UffiDcN2EJU0ABBjVqsuSib0JWN4FTK0VePmZd

AoIDx1IDGHRxYsAvz1DOc6HFE3WraiVlg6MTG4IHM4
VofvOiToHZvqKuS8RkWwZaFuSGS3PrQ6LownIfOdQQR2NvA7KZWbRKM5LXD7LvV0IWCqGZM3SVl9OIZ0

PERyREJ8KJW8RoE9UQTfSYX5HZE5VYVsBbndRcu7CUY5OFN8WWDwAPxaFAE9LxT4MPDsFVX3XQJ7HiN0

TTUrQOA1MNR1WCF9SRLeLTZ8YRY9KnF6IKTnLSP1TT
OwHGM0PBChXJJgTF3sJAxuqiKlOsoRXaX1RWTam6FkSWcnQQv2FJbnGEVsX2W9gUSvIp0deNGgq6DjpB

X4g2HZNsNlHONqLi1tcL4ouCFgEQMoFUeFOsZcYSMeLYMdNX81ZBysHG4ZIEMCSZjwtSVzQIF3Z0Kvx4

ExknKuXVOeNj3ZOXQsZE6RvFZvcwTyQf9LPOOfED8N
i607NdMdjOXqOPFzIsIrPVWrJLPrKNC5FJ5CVCCaJZ1NdZSmoARCzsamXIZyG2A0WW7QBQFGNeXmCg2M

VlEyLK4ftp4SFTHyPOOaBkrVPvNdWSeKAlXbPPLpKEzxQW1Ff703W6D0ApL6bPBeDKE6SED7oBOvPuIg

CHTcutHeDDQdHQmeXW3wg4NodbnfP3fwSW0dqQNeW1
3vrY8xUOhcIVFhT3FqyaD5K8ekpfOtXJ7SWHT7S5wgsrCnUMXRKdYqZQQkY2hfmPaoTFW4YEYeHj2RUN

RmXH9Lg243XBHaG9HdbEPzzrMhFWNtOZWUSlWbXs5QVsAoZC6uez2RFOeqUVLsRvtQSkLhFrS1IUKqCf

UpALB3GXPsWiDjTXh8VEB7SxH8QLMhADa9AKqlQoAh
FlcwGwYhOOYgZxGaEIjyKKv4CRS2WVPfWxUlPif2HBV1ERH1VRKwYML4GXS6HfP7APRvVER6MOL8ClO5

TKVyFZM6CTK1OzZ1HARxIdUgUXMdZgL3ALQsUFabBRY0BUG2YEEhTsKdIKs4NJK9QnPkMgJyGLftHsH2

YwRkCbR9NACyYMJ4IwbgSkBlHDO4IBU0VYRiCqGwRN
YfQLF9FdOfIcDeJXm0HUP7QzshRjv3LUwxSfM1GjkgRwJvMDosTlR0DwauGCY8QGL0AlD1SujtUaSoPL

8PWALaJbRoOzw4QZEkBkN0NSVfHMP6YMFzQjY5CLTmEkTgJPI2IpV8ULJjXVN9QVGyFdF9FCTyYlPwYA

T1WJQnWhpfSKR5ZPS1YVS8MIcgHrEvTPOsSCM9HWSt
DgRkLOM1GRC9RKPiZfPzIFJqSNG0PNJdZqm9JUS6QsMMMfNkERG2MDMiXDPhVGqxYtlpKBC4LQI6KPFa

RwQcNEf1HSK1YNJxCuUvJWq1IGW3FKLhJaCtTMk3ZCK2CXUnFxEzGQq9UZI4QFNgImPwPZk9OPT0MTJy

PpKlFLc8DCH6WFVaQvKlLZm5KOG2KHHxNzFlXQn8FG
G4FFEqHNhaLCv2UZP9VBKjIaFxCCj3QPK9ZUMnOoQpILb5KEC1PGKxLtVrOHS8SPXmShAnIVG3MIJ3Si

Z9CEGhMHC0OFS1TUI3XBOzSlJcGEivKmYjVlNaTSA9SCC1MQCrRqGrXjZ3MNN8DeD0FUHeNIP7ODYeRz

JwMvPfCfIcQH1DHBY2DpAuVLX1LNGvRoQnOuZtWmVw
UQC8MOI7WFIuIXH8CWgzCAL1HcXhHyGtPQowYsB8KfGqWjZvXEnlXeZ8ZyIkMpYkDIWkGYZpDxKkHUS7

LlC1RgsxOmV0VNG3ZbVfVyjzUzw2WST8JHGyBbaeWcDbVKlhHnN3PiknAKquVOu3NUI5OcjiKck6ZNj9

WAN0LVBdSad9WPglGbN6WsOmAvOaKNpqNdJ6UcqmUt
W3KVYnRRC3XPFdRBF5PDA5FnK3XHQhXCI9ZCS7CzI9HZsmWQU8IGN7WxK0NQAkJRM6VPA2FbApMriiHp

q2TIC5FYFmUbhjXsHaZY9JECK6PYOtYbFwXVBqYZN3IRYmAoEyZCAlLGD5PClnNoHpYOLuOGC9PCRqCr

YmMPSeRHW8KZYyAzItYHE7LsGgOJ5SVT5ho3JmNSgm
BSQgKE0vms5DZIC1QA1WTPUkRS5PrBWuO4XgsoGURTUuhlheuW9lBYsgEJUwK6NxbeIVFS2mY6OlnYKs

SKNdlICVYqVjRSLqVANrJL91FWjxOE6QIPHFICstlVYqLRP6Y9Isv4WlyyWpNWNgEq4DCABrAK0CfTDs

yrXgUk0PUVRkMY8Ha453YlTwlLNrTDEqYvBlGTDzXE
GsKVP1AP2ODUUyAM8ZgYIrtHLOoagsHKAxS0F5PA8ATMQPUcGgOt4IQvByMK4fgf7UWAvoADFqBxhHWn

VcVDtDPdAsTXYnPGwqCW4Kl386T2Q2GrF8gHHwKUR6DMT4oQOpLmSdWCDynzXkNCYpSCldNj9wSE4Sin

AnQKgiCw1RsE2UhzBhBU1bq2RxaisAQbHvIGDlJsag
t7QGoMHpBHLjV7xml8JDgLIlCIP2RH3YBRUiRF0AwPE4wYWbUMcpMOQYZJcuHOSeE5IlrmBRPXNcngyb

qY4qMVQ2PMEmCo1LYNR+Po0YAH4dc8PtBUcqURRwIA0urd0GSKO9LW5ChRu7NXBzZ8JjXFLxXPCzv1Ez

TQ6FIF3fjKbgYfZ5MCKwS9vbjhc8jVPfCuguEyL+Pg
3BZGKnvVBjAJ1RCvjI7E7QaEKG4fN49t09a+s3qqLXwP4koTLAwtHrjI2bp2MhHMYBxDWkZTMNs0qXZ8

6EyqfgTZuWLjTQH5KUlBmBpBDYhYcDQVP3JYXBGnFk/ufUvbfTtDjMfN/3P//z/A7126MMgHtXy+rUWa

lcPiJGoAtVeX9If4DwEU1/shN0kBRUgnldDJovFCZL
9FQT4pvSsn206TxzIqQVwqHZEOovekkzds7uuX+YJfaIgmzA5b+HecOZ83JZD3/5bkmgfbEJ2DqPsMfb

n2/WvCVLJ/fp3fiBVe9QAHBUmM+chRBrHhuXfy4odbSXtW+4GG9QtSE4Knn4qXipxfn94AqUWlyplnbw

y+ZfctFdU/DZlMuXEQgVzagkaL0ctgYQ4cjjpp8v1G
a6CY9/YkXE0P80vxW6hya7Jm1CjxQMyozQl2HWgX/NuDxpyJTzI1dnQHR4+jLiDBCJlM6c8UJAMUENrI

M9gLRH/igADRk5OUqGnk+HmXMC5Z/r00sQSFsku4tgNpBcHjnq8FLUTxmqlUqoIZ/5mg7FyijBRaaI56

JFDvRrbSwtu3UcfPJJuAODn4+L1gyelJaYzP2DKuJf
1m3fbqetRGEd1Gf0EH5K/4ON4HdNnUaFKNazrf9uNZ5DdTSgInzPhlnDQdcHfWGnmJ5c3qM0VX/BTvH3

GaiGXNauhj9l3Qg3H2rEyYP+cT88CB+vd0gbULX5JPKDP6KbwTuayTd1mWMVL7QUC5TqVIlNj5qqRZbT

O7KbCYSVbPTvqJy4Bg5ycOLWPjuJE9Kt7rEFY/LkKd
jkdI8CiD7pMFtXsWB045aKsl1pX1k4XNnGQsgp5Jra5Br0qAflDjioU32Cwu8dGa15vPf3Za0+A6/Cfl

oNdefYdo6hgCeCx2AiOI/0hSaW5ltdco/Ff2Yh0IbN+Xj6HIuJTitJz8d87OP1N4ad2LzEvbT1FXBIL9

VFYMEae+B3OszG/l6LpX/I6dq31WJ9iF3Pv3HSOu6Z
Dr7FI2GKR9Ud0nsBtA9pN2kLJ1u29IDdeQHRzGl6y6hr0Fu8eypPybTWZX/ugE3wL+Qciqm2prHexTes

mqgv8xRsl/tth2EGXJGmM/hTgEKxTS9sTfOwaYeaiWQ2Hz1NFibnymQbbad6/qVVaCtgHPLDcmz9/fAo

zjvB8Eu/james/X4RcrJgit7HsE1njHho3+r9j7tBxSMv
Abs1Ya8fE/sS/Yt+n3ghWN9bZLMNaUT/JoBlAmJo1B6aLvJjiwc3/wd+JMRDnwh8Tcv6wc0fzydMBbia

X43Sb+BuwH1npz1tSQlGJhI9+QN8m/d18aAyZHVxH3+nOF8BLjE0HRd6eKZP6rW9k7p1CmiyON1cYGA4

04uAi/o0H18nZZzED/CUm2DTDfB/8VqxbnT9kvqzHy
oW57mZ2IqjLr/tD3IWhxv+rriDDXnNV7ppP5PyJCUnh6NNuqGBVCK/eYLiD1rpiGOp0tC3xRz5KgKlZ8

V0alXz1sYXrEH9GVuw+Sp2Zv0rmDTxme3UhNZumeGl0YejVfyK7QXushnE/Zbyd3ZF3pZrxX/qN3Slgd

K5XbgrM5q4yhtxedQnmkKpeMR1KmZhtuQqyBzEgqJc
SFZamqEQ0fI0+D9nQxPYaT0QNLZpsaAbDTCvg5L/qd5HYNBug+fkVYJxwX+qqi1Jj4DzSGOvXBOI+0EU

xX8CWivM3Ty3bGL08iEKe1oleJY4AlFEvdkwwE0eBghCYtYf1epuVI/BLriKW6PbqasaHz+NARS59Wpk

Lp8ZmlGvuOeaeSKREqhXwSxChuyYC0WYLWfV9APDZs
NHJRb/ETuBHmCn+HGfdWP0GBnYy6HqyYoJcQoZ8B12qhKa+xhGaF8WYyzlExgIZrLURifjceJqQClK0w

I0cSD3K+Ra6KA0X+eNKjri2j8/cHofCGNHmg96pnyMIPqNgYaF1Xl+YW9aazGbMBKTXUN7T4RMQsmCef

O7RVbGEgxCzP1z7ePmvOglSigRpnCyUMIEUMQ+F3Lc
ySUIhqFz4n85TAqGXpomDmQA8JTSqfBvzjSqsUz8kp6/Nehpmqwe3QA6P9YJflejV7yBfaK8qoV8vyVd

stW5xBD+sglizzOl2xvUVcBvmVUC+uCCQ0eucSFML5kk1rLgY/kIcyhm4R13PXr377tEehhnY1UuvDRa

SnnIstyLtnDqwZrkmYcykCzDT6wm/SBS5RQxapGQu6
APfvJl5HjfAbYQ0v2EDqucv7KcKG99fIeGjjq8Jz3cYmHFMsYGPv4wDxhMDs5NXEAMrHTJlXK9jIDNiu

2HoQYYDXj2UcUkZWsjW3CWNp/4XASE0dMTKA5niZtMlFKfBu7QpKsPqDqo7SIosKa0PQpjEzzn7lwG8m

P5LNDiYzNd3ra/T9Y0ZOcBtiE0YbxTMwqv2YQDUB1f
QI9P3JL7v5luKkVWJeEZ1Ws0Gkj5R0XbQxAShkWXaCrioMENGieUEZumbmrWuGUgO6KCm9yMKQ0DhFXb

Z2lSC2VxfyXJaXtswv9XvWASj2oi6ZZ30I7qCq7Ti32Nu8bdDnpxT5AeDFo52bgohtQpiK68HKUXDUIE

DDF+CVCS+XlzAuzkStWeHqSBOmPtHqg1qrkFXkUuPS
NcmpE2WE8fhPgdVwnKdhWuLiU/FHYLomIVSbIZ47n5dL5zjYAP7TajZ4GDNeZ/z+Fpia6ADyMf3ncfOf

EHsQ+xDfIBThIH4/rw8EbanhIs7FywJxJtikGKVK4djFFwyXF8/wAVkp/ErhWoQgfIBXj/AXfK2/4NUt

vI+i38C7yHw/4Oi0zkXnD8IKxLC7nMbMj38XH7GMWd
vm5UdlwODAFDQOHzlKkHhOwFT+0yOfgO7trKNgl72IM27ceKJ2jI6Sh9LNBFLp43FU98TXYKQjErCSpO

FkKDm9I82G6No3pJOIbwusDBfX4DqoFfS31Y6OHKxLWCG3I5GpDgFs6zRM1BVFLLcjzcJBEvSKo+f0Te

CQMd4RKia1NyQ7EA2JBjfjH8NJwxwT+TyHaDsdJm3K
hd82F/wy5zWbfMh3DfhCfkKuLgnfVmFnlTUtHrZ5Ws/4zYEa9YlrsmPpR9rP6bGbePIXw3ZY3qLVrOQ8

VCb9N9W7GDptRrzeyfb9cBN0HJ83W6jiWbvN0KdiT/E5olQJi+HVepFATRcdz6DQIWkvN9RGCEnCSGZN

OrdVJ6oMhxT6x9jCZiLkfrKDD3arF42VtUZFDYpI1Y
s/QXQ6q7aOiPQZLMFEhyzVNw7ifR+wxvNY4+GggLHszY58XvkNZkcxMsIYL23E7vjOLzXjgUhr8JaoYh

HgxFb6DuRwX5sSa6UFkympyKXKtqYR3cuqaVMqNy8Rkgy3WnHS2rWzmDjF4l/Axo4sHR/Sandy/noUzYg9

y8JK2KVenlci0kCbnOzU0kl1Bl1jwRgQnh1bF++
z5bqTSUYSIcR3siQKYBJRLOI86zPVXAt8zrWulFaqs67/C5+igE2QIQBvZt2DlTNHmUT+QYhwHyjiZt5

t6uB0UyKaqWp8ZG6dqWCF+NwystLZ7tcSUvdkwj9uMbrxnt1jvGUKVZCx/L4Cyl8omn//8hJtOIwum5S

CEB25LeV0K4en9ULuaTpFg2mYYNKObPrTTrMUWLxB0
LaBxbzeE+ELNtQTYARcFJil9NWI1OeHh4h5pTi9CG05O+CH4DqRy5SBz5Kzvz/PJlU6KvyURpIaN83lq

DK3NzdiKRMhjN4eCWLZeDPs8e3RD5/bNnhnfuk1xg9zgh48OUJGfE6TD9lzu3bymSXP7xh8jVEbn9D3j

VSCd4yYfMR61C0rWduVNxyjqLS0dk7yVPoA5RbV8hS
fDARfkzDgg5m9gtVK4YLRqxid0INdokJwQMhit2zlerrPgoP3wR3IEeAybDIMOqRvqK/SGtMvZXF3ov9

B6yGlvnTV6cOnec4Qjvzs3GagDpMMHsLYI+QjWxquwRGXhxrOj6+cPRZtp7wnueKjcfkXHj8CXLFj/jI

MoS0t5g1gGBZNfnNEPeWo9GiA2cMEs6SGmzzWHvr9Q
xo8Xv9uOcv5t89uwivcOeySM0KgbGNxuAwA82o2bTSb6zSS49gmv/bgLLZ22yD0mq0ayfks7cneLIPD0

Co2eCfWUM0nlG+sCzejtrnEZmd7FS3w6+HnEfpYv/W1uApM2fmjhyb3k/OsgeyL59EwidB7xTVradxv2

dHF8HYR37BYdbPizHwcj/HmvK4PMw9ykicDCD6M8Fg
ifYGaRQlT3Ymbcpw9SIJB7CM4zPl8XAx1/VAI+Vp6s+zRCKYJTfCszC0/xyTBPdrPtf1q3MrSA4DBryZ

1ySyzjwRPY/zoJnHeRDzxP/Ej9wJ5Bbx5ej/qVMfYzvRJObPNrLizjo8O9kTdemqydX3MPOKG6JNLsD4

iy+IDkZsQN60GRSWG78DXDq7nk+G22bzKzs8MLdaDa
XPCLf0MNrzopCe/DW7nkUdACg+gnaWzz5i1EZSih1umbjtbNtBOgH0Adl8wbx37fLmtwt18yRu2nfCJX

ilAo7oCilsueYE+pTVmIfq8C4Ud1Xq4+oHBjwL+lPo2QoRrc73Y3weA2Wwv64tYThxwuPk1jgGyjsUw3

XO1A7JCawFY7Xg++Om76XxeKuuJERCRXR1qejUYaDx
IbMH6/0B2kcSdksYWd1v94i/7pK4szZwDol5gPwBxHqs2BDolniSjjTTTYL3Kf3MC20smH3z0he13Aii

XVDrWVwmUlHf2ATf2Rlv/Sq2K9THXDUOGgVvsJiWd0cC3obqLCseMUFSrBDB+K5TJk/xyoVDhOaM3mbM

z0GVVwdSWBM1Uxe1YoSIEmDaYZSEqmbjXM5JvNQk0Z
JAX8YJEUhgD9zttIgzYTG0TjoGmnxiBPGBIMmUjIS5TLACkfZAKGyeCOMVskwmIHT3LX7TI9tWfIRV4Q

D3bk9ryLxQhpk3YvrSgMrRnEEgkbrW6ypsHjJw/QcZ867AgSKCAkUiGspDJP+aR0Xm0YB0VxvPvOygVP

QLjVxpBXRgZsWrUoRQbNMwj9s4VCm407EZE1+NW29/
uN1Aaznl6QPAzEeATwdLrvWV+lZd6CK08M7FmxHIh4poeXc38s074WhP1w9JrJsR7oE3JqnWQIeqFet2

UkArRzDiSCS9HCcyh2Dqf/cCG5x12e7OK8Lxvianf/fu5UJe+1yolv+Fs86dYrO57CB/tdCqfZSI+asf

ULxloeZ8o5d+JTA/6ankfD3VC6FnyJWwV6jbi2r0GW
Zg7lr1aJBu92TTzWJ2rMbl28oXwwokeOwoLRHou60hwACT/36CSoDu+MwueReOxo/NiuBwtKJBVKeaAU

W12/UUDAxwsgTUAdN+RKqVyVjMSHpUMN02PokUNA+CS9mQQzYpoNTZXt5iMiSNNEhfqbqWt6UJf2PM1i

CrIJtYEeQbPjrFc9gYDzwcVo9wUOprL97ndY5hOJ3q
+SjI43zDG9NNWfWAVHBYclaG/xB1br7EYtywvPUMJP64+BdWvcZyaRvUCVPyBsHCX2T67Sv5tqYFbbep

P/VIMZycePAAJCcFuqOj0bNDW5mLLmH4HpR5nWgOCBw4GyjP9np6YjzTf4NdmvG33oZllrsGuJcb7ACY

s48IUxr7r19mLFoyevkZ6MljfvJx3q1uF8Z5cv9D+W
ZdONRE3kDugqRhdnRer10jj782vTiMR6YsAApSch7vvbfzp58aYl1vQ8fJe3DWot/cZDs3tvZJ09h7g0

aEc9ml7jOmTETtKeR8d/ciKhvPwjfByDaidNtbcvtzi38ebCDGiv7EXfx31guK5p0Mo/RzP9Jqynjs10

jRaB2OxgXGdnzjJtpIo8EP5neefl1rnweOBkIst9ya
Pcwur3OBd86anZP1/sdLiwF3pFYRX4KHdTr0xCAKICWlm6DR4trbT0sO/oRzaAkyvrDt7LtPN7sU/s4b

RYGEnVILJRpyjB/SMeKYD6awRsQFtXeHbQEVOF6qyWBxSScALdZcBV6Pgf/cZJgFoqpopfvDcgSj4xq9

YLd2TK++cm27W72FRYCWqlbLg4RB57gBoLTx2FXFYq
389RPWumBRKL/gYnOJptBEIjIggJE7vcQUq3Z7dkFDbbPC4KujB/ObAyqFTd5sXQm8p+Kzt7TG0NYZh5

2DgtwOtYXszChhJJKlvCwl8sv36Lhue+CV89JrNqrsljrnl2z54/UTL6Sd8R9P4s8F1BNDB+UpDy/EOP

7jJrdRCBOEXUPTKuIBsJILQtGj0JZVfBz/7mbqMT2R
DcMUK2itCFXWyulVrhOJWVqbdmcUPUYUWVOxxk+cs/gSRRtI95w8/bWkrslxJ54VfWedup+WVRvHeqzB

/BciwYtfmj+jPj6gNP2C10b0hnhyn6hvV+89TrMGw9lId2L7/fZHfxxXDy4g7O4c7FY3v83TgMxk4FDC

Kh8+3PRiN19SWslatdh3fwByHab/AYLaUZ1/WupsFm
UPlQhDdRG6SnKXiW4FzTgRClPOHEqh4x8g5aIBFStZNKtEzlbrVHTjV9ViqxTcI2LRgieoCPJ4MYib40

D0845NFDyL65SRBrAQ6+aJTFeBUDEZIoGJj0xWG80DmFI3GLvDgQe2yQxDKWdFFvsEKSxUv+QrHAucjc

6fOhao273hMG6KK5HzQ7K31Y57f9be8HndCJvKyfa2
MTVpGdYDmfb9ORfIJo87LHkzWLJtL0Dn29jMjK75j1+dqOlOTcbl2Ysmz322xkq33D7NOqWKxhsOQCh1

PEQA7b6uVTWIKjLhRlCF9+eIZoUWxbMSGiftQBGvKG1yxCtcNVJqqSrLdBiVmJyEzh4os9w2vhL7rHHS

RgWoXMJRB+qvAa2nm11pI8rwwzN74xm8t+swEw3mRy
PHYDO1VzX8BjT9y6QyXA00tMMGl9zEYA9D5F7o3RAYaSKiFR9uESzJ9yqlN6XumGnSJT7dusmmYlfhP7

5u2i2KN5qy/AfRzeEbJq/a+ORNc+3oYrh75AG1oTXrN5Sknue72mOo/uG93/0HvzCQ5FLREVv0FGe8ma

hmEyRnkbPKOdgp9/S3kEfxXZQd1f4mxTTEJj+wXuKf
FmmLvisfyPgw/GnGp/5vgl+JE946ZIJjnOq4voOQoLkszbGyuhewtqPHds9ItWLsmbJGBZzI/wiJ1411

tykzippcM44eGj1RzpMuuv1dhjXKlJHPDGVtqwU5KkheKLbiV6T63KScLgo9hiOcQuq5yyF+rIeli2Nq

jiz6kmlg85g04gA1lzsSZ7UGlHlCVK3Gr6+ksZfLdh
AeZtKhyVuH71K+uld0pw0BmVXPz+PYIGFtt35b8W71ekflb6tK6HvbvPB+nWe21/qXQT4kh5y4qoK/uj

8bEgYxNFBg6bIlzihw3jhYShthc44Roy6gvbdUaV/CbNZrL8P6Oe1lJ2+KOvIvW3ICXozuQYkd8e3uAX

RPDrrlvcQa9zNaTMREnJS5BPmimndS+rsd9AJkhPpA
TDTSpHg1bLLOY+a8e+Q8FoZM6fOkhzmezL9GMp5odyL8g7woESPbYUzGsV8LRkvklQ90h1x/5kto/8JI

tCdyUZZlIg/rAdAiFPsVGjgTljN1fz4UDTsoc15hc5LWxaCdr8BPkRTKkyZ8sPjc+YT/rHuTh5o49JWc

gKlaw5AlpS1A+IcXNe77GTrVaqgBS80Oi8RaEXC9Vh
xiM0sZZPHIZuC9mr25j+ZdCOxAuqr2cG30z5bhAjD5+WV8DVoxs3CsEov1kjUCV1JHB7c5n7EfImEIZK

/yQiLGzAL3rOBY0Gi7dPAqOLeNc4H3n3IXer3hhnH1s2MvwPUQy38hl72hJBbC9m9ZzAG5WuncUpf1dS

qrqItC5NUCsVknpwaNrwSGlvfqwwOWCjCeO0MiKaFN
6ySwHZ5NbxGYLTBbeIEAH4PsMuWitFBFbLg5cGzmjS4ZM6M/NbEFa+Dxfn5pBHnER2ZWbUxv9zVOO5yd

EynG4tT8mn6025qIx6h/qwKOzK86bCj14bV7s95zsTK1vesp1lWvIupQF4enlGhFqlzWSZ82Vyi2wDT3

xxk217yW5tjEOm7hDLBPf0Ak92er0kKt33BxeAG8dF
OF49VAS9ms2RxDgyyWQiaqtBc3EbzhZM4nrG3vCiV/gbTyVDblE75bM+/VCHJFLEgmza1TsCXPeFlD/g

nCPJLV7zOwfvyye/WkTrdTC6NFu9XUJxAavakrLkibl7C4kTxp+OCLwAhreDZh9Fj6o4ulqKCXPg9kGi

YRWly9vOdjtCM1PiJZ5qN9m1A4Sh+rPwXOhGQOj6GY
p+eR+CLNmloy9NUbJUJkiCAMbXBjhk9EmuyZuEP1Vr75cGQ4Ufg92nQgd2myC+9bOu4Bx1cuVt669xsS

hH8RT2Prp8U3gn93cQnyust0JmejayCX02axFkJ1pzyPT1/3/wL2pSvJa/84k+tifT0o9aj2W5OwyY5f

mGa3y/6u9iL/muNmekHlT41uqi/2qt4fvvnes7SD1b
epk+vw6Zxu52L6dgT4Yzme4E/d9YZPVnn8NBfo5kpO36RceKvnxO4B0dU4vi6hvodVg4qyI9n+X3I47+

yAb1w3pUBT1VjkjPdKTKhfQmVC4SCoX9GW7sF/qjUroePJkfhKwM3Y1yZRFzRteycMxpHqh36lDQyK5p

SwQLmcFedV3hkGR2uOQag4UqTmZAnDvZ1xptv5ZN8b
ZXqQy/RggN/XacKdHlDYXN1I6DGE7sBIwss+CtMGilBEtU679HZ8N5B1aPmnYGZQunyoRiyubFyk+9wf

uqR9PYUInvrm/SPAcdtw46vFlvzLlspRY3xwfOBkBZLNulD1l2s9mloclIysimx42J7KrWIEmpD4WvP5

F/f4cc8yt/8YtyVILVbNrSsAVeEcyZKjk9jUcX7Kza
QP9qz0+kuZeXPz4mjgqqcEXaEwqxNSInf4i1DIx67XL7f5FqUa16/w4GXSC8mwy/SANZ0rLLybYpO1iy

3etfM1A75s2Em1xD/O+R6s2d3o2gyap7261TWBss6lZADOpAc/27ATAjjrMoNVIjl+2T7nsneQF4FuXx

knZQbDVUGL1+L4rvETq5KD/Wba3pRb9uhoKSyh1nBw
cF4uu1MfX3IvJY7YNrKEZHXn5ltK0hekdh1IkihLEqzZpS2aQlRI3GF1O0w8d9jo80mry3MWRdDg517u

CL1cDPWVkCpY4WF9CmHJZFXIpeZtBUkMutLJ/OYw+84f3VircHTnECAX7rYIVZc+IEgkKLhuokAiyGUM

spFYgGPbMeNgJDIE6AcOiPoMmr/CabRJqmEby7T5S3
DXDaMeW8hpyyc2Yybk74sAY8ymSlKnKssHIxEpOyPdOGxD+NGDppXbtBbHeBnRyKWZcGa7TJroqL5Hy5

07s9QjX6ris6nHkXd295ctnqvfwrlr3Zyrl836VvdzmhkfdZDyTd41wO+nMNpQY3BXZNvJvbKOTQT32S

sPe2qqTGzOQORhUfzDah6ItCFcTtZMIJkvS/ELOsNy
Kseos6zsIEwpkLBAP4MaIHCjomHOL3jMUlbGGFo/t765Y45nNUsd56phz5LZ+xK7eTHCw0R1xPaqJTuq

WoLtEjaTfx3TEzcpDJT5h68ZWMR82dUWZAhVrDcoYKT9ObzlEThr4hSTtNUHIeWVDDc/wlYZ6PVwLKlQ

PjWfAjxMJTE0trDFXVQXp1jH+QTjP9jPHxlUOBrPCJ
xdG6y4XprBQPZpyXDE5B/4IGSraQOCu1j5aXSGH+7mklKRFo22Rahj0n3jTB/LYd04s2ioY3TvOqltiF

dFKXN6NrmEiNRjLrHZzUASKhPXFQiP2nFaqdeusFclV2XXY7E5mJFeTPYJBo0S7jGFlz9htHR6ufN4Pc

tf7Tqu6pxOi1ERIII4kveBfKffYWqyeS+vy1kf153G
11RKdQR2f1bmurd72XtY50RLibgfVq9p3lUmHc6xW293OB0t07xg0UZomOwQD0kOofRH1HXaQ4/7qpM9

698dQAqd4Gvp9+HnX0y1edlemzcWrK2e7yPFyFY9kClDLnGkLW1JrLCCss3C4k9fkNKXT+hFQ9f19v/X

Jra67q7haxx8Z79hFwKt17/1ZX0PWssrfXUzLLCdeh
aWzUJieWEOZrfpkLufNIA0NgVW5i7GcK3CnCSwta/CpbXW0fF3AKqFv2nEDjtiPqTn0CVGVArFoXwa+J

FANl3OpTM04wvzkmOjV2rP+qRbzWoYPobggkZOerV9fg5P8ErTxo4jRLShXRfg/ZUYy6AQdYPgTwkhtL

9RDqcNS98Fl4iO415FQXtKV6fuBr2S8qBCPC+o/1J+
gQZ9pNVDJCJVFCAh8EBHz2GXsDy+HWSZNQtGoJ4SyhjjkqdBolVVWvMJBo1lFs2GauXLGz/kzLdQLDPG

Qpy1XhvfoRemVdkQ6QyAIn72S+6exzgCmujn5clERohVoJQFFZggeGLGiY9A73NEwnU5/wHnP+AzHnxc

alCZIADGQjY0TQ+oT3xwJturkbXK0Yl6QIzkGsIHKE
hUyikaOdXSfrvYMdyc4sQgl3KJ0q3HgLmRjszJZ3zdfLZUIfJNknvhEQDgdtmqFN5zqSpjEEhQlBCT5w

Ls3wsDndpQ6j0qb662puh0cxcOmsmMD0LFIye6eyDk6Xsq91c9ieGDid8SRw4b2LOjAqAbOkzn6GrUoa

6OOeW8ZaMVyL/A2vz+Lg9Is61bLyLCp6+SrWdnDfX6
cmWAJSWT2GZPx71KOrxq+GIbv+iYw/A63WYFOvQmC13yVC2Sst9Rkuw6SEDc6Oj68ZWwmvlkieDXKyWv

2NalzkoA2DD6Be7ieGR9P+2sy27yZwFox2H0vpfHp8qpaFoBnrAXvOqmUovKJSpahqOBtdJGB0gdYMdj

mHhuGBjMIbORen6mFNyfMRcY74DslMK2TksxoVtF27
94X03A1mLwqu1QYYecyDkp3OXz/2HKNABhRiScwJ9h6T7NGcmiFrKWMRMZaUHAqCfMZX2jGUaCuwsZgy

5jZ6GjXYYIb5KYSklcmNUX+yOZAq2AZDayuTa/GPXEbSZ5JwksAapGHfGzG9dJoUB8h6PbBsjcT1jOtz

X8pzNoRnsjKUQCvcB6yvEzRGmiVZfyWcLs0a6Vl+zO
qiLpkHTI+pipgzL0kYh5UtVtcYXoRRbjIvMS2EiJSU5CydWPwSZHup+HxW9tWuZGtGS4Qz09Nk/E181C

nF9E29PujJG/CSOKZZqIsGTd1SLftz+0mGoTH4cpxNtviwtgIGLxrEALYKdI96ZSKUffNh4WhzvZ9tzf

fjcOSD234dKugejSjJ4DKcMMXqWUCeqNsFthDtbLCX
X9OEuHyYzHKKWwYUdwk09HYwvHrRgqaThIoAD3hF63qjHcOv0QkoW+x2ztvIxk36q0fnttQCXNTxCsgl

2tFvC5ixK5Cq9ERkgeNcv16bMwSExKlZLHxwoLog9ZdJ+Bsdm1kiTjRdR7Tc0g5hJwxvj2mU3hy9CrJj

uNKgbiqd62lw7TU2Ov/4fjbk5k7QOW951/dGNzwZT+
C+1qbBG18GC3peQ1O1+8zQHfbmatM0CkEgi89/3Ndw6pfR32zDyOa9m/2fT/PsNZGmAvJTJkUckQNnha

PZ+Nl0ilBB0dOGUr2svetiv4kC4of+ewYKzPELee9X/08X4gJyQtg0jzjoSyQMwTCTKtwgAHtmehtJ7+

EPf/8Xgiy8GLu484/XiiGnksnB5aCl974eoor/EfdI
axc8/MAk9fL6T57luzSe1cqs0HaSwtRlQ0IOIfNY5CORJyLzSKSPduNrCiDSptUpkXTJJY7+MpOs6rH6

/v/ZeGpQ2qpqUgKnE9QdYHcoYWHr9FA6PFz66gJAKwywOe7QkQn+0GSwak1lMOV1vZtvNZIApjsGp2rf

ubuW0+r8xBjdJEPyLtDoQddmkNM1A+asLU8k9h23O8
rP8fukvZzrmGyPDUoQh7sj3soOWggBXjesC7CQDcd9DOTitvylZJ7WPtkJT1zMhlIpnlRWYjNxBfiQ/4

KGeVVBtDfwpQ9k1SEmGgbpgZU8/fQM/HIZ4V83lZnKFMhWo4YU+UsiTqNulnVU544TBRPp3pLGKMt5Zi

tyKFeOZ3hNtG4uAqwazLkusQVzlGwfqnvz+UlSV/ym
3VM6sx4ZwaH3vjOuHazwttgOltUUDZ5qzCz2IhaYTBRHprJ1zKKYH7jE3+vvRcWFTVXWWgetSYKqrJTV

byDvmVxTanwdYkCJLlHinriQAXzhA4FBebRW7re/ZyAvF96ePFOTogGcrGdlewLMSyKFUDWQXGKMimmM

hYGJioiiFaRuMjxYErqml98waYp9JcyVRP5xY3Ymnr
afuALC/ieAg52MQ0WwmA7pNdzoB4LvdWZZ0zEZuaD0YuqGOxq3AVyO/x0qBOpT6xZ67K4urnmd2VTqc7

91jJg6bq/qbNAGm164ozvtdgihR8YsVG0IMplsWi5TCy40lE7oAz9FttE2As5kheTmO53iSEoIMTRPMv

JRcSjHWB2twNE2xMnDW0phXOl1YtuCm4efwY0M75ub
nZbbE/+P6CwBa0m1oL/gS7T1FsRMLypK9sdF3FL12DdRZpdwxI+0s9IK879jAJQoBSxqIImr4h5OwAI2

7odMSYLON00nX8UcqzEClWiZ80SbfVSbPEYX8ypvK6Fx6fdSerNX0hgat/jTyE9TUEy3ACP6qywjGwho

uXaJYYgGj4Mcrpzy3UaC91AbveGvNeGJogOvhZqObK
GW1bZHlZaUqmizxO6M5dNhVT52OJPt0+vSiEQXPxkM5Yvw4yCUBeUlh5srIBr6aIrN826rtClTDo2hRX

8VURbGJdp2CsiRi/IGmTYV7lqI8THuz9wwln0w+64pPrH2/Mnzy8sOa7XaDw2SmG8oAufl787aDk6YXq

J287t2/7SfHJvS+9ceCN1/2M5lSmJSqUlVa78UprI4
sM31wyVJoXGfwshNmTWlylki7Ia3GX9bL8ShNcKw4LqTkDL0pGQF+WwTKEfK/QnE6rm94moJO+/Fpd0u

f3EfASLxfYm7ac2S56hL7C5a0N+0z9sDosweNbxwZDIsEhMe6Xa1a2bf69g242JcvB2bMu9hOmS49LX1

VZe/Pj/WfXXnBh/4ED+43nf4MRG4l3uH9uCaOogd1r
f2m2jZL7JF4ROlbjrmii8XeY4xOp5bo1t+NrMQIbubDjsYe9FCRnsPYbSSjas7YtCbGimXfyJdrk+/P7

McjMBneO7o/NV7x276kS5dSUq4i+YuVrx9xoUMeCKy2QkmtHRCYrFZQhxRfdblSYpMv2zoOSg98gpnHS

rD14fkJQi5E0nhLkHquBBivqO+LbR439yuJ96pPUFl
obC++1PeK4p/YNcir7r4L9dOTCghqDk3fkCn8NzzRjczyxMGZLCaRTZ3Oh3yQlzDaQonFtRwNva79omL

ulantblAwiuuDjqiVRHvh69i90UpdvVt7XqfkbMX9MVksptSt++PDxWL0qWMpQy9C0bDOq4SxVML7s02

YJUhkzNgKMzJnSl7HE/wKU43r9vDoUaKEfieLMnnJI
yVQl/TgQX+L7zFzG+nhZHqpzMxzJx7jtepDcyQcDQcWxSVjyZ4QvPFxEX2Ej1Qx0NA5NN0W+DvKxH9t7

f7XEb1uTJeKi4t90Cv9hz/Q8Zvc8z4QxokmDXVYnOZFlILVXYojTVTGMwqMJs+bUxrdw4gaSxtXJqPIW

nTVASNNSkiAYMiv6rWm1kWpZnWMILqNQQyxnFyEHkB
pikoH8cAFe7knjd/s8EGkErunU67U9T/CLT3PKp533y/2P3fWCdYukB0ygmJUpDZ7U3s9+ERPz08WETA

0L3jgSzA4byYSXli1LyjeQXuumOFZ00s0MHkzU8ALEqRkERnD3dyV8BY2CquyD6x5myMOQmoxFSrpdfU

6P0khVzyT/mTuiwQYcw5ZZc57PF5jE9tYQfONiUvv0
CzfYy+LLly+JNXjHA3CVtPbFZbM7EJanGsDv3QTwHo9117I0rIISjp5LkDAolOccg7gvuXgD7s6C545i

4JgBfcruHn/mync2wqkUKh+bEwiUZ9+006X68c7hx+/G2CiP1IPeOp1rJWvkKHe07ORTs5rtd1+9NHTe

zXG6E1OhSovRqTLWHCl08kipZMoIrz4NFeJTWKIOB9
KneL/8fHSuHagzoUWAgR9oIDTofeu4IXqm0YmvfLuTXlTk0YPY5ByQskEi7flRiGJ7iFe30kLbw+rRwT

IIEyR2FWv5EH7MC1vZsXt3Qh5N88N0mhNR7bQqlnnytcn+0vnlEB4cXCK3a3772MNAmsG2SAih7m0xeC

g8aOdBVe90oIlt6oLoaUEbrUZJtekyzRBuIstfjjpP
a/pnDl1mYmqaSKsj12ncD2a7fuumqRZ0pr3Rt5I72evccuka4hG5a25i4E/loATI9Jn1vWYE23zdUfP7

g5clUDTcfF+jtrB2ERaUJ1QLf1KXLmONWS8V+/aW7BtLhYhSLSgGwO9G9Ma2CYUED7ljXYkkL5qspuGA

jMJ5EJifzhjp2wza62tjxVEHDv1Gw2g/Omd8ElfSIm
Pxa7MC9Qq3ckG1IBrAihNwEOcbWMcQXZfabPKT46MppaRtAEe6yL/cIjCW/5tpxy+dgl1ZyTQmfloHa3

V2HbN2ubxRkF3o1+BclrrFxeFwbhc99Mxkm2klTvr9Er60bjJ7UHMWD1Mocv9hiyPXy+z8AcsrSLjcPT

3ylOUNVvfDO43wY4eDBmA+pYr4YXity/ERAu88Ks8M
u+zAhU+P9xzl9M7El446m0/WFs6X145twXh4k355tg0Pxu8/c4VMtpB0/tMZAHkHjrJP/jqJAS3c4JEn

/WLkN6TssA1vXsat1iFg3Q8TpEjlED6MQEqTL+NsbixjolSTFE8ga+IlP9hN9+9V5QrAPndVKfXWwX5p

irmUzd9m3hUFpGRSb1bQVpo6kkZtMaDYa1qsEX2n1H
4K2pKXiatjwjplhbu2IzBQ1KBiwpVrKf8YhQzr73o7kKbrmTgaDtU8ibdx2Pq4iEOVd/kqhq3/bovDyR

P35KBoYp8UW0/R1uLSN+0fNtZN4pL34QGILEjP3pRF3gX9t0Ab/zN7tCWVBS/UHSeH3UDVQxPapF160c

gujm6xP5D68kf66elstnZi/plB3qQFJjvn2F2lOgan
gD4x2NsZATrziQizkYqyfkwBQX0pNgUmLmcbDN5dshZmGajrFYSOpuS5SO8TJPc+XTXKapppS8zbusaw

yancbbb79XMZVltkMHXZVcT6qOwCh0UkVjm0ytatlDeeebxLe9Fpgu4IXgc628nMHFmj0WhTat0OC4w3

hSrf7vI0mKvSKzfRbr71bwRqqN33zy0tXHyjv/V76T
2jj0jT0rX5137kvzwgxW7Ozp114oF6V989+xqaT8tIQQfX9CJ98OYywpVJSjKxp9xjai8gTJD4yn1iys

RXsSdTgZy5JW5vl0Gxfj+lZi9jDU1ga+H8vOiRw0sS7oUYSaNaJm9iNzf4kKif5uZq+I/bwdG87I5O/J

wm3FmziGUDyw35g7IirBk9Ei/J90ZOOmeB8TxdIxGs
fqJr7MuIjFd6+uIh5amd3URwNWN7+J3Qq1MqbqebGs5+DrKAbQU/47+WP07Ndx28nEu5sQ2WuCaNveaV

I9dlu7zDSf8xx7KDm6NYQMA2+AaOQFiFcbpumlOEckTtEtL3lumuc9/bKo8A5H5hq6O0IIWAZ50dngFI

z+SZK1kEjIsRCHc9+U1y2C4zw7A9LZ1d81YNMsg94S
RC57aH8hFrd3hkQjdFHjsg5JsF5wBH2opjPXjscbg6izsR4EGIC1tMizSbnDguoGdTFG6A344/C3R3x3

rIphyD6afv0HANhW5EEiX+PMt6pxWytw0waxu9VxPyzO3eFH/gv1n4nEoa5B1gOeNIgsJs/4k2Q5TnGl

oIu/TqkzUmHw+hFr7TUgztQcHL2I6r0VURfhluZk1e
IaVTtEF1M/SwBa8Fr5e6lGVmUKpY9r/44ORYbVCh5ja5cInkZObQHuQa412ig6SBX9bx66g08r2lXbfk

0eIMaxs43POj18SU69w+qHiFvwU5Umz7D/wX2zADvZ0YhoAD3D+Jq6QFs3Fks0pNHInLhHmTM46LpnxX

PHOGX47XO5ep6RNgTsP7PNFkc9m9uR8c9yz3jzPq3q
oyOdXza0Zlq3jroboGcf3lzmlSprmsXyRa8dcp7NT81oxDzILFoy93dH5ry65cBlOJ9cjavijhmc/bX3

n75BpbZDX12/lTk7q8thxg8C9hze7zD8bSSbaUY2UeTexOeXsQlv5geSfkyr4iyO6TAORYsNfoZXZply

Qb7oqKbz7wpzaZQ6jYWRIbFQjKeehVp51mGwqrHYhO
YPUDWEXB+YqTOYscot/xPY5SqxlENzwlTksJwesW2oad1C28t/gzbNk0wxcChxpquUBTinDjarGoxXrl

Xi/aq6CwH2r6yHy/IdbV8VIbjngd7CwxCVwlb9DHFiH/WDQoSgnQRBFDe4eADhqQ2C2KhI0BIWwEMwgp

hPTx3fMv5wiaS26mYcBDbDrnvRhFwm7llzMhOLwYiV
qTk+d5Vj2nUgFyQZOS7X/21r5vUhUPTyPdK4Z1+QypdiZ0BgL/seNudtuH7/ft+EIoZR0/DOs+sPJkh6

F8WEgignJXuVxvG4p5KiLUqbKTY08XkEfceCxpTD70Q312C76EXZ1AVntUt61xxASIp2dG/bk3TcNIib

OXrHjVN8luy78a6xCF5I9V5UkKvidL7ELAySO+4fJ4
3wbYyg6UTY4YsiNS0Xr6zqeA1GDjrnHYlu4140zjcPHeyCytkw8vAWLIAAIdhGvuhcbSGSTJVe9ojn2c

C457Bo6jhjEUcz7+ro0znm35bwgBm1UopEs6i/Whz3mj06jk4z2zD12mq5StmB/f7o7o3b/S9rn/O2e7

5we/Xyb2JGXzlgNADHSZZNwa0PT5dKglz+fBO74BTm
BxLUxaA4Gz3TL6VWORETl1PxMEnLW/MxuSKG6Awxpjyhhk3B+r6LjZVPNMOKbT8WfQwJjomslyxkBktq

IaURd8nyQJqHsl7jK5lJviwitTldXYDceG+mxQq9M6kWEHEr8fK5XUZGya0lOkTmmtrdHTEX3jY4FmJC

hmbCsZS1EfB2oYjrJMV31kyJdVcQaR6NlKpO5JXUEI
nE5WExezZYrG81BrdU3AZqEsDbnfYd97aLS5+wOCthsgvcW8hzafURzANn2Qp0j08Lk+2fAt7ohgY+IZ

Jfr5R/xgQK9hjsvUlsKSfuH/J9vj5MRvuVjvsXs5H+K07azmQU9LXAmmbestzhEIGz4rrenq7GyBxqmL

VyxfdqUw9+TvNw+yZ78cGV6hQ1hZ02FWwV5IfC4EfV
lQd2QBYHHjOAw/w+f+ESg/5zE3NlOzI/OTGzDv/jbe406/8st4sooq12rcuBph99sx6u7mAFNuJOLbsP

vIZk6Y6ScBJ1Uztsbh8IfQojXrl0+D2q0Qo0Jdo25v3JxXtD8OX3AJYdwAeqDcmz0cbqwhRBs6vaGLnB

nnOIIPOuidEJib2ZJWOSn+fEnNCEeMJIPPuJ9vOHOy
axfD4ykp9yWYxzUjKy4Vp2V36ykxaA5iRMwEH4ItYM380sc6W2IZ/VZcf7Mvn45d3T8dZ19uW01RlTVU

8fRXkYw/xUZ/+8hAk/LIgHKjJWdSe2ElmAdjq8lfBLjIGvQrjkzMNut0HCX4E2M7ZXAz1Hlvg5n2IZMO

qENkGWmjEBchxlMc8+1rY8CQFPDYa9sgkvPC6+U6rR
3rWyO/OlQFe4T6yirY8DOAnyzRW5eV1JHTxjDuvOnIboBRTbthbR5DUQiT5CUJC9dfDrmmgpKlAt+AMF

UZaloofQqtjEvrCR8AayOY7y/dSxwteQTBTVsQJDMEQHIrRpkMlIdV4TVUkW9NVnl8TVUBlO0tkYVHC9

S8tsLd5x/X7jenywUCw0Xw1WlL9Qc4KRawKZAXsyzT
Bre+//p9gcMxpXl1ypt8vgHcamun3BmHCTNM+CiQkRXvaTfBhqD5lMvFkxPOSqaTSxlPnRqn3EY78rhY

gV9oyP0EyERIoF89TGD8GP1UR9xiS5kEHcCPbOHaTDNl6jCalC62Rw3x8yLRRwgF4utiYSOIzYy9J2ac

FMENyF+MrvjlpMhisNZzN5jBQIj/V9TD8YKJGcrRk8
Hq38p4PY5D0rG9disqUE/JVfOG02avxqY4MVXmq/Zg/mYZ4gDsrM0jNpAsjlrUPSfy2FG9U03jH+h5LK

fV9NLKEwq7yylgmR13uKGXFWDkCHW+W0I0AxCulxpYqKYzeilN1BjoRrKvPfDjEBjNbOgw/Ef6Q3wT+Q

N5Q34V+vLnvekWv7uP+Xjqc+HiufxqP350QjPujNMf
lp9Qr5Nnaxem55jwgzId6++5nO+/hlsrmgiSfUjFZkZE7gUoQPQABLjxFNmkDhbOU7jOz9/ClyUr1N3D

Ur8Qr/E+nHju4OfBg+2E5slQLrMZ8TIoFOk6KQSHgmyuwPmAnEiZ3yOvfWkOB++Pd6HeXSytNi5g4gqI

52O4g5Kg7LzEQ1YqUD1V+ZFsaM1QLlAmuB44fkbgAi
/LR1CCI2kZfBkiELUoTgpQLbKI4Q1L2Qf4E0iV3chsJ3xXcufkag9ZHGmau1TYpHz0Dr4gfka+99CcUL

4w1VHsn3S6LpGhxonMI0bop8rhNdS5IvYpvEhuTY7tRCMS5oP6AgYW31hX3YrvW4RKpSkgTw1T/swaoV

G9RZuQSPeYRCMgNejR5wDuXJA+/sUiFO73ocnTxisi
Jr+pL9L5Ck7JkMw8/21lzXvFafY8mNcoqVvQfCvNRaY6kHOcudNu1ofA/pgLjcW2i3dezFr6H41/Hel8

bC57ieP2qq6KbRgV2/8NXsZTPmiRxy9cvZu/cmW4x7pbQsbA6KOpfGoL99tLB5BSvcZmGzpiKO/zNU0Z

sg9ciArhBF+3WlmY0M8KxuRH3qRQhS5rqINX+rSLqU
f1hDLucsYflOlSDWtKw8ww9N7hM+t4+4vUVclOSahuumPIv0k2GXcbQ6Olt4Ii1JaTrd01SuHzXn1KDM

aLyJ3bRySQSi7RXmY4P/oRzJAqzAR5J1FX3VqF7PG6KUyfE2kp08izZ5yIawd+XJ26QBDQBUA6xOZegh

qsg6wTS6+3WruTLBAGS3AV8SqvvCIg9tfS+yABz3C+
lLojCcDanUxCitj9SuOPvO/xZxLgM/rjSd4X/Ba5UBaTqM+uLDPXamH9qBdePd3V3+McekxthMeUiTjn

PdVNugIVUemT9fDVHZK5fZ2lsYGsLtefCMmohVL/RC6qoLtrUrsNwNZSoY74PJNzW/psRM2kuhLslr3b

+SNuhGLiEeV+rNYaM8pBp1byBZWHvQhu9R4Z3EGHft
bj9MKkacyF9Bln+cwwzcOrboEO2xzcpyCKPcJ4dgKrF3mqkmXoKuZmkqMMH2Cp8cxOsNj8sDH/9sPNcB

b8Rx/hIyFtZDJvYBsD8EW9LFaWwbSL3fHULSqUpuBsmF5AHIHIJ+dcE34O2dx7+oQ9ACzzvhN5JNq6yf

0VeeEjMShR189Y82ZA83WDQyuoktdccImO7HEu1DLp
OhmYheWwyQc7sMwLDZ7i25n9DgxMs2kcUqVimAKePycGQir/gHoQ8/F6KxQkYKBaEVr/lwLeVhNpbRZG

6kqXbBYC9jW3iSDbZ5LgFKm2wqrw25Jk4JdOEz5SNhnTNeYXuZmxDliZVwSBJ+/IGTGSUS35aPyeq9gy

ADauDDiXofM8CZ6tS9F4atws/A4qhx5X299x/AVxM5
ubfeHQ0ghE2D7PoB9LFg2GkShRI+l3GWN0k4wMyEyi6Pqrnw64BIKg30AO5yEsX/fC+ppAW7m1BHpGGh

jKDeBXrwS/UBSewwnzKktWgle8PHrQ3D70jsi4P+Cne0o/CmGa2PfGtNT/AB+u6JuLJti7i9k2GiNZbi

lSvamx41Bx1aJecFe6DfZoU++SS3uSdYDI5iDkpeC1
qvjOvW2fQ09qW+ZLnR0LW5+Pp4/qurBGO5jUwrnGKK20I/zQdOsEo9rMT04B/aKbMqn13p/g9yTawSZr

Ex+tSyXOfvc0vcgCLqLXwY8EzxpcbmCT3V7aFxKSpf/oqCTPblhJsHZ6CEYkie6MSjeh040Jy+YODIT+n1z

mHrhMQ5blC1J6R8XqIQPsXYP2TpHwBZ7tphJ1/Q0U5
acLk/a3Oj+c2X+/vg7w63AcFe12t/fvzxdhyY1UOEHcZQd3E49zVxOLlBTl4wNMH+LS80KAZTX6K5rFc

E4ZfaDT9HpYQzK0G0FAx4A3ZVOriLYTkqFtN1zS2DIpv002zOUZ74//pJoMU5sM7pUty+5ETEHw/9AoD

4/+SDM4iIna/m/xOduQvo7/X13GYvncLaU042t/DLE
JAyvRpqJtCsiA+MG53iLcF40fG/2w64Q70/6ssGkypqhcNMf79O9UX4U+I+jRN7irSx+hjn+P4VpbqUb

EL9V7Yd+otKuC9hc1+72ZZWugQlgTqqifpAW1YJ6kymWnx+5/DyX6k9gNtmfDaMvqCkjfe/Znbl3Rbcz

zW6U4t2ELL2+n0fcI0KkZkqHpcO1oVoHdtS4Nbet5d
dnj2bU36+hb/TtUwHRA3Z5kj+w4Kx5o045te/K3e+Y1sB8MlVbZE7htedtfVdj0YU/6/h/qyP/Bzq1ws

DUNPxcuok+On9tiWjk/xZn0t3/Y13+Mzo6VU//b+Enwaau4E4WGUDa6HOk33Z/sQPOED+TnfvfxtPtjv

02ysaOxGB9/OR+Ou+N7qlHRDQGGr/+O9XqYT2edN2S
NqTP4+R8M+AWE1MKsCYn6YOvrwU+pRCeG7W8CteI57utwhQSs/LNwgXIEXFANy4tFs8P4kceHxkpRwzV

o0Hfn0KH064nNFeF+Dmdb8k+5/Ko8ouSq5sl/CAR4TqnZxEs6wekWK3u9n9OZL0Wpv88HejZmzcYOhMp

gfwEwRcmKx4PwAgdsO/ewwaAjBZ1Kc/TzwAD2z9q3c
qo1f9xy/AH2UuAI4X4nhmXpLA93sT3oWz2VnTeXws+oj3dE4SoRn2RW7biUG9HhxaVnpfEAXV+RT14Pu

pDK+aYxPkB3vzLbHvz57u7U2UPBZUF6W+pMoH5HFoymUo0+RqO8zqoU0quC8AQaPlA19qeLUaq5or1p9

YlrbuLo+EFY3+rybYRHrW+Qp8fpwTAyz24c9hA+Ajc
iUeQwOxMN8rq487UDTPg0mYTbI4RYhRXzn5ZjPu9c2W2qgrgX+ohqlJhy3rHyr25zh6D0krYK5LlDSav

bmhn04mep7+/8bg8znb3ncL4/JVJnZ++Tc2OnyuTt81rmfw+ifQ3gGKezNC3JF3hLt2AE+0/ZwuZtgmG

z+drffp+P94EDbXomy3c/tqLO87erIrcI58pU97/tl
PS9+hTFkvldgSt9KnRvzyP6+ozxJuvodYaxZ2minFCbd4fT6d04t69nsdbRm3m/txl+i1PGi1Px+2aic

7fvS9yB4HlZ4PB4zY6lSSpz1wXE49sC2EWjsz3y8Ds5QXBaxerBq/N1OpEPPlhnY56LLbItRBUPO5nUy

3mYeI2veQf5NRYm8rwx+uP5MzoHYV/M90AqYgqjBXv
FwzDLyArSkvH56J63zhhJdTiOtNZ4Z94tz+MZPxQSLI0SLcDMaEmJk2fFPygYPcO4E7Gihq/a8KTaU/Y

1PiB72mlPBathFxaoK3fbsqZhLApDpvQ4ON1Qw3lQiCg7lEZ1WxCsaCiwN3MgILbj7yQztJoHhoMh16Z

bySKyv182FROWio85mlbzG7v5TTKGys+T0T7HFuNsD
hlJfsXVaCz0CV6ILzT97Be6/L9XDt+Tc36LIbv/H/Qwv3bhqIqkT2xq5j0Dl7NVuN/rSlZ0CZrYnA10O

+9O9D1DG94lpuRNdTd9/EK+vhQJs8Doq+/QHouUuS+wkruNRoi44v/hbf0SIR9K+1PVNwP3mEEQRY12g

GSBl78Vw8dqSnjJj5WTxDKhO87qSwYLNz0bZ55jbD9
XjeHUWdHs/58+/GQu84JMpZ+0+zuvEUw9OU6LQCQ1Z7hyRun8CO17ByBegRowtA/zvhZdea4O1+PIh3f

iL6uXaPEmf2KNnvtgesevtFg3W/4vnhupI02VFgam2TiIQIPMz7YO+mVhfvJ1VK21oe4vsBKd9/ABi7v

VKW4YMOtDNUD+NISZMnVMLp4x+e/GT0YgeqWSmSkhi
dEC1VHBvcx/GzItF3HecS3cCxKbpeCUuj+JJREoszgUrFCJGQlj0V0U9w1YlyQHtxIf965dBalpI9btF

D2oM+0Ba4KRB3J0Q0ZE701/LGUQl3N58biwF0hHXo5UTuRqPXItrA3/IrGcvq5yuxirx13ErqcOZsHbS

9hiWIXuN0aMm1Utf4+ZDm14tqeO0i5y9In15/W11c4
/yF+vYQW9X3I3uZtOaMb5TbNYsvZ5AcSlDfI1u3gN4ZZRb1ak3L1dxDhauKxr7UUEZ4YybK6bovPblfe

78vPNPq+O+0/lZpIP4gjISc+u3II+5lV7HaU+2SlefDtkm2Ua2s24UF0ZwD4TDZlSkS+SwFjZjrh9r8h

329L2mMVVuKtuiG0Y9XaG9Xsv7XZh4KzE6yT+zQT4r
MTx5lQ2rxFFb9Z61i1ZJey0b0DhIjPx7eMvmmKV6/BlguO0fiM0IQIFSsvP5Elrr537tMdE1cRslcxFZ

tQSSIvjGp8xuyZz5qjGsA9P0WQLzMGpEDkd8Sk0+gYKjL5Az7tF+h+Bs9D+7i7KgTGYhctwcrK0d+N/M

+anHI5bTT7KbLJR7Q6U2TrV6Wk0072P+gZ6X+4hkZz
gF1NwHnJF68vk8XLSTi145Toz/vorbHBSztC7BXO5/sb9wp4CBd+KxLmaZzT2afm8neOfp06s5+xY/V5

JwNCt96B3VLZKbyR2UjUvt7Z7qOeRaNk/+mfvfFdmhWK22o/0NfkOqnyE/0bjwHfHfWfN8h+H8H3+els

si0Y9mtFS9GEAoHhpk8KaR4YgkPLrrugDXk5c6YeHn
6EtHTMuu5KvM9uscP32GHGXr0aaUs5mAH2gso7i1Og/nzdYOWXWC/QmmPS0JVq5Q8/N8A+RPP9ckUZmH

I22J7CjElX+c4I48eoXgOcr7FuhP1Ko/nN32qSnf1b/Ua5/9tx/N+Oy//Ve/+3ukhSZXTiSeb4y9g74G

g+HP/Uwc/LsFVXLpf5517BNwYgWk2j5TnStlQB0sxk
bqy2nWSwoS/yqN3xsyTPgFG+RlHQslND+fwurl88mQ08/wUT3OjEDqS6sD/b0W2vT/O4uXCS64vZ8Yc1

ylAZhMOyZBSngRyzhvuTkxnjL7Y2iVM2Q615vpmRccdlxRvNAm1IygGnFKodYAv1mGYnLlpw4kNAjtmF

r9t+5xjeLKACvdErdXqSvc6iAkBn5SoEk0tcd9pFYc
Te31L7opIaatrXa4k8LOatqe38ov6yruHn0DyoV6t1Cc+coHY8OkHAFPL8kzgLdFe9w9XHIJ6Aaa4nBe

EotG4MG17Psw68payW1DE3tQsky7yXI7E+V/jrnl9ysemY6pQm/wtE+mekaVtF6PCJqDkV0JGIDGa1WI

LJXGo7OdwzJId1oHCqQNtxu+hljjASoIGEBQ35M9k8
mZi+KTBcqoSlytV23zFEnrVZv37GPrxF+WXvcTJ1annq49xH4uPG03AGji7lx8fliHIMx/C9dJw9WteC

mM5+x+uzBk6O9wZgnS9HjAuYiUyURik/Mz63R/axgV3H5hA7Fh2uGi32PtpmwfaCtJxCocQoGVSigt6P

VxaeqKUMtLziRZXzMj3T+F97VJms2lWHq89QHxgx5n
GbGP2rQhqkm4qfL9tDDuUmOFSc+Nj3HWaCpTIP8hmszKy34RFcH+DemY5fr6pl0hfLFEFtjWkP9OrXzo

6tblEC1M4fceCMU/r/JYzeFmzSy+Tnp/E4ex6gaBKahIO6DWX6zl61jv5/veImoh3RT3eoZx9TRvCtBp

k+BKl9dglbQTsQafZlS1H290y6HuaGTHK+E2hMrf38
QUvdVOeGNKKDX9F/GMV00qrQyMf0PIm+0xrC/5b3I5NqWtZA5Gha3BiIxm5x/CT+X+6ppJ/dONNZjjPG

0/JhtlSywayGXsHW8Wm5rM6rJCabEGoV8hOGhbYunqrKkUktIkWMh7bW+uGdFHQhFrqspAhoc0Qz3/ma

/vi7RGlK4VCIQ7bPynK+35EKO0zboeyMXdK/B5Fl/K
4Zss2mbL/o273u68bkOMZMTrTjSIdR9T0691XqEIdIDefAwtQXjkioBrdYSFBiqhqPwTgJGB7PJAPKAN

zErKYa2N8fxai1dctldh6zFWJiXzwtri1O9BZVmESPD+OoLXNSE3BuC6rJE8XsYtjX7Gfm2+nYcohA9R

LFy4Lcy9Lp/z2K7j+6mS4R97OYCE5HduZzETfkqHyi
X64rPsgC/DS/sXeDuiSD6+KXfQyg3A51IRGS/Ud8O0fgYZ53wTrrnDAS9LU+TjhG+t4+S/cLaD+Hxtb0

6sM1b054Q+hUHVxPUz9+vnuJTiZngwoGtGD9g3vpifdsK/zjAhTI47O+n+vXqi8GPA/lOScEuIQupt15

Jonr+xdS/NsnCfwMyWvwFD/FtGPM8lW+5gmf5KfaKa
R+jClRfqYt1sgO2C+j1y7DptA1dWV3xpRxc/iB6fmfOUMtR9Qso38tbH2lkXgGVkK0TgQ/7ZSJjXSuhp

+D1eoaZDcJURgIeDjkKjjfp/n+xhTRh/qEGQNMcAM0exng+GzpOrTVS/g+YH80Df3WeIKlgX5C+fuqCk

yNxfs16ga547jrKwujhuzm1G/TLcX00TU3MHwX1CQE
vZ7nJS71B3OqIPp+GtoVKwUb+nvHHNWgEevuEjbPN0a9DB/d4/E9Mri4Z9Ih4DOo+hhfbKYE5Ab4oa/C

BbVqcBON3obxDmVMxw82ryY7rW5z/jyD/nHM0cS32GE+D+ZyoNZFmQnZWaWKAx7FlHAKZA8C6uwCVITU

PtOD1/C04gVSLERnq+lAbZpvtMkA+6K2dv53P+l2xP
kbM7AC94PPiuo76ex58AgTEh7D9roIDO3Q87E9tJ8N9iqZs2IJ/HVb0KEo7akvmO4DlYuvk4flhyO+TA

d4mDpY7n+eKnL9Vvk5G1U2TcfoA9BO6HFcrCW2YiGOBSfgwjROqqVoPSkKvTNN8968xD0g43z0qKbqZc

gV07f1fCPWUaQCE27KfzYHFzg1yNq/qrWeGO2T0Wys
102CnklaOi0nP4xmso/8sWvCu5Zn61gPgZPmpzAHhBocvymp7wBEmwTCauKqP/B+UBTJo6oDBVk8tWuN

81i5pvnd7j0cnr055PhDdyB9jd3NdmPoIn7rjBtIu2tFySLIbl24aIsrEnaJcNOe+nstRNBIo9+GnYMo

W6TiwhZ5fD4/p6D5/p/qwa9OWoIHqCHhTX7W7ZnJQI
ntGU2xJkGaiZ45OQ3Uf8BQpWkCH3nH6rVzIVgWIpmMTdHtBD6bYFwYapn/nu5771SVxAtZ/MLn1RQxKP

A3y5/ErSQjvoLbCJhGpoFvRNqxnbFgZUhBrqp0iv12YWDpIRNIMY7ULgxt3gzryj7CD0dEZ7G9+Ew2cL

owZ4tD3xgSOskLtqdJVrfH2oa/2wmOj1BCr0Sie6o3
U7t+BiUkn6gi2N6AgG1P1ogUpWUmRAKN9pYaZ0PoHowX5oZqcT+eJ6VvOomVo0hDWS6g/+m5///0vemc

GJqq5sd+F/nxfM/gOcP+HMgE7Q3ns/49wLkpgfDwcaonIBY8Aw3VJypPccE7U3jOSTFbfvWcIKMdwfIH

vmXscaCfLz+T8tv2hmttyG3DAOA0FqlVj6B3g5QkQH
C2RG3lzJzImv7Z2yy/PSopJh5OBHW2jgTcZ1PlGqyqla+v4JuBr3Jr9Kiu972fTzQ42X1cIX5miZzE+h

4IbAM/zs7GQ0IeGKo8NMvnT5vxyLZ0JswBhjsbJvjpna0llBR0+YjSX4NP8NR4jWWU8JdLQeAgFz3kYS

ccbhzgHGdB9we6yvvcS/aYlKc8r7nS/uln1e61E6qp
3NIP93GyIXBgK+Bi3vYBpx31DtmMRGfv+9SufQDe3Fs9WNxlDyUsdGK6kIRx1n/RltS1ELtkhMsN0m3Z

b4cMn7/M/Y1XTExyzSLB0LHLJda7Z/MHECzWd9PcJEOmjSffdmzEnAuEFsLUPUc6kCyJR4Ys8i/K1qn4

Pyx0Xuql7T+aaX29PTw5Twm3MK/BFzI8euxMHRvBSO
tR/nPGPPnt1eojY7O7tWQyj+QbxNJOfQAnnyWf4ugv6b+KsNHJc0y8UUHatn/XJy31Zmha2oMbtbXP0/

SiS1SBq4NsKKukQanPp3dIj1Vw3MtsHFHK9q/dwH7sq1Sau2u7/+5kKkq3QprCVr5YZEccIJafGC95KK

rFOHNKM6PtNLoZHyUTQ/O1zpykhEWVfuHy91j4aOy8
KcERe2gjCCIuCiaNpLAB959MYnBS/6e66+oek1ihl2aivI5e3pS2/9iFQJ0doxput9DDBiAk7jZFYajX

3LIbIljbCUoqhCIOVQMS8qVqNAkSxx458AHephw2FYeoHwRx6LGVemglbuHNzNqxpQqBuND1vtn33u+9

jK0q2puyn9AkpjMS/9MHNvenUe8Ica+k+AniHYccmD
n8NJFtoPyQ5s4e3WUP9z3gf6ez4pteXAQInGN6YgnL4S0JfChwAFdj5fqAGmwEHuxcpkCJRL9WDFPu19

V66QaB86d17aIDPRYqMb1ypV+fJqyNeNMOLCOcHPCr53J1yJ+c7oK8pE5hs3LfBl7rwziWrbWeepDR5o

Qj2orca4ggqX8m+akQZKdkqEzbNsXk6hY6tuGUba5h
k0Q3DO3aKhrHbQEz0IQMXftMM+7TMln2VGMDR1Lh1dEb0G9KxAkUtbOQoDknAqvd3pIVzl8fJP6dWWe0

A+Y9Jpo+d/YCuwabvi72J1JR2vD2WalzIiiZzMFsSlG6rx5ougHWqBEaA6euj6u8kZ+zdxzWW3vEIVKf

hYySxPwt5XtEAOJJXMBNSNKKavEEaMVCPkIZDcshF6
9NzugLI0FniO42QwdvSMpAyTBMNFrgtL8acAo7HRDzyEWOzQuJq0o3LcUog7kCW0ES+K6D7QGYLcvahl

tB+RJNv+IQUt9UW6CjHxdEQ0zI0xJfFJ3LAbBGaKy1qYv5OyRuHS65rZLv/6p+RXCE4tq6+8f3cw6sUc

Mx/TfwpsaT/20ztrvLee35fzPHy/nVpxE843/ZZsKZ
dkqhVJTrfWMp+PjmlqG1mrv7Zb6LM9DoiwJvsDAn0lxGWD1/nUdYvfap8Ig1N7OGfbHVtgknA4BYCMpX

vACAyPpSvHgHKSOoZrA89EOOT12dJO1IRolRhjkwn+IOzZOKEd9NVe2l/dZsDQmE3FRO5HVJkQZe1LMx

AP0hjfcPHl0egbmxvWlvJ2yXvG6N3xdcBds2o0AqQR
P64gSLpGvWZK82lahRJepz5fn/FHBxw2j4X32XyMYrHHcWiW/F8BwJBoSemRKLQTkCA1qAb0Dz2rkTBB

GvJmsCVpks9XUcW/z1MkHtH+ybEDmSSrhDGyiEVkj28jHSscBxl17uWaiNgGsOgMSqqWJNxY87REpFk5

Pw3Y5YuMoCV3wP+iotmgQzJTszADPpiAbHL/ldJ6I+
0HybqTcWypQHY2LXzLHVwZN62rE+g0pBiDLNBi68AnHt9upepLGPlSyqPIzgWPa9YZ/u270k48B9iGmi

AYn6Ypgkg5uxibC8Hw+5RevEdnq9EORledZs2Xy9tmxf3NYx8ZkongQPRwaNLy/Kv/5I9ZKINXZKd5f+

f3Nfm1fWx2oMdqHD3/OfJrpe1q7Wx8zCnN/xQLM9Lt
8CzF6JMT9A1RoygOxS/fyAxh5Rfi2Yce3ZnsnL8k6I4+UHAFeeArz//SCk9cisYC7rKGrn/iU5Nl8OJJ

lhsrBtfXVsLB0XGnLoIH8kye9OEuElCCEcJcsRCoQrZJkaMtqycGCpLE8HtNM4CMFlE53yTFTuIFQdU6

LtOXR6HD0+SPlcTKN4yhLxgI6PRMN89A4geaYLJ/D7
fb6dboi6iYZjzlYMzHDUv50s8vSwthtBGwG9H518tO0un5EoB+LPeBL+1hbKmbopzl02ZgCGU+jMDgZW

C71V4TugHMFy4Ygf/w57azDr7eKClQLpaaN/0lR3d+wMyEuz0r7+BuOhx1jkY+MYJwvG6EsnF4EFgtl8

DxZC4/a/HuyO3h+gBTewGStLZqG+XepV+zfdAstuX/
ku1faVX+Ppg/y8+22W41PXoP0aqvG9D0zOCi/kSS8Ntf83JkEzJFEvK2mjonNu7UGis5add1DmTSnFk+

M15BGJRcTE2/ZCIkmbmOvJTKPTVipsPLpLZDitGLF2Pp3ZIabFtmSYYpyxufXsWHyLKg7UPbHCmACGDe

xWkmAFHpoihyYEMieMwSoSrMBgFQlWYLCKBCswQUUS
EFioBulPGVLBXNpSpeeWuRQiCzeyP8yi9rX19fi0QdPN1l6BhaLHSHq5eM+C5KZT1eBXZa3T8/mkDrn5

Cyca7tdCNeCjEVE8oqAwpX6RLO8yw1YzBZnkUfWxBF8pqw6YYAiARzAzX4Y6qOAhAp2fhPQhx7NtsKL0

e0CYAqFeF8MqxlFJAK9mU1PGSTLBKhoGnwyluO3KES
AmLVSaLG39LQzgJR9SEKYCFDeneNNvJMK2F4Dsn0AzcuWhURBuXw8JMAIcMbmjZ4ZdFuAFXkCbO0Iywb

NLHb67VEspJO1nOGXqURLvNLQ3JLUwIDnyGG3VjZDgtCVThppbMEEfG8A9GL1CTMHEGtVkU6SzgrOHaS

xlMiAyMCAwIFINCj4+BHtyeiFxIxyBFxBbYXSpv8Bf
MAo2PA1FQMDuJNfnOJ5Oo841M7N6FtZ1iCWdY7oFLy2svPQ3gHQaA4Cjz9VMk187G8QSZUGFQfsZbmra

zX1FYYHKs8mWOK3pwC0HZEMqcNu6vV7KPARjC7hDS1plzWSaVI8kywK0KJ2JKMtjy1DykMVoDXXrLpIx

T19uIXDxmY6jUNsUIFUzaKv4gTlgL4X2oHPySM6pit
QgMD4+YX1FAYDjWh7gtXMsk4VauEY0h3JeEkJjWNKEJSllTM6NNbOeFEOdU8vwNFUjObQeQKRwGLydGs

exJIT2GEnbRtsbHJZ6KZluUcmtGYL7MDkzYhfnILF3QRt4YPItOTK5APd9BWHhGGtlSIm1AECkVNqnNL

o1NRMtYBptCLu7OYVwYJjtZRlnAetcYGh7QWo3JUKx
MTj2JVt6QEPkWCs0GQyeXdSvWDo0HPpwZoIlRWi6LJp3DJUjKIr6APogNaLwTGlqFGd5RkXjJIdjPOo1

DILiAMdjUOd1ZHTpHZfaUBz6XOQmXGo6DZweJkNpVHg8IBj4UTAvHLy9SYprCvtoQWq1SLd9MGXsVQju

SWy4MsIdPNktAFk8RBZfYChsWBf5PDAcZEHpIQVeBl
AvDDGsWzcuTvAjBWUwIZM6JjBcFbIfGNXlFFo+Zg3XBH7wd6HzAKkcPNDiXL4qmr3JCWkRAtKwF4B1nF

AoDf6fxN2LfCR9bWJaJ3T2zZOoR4Vkd8WGt692U9TOWWJCFpwNkitohE4DlsFxWUiqNm2IUYIbbHb5oC

5IZYkqSV5QZVOuBY9qQA86Dk7zfAYjEoPpDNYdLk1Q
XgNoC1YpMT1eP48fEYBmQAAnJVYJPy7+MYhosgHbVwwRNcX4FZZkk5DrKGgmSSn7KWI7OWJtYbi3RFX4

BLCdOVHjWVX7YTF8LsZ7EEwkGdX7PCM1XEOnYpFjBlRfDFQ6PBL3ETQxPtl4JKCuVoHrOlrvNuw3TNH2

SkD0JLGiJTD2QGK5ApU6QKReZGJ4LLS6AtZ8QOIlLB
T8VOA8UrHpUdslQqa2QLR1TERKNgH1QHP5YOYiJRO1JCKlFBJeIlH8BEF8WrI6KpWuUyWoHKE0McX4LY

QiTdt0GDkrGwSlNwccFIBmRKK2DzN2DTJzHBRcSAclMyL1DaznDuK0FQx1JHK7WrVuZzA0JOEnAKR1Ko

OhEzA3MMa2GIO7KvftXdB3RVJtOKMFTrG8LTTbFamh
Rxz9AJK4OCR0IGQtGgUxPVR3DcX9YTEqCFXgEXU2QuN3ZNZvUvv8GOS1YoB4QFSuSqLsVOHjXtN6ANXi

MvEyNGkjYsW3ICIbURC4IVH7TrV9NXGpUnBxANGhCOViXmpwCLB2TXDoGNQ9YiViDCMuAF9OAXAmAWUs

BGJoFjOvQfKcDJTpCVQ5NSSsRyTrEWm0BRG4YGRoKo
PtNOv6UWU2ZGZdRjJzYFx0EIJ0MTLcOyQkWYq5LVM3MHBqZtCfQVk5UYN7DYUbTlHwVAq6BKX0IUDrFl

LnHFy6MHP3DXGuCuNxGRu6LSA9VKTtGVb2NVIiPbXbQHo1TPI1ITYfXsKsSJt4ZWI2WRIyTiOzJWd6HC

LbMohkVfElYWN1WyU3YJObZAR6CIS8TtIrTyNvYGY2
KTEiTmY7RamsOnnpLVX4SxT3TIOiGmYxDAluLdI4HMPsAIUfHYG5DUYqQrBoLfYvGNUnCmEZCzW4AdM6

HijfTxShSVWtErOeAmLuZxS2QWZ2NpE4UrFoTPP4ZLkxQWK4VVKpBjX8LWD9ZdO8KfovJrP9CNW3TjU0

WtegXHZzITX4ToQfSqJ9FGH1HoZ0SgiyUdY2DXY1XS
AuVieuCxs1WIM4CJN3NpGlHzHiVWb4FVRWZul1EDZ7ZdkaBvw4FXd9GMJ5IOOdWpd7WLavMyZ1BpYkVb

LgLTddNtV2CingUqC5FVOqXDY2SMAdTWS4SSJ9VtR3AHPmWJC1WQZ5XeG7NUswSSBaFDD6QhG3ZYVdNS

B3AQB9NhYjAxzmSbu5IHE1SIXtNrdiLAB9VM5HQNF4
WYW9NlO5LUOrDEG0WLN1OjZ0OGNfJNZ5QNBgPOA1VOYdFXD5LOI8YrU9LRMlAAAmTST9IvB6IPRfXQUL

VeDjMD7vtl3IUjYvNLNwQgrHOrs6TUhvIM6KzUAeM0KqndBNQDDhdlzgaC7sXRugCY0Lj096UaYgPY4Z

xdhuqKyMqBEcmYTOFtUnO5DkA1HkaOJ2UICbE7QpTI
DkX8j4FYanMN3OTRVzMV51UT4fEAHYOvPsI2KaEBnfLEv4FMjaYH9By980FyXjdEUjTLQmBtGvAZPvQA

AgXTP6BN6EIKHzWZGznWffEV8dsYOdBF2KrJLmFrJbSMx+Gm1JNQ2sl1DlXRccQlBfUC5qef2VYIfGCa

JdZ5C2eMXfZm3zvJ7MbZO0dFXlO0TxeIGXpUXoK1Kj
h2RRm680T6HqhFOmJSs1UKpbBn8UsdWeFNetJb5RkP1SebVfEI4tp3BkavvDNbIpF3NxmhJ2C2mhgvVq

FH1SWHW2T2brozPqRUNHRwXlS8ppMJAmxiDfZKCiZPSLNePvT9WdfxILLLZutmrsjR4pTEA7TOQsMd3E

Jl7RNaPgHY4nbm5YZuamGCOxPmoGYzjlGTkkuzDkHp
nAXrVeLXStYabSRzk4RQloYX3Rlq9eQ3Q9IGgmOTAJA1EncKVwQL2gJ9SXE0evFCwcRk1MGpOzH7Faws

IxADeiN4ObWOT4OIJpUn4QPEZsVM7NOVReWWTtLAXMKzMaJKXiJnKuYuNwETLOYTewXDBuB9NwILP3NN

AgUj4+XZfhTW7HK4RoCDN6BGn0RQ5+ZWttIS0PzTGP
K5ZoyYQoMJdlF0AOWB0WNWQ6YF9SkLVsBJ2ZzDPGD4RlgNCpRe2lXKWnz4ZwLz4jS8TLBINHFXPpSKnw

YPovAGKfNCv7W4B6WTViM9MIO126hOQppXg0Fw4jD1IRWSvGXdYqYIzwZFuvZYUfKNt7J3K8YIFpN3FN

T3HsDoDsogSlU5L+FvRuXHMUOJ9NSCEIYIn0V3D6rZ
EvN3F1fNwUzGV3TX1XKA4GuPPluHByc23+MuQHTsMdF7CHK8JAUE7MUIm1J3W7eBVxB0R5hFrAjVW3VO

6PFS0RvSwcuQHuUh1cLCuwOHQ+Pr0XRm9KJiRqNZ6ycx8NSsmbJTIxXbsLLoc6L5yjbgx2wHDjTgP0C2

I5IvR6xSIzUR1LY0O6vBEqEYD5AFSayUZ+Fg9Fq6Ae
VJOjMFx7R1tlKFSaWSBoCjHsqC55H++4vgmffVQ3X4e7GUDQhSEzoQmNluBrH6uWMUO6c3M4OLi/Pg0K

YGD0kIj8nGDwEPIkZSu3xE2udBt8QoXrMS80ULGzZSvdyG0oYtz8R3Ety8WyYg9tYk8ljHVyYu6AFrMr

RUI5mlRlGgPRXnM1dAilusovCIY9E1y1gRI5Hn76x6
ivfjXtc5UyBzW2HDuiJIEhLaUkbjDqTHC3zwWmzE4vqcPlBy5TVFMbSJmpaqBdVbINEk6MWmKvSH22Ir

tqfM9hnGB+DQogICAgICAgICAgICAgICAgICAgICAgICAgICAgICAgICAgICAgICAgICAgICAgICAgIC

AgICAgICAgICAgICAgICAgICAgICAgICAgICAgICAg
ICAgICAgICAgICAgICAgICAgDQogICAgICAgICAgICAgICAgICAgICAgICAgICAgICAgICAgICAgICAg

ICAgICAgICAgICAgICAgICAgICAgICAgICAgICAgICAgICAgICAgICAgICAgICAgICAgICAgICAgICAg

DQogICAgICAgICAgICAgICAgICAgICAgICAgICAgIC
AgICAgICAgICAgICAgICAgICAgICAgICAgICAgICAgICAgICAgICAgICAgICAgICAgICAgICAgICAgIC

AgICAgICAgICAgDQogICAgICAgICAgICAgICAgICAgICAgICAgICAgICAgICAgICAgICAgICAgICAgIC

AgICAgICAgICAgICAgICAgICAgICAgICAgICAgICAg
ICAgICAgICAgICAgICAgICAgICAgDQogICAgICAgICAgICAgICAgICAgICAgICAgICAgICAgICAgICAg

ICAgICAgICAgICAgICAgICAgICAgICAgICAgICAgICAgICAgICAgICAgICAgICAgICAgICAgICAgICAg

ICAgDQogICAgICAgICAgICAgICAgICAgICAgICAgIC
AgICAgICAgICAgICAgICAgICAgICAgICAgICAgICAgICAgICAgICAgICAgICAgICAgICAgICAgICAgIC

AgICAgICAgICAgICAgDQogICAgICAgICAgICAgICAgICAgICAgICAgICAgICAgICAgICAgICAgICAgIC

AgICAgICAgICAgICAgICAgICAgICAgICAgICAgICAg
ICAgICAgICAgICAgICAgICAgICAgICAgDQogICAgICAgICAgICAgICAgICAgICAgICAgICAgICAgICAg

ICAgICAgICAgICAgICAgICAgICAgICAgICAgICAgICAgICAgICAgICAgICAgICAgICAgICAgICAgICAg

ICAgICAgDQogICAgICAgICAgICAgICAgICAgICAgIC
AgICAgICAgICAgICAgICAgICAgICAgICAgICAgICAgICAgICAgICAgICAgICAgICAgICAgICAgICAgIC

AgICAgICAgICAgICAgICAgDQogICAgICAgICAgICAgICAgICAgICAgICAgICAgICAgICAgICAgICAgIC

AgICAgICAgICAgICAgICAgICAgICAgICAgICAgICAg
VTXoWMZyTSCnCYHgUHKfATDtBOGlMPKtOFUgDSk1W4ulSHZsHTQqTI1cANp7Hw3+KArKGiEcALC2lkFh

tI3DKX4vm0LbPXehINDwl0RyPDj1EC5NKRAcYWsvDF8PJWunvb3WPUEaJFUfqBNJz0vnRvEtVMF6JVGh

VwntNI9HRYReC1ulhmTsEIKjNJFTSZugHDMEBDbbPG
EPXIOzRLSeGeOmEoOuPQXvJQ1YYMPvW499kwAxUO5PJs3GAxIqWO2blk9AEhDlHYHqBzeROgg2RZgbDQ

3PzJZobARtBMExAWTWBeVcH3juc4VvLgYrIHJJPXopMQ9Hh9FwlACiGMt+Rk8PQX8db3YrZVagESFhYD

6hkh9HDXkYVyYiZ1IolLufYNusXRMnkCVMXUQmxEGf
DWeml2ZdixnwXFEfIDAaBM1mRjCpMgEwTBe0JbwlFO0nUMgvIX5BYBJ3XBaeLSLxOWDeT8rXXlCyAWBd

HRJreYulIO8BOkJgA9HqriLvfSEhGZSwAIYCPj4+YEgremExBdmHVuXmZNMyd9FaNQh6OF6UFJNkZFak

DR1XWWLyjD7xXUglCJ0RLyFsXWVfULVVIuTgO39wcX
ZtUKk6G7UzUgDtIZJaYcidJIAfSOsoHtAjOZEyRaMrJMdcWI0+ID4+REwhFO8JNWjpabIcJYWzRz1LML

WuPDRjVI8sDZAmVWFdN6Q7kIctYOJGWvZxF6apohueZJ5mRWEtC907sUqeeoMoHGNwIOHfUb9MMGWqIC

J5JLYiqWYhBwpcMKLIQUrzMO3FpSZcIHU7yV8wEAiy
QROsEOLoU9yBIjOlnGqbJT94wAdjslPelLHmCTq+Nf8DIR0ra9WpWCk0dlBlJSniLRAkEZtwSJXfRAJs

YLRhGWJ3IHD9VJWSZuMxFRMmDIWrQMndQMCpVUZbkn8PISFzIVC2BHi4MQHpZBWhGIGmEEexXTGnKVFe

UzOdQWKsZIPnWJ9YWdRcZCUhSBYrEMwkGKWwVYOsbk
5ONWJzTZVuEeM9MtKvSWEcPWWoVVlkWNFnJEYrGBT8ZWYkXDXrZB0NBsVaYRFeOZP1MGNnDBNnVHMwtw

2FLDPlIJTkDxNsADNnEFKdUVUwLFgkTDTsOJF4JZV0PAOnQDXtAZ3RIfOcFPBrIEy9WbQqXYDbNYSwev

0MSXQgWZAcByopEYCnVIDxMHYbHHsuEWLlFWCmZRJ5
UGLdPKXdXA9INfUdRUEvYPCvIjFnMHAbDODmxu5LDNEnJLCuVTD0GoIfZKEtFOGzCAffZRDxVVU5HSZ3

OIGtMHXvNM2LZbKtIJUwODJ8TiTbEFSpQHNeqr2GQZUfQAWrLkwdAnEvFMAwSMEvPHiyWLJoBSW5WZf0

MLEoEHCuHW5IVvVkOXKpMVcnNptuWKTkQVVpme2YYE
EeDFJlGUB9NcEoMQFbJDKpDMonJANaSLJ4VdZ0OMHkMFFfJT3RTwNqUFYgAgu6AcsgWRQoXIFutv7UDR

UbEJLzFSt7JOBbWEHsVLPxVLmfABHyDLAvANcpFKFtELBeFW1PKiLmNLRsROD5XuQmRGEqNVHqqc3SLA

MiRBL2CII3WHIwZKEaVIDwTEhrQFCcCIBxPsB8YJBl
KZWyGU3PWiPaDZMsZNK8DWOnBHHmRZQghv2CTNEiQSC2SyM1MeWcNXOkXRLwHKvnXLXtFICqKOY4XIPy

RDGcSU9YJoTpNLOcMBG4VHvkDQLoUJSzyc3ZHMQwLUB0CTQsBQBfDQFyLRToFRj6gpTveGXaSQd1NX4M

U9XztaBvVcTUCm9Ka248DLLyNRSyEy1HM5ktRj6fLA
RlLOEVCa6OQIr9MUFmNTV3YeEpXXX3CtR3DkJ7ZMI2GmO2LmsyDSbzCjW+ELysOPFoTyp1G3KrNrs0By

LcVAP1VRChZvwuZ0LoB5T2RS9gDEZMTf0+CKbwnDQngLrvRMQQPqF6Qab6BGotGMXGHa9Z









                    ID                  Date                Data Source

 

                                 2021 04:29:00 PM [facility]

 

                                        Cytogenetics ReportName: AZ RAYAMRN: [id number] Number: [id number]

366Collection Date: 8/3/2021 00:00Received Date: 8/3/2021 15:21Physician(s): 
LISA,SAFIYAH,MD  LISA,SAFIYAH,MDSpecimen(s) ReceivedA: Tissue, placenta, 
fetalClinical HistoryCLINICAL INDICATION:   21-year-old patient with 13-week, 
placenta andfetal tissueTEST REQUESTED/PERFORMED:  Chromosome analysis 
CYTOGENETIC RESULTS:    45,X[2]/46,XX[18] INTERPRETATION:Two cell lines were 
identified. The first was with monosomy X, and wasfound in only two cells. The 
second was a normal female chromosomecomplement found in 18 metaphase cells. 
Both fetal and placental tissuespecimens were received; however, the fetal 
tissue failed to grow invitro. Therefore, the chromosome analysis study was 
performed on metaphasecells from the placental tissue. Due to the specimen 
source, the 46,XXkaryotype may represent the maternal chromosome complement; 
maternal cellcontamination could not be ruled out. Genetic counseling is 
recommended.COMMENT:Spontaneous loss of conceptions with monosomy X has been 
reported toexceed 99%. This finding suggests that the reason for the pregnancy 
losswas monosomy X; however it cannot be definitively determined because ofthe 
low cell count with this anomaly.Electronically Signed By Zack Rey, PhD 
Attending Pathologist 2021 16:29:28Test DataChromosome Analysis:Metaphases 
Counted Metaphases Analyzed Metaphases Karyotyped BandingTechnique Band 
Resolution Culture Type 20 20 4 GTG <350-400 In situDisclaimer: Conventional 
chromosome analysis may not detect submicroscopicchromosome aberrations or low 
level mosaicism.     









          Name      Value     Range     Interpretation Code Description Data Scarlett

rce(s) Supporting 

Document(s)

 

                                                                       









                    ID                  Date                Data Source

 

                    [id number]            2021 01:32:00 PM [facility]

 

                                        Surgical Pathology Report See Addendum B

Shawn: AZ RAYAMRN: 

[id number] Number: [id number] Date: 8/3/2021 00:00Received Date: 
8/3/2021 15:45Physician(s): RAFI GONZALEZ MD HOSEIN, SAFIYAH,MDSpecimen(s) 
ReceivedA: Product of conceptionClinical HistoryMissed  with fetal 
demise before 20 completed weeks of gestation.Addendum     2021     RESULTS
 OF OUTSIDE CONSULTATION: This case was sent for consultation to Dr. Memo Cunha at [facility] in [place], [place].  The consultant's report is as
 follows: "DIAGNOSIS:Products of conception:  - Fragmented immature fetal parts 
and placenta. - Fetal foot length corresponding to approximately 11 weeks. - 
Severe maceration of the fetal parts, intrauterine retention timeestimated to be
 more than 1 week. - Chorionic villi with complex outline and occasional 
trophoblasticinclusions without significant    trophoblastic proliferation; may 
suggest an underlying chromosomalabnormality. - No evidence of molar pregnancy.
Comment:  This case was reviewed in consultation with Dr. Bernadette Paz,gynecologic pathologist."/pws      Addendum Electronically Signed By:     
     Rocco Thomas MD          2021 DiagnosisUTERINE CONTENTS, 
REMOVAL: PRODUCTS OF CONCEPTION, PENDING OUTSIDECONSULTATION. SEE MICROSCOPIC 
DESCRIPTION.Kate Bey M.D.;Resident PathologistElectronically Signed By 
Rocco Thomas MD, Attending Pathologist2021 13:32:49 The attending 
pathologist named above attests that he/she has personallyreviewed the relevant 
preparation(s) for the specimen, performedmicroscopic examination when i
ndicated, and rendered the final diagnosis. Gross DescriptionThe specimen is 
received in formalin and labeled with the patient's name,"Az Raya" and 
"product of conception".  It consists of a 9.4 x 7.7x 2.8 cm aggregate of tan-
pink, rubbery irregular fragments offibro-membranous soft tissue. Within the 
aggregate is a moderate amount oftan-pink, spongy soft tissue, appearing c
onsistent with villous tissuealong with multiple fragmented fetal parts with a 
heel to toe length of0.9 cm. No definitive abnormalities are identified. The 
specimen issectioned and representative sections are submitted in one cassette. 
 MICHAEL\Microscopic DescriptionSections show fetal parts, membranes, decidua and 
chorionic villi withmildly irregular shape, stromal fibrosis and focal edema. 
There is nosignificant trophoblastic proliferation seen.This report may include 
one or more immunohistochemical stain results thatuse analyte specific reagents.
 All positive and negative controls havebeen reviewed by the attending 
pathologist and are satisfactory. The testswere developed and their performance 
characteristics determined by [facility] Pathology department. They have not been 
cleared or approved by the USFood and Drug Administration. The FDA has 
determined that such clearanceor approval is not necessary. 









          Name      Value     Range     Interpretation Code Description Data Scarlett

rce(s) Supporting 

Document(s)

 

                                                                       









                    ID                  Date                Data Source

 

                    [id number]              2021 10:27:09 AM [facility]









          Name      Value     Range     Interpretation Code Description Data Scarlett

rce(s) Supporting 

Document(s)

 

           Blood group antibodies identified in Serum or Plasma                 

                            [employer] [facility]                                 

 

                                        Performed at [facility] [place], [place] 

dose of RhoGam on 21 at [facility] in [facility] 









                    ID                  Date                Data Source

 

                    [id number]              2021 07:20:06 PM [facility]









          Name      Value     Range     Interpretation Code Description Data Scarlett

rce(s) Supporting 

Document(s)

 

          ABO and Rh group [Type] in Blood                                      

   [facility]  

 

           Blood group antibody screen [Presence] in Serum or Plasma            

                                 [facility]                      

 

          Blood bank comment                                         [facility]  









                    ID                  Date                Data Source

 

                    [id number]              2021 03:23:54 PM [facility]









          Name      Value     Range     Interpretation Code Description Data Scarlett

rce(s) Supporting 

Document(s)

 

           Leukocytes [#/volume] in Blood by Automated count 9.2 10*3/uL 4-10   

                          [facility]                      

 

           Erythrocytes [#/volume] in Blood by Automated count 4.31 10*6/uL 4.1-

5.3                          

[facility]              

 

           Hemoglobin [Mass/volume] in Blood 11.6 g/dL  11.5-15.5               

         [facility]                                 

 

           Hematocrit [Volume Fraction] of Blood by Automated count 35.6 %     3

6-45      L                     

[facility]              

 

                    Erythrocyte mean corpuscular volume [Entitic volume] by Auto

mated count 82.7 fL             

80-96                                           [facility]  

 

                          Erythrocyte mean corpuscular hemoglobin [Entitic mass]

 by Automated count 27.0 

pg           27-33                                  [facility] 

 

 

                                        Erythrocyte mean corpuscular hemoglobin 

concentration [Mass/volume] by Automated

 count     32.7 g/dL  32.0-36.0                        [facility]

[facility]  

 

           Erythrocyte distribution width [Ratio] by Automated count 15.3 %     

11.5-14.5  H                     

[facility]              

 

           Platelets [#/volume] in Blood by Automated count 275 10*3/uL 150-400 

                         [facility]                     









                    ID                  Date                Data Source

 

                    [id number]                 2021 12:00:00 AM [facility]









          Name      Value     Range     Interpretation Code Description Data Scarlett

rce(s) Supporting 

Document(s)

 

          SARS-CoV2 Rapid Antigen Negative                                [facility]

     

 

                                        This lab was ordered by [facility]

AN [facility] and reported by [facility]. 









                    ID                  Date                Data Source

 

                    [id number]           2021 12:42:34 PM EDT [facility]









          Name      Value     Range     Interpretation Code Description Data Scarlett

rce(s) Supporting 

Document(s)

 

          Progress Note                                         [facility] 

NTFTEq7qJpCALaMk67/PHRtoYPHxc8KbVBwnHRr5FJjkJWUbX1QdCKF8gX4rZWD5FPkAJyMqRcDcNuX6

lbm
UeZbhEWhGhPXUcHatAVnGwSFqkZnpvtWOsSD3LoJN8YJMlK11jGDPvNUYqH2YcYVHsIpX+Jn2ZQZWnyL

GfJS5LOqcN7RdEh5yZNB8v6P+jTFX8YMOWOH4VPcLTb24aAVtds8nUpduGOLOtLqneYIc4ju3COdNJMX

1XE35BHSWvJT08kKzpDDUhz0+ZrET6acyJ/5cfVSpF
dyi+/wjdvsVqeUq9xR2oC6ja3KY6K/Ex8D++7ar8gT41jrHk1qbSfi7K8xZiyA7g3q6x2ljf/afwpSyu

SCVhnaIiyN597aqenMdL4MBVEfVmOYfStZf5AuTNTSPJBN7cZSUEmUnA21qWGumsRUWpbr7Cn9VPzt1i

fquNxIWyzIxb4O8QC8LFCilfFLvMZVQuYsG4O5r5RU
ucb+wRvheH/hCxiou5OXsE7owehJJjw5Vu7hyJjycXAvXH4NHw/TRQMmJ/8joEU6/SyQSI2EyTMN5BN1

g4R2vlXxIMwemHTlZ+aQd5rOoPb/4Z5vHCfXsnShhFD0ICFwy0yOR5RvZe9cgwYiOxoOnl+p4DsXB6bu

/eBG6rl2wMgiSmjQRRTQnkNTXK/8uEvDXTBUlhtKXp
Vcr8ex8RsJCP0St3wD9w4hSqkXeWuW+zYefGkQc+DWjII3dNpYlog0SgcFdb9LA0Gs/wc23Bl64hlsQ/

sHewLbeliXGga7kUx6wXr109NzSNv/4PNUWEXkgfO5TQ5dYZr0yq2jiVBbybYPyexwiUAwZhKb4wtbb2

aYJbsyB0MLy+ZmLQd1N8TvPQMaDxbyuhqtpWL15k+4
8I9P9Aut+PRsJdM5p7GyVv87nDUJnHUTdkpkknQCUwYpyaEcoY9T6u1wuFdjpC20jYNLPdfjBPeP76A/

XaAXHTKXV/rgAZMao986oZEsEWcSX6dw5Eh5qKD3PWVYzgN1Z7mcqkyFIFd7i219xPBeQHaQUB67aq5i

8mT5j0o4HiSrTdUQNWwkTONn4WQ5H3NwoAXx8uAR9+
HxDx9BFrWJc6JnL44/9eEMYQMKljnaHbBlRZyPAoFJTLxhiUTDknPzaudvuvDP23o+T9dXH0O94idJo2

KTKIy5tFDDFR5y2JUFVMT2KF90lM8F/qTgZBj35I4PTIEokEfzr5kHZgs6SGQujSzomak6e8HDsiaTHS

5fg+cvESlkqG19twepbzVgqNxHnZEtAOYwk6i1TxC+
rbs62L8fEn3WOo1XfjmRja2KsFixMs2ituK1vVs2zJWcDnfB/ZIhclvSgtznrwVjb/BVY468YaDOqbRh

P3qXGwq9VqqkofbOOUePlpf1Zsoq8j1Ei2Pa2FAB6Th0zu8M9hO1A1aNtEV3bujoMaJuEb0DioD1N5TM

VSMM2anTR1DN2/51uL3ep/RPtke/pLpB/cAIPnf9ap
WdLQ8DejLjuIm4DNm4NkLH67ZduOCF6W8IjDr7vAt7853Sa0IREI9CUxn5VahC4kePHQda+pvxh1x/DC

FRgASjqhDFLyNvpNiqz6xdkj4koH6KNFjh2pTdZBdxG2e8/YG5A5tfrsdEYv2q6fdtnSSsHT/sdKBCrR

eg/S0DaLrPXOEaCiVZoz3ENqgGi8MTLJS91nZXoDrw
6LJq8MUn8a5hno0IuhCJOXL4N2i08QsUHsPJ4MyY2bgdevbUX3eVooC6pURJopIImjEKPCBge0iUqfVT

dQg/SoT4/NnF7uBei2N1VRtYYD4TnA4RNHowIf3PLS1vgysQ/HcsoS7eqd9KpzshxTWNzfGcI8DhvFLi

FBrgycU8c00fL5pV89ZKsTCS2YF3WOSRbs/IzdOVQ8
6TEd8TS43vymBZw0Q7vLtNVocD0DByZ3bSOpRN3sp7DedplqtbVJ7AKuLzdDqVH7Gl/v6hEn19ymLZkK

068MtKgQLFbO303QpHo0ZV4sw/g+yN8U+vV2zOVPy0obmSXiJK7DkYD1FdTTsRwTGTOxwU6ZYVeWUqEa

wYPOZqAXeYjFXdr6YdbEEQQ4ALpvcqph02N5t9uzTz
yjw8Hrfn0wLQak0cHR+kR3L98POIAFrRO/+EyS3uzrpvG+wP46CjdfZ5iDAWsdvNGDbXghObpoFlgv/J

DkHk2vLBF0QXBsv9zUidNWPAbM3S9yyzQYaLcEoPXe5/NrqYPOj4KZRFzjVyVrqCznyKTYIxI4KT5ID2

8r9h45g74/Pq/BBMAtqLnE9MMVz/TyFOKmIvqa1wsc
kVHWhbFMIwAz3v9ZIjt6fFTk2TOUw41h2gBj5LOJir3r5V4FHIwU6BclOMtZyu0iYq5OkPyrp5d6dske

IPtSQrckqOKQ8wofwPMjN3QkXIqam/UNJMMJBzfyzAWdCbd+CnGTKoUebMGoeAQ3RffoWiG2oh6DBD+f

AzZ+3cCRuJD0UKFb5ADLbvX1WxJnhk/QUI9fiEAQor
z6Bmx/jI+iHNatUEYGU+BEHTjG7cd+53xhqu0ywjmpR6NrWHruvA63CZIht8TXrUubDgflicnWIIM2sv

Xdf2uY+YGSJw457LLH502Lc5WFmluuH997Y/ADsJylVGsWFPdwAfrZSt07jBU6EVCbfOhW49xIbTnE9A

r/FmkYFoigNJZM6xsZE5v49YNaIhcTyS7pvejo/Yojana
vjkQeHY96w0/xrbtIcNmDd1uHky0fsLCWvGY9sIWqOu33xh8tGziSAf6GEURQp7WNG9aUB38zQsYL921

PWNrM0EDJwWq0abHBpQ8MklckU2F6naVUE5x6+zph+xqs3MNvbjV75L+3aw0+UKGS+nqnvh5u9C6j0AI

kdhoTlggjZpRBS9UobiM9REdleSdH2EMyqVISiueya
h39E5vs6WUvkufLz1kDNk7fhdxIifdolRSUgoXLsILYJFoBHHRlkBD/d2MRMt+QCth4zdVIRR3CuCGCi

JFLjKa+gkYFwWGLXbBolmGWnx2frBFvH8soN8hOGK1VC8wp/Rt/Hc/iyKIPhRnT/QI1AFZWWCVm96Z0G

23aZIJDdWYov4KzCxW0rtNhetnfGLZ1uoMzvvEjJqH
ETu/h/DhiGeIfCvksS9FIX4bb4YfMXJaXOxxyzOyPbbNKaKeAJXtAioOEwIeABuUWuBaYUYkEPphZD5A

NNcbANzgOFViE6WyayPhqCBkJONeNg6DZDHdUV5XCIEumPIvFULbZyJdYMDXNlUaPLRaYWUsoSUCy5cn

ZoEzPUB9ULMrAoygFN9VWPVyGA9Kv189WB94viI0NW
PhAn3ZBCCyOV7Ekb52wPY6LUIpLsHwCTYmiwFpEVMqfvN0GR6WXgEoKSC4rGOhFyqVVW7XNNKfmAAfEH

5QYHAprOFaHL4+DQogID4+QRatunQlLnyCAiMuCFHqEbhXNfWdSmO4BPYyOrCkKSH3ORGxJvciIvD3PD

Y3XlO3WSKpCTk4TRK5PyBgSSDuKiGfXHOyQgFuDNkz
CLz0BCZ6ODCeFjPtDzr9ORR2ZBE7DHViRNB2OZJ6JgD0KZDnDAQ5VMB0WdV2WQJqRRF2QBK1YmG0CSSf

Glt7QYC7FKY6VONkBSrrBEL2TOC6LAZoHFB4NIMxYOKiVzF0JFF8KlO2OtTwMyTpIZU0XhS2AXIrFvl9

AAnxTaXrEdxrQXGfEFE7LoO2KGQlYWPsIXwaCxA9Pw
pyGfL1NCc4BMY6OtOxIkA5KQNqHRR7OmNrHtQ2CCu1CKE3KrksBuI6UCBrWVOHRfCdAiv9QDQ9DQGoLr

ghMQT2IMG9GpPwKzUwASK8DXD0ZaR7RHQySZL2ZNL8VcKhLjuwLNR9EEX1HmDbRwEcWfLjZRMfXMIjDr

RtKWAtVSF3SbO1PTPuKPW0ROE8SzSwFdKnEOGrJMD4
WHV6CZAhYOTsDEvvFwM8JKXmBVsiTUWnCUQ2YJPfFjL0TLE9DQDuFzFaIQy7NWj0XAZ0KWDgRaVaDAu9

WUT4KHVzXaLuACf8TEU5RXOkXhUrFTy0DPX6EYAjNxZcNFe9FYE6JLOnJyKiBOf4WSO5RLUrWrUhDGj6

GYU0NDQaDlGrXHm7LYC2QBBxGcOtBN5BRXN3ZZMyRf
NwFGg6CGY0XDKuVvLlATr5NEY1BEJsQpBxOVh5WEXoSfmxSmJgKOA4EwE3UTBtQOG4SBB3ReArAaLmHW

L4DGQqUaL1ItlzDgiyVZM2JdN9JPHqGnEdWScyRoT8EAGsJDYnRNJ6UDHnCtSwWaKiMYRzSuIRDrZxEL

m8GVDhHcXxHfLkDvGkQGQsTtCpBmDtGJT4HXrlQBQ4
RpUmSXD3HGPyAXC8AnvqWeS4AXY0ZiN7AvubIwI8JKH0OpTlUYPrJDgwJwO5ZevpSpT3QTL2TgC3Vxhl

Vql3SOK4SPYaNacjVrj6WBytGbM3EyKsFzr6LI4TBAR9OrvqIvl9RPl7VXL2RjjiFQj7RGa6SJK8XaOn

BpMjZYecCpU0LcQxBoQ0KDR7BcE2MEUtNOO2BGD9Tt
G8NONnCGF9NRV7YpB3GRXvKGv5PFSkJYZ9PSHfGFZ8VFW5QiO8VKGqKip1GCH2SMCcIxjlIgh7OOY9Tb

XDEbOwPAA9OTS2ErN1WAReSWB9UNR4QpB8ZJJaQPS7QGIyZCW1RJHcUZZ7UTL4JuS3EVBfFGYjNQB9Ie

S8PBPpGIXSGlMpFS9tet7TJbLwLF4ycl5FQEZ2RI7Z
WIDjHX3KrSOmL2HkmtCDIVClgnixbT4qYHhqFGNwI3WtttTUKG6sC5AotRJnSMmkRHBlJ6AuJ6NhoTE6

SQKcP9FjHVJgS6w9CZhhFO0VLHWxYW19BU5pNMEADvLxZABcBjfqV4FcTcXJOxUuBMQpQb2ilTMZw2gy

IjFpNWTcUwKiIUD5TCcgUN5WKiIdBNMeZUSpuTarWG
8maDZiBC7LrHKpYmMeWHywJN9+DPsgiwWwDgoPWxnzOHFgJtuESnBhIQbWNlCmDOPnCGirCG2Gw612G9

J3GeP9xMIpNUU1RKC8kWLiWpMnBYDdffYvUCQjJLakRK3hv3FnypskA4roQU8kpDRfC87cbF5ySZzzVY

UeZ5VwlsN1X1ugcbBqXC2VVRQ7J5sypeOyIHNPCaFn
EJUdF7utiAzgEENwCBOKJLtpFSPhW7YfjiSJZIHflvtcmP4dSDzoIOVAXUhxRE7+DQplbmRvYmoNCjkg

ZMAtPgpVJcFeTiO4PITjTgMaLBS6HLXoNiElLJw8TQT2DnJ3ORYeQRj6TVfeZvQuHkkqXcOfJTUgSfZd

PMlyDNb8XTG8NBNlDmRiOlr8RFG5SNR9YHXaLDN4UP
F4OoA0MGJeETJ2FEK5YaC5YECwNAY6INW2KiG4NSNrVsAtCUFmIkW8REFeTWzpBNY1GGA6WLVlPeBhRF

j8EYA2UkGvOzFiEFcaXyV9AeLjOwN8SOMoZNV2KqakLaLnIMN9NDI5ZOSxQeBfTFIxEBX7AaMvTwPdUJ

w6AJV6VqvgYic0WCsfFbA8RjqjIxEaOOujOsV9Nnjz
JUV2XFI9YqT7TqxjCxGqDA9NPMPbOvDyDli4XWRsQsR5FIUyTQW2JZFzAuR6PXCvWjClUYC5YtR6HKAb

XCF3EVWgUcV5KYDyHzRnKNT3MBRrUftuPSU5COJ5ZSB2VEviYjIaVSHvHXA0CCDbZvKfRTW7CNK3IITn

UaQhRGMwFVW6HGKjDsm7WHH6IrKRNuEaGVP9KLBkJY
QwBPbuBovnYPV2VCZ8GGCfRtCwDLw5QIG9TZUiFsAvMDz9USE9PDUxMrCsGQe5RZZ0GNZkUnBcWYp9RH

E6LSQpRlTrSHx5CQF6FFJgCgTuOIp2WUK5XFGgPoCqHVv9YGC5BBRiPbZmXQg2XFL4IGQmIQuxIQz5ZX

U5TWHaAlUjPHw1RUP9BGLhNnThEAp8JLT6CQKzSxEc
MBL1LCOjEhBdKAL4YDH6DtE9RZKaGBJ0NBT7ZLK1QGWmIcMmOKxaGnSkSiHoHSF3CZO8NDTeNqVyDeJ8

WTA0AfL3SDMwGKK8HCBjNlLmBhQjOfDkVE8ALMV6PePpQDW3IHVfOuWeGjAqPaLuVZI6JTL8PHOqGBT0

GHfwORQ4NsGrVqRlEIahOcE7MfGxNsEqPAuoZlA8Ri
FrAcTjCABvJFNqFcXzPET7VrU1ZqpdUtL3HCU7KuZuTumaXry3ZJT4CEWrNgxbYcMeHZcaKmS5EtavYT

adXVv5ICM1PuivFbp8DEn9VHC2XUWoYrp6UKlfHrU7RqVtMhOcXKbbQkB0YunaUhR7OPLuPOK2XKZtDZ

Y5VNL7TuD2YEBlPZZ8CVW8KsN5BVrdKTC3EVH8CeF5
TAUzTGY9QPH4GmEoUibwEfm5VNP8PZStQiabBzWbXU4MUBG8DXUdPmTzMFIoEXB7PYPwLuGtABWcYGE5

IEhuNoPfIMFbFWD6LTVcXiIpGDOvLLB9QNPnOcFmPNI7CtYnVQ3QAI6ia4DoNYtlSKJuDM1sef9CQUR1

DO2TSZNmJH8WmQNxS7CyuqQYWLQgjyqgqM4sSCjhWM
BiF6EjqmONTC6lT7YsoTHkWZNriMPHCtBpHDGnZRHhOZ34ALvuSN3ILSQUEMuoqQEtBQJ2L8Xxd0Rjdg

YlSYSnDg7SWUBqWM6JgRHfykDzQr4RELUsZR3Bm514KxYoiLXqDBNkGtAtEIPoKCNiBZZ9ZC3PVWLjBE

0OfNOqrPXGjvnfFLYxI2B4RV9LSGYCBjWaPn3VNqFw
LL7fnq9QGJFrNJGzIibIXnVeFJnWViIdFRVgDBtmBG7Yp170H5L0MsW2qDBrMUK2FUT7bKEwUcJiWHFh

ejYpXVHuGPmeEf2qIV1JyqThIUuhLc4ZrJ7ZkrViJG4kq3HfwvoPTkFqRMNuWbpxo7AYcFJhRNTbR8tj

m0OImCMjWSA8II6SMZPfIK9NvWI6uILbTSCdWLGUIa
AhWADhPb3pkQOai2BemFI7s6ShYVLkDXCXVQzqPG9+JEvdwyVqUmgDJrOtWIYfa0OxJPxtJHg9O7AzkC

KathIbLbojnRMRQCRrYNHlH1wrtev4wRSfKFO1MnKxVWYpU0MvIAPyXED7Wv5+KXvhCFM5txAqwS9HWX

RvgTf1LZY6/1r8K4wRcxEIFQMkiMLnXLtPcYNinbQA
gG1JJBGMFrD6xNwoAVLS4C6SSMTLXzNYJuQYgKkIMiswRYqJAMpLUFkrayKxEKzB4Kj2uo6MXXGVZ+b/

/b/euj13w3fxTNS2th5sTk2kRyJJVQlWqHq8cekqlmM4U39EiA0IlfWV5OZiEyE1pfLDC5+xN2tvNKkN

JBLg90+5YZ7/mnOSU7y/E4P9di60M+ee+bFNFZHnQW
Z36Vbmi3i2OmnI2av0fv/h6Mer1t/++93fcSlmVGWMxi1Aldx7uSAU6Hhbo3P5h8taomN4VvraZW8xq4

+btn4ct2Dls5NxP023U4almaSmXnxMfsOru/asR1OSktdzPwa6y69wx9lz/2zrqbndxSAZmj6yGfXECV

mDkUxNgnIN9PS3cE8LneG56+Qee77lI7weW+tRIVvk
E4Il9iWLFVMKDcYW9uaQk6PM7llXuYKU8YQx1ghP+gOTUB3jlU1dhq8uOSCVyJuGYxSOzz6EpKisQ70g

TiDVZtpOW+u39KQb5XR0dU6vKQUZyGFzq1P8Idx/GJTbX8E358PGP6W5guaJWpvyCawr6ZZmiY2KgCVA

I2pF/Xz0xjVPYgK6JH2lj/YI2L5R0gD9D2CVonns7j
3WE/Qkbdd+L58gRuMVK/A9YP3N+RR8AMPSddvaIF2x3m55yVUeaBPso0A5wYw9KcpLPvdUvLVIzmYEMk

4hD8A6hUDdI+yEDWC4buaP9nhPcYbdvJWvBtnHu1hW7Yf0xdUhwlJHWrXZLnN94XgxpohzqdF1T8aVWI

FQQD1FUnvK5tTCtV5ULhy5b6Dc9hqixpgiRYrhvUqv
5tB/6663KeUAa7ME2bRFCVNJdvVo3Po32vpF+zRS/dD6WCtgs3w3ex1x4DVv0Qv6LFc8Je3eilY1MMjD

rhUotvxvPZybiKkC8zQgprcyzlh/KJjsl5zcT7a/4euwp6mOmHhlbdyASUqIXkQXVSO1wY+Ui1+Jt8Wn

Mk9OlA/PE2IG6Ts2Zt7V6XDR7lqmCImiYaSrlevL2s
PoBlakqcd8eWegNXuqPfi88Qb2Un3GewhRyw/Rc/AnkCqzX35Lgp7l22OSnv+t/OSqX0dHzFwOoH/

zqeaE4v+81iZ5L3sW5NHgdgExNx1+cgYTaHrjYfCLgidcrgaVbKxae0rTrjrnSaVxYXMApjqbopQXq6E

rY87sg0YTzIQaH6qmpS+ZYsEXBKdsOfad2yHXtTsIp
uu0Z7tbys6qHprn2PH07lhzkQMCQqrm6lWtpSxO6zpbo88/+7Ty4Bn1faawdpXjuk4l0Il7XGQaoRyJe

n5gIMWMeuYy0Muhvw8x+AHht7lHZI0IWDXhIPOEfayWeSrwdOzPKYJfci11sEW92P26G7sWvWimxMWwW

uXIYvlfKqfJaea+5A19Zj6afZZ/NZAtL7yI9Yv/tCm
2qNk+7DLghwtPbv0/WPtG+107ja+dPgg8daYowIHq/faJ+pv5HU3BbEdNCkweuflUqpOGnXSK+3XOpp4

2yiTdT2kkYDF5ugoc8djqze+iYafPnK0df5i1edzLC1T909TxkgKzH/axLvqVoDq5WtzTB2C1jg0x81h

j9GB5qdS8kkfrq7+Ty8whXNsqgnpLyipy889bZdxBF
MU33FqP9koDgnxH3cwdrxrVudUmQlqEDWtsqSs4ZwqM7Ue8gAeqD/mn1k6uDJvOl+xR1BP1eM3qxz0fK

oRgrsy0hfQb9GDgexw04o0SbCkAaxHmWxjioV06eWD1BY+yrpUjKhKv1S72KxnbL/mqH4AeD8sDF1AgV

7BxOurwFPBC5Y26hqqU4KyvCkVHyG5N56xVLoAM4wj
bTUrdn65l9f72rV3p7epw3T+pCrus5ItlSS1LPmeXqXl3ok4935LmaZA1ONFGjANnF+gVhj7b9JW6Z4K

ZolQnQadswundAD/ZMtnHhLY4xDGgGZJrVZxyixv3S7QIvAVyOsvH4VWzO5mCNfqEhY8wxePILk3pKfK

qsWs1qXliR79KO8EAu0g2AvFE2NdgDUrAn9mobR3iF
OR+EhBavEPNHBh8tkCrNdxUan67u5CUQYzVi9O0qtg2OO8Ebj8WepW+16KiD7k6JOdF4DzEPjPKi+TeU

WMVaQC1to4piq3Yup/m3yTTGT7h+BH4ToqyuXIlGSi2NNgFjyC8X9d924u66ND861bbIv3duLs3PXMVY

W9dD/scQs6PT3qkH+lIv5Rv7/ddJ5RP5CM3fUTH0Pm
dfx3IuGlbfJORG54XKxG7vOmSQhjyqxR+19mqqCh4WO5lCUfmb3uIq+nXQ/JjFstXTj+sgKASsL5Bynd

bZ3Yj6lkufuY5HEeIKwFZYo9X1oXjgOnJEv1qrhDLrig+7G5giIUsRpTMuuRZ4BUSJTJC0XNmulFhhTv

gHeAwwFR+AfSDiV8DAH9EYGWgQrY8UnO4e+v0Fftx9
Tqh1U1upTIwd10DlAKOwGzUl+DNuyv96W+gJ4TXVgSRu7r6ojFq5EGuxS+Ev9fkbi38GgG6P5+Ip

Les2uKZqSTDBhGiYHZPZ04FkprKNRc8wgHkWMIcaVfl0Wk7vHP0/aToR9sHMIwc7+bUdI7/y4vrZtfL3

TfuVrCFxN4PoT/h+LD+m2+URrnM8+sIPGphVO3ZyxA
MX6M1C5g/lhxQj/3cJHB6aRwINzTm0Bmkfl/GMlR+nsgPv9Z2FJKzAtkNjhtvW+3jGyPfgek++B9H+WN

6tE8522reIecf+MRyUGgwohCHLoCMvCl1klq2XeFSCd7nTb0V9eXlKtoonU1zaOhPaKu0x+cuZo5XcCu

7E9m8YCWUDjCOU7xJ/MBwoBeYCJlxvw/L4IMQ8MjjC
EIBehmcs4Jf7gd8Db1MnIAgMB7zyjWoUUyMPVWfm+hq3t2SySeDUXj/I3yn6e/maoq/JMwDeV6kMhg++

Ql1ks58OVM8UGdkiU4qLoCI+jEK3sqe3lqU4qx93iYDHcGUhyQZwGbHOg3If6hJrBBJ6effUkqBFgcvM

6sompCpMN09GeOe9YU3/MnteBhcej/ppu9YTgEGm6d
KY8m69v4XCpObFu7TWjEhvyGfupRcyDBe/HfskqrMZeOMR8nJU5kLXvSJCG4k+cJ3x511WFYSyDeZsSN

3xeWd5NiT6N+mY0/GlkzrL9nB1u3aosTXBrLbtfBU7RMVOUTFSVKwXFVNFxQJRcYeo6C0qrhQVWaIiRV

PgnbsvwGvFPinlNcXizs21QgLBEMcDWu+JinJRkSkq
QwOQirujp02RGifLBoZTSmVDeUcKso76kl2YUjLfJZVnxr8pHq4bW41ScJH8XTO1Q2mSoxwOP04c75b9

d+pNYcwLMl3X3cXd7TZDUQ1AeSrpyQ0z5MPOFdKlX8zSygDXGHdwoi1Ivj+eaxP2FiA5bZlS6eSftPSu

FVhvwXBU4DaCW0OfFcQBHqlxlf7qIectEezpWvH5R0
aAdk+NeLkIl0XmLMvGyziHDOBWjLxmEzMe4lMcg/1EqNP7Mqs83M3sukSc4AFy7bDS5NCCxcYYqe1j4h

9sBVgXHe5eC2OUuybr0i3WEU0zd1MW9AWrP31bTuwghsCq+3aIxpxqm+0tbpNpLbk3Dc7ON00vfjVs3A

Z02xAQPy4duVClpHlpe5fKhQ3ENNMzb1+xbk/p7ntu
y5P0KjLxkp7RpCtf6IaW3zczHLVMcXFzYYauSASygIriAoZ72yyE6tCEdxu3S2+ZQ369ox1iyEvfqyRA

mCJp0PWw9aAAnSAGESnbR09PUfteWugh0flOdNOfij7H1/q9QH3YshrzWH+kg3L4pSgr0/LhYm4nBSr2

g4SWNPN6IlkMMBwar4citOh2oLBgmAknBvbvocXIT2
8SCt5SZUMl6RGyoAH/4Q1aXK9AKhV+MHMvTLfjnOoY57e5UiOXeBkVlR5ZYGJI2TfLz4I8cyOyCjj7Do

Xl3dppqwWd4TJpDkI5TgLHitMCSRoiJgNGHfhNg+pu4GgzC6CwcblGtVX4l6iaq1Ip9D7UF0xXHGc1cv

qL7qJAafxKAPrrnG9S6RUS3dlZ0syWeaCgAu9w7JSg
KZaevF2AtzxDUQ6Sb/Jazmine/4OPvSYvys+WwLx3wL/C4XnG5WnUGU1+UTEhS1wcuSFO2VBrTMBUuVQeywV

5HvhdxtxZn9cCuirKzxpITgjuRFXfWjbN6L+xbO/hHCutVcdzgIJkEGpBDES08+jgXeavEW7NZy5g8Ah

dyHSAuhNT6pCjD0XP2nzQcwSd/LyU2Whd3NZip5cPi
OCPO6ViCPOdeWpjbrujL8Ojmmc62rGJuQKs6yWOW71WLbNMVIVAKUf+aEpgXx/Cm2QA5mihHI6OuoWNP

FgdHHVhODU/JxetP4BjXa3TVc5I4+z0rgzHfsd80QyQ9xmBXo8YWjOn7aKdkiluC1rnujV8H8B3z/sr8

he2cqXK8x7Dugqh8RHv0oV7ktcokprQdaXV+f2UZ23
O6oDcsFupYk4TBQwpmPVZB4iSyFJNfs70TcQMYIp1KnATJ2ZxsTznto4rPSkWahNUPN1bKWx+vLrKaFg

Gx49Z83CnpJoykCvQ1CLB/ZBXEEoOCm/zZ9UVozpqNLX4sPcs6y9ClNT1qxBziqrOJzX3Cu58rJALIhR

uPsYt3wvrEdnJdqbPM/7w9bjRo+CCvnSFhFMFfOovE
GPfQwIykLQp8lnQCdQS48Ge3sbzWITwRG3Qmqisq1X4OqCDFsM87bWnQfmm4YN1rnnh6WYzbgF1b8bCN

d2UKKNZtnsE6i8xZnvFOSud6nxi0aq2FKNaTTCwjVmIqDG7ypMPSM3IkEt9OWh4YhsWdwBvAWQ+33vM7

8Lrr0pr6aU0pKF9ahUqJFdaBRwyEqIUhEWmBrn6Z3l
NOgU+nOiRkGK8DLgprCXZZhFhPUjvJMRy4iC/HpfEdubsr/m2IvB06K4Ckppqb8pNPrclKbfRbRxHcbD

Q3t9KDTaBc7ghQSuzTMTc492T3UkFNY8OgeUfByt3cb4k4ANadooZpCoDSxrFODIcSaTKNsRflheDD5i

BI1Vg5r831vftISITIJ38drL08S5cZ/TA3XLUr+aDf
4MFUnRqfrv6cMYDtJoaPO9P5jviuR5tZVkYu48BATo9HxbKLozkFeyIhqu3ibNnl/Q0uk7xR2ooHa8MW

Nzu5ZAGWI3cJYMDQ7rtCyJUcBMmtu84tnYl2DKeE+Nv6Q5yn6YZscH8uzi2eOzVTzF2KTVSaFEY4uPG7

gVoTR3XuTj/iAr0qp04lZ+23l5ojePXTyTblToRDyp
Y5HR1sY+B44Adj0XEjKsBInYPxMdlOMjaLgFz/GUOENox98QLUTXJbE6+hC4TC+jrqSDsDnK6Zpc4E1L

JWr1vnqCGYw8AFVC7O3uHwgCGpp0O/A9DLC/Lliqr26T/8pH/wfDs2Pj33e/lMCqqL2PzWb+lhvCj0El

NKn2I28eJ/SxcOVvseKB921n0l9H+bFgRfUwYK3Mm2
66fo1valp8GS7Z2TdyWYpQVs8u+zZu9iUNT7Bke+jNZgzSadYm9cCiCsBWBzeX/xfF9k9CPbyWWgKQZz

IfZxfWRFIEtOcbu1XS4Wvfk7WRrB8B/UB7a0nLBmTmie/c93QRhzk4Wf059pFh5xvdjQqQSZ5Fill5Kw

sP4pqON9g7yCjW/56l+v3f+Y26N4WYgK89Y+rPMqgx
eR3oJv03rPuKx8v0amNx+dkw0W5I7reK16U0uuzXaNjD7c99182iyPEiDAHtcz85rAoAvQE9RYN4I65A

0CnraIA+ryDy42Vk/QMvq13vy1GARBej9JuiDvCLj7e3wJ6Gqg88od0jjR7DbpFtiBoi7RlamlD6ox0+

PIcNWpzW6XA5IQKOS1vLB3I1CMvBerpMQ4fIo/ul4f
3W7DcXIcbUDcqWmnJLbgkxm3MD36JqQMlqozTyvRNl506Si5lnPI8mSNCmyv6hDGyVEb9eCO0jqrED04

LxVTPUJwg6nMCcQ1aTdjs2D5He8e0Mkb9iTQbajD5CXLfGW8Lq/ciMDPxJanmieK7ZgK7/wpXz612+5Q

F2Hu6RZsyv8QufY/NzDkCUGD4gNY+r+SS5Hcsm2SpZ
uw7zEk5HckacZiZ83inS/ulruc9RUzA3VR+74xRlLXPfn/Ry2jnCfnfmSZ+4/MC4NG4c1Sa+hAx8JM71

mfBkD7St0acN6BcjGoiUdaqr3Epx6Bos7IHkBIRF4I4MBWvp8ZW0nln09Gg23iW00wcaz1MWll79vzBw

L972tbFPM/RzclsSTheziO63w2RA5Y0rfOMr15oT8S
tgudGFmt4F0YO5D3V0SUUR4wJErtOUClGXPK/sgDl3fSlluCoHSdBho55AyunHOyGl8QLimS6cRyQtvv

OZuRJ02zbIebSr6YS3jVlseo+T9dX5561Cchbz5LZScoY+IGdwCJl722WdWOvb6U600PaPVwgWlbBzaI

8OBQ/z43yQT3G8hg3uEEUw8eQW5F6SlLI0qMwwonnR
r+mlYdgQuap76XWZw8enQFxmrYJhnOUrfdGaSQjm5huJ3ERZp2THTOwtb7g9//WGVU0Y3tw+9DqgVaCO

BdJIGLI2rYRjMJ+3dewSHj/aRsrkPmI+WL1L2pVHFSzPoPSh1UkZtABi4Unb4iZgTgP+N4RfIsS4j3Q8

MITrUrbk1ot6NFdMQR09HEhuTwg2rZFQnJiojC4Hzw
wbREvRj/c3Y2B6QXTK2NO+7jUEfosHWTxvLxs7zTlZAlmKiLOTlu3Hb+n26Cu3IDHrsJB7u0IV/Q8Yqy

ygNE7GKrp08DD9rkdvK58NflHS4z8jwCqLC2AXSAy+Cf8qGq/8JdeevU4+jtm7HlG1lynkn0h9Rknvhd

STdKT/Bvk6EJ/YnD9cziEdcC6mVkUvFgUqWBClM9XE
Nx2DvUykSxFVYDotJWkPwZbul+WFpPLslgSxVK84ghox4yDT/wG3Ui86LZTAeHaJUOPyvGOySvjUmtYM

MjM8L7JdUQEL3K25a3fO1Mc8gOyOC+BuD/drqCcqH5eeP32LESUoo2W48PpULBLeLrIAMdorUv4934OS

0GgjKGaJqjMkCG5DpRiN4h62iqANy7v+TYOuoi9b7Y
GzWk1qpBqLT85J1NgpwdJMjMyoS3oNRc4AmhvBZ/VF6OYEw0BIWWcT25SaD6VrBgIheNZ/U2du+Jdl+1

kZ9i9HdYJ8+zeKt/sQNRVvW++JphGjC9m8GyqsJH0J+Dn60lz6H6HeQAGE7Dq+QlznkeGR/uu0ta0pe9

IRfMp5aP4/9G77OBgq1YRStw1+0eIiJ2Yzg+f79afr
iNqbn67zsuGEL7/gV5oSn3FJy5tEhTXpOzqbS5ZGXZpUjV6N/Mj6ktrgU8FhIH21GyzbxMDigZqy7nj8

3e3oz72p4ircxoYYSr/NHAAjvTEZT2a6lRiOzjgKGCjBMMkiMlg3jqow/2kXezWh1Am43t+7RTQim7Pk

ZO6KZ3Ls4OtWkE6gf5RKY3SCVzdYS2SShNI4T9XPyQ
yiMe1iqvWpf5PyUCl4MtpQGymrIPpdhkZKdBzSSbxIJzj45gpvwu/5J9TozdP49+tKvQQgja95UDS1xE

+Iwge6TotxOa6orI/E9Rtzwu9CsNN+r+C/GiiG+97YdHS2CHGpCznSJghoNT8c+h+nF15//CkLm3OU3R

CkglnmuHZshE8E1pfuw4gxOzi4v6+I1MtPTQrxzLw4
OEDjMPth74TqPWvzKDjSuW+ycmAntXx7VCekuD5x+6Exzy9a2DiVN9CWzFs1o/4PssLam0JGon9FK7ed

cvl8Sc5z7dnSreHuAviGLpbVzTNsn3Y3cUQ4Who2+gxqzU7ffW2+SnSvYJvat8VplsoRncyn9XR8T3W+

976rmi34J7sEE9r4x/2/dI78F+eZXPpJD8YjzSpfEK
yyLP4AzeO5dj4/8lx+nKo2jN3+d/0qZH9nda/sBAhZacue3v21vf1xa7+fnftL/ZF2xy/9J1unhw1R26

QQ6t0TxkxfoDcYtQrnTp5z8V+wsa0eOZFKvp67y/egc3PkHP+7qsnB0nN/oDNaApijrPvhX+Y6LAay6h

gH/dSD9uYaZ4DZraRUwgX5b22pth2F4VkmQrUdlHDk
+epqCR5f+2hPk6rdwSjqCdufT+dCyWQCeRm5kMkPtwYOYHa4oE2Ku2wn9NlQEKdgLsoAmfVsoj8I/hjo

4mZmE+jtID3N+/Ry1nBG6B3qun8sJSnMyoVASUM4KE6XMO6qzo/mvS/jf7nTg8AxRT6LMjlYiBG+d58O

/njeG/L4eb/LrwS867rTldCBsrG4DYGtT7iuw77SPu
m8j/mUNoa0tJhnU4lGc9pY+1H3Q9ldCVciY0XbW31K52gY5SK5lyvqrvdyo4ogvsQpCwuhxm8mzn82Iu

j2E55Q2jtgkXPB7Z2eaXid1C4reLz4jZS8MNVhtpWGDm93fyCzOsz+vH0ayE0T60fyUWfBdOiJHjR+TH

a9bfadQvzYi0B+bP8WBT9imkc+Vdnjrdjh6n+832n9
4lx7eL7Q6QgVmhrK+/JCoGT2ZTr+d0/0IhE39Ps1OmOFrbKdcvuyrzJXi4Nc1eVujBcjfv7b7J5iotIz

rOLrkq3D7QBU42xjrDWgu+4oycgm81zxtS42WAAlcuD261Fhj/HeMDBOvgv+gHbc09HccU/cR6djoRej

rTj5NgB13j78F5rXpECZZBVmqQfHCsdCGSfS8MZEuk
F05tt7nds0GcK7+ea9F0fspbJS4jW5Nu4qea9KLVGenW4zEhnnUL7T1KB/lpg8i8u3RfIyKM2A19c4de

2JD/e9Rx6sh9Mm3liSgB3r+vlZUfU8MNe9U/uCngi5WIBzITOlUZ6TCjZGtRdO1ZdBIAHTVwfEI9Jy8V

nyS4S/C3of/Hz2+c3h7P9h6uqVlWkyT9qDtYaELKxv
lX9bcUwhADPkUL14pfjMCuM2o420MKpMY50WvXXNyklkEmR5NOWqYZP6CirURsFRChDYMk20/fVxGGPn

Zk4JXEn1FxQEB0fKVXx5DEYPR1jhBHGlVoTCs/IpXT1DyUsHMiQD/iQ2FjWxGQUKN/nIlTFFXOrKV3Ng

YvWcHgmEp/+SSNStsqIuuBDBL6GtR/bh9yzHke2Sfc
+/YX7Yob2Q+4oexzroHj6/OXz/ANS7k34QkFm9pg2Y60z8Eg7G+jlN70vynViJ0cXQayoU8ADpV7R1dF

i7LE5b+1NodlKHJs6AF/2Z50B/F+FvDvzFLk+Abvq2U3tYRTI297Fa4IScs+0DZ/lrU+h6RPmBL9QdxI

dg91SDBtqwX6yQq2RnC9nrxT9wD5pZiznfl15M5dlv
F+yOqK46KoaJ4zdpwz922X+jeBgvOnvqbyfz4RTxnWWbGmm1hudhhS9uulyPsDe9zZ02uiy+m3dzpA9R

+bS4gnEQqaUpz5yGdmj3Zs+M0zAueKw7U9+xIPFqp7wJs0FBdC0aEMI0f0PmMdu7Gl+OGD1I0ymntWhq

rtNwhbrNHtyZb5tM1bwkbxTqr8d7qVur2BQ5m4mCIk
CCIG3W8xNs+aiE0JZ9AQ1t71nhlWwgIgKfgZv8QU6wmEz2MfOic6POqWPr079m3mqfgn1NhYg4IaiP1Y

fs/RXV/8VUAnFpFXHuuC5sC56qrG83DNkTFjzU2bwHN55ZwF98WugHPNyXpDhbPWav8iN7FoOWxc/jPU

eYgNdwFF7hJ/ed+XtQeeH3CoISqnw6S8+inkdTtEv5
AO0yT0Ss9zz+MhoAfvias7fSdVWuIvzmVdEP+x7FaD4q2pW6NmCxK7N35+C+KllEb8Bn72Mth7SnJ8wF

+zSesvgcT+6PNQBXLwEEqxm8bcWjAghljEeYtDtroLvlhUGW4aCVNem2SCryImLdkgdzMHwfTn/RxknY

xiLjE82NG3QOSj92g1BrJgrO879zN6KOPZPamLEdn8
AeSv2BRwRMp5eklsxguGu9/AgFlye9RZN4WT7VUaq6j8Fe4PhaL17A/UU9xdKWzmG91AcZUvfmzfhcy1

zM9nKOd0hldFAhMR9Izg69Cal7eQD8G9D5QeC1hwLtko/2A6Mxoa8KAotLQ6JmO4gpfrtlB2xVbJtcdA

PtcebSc++8hBZRa8pO3tdmz8fbxjcLCutfG/65e3fO
uopYJj6a/8DekztLzfPWT+UVvVbaen7D1cfbcc9Ve4R+ZfI7nZ5JObHjfaI9HhFEAIMcXJriDbuEbF6A

pAzoYiP1VtC7DkQf23JOkhnMXx7BUmNPdVMi8ngT7c5LE78fs79+ahBHlkywoa77dM6/J5IsgCrbyHtQ

EZWjsc2HdQkF67dXz9zITzDen7zo0EcBv/3F8z67Rw
q8iRslh0fv/912+U+990/TTl7fgb9O+m0zcHG28F0B/8Tp7fQykTCZRBVzN2AjWF+JJrsJC7v1O5wwsa

70Dk1WaBnlVb9QxaBia/J6903UJ3haP1PaI0p2xegDFEa+PFPt/ttfozwJmmmy408w4Q5tSBegxaj0Xz

2qXO3q8GB0bW2EwRskyUJ+Mk071+cxQjlwjUx50hxF
/S5UxRPrO1l+bdwMnXDC+g8rT1pNCJAk7MRuu3Y40Vuibg7Kbhx+2BD7DcwW27SfhMocD74mNPlj9nrp

wNXzp3SMkpKxNd8rbX9NqGxwdyzU+wm3VzQr+iaRxA8EkMeJwJ4pZjU+QUlub2T15av09901zwv92EIc

jUaol+HahrDxzfdr+E1RxL6zhYd2yahD+M7Z2p43iE
v0stAHglx16wx9D3wAk5dJC335PnqRd9bLGQN+RZG3A4u/kOdBKpm9J7PM7H7EA+Er4QKeetSbM8G78t

F+KEBP5dDYYRLUtMN1hGys3CQyWz+CH9B0+A9PjjWjtkDnpTJcRkhwyjm0NVrt0qTvP/MdVmlsmFQmXl

CqyBk4sW59ZtkgDBcHFh+0QqMtUjvd2odv3gDIE8Z4
IZXfpVw3KEL5YHpVRnXpb17zL+4DF4ZifIc3QCi5PcbzrZ141oxmVc8NHScVIjz8f7q4Jq16aARL93pN

oMr7XN2UFguL/eAI9Ta+u2oOnbQUWV690Ny6IjuiYk9xVvcCG1vA1w4cM8LssJf7Mo0MuiU2A7jxY2T1

n2Wrhv6hu8rX1GAtjuCL86hervdpJH1+PvO2UxBOYU
e+e1noT0QKVtK9+55qG/1SisdrtP0NzaUlCVybDtL75V64BsR3nfi6QIrIulev6Zc9phSrYsFXAAnJvu

SX2+tSi/akStF8b/KRsPOnlYz/3+zxQsHi8mZtHiEeoeoecoW9/s48onO8c3K+uxz1+vGIYTr6U4AxTW

h4RFYTXwJwqMa+F4H7oF+fYOhkWWp/1LbXlmkNMLbn
ZS92kSZnCVRuK78H/dyBd1p5/sE7rvLSzwxN9yt4Iix/f2D1aya8h6muinV+B5Hk/K6B8x/k7N+q303U

4iOaWtPQ50YYOPKS0+6XNo32gmb8k9wZAoJuIefTcsnGjzmcQ+HTmdaPjYz7g7XSClJWFhwJK5rkExL+

mMQx2inWV+RXw/oM86+ka5ltLzg+km/ZOkh+CB4X3w
Xs2cbQw4jOcjpFIl+pwaaLV79lZdc6bOGe33CQ01xOKgX9M2v35MHXtusYCA7uhR31rACin9SgkS0u7J

DrRFF7E++dkcu0n17tQlYW0L5+v9dhHBV53hGb8Ws9u971R0jAFjjFaucEGp/uxhEX4Up+Ys5eQNJrQX

CZax4xb69MzTo3bOmBUc3l71Wt8smh0hbyWv33yjb5
f8xIa0siVC66hpWHqyHkxF48I6eI2xbA0hfJJMZyoz38gVnYZMnwK+WuAE9led88ZOyD+APwN+Bte5/q

wYc62oKrk0l29IS3a6A6h/HB3fm5vfAXxvBdr56nvRL8Ja+LM/a3JDz16kwSJ/rqkkdT0Hil3QdJdAte

gJ0GXNc+FP9bKQOoYD6HXa2lvCco4so7DdtqrPOjm6
QbcvvX3telN8ID6GRN40ckqUa+QHyG0j3bNB8JUyY8RmNUW0XC25/5KZAvcza3EE0ZE8Fr/fsJ/Toq5H

Jc9TchDhyyjNGnebdxUHthWMyQwmb8IWmT6yMI22gRZz4S2i0PymC9bKoa1L2Fh0HWdGfjIzbMR3on1g

8r7u3P7ezwei01TfOeFd/Egu/PdJ8+nbxmCXTOHPLq
HcPEhQfh31U9ztya7I9uLkvuibOZ5WW6AsrdJOXcyfFLV/Is9X6lfyc0D/GzW9kwoMcoRBzi3w3KXCag

tBJWORfu2ZdJs7KMSI8u7BifJpwJZ7nv4H7fL+MxNaau8mrqpsi5dZGUKt7mB97xxh2OpF6Oyg974uMb

OSkPQckXobkaJ9zDi1n068rreybnWj7Koj+MJ+m2P/
uwYCK5M7Q8w/XBuazlBoiYEWBS1v90pRaq5/K0+7BwLb3xUQ25MbmJ3ziT6pzK8ed+UJmJ2UzkC/Nbij

E9ppf6xDEhUJKivElfxGqz6pq9X6WYnMq8OdW+fBCmLIuD0B4A6tnzNDw9eb/LjjyNRrbssCVXYYwONt

iqf5Mb9eh7wutGQKN1DwI3g3gD6jP2IdupEkduoDLQ
Thj/Qhrz6Y3t8dtFnqIr+OeCx/vPxT+VTgjHI/PN9XDEtdCI6XbLn+F+1h/GAZhH6Mfd6xUXC+so1k/1

TlIM913+vveOzIcMDsTcVj2EqcV1Eti+Rq1cAOb7a9jIFGjTQLDCGdsr4sILHd0hnMtfuB8ZGfhROzER

WFxEjL12LNofSqr6XDhCwMryRQGddXnhcim8X0IZPT
zDI8zgT4jcni4YAEvYM5SGlIzwZIgY3RRQ9HO1jF/fQe6trZr75ZaMTx3023JMUwVLfGZswZ9jU0K+90

yIKpoo1RzjuyvvNu5Kp5rbuQ06WE6MVTEehmJjE/fe/883d70krFoFS/bal2ay4ovtPds+KKfxWJfyen

8B/O55r8RdEX9G1cIuc/t2CiK3T9Aqd8S1MWC4z0Br
R2BRhGwKktydg3rIub2bj3loz8OwrQC1O7U8f1nCTk3bjODC5Du47JToxZazwov6QkM59TdoPk1m37bw

jDqXr2SSk1Xg7mh2ggn/pveH8P5eA4e8HqCUfmHwFWYx0VO3i6ooi1XRR9Fv/sO8xZZaHDwYMkGcgOZC

Fa7iINy0CkFAm79nsXUktRB0g8qha2Irhl9KertVoJ
ukTA913CQU6/ozwPoMIaS6ZR4A2x06vOgdG4/eO/n62Zcxx9i3fBSHePuWENaopD+w7nMgE6CkYWl3JP

iR9mpo1Q/XAzm8CYM1n6EHrZWrtjQy1oFw4sMxl4tk44r+/LdH/EfrR84ehz26xN4EA/BuDMEPgPe9uv

MftUOo8FYIbH1PYU7VRl7CvVB1AGBNCuqrPTFy1Mkp
R8PqaKqM/mK4FzrBdJ3BKcPOY2UjwV37C9/0Z0FoM8jOrv+E4HvYZ3+mAEX7cBhU5qFNs+IV15hyBjH7

SX3499gvly34jrgHrUC78Fvjn/sF+/b8gQwcut19v+x9XHejql9uA7Ruq8UcxYcKoWctmHKntg5qq0Em

iPOQTg0angCUmsJg15qGdttCAF8DyqkjUWE+IFBY6g
9ohja4jSITSP3YX8dLsKkqOgNpG1DVy+UJ+UsVm/2yohU0h1EaVq5AHL6tgZM9xq6i1wiZEz5IedjMb6

lBC7LF67c8REt/2RYzJ4Tdu8M1KibvhJaaJ3cEyv0CpVU/GaqfjrvTHiZ1uC4qNTRvKZ8ycZn1QKCoHB

7SbZGgZMGYyZP9yJCCJmK6olU5R8HnOUEfcwHUGCvd
CUJaRsGkstDwEcUF+hxawSFa1vdD4jSL2IIoKecEWlnXq2lElHtcuwpMJ1Qqjc4sI2UoUpq4VeGff9W9

WGqZZNgTLXVzoMOYCqEWjn5yzj4IQ448oKmwDdVQ4QzteiYvXYtwuMDVQ/zjlHTAd6FE+FlSgjyQVmYU

llYWoujlqMQi/j3qJdthxSjsDiAJN57Qj4E1uxA/V8
kzdmeFHMCmC2JoA6kqeJRfNvCwxbDkaCYdupzMTST1C1oVQ2VLdABXSOZrT0etMsLHt6MjOv0A94S7tM

7X72Er1CuPNE1dWhXtp1O4ZHGP7DgwPewIq9UrLzdCkru2n/stVULI56NyY9L2Q+NB8mPZi0ZwXzgYRZ

SnPPQMGhukx6M0mvlNiY48Q08p8CYS3904V7IAS7Yp
yT0Q8VR+UtfVEO+6L1yAdCdjyZw34jBtfDJU2few0A2KDjzCeS75d2H8rEnanpMTz4gVf3wcF/BdNUGa

DE+hGbMj7drY3MWjVxYfvXUpXkytdsGsl4EJvUnNy9x8iN64Ey9jNG1yoft1b4lj2rFbii+TkJ3K5DKu

+FNRwiOK42f4QAGdTm6KiK7ZU4i1w6plQVHjqvClhi
RcMllTsfY/yXMD6PrlRPPp7UbxXv8W3/VwS3mH5H0SwDYQpH5a6HyjVlCEwVKPCkrzMoarMqkp0kk+James

k3dpXmTcEZhmDXUOI3tKQv9Dpn3Xc3mMHrqo9Hm2zM/HkhGoORmYG/Lyz8p5FRjcnBNozdNvL/O8jxns

MP7ClI1SVb2nV6XGBnDAAfcF1CKFk0sttwsHdiuaYt
h9aH2HlVaemT9Q3UT+00IkhHcOhRwYKj7ANmtNHpUUHRK0GMnhTN43w5m9YSTw3zWR8TuYyMHpqXI7cr

dj/1eweRTFdF0BLto5oIMwIrnxX/2mUbONOO5WCW7l+cEbYStL6EzCk83e0KrIdLITBKXWKXi6KSXmcC

ZV2bOaRbc0A4xJzR3Ycq1q/7kQq1r4thk9EpaKsQU5
wmmtXSU4OCOuADwZtH6I75IblawdhJIdVfHFclRaJ9W0FNW8DdMSOoI5EUaHt5m5QZWgabPsI5CFUS6E

dHifYpyKmE/+SNh0fmHdIU5iTBZsSRJ4C58UUWjzka5pVvfS7HwbPbLBfaFuHZtOO6VO4V/KpLKWCXj4

fIEzWwPcY7TFa1Gqvv2WHS4wOfOWeIPHERQVv1cR/9
f/g//C/N2o9A8f6ZPe5GXS6za7GpPQQpIXdluyQmAmpEOsZlWYJra5BdOBlgACw1F5CtfWTuufGsBqgw

oCPQQJMoALJwK1tdnzf0nISoImD+Qy1PHNDzeQUhXG7VPmkUoRRTozCvJUxZ2e/42KqHQNQjQlptVYlD

Xpl4tQUJYmmnSnYX/U7Cx4fW520C7vtyaxYKKQbuqF
MzRDuf2cFMyW7VjlSCcYZz0Hdt136LAHyjcH95dlL3t/cQunh7DVCgG0cnrqvCwA+TRiVa0HUs4/h0YK

tOjyA+5DSu2g5E1jq6DVfdVYwFDErxs18enJ2BRHDNiv8X3J89GSfR7ng8pjMhpDHU1XAg6ebqrFK4cv

xpJPNfhTwLNq3+UXwrtJiEbmSkZZY/p/r6exasdvMf
9hhpnvsZHcUvPF6yHo8r3V3aXiqwOcVlxepOQqLdBYE4xrMbyD4OYV2qo0PqIGxlPHXoUG4yxy3VXXjU

DeVsK9O9hJKmNz8xyUJio8NwdWY2r9QXVxKyE6OqkzCAFC0uO2LYYPPLCdjNriparZ7NIWFoQQFaMP97

VMkdRY0IWJGTNLmtwZAwCOF7T7Jbo7IrsgCxAEJyLn
4RBCMtWwxjU1MrSzXHBkEnD7KibsGBOn56CUajQJ2cLZCaEXEgIFJ9JCOuNGjlMU7IaUBzbPKKoajkCO

QpD3U6PH4APLSQZmBbJ8VsxiTYhOueGyIeBwIaRKHZZy4+FNwdavHoPapFAkZ2MQIyz7BfAQv5HH9ZZU

IlKTflYZ4Yc614X1G6VbS2cQEsB1aNUn1slKQ2fHVr
O5Kps4ITo866N0NWHCMTCwxPhrrvsI2TBGNXz9jFNP1ejP2WLZZbxXv0iB9YEFNqX0gGN6mbjQJvJN3t

fcF0UZ8RMSgpp0JxqBGzYHDaQyPrN06iJLRnaJ4xUXxMWMPumDj4iHlvC4Q4gEJnJH7tfdXhKA7+IA0K

EGRkBs2hvZKhp2ZzoMQ8q0YaAXWdUUDZMZmeLJ3GZh
LsSFLzX5ifZFTyMtMjDSJdKwhpFsVdUBLfGG9DKw2+IQawilIpXuxGVbA5UNEqp5IiYTw2ZW8TYRWfDY

xkZO8Sb484B0A2AbB4tZIwHQcjQAThZfXsSGLdsgXgKGNCMPSOI8ZiyQRpL9DmH16qkY8uC9lvQM09wT

J7SKnTDpSrR8Yfw6OydqFycdEOq909ucUtCUYbVJOU
UA7JGMXwDC5Kuhoqi4JwPKGcFYVqVb6CDf5JJvRxXC0dbi2DJYrmTIHtFufNIkykFpBxULd4RHZnOzeb

Dad7OFS2UJK4GNOpJME7FLo1CKY0EpacVYqxOXPwImSpUuJmZse7XEO3PCCsCiiwKoNyFDK7NHShHzns

YMQ4RYS9BuW4WQMmIRN1KDC7KuZ9MLNaVCH3PHE3Px
C9ZWJaNQH0JHH8XGQxCzgxEYk5QA0NDNw9TWZ5KRH4MKDfMKGeCFR4RgbiGwW9QNwhLmM2PxDcEzN4AA

HjBMF6NkklSyRyVWQ3IQE0XQYtTeD7ANM4YkB4KtIxXcZcERk4GGO2AodePks6UYijNmD2DueySxXyAM

mnFkZ7QrtzXXC1TUN4OpC0RjimOuHoHZ9WIyg9VMY5
GWIoWgooRCW2ESP9YcInEjKaNPJ8DIM0SwN7ZSOyAIT9QDN3DcKrQqulUFU7ERW8LtLmMlOtEpBtIJWt

OQVuDuLuNASmLPQ3AlX8SBNbZYC6NIQ0CkCpVmBjVNOwOJR7OEA8AEEtKSAzSFeeLhU8XLSsEZw3CIVd

ZDAsOBIvOFSaPiNsPwR2MFI0YXR7ZHQtUnAoXUi4CJ
N8MIDqZgBzBLx8CHY8RLLbPsUrLIg7YBY2UMXpTuHjKDd2VPV9XNBrMeVyMFd0ZCV6AQFjFuEfHYg9TG

C5PEJtAgDzPAk7KYI2VUFoUwJdJMx9GLUNUkt4RII0EPCbNqHxRWl3ERL8BQReAnEkSDy0CRJ3TCAtXr

MrZIU0NZZeNnNpTHR2XUG5NgD6IGYyRMW5HIM0UTQ7
CQBwAiZtWNqoJeUmQtXrWKS8SEU1ZEUpSkXdJuU6VKA0OsL6VVYiVQW2ALNoQwMwVkRcKzOdSS1TMTf5

KNKiXxAbOjWjAkQtLHUtJvCwKyWeOZX5WQgeNDG7GaAeFWQ4LBDeJCU3BcrvFjP4RJE8UlB9ZfvaCpU2

ODN1GrLuQXNfISgqIvI4LqxpRkP3CAD2LdT0EznxWa
z9XTC7HUVhOofhUgj4HEzpPfP2LoUgIao6FA8XCie7LEy9JUJ8XotzVtc8AGC5WMY4TtnjAyAtZDhkOz

M1OpGiRdWePRU1EiP1NuibTxWjYDD1QbT6GUUhVLS3ZOY9CmN1ABGeNEF7KEj1KHV2FMIaCTJ4EWF8Jp

Y5DTOwJOX7BTQ9SUJaBjxvMjc8EDS1IKX4QPQnGKh2
JMZiZBS3VIJ3McZ7SJMqOHB2RHI4NrO0CZclQcReOXH9UiP9LVQaMVJ6JSE6KgQ2KVQhWBY8ZRMeITBl

IG9QOM3dp3AeDCddOIUgFH4vvu0YLDuZBcQxM7L3lSKbYk3nrCGcm9GabDO9w5BMDmRjU4NqrfTBFC7u

U0QqwLZqOFbzJH6Ku5WffyGyHKN5F2XnkTosvGspnD
W6JTFaGRVaZ2NqaNCaHeWeIBixII1AcJSawiFwMn0GCGPtWb0dwLYRn3kzKlBdEJPuYdKwHSC0GTtrBH

6BYcBjN3x2IIwgC5PvZ8xgBKCaO7FxtCCbRB9WFk1GInUvGT4upr5TTVgsSRZaInxFJjq8RUvySM0PmO

VwI6UssgYyK6UkiDzxXP6GhkAaKGalDR3ZZVRdMh2s
jW6BfsfibN9MlsYoIMucJu2SsO6WduUaFU9qj7WmqleIQqSiT3UzwzE4D9zqmiYeFZ9QKIV6G7delqZx

SLKSWhFcU1jiFZSzouFrAmEcVEPPPeKfP7ObhmMWKGXcusvuhC8oBCM4VLFdTj3WAk1OGoEcRV0nvl7W

MjAgMCBvYmoNCltdDQplbmRvYmoNCjIgMCBvYmoNCj
d7TXkhLN6Nxs3cX7J9THjwMHEOU0CfiLZhSA7zY4NAI7yjBFprUw0WLaZzK7SovbJrOCarF5UtFJwqQQ

QWXIntTJAfW4LcACNgNQLnMa0WXKMvAT5VHhQnLuNjZUQDWgRfFCCrWzHaTAfgCAGJVu7KLwCuP4pCVp

bjA0YbFKryPq2WRwRjK9B3wTtOvIA1WDY8XQ0AZtPP
NkRyXIr3F5K5iIUpG2D6fJhJhFA0JS6PEK4SDLDkAM4+SjNsR0PJNAwDXQS9UJ8HzOZmUL7FjHBHA1La

tIHsCs6uGAXryPknpRf+WfScQ6TWSTSWENO2BV4RzBXiKG5FeBOQP8BiyFBpCo3hRLovXjAwHM0rSI3+

BQ6IWNAJIpZQPwNcNUmrZFbvFEFkYWg1D5B3YPDmR0
ILW2W1T7h4l9mdpr0+XW6OJMKkP6MRERLNXzXoWTznHMniCKHyUPx2Q4V0WXZiP8MPK3qoL1p2NG4+Pi

ANCiAgID4+DQo+Cr3RWV2cx2QgCCqkATEcHH3pjn1PVGaoVJTvC8PlFUMaHSopI1IouVazMO7KAPstPZ

liXJ1SRKDaGVY7PK2+IUplzFGjJZ3HMqg/rAEeZ0tp
fUHgZFyjlp2a33k/TlJbKN3tFfLNCO7bP3MuoXs1ixGRbe2CW3uqRmppLl3+VJemNOg8EydweH3mgATq

zLt0cYI8gw3uTx2iKFhnSZdxgY9kwpU9uR6dJUCsNjM8ooG0fAS3YB7sYm9ONDCnIYeoSEN9XiRLNDrt

vP2sReGaDs0lxTS0fNfsM8c9yo82Rb0bugnhKAs1WH
8kSz9xXa0fRDGzd6gkwTY1UW6pSgx+OBgsKKUuJS2hYMH1ZxUGHf2UTZN2W3q8nQ8jwIQ6NI3ONtRiGI

AgICAgICAgICAgICAgICAgICAgICAgICAgICAgICAgICAgICAgICAgICAgICAgICAgICAgICAgICAgIC

AgICAgICAgICAgICAgICAgICAgICAgICAgICAgICAg
ICAgICANCiAgICAgICAgICAgICAgICAgICAgICAgICAgICAgICAgICAgICAgICAgICAgICAgICAgICAg

ICAgICAgICAgICAgICAgICAgICAgICAgICAgICAgICAgICAgICAgICAgICAgICANCiAgICAgICAgICAg

ICAgICAgICAgICAgICAgICAgICAgICAgICAgICAgIC
AgICAgICAgICAgICAgICAgICAgICAgICAgICAgICAgICAgICAgICAgICAgICAgICAgICAgICAgICANCi

AgICAgICAgICAgICAgICAgICAgICAgICAgICAgICAgICAgICAgICAgICAgICAgICAgICAgICAgICAgIC

AgICAgICAgICAgICAgICAgICAgICAgICAgICAgICAg
ICAgICAgICANCiAgICAgICAgICAgICAgICAgICAgICAgICAgICAgICAgICAgICAgICAgICAgICAgICAg

ICAgICAgICAgICAgICAgICAgICAgICAgICAgICAgICAgICAgICAgICAgICAgICAgICANCiAgICAgICAg

ICAgICAgICAgICAgICAgICAgICAgICAgICAgICAgIC
AgICAgICAgICAgICAgICAgICAgICAgICAgICAgICAgICAgICAgICAgICAgICAgICAgICAgICAgICAgIC

ANCiAgICAgICAgICAgICAgICAgICAgICAgICAgICAgICAgICAgICAgICAgICAgICAgICAgICAgICAgIC

AgICAgICAgICAgICAgICAgICAgICAgICAgICAgICAg
ICAgICAgICAgICANCiAgICAgICAgICAgICAgICAgICAgICAgICAgICAgICAgICAgICAgICAgICAgICAg

ICAgICAgICAgICAgICAgICAgICAgICAgICAgICAgICAgICAgICAgICAgICAgICAgICAgICANCiAgICAg

ICAgICAgICAgICAgICAgICAgICAgICAgICAgICAgIC
AgICAgICAgICAgICAgICAgICAgICAgICAgICAgICAgICAgICAgICAgICAgICAgICAgICAgICAgICAgIC

AgICANCiAgICAgICAgICAgICAgICAgICAgICAgICAgICAgICAgICAgICAgICAgICAgICAgICAgICAgIC

AgICAgICAgICAgICAgICAgICAgICAgICAgICAgICAg
ICAgICAgICAgICAgICANCjw/kRIqE7oztNOakpM6I4zyIo3LBl8DZO3gq1TvIWBrEBudboBbKriRWdSn

MDRbRapPRby0ITdpCH1TwAJsZ6AhO2EvEUedYD2NIUUuYYPqxMRvKIXmUVDzJpY3YDTeBAosGS6QbGZo

WKlaCIKjARRtNS3DLYZbX040zsFnTE8UAu9NMnFyNH
2pxo8HBfAkOQOlEdeSOri3TItzQI4JsLHwjSGoYrDqORUKMyAnJ8gku4QcCiBnMFVWRXlmLS5Qo8FhtU

AxDQo+Ct8EXK5vt6SvOSlxLyAaRB8cvf6NUExVFwHmQ7LoyVciZYVae0rpXGLsIH5erYPiEFB1VQwfzX

4lIPTOp2Bfkac1C6quJL9KGVC6HIfuHuHnQaGrHDIy
IUq6RTAPCMlFWzMwZ9Vdg3HcCzH4HZPhTfWxKRmcSZCwMaN4HU57iWoyRU4SXQMtEQMhJX81FTRvWABr

Xn0PGl8JFpBhOA3otp6MLtOrXNQnKbfSPcy2MCduXR2RgBXzC3UiyIKwp8wXHiQuZ7PSQACtHDSdAs1D

OXGiSsVzLTJiAVpcFV5eDERuDZHOuWfkoqH0UH5YMW
6jjpCoSY6OWwVtCu2dZl5NEhRfT7IgV0SqDJJiPEPLWTsfEA5SAZnfRH5zMJ9Su1SXjXEepD3kty3PDJ

IxHJXpUwmmjq1RKqydF4I2sKntCOUhDwYlDMGPKNyoKK2RRVJjJEP9KSLzJKIfMMRUMzLqD16hIC8NO3

Ssd64tHfJ3PVBwNsFuYGlwWI29pVkvjxGotMVveUuj
OP8LCu4+BSmadxGfMycTVqgoZXKIAnJqNfQKCxYoPBKeLYQbWYLtBbP3HuUrMe0ROBRxEFUlRHNvAoJz

GKJeJFTyZEtaCGQsQRK4JHU0IGKlPVIzFI7XLtMbISGcPnA0NlVxJJVvYRGuyd1UFKPkQZEvMSG3PhZy

ZCUlVXKwZTlkWTSjNOVtWOP7ETPwMXKjQM1SBqDmAY
DfBMD6IcMeFKGyGBPrqs6EPIOdVJQzAsNnXDYgUXRlIHQgBJkeGPTeBUQlVYW1WDTjDBPoHV8LBrJjSZ

EwECU7MBzcIFNpHHKwhd6QORTxDZEwFEh5TIOwVJVeSOMnZItrXWQtBJS6TSV6NRWeAANaDK2TLoBtCQ

MpPFXsLFNpBAWaZOUzeo4RYWYbVINtNpU4FJMySDQw
FTYnNCgcKVLnYKH4RUC9QLPoLABbSG5UGhUqXLYkBNvfNqDzCAToZMMkdx6LNFYeIOIrXsA6TLYxEGOn

JXYrCDjdTFYlQNG5WaVzBEDzIANrAR9KWjXlSBYkQAq3GLSvJAEeYPYxfi2BNFSkDQOdKAwjQAYwGRZc

MIHgPIwyBTCbHOS7PLR3MAAmYZVlXV7YLiYwXTCbZY
eyBdUeILWhPQZffk2ZTWLaNJJmEMc4HAVjLXYgZORzKDvrFEPmDOEzZEOsEVQmVWYlUP5USbQfIERsZp

X9DLWxQIKpLQCeeo6UsPNhbUyxco7GDQkEVz1JhCspWIY5MEkyWc9ptQTcCQSuJEBQWa2DblSmJAIlYD

HJATvgHIFsSEneDaKrSyIhQxGtPKN3AtP1WQW2CjD9
H2AnTxntP4PcVwU3FfZoHVAbITWjUON7CFt0XkD1ChD0FJnrRBSsIWFoYBT+SW6qWYv+Ed6Xh0TdcpL8

sxQrWKpjOHGrEw7NQOWTI0LZKh==









                    ID                  Date                Data Source

 

                    [id number]              2021 09:57:18 AM EDT [facility]









          Name      Value     Range     Interpretation Code Description Data [facility](s) Supporting 

Document(s)

 

          Color of Urine                                         [facility]  

 

          Clarity of Urine                                         [facility]  

 

           Glucose [Mass/volume] in Urine by Test strip            Negative     

                    [facility]                                 

 

           Bilirubin.total [Presence] in Urine by Test strip            Negative

                         [facility]                      

 

           Ketones [Mass/volume] in Urine by Test strip            Negative     

                    [facility]                                 

 

           Specific gravity of Urine by Test strip 1.025      1.005-1.025       

                [facility]                      

 

           Hemoglobin [Presence] in Urine by Test strip            Negative     

                    [facility]                                 

 

          pH of Urine by Test strip 6.5       5.0-8.0                       [facility]  

 

           Protein [Mass/volume] in Urine by Test strip            Negative     

                    [facility]                                 

 

             Urobilinogen [Units/volume] in Urine by Test strip 0.2 {Ehrlich_U}/

dL 0.2-1.0                   

                          [facility]  

 

           Nitrite [Presence] in Urine by Test strip            Negative        

                 [facility]                                 

 

           Leukocyte esterase [Presence] in Urine by Test strip            Negat

corrie                         [facility]                      









                    ID                  Date                Data Source

 

                    [id number]           2021 06:00:50 PM EDT [facility]









          Name      Value     Range     Interpretation Code Description Data Scarlett

rce(s) Supporting 

Document(s)

 

          Progress Note                                         [facility] 

ZDCCFs9uYhBJIoXv63/NTBohIXPet1YuXIntDQn0WAfvHOZiR0BhLSU0lR6qMMY9CZyKBqFsHcEjLiZi

lbm
EwHcaNLuKfSYLxNslZYdQmPMbpEmbpyDYtTI8SeQT0CWHsQ32zXUBtPSAeM1DsUUC8WGQ+Ix9QPCFwsX

OyUL9WJelX8Oefz+PG8XuB/ypSQsMIySjwr3oUJRiyHc9Czk29BkYWoJ6lZotyf9qKqU7984xUSeEO8n

MHPSmVmdsu9VKbsgywBdwIWbAF//rXUDmKy33R/6s/
TSzZaMr++hdHWsCtIw57Y88EMrSKxZsHCA1jnnzuaXWmffSqjNhRC6BdBKyYoY9CPXDV8vIrIts3LgSZ

YuXMvTqaaIgstItGgFRtXLkzLWkm4VzIew1SMpOCCEJKE0GiiGmsNueKKXuIcYQfpOMbmK9xxtuMMl2s

3QbUITm4CG0URZsfFfWdVSEPcgmOUSmZDPj7XNEhZi
rc4qUqbZfVisOva4dBtYZcMYqXyUUFvNL3ovscQ80ETgCIfqVDgOmaHyCVV1ezpbGlYB3YKP3JdVAN4N

1f/QFk3OQCLJ7JcYh8jvTDypLzcGKXow4DlsprSUrRiucwze5s0+T+Lhst/V1YwT5N0fQ15Hgy5oO1lG

keHDxS880c6ZQyDSSSg7dCUTS9CpkzwU7jiyiLI64a
VAceBGMxQUdy7ife7Zi/LfOM83BA/RlqJaYDObnbHXgRcsX1S1/e+CBRonK3XhpAR4fHA1hpp1jZ8om8

NPNwCCCVewF8CQmHueT9unpmZuFIA4q2GaAscsxXHII6ZHW+Qo/jFjkkZXPadPs6HeVOiGATn/cfzgYn

w4yO71Bv/EfcFwAdJl93HLRxQNJcE7zwhRankCAOia
6Lx42nI89zhIH27gX7aTw4MSDfZaaDZTr9sp0RGiUznbYUR0jnds9r4kjAgqcz+QtoSGxnP6etbaX/gl

jbVHXsPAICd6lowKC2/wYtAIRNB3Lg4bfEYHEHceYACc78lZsvoFYPFUjJv/rbSwKnVEGyPMaSZcr+zo

aZ//xDBaWpVD3EjgQVWJ+BTLuM908tRG15x/n8MRun
ue/RvtDkS5ltGXr9W5NXkPSqzEjGLzPwMWuOU5HCZtGmSPGCDvQ6LD1D+fJxt39gJRyikHcY+yRfTos6

tF8bNau9e0C0M46gEQJMJgbVYQM+YJYBNYySAcQDrIogCzRNaAFbMPQDqZQZRcXJ/A7Tl9jZSgBHknWQ

+AieYSSwLFozvni4SXCZFUYEfM3nplrYjL3hIwzg4o
epsrwafyD2gRGsMTWOOCZK6XYZvTJgWvu2FrjGaJHSCVXTYS3PVkUlvCEDOmdD2GvBwZijIUiD8SABwS

IJphPL85iPrG9Dpw98w2XEV8i2lENSn1j9uujuLYrUzbD/IJK7MOOIhrVI5IHGOgzudEloPiH35JN/pR

JKUJiXdWQjzb//wfm1/Od/9ZFe0hHESDFcP0ZQg/vU
u8DZWkxGbMDd5hCwRReq+rk1qB1R6wqtz6eJcFiF+P0XQCqpHK5CgOLqXjRodukpg/aW+ulsNH+YLdOc

hfIrXwuCHwTcTfMU1rI7r1EQ2oYuJA8liOuLWWActz+2rgAskNjuWBF3Pw3Gke0rY+sW/6rUbGlVmw3Z

EdMXIvst4IsaEGhGLZIlGq59iVznreyBweEGIpI5fe
+f0TIs4hy1mk6rlgfecJFiwJGOh6HTopS17so4Bjp62LvypHipS/sois24oScbEvVbwqg2hzhPrDpZuD

cferAvy7w1gX0tX0ZWWUV6pwebrVN3yyPvJClyNGGBQke5zeW/DBMAFSVBb24ffi3X/Ejg3xLa0XQH2D

XUhJ1WUFV7StWKPZ7wFWAFQWx3UMVSlrL6SLP1bZ9Z
z92SlcB2p9oxw5VeFMKAYdPkI06I4B/NKVYH8ZHxgTE2D1ueG/JyTOtrTwIqHK1WnjLAVY83Bqwn1llg

I0jU+Lqgo1wfTv/F4Kyu3wkoGx4L0D92tS8mHXwqLiParWsffLvuLJiWRbgXdqiZeloY/tQbF5W7EjLB

YEtsICaHmH4iay5gBEFCQhxLcbAJkIzHhyfLHZABha
YIVQ+qcomWpidqG7zv66OCbWDO5VH5AV5MU2tmMD1OWiUA90Vk9f6e/Aki98Hl9KBagEAab+NslEzY+2

xRlEFPxy+junel3h/0wQlDPbqQyz2mQB3fGzXUYfvIVLPUXAtfxsmB2SV5LY5DBb49B3MK5h4xgukclU

qW/V4ey/NSMl7BLC6GMyF2kgoarOadLEIyevvJsJ9h
cVDSHONWebqeUkPqSw5M/zVLl0++eY+LybvjvcZ+IZip39p+9U9EpHdSuyA+2IwnmsDulfPZk19hprvl

VnBQ0P4luvHZlNLcTJ04JtrBIVBu19aqdbPjYuTnyAGzZW/2hHeiqoyr2OXtBPdho3W3VA67Wamb83kD

jbEfpGrVGrwkZb7pC1IkK6tsbm7yoOl7AlrbBE/u6e
KROXVry8S7bXrLryzgdo147W/ke8FfS2wqiqL6C4V6wbJaqhjOIYKtTjH2KIPeKOpAduMcApcySKWuch

m4iEORkKgCzOURGPZHF9+IA2ZnkTBltlxIPWbjaccGvECk5bXl0qdPEW/Uyt+jE5UrcHvlbRUTBofpTB

Ij3ugYrGeLBQzK4RscasmxBuzIFKREUDpkEfz7/rvZ
4lKOGJciC1t3VIpYI900yCE8TB1iq7htpeFXHdxJEvYlv9otvE8PDsEFwwiP+fJ2TKc2se/qZzqmsAmH

Nqdd9fEt2Ctnj56bz71qQV845wDhHJnOiraBy0FKxGPQrXNqxlj1SCPth/SiCufZNtPRr1D4vwiMV1MO

zUpGO1bsi53n4RTFvDf1vHsZVX1W90ozhw0hUnltUN
np3nPJrelqc6wx8MNWo2GnT6i4z5J4O32c6r6+9XgMhKcv3mSVb39rJsuPtiiI158o9Q6R929BNBbDb7

HMJ8vaMUvd5+hIdS48n4LMjne2dClguGtRm9rMooafxFLOXMCs1O2C5QqwUHmGKdaWeEGPS17KAkHNYF

6ZabVaevGvcwSfNNJf0oF4hel7eLvDdrirGQ1u7pvN
nA8gHwNjE1Mhtqa7gDAhH32iaATVLyxdzQ8tkvK6qkDmzTda42XbFi35L1wzQ13CHB/5UsdCzpWTbCrO

TMa4ErDKD6rl5SwAJrGRbtt1QfvcfBvNB77s1kLDogq/5tisOIBorMAagyoICJubT1zonxG3kKCgfumT

Tn/tXiYrdvUKYIDpS1zaVo5heLK9NTTcdzoYlONSaG
yk455bTWUt3X/ayGgb/1elnFz19QM8Vek1O1eUK9bSxMDY6aUgcGgrjYjBGreXWTbzg5xJgnM6kYwDi5

UDcBmda4QO9+GhqtA8hPKetN8pvDUx8oCj209ToS3Lrbxm1eC9XabGtRINkLWfK928fC0HAMu344wR/r

H4dwdrAanNPIB2hHlu8pL1kCkH+HctLYLqheJF+pjm
6IWPhxyXz5Ol6o+cmozmd/BLF6CWW/oniiwVqDVpI2nkrMLvl2bkFhkrL83DTPQNR2xY/wPE88Gz5wkN

aM3fFpgKSgq4SOySplBWBIA/oWYi0RELa5FVxpZaM1Rh3SVsgymXgCynb0T1TtLXfbdMTqCQBJQRdUiE

q7XGDyOE1AfYweTxWAQnW7dGeHEUHKPwr1nlePG0Ew
BxFNnQZb2MDcFVsVBvuHubJujc2ORLZDfguijPOzVbM906nonB9xcmLKXMVfk3Iykyo2J5NEfL8leana

EcOxU4zJE01xNcPPNGBoqtihzwrFQQ809BMZZa06ImJ4ehXjBm5kJDbTbbGtoyWMu/DIFrSA5i9l1Mul

e/jWeC9sn7TQFdpr2r5f4UdTa8nXYERvep+dyUGnXM
uZ/fH+6GgyyiJ91t2Yz8HUqUHumV32Hptr+TlDakQutpf9fW5yoxssil+/0pRf4xrcOBnnupbHf9J/tN

li/vev+HKG4sqdSh/m4Cq7zncT5/KeZ+ORLZji5HK8lWNHL98F2ZyTJquvjlpQII3nqhqoIYKHTksysc

Q9lbhFYSVsJ1Iecp1Mx/RsSNHm7CCdDyTXP2jzJjuC
7FYQ7xf0PvYBt8NBBen8NsYFkoUNq4NIygITXgP1M7aSEaPDWzZW4HTZGuZF7OHLMtemXpQoUiZPZOCx

YePJAuIrKeb2AiA6HjVYIoUXRUTRxiJIWyQ47eWWxlLb07KHhbDDHcLqYeSNc2Na9SRuQbLYNeR54hlI

UjbJFsWUAlCVWLFvVaSKRfH8AifMEgXAkuK2NbJ9Yv
VG3tqAAfMG4pdLBnB0WjB3OvbxwbSJACVzJdVRByPNttEFSnSqTjREfrSZW+Kg7JDOZ+Tz7BWD3hq1Fv

XLa9ONKkt5GaXKduPTs5Z3GhhXCjosElCtreeVSHPPSkVIKqW4hwxuq4rEZ9QVLuUh0MRwYdi5FcBFPe

WNxEla1cSBLspvV+T1X+o4BkWehP5L3T34WKMScNUm
EiWZScqjgPPMDDJgmGBOUoPyu/BSESIPAK2Ah7DteHGbStrNYYosV54/Y2gj6918/jCxw9pi/N/rv9mR

Whd3Du/kMM0j24AGvccmkThaf8sj20knK3mDle7l93VmobOZF3zWX51S09hAg+/Uh65xhP6kodtwTLx8

3J6fhi++3N/wV9q08M9q2UB82+6z17/r3qQEHzWhec
n/6XxgwRZ5v0ww/3bpsZxS8y2pQdY78n03vhg03x4Wk4M0r4p4gHVU5bUSyN92nril768oWEjI2sqia7

kati0tlw4pibeyFU54wAhm7XdB2Lr0NS9kyQf91j38A6S4gOVEwpMauquJvsd9d3zpzv5xggxc/JV+FI

kVxSIZuZbMaymcBmLpu/yXshl3i11iJucDUD4+CWJq
YEOPIMBw+BMSrrTSFjJWbeEAhpI5qsTamqHq85QkxVDsqK+MLlyt2KN9RWtlxmK12dLClIFA2wv8cos2

Xcu+yrGPIaNiGvqFknglfwNHLcfTnQkl+Lqr4/VnL+4yLK9knWKIcSMffYc6uTA/MGSa2DZ3mzVlhaba

pDJHiM1QnXQpQe4OPlHuYIdsWYFI9W9DOuAdLMIVRL
vuBUg0Uc1atk0OgEBnaLKRTrZUxTUcTF4RDeI93eYSCriaV7pfsnc8Ke05o7XPdKQkadCpZDsPRWlDoE

Qar+0Vr7qWpCRqC5zUXveelkEs6JxOkb70bEEd1nHGZ/MnCQ+d6T0A+pCiITAiBOAMlu8enPjevMBWWk

z6hZdI4PUKJoGPE8OKsGpwXBY5BRGjxDKh55EoZUg2
Ebc5tKBng7iyxoh8Td1F9S5oSQ9B9yf1eQo/y6aGXjfg2pnSiRATtkWsEl/X2KHEJ3mtHUAZtMAeDz+G

VXhYxruQJFV/SLNiXs8ffH4kwWrvGbtEqqsxQ2RHeEl5eB12wpmgmQTI/O0CpTflecAk04MTCh33bxQ3

6H2LM9pEr19f62cG9Ylj79OcaIIUS/6s/tVNFUfAd+
vL38xNcsX686m1xMMVdyYvlswZ8MtNcNekMcROsOU9n8FFThBrxXdhh2c0JRpktBbNrK1TLO9WLINY5N

ZXyQXeVD+RKPpMOMeHd4QnkFoYYLPjkM8oE1QLfLbIQ4cBnTmslMmUSasHw+o5G2l087En4RTLuSeGtD

wI13Yjtgrz0ohUrMQhSds306TsrPQIV4elykLZs/+r
u9UKnp5lpIyhUITusRMdHaQCWRm2DPPyVBxCqMrFlOjDZ40KyZ0gN+P70+fAxgXsz6L6b6rAsUdKZwi+

TJn8iCF6e3KHmL1+1lHqPqTje8LHap37isWummvoZI1VnzdGyLU1DWkJ1o1lfzjpcSrYQZ0masm9W7W5

M4gaPQRuxwoiOwSqXmX3oqYGnmExx+eRzl0Hc8k3pn
7QjA3Em61S08l3OC+TTTnPREixwdS88ZbTJ5osDDnhC9PtFlpjAGb9hJe0ppvi2GfG5Vve5c+xDiDvZV

09eavDMp6lPli3s117C73HYDfq6PtXEI5HaFsulPyW7C35ncuiEl+JVwHW6w4OeTRxQqCA33rxzjf71X

Dnr0Knm3pDImbYDrLXLW8ckyALRIHndurWu2plIKXB
vcLgT0GV6Oh8mgOv6eEMh4fYSmpEs9MRHXACdIj6PsTWMPvKNTHQLDDI0BhoXRJX6AGtAlkUrnywwcqR

3Q+q15JYzo1ZXM2YI7L4d7LrIsHYXvLS8dKJPCwTJtTK3VIPMxPvxXppCcs59xjurGZFlwT+gGJ9uFzc

FlpZlVqglwFuibfj3NkLHr2GfKUNsXPvYQ4wA0N5An
LaTbsaO2sKzLzpMCfPMxAKRrYOmSZcfxVmBag6LEWXMHlCVTLikNuXGzdLWigvikemSsyCNgGNNps2yw

h1KhIG9e4/fM68MoZHzHKeCyjSaxgWyBVlRZlE56Q3NBl57PB9D/b/FTNxfLrRBfiCsXjZRdqlscbd3r

bGgNyWHdKdvd+dUWUZKoTdaBiWIVCUp8fu3EtvkyFx
77AKCS9NWqZuONdVHDsESXt+xXUETKx2ahJoE4YixLjEJMmPQZEKq5INDA2hVCAX8k8BKe6j/h9T4pLt

X+6U4mffZY/LyyE8zAdad6JYOUBEqPMiIjmejLhwCTNYsqmGQl7PTPAexiZFCAPnytoWSVsLJJimgKOV

zqGi6MW4mKYYfTYObALdR9mDaSLyORNSLIPJMrEOBE
Pxdg8OJcF6kgwbINj54bmhvyHlpotPNUtxcf9Y/dQmQ7oAsACXsaCQf5MyvnPCdhkevmo2s8jo2qoOq+

5VQsnHcoEE3I3ygTxtpBPRyG9JoRQPA8n1nOeLbMHLI1a0iBLiA8ahCZzIORJQqw2iy/PKNkKjsYehsO

iGRy6ANois7YplCJgASB/PCR4TLE4QNj8KPjF3c/w7
HcMTdtxkTYAqq8bD4wiCvdBRAjgjQhuzsiu6VOPaPI7ghmMAnToOZkqYQphNgaztIt6KMuofUzrJ1mO5

Wofx9R3ctlqLTOAQnBM39gG07Q1NBZNRaYpECRlHILrrN6K/qvvkT+zn4eEgDD2rDYTUVGO3UoYeFgd5

vZrpC4miUcObnM2uSSeN6GKywoAOYkIQQELYpwsIVe
LI9Jk3KK9FhlUSMEG+fbOHfU2DeZhPm/Ni23aUVMl67f5UsKWfsnTeSY/cUcW5tJmMfDr2zGpvXfud9G

jTOWUC3Txjp+jJKeErXh7dCPDy71RuewMPzpguTfuDD72i7HFhEQDkc1lD7nYL4gj8Dq9wAEE2xDI+gC

AXdJZaF1nST5uV+KyBiTdXu58OD9hOUPdH8kdhA0x3
EbYFNbNnpkAnhSQ84NrPHXcMdbaDqq1+tXYzXUGZAg2E0rAyAkEDnKQCAxkCXNzFCEs+GJRDvGQdlkaK

5L4lmvJkQaWwLl+lOapxzX9xblsrDDGz4XzH0SUz34mFA7YukGyHUAcjxmRahS+L25hYkY/Vf7N3bzYf

GshVbdVMrlFrB4jxdkSkNuOq3IJU470K8eAQWzTpiU
pRPFrScfOI2gcxfqc3iXYTRR5SjN5qXYEruI6w5pTj2ib9h9EqshT2kR3xXgAcC7USo0aZ2pgmSYFhRs

ZA5z/AluXWZjVIdlQWG93AxBJQv6QtjqpMnoutdeo02RqpKJqH5aqghUwLzMZ72u2TXspkjoRlf5vGUr

xKFMuvQUA9bvnD0sif46igZ6cvCU9dAdAMKvPKuVxe
Z7Pl88UOJUf9yKCdQctDg5u6/gPfcttIK0oyMxPeEiS08jUrG4nTrtOh3Ls7GsMRhMoKCj7tI9ZUP/DT

g96FNtE2ehY0DMW2co2sCpUTK/PrsDbxAWcVV4EKLeHhMl465knr2YNliRbYpCjjaLKiBUmS+UB+jIlQ

IWCHVIc3wIvdkhQADHLMzWSrkKXAButEeXkuMNHe0t
PG0/+hz5emsUMt3OAE0PUsiWtGIt5li+0r+WcM+qiQwYKzUBt2gbDBnywMBmGXAj+144x6c++a4jbdiD

IfshEZKHPf7zCXLJD+7lgr5AvxbkZvrnuq8HVaA3PTZfepHzK4ubitSSJQPGcDTabByvhRBTglzPM/m1

qQtqpArLJ3gNFaohbZnVaDhR2YmwznkLpFyGAPdjZp
j6XULhkCNPOz+WT24A0Uiq1Owt8xL1Edgml2wXl4oNMLUcZxYTU81inpaDpXFCaoVwjce36MerhwLuQ3

DGhWFKfIPA5HK0awofhO5c+N1vN6+6elpSNvWoy1mKls2wm4ZgWQtZvKeL8RDHVA2VmsFoKx8Q8GnVuh

zLq9Le9AQBPgw95XlwXhuhAZtg5fypeTq0mC8hVEqG
MUCd1wgtWzwhJNnSQJIuVszwBFhU1DJ4bvqg51PHxfZ/v5TyDL8tpGYgDdRXzRbko1i6HmzKKlMjwmLe

VTysZ90gmMLtznuWMtw/D392mbgpBmAIF8E4k453BfCwaelFJttb15yTxj2cSUpJ814la/uKiDAW424F

QBG1kRwu61huDjkI/8j9Wx8cDEHMF3dHN64M1TCdxa
zmfw4nCl40k6Oj3N8+Q5017vLnbs1/loqRvuQHIO43CuzhzMofRSL+6dBdY9OAJlvVuUGE8gQuqBxVJp

uSzutXUwHNOJBZCEK9AU9D1WzkBYYuPhjwT8BZkLOnFR9Sd1nCsTSYlEfAaxPPn/88mH7iiZyV1f+huZ

hBJd3tq6hmJXIA0vG+2iDX0WEHASUUG+cEGJweStSR
dQ473JgiS5Pi/mKpLtJkfqnfOZPms/n8EtQFVg1exNAQhQg/CDjH2JuzOdzruNOIH0qAQPrENfzHQZP9

/xdBDLMY5xd9cKsI/wtNwkSLhhe/KkIU3Tr3EuaRZZSlu/aRRsViMuUrl3aESUi3d0ti8MY3aRnz3S5J

BYTHVX+RXKobsT5javIeeWjLIkznh91SOoTYF9Lthj
W/dv9lAAQF2+tx23pubzNyuhLnQYbnJhophUiWghjX+aJoMIktMbdrwlKDMARcVHw5GQQJZaC2hRkhmI

Zkx4ow9FqEMupwt7R6sOfoxOTbO3GMJ4TZYE+d1e+v8paVDf2zKhj6+OUmDAV0idr66QV5WiYFcHPOUo

G7rLgs0qkKFBPA8WJZI0X91iK326tvpPbOIc0dP2WA
pkkFHwz+hjdwPYLhE1tYawB6RjYhUN6r6XxYktDnDooXRgMK8FsTY6YS+9aMRaLb6BJVlHAxK63epUgi

3cKTxMhsMJH46g9NNsTXK0635sUPIO3uKMusB0bXf8UatHC3xRUbuetlGAZHDA1wtLS5cptlaItANiKD

rS31PNhoo3NOIjvYOBYYONXY9IZBXXtkjEEekLH7Wc
YOlzIFgCx3PsW5zUatS/UWla2njunWBbJ6IACqOKvHvDxsMmQ9wwVnkjgYy1vB+4eX49xiSpKkXBbfjn

uAU3hLLO8AYOou82L4JWslkerTtKhzblJ7lmTmTmfpxFKibK1/KZr85SmroRirkd5JmEAtNuFxUcoaeM

Swl2qrdpzZHqDaNSIebMS+OA9+sU9hrc7nBgK6jihT
yJrs020kz5o6T3Ta9W44l1fGovyClzKKH9ISXr8OIwGctqN4p8UKtWPFdQvJQ4YZxIZDF9LvkWJToHwv

8b79f6U8QnwNRlGxqpe125gRFAzt92gLVHHpd/tNLVUUdb+LxxBGJ/xsIJVynyTcapJxcyIe5SxX+wIt

fMPGRl5j7VRfdAFxMjtQDsfVbTyl22idmkUB4wk193
l3txTiZdx2hLp8ccQa2L+5P69UaVv65J1YPjlvi9cFp6671kUU3j/Upa7f02EV9+kWBir0qJPg8ezBed

lkzDfi5/8BTXG+nd1DGX1zb1LuPQQtUFqhufXeVbaTFwinHMAgSfnQYmXcXCbEVxOlZVPyIPeyUG0YRY

deZVgcZJHuZ8OapwCzwNUpQCQiSo7VBYLtTL5ABKEf
pKSeOISfKhHxUZTIAiRuBDBpWGMqjMXSu1tsIuSrZMP7CZHuXguxVN8DQALiPB2El604RR07rzL6ELQn

Qz9BISSlHO8Fzq49hLI9EZXtFdGjLVOsahVwVIXloiH4OL8BZzEwWOB2rCScCdkOLM7YKITroGBiTC3B

IGZhbHNlID4+DQogID4+DQplbmRvYmoNCjggMCBvYm
oWUkWvJMedTipceJLySC9SkWZ6TBIkI16uEZJiXNGtT3VxSBC0JCW+Ei9FURBswMGdAS7PVqvL9Zhyh3

NIEH8/8o4CaIWENffkowhumSVXU5S3Q1LXO3bS87XKv9QTQPV8t4rn37ANct22zCgsbKqW0oJz7LsD+d

51lap+danOZJB4Xn7/rR+TNFDDK/T5j5mjWQmsyZkC
Kqpm8Hf6xY+p2tD6li84koga25OP7Lq46dGP8h+93GW97u5Kx69m7/LF2PXxiNALF//rj3E+Gt+8eKFe

vn5FFw+4dboDlolV4j+7+XqC6b93jqRBHJlO1Kvag8U2khakvwtJfnev+6a0WSZIZVGZMnHQCnEAcP9N

6RHY6vBWZOSGpfANUbW0qYDtrUQZfD5jRLmP7XZxxP
CsjxA3WQimnH9G20dHcpH0+QaLOouYklvOcdzevJcAEnMAlnIFWmm7Kcv3mVokFlpwvSt3Hqu5u69kJH

CsHCL3/UcbiKoOeJDqr5pztcwK6KsjNjNtSz3ASwi61X+jsSG5uedN7huYFVuy4gw1Z2bhklt411xHRL

0URRlLO3SKeOFgT/OLDMTbLrJP0FpUy41ckxWsMNWz
pgwNYla/QiK+3xKGmlsAwLkcMfYEH1T+4XIkFjxaYiXoGQkFsdYTX0KCXzLtrUTOSxAGSsBdCd9uitR5

pDJiEoSen+4A27ZxIdR+mHeT3CHZtwTB9NvJ062kqif2kgH6gVRch3iDasQzKJMjBu4130o52SPRv2bC

Djys3OKsI8vvWTL717bwrSxVvxza6bxzRDSFbF8azt
FYiyfsav7FmcgdSDsbjUvYrSfJbeGWAccW5bCWeZSOPRHmzqtcenvkIR6SlrJ2CmM5K5eBBqLTjkftrQ

OaVPFH1o5j6ig1J+JPptEGXF3ZE5dnfj+xJHDxeLoEdJLi8AxkfSxbzqEahNDL8O9RW4PYJMK0cxGnIO

j1wEtYBDJsecE1xhO6HvMXctCgjZyJyJi6c1xCkZt0
NCdUQQRltPxGQaTV9KkszgFxzZ/qbhS7YpAoDu8pAO2GPcB8IR2ktv7hM7NVZBIrEPQEBCDWLipKyQGf

BEwO9KUaI6Q8HEHDiorcZWHcaxt6KrHSf5EbdKHNiwqIwRU48LAPLgksUOdbfQuvLaYy6+eu+wGSrM3c

VTXL1oRkMtef6bAAIBgKvcZfSsgjg0gEMw9fNOOsWF
Eqe1f+NG5Lgkz8gRPYSDpF0DFCQ2jYSFtW7WmoQN8gjB3SVECriXI/8hiwE2PNDY/Y0Hgfm4lm8JlBaD

mFZi8vmmlJWSqvV8diedpjbo0AESTdo9yK1XryCCEyIE6kZpmlQ6Vsog/qZtwVvVN6IeJyZ2cnqKi15U

zG1baTakUUUURhxyJKQFfP8wpamyJNMSkFYnFZTsHl
BS58V+S5VLrIWLhDFOUl4dLSHHwuv4lqh6a+hXPuZ8ELNAmuA5b4bscFobVNGTAg3KfRAGmsmtm/gwDx

cWFAjFh1u4y4BsZKQyfmMu0RJsYdKzpw5BQDjbPJBtBQJtf2DD6L/uNsFMmdc3Lq1mzNqgzCt8H0cKi8

5NFfVMI4tpE6t49l51dewe45u1Qswa7tYquFWsb6Mc
6ADbIER1fVkaWizZnLN2Q7idSsw7LzFeez4VciprvAuM2aSVyTKwV6M3Xc3DHrqIV/KbBQzvX8xcSNqQ

MBAxOzXxjmQyk7037tiP+CFmHf/BRw/cvnwtLWssNAFUTJ/MlI45xhU3PAt5/2J/ZLQIERszEmBjda5V

mXx9rUdp9wMMqart98L038TckXaf4OJZZhkUBHbBqw
ZTfjEDmcyuFtHEzTA33v6ABIuthHxOWeunFyudzboZ/XeEX51Cg3k7gXpgaSGJOgQdpCnXGxWwGhpsII

bmdYuRLIG74EM9Q/V44FLJHW6z3cCrcMomYteZ7gYPWnlFOqeZ5iBM4nyfYYS17sTf2+J3iM3G6j150k

6ahvY5/p73uf0/uPbnSAqd1tLzhjBkti4KLIMH18fm
a5xTlrjU8Z7MtbeuvoXaf03rkyNHl/8OydKjN/B3yzIiuJE/RQiTJxh8DaXBzWJaHGJwiHl+ph1nLVKE

h+O+ZPfjBtvhkAYO1AI6L+W+R9yOW2/J7LOikRQuTRL6dhIspG3kAXcAH6TXUiSLtlmJkRQiMhMBhiKc

Ah8xtx863aI2Gy4BgshutZ8BOt/flSjP58O5ayC8YB
HBHJPyBfBAF0zaCrgN3IFR9fy0UbVKi6MDAih1WsPFzgEQe7VGngVCPkY9D1qERvBPUoZG4MELHvOI0G

CCDntlVrJxApTYTCGzHvISXwDcFjq8LfQ5VpODXbNMRUFBmvUDPuT02gYOtsZx50KPkwZCZdEvClMEe1

Uo6OKlFkCFGnK53wsOMreRZsOSWiBEYTPmVtEJIhR9
GkwPWzDEwnC7ZgP2HcXZ3vyDGaRP1fsGGoH6SyB7SrhytwZOGEGzPlUDYsKCdvASFjFhEzXMysOVD+Pg

0KICA+Yt4IJW6mi4GsZQdfZGDmAN5nmm5NEKTxYZz9MWV4FAExBfv3JTJ1DTSyYZVyNAW5MJI9AeA1LA

efPgE6QKD2LCZzCcRnPuQiTJT4KEJ7IPVdAxy8RWTy
UqRxTqzoLii4OZQ9GlR8QOPzILE1QUY4RgD3BACsITN8VVW3WhD4LUOcBPQ4FWO7MdYwGqkrUyw6ENL5

BIUJXhBlJGe6BSX2QCG9JUDdSWUdPRD1AvyzAnM1JZvdSiP2XnZzMhY7IQLyEHT1EzhwHsYwLNF0CTI6

ANOsVdK9CUF1IhL4OqOzHjXpXQc8GSR7UkoyKyk0XQ
bbDmW9LrarWrWjNTxtFrY3EwiuRBE1OMK1MtO7PcxwVxPjLW4MLIWzEfkdIeb1KYE7EUB5KgtmCTE0TJ

TxScM8IHMcSPP4PPBhKUU0TBSsXMU7RMM4QFU7TSRjHES5WQLhQxUpIaDoJXSoWDLmOlF1HaRbFQD0HV

U0WzJ5BEYlBLC1OODpYkU1TYErSjf2HXH8UtH5YUXf
XeXsDWXwTZNOWuKbOLChMUElPLLsYaEqQeOcFKQeCOF8ZAHiZmFgMIe8ZLO6WKTkMpVeKQi1LVN2RMYy

ZvFlQFi6JDD0DUPuEkLaEPr2KPM2UWBnYoYxLNq7SIP4CVYxNqIyCNu3LXS2XYPcWpSwXQb3RXA8GZTz

KxTaLNn4IRI4NVFxONosOSt7QRR5EXKbSjIvPPu2EA
P3JIKwQgAiAMh4RLG2KGUtQfWwHMK1SJRsQuRpRLU9PRH1JhR0PVOeBEI9VCT7KLX0VVFaEfCeQSusKl

YyDfExQZM4XNH0BGLhEoVlPiZ0VWP8GuQ7JCFiNGF8ZTZoImAuKtSrHiHhZU9QPBX7KoCeVGV6VTAaHq

RySwKqMqJlJDR4JKA2XCWhOPQ2TDknZEO1JgIkUkFr
CUV6WrM0RfqfAzW8VYO1XaB7AemuDqB5OPHdFYXsQwIgDEU7KaC3WpazWbR2TUK5ZaOfJogdLmv9RKZ0

OACtOrhvZkPgGTvaPnA3DpysWApnPSb7JIE2QfdeYgm7ZPw4GFJ4KWVuQpz9EEszMvX5YuBhNjSyXTic

MqD5QqudIjZ7JAAuBST8AMLbNMU2OSR6XgX8YUOlKJ
I8ZYH1YuM7DIrbWNUbVXF9VhT5CDSgKJX7HVR4KuWgSzxoKch0YZU4BCNeZryyWNT7NE0MEJX7VASkSJ

D3JBC5XrF8PTBpCZU3TVZ7WzX9JKelJpHnTXG4MmD1VRYrDAV7TQR6HqI7VANeHTP7ZYZaWCTlKM0NOP

5pi8HtUWslVZWnGA3gut2UDLF1IP4USIPoKP1JvDIa
D7JhefKKBWAmbegmhJ7sMCaeRQWqV9ZnpsNWYY3bV9ShoXRdDNbySWWpP5VtZ3LavHS5UWNgM5IbVEGi

D5o7CDhoCH2NYELjIG29SM6xQJHHIiJgIVNsIajgY0DzHuKHXsDfFURuRa5tiSBRp7nvHfCcWELzCqTz

XVS0HOnoSA9YBjJrCLLoKUUopXpvAA8bjVVnOT8YzV
VtViAwDQogID4+LYylxgXnIscZPuUhZCXrp8WhENbzYOp0YXwkUWUhJ9K0jUUqQy3tjS3EzCD1gWJeK7

WziIYOiMMcT1Bms4QFi064E4BemECtL0KiD93stL5sU9duhdUzy4iAweHuMSfaMr8FLDCvHI9BcKPhwS

AgORFnHjObKWJweNIwYERiDmR4TEltMZRhK3xuHVUc
kbAoWYRpTNYBIdHxCBPuWh6vyATzj6PcoTI8s0YwLTYgXRMCFErnYD6+RKbxwdTmVitFKpKmVGCij7Iy

GLdtBLubSgJcRId2HZDiVqgoMzZgKYI5SLT6ISIfCSU9JDg6WUX7DxKmSoS1DOEgSyFaQiZpYzz6BTP1

GBDoTsguYfOkMYE5CHRnMjxlWVD9WVB5ZnU8WZAhRX
Q0WPF0MvH2OJAtYUI3KZY0CkC5FAInSSW9MGGwRmYvIpPgSCl9VH9UOIH8PSCgZOg5XQQyGDN4FuKcGt

EmESqbYoT1JzFgQuPhJNC9WvB4PPTsPux2HPvuXdVkThmvEVS7BUivMtQ8UNPnRYByTRdsQwQ5TvpnOm

G9CDm1HVH0ApQjEeK6FIAtXRA9LnKqAcF2XTu6HPA5
MdjnNzM3PXMbGQEETqPvCcKaRDB0EQZcPsMoEQb3BNH9YeHrUaHoSVU9KWIhZDC4KNRsKfZiFCL7PrQf

YsSbBjScKGItXCDiXljjJrw3ITT1MaWhLgnwTHy7CFKaARP8LHJkTfZpZNJyONZdERfqNOW8HSMoTmB9

ZOKyKIA1FFg9IKB6CMXpUJddUOA8KxC9WTWaWxg4LP
V4TCXwXYadTAl7HGp2OFD6PBYrGwJoHAa0BBR0LFJmQtJcNIx5GAU0BVQeWmHvVRb9CLW0FMHbJjWaWB

t7IJG7NNFzPyAvIQl6JSJ3FCHiMhPpOLh8DXM8ZPXdSyFaYFr1ODI9OLAiXmRrFX0MGBZ6HMIpDbUeUI

z3NEB4RIVoBeEdEFo1YPU4DJVbZtZeNXf1CIRsSoee
MsInZEF6ReN0DYCcZGX1BFA5RxQrPNXfJDJ3KXQmJqM3IsajKvnoMZL6PkY4GEJqAqJyZFokHlD7IVPe

SVDkLUY1PZCbJdLtXyMsKFNpJlRQKiTuGDk6OPZ0SjAbWdRyNsZkBXBkSdQcFqUsHTP3RCofYYW2ViMq

IJU8HGMuJZP4GxPwQbTpMMuhWdU3YjVjAeBzGPkvTe
UgXGUlUJkpPjS8OowfJmU9YWA1DsK3LhlkLdj7YRK4BVVdZyalVhb1AEdsInT2RnFvTbi2NV8WXCU9Gm

nmQfv5ZFm4VFI3QrufYFe6PJv9JIG6VvSqDgNuASlkZjR2OpSrWcA7PXC4FsY1BOPwRGQ8PEY3VrA4FD

ElKAX2QMX2NgH6GWSaOVs8GRB0FpA0LCLmBDM3IVO1
CaE7VFIwGve3CHB6HTIoWiifOsd8QFSvOLYBNoVbCePyMBJpTVJ0NIStPeEwTJWfEUT6ZEOdPTC0ETUo

MMN6LVGmZsZvMITjWPB0OGObKJS1IDHzLBX4EGUyHIWIGbBwWK3nzq5HRQCyWBFvBmzFHsVkMGrMNpPt

DFDxDRmkQO9Jr528KOEhO7JopCPmxn7HQCUjEA4Su8
44QzDqTD4CipxbzUmLv1weGMhfZUIxI6CqP8DyzNF3YNEuY3ZwFHOxQ0o6VAhqTP9FELWuXY67PV3dJA

QPGnYxQDMwUdwkV7JjPeKZWqXoKKZrMy1oaSWLi5mdFwZsBHZnWaVoIGIkGYedEJ9ICwRdOJAnKQQwtT

taKH2tvOSiTW8FgWEwAfHgUHsmUI2+DQplbmRvYmoN
UwD2HNMgg4BqMPfqGWc9WTntFQDjS5D1xQWzDf9hnO6BlOF3iXClZ3HsyDNMwBOwV0Adu0XLx976G5Ss

kHYxSDEqxTPuUU1fl4EqatyvM3liUK0csGGoS21quI5dLDpcPBRzK4YjjhP3O1oiklDxUD2UIAR8O9bn

axBjBXSVQcObVNYiX3nelAhaYMLrWBSrRf1VESZeMD
9Lq580MRSnG4TefQJpybIyLTAbDARAHpNyLs3PEmHjSY3xfe9RMMVsHFEzSjhJEkHoXVwtEuwcrQWwFD

0GfQN5MMCeO04zUXEnYBAeX9AdFUM5JTS8NI3CKX0rlWtoBGN8YGrzRp5JLjTmw1EhYSUkBRmJxRk7WR

SVGxEwOjA094szp12zidajp3HAJbdpENNdgQZnisLM
ZopeunAMCBMZwyQzM1oGv8yIoqj5sFIDhXwKFXnXg5KvqlmueowyXptriRxaXyCb/6xvjyIN2//9vi/N

a7cgPo9O/eq9V++8av3GDcfLXYKNkuu1h4041qdVcCKRmajb1Dnr+RDpEfn7HHZD8lkSXd0WRiVLgsW7

j82+fkXsvs/ED7opfLcR60mox6Z1+0zpDoSkB+Ghn8
+92sm1R1ekCXNknprOgf67/mNRP1z57/OnUKitBwLYQHcp9jb9I/ISGNcsDPua0gSgc6djJZDvRU5Ggy

503rgBD929lWdcs8Tt/f0dNvQmKQOFTrPdzuE9jAivmI/+wemkt9sVvRC+W07D0RM17IuqRWO/04OrBp

hM0o3WG/BCLIAxIcZUYMBhPL7OV0qilBRhP9aSGK/8
yVXmpOO6GT70G6pTHL1AmpeO+R7NO3dONDcDGiWm3UbV6tVS5OC0ZVB3aTGZGgwwbUeDS+N5oGRfcCK7

qZivk2Z+9Ao+NSyqN4yFJ/rDMnOi0JG3Dg5R6lUZSLkDC9DrXIFcdeSYEd1W+fcHCNyZtgpyG6Ah9EtS

1BdoNbqD+qXlwNorqFZ8CE2LO1F3fZspOkYEXkOgL5
EgCNbGBfYKvJbmo2kjBX4WhrV3fZ+rljFFszZnXV8fogd+mvo7Og/jkBq2gz0iH0KpePMLqtWkt5Lg6N

mEyANOjEYE5BM+wcZ6fDVx9HYqGmu6+xo2XD4rJucO8AXiZk2n/MWtjwgOjUNP2l1V9wLqC8mRAxd2jB

wVwrr07aUtIDXS4Yc+zc1to1Pd9MDkETaxjzoCbU94
6XmjjIN34fLnbROXAHxEypYlD8Rl6dxcAR0XspFBTFzYOMwIVyz5gUcCULQnc0/BnZ/h/3Dx5DaJgwfJ

ZA6ZFvIL/ZTa6PfoIxzG5Xg8rid9yBLyBLcMydDiADiuRguA3o/A0z/LVcfN2KAv2qA/wD5b7niTufet

InH0S/Nq5OpcaWoH5xW9V/g9/Bv3zZhrqsHqqb/yz/
N/eq0Mx13AZEf1gg2wN8wWQ+OJ+HI8H6/C6/D9+FF8AB/Qz1nHEeCoLv7j78lh9rg5LWrm19z164B7ke

4r3iU51J5f/yT/m4uNjESIeG/KYA4uYc/Am+6DN4M4aThGO9JI75HElgmBb5QjdCMdbq/FT+Kt+HncDq

0cwh/jL/C3+N/4HWTEXSWHHeymYT9XiacxUH2ac4VR
wEenC2OhlEYDrPI2hLht30UnzN6h2aeXaEU4xM/zB/uT1EsU1QcaYcZD16XIvDRsFapLcAQ0sp3RBxb5

MQUr0sz2RahS5hpLcP/LGMpGqRZZXUDZcvCElgvlnSs13LjmP7wNkVa+KQ2KkaqFX1KTUerYwF6/hp9h

tf253usH+gv+NqcrDQXY463bOSwYigGrFNyMnqqW6g
BpJ++GU3zG6JjX5+NEmHQeOpkFM8AjlG4tJrn6lD+9c8rX4Kp4PuwVJ/kCPs4n+ML8WC80/gn+c/5zYY

bwlvCpqIqLxbVip/kOdCA0sLJBgrl9UnnMNnS79fixxueORvZkbrfDL2x60PtjD/y5uyCS6T/rs3VSR8

PO8DyBlLw98pMlZwcPkrTQ1ezlmIaFqvGyZHbTcQYt
cvmCynCq5SS1qToWQ4149x4FTxc/ik7we+Hd/vuCTetq6v9cY3YTZgCtCyCk45h+sEV4Ms5jtkLX05Y+

xqs4gE+M71nYoRT/7Y0KeyQ28aS1a30P85G5mmNoqh/Q9xQcg8DaoQ2Iroxpq4qStUQocLN89dm8WE7d

w2JbRV6Ym3yn9xt80DEJmSclf4CqREY4jFaCMxZM2e
AL+FbHU9qaeg8Rl1cFvvGmfHGxtKV8ICmQdA+zTac9XI6BtTc0P0n+jV1jVcgN5MlEeQzSNcPCJlLiDL

lye4JcTQmwFDS/DZEpHn4Di9f8OBkXFqIAWsR9d3GrBOxPxZSzrC6O9OgofOZlv+WV9DV5wHERhCROCA

m1VoVIT0nuruq8PGyj8VDcFUmp5qbq4uRqfyaz7IYa
AIEW45Um4Tpq4kqvCqBH69T6SE8AlSV7Q+IxwkhyUBiZOo/rMu0kn9eV2oJR8L2Pcl0JWW9DIloIj1Kk

/F/YPVECrsf2XqZ4NQkVC2NA7EU6YO0hn2eiOa0OHOhOgH9ehvtEgY2xbNVtcUHNi2y+5nz8LloCPfTY

VTjrERsw4V/mzS6Un2hg9EqaKmSI6RF+xEKT7MuR5Q
+dfBN/G/76YiVvwRuZeg6nZZOxXPifCZqBbRfgRYOtqOowPjdLPkKZ47BHPyOuW5isU5cZZ+FyW8+PGp

1cYZvbV+Rang3Ou2hbdCTaGXfThOqXJ3QenKpcbGzEolAwNxpSrH/yBFQEKHL2rdopJjGswVD3UrgPPP

ngMY2kRYhUg5VwFKeeLS7HyEOglP9IcSBJfsLXVToB
QWRUsMzUwsLKymB6LWjuJjmp9CrJBvC26Fo3g3aFFqHVHz3Jx40kYShzedTs15WI2/ejA3VEqoWzOwqB

F/cbls5L+uIdi9BLC06HG5QJSE/ht4k8H7kKEprLdxFVPNciZ+nYIHWNH10Sp1U4SbUvWf8qsXT6glME

DzCkVm89YzPGpLaZzT+QG0XJ1+RE+lk69ftFZhVFxF
E5oUBY8KBcaUdmiAT+DwhLQSqrQJpNMdUoQalA8B9KWE8CyVDXH7vLaZZ+CSORbEJlWWMU1OD2ObSdC2

Lavs9OI7BlYB6PHLTndWwso/Biz7FDSH6Qosotua73oiI1jxRLNxGcAuZgCcILtra1/RWd5E+t3/cBjo

wWRMXP3mRM1PIrxtyaRP4I7OcopU6C4HEhxKTH4p6M
+7I4WXjGsT28Ne9/e4PUXvEtPOrQWNA1O3B3AHJNMhONWN1WvWXRudCt5azG7qYxjd2VSkRBXRcqqiIc

YmjG8huy2OZ/DY7AmLCOkwNuKcw/m6yX6ypN97m+UfV6zW9QGpnGb9SqJhmU5L8Ijw0ocfzhwZ+1Fbmj

imJ62Yi4t3CrGwAWQR4nWgT7IMSHUkplz2139FO58I
etA7URJ/qC5c+hB6CkEWfz2CtGMHZ1zV+5RMSLIlV8xAoD8/gonNIk/vMHAKNJ/YS7WWFD/JP0iAWnjf

24cNZwhlBExVbAbo0MIFtDi93RZTYY7cYFUWCT5hNDwUyha0yIBIiSrKCAVRsDkOCsMou7rwu4+2VrWR

cR3WWa3iyy7zveX+/YcHKd10D3pJ2nhxZ1a7FAjlq4
DsgujM3Q5IaOtz8nmHgWjHvlmpZd06GLYSEmS6TnS2n3FQ9M5+Qu6KhYd7X09bxv+i6BPVNdYIthbsKv

g/qFO3E5cVl+CIVzrF7pwZM+QO4NaLoGHWMdKawGmWfaZ0UT9sNF+z6qIGJy5roy+ZcHn8xqQEWBC/Qq

hPMg0HTW5TAhRUcERmM0CBjQcMOJlEJCfHt+lrogLQ
qqZ4gTaFsaGdCFkrtnNEHVaL2tansRZ68fpzo7VP5gyAZnBcdm79sMIeaTm0a4R3QhwRs8GziAORL/H6

OOqp6tgtqC8D/Hd/7wQLPpYl8U1aCsY2zCzA7h3/rdepAvfyX55nC+/LMZ62KiyR8R79VJG7Pr8Tnehu

h4s7FGew4hdmHCIkvosu79yz0YCedNF/U5eHCrI6GU
CzrqcQ0cF9RcVH0Hdbup2kqo61VzuL3R8diIqx88B7Wde3uF4NQLHJ0qPBQ1OLv5dJU8U619zgpUWmPI

JcjYFqiLJodSJ7JDaDPnXa7Geom9fuZGLH8K6YqPFw7Y4iRjJmC7gRC8NrwMejIYBsvppwhwdBLFsHoy

fqovhkNT3fXwLWyOEKBzME+KXywQhvLW4fPwQ6L0lX
kmLCa1FDMy2IrzsodU9dU6RjbUixYvGFaOHIKFakrwGQGVNAJvSf47VM24IK2y1tUgnc5c4kI4clTqHy

dObxEKh+QK15k0ixtkoaTM5W5HujPJpZM9onFf4ya4DP46X5Q1vKAuOW0X8ksKnToByIg6CpA4RKe2F+

0DuQfrPih3QMHq//y4KV30Jo8k3UYrCDuVkjXlE7kX
o54FFRgcuWTBluczYbgXez7D3cABypXQ94hUaA1yyFGf+B31x7f7d7uVsfjtQz0yStcs8DX9G9gqxtdP

/wn1gGP+rdgIYWvYcywj52JoRYwxCzp3N64gdkD4NJHYoW4jabW7ZOl7ubngVyC7I3MNkxYAumJbyuGQ

KFpLg2KVx2lT7mG3ypqEqIy2oqNCg2YiAnSOPhBfhZ
oI885VrfGce6MdQ2LawCKzifwuvBIW94XCzJy5noo9FMgnYrW/+vhyYRLP887fuD3poYeYWtJDB9oZgU

Vy9/Lnce3OH8RaW4H01EzsOq0+34mD76h+8SvAuuF7i0spHz1U2Tr/gm3kfjEcj262oBwaLUYEyK8wnX

cUH176fe4QuWEcpzVJQ8keh4J5S8jgwrh/5jDO1FBL
eJ3D5U5jdJaxKrOjdpLdHtG0YnuaLNvV5eoSeZceEDkrvttuiF6BAIFAKXg3BwI63bFJBvLzpv+8NWc3

IFylrR8E4HWtPAszLAGLjvl77zN3DWPIKeLYmxz2JPq2/Jbo9PioiTdYFk5eYRsbkZ05sDe6OdDm2X/l

3WxZM53pRM+nieX/7Q+ujWgxrhy+aPUZp4vVYz5xwF
i2lxpSFMZYYiRHFjRDGPryhO6LK7jzEbOOjTGrf7yPt0euhvSHUY46/Tonya+GGPGwbYjbAx8CNVfNoO35

E0mryJOTFqFebpyBuYhnZ5GxnZ2V9mmatDCZcwUzJluhXB5ywRmbDIF84yJNJJF1g7fYgjsxcrVduOUm

fJikpT4sdAomBTTCoAQTXalRZ2jU1kA4KDZE60CLbs
x9YXhhdxGAXlESbEzY8zJUx4OseQNPMm9wnw5gRbdfsa2vNhVhMlNIYI+ufr+CcvQPCv1oiR79ZRPUSM

YTE8pHYsdJwGXdu/Afo8vQPOIVCZ7BAylA91mq3pvfrYW8m7xDfE1mOeolLwp/jr3eORNBbqLfIrTpTj

4KWTgJFLsQ0ZRnj3XoRl6EgJW7oll8Qt593vI1uVwH
epTD6no7/Crk0Hi6730WNJld3/TNvCuD3fksAlkvPPO2luKhWXrF/YONmRkY49pGfnfmhM5KxXfnKvWp

hDwGQa6A4DI45jBVzEwbdpFTmcbxj2+0hJ/YN+rueBRuZQz5CBF/M9PFJ7SycUSEvW5TJv9/XuG7YZ2I

E04e1dL++a/qez3Ye/Mf3grXUVHdM/yRagI4jV96if
NEqpmR71iCpydlECbIUurWLxgEDwhjZ0EUr5/3hBt25TI9wLugEI8FoszuUUtXS7QgcKVlXWTwmUjRwy

AyOvLRhf89nLtiKM3T2QJkoURLYPE2Vk9b787GAAm7F5J8Llyhad0P9Stjboyq0DX3axHHmumNxXFYfe

sc5XsjEUIZi6jRBTvhqzg9e00XSQw0TITyDFtfcIdc
A5wesIlIu8g7GsZ1L3OZMeYQZK7InDq9ZGKhseRjjeRUPvEUMttaoViZM0VE9CAON2sSO0zgH2KNs0ny

CzihKZZRUjgUycWsXf15HjdF5GNFNNFlUqomi8qQok85YqFS1WV+ols6DQCXxoARYSeO8o+jouE42nmn

K/+cJV0V+FdhAdEATo8PR0BBmTlQN/zPaBX3yYEpoh
uhmZdhtBJJFJKiLE5JjLuGytMobErqNOOGXAYwSCFBMqtSCcbcq32KFACfJgHLLLa5gX3wn0WAq4+2P2

5+0d0CJt9NzVccwnaeMo1XOOLyMyM6OMDatlqvmQ16eFrGdfdPqnErH9SUYPwf7V3NpLHB+qfCeZ+7Ig

tBCflQd9knUJp6p0VOP3Rb7ejTRUqSlrTGBCNm20T5
UMOiXCxUyrMbNyoUsfNW2A6tQSlW47H4kp8OI76Xeo8SviNMV1gwciVZGxq2xvDHQalN8Nr8wxJ8ld09

nvt9D7e0vcSKQmfk1dE5jeMH5S4KrQkkHQZekkgmMng4jjAzhatTRJ8nLmicp351LuzoXuoPbXMW+vhn

FCA/0LZG6hMRiLyWR2se9F25UXWzjoDo57g7dcu24W
vDPf8DcSLchDaoonfEBdw9wCnHb2kL+NAMOPPGvOa3eGaJSPqqEvCK8zK/r6AY+PEW7Jz22zRbepvS6W

VnGKWzTfWU3djCRFOb8gh3rxZTH33GWFB+cyG3aCzjxkdoFjgjeIi8iRo74e+4kWqPAz1zlNQhiYNkvT

geIemGnaZOLmMbKmJ7uRb/xf51vgmSVNm+RYOxU/lz
T2Ckt7ZFSbOvMdmD6FT0gNBUZv0iU+QwAWCk12A5GCRR5UDhvWxhxB+C+Jf2b/or+l2Xw1cFibJl9O2N

RbZr/OcVPgbnQXvodfK6+w3Z5r96y92Qr+szfvW4qRSErAPoeTJqQwwhOUxRNM0nL52CdBtdjg/h1ygP

wtXuN1l/hgILicWiFAIue8Ish4ronh3MTG4A0vAwdM
DvaK7bTrRsc/f8BMTxajQrmYbHI30XsTs1F71aZg2ghuDqSWxhW3jrf2xmO+Z79SkXdX+NKQoZ3g4Fib

KzvzGEDKXTM5DT4hwQwhqXBlS1M/P0pqiMyTbI+YUsrcoMFXjwdinHMkqCmGK2wwJiaVDe6wsQWfUqXE

1VLx8EVOobdi/OJd9SWMKkiJoTw9/iaTXMFs1kHUGp
p317ploXkw+Dfp34jMLs+7N3n6/feTp++/au2i+XfcOXfe+iPFfOc4XofVW9u4YVmH99XAhk6r3Be66S

5996/fmwLPpeu+3+Ep82+/vtbwcicvO55bAV1R9l+4yWR73oVJIzBUTIj9DoEDQFMPzAx1I6nNccCihp

bDTP8SXguSTOSHIUjkU/Un4lcpL58i5/B4J+d0etEE
jWMrmKTTfuvPf8x4JfO4SiIr/lNpOzFs54aEWgcmMhyS9rZ8Pdpn/I4WKavUR87a9xQXe3fSKkEU4qo/

oSy0cI75HF0cbCUhIQgV4tg58E6J4JyqWo/y9FhVg0K54X9TdamuOm/z4b3hL/1btG1XJRx3v/C/5OeG

2IcfHCZ5867haqqxVwox+W3E5JrP7xrPTdJJR5E3FW
dSz4ERvpoMob7lb29xiJhDp8NPz1OPH2tCsSBuWTLkVByhF74ufXnsPQgLAN3bims82YKk1rK3efI7Il

o2xeKlwgz0sGQim4r4W25wenU8ImXDo7QF4krpXD8iv5+HllWpCC5TjOBbnWDE+fFCMrCqRBQLCyBHwM

RrRCFlGjjPOU2Ct1j+7Q803tViMagTw/Xo1cbVisza
F3FaOt559kIA2F5oZmscbU3LVRejNP9vo3ik/utSe7B+EZb/egRrjyU/ZwLu77Lwr/dxVb/s2P/4Pdu3

A5/wHlB0NT4c0eUsT7XHLWQBEjbkWN7ImZSKtlK1EZhwu0H9ab5zlN/eIvaE9tzlPcrDg7sR0OHrkCPv

9cJLe18lBPHPdrhr7cexNgGnMZGL1lQXc0NHywm0o8
GVrHIhC+mjQHmwTGcE4DC0/0z32Zc6bRJzgKF49ej9lZFA6XPb9nxCJrtm4Xcdp21SeSbSn1rEHPClgg

KwzjYvjJlmenlSh2Yk+m/cVfH7LtyZKv3WxPYaf4bY2uButwGWxg/W3lRuE3O2rVnUEs4bFmKVJmhXRt

qhoR8zn+z/twpOzeG75sF3XFhbCRyrozkpNTMtpWwy
RKCdMtbdVJbjoWc2J2jeEOpPyGKKd/H0E3xv4QrPlYlfVsdEsnyWnd5iLMoYtaqOIBXv8UWPbzIpfuam

B/yjTAhMo6qrmuGwlh9UGmBoZtbeqT3kNrpeLAKHeWZAseK71q8sKpvOmU7qEWD3hS20gKH4xaRHdzgg

qYl+Kx2OZ7QFS4CmcfQZKq+AdbNO27/fuX//OiGdg3
BuewLJ3lvpwXjjKOr+tU46grS8Y7PXuyL0fd0+rKnx/ep0qWdFodrDu+c8+Is4NQA4Chv/XLjV55s/ue

V9/H4BHjHQWyFgL0dkyzqlOJvgdOzSk++aSkks1BnY5gCs0zFqcqPNjyzF86oMET5g51XXqcTBwEP8Xx

iB7Nlq33Z4cOtNwxbgtz7eh+A6kdR3SMvrljpKlbwk
4aAbAaglDllHTgoarEv60nYemGA1V6iafm/OXyi8EXRJL7769k07DihPchnJW/PAHh+6hcJevA7uavCb

OTA3fbn1s9ZRC5tspUsKy5F9roTITfEvqSGIEzOQZJ+8RnVAiWrnqC601uD08bFsfzw7m2tEVQ69lxGT

oUbybLGoXw0sWfeESReILsU6azaKiVcxIRLQU5bR81
tJKmm0mARwAqz7lZ1afws9Ub2KxgzpkvUpohvvG/HU63Zl6/eWbiOY0f+PbtjokU13EQpJz55+Dl/7p0

/y2O8H8fsf4wBIAs+5f0/gl9l5xmNOcyHVTG12mByx4cDBglqGY9EveNgYjAzQ41LjSPdiLQttW4cnAr

Gq4X7vaLw1IDJHAISKgfpCeTk9AhYCwHAEZpwNF7ql
PHqOvqh7FyzoiZdMbh6AvZQE7XseRtKnE7gg6Mi8A3kR1Z/lhRaH7Kefqyqp8ybkhgj3e4L8l5gg6QWC

awvjhm7iwJCOtcMNwEaHSFjNtQo1WGrGb9J7oWgJhHEvx+/1e/qfpEFI2sPwOOpQbnBgv0pwbxU87Jdn

wm5ijICbZzXqD8w5QaXtmhI9PHZbS2HlaAWkcR2T/n
0p4CumJptwc4F1F+8H0pW78s8gCFJUc67E97EtiSY5P5oXQceA+59LJp+/suKlQQNGfPHsZ/0un3oZT4

DpxeTYE2YsHfYffAkZ39mGnhAA8eZKwnQ3fAPwdQDsyXIdrn/y6ky+c9Yr/0dKGjZGSrspTkmLoCctgq

jmAcqlhlMrnuek6DNbsbZu3Mhgv6EKCiT2+Qy2ch+c
+9Z1nB6Qjg+LUCOV02pqdSuQciuKZXerrMW4a9nfC6SI4RKkM5l7peQP1j8CZgOLmVh4zB6hKqW1Rx9O

kqDcoTzbPD1aKYhnkcHBHeyzk5FkN8AqwjM0v2JkpBgnRv46k6j7eGk36smFdeTJKHRvuWLWNeAeQfff

2cFOthnhWDthg5acmb/oEl1moznGSz67AnNIliETZm
wJVzkoo3qAMeuQ/hvOF+JWatFGyLGsow0GV2/G5WI+XlgcdOTmI7/Lkw+AyZ8QYCiFFkG+dwgOLv10IU

nVwn3w8jKiKFgwOI2Pw20hZcakkMZYBDF6OX5/XZiHZFgClYHNvAYbjMn3XGBefajPv/+n6MYnmak1yC

YIO0C685/CklARW26EC8bZr/oqFUoabAcZuu3wjliR
vuKv7+Oq7xW1A+y/tHnsxNvHr9+0P/go26TJMJuf/SJmrvEwYMvDh58eq+H3+Hd4Rp4TF2iGRNARfBwu

nUwuSvuyYsKrAyBIriesBKK18cnNBaOb+J3pkmYMR5VyehE+melmxsHxNxY2tWURqZO1UcPwI7/jy/W2

yBbu1EYD9PH/DykfZduo4Ou/OWb2cSRMVRXlehiTRo
v5OnJHL8Jd0BIMB6t6JOJjxwDlvL2D+eAE+EG9ZiIpbB+/Wv693sz6STpnsyRZXaBJmDPEnR3+gpDoAl

juIYowBMYP71zPdX9AzkE/wSvXBK7r2clAPXQ/9I0CATBfxQQ9O16mxYrearIUHNiLwMvInVJchP4aZ6

2Dl+2wbVpmflMIsrObKqV4rgSPmm1Xgd/ApS868XY5
QrFEYFgnMix75T1iKAPbh5SQmOeQEGmLrTaLAUhZ7pS5h9Ag9SSHQIHNjdYdQ9KMMNFZiZ8ymvfaFQDt

HXGKE8KyGJdzmk4JGttwo5n9grXK1UlCQ0YX9zSzxeZpVVvaMi/LTnq11hh9plBt79hCsqY/BYF5Dn7F

S3Jk/xf7AbqgjXW0A+yXuLg+oAGio2Wxxpd/vWOlc5
2OxzTVglPPvr1im5vllrEHvQofaj+VC4dP5vL3F/ecJLqJy+smFgWcHESA0Bz+ggyobJ/kmoQNMPtkWQ

Hj1+TvEuA3FmUsiZ4q4i59ezYJoQFfxhvhpZMuuAtBrKcOstVmdks2duo70UcRjdGo7XEilHgjZfwgVF

wCOXstxhXVaBTtNJuh7A/OrWnHy9DLlAAhs163WrNJ
gR91nN8Ytvm3vCNt8mbeLaQblLbk6rJWS6LsaTvrSOExXHK0z0qlKE59iP38Der4Jzvn6kJArRMPLa3U

7doTEAn3n9Kq4briUM8ngwdzU352NaVjqM4sArJiuGp1BYhVasTTAGjkjrrqifBLeHzon4QRGp6O+cKF

WH8EF+HL+/tDA/CXMNwR1b5IBnM3w/32/osn/JI7d2
Yk/6sUukgCxhDddT8ZJMffuh/+nQ8RFf2MeJosFlafjlooaKD0bUvtQOxfngJ3EUYo+zLhgx8wYVhCMM

msfplIHCcrPCGKJPMcKL+zGeXHZSk/zirfWc/FRBFE+CsmgUJG+ZfcALshKAOWWAUXrOdbi2VxIGuff8

AFYhyoZhHal1ga2abma/87e4D/rlGG9KCVgim7grIZ
8QO4GD/ZqB1mBGYOmdrSyHSSz+EYv30w30mpNJBhNh9Qv8DGQ1l0ZICM2zETgcwZ0apRQCcxfXBUSiIM

KF7MWIgIKvvjINOMS3NK9VIScKa6bQ6SvVwqYNpEJ2P/2+LoukRMnKDwUmIBJkWDRNhIm06C3e+fSwJb

lYDFK0q4k96Iv6c87FkgRA6jA5qStjojgYg7Mv01C0
CTw/Df86AZqCx752DA32wAKtOY2jNOheKU04cBWWStkcGfCn5kLnLcCTCpXK++LOfuwKNEC+VxnDvPpv

h6zYC+DKmdJUBjM39vPkP986zEOkoWHiU9KicRFrqHzZASbwQnlBpJy1KWrYL7oHqvEvAuYVvmwrJQr4

MzornV61uXxlZgjol61APRrZx7RVrC8tGLVD+lMcMM
HmXEIsxWQX1E+O3hClEU1IQ4PoMHoVBvN2TpHjK6LhXbmzjeSYjnT5qpB7/U4P2qg1S7r/kbzrnxi/v/

sMrKwKScpc00EsmW446oA+/8A57+2+R/xqajRq9zd5FgHG24/Ra+I7NIeJYVTiDq8De3WEG45fx5hubS

u5xbAQQpEKUqI7x1DseoDDQ7OB0fi2pP1E1N/nTs1V
ges3IQkmGAOcpMKEg4jBXSUXL/zEKfYxOkYhQQCd1o/8zD43laJo+hbG/OEcuSlD3zynsaWa7mbAaKNj

/zSPLALyT5+TacYPjR7Nug9PBCLZuzhVP2Ua+440cRAIUgT01e7Dl099Eu6fMc6d8Ur/hfmiNysePZ4A

Qa0WWqqDKXyYeXzxwtgW0PqzcGjrScPZo+bFJmqcTS
WeRQqqNGkmzGnBrClVLdriaM02nuOtqLawLUHNy4NHndXqUhm9dR2w+07crK59R+Qoz6oaGONTLWDmXI

trvdSbJWV4CfS577ao2H51wtpnho6gOxs32a5bSsY5rNg+br9FUR7NwhZ+rJZtkuRidVBQP8cGXuT/Db

JXnJX6rTwvIHKCOshAxgAKH8sLToZ2ncYeUH0Gh1sv
nnAr5GOEp5EvGFKK0zF3ZyTTOtV1MEp8tNwWOBCKdB8P3TPlP/LduI/1VHF56/9ii7HcCUT8lSqgtWH9

jLfrWNawnsXYsT4soXyXesc0Pmp2caEc4bOrZejgNaPYGpUM3osT0fZfMneqEpoOT2sewUc6CGrgJ6dC

iNVuAFiAJklHADZVF/OvoPvrOnkOtHgEMLGbeykH/+
FnLX/zbuizUSO2bjnuwixeYrlDjmxz/6V1gggc0dRy0t3W/JWd4BEe0+Yj0n0hyytn97uH60WMBH6ikz

kgB9jko/ls7i/KJc6AzhgCWcrcHseCYDvZmgKuPTGGUtm31ozc4VXDIGj5zjwgPTDD5nWdxB2zCs9PTD

a6YMKZrMDkoif4hn5SUmm1MOt2P8HrVhAoa5AbdQZg
bh1Muja88yszbAFY+zRjaZtlbpE+CZO8HBEAQyjhE4yVJndj6557aGJvyAVx2Fd7z5iiq0BckunkZQE8

f+Js77Vlj5EM4smJ/eHv/ma9nVGH25mx8wfuiKG+YT4iOUc2f4uAgRlfRZdR6fn8LZM7yhEiUJDCuRkA

7f4nzAvXpvRBc8erWuxgIv53fIWux1+DArtRppRV94
IdyaRVBu57DvfRfiGmj9EHnJAKivjs3Q2w88u32OmpzCKEC7tr7SyFoo23LVPJbwMuBIJKDrpRUbF4hC

JHBwU7FkIzJUkwc8g9yxA38ag1bJZcCRXbmOVPP39wK456ARAIlzIpuJFLjtaF/BwfwaBSyMnWB+ACsz

cuM9SnlWQ3cnMLQXxm3V+WbCxef+oUnAnyXIkV7sdr
524IbneDGvnDFn7OIXvO1CR4yFADY63BbhEYGmdVPt3buOXue+on12B2zJTXPSkckgqU7MBxJ14+VY2+

cx/y/Uzph0eRbMg4RpXAwdh0HpEI3V1KppOwpH+zddS8AtlI/MikJAQzeP2z3ABA+rurc1ckFXrTKCi1

yqhq/pewl/qu89oITjV90TnN8+zv6G/ZfS8js2cvaX
rq16fOjAir2TdprxpL/vDvs34ucoz69yffdBZfe72qaOemnTVjC/XO+4e7dNyfJ3s8jrsYzfOlXiaFPl

USyWeHVbrxrCHnerJw14VmtQM3zsPeyP7RCQdU4XjgkV8VHtFGDLsEhRG3aVnwmz55SvmgZM8Yelo/Qs

PaDUOXOStoGJwuSlOL+Ryjo0WlYXy3dIXKSbVmfAtl
nXMEIgqoIW5A1A/gOwHE6ggY7xg7nzPFkUUNjwyIr/+Ar7YPRjJcaXHN27ox2CG8xAKmIbybzQElPi2r

VW9a/khoHhw1KBXIi4kD76VKj+HaD9AMPdpXokw2vvwiSFKqq89TE6lEGkhOp2UtU82kDHi0VaXjg0/e

bSL4ue7Jg6UYYxN4tvW0jGKOsDCVEIATHZhW1tTqaG
Rs6GeswYeY1ePyhXBJYXa+ITiU8/hefpfTYmLQn8ukJtLXHuCbb1SsTRXKUK2JoNTTuItvvxqcdx5k6I

uA1VRIS0Sa1IKTdzVmTJ/O1k6v9poFHz7NKoRQUnnAQnfKfl3c0tll15rCXfnigayAmMU+k2CV80vUn1

SwUoOdMivcNi7s5pWyiKH8e/BMj2jrcdk66qus12a1
CVPedBbc8yylipDHPhx29sD+820aMAQy2NxK7dxNhsN6fxbqL0+ShwbMrxEiCC1nezUCo/btyMBburSB

efqCYLobxVzAUjGSWZiJQT3yyZhFtXM+uvDY/flqx1gn3Azv8G/bFdspS5bithz/0Xtuao02oCRf6DTd

T2Rhdx8Qpp2HcoVaxL3v8of5+zouGNH5yUsZnF4aq/
82c0u+Es4qCXowFtoGXrgIwSEuz8WFHEy2NZt5jjOYnob4ILcWmbQcDCWpq3qNWupnNPLsJkBYacd+ok

74YdKZWUlhFmACVHcSbsehHNNB1KEVUrKyvcNi0AstgB3d3Puh+hbP89t6CxKhfb3x1dYH2JAXxRrmfd

YuzEF9Y+lyrmtFazWkWSdaD1J3HgJW9HQCAjKN15R2
7EjDMxjblQkWBV/vBTISxObP1xIv6auioRIhfJ0z7GZ1OT7FCl4D0fqVr8Qu3oCQnvMYChw3jPuvnfCS

eeLFHNeAEuJdam2hbUdC1C8x4NyvmWTbbwhFX0w6s2iOOMmn/6F5+52soNxg0d/75v/H1vLk9+k3zvV3

lifiw5J4cq0IyLZZhPVP1mviUU3aPYgglyUOX0rb6M
viQQQaO1ZZWoErRw6F7aiDwwUiHtj69cKs9Kw2I6MM+KKtN31s4d0Z/Qp4xb/BEwFabKpUYO9voRvzF8

+idNtDPH4RCZO9Y9palehp9qjnEV0IM/NOwNgDEj94ThPmMiz1LmCMLG3EankIrB6ayaDKD6qcQbZMd3

zlGdD6upVOhRE9i3NQfrCA8yjxBZ7qx63Da5YGxE20
87jXXfbSpL8bEpVV4wiu5WEoLqmZqiMt4MkoyBaA2xF/JTfY8M81I4ppi36YmX36XdCwM6of0I5bb4nl

0+4hZ3XUjZgMYlc1INhAED+yUxhSflMb+BqNEKGp1quMilXP5OhJxjoyslpK/PxxhoolmU0H2lRwBwL4

UASXSgYsFaoAgLWXJSYnLKpYhKKPcjyIog0Iu5q6+9
otL6n18jFA3JCTuyyzdm9Sewvd7kb9pz8HOsMOwYeQGYkGWqou/95u9eO/g5vrqVo4GV1CV01DZn0eWT

dkrfP0Y7LfCAtWohgf1WumcwyDTexciHZdUSROotpRHpndzoX513O2j0Oh9oyGYTnf8v4tCOsm5FyPxI

X55Vf6eBNwqjnimY4kAS/NjlCAQm+Gf5l/d1u8H0Gm
ooPB1ujsRlcObQG8J60nWqzuosq8fhcS/yRHgbXeY7+V+W+TIucsAAH+/wpQD7ICxtMNSqaRStsC5IOw

nvBoqLLV0VIKzwyKrsB3JIOdyq/l6T2K4A44/YmT0IXDyUhfRCkQGOGCumvgxWPxw54ZKHxq4AwYXIWX

CJ6YLMTFXxdNP7bOu7H3gPKIIGiaKCFOahwntF+kQ3
ORwmrlNg1cStfajVtL8AVfj4YBFLF+npOIZXymfblHB+vE652UL8+59128eaW0a565nhP054pwjkBnud

2hIqeg3a/W9lkss7dhjowYYWgi4GHr9Xml/Ftu+kbv9bx3U8A9I+9QCrNAt/CvJ7rQggTxbDZq/1Dxmh

qgKj8p37GI+Gy43QD7tvU9YXBGQv3JbEmksv4Q6Cgi
5a9lChrYcSOM9oCYmKDE493oHgWElLuisMwPBESrvfOBVBI6bBACfXRB3qdyRFXMb2UgOEJFM/ZDhA8J

AXbAnypE4gsIjN4HWzpS4Os2MnvjXcAy7CBcLN1gwgBl2one1KbwTobDvvr6VSPrOL5kQ4ZWRSccrbKH

nWEC54Us+qsfhpU1z02HQCB8BKAJHfgLV97DlXRojo
qXDyflgdfXjE1HHEtwVJivBP1FGEjnEB90Fxw/keSogp5dEsb33CpRhGQCWAEifVOHPL08BifSa5u2WP

qOUOFbpeAsjrrtoNxRacuswBhWbVCzOR9g58Vf3H4xijdUISdjrl3k3+aVM8Za8ApWN93bP653rdPmBu

Pme/uc5bWxLxUAUsCAFqsR7uJsdP+dY1XNpfgM+iz5
P7GT7LOURdF/oQ5tq81+GS4/moP17Daeq1sb+W2OwaxQkzDHwjkqHSGV8FfaZn8LMSnztqBF3rQUSDcw

irRfR96v+bWK0TDA62JWQPkVg7iwZcwXmeMO8KNcQzHjYVrvTYHv4rrE3I8mme7MPH9ONLJh9zmG9it8

v1lugY1uDDMR/PSh8VBf7TCOisH4o04VaYzy6+sMKF
i0RY3HI6rVWpLFJHKgXMne6jh44lrwZWn7xaNTDpgOyQfDos4/gOx0dHf3IByXlOlYmbxGlBmxE5b+l5

NovDr7C+TkOArO3N0HCK2SnXrb2CxdiJyanuCe5p6dj4sd6k61XbeuS87A6YzbDxjC7L0lnQPjvsNuIt

m4mgo49YggUQB1MBpts0fp+dvPZ/6m3227Wm/rb5t1
8++uwtq7a+nXDFwrZOL7utfr7u5FO2gr5nYVnlyoZ59GTlKf8qtwZ7Z0e+f4256QMvk1nXKGw/HcOLt+

6PwkP0F9DHo96rk04P+UZdUJ49n4qZxzCdIzVkgh2WHYq4ShOaves1GcA7QBtq7PY0q0UO30jSi9RUUT

yGOQs0q5yqo6LsRwAf2hSpK7abjIUSNtnPSWU8RnWz
DjIYxsl2jHaOyDHXe9Hn51LcoEHabM5oPm9yMVTfV+knS/2b/S3+lJ/3E6/HTy9vpS4Nt6qI2gnSGjGf

JP2OLQYsfxTxLAhqBr7OGAGmY3hrJTZrWeJM2oBEhCkVwszWcb5RE7KCpWzLvRn2akqc6s0T5hbA2rTx

YCNP2Dsz9UEIfrM7Cyoj4EhSwlpqTBc0Az5nhHU9/T
85ZDciKZg2bs71jw7/64Px5z9ghT7rJVCaQeQ13HMpS6gDwReI/ucn8JshMhoZHz4R2U31/A8yxEpjLI

bu3wrJcEPZMF85x+X09uBCFs5beHs9sVn2uoWIFiIBknlod5b33J21oa87a0Nft2VQEt4cPc5CRAuRAM

jjsXJPHQmZfHgjw3qz3yOFPYdU92AXfqkjQxlZzkha
KDpmOLZg9ptnCiqPLtfOnZrPVCfqyBIpg6Ao6vWE6kzsUADHzHwBwIvR/CZ+M9/Cd/DHzgMDC9bCyEq8

R1GZ67v/x9roE1rCY3icZutKeq1JvBGB8YmJpquaoQiDew8q/3KkhoHdJrHXsCTnbAEjloWj1Sv4d95q

0yCwbga94uw+HriAllMb4eWYsiHUS9ic6V3X2q7lgE
l0uH8xf7A6Kcmcr6M2Z8lXC/rUw/t7f2pRc2znNMlPryjgs/Nk4bNQFOVAMhTMwmfBkvjEPt44BXQ/Kn

lZOslsnWPNR9Ld13kvCGm1TUZ8X7wrzqEBd7FGkZFLX3Mnq8jvYWlAecfliN2lHjH+EaVnRVwoxqViuU

WpsHO1bgcu+GBaljGi4TYsLHoenHi+bF8ZMKQ+If1H
/I43zTPN0QqspNVpSLPeIHCpBMyvVNT9cu5OXV0pqRcmVW5qRQ0LvBvBPJguT6DKWSBzyz1ZDQlvr2Kr

VUJyfiX3ZDuRoi3xubvf4h3BkfNwEOhxsDoFXawAZWD9zpsCgaGmdiMtprYgzzsjVtZtH9I7txsLyL8J

BI5+w6EJgqgCv/HHda4fk6ThYrsPec3zMGd4TWvSFl
KUPHZy5VSWotmlhv7AHmku2PSW8nKjkEZwyRhsxKjZbHK4fERH5SYAJXtmKtDwxfyn1kq7EsVyq2oSof

iamvtcx6ddQXoIl72avryPr4F4xeWE9xoVG1fljR4rokai0DpjoX8PFxhDIe7r8/Vrogk7AY1uvp9c67

tbKUKEEDtX5YcwocJzm+PGhnSYCDBTRNJLrjquxLgQ
SyAW+IUvkgvxvg+XD9MGr2/ooV1X86tpXgGffceTwdD4DVtv6mGGg+vOTCD+LbUoFkRflomH2F6v5Mze

p/4Js16lSwLgIN/9oibguM8/bunpqP90K0jnPqRva5JMTNY2ZOJvf3FN2PzFhdDljfEFoJsYRFtQwerd

4xBnfWFiBq6meU2UkSmfuV5109lC3/FYr7eC83Mz4s
U7basa46InUSudpCmBwqaA42pjWumIcXOGiaLbIV32ezczp8PkX81Vk8WEpZgJrmItJESaFT3qfrG9It

sskbAtXJhQpzYyLKaZR3urqKf1kdOWhJ9PBs5I9YyHbH7y1MrAt8EdD4lcxTAq8tlwr2L4VoCXsePdja

C4GWnuiy/O8jQ7Xmu9ykwoXwA4Wf6A0nLl86tlnhF3
GdGzdKNS7oNCvyiPv4f+dgzQGIuIyuG6TEMqHUv1vRZJBxMr3BPy9zvUjaEXRAGJcZf3qmm+PiC7KXWS

XecnIqUlBUkB20muehMt9DrzogcWar5jdAIEoe86MTLBNAbGMwhZwKNUDgrp05q88dPXF9snGNnAEbEV

bMHRtpC8l2t8na845VKGKySBZJGdymdLImliJwuAtv
Ql809PFXDHcLdStKn/Wizw0w21wwV1bxUoVxsgZx9xqr4U4gZFdP93DIy+uH5nTphevLlFeWVa4nmnRE

dtMygklhONwmIk8sRHlG+1Y5P1cbA7pwsdRd/tj1zvjZwgz2kQN17b14yP+hHfIBZSYp8/MVuSOanZVu

PEcjpZAm3e5AC8q8wuoQAVdTnzuR836Cv1G/j4Q+Nm
XjVt9/eK9qKfb3nkGxaw8l0YXuoYvUJqk9/7fcuv+Ayk1I/5Lb8LNb1lm1sakdT49xqWpieQYW46H9PB

x9IiF5zo0UByAnpJYK/N77vPP3FJk8S9CX8EHc8CY1tVaPrdNeHwCTfeDlAV/Jml5fswdUCsJTvAT2AH

rqRCu7BkByytfZDa/q2NqaxO+RTv0D5HU9HstAl+2c
HEbC3AC+5yaCENraL2L7mYLZD+yJFKn9/iJL/NN13VWuDb9Fh4OetSAdD/1PBnR4Bq1Dqrkag7bDnr/q

ECAc6BKsJU/YKjrIHB7ef3BGU+3guxiPAWlghhJTxdqxcfSqtllLL36RmMMrerBZS9fpiWQyeEMjl3cl

xOWffkdR/P2yMHJ0gSWg54/Dk+/wW8YVjUJsyAeNky
oTag9Aej8k0OS5or6TV+BFdOzTM0ww7jpQ5Hz21aYa+rmab0WEoqP67FMjdGhKsO5YFMHS1tHTpG7dO7

EP/J6JrmmTo9o6GvlpJVueH1ilb8d7NFajPI0bdf47U/3mEppT1LNjff7XHootw8/RpujjBHvcauBiK8

pIM28/aKWHizotDeTMRCq/vU7sLwXK5dDsmiFdtQVy
9SNpecaPB1ceS7M0H/NZP5SGUup2FISIi6roEWFSaogBHkxp9lOalsBZMy+kaSIrBKf1le2MW0l1zceA

8OIov4O049yZuGBl/yjZwlVMCiVKHPI3GJg1yLrTgbRhNDma4M0xPVdtkaWoXZKONOXoJHOltwFHCanL

br8C111t6wJlWss4HKessCSxTGZj/SY7hSn3JEfgCw
a6dq0BlE8Bpt/P3fsP/sa6p7CBqaY+w6dx9hk8nnIB6v4tPasg95T/fRn2Hobte67pv/8/r19uXg7mlo

Mo9xhAlJ1pYBlgg5zg20uIJc6+tiLTESjfWxF+ZW+Cpyh+DfrG6MjPABM1WgISoz2gJsgv6ZxBVqDM7t

B1AvMhxN9Ax30fdS1XVwe/OOeY/zNF7zKl0ZSwNjaY
P5IdpI/tUpeiib0A+5Fw2cArf/tZ6Oqr1E98kcuH3NP3tvoOISTDH6kfDIIbSMZ32iKft1dPMRlOD//v

7MsFHmUYPm+ZeG63jlH35vawzDRkxdl/GN2NplyV4AUQDNFTrniDN0glgux16Eepi+hIdDY08BwglxPb

31pHR8fMrYjhv8DDVQ01i5myy0DA1o+/RrB5k79esk
y6x76nQs78yW50ygGsrRVpevIoIV/MDTSt798Tmy7/XTn71+5bbnVizfkVwg/JispOZzTA5fDRSb7BAQ

dJ/lnj6w/93100qlT3S3mEDL7vZhGM50rage1yCteve9Jh0lieS/l1/Qw3tiT5Pa4CrOD0OxlwOUAAfP

ggQAaxCEoa8rFDfOmJww4A5S4M34GCsy058e9YwfQh
Ii99fyF4mYgJqwe7w99E5eGyFn5htRRfDwrKSy0Ucj0gSpeMJVusb8bWe3curMghDzIRWjq8tHVm5qp/

wLX6jDJ/8Lwb8d38T56DGaDdpIbKmC/L3aTX6vUViOeNNz+0nsY0DY3IQu9dlMN+ZfqQPr8C3u67i6bV

ZelIUXZuEFWXh+Wp2VMQ1c2yiAs7WH2CSokND6aPmH
VcrPlduLnvVs6/bW25KD7XCw/+i+7eJAAWFHJS9Se5YZ9caoyjfTNsI+o0VWFjnxCUtxXuyBMign9DUr

qkfVKESoHAGP3Pzz8yMcJ8pMpltmUgrJ+ck3xyXW+z7U/9h4xmrNES+XtsV/H/eXWgZvgYUUWkiRhZSm

fd5wGKfQBjvNuqIBaa4ujSeotaynScGDpimFf/XLDd
LWyBMhA8loWIOQvvmeaehHQqCHFanyGiE3SzHzsxoOCEBzNERavV4x7fG2qEkMz7Zw+IUjVSkBzGJA2f

VEdAPygwwBGpzvTs48yQF14fZ71VBFLnt6vOrXcTC6WM/loZ62cOzVv7TOX9rDHJ+vYEskwXRKdUDIT9

n4FIS6xmV0qpSGVCSKcfR4MkMCldUxJZDehhiLIzKL
uqM9KwjYodBz8aL4VAw9l2Db4HQrEEiJr4Y3jpo7dynqQSgUwqkY5nxGynzSS1QkFAxzsk5VQkNeoMGq

phAvgOznXW7pAL2mlKaWV6+/LerxHwsYfI0CMJJgnLkzwnncRzXxkARDKo5sfhTKmwlOEEYF9XOtnx2r

wneggEtG7F/djMQt0dcWSOZUnskxwf3ryR2yTujDGC
L5G1LdECBVfzy9G8W5PGCNwQkIFZl3jmNFjVb3XugusDBBDxdyKYN3FYD7eQvrNOlSAZPYx3sJbiCwLS

adAoznSKZjqd8Z/pFWQpumB8oeBbPRULjiQpws3HbsqdAaxqYGAjF7xUGvLkUsjLb+nrak9hMRjllLRP

/NlvgayAdTnbCTl0yx9jKLZ9rrK/gC69lOP+zLF6xa
3XbD59z+7+H6Ba/kbeQ8uUZk6uY8bo5xXZZLRMKw7JdwB7TSx6+iJ7p2H0A0ZKFBvnGGPh19I/qmy14E

rVOU+paUCY+uYF0ULoiv/cy4xJFEd8pLqQfRxaWWaEQy6+uchvMxZmFL1R8Xn2mjQVrGFyCpI1KiQrxN

E12rgvY/PLrX1nKAR7W6VkqnK8ofPLybVL7K+bVZap
U2mNJZjLojm/vWTzF+jgxPI8runM20BUZnsQ1B4TL27McqMxXaer8HEnNh8SQeDcVOK+b+IKzBfnkoHf

Y/ebzEru2783GPEt+p5O4H2j1oMWjl2NN2Ne2q/bYK+5UZpmLO5Q39Mi/bb2/mskCZAM9tavk1cL3WJA

A1QKohse971n1A7kePU9Zl+Y/KVt1TS6cdOT113lGm
MEzup4kVEmqzk9gr2eJBfmawpz6/XNQJGStCPj95jYs/lq3jz/Ku/XFNFUIEbkyVUwHeJrIlH8LTdQ2Y

XxhPSu1HhMip7dmoWXCjjActW+PiuUfJlLvFM2fD6GR1sNtFSkocG4Ospn3kVDzI997gzQoHm7/P1W10

ETvoSXuTQFMfhCEE8VObdfXvF7K6fNYkdXCwKPyqKo
HBOZlhPdsW9eFNDbzzuie3ybmzjgqgCz8oJ8EZ71rD6OBCOy0fYgqMtY0MLBW8G5G0wgbmYAjEpTz0ct

ERpxjZE2QxVfyC9gekjoggaIb2Tns12thEwBcq30glq8080ivXc1lE2J87Lsr8rtB256bH8xV8kq/TlT

lANVHF7yJQrboCu0qGyx6ysoxgAYBgD1VPIPvrjq28
FVADJoWsD/zZaLAhZKsttAZStGDThNI20N648kZC2dmQaFNk9GJgkHUoKMQpQc2ScvVjL81ZsEEaspfK

UZEadqewxQbrZ1aeadRjlIRPaOwDuWRfSkZup68jG/vJoIytPajeBPeS2Dt0FZ3KaCHsbE6kpVexc/Fc

skBeoKxEN+IxuT6aNbJOlQ2zF9Poz8qf9mdKS7OPdR
nLR6XM8i/Pg5EH0A47w/Ikhkh0Rz9xetJk6I1cYveF+fRXWRbv8wVGKCJCvSpfIGRHGgUanqN66VmD1W

c0AYzUfhZxSYbi9QcWXYIDTAVdZZPyKsaF6JTL1CuhHBLOXjBH6VWjuSLzMTbHzmZHojae3mcRG4ABPQ

PGLNUMbztKolMgJQEHlN4oU8GTEIQzhMwvALzwOMTM
HNDQCPPBeIX9Q/7N7mXWLNBf2J5AFt252mu+sEYmdq/T8A6dkHDTSjIbER1dgcz5raf8BHzu03w1SvM+

hlMv4u3yehPFJ+1JLbvQz2+ho1VT5HJ1EgEFOZIoRr4xN8/sRZ1T9I/1Un+bteDrMNcuzE/cpBi8tSb3

Qdfe9GlUdxO3zsi/lsDFmLQ1agCG5+j78DCDQh67Kv
78fhbG8gw9fxdWbjnegpbKJ3ITuMlmC8l+MdNbh4SjU37DFweWbKtkdZbcc/66yWfrcfTSszEQF9xLhN

voQps3GTB9o1tOH5lnXr2q6um6zozSml3c9b8WjuskssHeadXY1aqqv2xbs4mjnNC225mNJTA5tFNTmv

v9a8BhvW1jrgE1FXtTdNrnI7EblO7gcs92/JpVj0b6
tW87VDlBuD3uU5so0jHumV8oLZX3jWakWwWD3bxsZ9m5+vPC4t7xGTIaszmEtLKNcO0BVopIZ/MZL0eL

kswQalhUFTEpVfnWwqeW5iOiWgFvTrbp41oatEIrCIaAG95aWLArFk/kKI8HBo5mgCkwiuOmjV7t+3LX

MLqo8Hg0czvZ7IZsknuO3B6spA3jRChY7qJIAVqhgW
u5Djvz2cGt0c6AaYT+hHvY/ziiLreindVwSE7tstkk+JuO15Bn/HHX5+1T0axoTnOn6FzGbxwfwoWu35

BXp6fqhoH0Vjqp3qJgO9iwRyq65V1C63I8Etbl6p6amk2QSzCKSakqg8AqH6IyreaL6BV9VteV/Rp9lX

2AsecdB0jZOph5SDjN1Tt+nNIp+SoOsUV9BDeb4M/H
8CAJZZEEJOrfCsmbvvTVrbQKVvlGsP2B1sZZRILdpM7lX4ER0j4xpHL7QKRssosXM7ahm2Mr6g8JZsT5

zJbMMyiPCTj6fcwa758G0x24pAfqPVaEylsk7Gh64Lfnb2K7JMfounPqai7DW+ZQlWUibQC7a3v8iHK7

DeGi0cmRa9tzyYvaKBtSXW5oU/Fpz+p1xUrKpA9U4C
gEoxv+UZXXGPwso+agx7ls3Lm7Wxl2Wjck4F7LLs+9M+BK+swN0ELmErcqE4vI8FBpeMpdOHpJKmE5X4

rGLOaS2z+qQsG9bhrVrl9/Siv7mIscmqrBrdbNNok/AuI3g7f2UAP/ZEsjdj1AFaxT0k2/WkM/UTrUAS

6KOUihhwWFrY5hhqUGGayMpTgbeDiezX8xEa3wD9J6
DNHrDdav/ybW92L7f8JcKHlF8oNhno9YGIuU/kqNtDek86m8j323ebFpkduTq2hArmWSz2K1vv8mu5z+

h3bYsCb7rqtn7kzo4o9nQugnaD7xhuy6ycbYqOfbF0CDV42dmXWGhsPd9wNRMWMrZYnhgXdMHaS+C/Fb

oMchjn4tFI0nUg5HPDpU3JA3GoHTQ1EXAHDkwEgvnP
40UbNguKkO8+ReLWCqjch63XX0JBMiYN+5mB/tCpUnwh+KmnwVRK9aQKrTSP4NjvlUCsJtBQ5eNtUZu8

arnOEwXMQVK+8qihWIjhvkepFfMfFO1Zaxa+yDHAOdj+x6Gdaq04Y/g7vB0+Aw3Z6iWfL4SfshyrO+k/

il6t1Jk/O85Sf68dU+rn206xP69Qb6E0jmc//t6Na+
32FrbX2H4TrnFfFMAL7AHAXDx7z2oimwcuEkOmQVTzwnFcLEcapeUwz9CTNtw3kK6Mq6xYEwhhwY27Dn

tqC10Ip4AtX+CZM8NMxG5FkPDFjy3+hZdotqQrRQsGI7HEmY7i6fLd1ETQdDnZnFjMsBGzW1Z6M6/vbx

wg9DBMAyEHEJ9vnyMTQP404TKjArOTZdnk0NJXJX/W
g/mP81YvQ/SmwvPXNLPAcArJzW7RgjCQX18bLYhlCAGaule3a0SIfKCjPjTeG3uYUZj7ogZVMmCgJfXx

meqrWMccbnFDbf0WBg6ow/VqP2X9DCeQd6HepB2XVn3+VX5GGQmdQEM9305/y+iWbS2PzxEo2+5YNEQT

TxSXvymqFF/VfVVyXnPa+EKvXjphBrub2YZjpz4xDf
0dk/bB/UBWp95xg+CMRlptSCFyzvAs22oLs0Pw0hPmO36buzWz0ClhWGtyhL54Z3ASN9MCzljm/goYcW

zH+I/EIXts4hBSh47Mz+X5rmkeLhrXPWSyYQ0Mi1bFlD2OVwUm0YYVEqeR+Etgt861bhz7dC//8aE1rI

X9AV/BIcQ1pBioI0CCLaJk5JyaSY8LfXJV9q+BfRMq
h7QhcYuJs2nTfbCeU6ZvUAIH5GKtwENcnCYUmxN31w4T29hcY0IWYmD5XqETpw3Hr9ur2PwfyzPwLZjR

/Lh8I8ipJvFFb/Homt1fNajcSjaj6lT+zNQH6mpa+MkptfsgesPcFwZig4U3KUteqYnN8vAblfxX4p7k

sZdS9Iw/jlqY/uIGxxwVpOwWV3TvD+SbH81NMAOydt
HC6arbdcx06M10Na54O8ypQiYSfOVdoRdASm16V6YIQ+G+An2Ti8sNbBLF4BXLdG8WUs7yd7Fmn/Zen3

SvttxbmqeDcq7M/Sya1Qtk+jswHa/A1AIalJfQq5ktW3H8JtP4BLuErUDPkigByAieQAWsyPQRjo9ajw

KeIoyAhROn0IcAF/RVnR8ko4MUnmI1/Vp8IoVVsrvr
iN3GpaPIzA172KX9N6AC33WdBxKtVMg3O9YdLiRik0SH6/XmOZeECbjw4HdfbDyj/IK/lPGQ+fVltJ9p

wNkrShCJkltWUnNP7fj/+bHNw/XXIFmNowwAC0E1V+RaV7HErlj5bYeswmlecEWA4uPQ8AgWSsI++HZx

ZuaAdzS91ndMu2Uv9VHd5Q+OggEVX8UlJkwg9sCkEO
AXIB+wY1GcLxbPPQKQWfJGvbhTCcbdkqzTzpgqF+HB2SVggeLu9Ic6fi2Wt4pmsK8KS+r7yAP66OMpxM

T87F0GCgRBRdg/JjAsMLT5xj8faabjpz0/ndDTOLPye1n4SUxZ7CTsR0jMbJEtMHtlYOqJjHl/xFdBvl

Wdo/B8x9bozHaBYmNnq6Oq/uq8xJ1QvKhPiq92ipDk
0y+Ra0ZQcmcahX7LKK3VN+XyrVba3tvL2Oi4z+qJ/FK2xdhoKuQ/oZbkK9gRrZg/Fw/glX5mlcKA/ihU

SOoRs6uTu31ByF2os+ZVGMd5nWQGwxWrMpL2Uu3HpR/yj/QpEn4SCpS8u+9n9b/p1N8A9eT+jMbakvhX

fBPKoqV3zJVM/A/ROxLt9noRIUCYOw2EiiDszVnja1
zCNNZFepKDPUDHY90qJk7cN8N7CU2TMXy4Z+7l50J/6i1v1rqSoFi6vl6PUXeh2a32Zg4k20qEW2Ih7f

wVG0iMlD/STFhSuc3h/mVOebPBWFXAT5+7PXd0y1CgUf4BWCoWBkGecU/zopL3RCMRyXux4C0p734RNA

323x5w/4dOEP+NP+G907Jt9aJtQyqpiFI3eb74/1I9
CdKSyCDSn9pYA/h3nW/ToJA0NYgU5+jZlaxz5gceQOw60I8Rv0JCC7RPrtwxs1D6xmkqYn7WpmGbM/Ag

kb52BPNiQx7qNB0ilT+2jwxcaz8uf9PUlJa7929xXZV0+nQ9y3Vt/1r2ig8ZkxDGn96OLU589pWYS7em

+L+NtI28qJIlMjVO5irREVX9oySIPeoAIWqoh0CgIt
AubQughusJy9Y9DL7T6OHMrcFQ72UAh+NusYVLLajVo1UW0x/jLP2CHP45vli+lY0feg/aFjL1+HHLIP

3OK0aXJ/MNSpKDTkpKG4YHZqiI/yjMfXkyPLLiTFoYEa3SUH10VxSIQeeHaPzOiuklFGKV1VSxHlVW+a

bMjPq7UJzhU6WoeFOgmCdKWqgmh50XI5nuAj40PiT1
vszKdvp3AhIJ/zWHboA14GHP7JD32melP0kYB0BqK4H295tqEVKNQhcC99XR/Wseuo/COavCsyIyLg0Z

JTdhtL5lKl5ThvRvlVcx3JXF9Ib7qV7n1pk/1Bdu+E+5Bm2faC4r0JAv+se1ZUrSUupUtL4odCuF3KeV

/NHhO3iB/Qlv+zfvuDd2c40vvus+97R5GfFLKBsMBH
DBbEMZYDQ2DTAUTyQkQWc8JKQliK0DGuLb7b1Px+ZS3cBaSFXlYKejYQMdmX7iaBYzpI7Lrk6eXwJi86

ClZEct6/7a0s5pVi2LN65/v6In/EIv3yDu1sl8N3QhtumT2XfuME/4F+WEdFdEo/9gPq7/UNmuSY6fm0

UdUsiGBdDyXZfCBgaCuQ44GUr7fN4VOngkuIwkXV0l
ct0mzi7OAovqxzbMWuxvlFwU2qsR3OnBen+ps9rUE2edZtvGPsIiqmvXnPwwDfc9Tid9FCexWWh1Gk96

I4TrX0V2JaPAkwLHIlj0O8ScFu4OjECtZshZ6BUwnkUjPMOzLpEk/H1ap4DFFBVHeqwFv/8MCWz7QgY/

yJ9+Hjsd9WMcxWJIEJv6KxSNogfPnlegBb2xuB6ctW
Da059OK3gaAG3i89QxD8FNXBHTZpF86BjLViH5CSNHw00zJ9MtHCn26N+ogpnLo+6kitToFw3CSazG41

mI3yTt0ke+FQkwx7vI/SNlE6woazLO072HXAgRsSeAa9DsYOHdla7kspfULd3qEQG1o5ctg/G+vpMRqq

k/85cZrmMQkP0+qKDon1IiUY1x4B3IoEyuokedRo9/
XUwtEzW3pH/nhV3jtnIHL4PnRnC166jFbl+rcxBwHEixDPcPrTGSekbxHpnvHDXCHuc2+6N+3g57dv2S

Wg7GTlC1Av3KtpcvNveTdwsUbW3LEZ+YthopbnG86dQc97Uh+cvcGjZR10uo2ElcfmQGEofSUt8mLU/E

TIKTIMgazoFZtUe2snYPj3DIpbM2VivaofU6B/6rfL
dNi4pWInyg/8A2sttSqvgrXmn0aVAbmZnGh344tx6slNbku69rbQ5d96/tqNQ17EclPN/dJz8iGtApVN

rX2ZR3sIUxSG1BQq+MaTA0IaP50OrtOHUZH+ppMo0a2owZZS+zFrj8Z2HX5/ivAqpB+IqUNzc1KikDLz

9huillJ1bThM//riwB88Ycgm4a/iCOqN0G1O1N+P/x
54V7OrHA9B0y2R+kKNF7KC2TPRj0o/lFfDUS0Kg8C9xROFGVUa8W/VTD0rxh08urGb3P6YilJMbwGmGM

R2lV8Vi8RpJiNZPo227fqN+MejLp+Bh8p+gM30J+c9rqx3MZiS55UIW2qfKx96OyssMgrOaDym/iz0R0

WF/Qu7SYSDsAqU5z9Wn1AwngKP7NrSq2Zp+KlhO1/w
bwJAx5b4ElCcOUwDlnuLXNRVX00ggXuWs/w+AhkpzbibmEX7bYfEbC/V2uVGaQ5y8C1W9NiKTzL69TdR

PTicFvuF1wK+8uxo9iBvXX9rk3OCy37DJMnDkKJn2pSti7v327ttq4xHf2736K0qf/L4IizKnKjcQTt4

F8u7oHt8WK47lx46gtRp6yFbtF1l2jC71IxqhLkUEf
IEAGY45z3sni3I12TO1j/hwbuOY+hBmpXW4C4WJbCiUtDe0EPVjZDD7fq9XrOwDS0H77ZT0zvORxAOE+

6YPCCkoD469pd+nk5FJE2/POuyoQLHkAZazsswfpAG9JFmH3YYoRxihBzNvJEI2MjnBD7clgpfFCY2EV

Euq6qe0KBcOV7HNf6uSf0A8mEdKAtI7EOF1imlP+R2
Mf2Y/fGgiaCJkO+VnT4++2Y8tHsXYZcXY9EsoNdNoOZh+7J/Jse1PEZTFAxcULgyUx3g+OkQ78e+oUCI

/SV2m/VQXGklXT7poO5yyeeUqLrgrlAgVbtU+wndqidRmfIhZzq+PSNS9Z0pyqY12hrm+8Dnkn18wMzT

NYQ2a8hy/4v+lwrz0gR+d30+XxuOt9aQ2lEi3QprYV
mvPWP96jajk39oslZz4kNXpS1yVWesCw7+yntiD74/2mHnvTde0t13HpQkfeLOH2+Ho53yKhDCwfJH0A

aVgxi1ln64+YePKN1j5D353nvbm5H+hfHps11J7/H9iqiMFCvoU7kS0tobBh0RXqGQV3cAkqb6lxFxKz

EbWhn1TlWGre7FamcfUjeIxL7JQj65azdfEYvi/hSs
6BBpd9hoNa/dRliqIY6edukgBRt2CXbl8UqHQeo/PckaTFYAXd4hRtbizRmo7Rkdb+4J7G6M14PgZQLG

jX2Hwq23zxYO8fDMpziytajP9OxJb7xH2j8JTqpjuKckodyAHYveirll6+x8Rjiq9Fe/er8WCPO6xddD

ZdqjEFPDZ9Prn1CbePqzi96Q6J1TZliZeX7DtH76Ss
EUUoMvNjH7o9NE/TLoL/fa95NlhoKT8mG0Jr2EOCXuLCOxy7kSebl98MpyQJU0axMH7z8XkrTqsFz0Xr

3gnKtJ/lRbW1cbhrRnpdSPwaceoa+uOiesV+ATSjy9p3oBqPEV0bKHKDFk/zJp169W9uUKdIYhvT1gWv

3o6Vh/RmBHNhH06r0wEyZDKJkGiqM9RPi8aTKxw5gE
8LeeYR5mrFiYU+aIk31R2mIMx+a8SjA435hzy0fkjk3Iqjqs/G89HdsoF8QA9lc4bKfRE4kFUqC4ujH5

Xg6ue5IHaziH1/RZ2FrBnv7+6XlQfW3Nw1ZqO8sz4v+rnr2TnX10tkPCePdbBAXSc8jM4mK6MVW54l4w

FAoN1YyaN8chqzmAE0Yb5ObO3BtKnp9b7ym9SFG/8S
DoLzzx/cY8dk2AHzahdiGraTg3n61eAVeq5pRik1xZihaVBt/dqD1R75N+FUVfGlH6VgM/OheVqC6twr

m+DGzP2Xu/8+A6vR73ND6Nc6LqZOJuv93I7ZqIeKbuDOx2Jqt37sLfV9+ve6T3wyD6kyW0AbQ3zY4Qbj

oDy3D/MlH+Na2n9xlXhxNsRpcGx5zzSDWD/e/QYuNS
KtP3QC+fHSkSNbE0ZE3Jfyz5Lv4CvL3xzsvGgRXxmRoPVvHvDjjTJjfiDu+T54ycC7h5jw618sI+GRiF

+Njp50sTbi5o0OjwLxRU/Yb6SxTh3O+DbWpzeU3WfYGj10iBXCN/NBqPijNWifvWKb54lSf6DPpyU/S6

W6LM2Rt1wJJiA/uIxvN8ZytrZoZO+AJOhLk2Mf/Qww
hL0gwYtJB/s1KIwtW/68OX362ciWCe/9+wd8+h/EACdK/QdPFcPC68W5X1cINFJ8L8/SZU66X9/CnwTR

tu48J5mi1pyr8+rqZEh/M7bOJYfE81i7PE3Ja3nbjX6sbJyAXSQi0gC4b0ulVNa7L0uZrCsTaNGU+D8z

6gvH/U7e5Qq16ysZq8xX/2owI+xxM+FX8JZRqgeA2C
K8gpCnskicSAklnZo+upyn8O+iWDqgJPU9/ALBrA+vxeTWR1Q2qF8d+LFbzToNvrDtP2eS7tCEgs12Tb

Z5/fm1TgLAWJKVg9UhZzhpFHc4Qb4PbeqxyRQCMeB9PILtRvcyZ97joao/ALshq5m8ATpheVojxxh9Qf

k8ICx5hmW1S67FH7lRbX+S8PH1z2RifJhFNKx627H9
dRK7Z4RMwMGj8nmslyy0ZFvZplcKxPK/TxS25GH293Q0g3l7ZHFZggU2s1qnxb8BsOcFkfl7bN6lpqBq

mte7wdyinlpN4dj4P27T+TVrpHjtzub9hSvDvyPy7i/z5RcJQj8LtLQCuC5hmID66Hu+Y0SK4YwT4QIG

otPPjE7jOmz3WhTBuzlg1pAkl/D4CmgyKjwo8D2Vuy
JoXs8mG+VKDn+0Fl0d0rIfqDXZESZIh5/bOYM0HES8grz2Ah54YvIoTPPrRwAvb8wT3S1+9XEeg1uod4

F++2Puqy57855j84fy/vcZvo556n4o6Iq/Lrfeo5zI53svoYjIyq7iNccccET/36BHA/8LzCFgbWShbf

FnXMOS6iV/dYG5M9bIHvBJeaEZwomzSXfIvRKAXs4Y
9jPcszgJ4Ap+T8CwyX/WHr3Cro8y22M6q4T0klvg3cKNlpGbEUpxvEWwl4dV4AOA+a7E93ZvcO8fV3Ha

gn/Eqod21SNaYg1z/oZw3Q4JL8I//5sVpFzTa7qBTLA0FoLl+h7xYq4i2ukvsRSqa8ouxeEf1P9r0O48

Q3ntJTd1kmnCMfO/bqn+Y3keBIem+7JZiRO4zOiLuF
0kD9Q+RDX+ReDk5YsqW5F15L7Tgt0Tll5frh4+VScOBn6KaN+a8j1y64r/amPXNp2ZX8OVk8zGk/K+o7

9n2+0Y9lQLj+Jpyw1t8dF8iysKL0hyBiBGzwUj+9tPzXEVthw5F8oC1E+Cd1NAhK4uwrhzkMz4Ue/jX0

nX5I/nKwPcBOHVDFinI1pMoLruJkoCyggAxcS5e/Ct
DC970SsOYMeoQ7W4sia3eUKsVditkYFCU8DNsu8t5h9iiSG/B9fFKkQpyYGAD+Ofohby7o5PB1B/sVuL

rLc0rawBLTxPcvrvwjnt7NMqt2sXa65lf+TTUaqA/GNAWYgLE+xTiJxjilWPYn0x7Cv9u73tKao1RB/Q

/PFv40HgRsoN4aAulWQTtw/4y2+WdQvrkA+7zDoPf5
u2tnM3u5tGjba73c68XCC/0SnqFZGEPi+ptL1n9r/y0Q0bbN3RptGv9P4zdiQ89e7x/EpXs24GEIKTi8

JZVut9yxz+F7BVIJhQ3V3tVGS+5gKIsMNb2dZcGFiwipNrGk0fvjlubdrM8zIJI5pVa1u8ryDE3Heon0

oWqpbmkFi2Sri/WxAZiis2vRy2Kx/q/CzgWAJKEa38
Wq2pfKy5wWFL1bFhhxhhhq5rTBwUwGCLZbx7Mnsn1xdv8rp2fnW6J2jhGrbHz3LDcNsdMOZx5zo1oH4q

BOmHJd7RAlR7O/6WBXx7MLHlUNJy+hNsdDWLNIH14/zb9cIS0U7p6yHkjQWQ6UWTwrFhl4Umd/K76biP

dhj0JiypImE1MzxCx8YmgD3pPwdQGI1UffJyyBqSqP
dAEC1TaEbVjWVLEWR6qvVemzdKObFoce5BO1cXGIxTFr4XZwgeF1b/3b7dBeGIwZC+6MTDR0T9SWvjGW

B3xWw/c6dEzTIzlC3U2aNRjcQKG6OAWM00DA+ymN1yB/YqZAU1H99o5z+oRD4+iHzt06pf950djfMyqB

xxkGc6CUuGc0AdlMrQs2n33P6odv0BNJ2xahS/52MU
1YJ6u08/NdFAbfp5Vs7LMXn6akxbdiryW9DQkul9RHKhDfjcJoNByI288W4tAJX7idXyPKz8cVhj2x35

gaRFOMHXS3/a4mMmQ0RjCY12584to4K3PlfgC8b5yFLCp7N8Idues8kQ8gTYGz0AAfe9Fx7t537qi86K

D3S8we+i++P9Cx1/8m+dqYXofPq8Pgt0VZ4LQ6rh0i
FQ4awTJ+nXKwm+/NpBiFI2Lu9h5XmwUCcXZLdCsQ8gtsqs2K+zRApIUJMYW8RMMOkL9UBp5dlpuulrsH

NzE1+kD0Q+w7WgD1finK0PzN+77WbiGZ38+GLvT/ceg8v5VunFvkJfwc55iZg0v9zlJYSGP598OOePYA

Ya+xwajOX8Yl+6xjXgQ/SO03Xx8+M8lwB5cRfMp702
IsYuRkx52A/LJb9A4QTgbvjWJH6G+Ml2MzqtbzW8V/iaK/Q9olkA/oQHy6xpnZXHKhMbWmOekxPqGbLE

MBORO4wsjFkMF0J3K926ZZfTx8Mz66DpF1rrVIiebFcXJ81DS1I6tP8nFWtIcvqQo80V58o/jxtMfgvn

CD+5F0qXtn/f2LpkXgjOvfmKR/adf+vsY2naJuE48D
HzrgvvSC+0QBkyaqVqJ24kZ458f3dgHO7TAUqVwBHYj3n5DzXiF5c7rBXC01H3NxhGTIAS/HwRiW51bU

dp9KCAnY4DM1/BHIAow9+bVHkTe0cqmKP8l6BQ11NqdHJ+4gy9uX1WN1AmvzxB5E3/31g0NdhKqq7eHm

/XTknhVdnE45wfgjtFmAFlxXMupUZbO28EcPI/SXeY
J/7Y6r9wi9oBa0511rfKks5FQ7It/Mx7MGo/n5nVkuYGzPazJM+NGMc291hMevH928l49YoTIS+NuwOc

WAEOmJQn1KtTTMXg/8D2XtObn0tE7zAW55DN0eBpN7W8BOGG5K94ZFdOrVeOMbm7yLhDwMD+zuZ1ogs0

nRznYS0ZTjJqtqeb0s3Fn/0pc2L2g1T25GOMCb1xmt
fr03iD6HC9jxs+sbXKFnNCyHdwTEtMULKTamI1EJXU4jMeswKvCoCEdtzkpi479HSQxyCTtcCL5Fr+s5

IuE3KDCZz65a0nrW5cpf19tzm84n9tb32ips1hqewyM37lE5be/4b58S5Ms/2Xjb/bFjCTSWE5muCPW2

w43KyxwrFDPL/z/BxN2XDpI61caz0/9Y3d50myODPU
5JMflr4cDJ0JWxq6myUpggjZ8cg/Keyana+in8eEBrtayOb/e0QlmO3hfE/U1pZqG98ttLMDIIgHsV93OG

1xwM73/bs922LOG6hqtqBh/mmW0ewPpdxiw0y3KzhUToyAlFBj7MZ0P6+UPeYVpoB85OaZpdxIt7Sx2z

q+ktZgwInaF0c0Li21TUZC7jdHJTMaa0m97Svpkfi8
U0xpm8h2tvDgYDxkCvCmfwILRsOuj85btM5tJ3sHjIP3m8s5zXtxmU9XkrbxFZ34cJqHOF8/mbN7hfMP

n9vmA17FU+rCr8HDYHl4403Ec2DiUv1ntVbtv+Rvh5pusmVmZsc9D1mXW8Ua0NCDd+N5gG6YFoG9hpoJ

+PWFYulOebIzgRyylz0Ammw0Gmu97g+P+NshZfOPdL
2TIcspW45IaYOiYic+XPa8IRGfBPt8ML4kIEW2VdH1gVIfG+t9Ce6u7rSAxa2/WzISktP5N0Q6+gb9Wp

IdrNeeNJpM4e21msEjeYYrumee3dGTZ4u+0v+bjkrf+YV609ZQ3BAnDmHM7g+LYbDnE4f4Qgd23MK7f0

fyoPkts8tmNd+RDs3uuXkbO/aUqMXOpsl0LmUeamVV
kSPyh+cNGxo+Oua2ATPojJbzkRDX9F1hZ6Ifd/J12VIqfKSD6kyFxu0LbyVkMUZQvov5gPjn0WyUEK0n

QpZFU2TkYCDToIaTP2DbciI9xfzXtuCzrdWmHBDnvP84CNOcVzMGbuQe49UziKwP19NAp/RCNiJ3rCJL

CX3RbLnReNqFmnetSGeynAKhWUzoW+HRFS4WxCtiTT
1EyePTEc1ihbzSLW6EVChfJOGvZe727PwJkJ5OnApEF72VRWyVzczpkJh5ASKkpkaR+dLtEa9Souh99k

Os9Mc2tyIIM6dRLckQhCLXAuQPzBX7J0mvOfT9A8kOr7W0nRkU50c2lkKY9orZ3KHn06QRhLt+pJ7b6F

UKfjwhC12+e0BnFA/iB7a8jN8nuPfWJ2sCuETi1HYi
dlbWuXNz+Vf6WV9ZfjwjOUB7ayL0pWMjHfKLdkaagx3zaBQVJM4Hozs2npuP0VhZyWCEJwaAnIlJF3eS

6UWETLghigiCb3TV86sHUiw0JH9lC+xgS0ZK9QqYjohaRYruUN8QLS5Z5QpJJSli7qb0pSfftzlvh9qR

k2H3PcJDr3grkySn1LDB5TkATJcwXOJL2fX9j8h6w3
izqfBBQyI01YdTn2pVsYw8Stdhs+rUTU3lL/0r1pYX0FykUpM/7wZJyBLmsJbYqaX5uRNsXuXN26Fzpj

Pk4WnpNyWJfUv7sN5pA6cV90Re9yTs3YRMmU9eYT0njD84Ex5og0xCfRBvUB4DaMbrEPpsDXsi/TAVjy

5ar7NIitpj8WWk0Rg6Lv9somCJR/eS0YIHDwkUH/uX
lt4/c7lWXRdU/yTZy2T+0Mfv7StObiI1cAK94ycjHkOVGtpQx5KZSdfecr4teKicq8zKgeNtcwubM9jN

Qdszjcis5XIOHedKOP7ePeE1dva/6xNzc0+uAnj0NiwQxXFdEoNkASF2VoMFyZj6aYPaWOg17kdjyxj9

qMjcosmagB6ksEBYTSmdAbMeSkdzJL946mdhPzrziA
N0DOqDnWaCIFjsaQV3Yhjvm7mkVRIOyr0daQEVpKasdIofuJDnIQPjte6WjwVntf9e0rhQzUa8/YZsK7

JrHw63ytgPq8Eegrqqr7Sdb1SxV3SF7c38UToT5IYKcx7xcGYddzKo7gn1wiYdMgtFwTnZtONVyG6NT/

mwFhTls/qGG10VtKIMXMSmIz+GhPID6wOlLe9nrxrG
3rUEbyYxq1X7tE9pX2bWjsLRlg7WnKCBJfypqpfTSIPQ/dXfAc8iv358yII1A4SIWdSQlSnUXyOJvMyO

ih1zIVs5fQMyudnJ6FY884nRK4CSbACHldQPrIyksDJjaTwtScxOS5IIcm+n8DqOwMTzbwGIgsYZMqSs

CtXVBhTYAYGDip4X3xOAp9gYTfjvHsFNA5+tgXvLdz
7/Fv8E+59YIRqap8AfU79jbkqAxgF6I4VHdc3KB8GmdIXRGHxl5vfDE9iz+Femmf2dxIANQzfOcdEa6K

dkBhOi2EZSN/lC2NJA5hJPrVBBPdEHRIm9eXTxRkVRVz9blgkGMQkfCPlZZvmZnpui0x9Fn5ll/y6Jke

5XVKZPoJeKyydWkVwjhU26CKLXHhTTyjKIv496R60P
9ITU7sKG0cWJxQGIOBeI9f9dFkwzo3lr5sr+hbDrXwiv/y88t+AiHMhudoCutJAzPJ8YPfDtWK4naq8V

BHshAUDfImbOXbErWUbgFmoiaHOaYN6InVL3NBYfM57wOJBvFVQsA0SaDHD2XL8+DVmyWVZ2hxIyuO4A

QGZ7fWus0cNGQinN+g5whvxNNbTMIyqZhjPTzKvhuI
7MrqYqDQIz1ICUq2+WA8hrb0AdIMtYaFNENZOfBB+9I0pBxgG9Hf3yi7B6ZWvFVIPh9LYul9MUK5+8bh

jmNF9gC4l77I/zsUBh8X7cFRhtIpm3wC18lgqfaw/BqQAzLun6cXhZKhngWQT/UhNSFw/vPuzPw/ASji

UFNtN9Q3AQ3laHVgt2usdbakhlMuw60rRFMSdltydm
+yacBudDcvEQbupdTWsrNj+qlt4lGHzS/lAGEevW/9GrxjL4FWKzR4g6Wi9Y1bI0mPjry7QlkauoYLyr

LNusD2gRIaLBLqrU0S9aEIlLGuMKjnlyZqGVCLzR4IG3jJSi4jOVbzRjgeKcNyqXAsoxiR3m2m7ssYXr

HDvPuQtg4uFRQCIrXCypSrKFwW3z3iZucRJuTUBlHW
TcNEGEGn1VJSk9cp2W7hUgOmNVUusLQp7f6UwSAcmip1ZdZWPPfb1Z7cZlxiWxswwt2jLqZSJmSLOvDA

ZoPp9H4XYaisIKYtxc6qKAsBDQQD7dAk4ouBVLR221isjl0qkh7+sItoqTq3YkrdMT48M3Y38SRnpu8G

t/cOSuFc3ATX7gq7WfOQZrIVrhqaSwOipTDsF9ZRWw
s1ZhWQy7GH4QTKNpSNwjHI0Wa743XCSnB8ZwnZLumo5WGIFdBj2sdV6mmPUcMIQUKHURU7KymEGjGVng

RK0Oi0UasyIpMKE8A4IzdIslxOyvnEX8ZZCpRLMbX3XskBZiLnPaEHrbFZ0YsAZfvmLgJz4OIYHpYi6v

uGGFl9mbHnSeIRBhJmFkXDHcPBjsUQ3AGwXqO6d5OW
dlR5EjM4zmTIMeG6PvyPTyTJ2SQPCkMo8gbKLaiEPgIYR7UVHaLp4JBl7UOpUjKW7ntx3LQRwsAWQvYp

zKRdc6XSxtGG8HmOGvK3GgpmNbJ6GekBvrYF4QHEZCs445IEoxMWAxMvKaITDouxDxNVNIVSDJV2WlzQ

VwA3HYVJMaN8fPFTSfQ3mtLE46wXP6FVdiIR9RWQDK
cKQ9PO9WreExENm4V0IoG3pjhQF2HYmBMO0dNSzbE0CwFMSbvpaeZFeqBF65fEJ6ELMgU0ZlyJyvyEEl

mWIiRf0iQDkvCF0Iu128EGXzU4BgoXUrinDhYAGcEGBTLeYzB3AGJUMmQTKmX8uiXIh0EONfGXDbObG8

PS6oXWKnUaXiB24uUVRyXvPzB99nBBEaIpM7LD3yPy
LbBiR7Et1gFmQlHfW2Hi9zExmdUbE5Md2jRvtsQtY2Mz5uSwnyOjY6UX6vGgUzTfB5W92dHwqtQexkNi

0dYoohVpxpDa1aQAQxIdHaYU2qOBFoOde0ZZ3oVKAiTyilIk3sJRfzAetmI58wMPihEpuyYi3dXWMbTn

z7BS2vMLDcXrX7U53sKNQaFoikQl4hDFNxUkBpTX3t
IlSmMhBoYJ2mVmrkFaA9Ms2sSsfdYmZ9Dz7mBgArKuV5Ot8fFsDkHxD1Cv3uXjPpRyJ1PW0aXrLfOsI8

Bg3bMwMxFuK8Kc5yYqXmDkBnXp8fUgbbVnQjTF8wMmbxKpReUv6wIOXxTtmkZ40gVQHgHlF6Ti7bFSQl

NzF8Ll9rPHKwXaK6As6bKCFcLuWqP29lXKIaUqBjCA
0bVKhfWfS2TR1kUDopRmK3Da5pKCxqEgRfFQ7mLSBjEvizQi9kXXYmDoWtOQ5vGGM6PIidDUTpFHQ6WQ

PhQFAxAF2mRT6XBe1SHfGuUJ8iog5MRyTcVFMcNdpETph5JUowXW6MsRWsF2DjacGuF8AcsCvxET0HkP

ArFZ5GTPVePs6xpJ6WAXKSNVNkQCQrDUurVK3fg4Kk
ztzfDAIhecCphVhxQL6TUXOfTTKrK4VhSCXkrOZuhvKfWLviWVNrOYQhNRnyOA0Vp6AznJFlLYOnVOmj

MCBSDQo+Gj4LAH8iv2NqPClrRIZzTE1ohq2CXvR1KRHkZvHlFLZ8PDUuUdwhBpI8XGJ3WgJ7UHEnDJu5

SVV3GbCaHAQgZwGbFBBvPaRaLNqrAIb2YHL3SZNlTw
ZtHfy9JIK2TZG7WQXpXCW0JBX4CkS8ZLBzYKZ8WFW6HeE0GNOmAQV1LWN5WtZ5TXGiMwr6QFD2UHK2WQ

VcORk1KJAiSLe0PXG4QyEtRSC0GIZ2ShP0GnwpFtJvITqsQpX1FsjfClQvAYd8CBR2VtZpUkw5CEChLB

K9NpuoDUB7XPzqHuE4VmVpMlk5YDU5HnZ3NnarXnNp
SPP5TdD0TKSdUiZbEIM8DnA1LRTuPkG5QTM2ObL0BDDdJNndKrwrYac6KTR1NTS4CxlnYXR4CYZwIeZ8

PBFxVPA8FWGgEJQ5ETGeINL9BEM0PQQ4CZPqXKC6OIUbRtShKiCcFKGbQTMlXlB4XwRbTYV5EIJ9RcW8

RAKdKPG7AAFrJgS9OCPaBmc2UYU6YyJ6GKQoGjFgAH
HyUNZKPfCxKLK9ETAbHbM1FZN8MLIeGmBaEGd8OQi6VBI5EDDwIhAcVSx7KQQ4LTWdPxXlPMi0XYB0NP

WnSmIrHKy1TLJ4ERNuDfZyPFn4IBH9IBSyMfOqELr3DKV2LWDpWvLkEHv4GUU7JDMdMtRqDAp2UMS3DY

EkMuXoQE5ZYySfZSo8OKF6FQLpZwWfBNz1OZP8XOGq
OeJwLOj9KNK0BXHuGby9TFPkYjI1FMLgZCM7EEW3TiT3CDBtCtPcGMZ6VdWrSyAtDoU5UDQ7UNV3TXWw

ROp7GXAeLhH7LulmKVLjRTRyLJA4FTtbFjBkZAYbQxJwUdXcSXo2HsLoJDC6NMJdGwQyLgAjStJoKER1

BCA6FILoWLX6FBilBGO4DwQfMaOnWMT2GaA3UutiJq
E4CAF5CqF1ZeftWiK2QFNnPSClInFjEML2EfI6SgdbGfL4HBF4CpLqOzheOnw1YGQ6PCKtTyuiTcJxAA

egDmD5SirhOMn3OkjaGnh2QEl7KTR8OipsCKf8MAn0BAS0RyMdDnLmEGvlHfI5QdGkWcH1APP2WbW1SI

WoSSC7MME6IlH0YMJaARD7RAR6DnF5OOUyRLi4FKWc
WGJ9KGMfCKF3YNB5EsG4FZUdCnm9LJY1PXPuCzinWek6ESK3IpQMSwE9McS5TLAuFSJ8TQG6YqF7WHZc

VSB4HRX9DIH0QKCtTOH7TYX9PwL9OTZjCAP5FBOoLZW9SMMpOGFrNY9cFXnnplByXxtUMzMoYZIsb4Ug

GMz9JA3RQQWxYScbUF7Ao544GMJfE4BfaSFgle7LAF
IcBy1eyG8tnAJrYTXeMGmMDyHcR8HjY2WryQY7QQWlA3QxBWLqQ0a2OJvhUQ8ZZGXlBE82TO4qBWXZHt

XsM0HjLChzCZEvOXqhJC2Rk400QsWvbRZlRUZmQoLaJTCwDXRiWCZ8RG6AKPRyWHNjgMwfJF9hgMMlTV

9TdGVtViAwDQo+Km5UWZ2vl1VsNVyyIfSkEQ4hmz7V
GVmEAlOjH6X5yDDrMj7usT5VpKJ0aKDbV1OxqANTwZOhD5Oor1EUd124Q0RpsNQoX0FqR04piU9eL7lj

xaSis0iKyjXiMZpqVz4WBDLgZsrpi7SNmMIzGARqS5fzp0LOkVFvNZP4ZK6LXOBpG9daoGvoVDWsGBXr

Mh5RPAMvKo1woJFai0TvbSF1q7XpYeUrANDJIBv+Pg
8OIN8mc9DyDQfhVQErQI0ycr6YJ33VXbHdMA1zxr1MBmSuFL9oei4FYKeQPdBeF2Vzd2FLZGAyYl8MYS

LhBPO6iL3NdKYoNBKiDD8vY0KXQY8IPYRnXq4qaYM9LZSaXqFxVWRpOABHOPofVWIjE8RjKLE8SKPmQy

4LUAYdEL4PEtCpMTMhLTBXDtCsCOXhJxMcEqLyKDGH
Na3CYuCkL2sYVdouS6PmAPneZn6YJjDfX3M7mFkYpCQ3HUY9CE1VUxKAIoDaMDb3X7X0lWNgF7D8vBkM

pZN7QM0ARN3ZASCsHK6+JlSvN9OYYJwWGAP3US0CjUNyAK6DmQDEX8KquCSxQs0sIZMlrFbpwCo+PiAv

Q1OGTHEHBBS9IQ8GgUDlBR0IkOMGS8PkzPIkKg6qMN
wtJwWnIB8fKM3+HS4HVQBCFoVUXxLzZTmxFZxfJFMzTLi1U9N0QNYpB7LAR8K2L9a4w8ebln8+IA0KIC

HnI5WCTUESYyRjTWsmUAfvZUNsXIo3S2N0FAReL2OGE6qpR9i0WT8+PiANCiAgID4+DQo+Kr6DIL9ry6

CpHHodHXCyMP7ooe4UALaeYUWyP1BgTMXzMRkxC2Jd
eKpkPY4BFNsmJYfxUE9WUKNlYOB4ZP8+NVlswJEkBI9WQiz/oUPcG3oyxRVfFUxrri8u03d/GjOgDX6s

VlIXUS4vF0FxsYc0duBWqu5GM2ppScjjZu1+UMbaGZs3ClrqcB9peEXizPc5gRS4fw4nAf4gTOczHCew

dB4xhrM7jZ1pDEYzVjD1cwH6wDO4KI7dFm7XIKHtDW
fbRPI2MgXFAKfenP9fKkPnHz4bdZM9uGdoJ0v1rp17Zv1brjjmVSs6HW5uBs9cFh5qOEKsk4urjMD9OK

5zIyc+ERqdQJIoUE1aSRQ9MuDOLv5WVFA7E2o4cL2hjUG6WW2VPbYdCMNuXXOxZPWaRXSdQMVrDKLqIN

AgICAgICAgICAgICAgICAgICAgICAgICAgICAgICAg
ICAgICAgICAgICAgICAgICAgICAgICAgICAgICAgICAgICAgICAgICAgICAgICANCiAgICAgICAgICAg

ICAgICAgICAgICAgICAgICAgICAgICAgICAgICAgICAgICAgICAgICAgICAgICAgICAgICAgICAgICAg

ICAgICAgICAgICAgICAgICAgICAgICAgICAgICANCi
AgICAgICAgICAgICAgICAgICAgICAgICAgICAgICAgICAgICAgICAgICAgICAgICAgICAgICAgICAgIC

AgICAgICAgICAgICAgICAgICAgICAgICAgICAgICAgICAgICAgICANCiAgICAgICAgICAgICAgICAgIC

AgICAgICAgICAgICAgICAgICAgICAgICAgICAgICAg
ICAgICAgICAgICAgICAgICAgICAgICAgICAgICAgICAgICAgICAgICAgICAgICAgICANCiAgICAgICAg

ICAgICAgICAgICAgICAgICAgICAgICAgICAgICAgICAgICAgICAgICAgICAgICAgICAgICAgICAgICAg

ICAgICAgICAgICAgICAgICAgICAgICAgICAgICAgIC
ANCiAgICAgICAgICAgICAgICAgICAgICAgICAgICAgICAgICAgICAgICAgICAgICAgICAgICAgICAgIC

AgICAgICAgICAgICAgICAgICAgICAgICAgICAgICAgICAgICAgICAgICANCiAgICAgICAgICAgICAgIC

AgICAgICAgICAgICAgICAgICAgICAgICAgICAgICAg
ICAgICAgICAgICAgICAgICAgICAgICAgICAgICAgICAgICAgICAgICAgICAgICAgICAgICANCiAgICAg

ICAgICAgICAgICAgICAgICAgICAgICAgICAgICAgICAgICAgICAgICAgICAgICAgICAgICAgICAgICAg

ICAgICAgICAgICAgICAgICAgICAgICAgICAgICAgIC
AgICANCiAgICAgICAgICAgICAgICAgICAgICAgICAgICAgICAgICAgICAgICAgICAgICAgICAgICAgIC

AgICAgICAgICAgICAgICAgICAgICAgICAgICAgICAgICAgICAgICAgICAgICANCiAgICAgICAgICAgIC

AgICAgICAgICAgICAgICAgICAgICAgICAgICAgICAg
ICAgICAgICAgICAgICAgICAgICAgICAgICAgICAgICAgICAgICAgICAgICAgICAgICAgICAgICANCjw/

kAOqB6wefADjqpB7U1jmPr8MDr9WIM9jc3SzCTSvDMygutJtUbjLKbPsQNBhAgiXZwk0EGqiJW3QyYTw

P9MrO0RtCShvNI9VURNsTCSlcQOhWFIjNXKqRkZ4GB
GaDHhzYX8TfKFaCMcgDHYuCTJbCcSlQBRtMKVoHAKkSQ4MIIEzU833ebBjYt1WXc9HUyOhBQ9vir5QEp

ZdPSGgMhqYYfe9ICvcIH7GbGZbvDMhNgSiWSFORdVhU8tmc6HaIzxqCZJZHVogQA8Jy9YvsZNeMRj+Pg

9JJS9uo2PuUKbvDnVbTG7eoi5FMEeXMtGcI1RisTjh
PTXpf2ncQSWzNY5ndPYwGON8MEfiyG6vMAYLr1Vetru5D8enYZ1PQSB4FNmvLZ1bTYReXJUuBxAlAWGE

JS8KUJWeUGWwgYOxOPYjMQUZYG6QMWstXCU0FHDfsyWspPFbGGvaIQ7KTTGmloOsRwIcVFLWKZg+Pg0K

DE4pr2TnSYxoSTGfIF4rmn3AQViNGqWyJ5D3mAWrL6
N0ZScfBn9HQYEyJHHsNxSwMLBKLAdxIP7VCY5pfyS2QR6RhXOnJONtSWCknHLoTVc9Y18ncWGuJHytDU

0KICA+Kiesha+Nx7PRVMjLDWjKVLzTiYnZNIGNwDiE0AgH1GIy7YlH8IgUD75hWpetuPuABejYR9WMO6pLL

BaQLJCLR3FuLMwcR8uovAgQoUoBSMOAcXeM74adEOw
SVXbTKY9KMFfOv1KNTSmA5OntyFygGpdrmKeLPAuTHSUMJ2BDYyttnSxyIAktMubBU27yFelPC6AYz7R

CqDxCO0evp0HcJUfRy3WEIJhGG3JWRLlQNQrIZEvHAO7GURwVgLkVCroHSEyJVCkVGM9EDVwPYVeXZ6Q

XuPcNWViCVG5BFEoPOOkTAPnty5AJXWqGFK5GlQlAF
HgQLXiNQJmREhbNWTqMCToZZF3LTTaHORtRL7GSjJmKMBrUZLrLHJaXEFzIMNqjt7FXOJaMWSbWlS9PC

HjKPWrHTNoHGhsZYAvFWM1TNG6CLCpWEVpSQ5ZZqTkZFJzIGd4YyRuHQKmDUOkqu4CIZVbDSIaLZPcFf

IpKDKxHWWyUOgsBJNmABV3LwAdQQZqWBMtQL2KGnKp
WBPeKBC9RMIdQBKsHOGxzu6PLDZaFMMqOPc3RwXjTRWwINCdEQbkWMOdTEDkPONcPKQbEBCgQS3ECiLo

DVPhHBK2CHvrRXLgQIWiyf3XSWWxDHVhAfCcFRMvUEFvOLQdUOsjEEFiBQNgAiO7HSLuADGtQY8RJyKi

AXNiNwd1XwByAABsACRjhc6EVMSvTAHjGIC5FqNlJJ
JbBOStHKuaYBBrGCI4Axa4ZHLvFDMuOG0LVtKfKDHcOzkvDwRwCIGaQFMbyp1JMQYuMLPiEPS7NkIvHY

WcAWVaMHsvMKXcSDRvKHRuMTIsIDNoTZ5HHyPbCRJjHRF2HRZzCYBoUKYrdn4JDRLzMAT9POh8WlXeCE

OuWHLrNFzrMKSgZJYpMFooVUJuHDUeKI4RUdSeWUAw
HAA2NpMdMUFqXYYrkc6RDNPxPIZ2UeivNBXhEFTkJHVwBYksQMLnDLAeEBOeGIKgBJTuQV0XIhHdLYdz

TNXHVif9PBhsX3w5WZOaHZ8MU6Itp3VmQwfcLETBQXagZG5urbQgFYPgWl4YT9dOUimkKbZ4P8AaMHax

[id number]
[id number]

o=









                    ID                  Date                Data Source

 

                    [id number]              2021 12:12:10 PM EDT [facility]









          Name      Value     Range     Interpretation Code Description Data Scarlett

rce(s) Supporting 

Document(s)

 

          Color of Urine                                         [facility]  

 

          Clarity of Urine                                         [facility]  

 

           Glucose [Mass/volume] in Urine by Test strip            [facility]                                 

 

           Bilirubin.total [Presence] in Urine by Test strip            [facility]                      

 

           Ketones [Mass/volume] in Urine by Test strip            [facility]                                 

 

           Specific gravity of Urine by Test strip 1.015      1.005-1.025       

                [facility]                      

 

           Hemoglobin [Presence] in Urine by Test strip            [facility]                                 

 

          pH of Urine by Test strip 7.0       5.0-8.0                       [facility]

[facility]  

 

           Protein [Mass/volume] in Urine by Test strip            Negative     

                    [facility]                                 

 

             Urobilinogen [Units/volume] in Urine by Test strip 0.2 {Ehrlich_U}/

dL 0.2-1.0                   

                          [facility]  

 

           Nitrite [Presence] in Urine by Test strip            [facility]                                 

 

           Leukocyte esterase [Presence] in Urine by Test strip            Negat

corrie                         [facility]                      









                    ID                  Date                Data Source

 

                    [id number]     2021 02:30:00 PM EDT [facility] [address] [place] [address] PHONE: [phone]

 FAX: [phone]  Name .................. : ALBERT BERNARDO               Acct 
Number.................. : [id number] ROOM. ................. :                   
           MR Number ................... : [id number] Stay type ............. : O/P 
                         Discharge Date......... ... : 21 Admit Date 
......... : 21                        Admit Phys .................... : 
NAYA Date of Birth ....... : 2000                     Family Phys 
................... : NO PCP Phone .................. : [phone]            
    Age ................................ : 21 Film# .................. .:[id number] 
                     Sex ................................. : F  Unsigned 
transcriptions are preliminary reports and do not represent a medical or legal 
document         OB 1ST TRI W TV IF NEEDED [id number]      COMPLETE:21 21:59 
[facility] [id number]                            Reason for Exam: viability and dating     
FIRST TRIMESTER OB ULTRASOUND:  INDICATION: Dating and viability.  FINDINGS: 
There is a single live intrauterine pregnancy with a fetal heart rate of 179 
beats per minute.  Based on fetal measurements, the estimated gestational age is
 12 weeks 5 days with an estimated delivery date of 2022.  The 
ovaries are sonographically normal.  There is a forming anterior placenta.  
There is soft tissue edema of the fetus suspicious for hydrops fetalis.  
IMPRESSION: Single live intrauterine pregnancy of 12 weeks 5 days.  There is 
soft tissue edema of the fetus suspicious for hydrops fetalis.   
___________________________________ Electronically Reviewed and Signed By Umer Diaz M.D.              , 21 14:30, NHJAY  Transcribe Initials: GEOVANNY , 
Transcribe Date: 21 01:47, Dictation Date:   Copy for: RIVER KASHIF BILL      
         via fax Copy for: [facility]                                          
                                                            Page 1 of 1   









          Name      Value     Range     Interpretation Code Description Data Scarlett

rce(s) Supporting 

Document(s)

 

                                                                       







                                        Procedure

 

                                          



                                                                                
                                                                                
                                                                                
                                                                                
                  



Social History

          



           Code       Duration   Value      Status     Description Data Source(s

)

 

             Smoking      10/19/2021 12:00:00 AM EDT Never Smoked Cigarettes com

pleted    Never 

Smoked Cigarettes                       [facility] ([facility])

 

           Smoking    10/01/2021 12:00:00 AM EDT Never Smoker completed  Never S

moker eCW1 

([facility])

 

           Smoking    10/01/2021 12:00:00 AM EDT Never Smoker completed  Never S

moker eCW1 

([facility])

 

                Alcohol intake  2021 12:00:00 AM EDT Lifetime non-drinker 

(finding) 

completed                 Lifetime non-drinker (finding) [facility]

ital

 

             Tobacco use and exposure 2021 12:00:00 AM EDT Never used   co

mpleted    Never 

used                                    [facility]

 

           Smoking    2021 12:00:00 AM EDT Never smoker completed  Never s

[facility]

 

                Alcohol intake  2021 12:00:00 AM EDT Lifetime non-drinker 

(finding) 

completed                 Lifetime non-drinker (finding) [facility]

ital

 

                      2021 12:00:00 AM EDT Pregnant   completed  Pregnant 

  [facility]



                                                                                
                                                                                
        



Vital Signs

          



                    ID                  Date                Data Source

 

                    UNK                                      









           Name       Value      Range      Interpretation Code Description Data

 Source(s)

 

           Diastolic blood pressure 64 mm[Hg]                        64 mm[Hg]  

[facility] ([facility])

 

           Systolic blood pressure 126 mm[Hg]                       126 mm[Hg] M

[facility] ([facility])

 

           Heart rate 92 /min                          92 /min    [facility] ([facility])

 

           Body height 67.8 [in_i]                       67.8 [in_i] [facility] ([facility] )

 

                                        5'7.80" 

 

           Body weight 186.44 [lb_av]                       186.44 [lb_av] [facility]

T ([facility])

 

           Body mass index (BMI) [Ratio] 28.5 kg/m2                       28.5 k

g/m2 [facility] ([facility])

 

           Oxygen saturation in Arterial blood by Pulse oximetry 98 %           

                  98 %       [facility] 

([facility])

 

           Body weight 191.8 [lb_av]                       191.8 [lb_av] eCW1 ([facility])

 

           Body weight 87 kg                            87 kg      eCW1 ([facility])

 

           Body height 68 [in_i]                        68 [in_i]  eCW1 ([facility])

 

           Body mass index (BMI) [Ratio] 29.16 kg/m2                       29.16

 kg/m2 eCW1 ([facility])

 

           Heart rate 80 /min                          80 /min    eCW1 ([facility])

 

           Respiratory rate 18 /min                          18 /min    eCW1 ([facility])

 

           Body temperature 98.6 [degF]                       98.6 [degF] eCW1 (

[facility])

 

           Systolic blood pressure 125 mm[Hg]                       125 mm[Hg] e

CW1 ([facility])

 

           Diastolic blood pressure 65 mm[Hg]                        65 mm[Hg]  

eCW1 ([facility])









                    ID                  Date                Data Source

 

                    [id number]          2021 04:29:34 PM [facility]









           Name       Value      Range      Interpretation Code Description Data

 Source(s)

 

           WEIGHT RECORDED 180.2 lb                         180.2 lb   [facility]

 

           Body height Measured 68 in                            68 in      [facility]



                                                                                
                            



Patient Treatment Plan of Care

          



             Planned Activity Planned Date Details      Description  Data Source

(s)

 

             Albuterol Sulfate  (90 Base) MCG/ACT 10/01/2021 12:00:00 AM 

EDT                           [facility] 

([facility])

 

             Albuterol Sulfate  (90 Base) MCG/ACT 10/01/2021 12:00:00 AM 

EDT                           [facility] 

([facility])

 

             Oxycodone Hydrochloride 5 MG Oral Tablet 2021 12:00:00 AM [facility]

 

             Acetaminophen 325 MG Oral Tablet 2021 12:00:00 AM [facility]

 

             Ibuprofen 600 MG Oral Tablet 2021 12:00:00 AM [facility]

 

                          Norethin Ace-Eth Estrad-FE 1-20 MG-MCG Oral Tablet (Ju

erik FE 20) 2021 

12:00:00 AM [facility]

ospital

 

             Prenatal 19 Oral Tablet Chewable                                   

     [facility]

## 2021-10-20 NOTE — REP
INDICATION:

no BM x 3-4 days, abd pain.



COMPARISON:

None.



FINDINGS:

KUB shows the intestinal gas pattern to be nonspecific.  The organ silhouettes insofar

as delineated are unremarkable.  There is no evidence of free intraperitoneal air.



The stool pattern is within normal limits



IMPRESSION:

Nonspecific.





<Electronically signed by Kvng Gong > 10/20/21 [id number]

## 2021-10-20 NOTE — CCD
Summarization Of Episode

                             Created on: 10/20/2021



AZ RAYA

External Reference #: [id number]

: 2000

Sex: Undifferentiated



Demographics





                          Address                   [address]

[place]  [address]

 

                          Home Phone                [phone]

 

                          Preferred Language        English

 

                          Marital Status            Unknown

 

                          Adventist Affiliation     Unknown

 

                          Race                      Unknown

 

                          Ethnic Group              Not  or 





Author





                          Author                    [facility]

 

                          Organization              [facility]

 

                          Address                   Unknown

 

                          Phone                     Unavailable







Support





                Name            Relationship    Address         Phone

 

                    [facility] Next Of Kin         [address] [place]  [address]                    [phone]

 

                    AZ RAYA    Next Of Kin         [address]

[address]

[place]  [address]                 [phone]

 

                    DIPESHCOARIEL          Next Of Kin         [address]

[address]

[place]  [address]                    [phone]

 

                    ANGELIQUE RAYARIDALTON  Next Of Kin         [address]

[address]

[place]  [address]                 [phone]

 

                    ALBERT  DABRIEN  ECON                [address]

[place]  [address]                 Unavailable







Care Team Providers





                    Care Team Member Name Role                Phone

 

                    NO,  PCP            Unavailable         Unavailable

 

                    GINA AUGUSTINE        Unavailable         Unavailable

 

                    RAFI GONZALEZ    Unavailable         Unavailable

 

                    River, L Kashif CNM  Unavailable         Unavailable

 

                    River, L Kashif CNM  Unavailable         Unavailable

 

                    River, L Kashif CNM  Unavailable         Unavailable

 

                    River, L Kashif CNM  Unavailable         Unavailable

 

                    River, L Kashif CNM  Unavailable         Unavailable

 

                    River, L Kashif CNM  Unavailable         Unavailable

 

                    River, L Kashif CNM  Unavailable         Unavailable

 

                    River, L Kashif CNM  Unavailable         Unavailable

 

                    River, L Kashif CNM  Unavailable         Unavailable

 

                    River, L Kashif CNM  Unavailable         Unavailable

 

                    River, L Kashif CNM  Unavailable         Unavailable

 

                    River, L Kashif CNM  Unavailable         Unavailable

 

                    River, L Kashif CNM  Unavailable         Unavailable

 

                    River, L Kashif CNM  Unavailable         Unavailable

 

                    River, L Kashif CNM  Unavailable         Unavailable

 

                    River, L Kashif CNM  Unavailable         Unavailable

 

                    River, L Kashif CNM  Unavailable         Unavailable

 

                    River, L Kashif CNM  Unavailable         Unavailable

 

                    DAMIEN Zuñiga MD Unavailable         Unavailable

 

                    DAMIEN Zuñiga MD Unavailable         Unavailable

 

                    DAMIEN Zuñiga MD Unavailable         Unavailable

 

                    VICKY Rangel MD Unavailable         Unavailable

 

                    VICKY Rangel MD Unavailable         Unavailable

 

                    VICKY Rangel MD Unavailable         VICKY Quispe MD Unavailable         Unavailable

 

                    APRIL Wong MD Unavailable         Unavailable

 

                    APRIL Wong MD Unavailable         Unavailable

 

                    APRIL Wong MD Unavailable         Unavailable

 

                    APRIL Wong MD Unavailable         Unavailable

 

                    APRIL Wong MD Unavailable         Unavailable

 

                    APRIL Wong MD Unavailable         Unavailable

 

                    APRIL Wong MD Unavailable         Unavailable

 

                    APRIL Wong MD Unavailable         Unavailable

 

                    APRIL Wong MD Unavailable         Unavailable

 

                    APRIL Wong MD Unavailable         Unavailable

 

                    APRIL Wong MD Unavailable         Unavailable

 

                    APRIL Wong MD Unavailable         Unavailable

 

                    APRIL Wong MD Unavailable         Unavailable

 

                    APRIL Wong MD Unavailable         Unavailable

 

                    APRIL Wong MD Unavailable         Unavailable

 

                    APRIL Wong MD Unavailable         Unavailable

 

                    APRIL Wong MD Unavailable         Unavailable

 

                    APRIL Wong MD Unavailable         Unavailable

 

                    APRIL Wong MD Unavailable         Unavailable

 

                    APRIL Wong MD Unavailable         Unavailable

 

                    APRIL Wong MD Unavailable         Unavailable

 

                    APRIL Wong MD Unavailable         Unavailable

 

                    APRIL Wong MD Unavailable         Unavailable

 

                    APRIL Wong MD Unavailable         Unavailable

 

                    APRIL Wong MD Unavailable         Unavailable

 

                    APRIL Wong MD Unavailable         Unavailable

 

                    APRIL Wong MD Unavailable         Unavailable

 

                    APRIL Wong MD Unavailable         Unavailable

 

                    APRIL Wong MD Unavailable         Unavailable

 

                    APRIL Wong MD Unavailable         Unavailable

 

                    APRIL Wong MD Unavailable         Unavailable

 

                    APRIL Wong MD Unavailable         Unavailable

 

                    APRIL Wong MD Unavailable         Unavailable

 

                    APRIL Wong MD Unavailable         Unavailable

 

                    YANNA, B VAL NP Unavailable         Unavailable

 

                    YANNA, B VAL NP Unavailable         Unavailable

 

                    YANNA, B VAL NP Unavailable         Unavailable

 

                    YANNA, B VAL NP Unavailable         Unavailable

 

                    YANNA, B VAL NP Unavailable         Unavailable

 

                    YANNA, B VAL NP Unavailable         Unavailable

 

                    YANNA, B VAL NP Unavailable         Unavailable

 

                    YANNA, B VAL NP Unavailable         Unavailable

 

                    YANNA, B VAL NP Unavailable         Unavailable

 

                    YANNA, B VAL NP Unavailable         Unavailable

 

                    YANNA, B VAL NP Unavailable         Unavailable

 

                    YANNA, B VAL NP Unavailable         Unavailable

 

                    YANNA, B VAL NP Unavailable         Unavailable

 

                    YANNA, B VAL NP Unavailable         Unavailable

 

                    YANNA, B VAL NP Unavailable         Unavailable

 

                    YANNA, B VAL NP Unavailable         Unavailable

 

                    YANNA, B VAL NP Unavailable         Unavailable

 

                    YANNA, B VAL NP Unavailable         Unavailable

 

                    YANNA, B VAL NP Unavailable         Unavailable

 

                    YANNA, B VAL NP Unavailable         Unavailable

 

                    YANNA, B VAL NP Unavailable         Unavailable

 

                    YANNA, B VAL NP Unavailable         Unavailable

 

                    YANNA, B VAL NP Unavailable         Unavailable

 

                    YANNA, B VAL NP Unavailable         Unavailable

 

                    YANNA, B VAL NP Unavailable         Unavailable

 

                    YANNA, B VAL NP Unavailable         Unavailable

 

                    YANNA, B VAL NP Unavailable         Unavailable

 

                    YANNA, B VAL NP Unavailable         Unavailable

 

                    YANNA, B VAL NP Unavailable         Unavailable

 

                    YANNA, B VAL NP Unavailable         Unavailable

 

                    YANNA, B VAL NP Unavailable         Unavailable

 

                    YANNA, B VAL NP Unavailable         Unavailable

 

                    YANNA, B VAL NP Unavailable         Unavailable

 

                    YANNA, B VAL NP Unavailable         Unavailable

 

                    YANNA, B VAL NP Unavailable         Unavailable

 

                    YANNA, B VAL NP Unavailable         Unavailable

 

                    YANNA, B VAL NP Unavailable         Unavailable

 

                    YANNA, B VAL NP Unavailable         Unavailable

 

                    YANNA, B VAL NP Unavailable         Unavailable

 

                    YANNA, B VAL NP Unavailable         Unavailable

 

                    YANNA, B VAL NP Unavailable         Unavailable

 

                    YANNA, B VAL NP Unavailable         Unavailable

 

                    YANNA, B VAL NP Unavailable         Unavailable

 

                    YANNA, B VAL NP Unavailable         Unavailable

 

                    YANNA, B VAL NP Unavailable         Unavailable

 

                    YANNA, B VAL NP Unavailable         Unavailable

 

                    YANNA, B VAL NP Unavailable         Unavailable

 

                    YANNA, B VAL NP Unavailable         Unavailable

 

                    YANNA, B VAL NP Unavailable         Unavailable

 

                    YANNA, B VAL NP Unavailable         Unavailable

 

                    YANNA, B VAL NP Unavailable         Unavailable

 

                    YANNA, B VAL NP Unavailable         Unavailable

 

                    YANNA, B VAL NP Unavailable         Unavailable

 

                    YANNA, B VAL NP Unavailable         Unavailable

 

                    YANNA, B VAL NP Unavailable         Unavailable

 

                    YANNA, B VAL NP Unavailable         Unavailable

 

                    YANNA, B VAL NP Unavailable         Unavailable

 

                    YANNA, B VAL NP Unavailable         Unavailable

 

                    YANNA, B VAL NP Unavailable         Unavailable

 

                    YANNA, B VAL NP Unavailable         Unavailable

 

                    YANNA, B VAL NP Unavailable         Unavailable

 

                    YANNA, B VAL NP Unavailable         Unavailable

 

                    River, BILL Rowland CNM  Unavailable         Unavailable

 

                    River, L Kashif CNM  Unavailable         Unavailable

 

                    River, L Kashif CNM  Unavailable         Unavailable

 

                    River, L Kashif CNM  Unavailable         Unavailable

 

                    River, L Kashif CNM  Unavailable         Unavailable

 

                    River, L Kashif CNM  Unavailable         Unavailable

 

                    River, L Kashif CNM  Unavailable         Unavailable

 

                    River, L Kashif CNM  Unavailable         Unavailable

 

                    River, L Kashif CNM  Unavailable         Unavailable

 

                    River, L Kashif CNM  Unavailable         Unavailable

 

                    River, L Kashif CNM  Unavailable         Unavailable

 

                    River, L Kashif CNM  Unavailable         Unavailable

 

                    River, L Kashif CNM  Unavailable         Unavailable

 

                    River, L Kashif CNM  Unavailable         Unavailable

 

                    River, L Kashif CNM  Unavailable         Unavailable

 

                    River, L Kashif CNM  Unavailable         Unavailable

 

                    River, L Kashif CNM  Unavailable         Unavailable

 

                    River, L Kashif CNM  Unavailable         Unavailable

 

                    Lisa,  Safiyah    Unavailable         [phone]

 

                    Lisa,  Safiyah    Unavailable         [phone]

 

                    Lisa,  Safiyah    Unavailable         [phone]

 

                    Lisa,  Safiyah    Unavailable         [phone]

 

                    Lisa,  Safiyah    Unavailable         [phone]

 

                    Lisa,  Safiyah    Unavailable         [phone]

 

                    Lisa,  Safiyah    Unavailable         [phone]

 

                    Lisa,  Safiyah    Unavailable         [phone]

 

                    NOSOVIVICKY FITZGERALD JR, MD Unavailable         Unavailable

 

                    NOSOVIVICKY FITZGERALD JR, MD Unavailable         Unavailable

 

                    NOSOVIVICKY FITZGERALD JR, MD Unavailable         Unavailable

 

                    NOSOVIVICKY FITZGERALD JR, MD Unavailable         Unavailable

 

                    NOSOVIVICKY FITZGERALD JR, MD Unavailable         Unavailable

 

                    NOSOVIVICKY FITZGERALD JR, MD Unavailable         Unavailable

 

                    NOSOVITCH VICKY TALAVERA MD Unavailable         Unavailable

 

                    NOSOVIVICKY FITZGERALD JR, MD Unavailable         Unavailable

 

                    NOSOVIVICKY FITZGERALD JR, MD Unavailable         Unavailable

 

                    NOSOVIVICKY FITZGERALD JR, MD Unavailable         Unavailable

 

                    NOSOVIVICKY FITZGERALD JR, MD Unavailable         Unavailable

 

                    NOSOVIVICKY FITZGERALD JR, MD Unavailable         Unavailable

 

                    NOSOVIVICKY FITZGERALD JR, MD Unavailable         Unavailable

 

                    NOSOVIVICKY FITZGERALD JR, MD Unavailable         Unavailable

 

                    NOSOVIVICKY FITZGERALD JR, MD Unavailable         Unavailable

 

                    NOSOVITCH VICKY TALAVERA MD Unavailable         Unavailable

 

                    NOSOVIVICKY FITZGERALD JR, MD Unavailable         Unavailable

 

                    NOSOVIVICKY FITZGERALD JR, MD Unavailable         Unavailable

 

                    NOSOVIVICKY FITZGERALD JR, MD Unavailable         Unavailable

 

                    NOSOVITCH VICKY TALAVERA MD Unavailable         Unavailable

 

                    NOSOVIVICKY FITZGERALD JR, MD Unavailable         Unavailable

 

                    NOSOVITCH VICKY TALAVERA MD Unavailable         Unavailable

 

                    NOSOVITCH JR, T APPLE MD Unavailable         Unavailable

 

                    NOSOVITCH JR, VICKY APPLE MD Unavailable         Unavailable

 

                    NOSOVITCH JR, VICKY APPLE MD Unavailable         Unavailable

 

                    NOSOVITCH JR, T APPLE MD Unavailable         Unavailable

 

                    NOSOVITCH JR, T APPLE MD Unavailable         Unavailable

 

                    NOSOVITCH JR, T APPLE MD Unavailable         Unavailable

 

                    NOSOVITCH JR, T APPLE MD Unavailable         Unavailable

 

                    NOSOVITCH JR, T APPLE MD Unavailable         Unavailable

 

                    NOSOVITCH JR, VICKY APPLE MD Unavailable         Unavailable



                                  



Re-disclosure Warning

          The records that you are about to access may contain information from 
federally-assisted alcohol or drug abuse programs. If such information is 
present, then the following federally mandated warning applies: This information
has been disclosed to you from records protected by federal confidentiality 
rules (42 CFR part 2). The federal rules prohibit you from making any further 
disclosure of this information unless further disclosure is expressly permitted 
by the written consent of the person to whom it pertains or as otherwise 
permitted by 42 CFR part 2. A general authorization for the release of medical 
or other information is NOT sufficient for this purpose. The Federal rules 
restrict any use of the information to criminally investigate or prosecute any 
alcohol or drug abuse patient.The records that you are about to access may 
contain highly sensitive health information, the redisclosure of which is 
protected by Article 27-F of the [facility] Public Health law. If you 
continue you may have access to information: Regarding HIV / AIDS; Provided by 
facilities licensed or operated by the [facility] Office of Mental Health; 
or Provided by the [facility] Office for People With Developmental 
Disabilities. If such information is present, then the following [place] 
mandated warning applies: This information has been disclosed to you from 
confidential records which are protected by state law. State law prohibits you 
from making any further disclosure of this information without the specific 
written consent of the person to whom it pertains, or as otherwise permitted by 
law. Any unauthorized further disclosure in violation of state law may result in
a fine or assisted sentence or both. A general authorization for the release of 
medical or other information is NOT sufficient authorization for further disc
losure.                                                                         
    



Allergies and Adverse Reactions

          



           Type       Description Substance  Reaction   Status     Data Source(s

)

 

           Propensity to adverse reactions NO KNOWN ALLERGIES NO KNOWN ALLERGIES

                       

[facility]



                                                                                
       



Encounters

          



           Encounter  Providers  Location   Date       Indications Data Source(s

)

 

                Outpatient      Attender: VAL RAINES NP Physical Therapy 10

/ 10:00:00 AM 

EDT                                                 [facility] ([facility]

 

                Unknown                         [address]

[place] [address] 10/05/2021 12:00:00 AM 

EDT                                                 eCW1 ([facility])

 

                Outpatient      Attender: Halina Wong MDConsultant: PCP NO   

              10/01/2021 10:30:00 

AM EDT - 10/01/2021 11:30:00 AM EDT                           [facility]

[facility]

 

                Outpatient                      [address]

[place] [address] 10/01/2021 12:00:00 AM

EDT                                                 eCW1 ([facility])

 

                Outpatient      Attender: Kashif Holman CNMConsultant: PCP NO     

            2021 12:30:00 PM 

EDT - 2021 01:30:00 PM EDT                           [facility]



 

                Outpatient      Attender: Josee Rangel MD [id number]    2021 12:00:00 AM EDT 

- 2021 12:50:12 PM EDT                           [facility]

al

 

           Outpatient                       2021 12:00:00 AM [facility]

 

           Outpatient Attender: Chery Zuñiga MD [id number]   2021 08:59

:15 AM [facility]

 

                          Outpatient                Attender: Rafi Benson

nder: RAFI GONZALEZAdmitter: RAFI GONZALEZ              [id number]         2021 12:00:00 AM EDT - 2021 

12:00:00 AM EDT 

Missed  with fetal demise before 20 completed weeks of gestation [O02.1]
                                        [facility]

 

                                        Missed  with fetal demise before

 20 completed weeks of gestation [O02.1]

 

 

                                        Patient discharged. 

 

                          Outpatient                Attender: Rafi Benson

nder: RAFI GONZALEZReferrer: APPLE COFFMAN JR              [id number]              2021 12:00:00 AM EDT -

 2021 11[facility]25:18 [facility]

 

                [facility]      Attender: APPLE COFFMAN JRReferrer: Kashif Holman CNM [id number]    

2021 12:00:00 AM EDT - 2021 09:39:42 AM [facility]

 

           Outpatient Attender: GINA AUGUSTINE            2021 12:00:00 AM [facility]

 

                Outpatient      Attender: APPLE COFFMAN JRReferrer: Kashif Holman

 [facility] [id number]    

2021 12:00:00 AM EDT - 2021 01:02:42 PM [facility]

 

                Outpatient      Attender: GINA AUGUSTINEReferrer: Kashif Holman CNM   

              2021 12:00:00 

AM [facility]

 

                Outpatient      Attender: Kashif Holman CNonsultant: PCP NO     

            2021 06:39:00 PM 

EDT - 2021 07:40:00 PM EDT                           [facility]

l

 

                                        Patient discharged. 

 

                Outpatient      [facility] NO     

            2021 12:51:08 PM 

EDT - 2021 07:49:00 AM EDT                           [facility]

l

 

                                        Patient discharged. 



                                                                                
                                                                                
                                                                              



Immunizations

          



             Vaccine      Date         Status       Description  Data Source(s)

 

             COVID-19 VACCINE Pfizer 10/05/2021 12:00:00 AM EDT completed       

          [facility]

 

                                        Vaccine Series Complete: YESThis Data wa

s Submitted to [facility] [employer]. 

 

             COVID-19 VACCINE Pfizer 2021 12:00:00 AM EDT completed       

          [facility]

 

                                        Vaccine Series Complete: NOThis Data was

 Submitted to [facility] [employer]. 



                                                                                
                  



Medications

          



          Medication Brand Name Start Date Product Form Dose      Route     Admi

nistrative 

Instructions Pharmacy Instructions Status     Indications Reaction   Description

 Data 

Source(s)

 

                          Albuterol Sulfate  (90 Base) MCG/ACT Albuterol 

Sulfate  (90 Base) 

MCG/ACT 10/01/2021 12:00:00 AM EDT        1.0 {puff_as_needed}                  

    active               

Albuterol Sulfate  (90 Base) MCG/ACT eCW1 ([facility])

 

                          Albuterol Sulfate  (90 Base) MCG/ACT Albuterol 

Sulfate  (90 Base) 

MCG/ACT 10/01/2021 12:00:00 AM EDT        1.0 {puff_as_needed}                  

    active               

Albuterol Sulfate  (90 Base) MCG/ACT eCW1 ([facility])

 

                                        1 ML Rho(D) Immune Globulin 0.3 MG/ML Pr

efilled Syringe rho(D) immune globulin 

(HYPERRHO/RHOGAM) injection 300 mcg = 1,500 units rho(D) immune globulin 

(HYPERRHO/RHOGAM) injection 300 mcg = 1,500 units 2021 09:15:00 AM EDT    

                 

300 ug    Intramuscular                     completed                     300 mc

g, Intramuscular, Once, On Tue 

8/3/21 at 0915, For 1 dose<br>Refrigerate&nbsp;300 mcg = 1,500 units<br> [facility]

 

                                        Medication administered onsite 

 

                          Misoprostol 0.2 MG Oral Tablet miSOPROStol (CYTOTEC) t

ablet 400 mcg miSOPROStol 

(CYTOTEC) tablet 400 mcg 2021 08:55:00 AM EDT           400 ug    Buccal  

                      

completed                                       400 mcg, Buccal, Once, On Tue 8/

3/21 at 0900, For 1 dose [facility]

 

                                        Medication administered onsite 

 

                                        Oxycodone Hydrochloride 5 MG Oral Tablet

 oxyCODONE HCl 5 MG Oral Tablet 

(Roxicodone)              oxyCODONE HCl 5 MG Oral Tablet (Roxicodone) 2021

 12:00:00 AM 

EDT             5 mg    Oral                    aborted                 Take 1 t

ablet by mouth every 8 (eight) hours as 

needed  for Pain for up to 5 doses, Max Daily Dose: 15 mg [facility]

 

                          Ibuprofen 600 MG Oral Tablet Ibuprofen 600 MG Oral Tab

let (MOTRIN) Ibuprofen 600

 MG Oral Tablet (MOTRIN) 2021 12:00:00 AM EDT         600 mg  Oral        

            completed 

                                                    Take 1 tablet by mouth every

 6 (six) hours as needed  for Pain for up to 10 

days                                    [facility]

 

                          Acetaminophen 325 MG Oral Tablet Acetaminophen 325 MG 

Oral Tablet (Tylenol) 

Acetaminophen 325 MG Oral Tablet (Tylenol) 2021 12:00:00 AM EDT           

      650 mg          

Oral                             completed                        Take 2 tablets

 by mouth every 6 (six) hours as needed  

for Pain for up to 10 days              [facility]

 

                    Norethin Ace-Eth Estrad-FE 1-20 MG-MCG Oral Tablet (Junel FE

 1/20) 3467-3538-32        

2021 12:00:00 AM EDT         1 {tbl} Oral                    active       

           Take 1 tablet by mouth 

daily                                   [facility]

 

      Prenatal 19 Oral Tablet Chewable 93097-581-62             1 {tbl} Oral    

          aborted             

Chew 1 tablet by Mouth daily            [facility]



                                                                                
                                                                    



Insurance Providers

          



             Payer name   Policy type / Coverage type Policy ID    Covered party

 ID Covered 

party's relationship to weeks Policy Weeks             Plan Information

 

          [facility]         [id number]           Se

                [id number]

 

          [facility]           [id number]           SP           

       [id number]

 

          [employer]  PPO        [id number]           [id number]           SP    

              [id number]

 

          [facility]           [id number]           SP           

       [id number]

 

          [employer] AT [facility]           [id number]           18             

     [id number]

 

          [employer]  PPO        [id number]           [id number]           SP    

              [id number]



                                                                                
                                                          



Problems, Conditions, and Diagnoses

          



           Code       Display Name Description Problem Type Effective Dates Data

 Source(s)

 

                    K529                Noninfective gastroenteritis and colitis

, unspecified Noninfective 

gastroenteritis and colitis, unspecified Diagnosis           [facility]

 

                                               Encounter for supervision of

 other normal pregnancy, unspecified trimester

                          Encounter for supervision of other normal pregnancy, u

nspecified trimester 

Diagnosis                 2021 12:30:00 PM [facility]

 

                                                    Missed  with fetal d

emise before 20 completed weeks of gestation 

[O02.1]                                 Missed  with fetal demise before

 20 completed weeks of gestation

 [O02.1]            Diagnosis           2021 07:56:00 AM EDT [facility]

 

             Z3A12        12 weeks gestation of pregnancy 12 weeks gestation of 

pregnancy Diagnosis    

2021 06:39:00 PM EDT              [facility]

 

                                   Encounter for supervision of normal firs

t pregnancy, first trimester 

Encounter for supervision of normal first pregnancy, first trimester Diagnosis  

               

2021 06:39:00 PM [facility]

 

                                               Procedure and treatment not 

carried out due to patient leaving prior to 

being seen by health care provider      Procedure and treatment not carried out 

due 

to patient leaving prior to being seen by health care provider Diagnosis        

         

2021 07:49:00 AM [facility]

 

           J45.990    [id number]   Exercise-induced asthma Problem    10/01/2021 1

2:00:00 AM EDT eCW1

 ([facility])

 

             K58.9        [id number]     Symptoms consistent with irritable bowel 

syndrome Problem      

10/01/2021 12:00:00 AM EDT              eCW1 ([facility])



                                                                                
                                                                                
        



Surgeries/Procedures

          



             Procedure    Description  Date         Indications  Data Source(s)

 

             OFFICE CONSULTATION NEW/ESTAB PATIENT 80 MIN              10/19/202

1 12:00:00 AM EDT              

MEDENT ([facility])



                                                                                
        



Results

          



                    ID                  Date                Data Source

 

                    [id number]     10/02/2021 01:44:00 PM EDT [facility]









          Name      Value     Range     Interpretation Code Description Data Scarlett

rce(s) Supporting 

Document(s)

 

           Tissue transglutaminase IgA Ab [Units/volume] in Serum <2 U/mL    0-3

                              [facility]                            

 

                                                                                

     Negative        0 -  3             

                                Weak Positive   4 - 10                          
                   Positive           >10                Tissue Transglutaminase
 (tTG) has been identified                as the endomysial antigen.  Studies 
have demonstr-                ated that endomysial IgA antibodies have over 99% 
               specificity for gluten sensitive enteropathy. 









                    ID                  Date                Data Source

 

                    [id number]     10/01/2021 11:40:00 AM EDT [facility]









          Name      Value     Range     Interpretation Code Description Data Scarlett

rce(s) Supporting 

Document(s)

 

                    Thyroxine (T4) free index in Serum or Plasma by calculation 

0.91 NG/DL          0.93 - 

1.70            L                               [facility]  









                    ID                  Date                Data Source

 

                    [id number]     10/01/2021 11:40:00 AM EDT [facility]









          Name      Value     Range     Interpretation Code Description Data Scarlett

rce(s) Supporting 

Document(s)

 

                          Thyrotropin [Units/volume] in Serum or Plasma by Detec

tion limit <= 0.05 mIU/L 

0.46 uIU/mL  0.47 - 5.01  L                         [facility]  









                    ID                  Date                Data Source

 

                    [id number]     10/01/2021 11:31:00 AM EDT [facility]









          Name      Value     Range     Interpretation Code Description Data Scarlett

rce(s) Supporting 

Document(s)

 

          COMPREHENSIVE METABOLIC PANEL                                         

[facility]  

 

                                            COMPREHENSIVE METABOLIC PANEL 

 

           Sodium [Moles/volume] in Serum or Plasma 140 mEq/L  134 - 153        

                [facility]                                 

 

           Potassium [Moles/volume] in Serum or Plasma 4.4 mEq/L  3.6 - 5.0     

                   [facility]                            

 

           Chloride [Moles/volume] in Serum or Plasma 104 mEq/L  98 - 107       

                  [facility]                                 

 

           Carbon dioxide, total [Moles/volume] in Serum or Plasma 29 MEQ/L   22

 - 30                          

[facility]                   

 

           Glucose [Mass/volume] in Serum or Plasma 85 MG/DL   70 - 99          

                [facility]                                 

 

          BUN       7 MG/DL   7 - 21                        [facility]

al  

 

           Creatinine [Mass/volume] in Serum or Plasma 0.6 MG/DL  0.7 - 1.5  L  

                   [facility]                            

 

          BUN/CREAT 12        8 - 27                        [facility]

al  

 

           Protein [Mass/volume] in Serum or Plasma 6.7 G/DL   6.3 - 8.2        

                [facility]                                 

 

           Albumin [Mass/volume] in Serum or Plasma 4.6 G/DL   3.9 - 5.0        

                [facility]                                 

 

           Globulin [Mass/volume] in Serum by calculation 2.1 GM/DL  2.4 - 3.2  

L                     [facility]                            

 

          A/G RATIO 2.2       0.8 - 2.0 H                   [facility]  

 

           Calcium [Mass/volume] in Serum or Plasma 9.5 MG/DL  8.4 - 10.2       

                [facility]                                 

 

           Bilirubin.total [Mass/volume] in Serum or Plasma <0.7 MG/DL 0.2 - 1.3

                        

[facility]                   

 

                    Alkaline phosphatase [Enzymatic activity/volume] in Serum or

 Plasma 89 U/L              38 - 

126                                             [facility]  

 

                          Aspartate aminotransferase [Enzymatic activity/volume]

 in Serum or Plasma 16 U/L

             5 - 40                                 [facility]  

 

                    Alanine aminotransferase [Enzymatic activity/volume] in Seru

m or Plasma 14 U/L              7

- 56                                            [facility]  

 

          Anion gap 3 in Serum or Plasma 7.0 mmol/L 8.0 - 16.0 L                

   [facility] 



 

          AGE       21 yrs                                  [facility]

al  

 

          NON-AA GFR >60 mL/min                               [facility]

ital  

 

          AFR AMER GFR >60 mL/min                               [facility]

spital  

 

                                                                     Male GFR In

terprentation                  20-49 yrs

    >60 mL/min   Normal                  50-59 yrs     >56 mL/min   Normal      
           60-69 yrs     >49 mL/min   Normal                  70-79yrs      >42 
mL/min   Normal                  80 and above  >35 mL/min   Normal              
     Female GFR  Interpretation                  20-39 yrs     >60 mL/min   
Normal                  40-49 yrs     >58 mL/min   Normal                  50-59
yrs     >51 mL/min   Normal                  60-69 yrs     >45 mL/min   Normal  
               70-79 yrs     >39 mL/min   Normal                  80 and above  
>32 mL/min   Normal 









                    ID                  Date                Data Source

 

                    [id number]     10/01/2021 11:02:00 AM EDT [facility]









          Name      Value     Range     Interpretation Code Description Data Scarlett

rce(s) Supporting 

Document(s)

 

          CBC W/AUTOMATED DIFF                                         [facility]  

 

                                            COMPLETE BLOOD COUNT 

 

           Leukocytes [#/volume] in Blood by Automated count 8.3 10^3/uL 4.2 - 1

1.0                       

[facility]                   

 

             Erythrocytes [#/volume] in Blood by Automated count 4.73 10^6/uL 4.

20 - 5.40                

                          [facility]     

 

           Hemoglobin [Mass/volume] in Blood 12.5 g/dL  12.0 - 16.0             

          [facility]                                 

 

           Hematocrit [Volume Fraction] of Blood by Automated count 39.6 %     3

7.0 - 47.0                       

[facility]                   

 

                    Erythrocyte mean corpuscular volume [Entitic volume] by Auto

mated count 83.7 fL             

81.0 - 101                                      [facility]  

 

                          Erythrocyte mean corpuscular hemoglobin [Entitic mass]

 by Automated count 26.4 

pg           27.0 - 34.0  L                         [facility]  

 

                                        Erythrocyte mean corpuscular hemoglobin 

concentration [Mass/volume] by Automated

 count     31.6 g/dL  31.0 - 36.0                       [facility]  

 

             Erythrocyte distribution width [Ratio] by Automated count 12.9 %   

    11.5 - 14.5                

                          [facility]     

 

           Platelets [#/volume] in Blood by Automated count 295 10^3/uL 150 - 45

0                        

[facility]                   

 

                    Platelet mean volume [Entitic volume] in Blood by Automated 

count 9.1 fL              7.4 - 

10.4                                            [facility]  

 

           Neutrophils/100 leukocytes in Blood by Automated count 59.4 %     37.

0 - 80.0                       

[facility]                   

 

           Lymphocytes/100 leukocytes in Blood by Manual count 28.2 %     25.0 -

 40.0                       

[facility]                   

 

           Monocytes/100 leukocytes in Blood by Automated count 9.4 %      3.0 -

 8.0  H                     

[facility]                   

 

           Eosinophils/100 leukocytes in Blood by Automated count 1.8 %      0.0

 - 7.0                        

[facility]                   

 

           Basophils/100 leukocytes in Blood by Automated count 0.8 %      0.0 -

 2.5                        

[facility]                   

 

          %IG       0.4 %     0.0 - 0.0 H                   [facility]

al  

 

          %NRBC     0.0 %     0.0 - 0.0                     [facility]

al  

 

           Neutrophils [#/volume] in Blood by Automated count 4.93 10^3/uL 2.00 

- 6.90                       

[facility]                   

 

           Lymphocytes [#/volume] in Blood by Automated count 2.34 10^3/uL 0.60 

- 3.40                       

[facility]                   

 

           Monocytes [#/volume] in Blood by Automated count 0.78 10^3/uL 0.00 - 

0.90                       

[facility]                   

 

           Eosinophils [#/volume] in Blood by Automated count 0.15 10^3/uL 0.00 

- 0.70                       

[facility]                   

 

           Basophils [#/volume] in Blood by Automated count 0.07 10^3/uL 0.00 - 

0.20                       

[facility]                   

 

          #IG       0.03 10^3/uL 0.00 - 0.10                     [facility]

ospital  

 

          #NRBC     0.00 10^3/uL 0.00 - 0.00                     [facility]

ospital  

 

          MANUAL DIFF NOT INDICATED                               [facility]  

 

          RBC MORPH NOT INDICATED                               [facility]

spital  









                    ID                  Date                Data Source

 

                    [id number]     2021 01:33:00 PM EDT [facility]









          Name      Value     Range     Interpretation Code Description Data Scarlett

rce(s) Supporting 

Document(s)

 

          HCG SERUM QUAL NEGATIVE  NORMAL: NEGATIVE                     [facility]  

 

          HCG SERUM QL REENTER NEGATIVE  NORMAL: NEGATIVE                     [facility]

[facility]  

 

                                        {      KIT LOT #             [id number]    

                                   ){   

   KIT EXP DATE          22                                      ){       
   PROCEDURAL CONTROL   VALID                                      ) 









                    ID                  Date                Data Source

 

                    [id number]           2021 06:01:10 PM EDT [facility]









          Name      Value     Range     Interpretation Code Description Data Scarlett

rce(s) Supporting 

Document(s)

 

          Progress Note                                         Health system 

GCKTKe4jCrMRSbGb06/JYXqlRHDci8SySYdfMYe9NNwwJYGiN5ZlMFD3mB1bURM4XEpZVkJgFxSuABJf

lbm
JxQjgFCvOuSKCnXugAJuAfUBykWjifrOAbKL0XoMH5PQRhH14xAPYwJKUrP6DiOEL1ZZk+Jp8UZWHezG

YcKO2EFeyH1AjbNyp2QK9+af+VdStyLvEt5OhhZ1ysSGRIMese4eFr6MLStWVaQcpqeEe/u2te7mD9xB

8IcZimtR+Sn2w/7282Yaz+9xfbURHnnJX/R92SRhm/
zj55hjRnrLujo1+fXsQX2Hfh+kDxVQYPD+g0HBfGf9Tw371Ag3Li70LtIpsnqiIR6cwU+5MFQhQrBcwq

JMfoneVbOONnKhSfPWGWT42OGVuwtQvSHSpqDhOfEAyLBh3EvsZXbbZq70Gl8lBSI/ViIwNcvHwBFD+F

ya8PC0wJKuoWQGpdPZBDOMtckFgO4wUpN759RhDce9
NlRitv1SCQyVPeagmssT/HFpQJkEkQEeLUVy6N5iPHdId4DI0TEiwkyX2QxixhJYMr0s/4ixEsJMwKo1

qoFv3YeBOZ6oDq5YYbw6Qf4lbpe0yJRlpvQbkV89Z4M/ppBVWwdMMzarQ3naXp3LTJCZONjtMGZiIMgn

AS1kSCEo1M/f3V8zMpN5HbxPLuRfkjqpddhGeG4nNP
5ou/SBWaw6rhjZQc3V6gNXnoFuNawLv+g/f7/rJqqA4yHAozNzQ8S90feCaf/VQ7QDYnaoG4x0Pz8e8a

mJEo6OduK6FMM4Qod3mhvPsAvmknSzheHTjjuvZULFpR0KnPrHWGqgPDAT28xbjdoWU50iIs0houLhY2

D4QR0XVMombjLpKXLFoxOg3KiyrLqZ9hnFAaOikYFh
bnGGmZWVMiZqezq/VOpn9veYj7bRjJ84U6U0epp0he5TfmJlybJY1qRFeOZh6XaClxuk8ucNWet/H9Fq

TgOHOrmKRmKuuGPiUPN6IOCmzibEep3FSLW1YS6liCLU4VhnRCgqPhaI5Za+x/rxGVfhjeEoY6pdvelU

2u5Kx7+ntQS2CfU3m8Nn2U6yerrFO5lVLGZ2sHjCDS
bfvBozDsohqoNZNcVQLKLhL9dULSyodgx4eUa4Un7pqzlVHQ/iugWgrUByFKM2RpJw1HJAalo9H8FrSe

l6IHLokDBeCOGsyHuBCqBSYceh97BF8HJke4QoZKz7IyTNKDhEMACPNIiyS9EMQQkCUp5iTaFipeyXmF

aLJDkomQ3rfFJsJM+2rFPPiR1E3XoMYvRislqdZ0TK
AkjcjO1MZBIfURwl5wpe5DMkCuTqm9xEUAUiFqhpTKSlHHFBdXzbuHQVDiaqwu2v4oDxIx1hofAVRk8D

1iCmaelt9fQHQbgZCQ10kCIG7aKnr/jj3mmxiY+eagrTzrIWANhNzU59V60WsYyQ/z3NPnqivTNGG7hS

G3lRxm86/k/Sp69Esthcgw/bQIYhwerHd/v8vYYHSB
urHEMv0Un/1x6ml+/vaFXahEoojJegUnoV51vtHEyO2OlC7nDndNFp/u6sC65L+n/yrcEIrOP2t6i0xN

6gP8zATq6MhclT8Vw/QiT/O7uU+keJ6fPfll5tNPysPNU010xgWEZCzxeRBojSlSGtdi2y3/zBNjM2HT

Q3YraneBI9ZWX1M3NFnMtfJyC5FYP5Tp2NiPTo4wfo
SKWRiOfRyg6743ahparYOqgs2LWHHoxIoabr6L6QmhPlGM1ZykRL7ewyWp6Fl9pwkMSvKfJ2tHJlg+2G

rbmfYSSGHX5eA1r3c6hCQtFD/S/pO2Zilrc5iX/UZnpgPGn3HnYknGbkmEdHiIrpUA60xBoQDIyT5RWd

B5bTQJQNv2O4YzAfvxoNhEbG16X+FmEAbyobgodOlI
PvY4xxuWPODnRWRMIVsPFGwyAiByD1ugQuMDt0VCe663A90k/iQV/waboCipQqNEpCFLnTYh8ARtrBnZ

L6JkeWg35hcB8HxHmuJO8XUjUawdYc57cjIo3cbQdhuITdwOmCvVEc6d0uBgljJvPcvlc7nNSQ2lW/Ml

oUxnsPWMO8ra8fgUQuJPXVPLgmjROPm3sE0S6TUO47
0OlBmod0IBO+ray9I9GRKGuHOycDOhqXIHSdY/Repg0nmK8x98neUJue1CAKqvNoKWP4kNqLXX1yqnsQ

Z2OR2kFEjKeUES8awEU71fAwUdhr4/Q670f4ca+lzsfWd/bma8HPPzGBOGfeyKI4HPTfIDlFkNtAnheH

XE3rODvZhLOZWsktoJIWO5O2C0Vo8+Df9K+wMNCmVu
OTQ7mpRhcZ9XZK5zv3LhDRt5SRXjx4MhNVdePUy5NRghEYRoD4H7mFRdTDZkFO6NSTJwTU2RRXElrmPf

VoLaMDDCHpLxKZMxDfGiu5QqX3DeTWAlLCHGFEsgFKShD09vLDazXw58OXdgGTBbDvJlNHy2Ww5XYzKy

TZRbL43bgULshEKhUQEbBGAFFuHuKOKwN5SirLMfEC
krI3GbD2RpUG6huWFcCK4awZByQ4BrR0YcwucuPZZLMxNmHUCrWBueEDQpJmUwVXslLTS+Tb0PLDT+Pg

3WDN0fk0PnWWe7AGQnu0FfDLohGQzwYlJdYCt3TXLhGltcPoz9VMZ2FCQ9VOUbHVO4UGz6CZV1AqbcXF

yuKPYzSwDnYaCeZrl6TYC6LQQoSdxaDxSpSKD0XWNy
KwqfPDU8HSW5LxV1LPJdNXV8NXH2YqX2ULChMTQ3ZDJ0PiX8HHSkBIB1XWF4IVJpSectBCy4II2IUGZ7

TOMdOXl4LPA2UpJkXJC6CBW0HgA4AldoQcBvTXucGvA1HcxmQzOyZHr2LCY5WbNrQlx6UDWuWQV4Vedd

TGC6WFpxGmZ7JtTvLsb3AVX7HnR9SntaMjMzPFL8Pg
D3NGImWyNvRUI0IhB3EJAuLqU1TXF5IdP6RHElOQqkGRW7VHUkSpmyNmu4UNC0MTP5EWRrCuAqOBS6Eg

J1TQWzSQCaXGU7GmE0MXLeXun3VTC1YoE5GUMbMbRiTVGbJoZ8MGPgIvNuOEglMxD3YQBkJCF3ECG5Cc

K4VOPxZsSmABFbDDBpZfdyLXY0GPUuPWA3KbBcIMTs
ZP4IUST6NHQwIWVmDNBuXZLqLbBiBaI6OFT9AIA5NGIqBwJdHHp0EDQ6JPNjVrLrURr8JWT4SJYqXoDd

HEb5HTH7BNEcYqTwHDe9SUE3XVFcFfWdGZr9LPZ4FNIwBeUaXPs7YPE7DDQxDrZpGDy1CZS4GHBoDsAt

STs3LBVSEnPxRzAgEUt2MFD6WVXyKgYgBHi8PHX0LA
KhOtRhTFb3PJI8SDYhSxg3JCNlZzP8BAZkFPF1WSZ6CqL9CNNcCwKhKBC0KwYsXtZxXtR1BOG4PRY2EG

GvCSa0PBDdCsG2ZljhZDUrUFTbBQU9CNmuLcOfVRStPgBgWjNpVPswUUG9WgK0JjbeYaNaGCVnYeQaEw

MoXrU5LCM6ByY8IrBqWCH5KZolQRU8XYCdGiX6TFO0
OrX5NpsuJyU7ZVA6OzP6YlbyWQFhFBV2IqDiVmA5DOZ5JxZ8OdsbOsH5ITT5XVCtAqmeHdd7HFV7YSH8

FvVsUlXfDNk1DFRBWgEaPhy6DUh4EYH5SaxaRlz0QNS4WCZ2GrmdUuKmZOddMwM8WgByNpWoSVT8FdD3

VrdoDwDlWIN6GtI3IKIaODR4ZRX9KhL2HWBxCOP8NN
q5APB0ZZSoSEJ3USI9SnB5ZCSyNJS5MEA7BICfXnjbBuj8ZFX2JOZ5NDYrRSebNSN6TtV2MWHcVWS0RW

Y9NdM0ADZiWSM4PHJ7XAB8FDVhDVI4MFJ8AvB7CKFiKTZ9PGFyRFN0ITTeBPPlNJ0rRBcsrfJzNurBQg

uzUOBrWnwUElRxCPnDHkBjFAClVLuiXZ8Zy891XNCz
F2SmoGDtjg1QCCEdUP8Nx363GjLzYD5ClrdseW1BYHQsFC0Gd6JhmbTrMEU3Y1ImuTheuWdntXV1VZAi

KASbH5ZezPQhCqLaLPpbWSCpG4OzUPetCKSsLXutFNCrO9CtxbYFWg55MJsrTK8lVNYcZTVhMDZ5HSGk

NHlxIICrD7w5XPjjW9PgA4jcLZXaQ4JmeOTvYQ4JQP
A+Jl0GFI4gt8VrEXy0RKTaw6GeHVvfPEs1OZkwTKVbK3Y4nFLeHl3kqK3VyZP9bLNcU8YkdQOHjOQhL5

Zmy0KDq471O4CwrGAcV4XgN69eeY7oZ9qrjvJrx5xAtoNmZMnsCz4FDDIsRT7IlKNfjPRgYJKkEsGwMI

EntEJeFWWzJqP9HSsrCQCkL1zqTKUatfE6AAMwUk2C
UPQuYB2Wz658QUVyH4ZbyHIjhrC8UHGbCo3RPVK+Hy5LIL5ye1HbTEd4WTVls0PoJDoeWSwqFaJqRLr5

ZYKmTyvxDfEmIDF0ECK8VHAhNQK3INx6BUC2DkDaRnZ0GHTeCgVzHlNuMee9RRK7SNHpUateOtVkYUQ7

WSZwBrnjZXD7KXH5GbH1UHHoSYL4LXK6EaZ7IJWpFD
Q5UNS5TaC1TEOuKPT0URKnKmHrIxExCLw0VK3MQBV1RHIrSPh6NJXyJNX1RgEfPkOfKOktWgF8UwZgDs

ToRXA4AqN0MXEkWgl9JXfuGqNeZkzeYPJ6HZmxLtC1XYZtMNXyKVgtBcV0QanrDmF9NUq3QRU0YrDhMa

V5VTKvSXR4AgOgRhI6ULm8KQH8NqnbErS3OVLmLXBB
CyUuRqDuWQL6BJKrZpZvUJm3KKK0HaLhDgIhTWM2QLYvKJQ2HAWiNeBeEJG0MyPgSeJzWfCkTWUcZOOv

UrneMsz6BJS6BvAiVfuhENk8XXMuKND6JSFvHqItVYEoLDOkNDffPHO8JOGpPsU8GZDlTZC9DSy3GHR6

KPJjRBxuXCU4KlU1HHOsVhd4TIW2SNLxBNbdBPm9WT
z5EIP3DMFdXgBkQUy5ILL6VSCyIyOcFFt1UHU9KGDfHaTkAUm3CHF9SGCiIbUcRXp3EQH9HQHqIrCwFX

r8KAL3AQIoAjAsPRz1JDG9BURoGgVgOCy5MAX3RBSxZwLcMM7EXUD9INUtYzJvMEy9MOR4CQKpBmAdPX

i8RRU5LNUePtGgJYv2GCMjUpxlHgPxPDB1PiO8VRUu
OPS5RXL2DlFrOHHeTYS7VGDbIfM6DujbDjedOLY2SvR1MQVuNgZaNBgrYmG5XYHeVOTlUII4FSSvGoJe

PeCnZZJoZkVWOfEgCAe5RQS1XvXrAoXpLiHgHNZoJzNhUeKhXSL2EGevQTX1RzKlLGY9HOUeOWQ4TzYu

YvFkKAzxOzB7KiGbCdLvCZrrXkEiLHKqRUyeMjR2Vq
dsRlQ6VGT3TsL2CfboEkd7MCW8BXGhMeliWpd3MFkqUiI7PvMqHhq6HH0LUCU8ScaoMuq6MCw1ACS8Mm

cdMHf9BWk5SGT6DfPfIkWnWVbxXxI9VgRpPwV0ZQO9YcD6UIVzXZG9BHN4JqW8FMHbQCZ8HUF2TwK7TF

AjZEe5SRG3BiP2TJCqMFP6NTJ5DfE3NXXqNtc8WRO7
ZUQqIpwzKdh4CRSmXOWCKcUdVwCfJBPwGEL2SGAfZxIgUXSaUZU4POAaIYQ3OMCfKWV0ECAlXpPaTSHz

RRL8YBFuTXX0PBUpGBW4AJJjLBDOLmQoJD1gek0VSTAjJMEqXjjNWcIjNOuNOjMpFQGdWLqwRE4Re591

HJGoK2IebZKzka3AMJPrQK3Ka733MfCsUH9KpglivX
dXq6vzNXjeJKQuZ6VzI9JxdRA6XXQpG4IuTZBmK3u9TPdjXC8NZWEkHH44FE9iFSCWZyHxJVEjKeohB0

VtDyYPYqMeYBYvRg5ykKFBx9riIxAbZEZvQpOvEYWaBSwfYV8LCuMmHLTiTMLhlNugOJ9ekKZaLN1SaY

VtViAwDQogID4+ACkzfyXlWfhBAqWsFMXsd2KtLQrg
QZd7WDkvYUUxA1O2iTKnBg9mpQ7NsFB7zPSsV0FamIVPiKAqB5Fmu5RXm835P7IryXUuEUAqxZWlMK7w

v5DqzjjdA5xoYO5dvGVsU35wvL3wNCdqBZCbF0AuvmN5E8mhzuOyTR6EQAM5X6lsniZvORJGVcMcGLRb

E5pztIeaDOgwMSMFJTpaPYDeJ6RrpwYXDOVlxznmuA
4pXNIvENQmBa2XQSX+Bl5ZVV0mf9TwZPjaZiDtMQ9dyz6LYUE5NZ7MhDb1QCAtW9XkBXEvRAZku8HtBE

3AHY0ehPlwNBZxLWfvV5uunzy6gVXoErXaLVG+Jq4QEMOftPVrVL1XLqpS5RvBqIZUha+mgzd5laGWDQ

Gf7cREdBPHWYFSwYKcmI5NWBJYMGTPMCz4BQbmq+Au
J6XYNPgYeS6ZZVi1IFPDeNPGYS5CHYNmFOsEsAoJqon6E9SbKA4SRfrw//2/654bV087gyvPlW5fn60a

MZMN6ACILH88SQ6ipAQnJx+zf6KqsGZKDrSkFr9SoE9F4blBbd41dPQ2FuNM2LvZuIZo/55jSz+HvzuR

MR2aqFOdghoeIBDV46Fftnlfg5+m5wbmLV7po1G/7f
TZ8+aP/bqz88qzaZYIY9cSpt2TBvHpNDXngOeCiJYP390xxkl0krm/uIHpnk5bY6xk7i6Bvd5lIEoUrU

5PccqWK33EZEn67ViBe51oItVs26jnQH+ABn0w98ZO42mg5u3/gr0yKRn5M2OpWZ6iQRH78CqzYpvs8O

PmH2+QGKe29eNcGHNj7GwVp/yLzgNGTdaCRIe0iG+S
XjCNJRbByuE4GnnJrVvkgeMcB12gRB4J4Frmu9ktorRRAfaTtUSueQs2Yz7r5RxdFznin2XZ8qeTaiPp

fTBr6diVKGLA+ZkWj0QbErjZ+bLjOCsVU53W1D9pEyQfA+j2BgX9AjoDZKQnZk7cPA4tVFFwnB0TfS/0

xyJVOyG7IXdZUZSK0px73dX8NH9GcbthoLrVZXPwKS
A4uhjX6SO6YPvLwIR3UP7ZxzUALGAGfI/IpcCAl9NR7oBGi3FkhM2AELdUyUIig7nM4jUeuFeUs2b1jP

YGDzi0HlhL8MbiBpede+gq+hv1G9H9uViWzUWPkPR2xlpaO0426Ln4MfQ5XdaOCXbshhpRAUuZFakepu

u1Z+4z0DkhU7OEfyaPWytCc9YhgcBCpJtbazWTxUJO
ee8IW7CUKyYJFCu4Gp1R/CIB5Mf44jivfBRCR1XAyMjh7Yw3EGWQkowawuy7kc3tf34ZQnHAUh3hygC1

AnUwEyIPIzruOX9em5S78kbDzzLWvQHltHr9kE8zd1EqmOvfeZndfnuHmdRSOmBOa1+0B/As4Ir2d/DW

X1LgOlAz8IqGJ2sWIoMnKQ2nEpzR+3QnrXU0BP3C/e
MpNWtu54Ng64x/3HVmRzWJ0tdUbyAa3sp6grF/fy2H6J5BvsTUDrT/0CFdX9X8pQHgkMoicnoc+kQYoo

AzdW7gdPjv2tV3A8hMXl8KFX6XG9EIF+PL9IH3ErwgAgy9ACjPVPrhF+EbkNfJG+RR1dQ5TO984B/lSa

0Y2ttJ3fdsknGAjE7wCxLwf+7Q3mu4rt6Lc5VFYxVE
1iRme2OIbDK7WQu2/MpL3t+f/vWZdmc+sGXalkZyk9pni1cmSvrtKEv46OFxZMXM1F1O2l2TGvaJ/iga

je7HVVpYDovFpWzJY5N5Nm1b3m3zPqasLP7m7GWfsQSRM+NHFdFW3OV5uznaOP2EaiqqAqezyQ0yjG/L

McKX0ePS1bN2awf/2EpmuoJZl0giuMF368a/1j7Rvt
pP01zk6fdXc2Ld8zl4z95abv1+Vj+wVhUsWV2kr5lR0popnAhr/y2xbbhDG1zklTfJhh5wvK15gBJotT

qtZGcjDCgQV7klFrC8Rz0zx+lA1cK0dM29vzb0BND1HJ3QJ+VtYqtMN+hpuRBjLDRJ4Rtl26oiV/J72U

qoVbqVKGoyJ0e7fy91vTS72J80un4S00RHAk39Q8fL
7gtArEeCzE0k60Oos54ihN8gsX4Bb/55ru1Pupsfi/m2Nmi20Jh1P8LG8wFJ5WfkXlVffYZUrIkIpBwb

Fh1WX6YNWzbj8R/LPl8AXN/maj8OI2hE/HY1aPB4tY8NbUlR0a0MZjuUc+dneQv3urKzCmTd1TUl6BuW

VGwlqT+Dt+eu1jClQVE1yUPbOkI9qOoan4K1GK6eSB
vrU1lueGD0BNWioE6EfQxjucoeH+gmo1h/L0lRxvbSVigHoMzocEc6ZwJ2vEYE1LrxUJy6NX/004YgJA

Y5UyF8aIghbND8qrFn3XRVXuFHi7GCL4ztmmBxuh2bNhooRUoc38mR9udqTPllBHYENYvDX+iDJdatyH

GOcUk45EkdmMXCalcrZOoshqEEnZaxYAzcFLR0F14E
B14jQ7F4Hdm3O/F9Hp4+xevVht9Yx4trzyt8hS3xRnk5AT0kMTESd/f2nTDX457lqokABnjQv3z9/YhG

GB6kUdTM283uaFda3Td3Tv4vVSNPO+AKgH8bLEMSaMIlR5tmoP5yOX0YDWuPE/8s06/VF+hirbi3fVSs

e/2COgRKrUsl5JOqfCb5tqDdu2XgasLehwfNJg8rGN
Jc3hO6h0Kqo0Dik4tkranq1YQa0tBaVyx98IgMpeEF43/aIMQbuJtChaBZzxHMRI1RXzEDhZ2rLpCplz

Nj/kGm5LQzOTfnYrUJsy8xyl8iZYJfE9QiBcQGnQfxVF9nnfHuJBpQFRJ6R4rCQWHyw6yOilr2AMfIBS

BrUX1/F3xzkumzTxbPhSkDcGdQ0veFIF+IgDR5ZIWI
pvkDbbaeNlvnS+gwmrx1sq0hcvARSZXLepmn0I1XWDsWw1kYpylzDkHwYDWVfV46BsxCIAbZQhmAd8eO

zxcm5P5RZ1GYTXYa5Na1BMXggNiNv2crsdNNMn62X/m1HSO/2cA6Gc2w/TsXQ3khnWrN2Nm7xas/ptvl

Le7pHRgFknR4vCRMijLEZ/J2BD6I9MhWS/1J8WNoZT
IqEVKGPF660S/hfNYdyKXefxlvJ2s8JH3r4t500vw0seo91OsKqrlI/hwvvCkOhtIYwqCficlW1VJJfg

L3l0oJc3lqsTqdaBsIOv66IQmNlafO2za5xyU5O7Q82kc+GvnpFn+Wb12/IcHlTgQEjqgSzd9wOdFtJW

EhBuJhj6V4FgNU1oQuwWEUHxZpWMKR+2BlG40NPbML
QXw83v4rUASsW8Oa2yv8LUtX1UsMkExMbX4e+zr5qvJzn6tA1MtyKyayrarg2nB+DRBPSBMLGguajXhD

jeudTn1s0Xmbx1KgdUbxP50DEvHP9M6DwBrxj+wuUwhnxHs2x8t7vNr8YxQaxI8KurQ6fdi7oFO/PzJ7

XgYXHo/6S5sMjdeRz+HlR+yf97VFf5q7p8POl4qi78
PDU8oD9zLXYjASXfIOs4atW+HiR+en0RUeCOMJssyx0gcx6Rls9elil0Iy1SemkmN0sCSnoAAvqFY0r/

Bgfhj6rEU4vyj82YqJR2ONXcVzBmRjRlOZItIUYUUTnKfeDj0CBMynHbUSxZgZlDu8IZm7gFtJ1NhTr6

spHVShJdOSoGwdEHUmUuBUGvbu8PrraPiOHa5lafEQ
wqtvg7KL9II+2P0nOd41cqB+zcdlnIvXL5PgxPKm+JlGvAgdYDhmqvnW2nscqNOZl0kHVf7RG+yhjwAd

DdLH7LdNRrqNEMYh0EvTQGTBxxKGOUJsUmHfw33ieYRLhNKEmF1JbvUM9biLPM4dOQwoSUBjvk2YogQ6

1SQgR08gZb0WNrCqEdc0eKRCsgqAUhKQfAAQuZs5n1
NP23RjxBZ39sCUy1/+0Iii+kXJu+T0dNinlno6gtsWOT0cKPAgQ24+8eK/NUKEfzL0PAXkqJuuxdGrPd

leppdQitwEmlOdMhbJYqoz2/k0xIwlcxkxCAqU5ddqMGAGh8Sb0m4ik7ZMnf1qFgNt867Unzd0L3dEx7

PxbRWJHAtedE5U6s59mh4sgIWXz5b5U7bl749D9b95
V7DSPZWyadrXWlaeF7k41wvQ+hMtBVJCb7ScLp/tvWc0tO1comwuy61MGEbbor6ivGwFDNliKWI6jkoH

vT2n88gdDxPVw0WU16uhLhK0BT30wD9g7vhwoKvym6U05kp2ui2dF0HXKswafxYfdPXStVOVM5EAg4Ci

e5usp6ZUnXR5OgkaSBJbGXhIvmTOaj0IyPxfY/T9qE
SGzN8hYiMTipGdcbmUGugQaafL8TzwsxItXJJsn/GOc9eVtBdWlEW1vJlF2UHvQPQYbqHMST6gmpFnla

IVyUzbLFpRUkIJzW/vc7K5ad+HYyZ6S6yBJl+ss5+Me5bxFnrHJkPTIlMrMcXlj2lVWjD5MEE/QvF2GO

ceDsQ5c83fVBjhdcZO0zVC5JOwS41PLZTUMZo3vU/+
Ur56iZ7gMx5jJ+XfusfV5hhc65IpQymDaHRvBkdoTX2UQWXv0nWlVDBkG+RXpacrnhZ+Qae82D754Ve9

Z4TEnfW8laiWovKom9mO16E8EElVHEDthesKZfjtqfPYZHSm2ntEpiWlyQ6ksIePX5g1LascGnincpUN

kAM5te8Mt1SeaXOvDQGpOFgUCJSXNjMEnnY6whsoVR
yT1FzLJLflWZ7FuzbxTJB610jUUrVKlvxxGIE5v6NcHZpKoyKHbVA9UNZtOUHy5FMw9YfdOmTwQRLP+S

Vb+g+/iLa0DG4xZ+oJ9QvCuFgpgqzV0k/iYmJ2jNV/Cbivt6ECf+of7K/KXqKFvyvCpMw7IY+CTbfIBt

Aqm7KYvXE7tPIo5dAec3ochsMkOyIWUiDIxPQRk7Uq
gCHHFX05lvtWKCbVGPXNuRVJXXSZOspGN4DMBliDcI3LbCN66/gx4jbaAMp+/CbKN8JtZv1Cqs+zyi/2

AWlIJEpse3kcPFEx4stgAN8X53bnDX8UjkFwsUc9OS7jKCMEf+8OidzenJNiIheOFi60SFfUUQt//a93

wM1Yrug48iESSTLupWbXO0PKfIBHPcXN980bmK/eAX
bDb9dvSrkcBLGP7R4ewmvLNVF8ia/ZxbzrHZdTF/KtrayAtyH5Nsb+FVcItCU0E5IzihYJCMfj4CUucP

LEsqnUhhM87TdVeoTj1Fl7qptnEcHthp9hqKG9VoDNBLmTdrDVOls54hK2f4R+17iLti4KI+6C97m54e

YEUQ2MJt2Dm8Syx+0+UPTqUEg39YmtOgtf0nCJoLVz
iCsGObkg7PgesUs7YkHlm9X/0kvqJvo7kO7139hYF1FvG6oXxV9L8p57Wm+neGCgpyXdaIylYrGExsuN

kEzOb5rM/Kf6Isfc0z3IvmRHft7H4YTPYndOONiqO3LZhRt8u9i4w7VxqfdmUxhkuXdHu2rt8sfWw3PB

O8ERzUCu9i+lDWYPmg3/T7r2U195R/BlnJduZ58Z+C
HKs3PlogLG9H4K0vIv8+A3zvduWBHnVDX3PpL8k9aw7clCqXejfv3OgkvCwiSIbWvgMFykJNBizlS2iL

Kyk9bCynI8hDQBSeD/bQYBtJjBHN0WEO5T4sZtOuy6OlStAK9JYQDueTcmu5mQR1Bn9z5g2OaK0wW6zo

q1x3M0eomkf3sfQIbb8yrSRGgcWjYGApEcZhwwEWFw
LnRsj4ZbXCN4GME8oRURV8vJv7hjiDNolswYjYknu9AyZ8w8hueLd9uHNRQkq74ka6L06D19V4rK9pa9

NkfdlaLmZBukd6XOM7CyV//KR/83PPuR4+xkU2FUoBWuXmHQ/whjI0vS1NbmjEuZMPyn68N0QOreXqEm

QIRYOwegGXaIi9rcpK/Efl2xGhrN6lBxItEXCGH5cU
Lenfskp+P2fHoW7Wjeb6GuY95EyJbtcGdSqdP9NO3YXkXgLz9TrGe8CCaDhdRnuYqVL9JBeZPkYlVhzv

+kn9LqRQ7VyoNPssaQdCeO7m+wVrWnPWYed/KpbwLOS4kDwiCZu2dY70kOPvDvgGHL+4xLVf+epfr93/

g9uU/VUYw9/SvqzzKoMYi+5FMiw6XBv1NSLApHLehf
LeQ+y/K5lOuF+2dwJ6iTu/RAe1fTrcjTcxELc89rcYleN0K4Ow7FXEaTbrNm71dWDw1CuEiALS0L3Xai

pmXYDPY0L/OE4Gu87lpcyoWei8X/KzByRshOz8kRxwddMpVsJHyIiQcbhkm36l2Sz6yxqVSH9CxhglmY

+zwaCbHbjmPKCI/7peH/LvHsjgtmwW8Qn2Jqs8V31U
3eiuSM6LY9FHwsbhORnSbRBgd4j3ACCydXXfz+YCNLG8FRC8wpIF632pLz9eLXFS1J+Bg6xRppoG7Nhw

bRPDU6UCu30VGMpaPRA9aNys5lgU76VkGQg93gr8N1vrCur63wpLUvFeHLZRL+Ptw23vH07Po9naR7vT

SeG1g/q/aWSmtC8CJXyAW6hu07RJaZ5m5u+iuJgK6D
BEMnUcF1s6H/u+ZJGT9x99/1I+r90aMx97pJwNhZPBg0eztUutx4+ZBWc5N8JvaOUQEsdaxOBnQ9m9Az

b6B00hubifReiE5gAyo/AgzradTF/Oo4txpvnigBpi3bttaDE0jtQlfZkKbkBlO9rKc0wSnWckx2ojrB

rKwhZi0BZlpeGvfOTdGk4K2ql2VE26EquXeyypLvGi
MDHL5R3MCGWQrz5TvcCTA83runZN6X5ni2oQg3EWF1E/TsyHI6aeiSeWbLj6subR76pkD8YfZ4q6lR91

rfg+BassoeF1712jUFj6gz/sn13yzeiJdUJYHhkkSoTK9iUESvW21S1fNACnLY0/u3YeJiURz+nlB1od

LDFuE+fOOTDI43Qoqvxr/oMWMMjcMYetxTQY+bYzDm
9ztQ9dqH2rguNNjVjEjip+hyjK+v7O0WpKOI5h/s8ibzK48dXdB7uNIwkyVKauBSGkeq6m0vd2QnPl5/

1quM6L6cRvD5jAzQJ1Ntb0X1O3CRlN5WmhrU7g4DtCbguazj0Y1Zil+blzkiG8D1s+3LtgzbCDxePEFq

lyYeR5ETJXkmkYhaQ0hmU9ZZ7O/5zL1SPPbjVuAkFn
izwd23SPhzvLVKd1Z8q97wPHNCCR1qO6zwa0MZgJusgT2uvbpNC/5KJGxh9TOotofp++ulfTbQq70qQs

20EN5/W7yKJm9Ctogiufb/Xdvj07b2jNPBZsZMxFd7quyiMf+VkfL75826e1Kx/iz8VvVl6xgdAAjYTt

iHAw7QgmrtcvCQjJ+hJjbzPWNePxOybaW1n6KvL4aD
8rEnjvj1amqxIrH+1O4lT0Wll/2o0YB4gUOk1TO3pVtQj6S61q1M67sRTXZZbMTDONtANlR+ycy8Whww

g9QEwQxlmy/358Ml2k/hlTu0BpoL3vmOe/C7qUWp4LNcCQdFnFXwr+WOtxYtKFfpGeE43jw/X1feGIWl

fVBSiQ19xiRfMikgWt7bdX6FKxu9VlIPHaWDi00bSY
SC3ErSCH1lHFqX2f/5ceHVxWqKUJ1Pw6/RbQ/X4p0PC3XND4UftkiFCfhTjwYvPbAQ6jd0jzb3CFZRj6

sskX2Fd9Ihkf41RJH/B/ifdvAqysUNR4HfRg1IryHw93Eqtb8Lpu1DO4aTrAH5/yYvV3rYuDz6R/ADkX

1tJhnSHO5Z4Tam+/Oc83Y1redmOWpTO3ev1kyNZJnW
FDjdIVhDMz0y9t+OfsIMA5UqXD8FALCHdchXw7OqwSxvHHGH86Hju2axR15tK7bbNfwC2xIVP4Z/0EzQ

FtScBSv0AYjPCh8olWYDa1rUB7ThlNzOcc+sqBBrA6hXbKo/fszW4LiDZMT+ADukL+tGh5PNIpxUZBxh

N54fM69UQ17TwNuvxT1s0yFW1S4WeSwLCaQ60A6h4z
F9jS7A1qg5eyYlKiI9xNcqtcSpDzg/5TzxH68JXbziV86VHiD0ahAr+ak/d35Klio6AeEeabLu4FCjRv

J/A7AT/q/hchbjbzqs0HaX5hEWXD8jTnVWYubmBfkksiyeu/raGoobPuX55I0N0N1Cx9vrHy74tXvRyb

Xc9q+Hxj5FXKK1GbOu+zY5Lgz98dAZjdrGqa5gf2QD
iy0YaipOhoKwgoz/K/yQeNf7jgoIiDdVyQCfD8E3sgKm80+qx8g4N9FUB2tQ+QgzF20A238dM1xGhCNV

Vf7JscJsnNXJsjfZ/dz9bA1jUQZWRtLynDsKIZcWPL1wVa50CseU+VcFtjcHto7rVS/H1jOn/yk9n9XT

/Xtj3SzLFyPxzIOU0S4RXBRDpp6V3y8g//QuwsC0Lf
ye/zb63uhsFzyS1YW2WBtPW7EmGy0KsTrom587S/8Ghpql4wvXOz2/EufFR/P5964TepW1HRp8TxwpO+

cybDG1OJldj3NiiGS3xFXWpIQIlPDf5USYJ6DzGhQJm5t22B/aLD244tkcB/5wONGSwy4LbgMhb8qAmW

9tnYH/V/RJ9ojPv5VHwfa6s4B/Zftc2YeoM6UH72X5
YJwqGdGGTnWwy965ARhnx0wIfwnC4bfu1+zFhPqulX0mla8TepH/Lv6V1ZGjIhTo1SL2sehjlRYl8dNu

T01t3SdvtUcFruT7emqPIsPZG6Hk4x3m77fsO0+IAqfN8FuXMtbfNzhoZa480udf+Hm/iZop3nkClT2p

Z14NqItMqfsZYtiEePngrX/5QX4pQyQ7Tk+5mbuXzP
tTPF+WGUmD0DSV2xL/Rhx5HjXYnWW1wGkQ404ep4zqlkE1809InT0U+NIukpUujntQp71rUQf5HT72iq

ZihOTXx0IdPu6fVbqsoMYhzLgy8mC4/yosP7heZ4M181ayHMd3rTctrft1lF99jlCa1r/XFjE1u8aIqR

T0vmEon84iuJ6lyT9m/l6D11tfqIB44R91gki9L5wK
ekCcAxmL9ecCt+fAtISs8DGmNA9DyFfmN3UAUYbW8xNda98+7+C1fTEu54BBcx0/QM28mHtHeNPkYSDc

e97V5gJ5JQ1++JJa1uaSx+9Rnc/m8zPeBg4O/jFAi2ygtADS7s+pP4o7RZKIldyjc0s+BnXHRM7r0cPe

30lbLv0n9HtGH/YjSEyo4rTv6rXVsS16Kib6UlKRlz
4CE/JpOZlwjC93S5pE89f19zl9dZRdM4bJm3s9e0vqlziuDFpGZM/Ox7OzjU/6cLunC9/A0oW9VYJrOY

etAmx2ytS2Gnx7m39fkgD70GQ1evzw4B4fYU75PgEkWPgUGPwGbBcllDSJ7zhEoea7O/o78spUQqlev1

l315IEMV8cQrZ5g8SVXnaYKk59Zxpm7Y6yE3N68UWA
N4+P6TFUqVy8SznAL8NhYLrVBDssVCNSKQmcom3TaNcpqxIGJe00eCgx0iUzIuqBJZgQIXkgTAdjZMyU

MCmjIzoR138LdutcASdUh0HkYzAatr2QKR5InrUFMM/2fIxEeCA13G+G55Y6nE2FuFl/qZdSA4LlyRXx

ZN7uYJk0Ntyfw2B+yZnvvwAdDIreV/wr9yxKlpk+Pz
l8/wVg/FyaGqQZtoVnCSnVvDMdTmiNWIiav0BZ3P0UEmR4rW1eqGnVxuWSwalQU/mQXmeVS1ehKfo7ht

iQbgfpkh84uE1Qas84elzxSPfgiW1Et5nJXMdjK3l4k2Sxb6BqMlpbikVSlGaTqqwC5gyhBQzVgoh8L5

RfMct2G9770Ef+iwO8qsagH27NcIEyRnzsSc1UHbGe
hmBW6v62J38xp/Dml8IU6TzSqv751McONQb2DbleP6IYE4Dt/ynFTmuUECmBmpMbB46Poz5KmnQkENP/

qdC2cdOE+4Mnofr1WoV8AS+qogEdrsXSZnuAO7L3jxVovyWvHsKHBKlf4nfc45yldXAdSuHXWGBcis3c

34t5/IEup7lBC0oa9q2hGYY3EYGsU/nMidTD+BUNcN
o38ctUTqhAPhArcpZE6tLY+Eby63+zaJpwfEyuvLPly11j8VJv8q0lyvIr5N4K3i+C82eFTNGr5KVnkv

Kw0rt2F1HdcIonD/XSHKkNs7WZTRjJcdU9A2U7prrTJur4TZfG2jlu9c8V6JxI6yj+qiD2agb4jXDReU

tCXUh1ivrMcn8OL5JS+TzO3Q+F0ih5Ub4Xr6GtTApE
F9fxve827nLTb/onUHDb06pClGeOn6nTo+omrk0ny8505+/DFKbA7eNEOsl6mzH0JV/buSAOYsV6mohx

xhusJb70dWBQW13LhgaYqBNlHAHOzGV04kFmbb0eyD8w3Is4inhQ3cQZ6FDtBLQnegvoLBSZfL/FrBJn

bOHc8mgIMThFJ93w4dkSu4xRFW3MZPi407q5xhT2+P
or2yRiGYnjqJfchO25/M+S7yV6WBOlJ/tzizhml4pwflYs7M45d2Q87o/cQ+AslSufKzTTR6oeOA8k+m

HnfYQxej/S+hMXmtIyeN1k1j+zwrMPqN5m3W1kI7EvJ3JQ0o+55Q7Ub4Zc3QKd1lKixzw0Ltjm1y7IbY

rR67H9Pu2I6Se+dSz8tr5ebrhttR/O9dV0T93b4e/h
QWGHnM5eu/p1NPOJl1fdceX8d5v+nW1fTvWm+R5GtPAmLtV8xh8Rwr9/KAe3TQ6c6SIP+scCLv2cz5fY

dWoY2dP0xxI5uS6IQigbPzh+N3enZqdhCA4FT680YaO4/oEZUYBUZ0c8SqQQ72RsIKsVlcv1i9ddTAuJ

eAp0Tq90gdz5Ro14vU1xt5ll19+3Hf/ddvlPvfdPyR
1L598yI/nunnlBudE+Cv+ytq6LxXAvQuxN56yFOfT/gqpHKXsBmPhBj0u+ARSfM5aGy46/CFD1HjYaA/

liFMOCdFwKWou499xTaMwdgqpK3o4wtU6Sw1p1E3rzo+Q9Xkt2rHo1sl39aax40gxX7eUcsngJ21nH/j

HCQ3srw5yH88swFaXJO/xNjHlUKF+7je3xID7bcwbn
mYanLZRi9lfbe1K7or4byNrfCRjzYnFSiqJrVXrkYrmQicifFz90o+1Z90bYZxIYf1rk5s4sp4aCV8Dc

9heS7ya5dU8Gi/pSeDs4xSuHY6ztkJ7roqkvzRtEovF8I/Od+7K89/UleA9MmMlw9zrv4b16l/heDaDu

2CW6fPQ2gTwpW+cl3U6xbo73Z3e0ptSpyj+LN8y1pW
rzdLB2CREOuwjDflPJmVDuX1suNXd5+EeqYxAeLkXbgfBOl9e95Syeu+MHcw6nB+0M5WpcPIaWiLM7t/

BT6Cc/gh/QdPh/Z8sjnT3g8RJJ1KU4Jf45z7RVqUTj6NnoODVceVbVAp3FNEHnwfgDeOTsNM8BmNqcfY

8LL0E7Yiq1rnySxS/NeCwSG2q2d1Wz9BDw1EaIEyFU
6KpxXY8Az914SCHhMeN3lr7QEiwGFdelNxUr4is437iBgcOtJkjSajxzpyDMiBUCnS43SY7xHEA1ehnV

jb7UxhyOeexZui785hLfJ1Z3iQ9bB929bxdmMcVHvUr90YbE9zdcKjeR/ZulMWo+sn/XjdUWbbLzVPen

0kGs5JtKRk8FTI3NXoq1impW86WzZQZ6pOnxdfx3WD
lMgQS7equEjXy3RjLVW5YmjJOfL80donimrJ1GU+QheO8PZBuRFahlFc88d5tcSgu5xx9HoM/ykbDzp5

WM/9/rXtXcVZuk72F32w3l270Guj3qbOICcTgvlwqb9msstdtyFq4CQ6TirBLnhb5tz1CU/heB+6Bfn2

YoLCmej1Y839NmKZO595LEMeJSaK7Y7uyLop6B6Rvu
Cg3Iyc0WJHnc995es064m9KC+5u2L3o8oXhtyxrQ7ZkgciWe6Ufgft9T1E4LUfGyWZseNhKjLk/txjoN

+ikYsVwrN27osVbJvkJZxWwI9EpfMaJmEUCwkaR8As8TbT79lbjftTRZWwXNZgn0PvWvY1L1HCHfjl7o

akm2/ySd5RiRbofgO25PN/JZDa+e2Bjd62tDuUpxA2
tPKl3I+5Bwq9+y1GprBO4D/Randall+ulnd2ryHAQtcnv9zoKGOvUL7ka+5+nRq3zQFKpsodX4t7rz0AEXWg

2N/otarwxAPNsgZ+91a64xnMKgfE4nR1ZlRh2K6iX54/W9jqciA3C4gb53L6Q0AkDePq80p2JmFoyDnx

rQG802dxI5IQX31U49tF+6rcAOTcO93KV6aJAVuTEB
vZyFnsi6d0i2NuIIXCB97RL0vvuUiKHxW9FXX7oOfKC2Z/cT5MimmQoo8hd9/Mgwvem51oUkoF+gdrvx

GrjmIDnrSiPAr7dlu4tHgG2zzoK5ozWE2pajDrj6Af95Q2HV0QrtXop1EVMpMRM9eqljd5nZ1xRnZMm/

KdBR4E1X82E3meMSU1rfovS5Otx9BII9QVUA4w9G9Z
NUerL16pm+7gq8VcmcnTveoZ/CFNEj8NQOECwH1SSLQl+jn/51Rw77CcW8UldEGSVh41FiQzi7yHUce3

Usi5IH/Ixyag00jAAW9Py9E+UDso1IzGdJntB/GytNj8UQW8k9jMhzot3Rx9Y/UfdS2iYw+j7CZkM/xI

Ujx3XbPt82Ndt2ssue8spQfnvs8s/RNqLsndQdaTqr
ry6pHewH1arBFzUEuA0zdzUylrDeEFffqSojrtnKLSAy0VL7AfFMppLLmemzYDhT7rS50y+hjih/JfAb

1H2U8QePJzWbE0/eASjw7C85xnf9sTD6IUsqYavCoZc+MUffc1Yo++ux1NCT7NH0pDwXpi2mk0ZsulR2

P71LS9qdE6bUsPkvec/18YXj88+DvAg8JHllWLR4Dt
R0OtabnuR8X+ygxJqK5Ikd3bKUnMZBzjC4uswnQpuhuxWNo+V4rk/sG1nch2LuAPGHRXrPHPFD8mjF7/

DxwIQxk6UEEUkmUpxOuxQC1bnEL/J100DgE8n/y517cGo53MUtW4NNRvbeSL7J8R1Ci4P1dCtfNDx2R2

s2vtECsFv9mIZ898BEPr4N/jZcPOKD2OK4vsQP1/jn
kgz2v0Q/rG0EiaT9mgROJDBrm/Uf5hYyjaViqHW4C/ZL8urEqk/vXZWS0TEz9RJp/yqbp5JZIiJG0mi8

Ik23IG8g/xNd150r/Ld0lOI2gS7zlPIMn8NpjFt4anb9J8c8jJiEEJwNaGSHS09msXtB3zKqKQpLgA0k

3kat9Ja8ukFcc1JHAHrWTH/OKhNR0rziHTeVWsenf9
bybww3Ytmsn926zKw/4h6V9B3F+jI9dnaTzKf91ClnzCNGU1n2eo+9WzoPeG69AtM2HyA5wH2R7M5grm

2POjkNxyygnGmknD/33v/PfW++E9xswiE9fmaM6waH6ClS/lmw4EfM2wf/MugfWu4HgYuNLoJBSci7Wa

6kNVVV5js9w0fDj4dBZ37CCzuhrBb6vJBDkIW+8WvY
J4FIWbuG3Loacb9v2V3gE93eH/ISHNhLYDsRLqFocN7u7xYQrQOe5k+IWrxXk8+2E5/Rs93q/FzHl5E6

w+zPNkwBvI3nOqQtFqJhPAXe/HPWrG+ZUCtZCx0PZ3gU4YdvoyIF38SfLX1j15T0/CfIq9pqpK4TDZrV

yue0PmbQCPfwm5JQVLfhrqy8utIKn+d3d0SJJVeU+w
iwPWcju84QiTw/7oh95cg6z3sQtUtbn5YYdLmfCSONznu6YTF5EaIR2KPmM/Z5qdU/ww66hI2RhYzALb

ihkH3Duk5sBgaYdJHu4/YpIms7H/avNWxJ1776vCZfYqmrLxOYFfP0iO9Hk5JZLdhHqhHX5HGydKPYdc

i9SS2qVPev5hNAn/OfthwXCgy94v5I5N2c89VBMT3u
Mh3D8fmtFN+P+G5y7kI1EG2ljvG6Iho/sE7lhmo1lVuqXoypX0iS4aHz+SaiYMvWFu6klGfXR4LvzKyY

YuW0Xda2pry4q820nchlgajBiH0sgpn3oN/Lth7b4uyP3ICZeW7gmwnV3OTGdDpL73g1cSpXV6X8ToJY

3FCxa8gpttFBcwEOQVfQXCaX3OvIvPMd2H+YhIBJYQ
GlIT+CCs/lCflLFZv/XM4gOKdFcAZtzmAdp4j196beHdr7CwL+3YG0zW7CsF9AISdQ/ECJP/C0wq2A0o

dRelWcbPmT9E9EpX3mU3sNk4Pq7PbseZTg+dzuhzL8LkSjtkIZ8rNI3THIVkUHDfHyNvcJjKNghPSHQf

zlaQvXbEHVAI9rqXhDQuaMFcfHlUNI4PEVUexQjYjq
BtjFPZ2ZvUBK3C4RcBFwfKjRTVk9RX5cky+T90ko7ca87875hZeLOfypZXyGyZ8zXSnGMdLpIG3xBL9+

aIXIJdJQjrH8YY8mzeLWHG6iYIUIv74YqCt7zEZlzjRc6/dRNmR7nXUiPZLHMgFz1reWCb0/WHO4sHL0

JBahSD+/Cb+V/R94c9xTBJ5vS3GZdiDWG9jCjkaVHe
A58ji98xWt4UjhWABN+MdCVedGNk3ZEtSE8aRuljQssRz0XFp7khpAaYlaMyyKwtbypoTDe6DzrOmPoy

gqSqzlQxrTL2BCVEcKKYjM+zs4KNwkVC/FuN2Jy3VewH5kv7tASPBqNG6cOrUKJ2yY04ARZI3LzLgN+C

u/I/SAwNd/PTdkkhNiZsR+R+enisJY7RBhgyXWKoI7
7biLePLQppCxj/J37dD+zooQIOvM5CWi+vv41cVPLx4gKD9wIhxhWVPQqbmWvGFNur763+WcgjdtS3NM

OKxC2QAa8tdqVESgP6b0QTKalUJ6KXa+5M2s+2xOzk84DVaYzBk+V3KD4hpuGPLnEzOZ/TEAkG2rQl4+

x2fHd6+Rk4dpgacUQfT1RRGXU5D8E8IuYeUyzuaMH/
Wvja6lHlSililwURDjQARQDlZuEeCegL7rTB76qsHucvcXA0tmZ3x0/l7n+D3Lm8IzCuI1CIa2fvqrNe

Q4dsstsgGXUPHhW2CX6/q2GKqXbZfFygtbe9nKSTWctLkBBkm3HwnH0C8KyLx4L/Kg8rdexKSFSNTqPb

BmJGlrSiqXozdn1HfMYwuSU+ArlDXHDx6PH8UsmTp3
LPu4UPR1VuLFsCXIWmQKN1rRUuGUYiWKr2cJ8Y7wOEwhubhzmI2aJdjo7DF/8KpQoJxFafqrr9gMDH9w

WnoVf/U5BXBJchUIjOILbBDrqpvlyZXoYiXLe4EBdlXKLZngVrdgik53nwTiILZix3yqrR9IzvRISvo/

96hI+1PLJU93pTi0O2SGT7PnZVaLlETk7EojWo41AH
ou7t1PDdYPscrEn6mLVJTmPproKAIW3kVCeqfR+ifLCNj54C9n+Clji+5bF9o2MayawA/llMObnFISCP

E6WJUPHYkEtTcDvXfYF2u7YE2nfT9lrMrSUQ5Ga4Zm/FR/SvyqSylgl4+DbE50koVm+JFu9ETXTnbIQ0

2PBNGaR6ubjtM8P4Zq/X/4P/wvwNr/AdLuRjcNCmVu
RAS0seOdwZ1AWH6ch2UtUNefWeKhZY2aqi4BCRN1HE2IyVz3BNRvJ8JwPPPtUTLzw8VbUD6SPM2zcNfz

DcN4Bc9LDoOmg5StMWXlPOqOkHJYn60BBDz0O+o/+Ymxd4HXE3WqpBXYAt4r/WNKDZusqUOKTpk1L4fp

LFjKqhAJAh9WcuIZ1tMrJ6V3aqO7VeulFjxVlhqOKa
ov8p7kinYIIXbKv5kakp/6Bz342WOZ8Qxz0buJZTwauI5ZlwRdBEC8zTEqGz1tCicsD+puAvXo/Ql7pA

OZtVQUzIwx4kT/Dt7L8Zy2apO3zSIDPcOV9Y3d0LWGRjJjBc7phcRhZgQbjZEaE4M8Jchl/lK2J4KJwO

2evTWZE4b1m0K2MKYx50i6GW7zOP5y1CrkjYkmW/TO
Ujg14ipnw1aKDXnjzzOeeBWdKM1LMlQcWU0evm7MHIJtFWNePtjPGld1PHvdTK5RrSZkD0OkxzWQFKBd

vnnqbD4nMEuaZK5Rh528LsPlZR1WIFCBJUYcBPFlHSaTBuObP0HaT6ApsAV7BRFfM1YyXEMlD4y3QPkm

YR0XEYHsJD67JN0gFIAENmKdP3DoYXckMDHdSByfOW
1Hk240JaYdbCEcSTTkAyKqANMgCHZaNXU9AG8UCYNvPESodOpaMU9ecDOpAA2XdOZkYlPwECqeWN8Eh1

50RmlsZTIgMTIgMCBSDQo+Pf8DSO3yc9ArJMqoUCMaKT5ebi5NOWaRMxKiG2Q3mDNmLy5slJ2MdJU7hM

SfW9MQWTVrnpWZyVYpRu2SKCHwQp9uwF7JXXYJAYZk
XEZkPBxvE6uVXE4WUURSXUEmXTNefuJbwYkITsPsB7KJMEV6q0CrhAzxAt7cMRzaMeGleBX4hkxvFFJb

p8XnHJ1AluRgwcivGsUlYPQvruXbdRfaHZ9LwSPrjGKuKZ66CIT+HpMEHwVhZ0AodvLLQWGujjyjkT3u

IUR0ENGpTb3KDPVsYXzdVSRpJS3DZsFkGmn5CQ8yGV
q8NZyhCRMbGQXaBTf+Jw4QBF3tt5FuHFsfTlPfGC8bmg9DUDwVYdZxI2A7bQWnKb9vpU1HvWU4fEBsX1

P0bEViU6Wfy2DFr609F7UAZDRWFjdVthghrY4ZotVzAGieBn0LQEQwtFu5dU3AWYhrNI2XRKCyYE8hER

32Tl3wwBSpTtG6TVReWq3ISxYkQ1VuXH2rO09yNUMy
MyAwIFINCj4+MJodohDiXxaXLjU4OABgq2SeAJxlJA6QNE4vo6SsTMgtALCug3EyKLw3GJ1PMCZcGSMz

D0FadYMtC0QDBc8VHJx0I7uySLsrDm4JsWMfAJHzFRlsTK6Hc579GLa9IS1RUOX5IDDcKo0EJSBuEM9Q

HTKaHGWsYOECUsZaSWDcDhCnZRLpPPXAHg5APuTiA2
kFYwusY4YbUVexDc3KDeKsD5I8mHkHyGK1IJA6AF8CMnOQMjSwDGw1C1E7cITwY3A1xDeJoTC2LI3BJF

8MFPKvRH3+CaWrD3VZJKbVHGO4JM0WqGDmEN2SuMVUB6VfkQMlUu3xZGVvqOozyHz+CxBhW0HKMKFAJI

F2IY1MfEYrYG3ZyHBMB0QzrBRmPu3gQFjiJdRkYK2v
ZT4+OI4SCHOLZxKORpReJCnyVJpoHMSiYMg7J8P2FWVxP1ZVJ9V9W6c8n0ulge2+LH4LCFXdB2ZRFOFY

QkErUKgiMPxkIHHdXXn9J2C4ECMaN7TJB3yqL8s8QI5+PiANCiAgID4+DQo+Dp8OFI9zy3AkBOjfFTNs

WI5llu1SMHvnLNOcD9ZfPMBtSCfuS9DniDyxQS7PAD
ohVDoeXH5ZFCAtGGY9PV7+GInirPUnCG0NFjg/kRAzF5rbsPVsOLbshm7a21a/DnVlBG4kTpXZZV5pG4

IqpQn1zxUZeq4YO9ecWxtoEq1+UIowWQd5LfwaeF0zqHDlbIl9dLP9ts7gHy4yFLlgFQmjtK3evuA5aN

4dTPWhIgV0dsY9jRH3JS4hCm7JACSbCFpsKBD6LmJZ
EInudL3pQpTePl2xbGA9pWsgS3f5ec42Sp1slpfhYCv8PA5cDl7wEn7bAJUtm2gefSQ1VM1sCcg+DQog

OEQuOT6rYXB5QgVFDh5WCVM3X5h0oZ5jvWP3GR3MXcAyYZHvGQYgXAMiNLViOCBvNXNkWRIfTCRwXSGg

ICAgICAgICAgICAgICAgICAgICAgICAgICAgICAgIC
AgICAgICAgICAgICAgICAgICAgICAgICAgICAgICAgICAgICAgICANCiAgICAgICAgICAgICAgICAgIC

AgICAgICAgICAgICAgICAgICAgICAgICAgICAgICAgICAgICAgICAgICAgICAgICAgICAgICAgICAgIC

AgICAgICAgICAgICAgICAgICAgICANCiAgICAgICAg
ICAgICAgICAgICAgICAgICAgICAgICAgICAgICAgICAgICAgICAgICAgICAgICAgICAgICAgICAgICAg

ICAgICAgICAgICAgICAgICAgICAgICAgICAgICAgICANCiAgICAgICAgICAgICAgICAgICAgICAgICAg

ICAgICAgICAgICAgICAgICAgICAgICAgICAgICAgIC
AgICAgICAgICAgICAgICAgICAgICAgICAgICAgICAgICAgICAgICAgICANCiAgICAgICAgICAgICAgIC

AgICAgICAgICAgICAgICAgICAgICAgICAgICAgICAgICAgICAgICAgICAgICAgICAgICAgICAgICAgIC

AgICAgICAgICAgICAgICAgICAgICAgICANCiAgICAg
ICAgICAgICAgICAgICAgICAgICAgICAgICAgICAgICAgICAgICAgICAgICAgICAgICAgICAgICAgICAg

ICAgICAgICAgICAgICAgICAgICAgICAgICAgICAgICAgICANCiAgICAgICAgICAgICAgICAgICAgICAg

ICAgICAgICAgICAgICAgICAgICAgICAgICAgICAgIC
AgICAgICAgICAgICAgICAgICAgICAgICAgICAgICAgICAgICAgICAgICAgICANCiAgICAgICAgICAgIC

AgICAgICAgICAgICAgICAgICAgICAgICAgICAgICAgICAgICAgICAgICAgICAgICAgICAgICAgICAgIC

AgICAgICAgICAgICAgICAgICAgICAgICAgICANCiAg
ICAgICAgICAgICAgICAgICAgICAgICAgICAgICAgICAgICAgICAgICAgICAgICAgICAgICAgICAgICAg

ICAgICAgICAgICAgICAgICAgICAgICAgICAgICAgICAgICAgICANCiAgICAgICAgICAgICAgICAgICAg

ICAgICAgICAgICAgICAgICAgICAgICAgICAgICAgIC
AgICAgICAgICAgICAgICAgICAgICAgICAgICAgICAgICAgICAgICAgICAgICAgICANCjw/kOViP1nliF

VzmxV8W7yhLl8ILc8OLQ5ay5LzTHFuESkoqnIwYsdPHcKzSCFaKjmSFaz3ILqfMA1WkVEiU1RzX6OnTF

bzQG2NXDXuKGJxxNMdGDIjLQMfHhW0RFZpBBimFT2U
dVZpONgtVUKwOFDuSF3YJZPfG921xgYjHJ3TLa9IPkFvXP9oyi6MFXltDVOpOnwDFjh7GPwdVA2IuZXl

sQJeJLPvTIAGLjMqO8uii0ShTfWhZKSEOIgmKY5Ba9MryBHnAUc+Vl3TPN4jn4BvBWipUYCbTS1mqx6C

TJqRLxItO4OnjMdtTZIww0ltBZNyZK2uuDHoEGZ0EB
7zwBOdZP0zPrFIrZRuBTrbEEVaGCOmGI2sZb3uPUAeEWNqYuHmIYERGM2PZPYdSXFqnALdWBYgMTBZKB

0BFYplYYT8GHDycnRjrTEeVAddOS5OKRRzamCfTHvvTZGDVOs+Mw2QIH4pk0VsDUloZEZgYD9aaj7KKX

kPPxBqX0R5oFOoJ7S8AGkgMy5PERWjEVEkFHfmBZDW
AUeaGG6DWP1urcN0KD4TmMBxGKRqRQOicGErHVi4X20tvHMjHMwnYZ1JACY+Kiesha+Qg5WDNMrZLKyVKPc

RzTyTWWJZbLrC0XtF7QKq3HdE5KbUD27yGtxetUnQGqoFP9TAY3uFAMzBWFXYV4LuLJbyK4ymmKpBNHp

TRLUYjVrT72slQCkYXTwJWO9HALmHm2TKVTqD1Qglq
OkvYrmrzOmTPFuISYSPC4SCAmybpNrkPGxdJweMT75yExfHE7TTc5SDfUuHU7tsy6OnJYfSb6FBDSyVa

5JNBFvSXBpWBDhIQV4HPNfJhXpUHtoNFRwMGYpYCO9ZTGpRHVuUP4JLeRyPYOmPLhwDLggFZXuTQXkwj

4OGGEzJNCmCFcbSbAlLEQzAWVnEFdjCIYuDUEdTLC8
WHZiSHHhCW8BZeCwUEMuBKV5UdCzVIDbKBGysi1JYHSpSQMdSyQoXVMrQVCnVWVoSZshSIRrRPDaPTg3

TXWpJELwKD4GFkPxYNXdFOJlXBFuCSPhMNJley1SOZNgDAEfDvH1BYHmUBIoTPGtHNaeHGXaAAM2IuKc

KOScWYLbKG3YGdVcLXMnBQF6JpYvJAEoOXDqyt7DIC
UoIYVjIRmjGUEeKQDuTOYwWIlfGGMsXJH2FMw6UFOhRJTtJO2TLzIoRGTcRLUsJTWrTSSyBVHonp6RAE

XuCKOaXuHcDCMzKKPwWMQxQLqmAVChGDU4QBKmKEZdTHYcSP2ONeYlDNTrZOQ7CBmgSWRjSVDvbx2JTV

VwCOBdBoD2IyAyZMYuHQEiMUnuPHMjQDP8FNR5XACk
KEWnEO1CFkYiKDZfENd0GQDsYFNaJUVksv9QZGVqQWDiUGL5BoGhUROxOUWqLJh9avQlwLUdIYu6UM8E

B1DwxiRvEjWSWm2Yx723ORLfOLUoXl1VO1hiTw3jAPChBNOYIy5EMTn7Xdw1FLA6ItPmGCDkMFBzSKor

OGJhYThhMmQzNjdhZTU+UAdmYVCzFZsvJnF3ZJMxF0
BsEUA4JvIlPSRuLTT0T8IoRE0yDYDNFa6+WQsinVPbnCopJMXQQlJ0CogtVFokGDAKIj8U









                    ID                  Date                Data Source

 

                    [id number]           2021 06:01:05 PM EDT [facility]









          Name      Value     Range     Interpretation Code Description Data Scarlett

rce(s) Supporting 

Document(s)

 

          Progress Note                                         [facility] 

FPAYRt5eQhYNIhAx59/ZETgtQHVos9LqNFakNKi4GPacWMEnD8TaQPU1nA7pUMA4XIfZTfUhEpTeAOAx

lbm
EsUjpPOhFtXIVsFciSTiCuZIgeJvvwhSFuSW6UyWP8UMXkP13aQMLoLBEeD1RmBLG7EpV+Te6DRVKacG

CzID4GQamN3Hkct+PG8XuB/wmCNmWWiTjL8SJQfkbcn+CRrWDkWQIR5ItGArOzsrMeIvGj/vddkktRM6

hzNEIrZroyY1yP9MycFHyPZPb447/AT47uWBu1x/1T
+JxNl+oCt0PQYJzem6P/guChWha1IwZV2Q+W+MaMksr1dbBVjBEuB/KsxUnjjB18x1VE3zgptpu4AjB9

i32qtTRuQrpdDK1uVlZsQ1A4mtAUJc4mvZ4RUY3zgFAWmDUfZIGqbFR7lXkfWS4t+IvCjhkbVF4qBmL1

rFkbW2MjEpdyY9dICoHdFh1MQCKaKQwk6HDv8/c5PJ
vqE98oaySv00vlzHgVb7yymyO4iC1hYdtbMtnJBcSPXgQbr5SjCbEez9hCl4KZFdjtl5yTy2LbgPsKIe

sg4RRUiGkJkMrFKOBu9EvwxzlP6QcY+zxLLtTe1WvWpbGIOeLvYB+S7CO57xjyZd5Qd8Ozeqd0ubKq8E

KsBkEllrHZgzlJsaoRoD2Eq2ce5/7vCXq7uCJxS997
nGudDQf3zImUdqkMlgt2CgiYvN01P68YZpQLPno7Veif9amMoWj3J/AEn1RaULtgedGz+jvSR701exV/

pyfHETsRoj+eCoKTL5RR8NjsPubzwPxBwQSARqXYjDaqwN9Q2au0+PTUTzJQcODY6HfJGvXOGWlHG1g4

y8Gt/E6iv40yH54izdPxNukHpXibEP38IeoOEPyZAB
quUHSJ7HlRHa2tb5wVYzsgeOmSQoQU+DFaSHIaizRFz/Vzej+RkdTiMFEt5xFoTunGP7Gmbxm2OQxcGR

c8K84dHttKhwf4fPRaUBFMBqZfdkqLpZyNojFIPa31dGetxGbEWMsr7+rwJYXaIXsKMBvV6pF8trkT/v

rNckROwjsz+Wc2sXl7y48YnPA6EgHNUbgH9e/RLEx0
Rw6qiW3/M5ac68rFOeWZnhIO2oFpVfWwuTi1yHdZm9XFgXvBfrmOQEQBKn3nUYaKHbd/rsDRu1R3Exso

op2BP+WgpZxvDMdCh7+VOdOkhWnmlNcuDDqUAAsXTCCSqGQi2PnS3tIG5onCHuf2mOuWLRLEAHzdGVCQ

0ddDQZzbGNOYGuwmHHJ5TXXXWRXyD/ZnyMMznIpiP8
yumDVgRStGYDoUVKqZtnnPdERj4ltCO5Os+gnonNxTc2H4FQ8Jb0Pf5DVxxYx4Vf08kTvWGTvS1TmKE0

KGOJ0bH0vVYg85hC9TkcnBLpHgCT359i+jo1MxdbQE6DWJDk1/IVMWjW/NJVb7dX8blmQl0oxIV95dAf

T17xIpg9cKYEHuTCvPScZ7HscRO//LP9jkp+7053HT
xQbCJq/rUKeV+AFiHxKM1YsHv8gZsi6FdsdzALdRam2tcBL4YohIxz+Bl3QYdPqpc/cWKSx9gq9JYaOT

ILpuHEu4hUdcWlCparBovN4FUWRLkgx9PyKOlF2zQL67rqYI67D2J3waze1sCFHr4ZtMoZ3I+GujMPu8

9buWux+iGuWZIaXg6WcGrTujitp3EtzYxCDvRpBdm+
Ki1l8jryAaRpn5yY3e6hKSSlt9bOyijA8pUHsxME3f25AxLzGSWcDwro9xc9oOFyHBgI9rg3x61AOaxN

DlUyr8VLnVq6yxFdreFJN4OHkKV8GIQOW3mNMayKhI5qvkG5bE+1AktRVFCxfEBVWrF1rZwHTvRa5XOk

8a/B4C2gMsRCbXpjuUrvhqvqj4a04HVSEiulYAxZbr
A0UWmg0tWB/oFGD4e6HAgtMQz5AlXfIJhxAvKig0olKbV0CiDT4CjugrdKtyfLfLZbwG+eBMW1OH1isk

+MH7tAaZrnerWxkOVuNFQHAVPkgIgIP+yKg+l53rtPdumqS7xsXpKhXGZkpJsTQy4rCY9JjWeTUSjuWf

QsTL5fPkcS+SYVoTH4O9F/ZHaNkQTUgqfqZLzvqtv3
tFPCb0Cx+RtkFTpUX/RJdRYYZRN0efTKDGWeFwoUJIKDP2APA2AjqEv4j/Iy9SGQX+miQKWtlw/VFmCy

pdocZN7mI9QfeeZjP+w6+gR53gpZMmVnH3dGFLGycJQYipNCWfV/o+3Xdi2BC81IwO/SU0JEE9nDewpX

nL9QjAkzBDHe1rwTXCHOZU7zM4g0ZRPBM1A0rvimDR
Zc6WP1EOUmF40vD0vEQKMDIT6bGfzsL6YzJ58JDTT2Gqid2AELnL0/D/60UlzV+Dli0m+avtyjTmEbu4

+Nyg+f+mEK3+QBkqDzOyLGuC7KZ5djTD65ozpC/Hg2Nuaznclk+z/rV0f57MIC0hForwmIjxgBoQZrAk

wbB9rZCr3pqQA0DBma1PtYuLz4apRu489fRbwI/xIp
6/bpGntJwYbaxkYMJT53mk41+DOMvamKYjFK6p2j/IvGmMPeyUGposLNOI9nqrAP/5BTEsbUF7DTV/hC

oSRL2MAa5F3Mma3heExLpJJF1Qo9fecvclEScVaoId5dSyU6hcW+AQgqpDt9bWuq0JSz1GANFvam1PCS

HZIiaoMizZ9YDUUWzMXOIMk9fDZkf0tUkAdJ9a5EFH
6VtlURLjn6b60fW49RpBVF8IOo55r1uU/U2QdjKqvH/BqKsV6CE8Mm0OfZFsSecS6q8sXd168xNSnnoF

k9Zc/K6zvHQQyzX43gTTFPsQO9VmuPAXYyoc0LwBdkE0zbwNCdgDxCD95ELS7zFgTKsy89DsX+BEqsQL

A1g27wNEdYRVWwDnDh7E3yb73y33Wklh31oRixx0zk
aeRZGtWlB+ozdxIE/qowT73feAi0yeOTZKH+DJzijfCQlo6vo6sQNcqa+5mZwQs8owDB4QCkrl9pwugp

rrTI6Bsyk9YvSs4sNwIVKjfgAi+NRzEW62MT+m9+Z1mcP983z+uQJ/rj25lUqOWCn7IOcPv9llRSm2sK

gz6KSa5iamG/If+qm+32BwK/szez/BtBd7c0KEC4MO
4Dy/vHgZBfNVvIkOfo/6LmYg1yhDh0L008HSMJwIitRms8160484O3I3pV+fqpnNidcv93LpeNlA2Kyp

0Z/0V+EyCrQzLyyrS+AO6RY832BdjWhSh85801I62N9Bi7PumO2O5rz0zgX7oIeKi9J/JUiiYMWmrxtN

d4/pGGIKh2/G2CbewL3bZfZ9yb1io/QbnLrO1jRboj
ZrceDO2NPW7p5UglK7Xt8TIhtnf6VVBKXjjqbDOHioyNi8DkToPHyyKbif0aAJeA1KPXboGw1yif4g65

znTz7n39Kl6OHevacRGbivcH41toLT8AartNMbQYbnjo90UWotjGnZaBsEIXc8J545evQPEY0PMG8Hbe

2M4WK0IL2ntjUjqckhEfRkUIwlFNOlRQ/4sCy2C3rB
tYJmF8FYneKgI2uTg9hl0uRfWMLzigOTK3HvrgmPWjWOTRq9MqmQNd7XsZmCdxt9v4j9ho3eHKAcVQoV

Lb+oUacdx+rdqrHyJ3HMKsALvKtTpoNvLitygsDgZmLU5Q9ZlyEf7bvTb28F0p6ZnlLsBoBpVcCpBbSY

cOmEixlqL4TTPrW1vWId8DZEAfBoA8fmjWaoywXXmT
2Rxm1dyxn9OY0b6cgZYta5xKYB1m/p7HBJyvL7yyUSOVk2s8rFypwqog2jqus6zWdBZ0+dz90jbB+jro

tWu0FfQNYa+ga64nBPvlIQaPV0zuIK+vGn8HM0/8xXN2QlwGH++hYLTC9RUUHJod2oRcF2wG8QZmmTsR

ziv4t2hPxS9j9O9uQnvGqCBF+UEaG7TC2I9YXEiJFO
RS8LxR04naXvOWhO5KZOdKw1Ck1GJBF3Bt0IivayG6xVAF3ASwmXvzgiPtaTfDblx0wTSdRpvDl8aByu

+k2O/bch9jgXlooNHieCcy32JfDVzSwahCQqo4veKch4XuEJdf3FqEuVbHfqRGE3kTduC/DnfwBAxkRG

HEswydZmzWWnEA4PEzDjLQ2yui9LQRJxPU3vvh3UTN
W5AJ2HMYZlSQ1PyGYiA8DgR1ODUbEmDRYaQYIiSY15KJUwXZPVXSdfUOUrF4Eio700vzZoebEvIXRlKt

4NPZAlTP3BEDJoPIExlLDfCBWzSRIqJuK5DFTuCDdnGADcL7PuisIoduGtAOQwPMZOGGaxTFRzG4twf2

AbAFh0UW1BWR4KjtWhi3WpnzQpL7sgJ6AOEY8JJMTw
S0WOJ1WaT1vgWzVgu7BtZ3tsSgYxi7EhBr2WRiDdTh3XGeYwFD6sfj7VIcByKN8iug9RPGT3AY3CvWd8

GATyO7ZhDCDnFGEpy2BmQQ8MYA9ydTrfAvS7Bx2+FGgtRIZ7zsUwwP8PPTFkGN7w0oBG/n4z9x/w0Z1p

HbCqlcxUXepPnbshBfxLv3Vn+8LAkEjeZLgJcH9c28
fii+Nr680AJMDnR61NUhR4y8X+y4ZnXgBW0i/o1kdiPC2c4t93N4TGtcIQ/PMfYvuYVpvHth/DF9rNi0

CJad1Av1h7uu03nYT2rMJHnt+DYTb/jFO0fjpH5/hlbmC9fk0SQ8uzjP9ta3G2hYecbMXnf94pw+WETl

NtRy2dzFk6b1IHqXXAVYSutYh4Y9Ekq+EN0XqqAN4Y
nxUDK8FEkswqghwn7OyCI684mniw04uT8XIhKv+bfD6IkXo0X4F1t5jL3yqHsaV5nc9SkszidknhpojR

KrhF+BvjygcA1gyuvHa+FcE0O80WKNz8sQl5X5t3psVRzSU1b/gpnD7nmoi2E0N7xwt7gDqWsFwTaZS7

8Gh1WcxZERomy+jM1JD8chlzyt/EhiE0EOjS4LYbe6
ogpXRVOfvUACqLDEKC/3EqS8lvu1pYRaYGBjVtQT30qQKWqjN3k4Uhk98mor50IaLN/SpzTTPayx5+Qg

j4vmMLApzP7dQkEIcekGxEn2hgkLwwO+JJJOwyPiwq5d6K5lczu6PEkh2OwuNCVl10HrPZcCsV8fJ0sF

KBLxmc2mOzWJlKBt9hhtQ14Cm0DNyWuAji6a/XPmfj
mjhu56aB6UYbKot6KOLLLuZRAVt7aBhlb9mKxoz/iuH3+7avLH4dAQ8lP18Ke7oupk7fmYBbhacjtdc+

CQy/XfUXpJM+9dUHdYMPTR8mbrdL0tba6XMXaZ/7wec9te4a7D6x646rnR8Tnxy9SO3NSNwFTzR8xWHQ

gUWUVtQPfqrddtCqyxjpfUnhtDMsGU+PTKQ1QQXw4x
yOHqy8zAcDE/lcBkrcWjrOzDM12Jv6b4lECGvU9ywMvLiwpKD+XgNrQ8jR7suPE4TGpCj+5Uzch777XZ

w/bYHhd490RIRpFJPMQ2z7wp7OaJFBzIPmI/nUddYpG/pzc52VwIrh2otVRgYb4HzRRAeWdz37oVALL5

Eo5ThiGbXb0IMw2nMZV42g2/IkMnHAGmuu5ac7xOZI
k9uA4Ei7ZzRat0DlIM8ZkkOGybb5pXoixTP2pSDaP4c5PPuGPcoNNoECjgtjdlMAAh8pXNL8R/IpWYiz

djKjoaYkT05fu63vb+k29Kaw8DwwWxnm0CUdESECBr2YtSDsPRRGKsTaUT6jVX+mPdZ82tC0G0UlhA8Z

17W4qqZi5gMMLqZxFm9oaZBLr0fMwz6EL6hIcCJIdK
znj1JInHIGbmJcC3g0sZXKYeCG5EnNKWcer4YU33K8dZoQdJD94VNABWU0c86P0F4EQE4YsL5Z/eBwMs

qussnaUozbYsOjtEXTs+NJx+CvTXLWliapMifGPtI8MXyeOl94rrAtxyWz+uB+hJ2eTiMYrEH+XMWzeC

8G+nluMHjLNlZFGBek3IMvloNJEDEsZbA7gvY+aOc8
JxvtR+W15EXtvkViIFYkfJwWZsGK3oEz/I2yTXLEqYdNjUYhNF7G2c1udam/5Sk/krysten/fbq4my371042

jfILBRHoCeSdv56j3wblFK9CUwx4sbSnZT4cTlY5ZT6CafJiNsYDFVG9O4g0Y2XpDU5CJjLipZNxX6hQ

US3HXqjM8J4jKxMRN4gr39j4tSd2xGohHuBS4Ncf8r
EoJHe9BDy/ud6vjnjjvC5N4f0Wrgon7KYnReBhVmNbQ8NOFgZWyiQUimhEFz97YOlT90kjfxWU8Dprw+

xxc1io7W+xR6ZR9od3pShx86xlT2yFfPqT9CEpgrwLIuoAv7N7Ft3fXOuNmDwM/MdrILzdYvP6ojggBS

4R58KLl/Hycs21HALO0kmipt19Ko8bYbPJyGpM16Op
jNvjYYeypcG9M9mP8nL7+BSGXX3KjA3orNnTrs7sFm4LNJxXkZmnND1V5mdlKe7atL118mHkqQOzLY9J

CTYjX4iLJveLbI6Gv57NY/+N0JJu0a7zLtBsILfZIdARM36UlK5d0j5n9QxL6fp+fWOoHa/FNzJosSY8

e/qYtHNp7n0lxb4rZQiawOAFz07vzl2x53LNyQbsJA
J2igYlaXyjWU52f2wsccX7XwgkhA/FWRB4i1UydrjNe2TeWMxPQXSBCMGmR4W/XhiAz3pJIbzfRSisZF

4V3HY1lih3mvsIy5aWC2uEi17jMK/X4baAy7TMTb5u3JU+p8bF9SuU1bzusd6b7jKWYlqCVbDABzPtzv

0BW9F2SKk1trI9skOhww6XPwOFVru+MMvgBWNCmDSo
3rV4l5KHVrYGXSCG/Mfv0PDWCgBGVYxQ0ULyZo31aA22mGRZasoe6Kj9ki1tx2tnFqx7+inNr5+Ztlnp

PWSSGpGDaG4Kf8rfY4nOzuuJIyTSOD8/0P5qpPovIydf0Ji38KdpqVbrUo6oNzfoRHnGw8RFIJ+4FAlf

SuheSmHXQReQCcI+QoR8bCT5FSRutvJVqt5DxjFlZm
64Fb1fG2KG0i7Dnx6ApZFY9i46zcgKqMz9zzDZhsr3zMDyTaPvRcvzm7vxeIfAx2AyUPDled12xuGEKL

6HiyQ+2JGaYFBdoX0smq2wdSe/LiGFqejpDyoNOluCVZdQqfAgBt/zeE1yy1uEgYrf6GIrJJe2jZ7Hbq

L5fqGa/Vnkfc4iSH7LfglyAIroChEcMx0EfEm6rz2z
y7ZD1+ZbmWry/Xre3cQQzOghN1j2lR45Hygz3vQpqd4FV5Y0aK/SLXf86lTZPJrbpURxNj/m3dlWPKM0

7F/kelq55aNf6FVhLs5DpIObadIRup7rC4mD4744YpP2vJ4kom6xO/8V+b/Fbd3YjipRgb7NWKt/0057

FVaW2f7x5u19+fL0nv7LrLdn70g41aL9zlN0m5n9xV
wPk6yKa5WTMZN37JJhttIU9oNyd7Dak1Jr/IHyhJYF3/oZ7b7ecn9GI3nzPl6h/4sBY4Ekxi8WJbauL0

wO+KoYvm11N7ukVOZ+zTONmjyfV/zfMlvzA5FZpEku6U3eYtU9Ebyz4PIS+vWGPIc+S4Tv3lOls2nriA

QFsEsCUO7i6wikLfpH9vm/Pfv5VZZQR8mPHpmj9x7y
c3g1jw04ncfIQlr6aWGlm0jP2HWHz4l9Q0m0Id/ZNVty1Dn1jjGBsugr6KJp40DVoE0vznqPGkFzfKeT

rXTZhplkz4E1FNgIncZZ0mP8guZYBrrMxm3Ev8NP2TpykhQV9y7ZgItmVW7vhIsk1k3ujmGp+IJtiV5h

0kaUmQA4CugTVtrWm/SW/GatrXHE04LrdH4hnHgA6L
oSqPyu3asw31IcYieFqgqAZpSwnlJsYj+IcH1LbUmTf8yUww1VQ6XNmZXz8MfAJlKFl3lrwxSSWhgxPa

sXRK1qVu24wF3Tng98g0HxgZvBtxtVR8d0ztK438tW0y/Ev682DTdcPW8bce+9StZNkSZ1lru7o7oqLj

nQivkGe4yHobf82ko2gomx5oXUXvcmHWwbriEMa1fQ
caUg8S0OipbM9J16+ImF7CsvCKsnndMyhHRoAZ4WJeVnJX3rnc9BSaZuMT1fqn4EXQO5BW8GITRfAG7H

qSYvG5ZpS5JGPeFbVEAxZIPhRG10UCYpWWDIUHtjVJKsB2Yyd972tyLibrWkNUQxGz5URVNlNC8CBTNr

KCUkuOGeCBGpMTEbBlG2EXSkGTapTCSiX1QxkzOxso
NqNQBvYYHYIAooRGYyU5vyv7XuAVj1OG0JCR0OcqPjp6QhrkIgA7anP1TMKC6ITQBdW8QJZ4ZpP3geEx

Eji9HeL5csMrJjv1PvLs0QQiApVt0NLyIiDY9jni3WERBoIE0xab8FACA7UK3ZvVd3KPFwK9QiILDyLU

Xnx2AzPE8NWW5djSonMpT3Dp5+WFwpFWN5nxGmaI7C
YPChBVfcMpbShm7t/wOxL+mBePMvtJeKKSTuH016lXefdPVx+rZdBZg1xtTLuE39N/BCpdKZGyHQ3u+i

GAV69YgQLcIzB+K68hUrQA9wl4A9F0HuPXxw1W7RWWvPhsalQ4Pcg6lE4RS/tk3ypet1i8wotmfAdBqw

MatjCObm546OP4O9518/d7p3n/uds/E9lLuCLzOgab
/s8eyyK6i49nN/ah6x5B51hPgxVA+uyvw6vbbkV3/wsd8lLBIbB14shvKu2HdbUrDTIlgOL38qKWx/Io

ensaWeN6kBw0nb67LszYpZHrnSJfMn46Dl13zJkc+vHG2uAgqEIoeTxrfH1OKrcaj0L2DVCfzLY+pGPQ

ba+AuVMUdIFj2vHxkRCP3HPgeGlnDeR5imbWz8trLM
u+Bw8nWLRxzbHc2xNiO+uXfjcmeerduBzXN0mjTtjoK77SZOMVfrLfxUjFypQYSY/Sa9SgiKyoJpGp2i

9sgsJ/ei4cOu1B22J6DQLGEzreSzL+WpoX9E4YMd9c+Fqe8/4Vvv9YVpKT8XJfu9KIdxODXoHY39eGiR

8LHwwYcXe4najMGpGdmQVJ7DmmCses0n2nfByrx4BY
8JrXGMOjmnD9lmIKJDjCu/HVDNxQZlPLViuOlAUQjJdltMNFTIRS6IDldgW1/EnGXkMYXPw+RMO02rQb

ksMJlSoks8u+wN+zCnnth8Y+kbuopmY4tEWADaxxYGCKw3DFwvapFIlGajwzF7ivIvxd3LD4dq+ZKEvD

Rv9utNvIEBNcqGgFo8dhcG+yT1HcCUd89gUw/JO5OZ
EKjA3sV2RGu7km6X+fCH1qYa0jeT4xBBIxYZaWdtyV8F5xYJJ5kdDIJv0fYQR8Z5Lo1/RkxmiAntGtLG

wNTXoTyjKJZK/dNxzUayqQ1jXLtDySXKcdQrC3EHX38/Rn9PqyHcA0yKpla3pL1gxrMNvReSTgceGIdQ

HsZQHLuArl39vtH4IF0pZa66aM28KfgvgbDvMX5Be1
nY+WKEYNWyJJi9sZkIyfYnpHVO4Yh2/P2J+2FuvTrB1A9rQ5CtTqEFfpUw1RdiOsFjcjPJa0SK+CrViA

bd3UhYUYY9IbP0ke3GREmtmwpLyqCbp9JDMj9tVVJ2PRDMXGgzSa5SSDkgGCUtwrYidW8q1KQdRMWhLV

ow/jazpIy47Uk4WaZ6yc9vZdzre1ntKRDuH8XAFJCH
y6c0o4bHyStNx5otrb0Fb4IfGDGEDbC5X7b9hPwBD5CLTlHOjWKantZcUf5zosnQbQtWdoFuEwI3qhL9

5immyGBlzTVGgMbXreJLrekzQ6W80pjNw8skIzEIjgWeFmhQgp7iRNq61Nr7WEvrBo0X/h4Ksr3JAf4h

juQ9TJBoJ6NxUuozG2o8JtwnFeM3U8s35/4t27ixrf
76DcH2eDv2NG4bJG7R+a3DnJ1G9RuK4lJSyDh8BppqZTWJ3cwJCcBhVnjkeWh5Hm6D2HOCsIyOHUYyKu

w7703KqLOT3f1NCYoy0odY+N0Rv4zw/hrBb7Dg6u5S16ltMElam9BGxMT6mIovc13CzZKxvkJW7mz0EO

ChQqh02MvKmlNV4IYriWA+9zlK6/A2Gj2qlS5IYkZ4
zOPzfqdIBZjeuxOX9qk3r7NfnMJ3CenOx651bF2Rsm4jcZQMJ3srDGiUAZXlBamp43JRiPOV8UKEpSiT

42jnnzfHXd00AuoCSDcinSAgvWvWfbZr2EN0wcBXUm5M0GIAJSgkXqmZvH26vATBzrmpktweuHXRl6Lm

KLn+Q961sV3c5NfTPM4Obt+xUVpHt5NrRtHfmB24aU
vwr1yNsiLbKoQ37OlWv8PFWsrpje6O56dYl+1Qjb/twZZZMdie9kBSyXnKiskKC8wCOewz1mkDIzkrnq

VgVhJxZBjxZ5Bi1RSSQqQqIBeNJPoXJdzr0D0l0Lf6+KW7FpUKBQyHaYDtYcj2PkkQmc6sKNoTGlmByi

dBIg7v2bNBOCSlsR9q5rZ5PZO5QMBTtJ/32E7X9UEz
f7i+dgmRCK8MJs8917qLrtTcFeqfYH9n4MPoVIFFGuxfryrkGAKdje+ThgiZSeSf06FXGn8rdtiKvItv

2JqB6YHXHRsdc0PSMQBXin4xekNm+IVpnr6qOwlMBK00BQczpRC+gcnq7MJfFiNCw2n3vBglrTcLYTqW

4CBzH3x3luWaVqchP/0L3tQfvodcIdlLnUBHt//yoI
WSduehiLY3TmSSeHe9XeKiRP9GguZ/OE1H1ZdJgnGwpG2rpeDkXrLE3kQdndn421q7jJqgb4ZYAxYoDM

NPav4WYVKsWJjbLpncCOs86TZZYyvmFB2gHMAN3W5M4bT2svke7ctHVwQ1YhuNf+N+oNZxxEKrZeSc1b

As1KHL/sH008OX9VotIGAqaJfaJ+cWHdNCVfLg9T+c
roBQGJB1iyT1PUmKT1D4uXlvrpELF+fvqpJV/Gl93ZMJEK2EDCDaGCT1YS+5i9LFnP91vw/SeooX6eb7

WN4y2AnGbqynIBk5TuREEc0Lt8Q3eddHcXq7R+KuM2JzbqVi9K4/BDGWmrAuA59OHrjSj1/RKYLNRXqc

6fC7oMDR1PMYmgrEv6xfaXWKhJQBGddiW0vrhWeXXq
d4VqLMxRtwLyEbA89pNrgLwPgfA5mmA6Gby6QqAQqnBqddkidQb0JF08h0Hejedcce6N7XmjiXmR2tKZ

iAEfmmk5IX2AYrUT/hpUhTO/xk350hnam2CpICPB7zwu7EDG6+SzAN//aJ0odtVBLvDFJW2oTN+LacbP

nVl3BeROICNnFnjOgLbXi1maMpG+A+nck4i+5rGlFb
eEpmjj17e+21jmq0mvKMA/CyXo16LuBUSS0U+8+hCOzBDfT2tKmh894ETFEf4ksxGX2Po9cX+cJTZtWv

SXlREv63pmTXSo8RH0MmoNK3NBHlqvOTATJlWEM/5KNv6gxCfjtKDG2adYs95KrfmjTVmaL72W7PPBmr

zmu8R+s/hxCoVkU0u0c2c4u3ZJ22ggTikkvgMuRXwA
rw/bop31QdbkIGeq/vNtGR4hD85OYPhuKSZTNlVIq1FUM2ieyGBLNC/xUqp0pflbzs92cZ2k4FREj8Oe

oMcEZkErSwjicWn/FmK5hLBX/1DBreWoVao6vlr0854+s96b2i8+S2oaOIswxHg6vhYVoBtP4jqDamha

ZVeSKeAmcdmjNdW8hKwff7kwfSQv2BC8EJL8VaUPzl
6F1fsG7G22Fyd9U3jo1CNA7tvM93A9HiYRFo7WtA6vA9dqYnXeU5f95OaOE5Z/HR1pToeCsJz2A/VOuN

WQt/8YoU5V9JY9/StDjpe9vDp5EyD6hogfIHjkaDmungBD/82FZlfcDjqOzSH8jUV6YyhO5c/nq28ZH1

+GWp7JCyJre9I2A4tV+Wvj9Rlh3iNZxhJ/8WbaR9ff
ra6pHC2yLHkpBNHRipTV4jtdIzmF19eNe2k8ahztsSk+4PX9HYxhIPk0vx+No+/VHR8jMCWh15Z0O0vF

+JGwA2Fb/MWvjaPvwxa+Fr+/LEaGQg4uJI0HCE3hH349ba6T7nQBuhnaUaaIYuDT9DRfNvWW8cor9WXG

UvNZ1ixg9LXIY8KP4GERObUW3YpXZqG1RoK8LSPzRi
WHLkOXEvOM44BAYxRNHQHZrbVMHnX4Pha989ocIhxfXqJUZmFb9VURKdZV7HBWZnLRGfoVUuMXPwYNUu

OoF6KNAiYVrcOGXhF7CkfbViplRtMEiwHEMNYHbcERChC8ihh8CcCPf5JM2CUR6BvuFbc8PkcjOjC7dc

W1QBEY1VZGUxX2HKO8GaX6gnPyDqq5XeW1dbDxUmt6
UkKb8INtEvJc0AFcEwGT2rzq2TRXCaODWrBkrHHdIoQXwoOvrkqSIgKQ0RuDV5UVWqR68qGWFuVASxE1

RoIDEyMzY+Bc0AMOIpfWGoKS2FBkoSdSdrkfrMQA4t6A/HpyFgRb1LTCEcYAVNvs5GQv9qWWVUDhlAqd

TZc3v3Kc36VS1r+SlNE8HiWOcQK2L6Qey0Tju3Han+
+AfdTSLglRv8l/MYxUZlU/YcH5yq8idF2wVGCX1ss6aZJkRnTxY99186KKGvWu4L1p2dPu/10UzZc1i4

ls7SAxacqe82J3/VOu4f94o5awld32bglo/r1SmPA3NDj1fqR4v+lGprk40iW0KEmRa4U0G+oPZjr6+S

0ZvkrUr+LabSAeKFXIO8pZFeWVxw9dKJByjdfzniZy
fXdQuvGeUkZhSv5OcolHcLlBLKlZFzZOH6Tm1Ko0NZUlPFvhiPW1/ZvW7eq4YOhuCQGFKRMzIHeoKRU4

1ijMsjq02Idn3+kRfrZHnTO3lPck51K+NEV6Uf7uFOhG+jIo5CfjWVpvIT57cVxnetRZvu8oSdwi4QPO

syGgD/n6G8y0ekRkfR0JRNhWeZ8mJzdgxOb3W77ZLr
7Da5HDWE91yyAd7RWyB9GdPoGEtVyyz5qqOpQWA8ZFr06j5QOdrypRx2xY7DZzXovuBRIGRUDtk4+qow

eWa1LDRA+EHt5bH0JOHVr3Tp4WFecu1tXyQyAWTYJoPFNs6R8pIpZ5L8K4Y46eqg4hLcAk1OF5jZhpAr

sxo4g7izfmM2K83eaG5GbH+bue6Op8xR7cQFmdrGO1
Ej1bCs404P4qVa1cd7zPJDLSt634Pm3aiPXmNPrSSr7Y2S6ygbA6ltwZA6StNbrZKdTufpnpn2FCW2yu

sn9EQ2p5YCnk6nFYG0bvFs8Yd9MmizPpb+t9KQHmlXrl3zegpE/zPpvp2zwz4c2o5uVWFDY9xgrFGFk5

a9Cx8HJ6xk9oLA9QHLuVP/LTw0ub0uBFodXRNPaUnM
6xkoPUYAlZ1uvO6TgOkSjKaDN5IN7aD0hp/bo0H7nSpnCVX3QKSfLW1d7tnZoZq0qrMP6mL6JwqR5zU5

lpLiMeU3KP2l9kcCwlRk8pXx+hBSQhDakoX9DvGWfeCj0jzrKP6I6fGbps1pt+nLPBryMz5JQJDJjtcX

3YlKSiRLPt1knwjpn93KVnYF8noDDdpRcxPn6Ijm1Q
JidjJHIapnxb8iTKqBlx3GYHuQniiNE6lwKltKX3IukxHhviyFgbpVm+Pl6uBnGO2icQk7DE6B1cf0zk

qN+1/rHaj4czQmFqwjw8Y9r+gIwGnG+wTbu0deywiFXl07hLdB+KMco3nfKeDb6kxgGIsL9U3HuTu5Mi

lS4Fc7vf0JIuiQfzWsP5mTZi3eZS6nnRcmfDKu534e
CUITPI/o9JbtQSylC2JxBHFL+YOL3j5Q7tJQtyUb5xHrW0Hqt8PPmkmrxwd0TGZikrzqtQ2N2Rq5dIr2

F5OYZituI8K+t8o5TDxkW1EaE4TS6yCrD0y9iD4cK7Yfab3xCv8P5T+nXNf5KXhIE+ZcYXMWGEi3S6pa

tMuIfD/UGQfOsDr2Qtw7RYcI6A+opZGI3s27/7tt0a
D9Bne7Seul/eFILqWSTy1kdOzFw923lw86EhdlN8do1GICQPutvErk6GcQNaqD6kWNb8r7z+CRzU4eVc

YAjiOGi3Y3uUZ/tYRo/YH8+byi0uPnp4ve8gEo/gaJC+pnAwqz0ZC/M9tO2X0XEK7lp6FsOSDuMPzfdn

VxKrtHKoOaWWVrl9UyNRupYMm9JCmdLHIcE7R8cHXf
MFOjJK7WWXNwPT1JFEFpwyIyNuJbDDLHVpHnGFJgOdUkt5PxA4NaMJWyFEWOPQykGCAmG82lWTonOm91

NYtuLRWcQqUkDPp5Ny6FRrNqQQDaB51gwHIkrGLkPYFuUJNOAYkaRQUrY4yay7XmYVz1UE8MPJ1DboDs

i5TqenAmE8kwV9MALI0BKPYmP2EIO4GrL9giBiYch8
NaI5rvIsXje6YaZc9OAxKxPs0HAmViEF5hva2WJRQtQSAdUaaLJkOzMuU4CHRoKzMlNGP4OJBcFpvhQn

S4LJM7AmG4EPZzKDy1TOY3DlArISTmXoHqKBZaKgLqWLxdXQa7IRM6EHAnUuRxFmy1WWC7QYN9KVMdKP

D8WWK5WcV5QPYpGTP9WDH7GkS2BVEhNTO5MYV6TnH4
TDFdOjm9GLN1LVK5RQRiVIkeQBQ6LCM8XGPaYGC5XAWcSVByPhV3GAL4WzO0AzOuQcJvAEV0VzN3CKXe

Yqd2SBmyFqKtAzicUHIeUDD1FbX9MTYbNCXtUXnzFjH6MjwlCbI8CJm5DGI1TdGxLoO5IVFcQRN2PqAn

GgF1PDl1KTV8FdydBhG4TOPeKRNFFaWmUcc8HLD5IS
RiXdnmJPC6NRI8CnZjGqYzRND9VHI0QxE6JIYqRXT9IBD0MhRyUwjgPLW6BFW9HxXmQsTxSqMqDQFzPG

SbWpUrNGTzVBB5RhZ7NHAlFXL9JMV2CcQlPfUaTJEbPFP0VRI1TDAvBLAnTXakErV7RTLsCGzoJIQnRQ

N3DTBlIcK3KHD4GFRgUhVmJDm6VYb2CQZ6NRQcAeZh
SQm6HFT1ZIZxPtWxQGs3FTG2EXYdFiQtPIg3VKO5HNPoKiSbYAn7PJS8CCMhAcTcMSk5EMR3GJFsJxSj

VXd3PWS3XYKsHwIuTYs6ROT9EAGwZoOaJK1JXVS4ZPZuEkLzZYc3YGH6QXNuUjUiGSa2KWB4AZRsKcSg

FGu9APKfHuraPuMyHEE7WwH4HVKcUGZ9PWC7TbOaDg
VdSTR6MKYrFqL6VsjxAgagEZO4FbH4KQFnZwXtLFymSzK4WGTeIIDvGOH7OCSaArLjVeLvBWQbThSFRl

YjDXh2ALKfEjLnRaAvGsGgHIGiFvRdBxQtODS2WMtgTAJ9QrDwNMG4MFMoBEC2KhcdJtQ2MLI9NdR6Sy

zeEsU6NCU2DnUjMJSlPNcwSiQ0EhkmVnS8NJH1LwN3
CnbiOhf4HVS9TLSzRgdsDfw8NIjmMlR4TiQqDsq5RK1ACRD2TifbRve3TPq7ART0YuprLKi0ROj6BYL0

XaKvGeAuTRewWhB2YgZoBoB9KAT0OhJ3FQPbRIK0LHQ9WzA0BMNfMHK4VDY9XeM5ROHtZLl4COKfMFB6

RTQoXNE4YXZ7JnI4ZYDpQbq8HRJ9YPNsHvfoClq2KO
Y1LiCKAgJeLSA0WWK7OzI6JKGdEOP0WHD6NgZ6YQFbTHA7SVOdSAP6ICJnFZO5OEG4KpM9FTPyIQBqQM

V6BdL4OPTbBTKHQcSgBQ8jcx6SGKXaQQWlGvyOXbTjYIjRJbTdOFToAMjuEW9Bh289VYJhY2YecQKfhw

6DVRXcOP4Xh700WcXhDO5FziegvK6ZQPMoXU4Em9Tz
tfAbYJQ8B4MurVvpgTbadSN2GLXnORVoI0BnsDHfOcKyNRyvRXNvV3MePBnzCOHqDUgsUMBjN6LwygBE

Vd51AIorGZ9gAXAcXDDrATE9PYMyZZbrWBYrY4y2KVmuX2UmT3jyNCWxZ3LhxAXtEP8MLYP+Rv1KCE1p

f9GoPFggWSTsRJ6sao3WAFF4KN0ZVGFrSL2VoZQrI3
GxyjInX1YlqIjoPF9XrsDnYVgoMV1HOPXdIs9mvH0TaeplwS8PwzLmAIijRl6ToG2JgxZaIW8bn8Zubi

iMIoCgRVCjXppua0VZbJCvKOZmW4fmb7BYsZPtLVE0QH3KVFAxTM7EjVC4vAVqLRAjUAYNKGltASWvU8

NnlbTNKWAwwnxmiT8kZUQxQWVdGv1FEHG+Qg7HOB3k
k1XcSBkvUNUxLO3zht3GXVAvYSc7RPE3RZAxVxo6XNTxZpH7KwXrSNX7QYQ3JhB1HVfhHmXdJVItBRHl

AnCaTwMqQKY9ITG6WDKzGvj2NXLiWtKvEwtlRpf2QDE3WqR5HZKiQYV1OXV1OeX0IRTsNAT5GGB4PdH6

NDIzIYN6KSF4TyBgDsFdZyDgRQK6DKPWCkNmNQv4FX
K4WNS1QMDyEAg2IVigIjD3DtEiKmDgYZeqLrU6AjqeSyWaQAx4FXQ8SsLwMke6VWG5XeI6EyAzHuFrVA

ftDpO8NlEpDpp7SVP3DuW0ElbwQaPlFSN1NtM2PIKhNuFrAYW6RtL8NNFrAcB5ENO7PyD9YMEgGAesFN

RtCkHsKieiVzNnUWQ8DBG8AAGlBvShTVH6RqD7AFBz
VKT1RMErSPS2XFWuGhRqKYInYNX8IWZmZpm4HOM5ABZ8YKLqSem3LJq3JCQ8GLJzZkQnDBRdIAK3BOTj

Ovy9PGP8NnXsDcIxOuJlBJX8RaG0OuudFRN0GK0QVWR9XFFcDXExYLR9TDApLQWfDiq0GXW6VKO0EVUc

UkAnYCr2SOB4DNTkYnVjOEk8YAG8MHAjZyTmPPr3DI
V0AEGtVmRrUNc6NUP6LFEfPzJhFOe1CBJ4BADaMxImQIn3GCJ4GTDhVhYcOWg4OEA2YWMdUiPwIPz8ZU

CDXnYhBuXpNDg6PXF3QGUgAoYaVTa3SRG3YTNlJlEgPNi5CMW7UKVhLqh2FVRzKcX6RHSlVVI1FCX9Pq

U2WRTbUizfSAL6KjNjGyCfQwN9SFR0ACM3UFOdDEu9
VJXiDfA1PzfbLKCoMMXyZKS4XXztObMxRAJdIdLnGuVrBOhhPUM3HeV0WOCmZwOhKGIzLfNlHiKvIbV1

KZN8QmS6XbIvLPC0FKgzJHG4XNFuQbAhSXciNyQ8MvVpEaMtTJwjTdX6EnMaNNIoCXS8YvGbSxB1HXX7

GoZ5HbhlWoA4DHO2YRSyJbfrXox3WTL6CKY5OjOdHp
EgEXb8OHTAOaSuRtg7QMc9LPO8MysyTcs1TIK5LYA7OasnKcPrKNihEcS1JnAbKtFqSIS4GxJ6ByzqQx

AqSBA5RvI4BHWpDSS7AWZ9FtY7WSFzYYW5WZh8TFX2LDGmPMZ3PQE4VuV3YNTrEJD3JWT8FGKmZnkaAn

s2BRG8EYY6NJMmSGcbCJKpUKD1HFWaInUzBDWkOCE3
EMMwLfCtQXN4BEI4FECfNyMmWMHeEDJ0JVSjYrPvDGU7WuY1OQKoDWJ8JR7uXOysrhZwFlxKYsE2FKDh

m6LoXDtaLYp0MHykDIDeZ2S8cPBnEg6lzYTdq5PijRL1k3NHBvKgMNLdRp5vzN1jkBXmUWDsLXbtCu0n

IS1GHOPxPA5As6MamrEsSRZ8V8PxiCegzHjmeKZ4PS
QfDPAtY8YpvIFiPtXnRGfaYGHwE5AnUZnsJLFyJVlzZHOvM4HntkOXPn05OXvnBO8mLHKbTGAjCSM9HZ

IbWJbtVEGtS7b9GLmoG6VbC3bzCKImY7RjnZPgMB3FTWK+Gn1SPI1dz0JtAYnbZjKwQH7xqh4IATB5SO

0EWYCaPG8TrTFdI7OvtuUiU4PywQwrTL0ZdfVjPVrm
CG9OQSXhHh4hlU4IgsggiVxZx4rvI4JkP13tfK9xR1evekDda9uJcaLwRDjwJk6LTNKdRH0UhSOjsJGx

KOTvXgQuTDQmbADkDRFnNuS6YLsdQLCuC8kqTRNmjuZiTMCyTFEABcBfCDRaJa2yhJDna5LcsTM9z9Ht

MTYgMCBSDQogID4+ACprbjGrQtbWAwD5QQGeo8RxDV
bwYBm6H1CwfLZhggQvOmmajGKGDDHeQEZxM5vdkup2tZUnTQCkRqZiCAWsE9WwUBKrUGC8Uq2+DQogIH

T1fyIfdL3IUZRzzCg6AEPC1wu2X3kTpsVPWITi1VXfTNQYSILwsKgpF8QDTBNEVXOWYqD7aDRcAdrzK1

YIsfIQpXEIItWbSTPPAggT1LtXazHaBu5N4yXS9R3e
qXtvaBowOjP/8z///3zdee+f7TOCiyBxHqW7VDGPpqXI5T1Y+cEdXA0jJmrADmhKE5sB8MXlYjR8fnDq

nZ1iIK95hVY9uVoS0x/1mnn+O44t+wz+9nGeyXvHN6z12vzY3XTqa9CfyrxpoW18Yf+Nupqo+Y3g/6J8

9rwF4/7+4m9RQypNzlomVxXkxk/tPyZ34xbDg28mLk
QaAYWo1M/K+U+fe/nsO9/bj63ZuYB6n62cT+qUl4g/q84jhd7Qs93+7qsN3ih6DK0e/7191owoI8zIxa

PTN+16qamRS00tCwjuKlXLnY6pMlf9F+pGUk4s/bjlx+tjyQmv/4C7DXfH+Qz1p/7sAQumhFzdBRrx10

1tuWT6PqTOWMmCnVb0bU5iT7ZyOQyaBK3wRCO4CeIf
fSL0bD4CJ+xq1j6zsLDYjEY12GW3cYwTXe6ZS2I4mCOChnsB3AYYBkHd79lLvawKw7s51ER8K8JN0ope

QGR4Gb1Pi8D/NmCvzrNwjKAZaey6Fsy13gUa+jVxRJ4xVZ9Yc0UfEIfqkj8lPXLK0f1wPheEEbVczzha

Aetvxp+y56cDGfbDc+hDcXfUMxREKZXgfJCuprXaJb
qwLrd+hASpdC1k0GkEHRB7ZAZyn0bHRIK4UctBLr+4HqjwzZTIs8V5ycMknceSZXQHHaUT6zZ6VMoQrU

saqqHt+NbS7+go4ue0TA7fgfDL4gmA5L8u0FQMu9TwehTjEYiMJT13dW9ZvJN/TS/O9hBvzzbtlAPUdm

V8taqbyLqRuMbx8kUW+YlnxEnL982wR6z+rL9TLG98
cW3Ti/PNSGBYPcHGWblIOXDaasO1XCP9pXptBxy4EPX/RAYC5nCgFOG9Ke+viyCr8x1cqnQH3qx+epCe

V29ojh5SFL3h2cFrhIy3Fo0mM3vf9C/Q3/RJtNhjVzKoFX7u94Kc7T9RuLhRugcNLLD4cPTlcRzpaNWW

yXFyljyulWtXab/Ts/Dz3Ms9NzPX2cqnm9jctr/X/a
KqkwwMLqHk1X6grtU22Qk10B47PJ/g+rN4UQtEESVT2l2ZASG0Jt+JFkJXMZvQP4H2bOV3+Me6Fd3B65

oDDw6MujCNeox5+Vfo3wArnu36HN9S24W7M/tI4nymo2CeFbJ+Cay7V0Vw8l5X1hrqRe2IT2ClfUzuZ7

WinVYJ5Oj2BFskX1f+4yXvaz/99lSnUx/KAp1vuyR4
XMNxyW2aPp0IeBwjCnOBtMi6vxE9t9HE15M/XK5Mq5lyawlzreYrFabX0vdbLVA4r5gqBzKeVk79gKvr

YnymvgEEfmYsOqtVxXoZ39jqYHfPaZEpGprHc+MKbxcihBMa6TpadeE1nSWDt8wia51OAwkr8x+1j7Xv

tJ/vrwJg0yg82uO9TE1aKwjsx5/Tj+hDhQzZz5pzJf
P803po0eb/42pmYrYU3WzuaeLhc5k1171bBQiyRdrUzg9EXoWMcxzWch7Ph6cuczG9QW+iP0+lQo9UMN

+EG2VsvSJMQmjFF8w+th1VCKk2kAU1V/Ih+Y1gd95PSjGTTimdrb0vA05JQT57G14x13N6TRRHW7pUFp

E3lpcpTiTvD3h0AccjF6xn9uugl6BcA9hd5hWjvaoA
Pq6NRi/7iD0ILSBI0CW7BwsP5XVKfNin0eCrVxcDQ1Xy8MNcWaa5X9EUjzCJg0ccu3SzwzS/RF9hHC+j

/xJl+uV0O/YWJ2AhsiolenRejygUeBg74Bv4JeUX86mvE+Dt+kg2YlRn7Q0pVq7qgUs+Q/TfbU2MP4kR

XbeY6aedpR1XAKkNBYZekvI7rPQWWQkW24+Xk1wG6i
cPPdFmpqUYgfrT5wwqIkl4hTxQ451cnRMpRJVvzLcAL16pydAMs+9l2CtmIAoXrqtHeONCrG2gK9uudc

HgnIbJNw12wdlXhR0nCwcqmEW1L1UMKlll6ZxlJ7LteSjaGJdxocT88LZs4hXc9GrRb3q6r8wnij/IQr

caaFPOtbrhNtbC51uhPRX5O1E626OCY88R5aT60b3L
sGvoMhpGR/KTk4PYR5H26ADpqQo63fK6qR6MuRy9zVLB2LRpOIPmFX7+6jFoiicDbRwSb+F2s0Ipzdj6

N0mVQmT6gNbbEWsbdt/9s1y/Sl+k/9K9xRPC6bbuC0np0wXK5UxMJkKFSnXJlL57Vcxs7Oz+13dpHekI

AILWOQkrtHj76inqnx1+LL5U8UlMgyWqpYmvcljBtY
5+FYfPE1HdiI2BlWCWz6MsXbKZ8TUrWy7OEZR8ZaJZbXIkXr6uxNFdJ0eq+2NBajsAiTOXDgHWh9QZQL

nZAPogjEmJMpHGvBhMtpQA6DPYIJWzRBufZcMIrVE7g/qE1U1rveWr01glhuZ4se2shHfqjl3ZXsOAK7

feJ5Bp28hmiv/GIc7xqpZ4lD3vg9EYcAL2SaytTT50
zkmJqrB6Fn9R/e5wqHILM8/ACgfOstaWHRO6eu3Fdr6sbuupQYcHLGO+x00dnzj7woeQFA49wPSJT/8m

d5Df6VeuybeJaokCIOqplJP4V4q4QL+O3Ux5ergDwM8iCLYaNWdoBDnKrUNSnm/i+7X0rWZescvNOMhH

Wf0ZteFOFO/4F55v5i73RhYJ9OUSYi/HM1a+h9d6D8
8Y+D9s40h9Fmkvq2q1partBXfSfnAM4rlyOMpJuTGQoeFSax43vyrij5HLd57C1nIW8bQ0kYzccc1KXR

N/ww1BoaBXv+CtG2jztVACfMCrq9GkZ2hPsRANjztVYaqgaD7OptEquTxyLTPG5ThC+OV+4DXgLeoGBI

HRgFe+RjWiqmCsmnAm04RJEIrpftCiaJDcXHQdhn6j
+zh5EhA8pJ8J15zMjwv0wZHrrSQUjJIZfjNd4iCDe84U91QUMWJ1LOt+TJMKnaZfEHPiL8CG8dOoIBzn

Dpk8R/thW/tfML5c9DR7rGyTnl5dqk66sO2r6Z4itobEw/wPpctg+gb16KQb/yo77sVO+t4t6NID+vW3

tNEO6BfmgIgb3JVo6jCF+mOJvSDxpfW2dROsqtdnqn
sl88zNDCOySZYfDZ0os4l6GtL4OqwK0/TfH2XN8c6mQt1GT1vGLY21+Px2gxnbvrFgyIxkZllAhoEm6o

YQ8R1IAzwxE5PmjkbhJztEyvN9wYXRCuXr4btsJuYEEbisJN3dNkxYTscoTOCR2PLtD/SA9zyHjxM6DO

uUVCdsHjEtZf2M6buDspjPIXV1FGrD3OsqAjrkA9QS
56XlJmgRuX6OImFjq4d+FEWyaLUb4Q98n856XZMJNXtYvsZsb33KVNnYbjEiKU3kGQJMV4hcIujg5qoA

TpZmPQlE9XiyA9pntuZP0irGaV1FCGS75nF5OPTLrxfv6WWzV6AbY2ZcC9DJuVbrxTf9EzCmd23vEC1c

YqqIa+ZtViuabP++yT+/l5VEf2Lc3mCBBs2sB6k6W2
4EAJ7woS47o63B4fR/bV3Wamb3IOvIP+2JKzhty9X6RV+gFLkZNKsmZTySxdSkd/mtXC452SnxExvQbK

+i6Ke3N8o3hqi3c2BTOi1QFVb44gggjlHzegrzF0F0kasOqQ5+bhktfjqumf0M7UTRoM4xTIUf0NhNjs

VyvXQMXu8JgCDAnhDEi0p3CX4X8HglsoalYUD4u/sG
iqby050u0vJ7898eCRWD7hlEXHbraOYReQHMOi1fVBgan9sNo7qsNiSItcylI8Fa37G9BBknxOK5kv8a

3sbeaK/Zr2w2i2Es9lECJgLX5MIKJ6EwlKdgv0Tj9KVQi6ZqmalzaXtVO547UetZbT3YNgywBiCv/tB3

nBMzGN4gOjyUJEoAIRQPuPuI3s3wlLAFU0C7FhqBmm
JTY4oEiRmSZeBdNu1qdqO0fxNnzhbyvNloNCMAb1Zoi2y4IYvmzWLVSg7xXELi5SRflsImq/SweZ1Lgi

6OX9fgr6BOLGxVUlIeSTXvt7yzEpL0p5E/zHeQKPWKklr1bE+HrZDi1CcMd/WSwtf3XYumzOKOwRt54S

d/Bj33wB9UhlaED/GX04wG9qzm21BwCmvsEZLmAZYp
Pf34xAiQkwqky61vi7hc2t5a4vardxlXQ6NEF56dNb19pDyQc1Wh3Zi6+9hHYp4yjHGDFPWSndRSTyF5

W6WRxyKJca2kwBPEddqbROuqokO6S8wR9+YDa0FcKUzajutfUlWQbbmwc4IpxXcydwdJYV29nRA03xUg

CaHKy/z+8qsqFEXOdqojz38zKgvrVrecCR40NSJR38
/uN+dh2KM9pp2xr4rl6G9ewb9IfFJy2d/PIo0iYnvk5+DRF/B7f9se5PYxGFgboW8yqI8jwbq8Nee5A4

5fhOO04zR2nGnZw5BULzJ8+Tqhwz0uk+VPkmzA0Itp/eT7VbNmx9Vs1IUf7/WQNKpPgIEDp2RohSBXze

yXVxBLCMeKnZ7WGUxtgaDcXogCfAwY1T16gkrC5YHJ
OoiJDRed3g5hrS9IfLIk9q7MnUI0sd5AlW9SdCstX6K4jnZQ4Gs2RMKcFgYPfIJzUiJWvpnJKnYVCaA0

clf4zuz09geRFDPOorU9tZ2Yv/tuWEUXt1j1+706xQ2Mbnw91QMKeGTaUALWBptYXJ4UALwolSieJPW1

J+xvwiFhZvQQUHllTio7XUPtIXJsU54fX/UhOl1lKo
L09O2VQNDasxN/iOQk7qlbqcsANSyOxiK6sSFS1KHZBQ+X0swpkQAdou9R2Q5iBI3DsozL1DsRNffdua

IxSqwRXSkhI/a7/nd7n9y8qw+IU/1pINyxP+GCbUlkr0RgfDyQQ8UBrp8VG9OGElYx66NLr9U5WVEcAa

eOrLaoFJnIRs4tK/NTN+jT+J8uOY60KoHBIB3TtWqb
Hh2yhjAylqHNdKf++06qJhsaG6M7Stmnad11mjTzNK8Nz9ljKGoKue6oh2mTQep59dlCG/3UEivbxXp2

CM13lkMvc3MrtcnMjA5xRWRwtlx6MvxQkena13XKpRjAV7jt3GV9IkMORAMkpKuHmvz+AvD3RZ00o3YF

xz1TpBVJODvCYJPX7Y/lPSyHnmbptWGvxJruR97rxv
qsIY7q19RfZtkYj8F+CW00+9Bs+I1Gwg0MYp1TQ1yMfLmzEuRnHFmVoZ3QTWz/InqoNG87vr1qWHoBNU

IU1Q5Hy24G+6eFmpkM6OwvP9nLNuPoQbeZZaFR83EO1DOBhWtPumlibMkM4cT0GRtU3hidEVMk9FEABh

HiByJdO/eJbE5OQXcJsAHUwZ9uQtdgY9Ab7Wj7/wT7
nWKZfXhlia7tJZ4t43wdOPhYK9k4QQXIkxoYGxFKWTeHxchJ0HxSNivuINyUVl/HpD24LmTRRhWX9fou

uhodNiOi7ywuDLwMzmJo20vq+5PYPwWOUr5UKE6+bt7Ba07tpffgQ609C00ZAtmV9fVv5w2zW0hZ/xZg

XrP6OVJTGqdF7zbH3pV/DL7zTuGIOT7550htAx4b3S
2wXmQXvYZJ8KNFCBQGNNFGn1ClY6tQud5H+Lo+9F5KvkwXaDgOtKkav+Y5rL86bDy9zEErPhsHykP1ls

RUB+FKEudolnbZ8DbNPsRavfMHY+BTYAMwC+wUJTAmWJjjJT50B/GA5tkzd9RpAO+jFvGy6bM8S2uS3S

gOxGvwjGvtFLAVedUljaCz3pD1HxSAg4kecTY+41LV
g4hgda21xi/uU/UUY0//kgazDGoMom+1yVcbWZbkjSsC4EGMw7FI9FyPYeocaLEeV9JWLqCI/lOd7d1T

VHIPBSW41ZRyzdwgxqiHZ4xn2PnXRoZ9UoKq3UGwLmsLJ3G5EyazblJFID6RjSW9AC664mVlkFfK3L+J

bCeMyrOnuvRZ88jW0XbHOyZjVeKvba39b2TxMpisBj
V5VMdiZcQ+t2ON4BQbdKOW870u+L8C+ZaxGTpzs/FKaeil3Q9949kvhJh3YS8OMeqEuVI4tEHHca1aDx

spolgs+t6P7tdC3fgByVsnzpM0LXO6etRMj5nRgUc6brakw748DBUi7uA5fYQSV+Al6RUFTu+iRQzOn3

gXcpdJMT2qqe+75Q4wnKX9weFeeSCody1VK5cjpVf9
l48OdK0RuGWB93l7R9HWX1wa/jqzvn/ch3rMq3P7F2S/yRcPDggX5lzZxaEN3Uq3hSKw2m49n81qVjs2

jhWk6pmWY2QVzp+Vg0QHXW7qcKbZbJ9aSYtz/NgZ+1HRzS1CogFhE0+o29kHLws53hgK+0+UFL7TuueG

N8bLJ2JVcKbWmImiwMqufaSHT3ny4YCUmG62oq8M9+
h4JV+MuZTpPq8t4gPSqfqvjHIP+oTcs/YQ7JcgZzZ9QESNPmzxKPfIapOauugL3jEfH8X0ON13P69bNo

j1lLw4xL6lxaqApMOFps8SFWlVsFABZFK0sO8Wkz6RIjwZdgvg+M6XQc4868fh8j1HeieN0bKpeXKD4s

Bbr+ogSI+lyvWrM3ZOG638aiAFgTZrqRi/o6HfBTL7
8sJMmstNIkRJ13EzF+QsixRPFU88EO8QNJCCI+ilMlN1eSUmjSIWEeEvPSRIgHeEc9Px2/voYoyvZdBN

q8VbPIT/tnHKrFBbKeH9SiXIks05T6RZcUpslv10Z4p44QNyG6utTft+Ij0KMcs0iX3UxxOEEbpLHwNi

pV0rEpOrtrAsVD21FrfoL1pya5lZLT7MBWkifCXX7b
m7NrcHqh7BCdwTaQimj0L84DA5TMyEo5UVY2udEz8qno44pj0Jj20lGNzV63JIJgmO9kR37V6X2Gwkey

S97VmAFW82VUchEIikDOu5FzyqV1K1SdfP6rvZ/XotrWC/2et0nNH1/zbYlhx+iEZzuHwD/tU0Ue+P9M

vxSe1xDLpJ1DofBnF+QT0MuBmk2EbsfT5l6NpKtFXQ
fwRFN1yd/fWuZKzNRbevzTp6CBDF5pFbIYLKUsUzeTiJY6oEJprvQG3ihYlLiiQt6sl21TsDapl2+6k/

5ki3OJ7cpIDtSozCoCjnLd52mp1LH37drIY6JCvL0+w7wS5DD07bBLigHpnn7D4Yvrk+4B543C2bCuPx

K9tA1mDTyI4HElOXPkZ85TliWp2l24wq5GXNcI3a+J
/ERJIUh7bamXa+5PXLWI9Y/YIhNwT7jYsJuVegfU9sZjz0eGiZcmr/jOzt4Qv932q1dfWNsz63oIakx5

T3P0nxHihnR+J4EZyb8yiG/7Js/vaPpl2Omqh+/0bxdh+z5xTm272S6tWrfuAntz7M2IzJk3yln305Vd

qbCB9QhlamhXQhHY7xa9rBjtspN55Ht4iN4gpN6ZIS
sN8YPkCq8+1vAYybHhD6zKWoo2dHZVIE8wzATA7m/dl+soW7X3l9gRBI09IVYqX+M3WPFLEy1A7pOZYY

YtRhx2uVmW/bTXuQyQl2rJwAL8aIc+3jtktk+0C+1brBSuYgWhrOrSD1wNcYz8hpaZ7Jx8PVnGucxal4

8+X5/xMwdvYDLwMv/A4vLhT9EzORqY/DDhkIO/Iw7J
WEiSZUOkcJspDAc4WJbiT+cJBK3rScxXMgE6KH1nKFGm25Hf7hNK5VcaW8BSal0OLKCBkxbCO4+RXa6U

++LwNsB7ODU/4WFmEZeX6VBP7/+FzE16QMu/2OvTtD6BD7/tRk+K8BdsH/GleBGYDo4oleU5eHfU6DEk

1gEttqH46N/+W17WkCX9qse1cNpK2n2wD3RBVK0Wrk
481D3Dy2sno+BqKgEQWRnenS8wafn+5Kic48vs2M77U4K5942TZmNKwUqhuBvh7L1uUeSh0z6o/KjkW5

DH3ddpSz021hB6Z6Zbuz0rTnqGdKtI5JwiciCnaaIx/LMwFQFTsxIJS1LAkfG0B0JyF/i3Zk8UBD/Ohj

KH5JpRewPlrqxu9Bk1+Harman/Rev34gnj7mlatNlPc5
p/1d0w28r/MC8DsoR0QlxNy8FgzMKL3Dg5l0LL8G9mt+XnmaPD5+l/1+/Z8t5aHvLYilY1ksRw0fFv5X

AG/A3Zub/WH2l3/Gp/hF3i+cHaRetd69UrgWFOwjkPlKz89VZG/sxp29+QJSZn0355b652cffQ5IXOPr

6aVMpEUqC6dXzMdgy+os3lYAw0XROSqioCrRtKSSg1
EoIMzJV5b67C9D6C/py4oKQeloJBNM+Z1Otdvzm1ZNWc0DIiFS3tAm5DBCFkeF42Xw4fNsKSuCvS87r9

ROtb/ZMXMMY7FRhHQmEF3YpPNpTv4FNaMs7wuGn6Dx46WoIp+n3M6T0+73O7jcNBenzA55WN1Jsc5lS7

72I5Gz0y4CGZh1H6S4iYIuBe4rIOX113Db3/75dYtc
7+QAt6MygBQSnTw2rqBGcu6BwRXL74uq+qg2SQoGjrIbap16G5k1qqN6Cxkgn6aTZ85Q8tE5+iXGCgbp

9TFfH+i/fwVasNusge2qsc9RegmxP59dxjq9dlm0wplGfn01QM3rV9ERYHuW9rM2qJxj4A9T7pBGybH+

+N6wHA9st8zFvNhTrWrKMoLX/QvUwlsyMdQg42Xm1B
6asRJpx0eUIwpGT8sSC34ls4voUdpj4drJXUN8P/50fu00+iMdpCrPvcPdnHQN+iS/ZieEIWrkQ6QsTv

Dj2VMxLxZ2rwxH9++6fA95Ilr22R3vo6/ql337XwE2FGwiFUa3+7F4qF47I0DtJWiRigD+y9R3U+x3vD

kBP8RyzKCCkuwgGIGtxnn68zytATAopamt9H+9zMLA
LVKu7xCVdWk80v6UVFeD6nANMhpAqUoH3wvtJP8Kp1zvvmmEBXCxE0LBUU5FfiImNM6FziwgxD+jgA7Q

9qB9575k6VFSIYNRcOBmlombggtxKrUBYp/Ld7CeuW8niyz8Jbc45l97aI4fY7jD/6WBqfuD6JzC8mxD

T7HwJZb7AGdKbFCCW+vTF5BYbAwQJ/JBs3dXrHw1h/
j7Pj6ghneN3YPjV8e1XGwNRnazGU0RNRfFKSQxwf5dSxpW3jM4Ab2FMUjIM28rdKLVehzz5hLtbUOyAA

SxUfzPF7WTJgQvHFrDlimY3AD+oG3EMAzJyaVdJxDUGq9JSgNOEnWBLbaqMdf7syVp6yfDK+4ZIRFSc6

PyDH+fINRALhgZ+RryASCC/4LAGxc10yWHCj6V9Msd
CYXTyMZCeoz2ZJlJgAu1McFt/ysOlgjq2JRj+r+r4pMxTWEc+fHL7/Sravan+utZCNXgr2W1d7kefJv2S/S

cDhkE78xAd2sDt4OwsgumWUXDsYBucLkybc70xZ1Int59gda5ML6BvYXskIn+xy1Nls+2bYLyY4zfcx7

giJ4LABvvJbi14HQrjCggLnFfwFCffntNNc0Z9rB0b
L+jTAO+EfhaSix81It1yHc/LeZ3WC/VaxuD4i2LBh7OOfbtu9vgIcSAsL17nT75Z+xIR3qa0hURv88+y

qHjxXl0cqPUfOTgb3zE/bGFZ4mkSuWZfMunGP6pVN7WU35VmlP8Bgyk22UFHcB1QezlGHh7ds1yh2WnR

OLZyLdgeAV01LNJGp7dh+uXJLG02E3A1Az3Szn3SCA
zO1Q4BN/jvxLz+JD2fddCzywDts1xHxq2h6XzqdLIm1nQ8npx7n4faAxAVfFDOSIM8xkP8FcS5Q/n1v1

DX8KEPiw/XyPZEZ8mf+lZ4X4ZC+GP2/hzpw0WAYdEcUCIxtU9vG96lqj41KDrEDldD9orST57JqQ34Wu

vGYLiXzI5wEVnagrGfKpXQl1/nEUiHcWvvOI03U/fd
+KmYwle8B6TArqm9O1+yuhfRhNt5EP4bT2Yd4uMeluaHYKDb5xsP4XH5Hc2qt8SHhXny0k4d7ni4n9WF

X8vxm9QnV8Lfu/w8r5wMjGHyMctYjGwUMScIz/RMGssiupzY62Z+3Nfswr53tVFFWdzHfMLUtTqQkWAa

Ax9flnEErnZ7dmqyUmrS6qau+NJ1uKMur11nWTlbnI
y7jRoZ4awk2xG1K1eRjDzmT2W7YUuyfvWSt6p8wXrv4+rAdkPwW1FKQ3rHcb8TwV/92eF/Aqmy1bOvtp

9pM1oUtznHG38rt9/uuf/kN0h/2F1id1W+U3WOM/6Xzx7lhftUxlmvxpslOSzVTRoJpnP2wn0/0KbKNm

T6qEN/w56Pnu1APzn/6Zz0KK7HjpLKmCaErRmp1vca
ml5/I1nSDu2U7aW8PEab8Ely9Lu6axdnFbl5R0cY0lT5u9w/gGDTVaC8nhei84HIJB/xkjlH8C/h+g36

wJkYz+A9GVhTrGpY1ydnZGw4/nwr0xR3kOsSPdfeONd3gx7hmpogZwwC9BDR5yY+VySsfyic+35drvkg

zcI2TCy09rzP1s/RFUEJUtac0luSJS7KdresCxao0h
0hj4UZZJvb5Ze43xZn0iz7w5o6vX94Ac3/bjv+u+0wh2rdz5L9XX2aCPra4F4cpSSqqCP+YUPdpdlEzR

8NcQhrtN7Mczp0JGHAjqduzeRI/Apkmk1La+edyvHMc8OI72yh55csRJcAat9F+P2KOMe6Q/62ssq9y4

Oflit3v1d2bZ5JqnLi1gxMx8Amb3bX3UDtF1xMxnAo
Lo/xkhrcOY2Or50nwNuG2dwWhrk0qvteEP++ao5GdX354WH4uqVmkATMOn2tWW+V2ranF21j5hUd8c9y

FkfyMEeuqeSsrc4WCj033rnecKguiDrip4+Df4H02t/xAadyHi7tJvd72Bd6Nkd9JohH+lRlgTg5kCjf

LjL8apTxsS2Bc25IPNtkbz/hA1AbTVAvo7WNYeOb79
szwDzA7qivjdrBriNyc4FZs+v7Dmp/aRb0+HvkU3d/ZRrp3DHMjovIwwAZCSGpTo5aXDkFYF/QVoMOcf

WM9TkEH28szZimdcLa6+HXYe/EI/hqPJ3gFzPjEWixVYSUoGvnS4RCmgJ/oCijvrEoOS7qPKtEGvlvvB

xab2+oWbfITxaL3pjc8mawpVj2lHvr38ONL/gkVkoa
MIfs3dQlaY+KuEnd1XgxCqI/wfM1ar2etJ0a/HdopX1uHp3RPRUX6ia+p/31XZjMSg9P8QAvH5TZE5fk

KmGhdfs12G+WcujD8TWrqiwO9BxeGvwieNZ65P65zVUof8eV6sbJxDY0BdCDw4J4+M0lvk+s8tYoyh56

a15ioOYRx8yOHhUTyu8siEOwFzt9P7VoNfC/I47VFm
3633C1i+7qzkauDh3fhocCP8O7708b1+frfNNy4vB7yoyQ/QCuzEiwxQleU04FQK6UrQpy844I9m173q

iGHho48vOfZW8AQeIhr8eyQI9kbCxz67EX25xuGJeY7vYb99ySwS/t42nd9doqtJrTUu6RlRqkpav+X3

pfKgw8f9Z7KI38O83kCpxwG65cKzzf15alq4Q9LG7m
qhreZn8i3PWYhj/ScueIa39ekyugUCsI7sx2wxfC1hz9x36+Fo4nS7Ifi+zUQyr91wr7j/U9C4Baooli

oYyuHzj1RMhB8tb7/2HO/q363UT9Pu0FVMS6lnWqmjP4bjfWadA3Zaxb5D7qIX+viTT6eUSha7H+ZDZk

nWSXuNHeN4drO1OrhPg0k1LSQ+8ULzgvkiB8Qv75jD
8w/4v2ckURXQyw+aatg+ST4DCsKeKBj8ZfggvwzRhp3dCue2544kAQzkyDSsm0HFi6zkluSjk/g2nIXn

Jvl21R2UtNYhZVCnb0R2cV4vpRvpOIREyn2yfKca/21q+8hQqwk6Q/UeuVIYhnG+S0ne/es8l9DeaDQ/

wgjl8T0tmsae3x9p39f8htqI40fnVsjrfizNhiBb1V
O2UNl3wybvljA9bTAr9fwl/eVr/0y3ye2hwQxA++xu4AXszz8M0qHQw5dpsA+cMCrykVen5mAZcJENC1

CZfJ8AhJuUUnBq8XqlD+Uzyu06Kgwfc/M1N3Ez5nrhehI0QoZQ6Wdu3OKi4eyVcayX0UpIBv7c7sIj+u

gKF+O+ViE9+rUXeh+kMhiMeXzY4BwjNySw7OmMDBQW
gn4UAC8Du0uLV58pE+TS3Dgrz9vP91Eo1K/SamSEtEPZz+fVUWh+yTcXbn7viU8fvJteBdzXI+SFONf9

GdDkPoMfNNWOv3nRD09oc0vgpc57bpP8qCbXkFAbxm5bhkbX2XkfhZMbXakSk1M6MGB73e7y9DPgM6U/

BiHsuE/wStFl/Bnl6X7AYzsRs5S32x/muov2FXtvt0
CJsN/RALvj/JROc9du1L1Ll98UMyQHvYx+Grp68H8qzqC2C8E+Cyl0q0d+4SRfqACRYmEeNO6juxN9v2

O+f91L1UqcngNdIIzvLkSF6ycyqO2TW8ZxXC2aC/TV1R/esug3NmdAmPySUKBwY+crCj9qIqR+VWdeiC

0jWNDTeiwtl3tX5stMiMqxiFp0qfJlkUiuPRUh4n3E
5W8qrq62qkV3oY1ZsFA4aJ2a7/4XmiRj3/EZjE26w4L6xFvti69WL2HveDuosH15a3d4oWZnFKmQkQoe

2heIn7HBH9sClUPQ5Z77x+vT4cpoiUthjJWpziJQXIGjogRhZFr9PWlN0SGqP7zZqtK63DMGLCK53F73

xXvezc/ax94eKi3CFFaksPNUfb3GR87sB/AwcL0CaC
jDdw11ya8qxDC5Ym5KF99c/ADha9LypswAHFM1IU89saHx6wXgxy719S/5W7ESjWZFLPOeXPuenYY8xU

wbmImnGg9NbDO1Cl97Y+rKNYPzUEzGOPu/chbsmTPHgUDG0c7Kz78nQDfByGwe73qbWc9GCB3Gi4oWUu

bKnPr4B88Gm08o4gzrWoMEhAXJyJMgpd8ZMcG10u4Q
GvMsnd1icwYFag7pdDKklaD7xTMby7wbFgXBCEv43XUJ0ItXwIX7Bm5Y6NY6/vrsv5/TR0pRTiuBLz81

w9ecZ5lw6OGPJsK/bp3mB3dYJYZyFcvlfyb7OOmXo2poE+74YcM3UMA7TJVpsRLhg6U+/9/6O48ovRyk

Y4YK+7hV1nylfpjThUTmvXkLjc3lgJ08joFHLE6ALa
jqR1xn/ZYw7CNSC+W3+JBOf1QCSpEWmDhFzNYd03KslN3ct/hf01R+2VNle/ENn1bq4s3uLOD9wMw9xP

LVWCeeV+qnrnB94Tby9h0q8+sttNfmX0gYsvOZ5tdo8fc/k3zDPM/zJy1DtdZm6gM03KN9dKH7a1L406

OdqeSo1sf4tYKJzOeVkyK6DMZeyo4S1FmxMzNOMy+5
dXO2BweET6WbyfpvRkygWOZstSZx6fpgm5awyohj0h7Kifb2N+HYcFoBljJ4fg6A/wDn4Dd+4bHCPPYu

aw7UKah/IG0KMyhYoByvvTYlQ2R+fp2nagGKdROpf1NPE0g6HHqFNd5sxo1voP9nULs9h+57V9+G+P+L

dDzpq/k/x6DX1BXhFOSPJKH4Oq/SByju646V6fyIxj
Q2yeoOvQYz6qBqviyekaILLa6mSDF5o4dc3a/XYdeQajNNwXfuYVfxY3LDacRT3dm/KKT1bMKK3UoS/9

DMduawzp1HrzrjOkTWpDhzLS04Gme1gXwU+Rxzn3XNV/kbyaLsIEes3NUgghXh3+gen70rTrao0L6xbz

ih4DIpPEld7r63HhKrne+f5sDxhaCdNlyNyZ60R9DY
WXnEFUSfjQriY5MWFGzCMDmFN0Fy4rsXYgwriWLPgN/sAUBEwJCygN+RGUfyZPyF+p9OeolfsCFT3NJ0

uuAid6Qab/P/Jp8xqeE/CHDuQG/QFs2aodfAmxSos4hj+Gl0Fj34jgKmSSKYOQg78Ckc6/THm7rtB1o2

pr8lwekA62y+rzBT298E6ebNb4MXgG2Lj1KhhtTr3L
SWAAnvN7LaiqDzDKauXFrx0dPPFBDvW9rWytHzMHO6ksjjZCE8w7eG3JbSaJe3IJ/7niN2MUkvirAGgR

PKoY/wx+U0lSI626V7YW3sq8sRDuoBrxzYte9uS2oHGZqDhSprEMth3/ikRgp9llfssHcmfgp6I1NOU8

0P3cXsnNxL9UCG9mpj+DPhGVuQUte7JtT3CyTQJA0R
+gCcnFJcUhsRhF+vlN+K3s97P/D8er1Ds/PPnTINretdrOcyysTmKF70AwkpMNN7pqoMItvaaCGMbTDF

lGqJeKAmduQOMcc8xfSJIunkxKiMH0tAA1DqmnzndCza8AYzBg75MZqfEuXYe9nTjAYAWAMREsr+qHJM

EGuPWkpmakSFDOc3CUxtGGSAYepJuXVKZFpKInOx2v
9CEjLTbgr/tB3WSKI/37xWSzDnxBLwyenKI9LN7AJ6lxUbFgrEs27m3doPWjYvDFyt+zS+dramUgMDfW

S4Nnh4Ge7moxjENZ/nuf9PW95UbcDjyAhQUYfs+oQpmfjMFxAtiBymIJHvgdrsyiHr9Y+uSlAfTkbeoH

RxSsb3b4F6awf+W84E6NxuXORO+o8maLAmAyDrp633
oAyyoqORiJl61aMW/qqXREa1h3CRg1NKVfDs7/IITncNRdRSZu7n9Zdprl9dIGu9Pvxd6bmx1kGw4Cba

GWb5JkLgkPp4VwcVFYmVRpmSE4/r8kfuDl9lwGZgkbovMwnBng4FRi4WNbFei3NI9rkxVt+nNCVISRmY

a/NssIn9LAnfoXWoclZkX/C6jwtuYX6DmF9EVf1pKd
PhRdVVV+eW8rGNm4vhmvwNunjd2X9TLcpDrg0fuDea1n1um5T8Sjt8JE8ubm9ICmsBIkABNGmnYLpeAS

qpI7RpCeg67annXU7rTlc9E5H7mnZl/0hdeEFIoO6WUvnkiXZyCIxhV/2p9sBzHS4KSK5t+sCwLTwL0Y

nWk67v8KdKiKJuKBJWPQq4WCSlkYEV9rFucmi1S7rJ
lN1Wxf8v8NhSk5u7ouY7ovrIcOV8lqIlXQO5LYVAZpCG6J8m7yLMaw80PuJTuZRqbNfWtrkoHP51WVBP

m0hrLMQK1MX2pzcdb2UaJ66VPV7dSlGEvU2KDS/60L7V7EZZoPbFrbkTcGgVn31ZvYq5Ie6JoQyv5ut2

lO5YMVJhtS6jZ+mu2rndOMQFSHy9WzU2h2sRciSGL2
ldXZ1TkWd8KTTfCKFLmSxvxlza/4X/wvzoPa/7djzt3Q/pQhGC2YQJ7or7NgRFWnBKtstdTuBkwQNzC0

AUZeo8MjIPdwUWf3L0TrvGUxwvHpNhqqoWXRJWLrGYYtA1aqzlv6aBPeQOM+Ep6ZLXTvxNGnFY8AWdoF

hZTFSjJhCTzkyp4aF7BV7wukwTLGXkIHA7R+QJpMu4
KlYirgfQ5kcj29FQqzFbEfeh39ORyY4swXkxuL9JjceUKKq90hDWoZCaNDDeqkf4Zo3AaZvl3R80z8mr

0x8m0ntPXtwzQapHaSyFsbstBMp6kK0p421fJ7Y+tBMLL68FOePo59St/HZaEbTPbrant36Y2Ih7INuL

8E/UwOgaeO/Lk8BWk2FKGU2RYZdupgwIJsRUcxIUo/
qUqMTQUAp7Uvzvol+MY0XE7mtcZLI8h/uAZ0kyVsfsHl76AcQquMIWacii2eC7pu18qO9Bn+v2U/iokN

TuWiVNI2smKotB8CRD6wm7CcVDyeECYaSP4ucj8HSAuEPrDtB3M2bTUuIf3kiGVht1XupGQ9x3XSIjWw

K8CewhLIBT5pR7RWVFFBBohUsraqwR1QIJSnYHKoWY
66FIigOU2IVGSBBIqwxUMcALJ9W1Jvd2KiyzVvRFLvDy7UJULnRndtN4IvVgSBJeXkV8KjakYYKp32WI

sfNG4bEVHvSPOyCFD3HECtCYfrPP3FnFLimJREbcjpISWzL8O4HB5YMIOOQxBpY7FvhmEAuKfpDxDhSS

AwIFINCj4+XWiekeDeHpoQMmUdPBQql0DrOKc5WK7M
YWRzHStmYI7Is598B8L8WnY8vLAiL0yGWr6kpWZ7mNMvG6Zxg4AJk936W0CPAFEHCkdXglsodR6VBHFI

y1dXLD3byW2MPNJqbDe6qG3UIESbS4hWR2wujSJxZO2tghD7QH4QFGmij6JwbMEiBOJtAiAkW71lURAg

fE1vBLlIOVBqgFq0wXkuG8D7gJWsJT5zzrAvMT6+IA
5FJTYyLi5coVAmd3ZmdTW2s1EmYnImVKYWGWngPH7DLtVnVTLjH1rxQRVyOsZmJYMoOgBqQtA4CE1sGB

h2BCsdSHNkKFOdHDf+Cx2IHN7rb3PwYWyiVlRiRI6aft2ARUeRGlVmA5E0dOFjHk5qpW0WrEQ4nLPiB0

X9xIQnM0Uni5TFz861W6FLVXNULniJbbcusO8EizVk
QKykMl4PQZBtlNo1bG2WJJugMT7MYDDyLM2rTM22Mx4hlMYnDnZiDYKxSz3WTmKtS7JtYO4oX27gBKBd

OSAwIFINCj4+UWcyxmQiHdxIKsOkDZEfq8ZeAVitFFi2VZP9HUEaEsu0XBD8JSPzIGJaCTR2HOA5LiJ1

JAxuPrO8JWC7IEKpVpJkYdJpXJY5NPO7MZOcRnm6GJ
YbQuOjEcnlVoa0GEX2QjB4DXVnYBG3NFO2WaM4FZIxTNH6PJJ2FwM2JDBkVDW6CAS7KeSaMdyxQhp3OY

O1MCDOIiB3VSB0NYYeUGD3ZRKuZIHyTfF0VRI1KbI8XxEcUxOsFOW4HvX2IMOnVip9ECutQlSpGgqyUA

DvCDO2RsT6IYUeOQIsSYjlVcT8IwbbXeI0QGb6QOL0
JzFgSnR8MZDoMVG2SjUvOpT6PTg5HJQ1NrecDpF7LEXwDGKEIqE9GJBkJypyVbv6TEC8BDI2XEQqZhKq

ISJ9JqZ6PYTaDBQzTGK6NnF5VWUyYdh7VUT3XaP0VOImKvHbKHFoFyD6IPFrDiBoWAkbZwJ7WEWiXMK5

JQH3TjG2SKBrOdEwJEOnRNLkRkgmVSQ7QMNkYXL8Sz
HcDZXcKE3LKZVbJOAkBBUjSzYzHcJkLXSmZJX4OZBpJaBnXAs3CEN0BCNbGpDpVOe1VME6QSBjAaSiNS

g7XNO4LWZtQuLoABm7HFX1OQZrOeVqNSw0PMV2KSRdCtMhNWy3NQL5PMKlVsSpTTr9CKY7ODSpYyUdPV

w7AQF3PXDwAMu7UWSkAaPkYTl3IWA5HIBzTbHkHPc2
SBT2CNDoEnZiMPj3VSVmVvtlGjJzNZA9TnV5ASKaLNP1ZJW5NuMkEbXiYRB5SSYlVpJ5ZgiaOgijARK4

JaX0SZBxWlEkETqzZfK4CXCjDHTbCMS0ZUSfKbBhKwNvSEOmRiYDBkQ7ZpA7KemlOxYnXQZsUiHuCfFj

AlM3VAF2CkX1PtJkWCM3PFudMBZ7KJVgWlR6EWG4Ib
H1SmtxKfD7DGX5WmY1QlrbJZClJBI8ApMxEgI0YVO0VpZ5NjpqAzX7LBV7CXSrFzlyHwa4DLW8ZUW2By

GoGaFvRBn5OWKHAgn7HUM7MfylTks2WOe0WPU7MHUcFbr4WUtiSbP1SgCwGjNnVKdbCzM6VxhbKgP6SY

SaRFV5SHWlFUX1HUH6FuB2ORSfKVW1LYY8EoZ9AXjp
JBJzQTA3UqK3KQRpIKT5XIJ3GnFqXzobYzz4HPG7ZUWnPzaoEQF4HA8XJEX8SCA6TbU8NFIkGUR9GHT2

SgE4RRTlNWG4IOYpLCE5BGIbCUC6BPZ6VkW0FCLaJHVrRCY8UaK4YZJlOWPKDlEsHP1fpu9PBqOeFWPv

UweMWnj7BEtmUV3OqUDaO9XgdmPEMXIoncdoeG4yRU
klSW6Wc432ZaOyWN0LmjuflAwXcQRoiBABXzLcG7GhU7AodBE8XZUxE2RiIEXeX1x8KPqePD0PYYOmNS

69PT0pTQFXIuXbP7AbPUzyYYj5LItpLU5Pj264ViXvcHHaWECrRzZgRQDfKHSfPUH1YA4XIJHzHLChcC

iqYE4jnGJuNM9CnFFkUdYsTAk+Cx5GIL7td0TrHPpt
LKSnHC2hwt2MVFoCFgSuV2Z5dQYyBu0zsX5SzYN7sZYhC2OckVLVgSRnI6Crn7SWq809B4DjzALaBIo3

ZQgzRw0IdjCvUUsySo4UoZ1DlgNwCD5qu6DqoedMHgSgZ6TjbkK6L0rvkyQfWL0EFPZ1W0utfmFyOBJN

LbMpC6ljZTNshcWzMmHuCASVCmNlD1WrisNVRQUrvg
lifE3jVBN5QMUnCr5JEs0FXlGkIZ6vmx3SGjEzSZPcAmcAQdpaRKnwhrVnTvtKLuOkVTVsPhlDZft8CP

mzAZ3Srn6dV0G8LNkvXSAXU0OeoBFpQG3cY4KTO3hnGFrgLy9OQgOkV4VvdyZvNAbcD3PuTRY3KJMsGm

3KXKIqZX2AZDPrQpUpFNYEJlIfMKUfNuZiQhKqPBJU
ZUpnWZAdX1BpCRS1DQAdFa4+HXxiVC1CL7UbWTL8GOz8WE8+PThqSU9LdEEMP0SuhMUsHOjaD4XHVT4R

VOU7TG5IqHTfIK6OrRNVR6QqbSNgWb4nJNUbc5SmVi4pB6ZHMHSBTMAkUTqzVKsrQVDeVRr4S4U4JWNc

X9GUR227wABhzPv6Bj6rR9VKPNeVUlAmYJxbVFcmHQ
UaVAl0J4V1GMNhE0RIP9HkSoTqtlBkR6V+OiUrPSDDEV5KAFWIWPy0J1M4nOVaW6U5cBvLePM1ON7RBL

0KmAXsuWRsh06+JiTUOwMhN2RII0YTXB4QBBp8F8M5kQEyL2W4iQpPaVY1XO3IOA1MvEyhjSXaHf7zBU

ogICA+Qs1PGk4XPnTfDN1gew3XRwlwTMRhAlbVLad3
K4hsvvb3wXDjJeE2B4G2FlE8tVDnBR1TN5H0mALsPFB0YCIymLF+Kd2Xs7GeMDBkAPc5A3dnOEIuRSSe

RlFxxC64C++2efuncWB5F9t2MJDOtSMipQoEczRnZ4kBXZB1r5S3VFu/Pz2DGVR9sSt4iECuYZNqQOr6

nN5nmUm5IpOpUB38KGPyVObjeU1lPgk5C9Snn4EsLl
6iAc4pmMFiEe0ZQnUbLDR0ozEiXqJBObU2oEanzamdVDQ1C5h6lCA8Xh93q1vtvyZuy2BaVqS8BMepOR

QoDlFrcnUcDCD4gbAqxI7efqCdNn1PNSYxBYuxkxHcTtILCs7WBaYoGY75EgbntN2qfLG+DQogICAgIC

AgICAgICAgICAgICAgICAgICAgICAgICAgICAgICAg
ICAgICAgICAgICAgICAgICAgICAgICAgICAgICAgICAgICAgICAgICAgICAgICAgICAgICAgICAgICAg

ICAgDQogICAgICAgICAgICAgICAgICAgICAgICAgICAgICAgICAgICAgICAgICAgICAgICAgICAgICAg

ICAgICAgICAgICAgICAgICAgICAgICAgICAgICAgIC
AgICAgICAgICAgICAgDQogICAgICAgICAgICAgICAgICAgICAgICAgICAgICAgICAgICAgICAgICAgIC

AgICAgICAgICAgICAgICAgICAgICAgICAgICAgICAgICAgICAgICAgICAgICAgICAgICAgICAgDQogIC

AgICAgICAgICAgICAgICAgICAgICAgICAgICAgICAg
ICAgICAgICAgICAgICAgICAgICAgICAgICAgICAgICAgICAgICAgICAgICAgICAgICAgICAgICAgICAg

ICAgICAgDQogICAgICAgICAgICAgICAgICAgICAgICAgICAgICAgICAgICAgICAgICAgICAgICAgICAg

ICAgICAgICAgICAgICAgICAgICAgICAgICAgICAgIC
AgICAgICAgICAgICAgICAgDQogICAgICAgICAgICAgICAgICAgICAgICAgICAgICAgICAgICAgICAgIC

AgICAgICAgICAgICAgICAgICAgICAgICAgICAgICAgICAgICAgICAgICAgICAgICAgICAgICAgICAgDQ

ogICAgICAgICAgICAgICAgICAgICAgICAgICAgICAg
ICAgICAgICAgICAgICAgICAgICAgICAgICAgICAgICAgICAgICAgICAgICAgICAgICAgICAgICAgICAg

ICAgICAgICAgDQogICAgICAgICAgICAgICAgICAgICAgICAgICAgICAgICAgICAgICAgICAgICAgICAg

ICAgICAgICAgICAgICAgICAgICAgICAgICAgICAgIC
AgICAgICAgICAgICAgICAgICAgDQogICAgICAgICAgICAgICAgICAgICAgICAgICAgICAgICAgICAgIC

AgICAgICAgICAgICAgICAgICAgICAgICAgICAgICAgICAgICAgICAgICAgICAgICAgICAgICAgICAgIC

AgDQogICAgICAgICAgICAgICAgICAgICAgICAgICAg
ICAgICAgICAgICAgICAgICAgICAgICAgICAgICAgICAgICAgICAgICAgICAgICAgICAgICAgICAgICAg

AGUvAZGnTZFkPJLnDFa5L5epDEBgQNDnOQ0dNMl9Pb2+PIhYVyLcZXA2qvHqrX9GUK4aa9EzNTnmLPWj

n0DpJBj5DI8NWVNoHIasNA3UWOwikx5TXTTqTUXfwY
EWi8paKeSfOUC3QQXdErurLH0NBIFtM3xgnoEfUKZlACHIPQpoYPUKCIjkBZGEWSCxQZCdEhFkYDlmCD

0Fn2JwiAX9WAl+Zn9NSU8uj2LpISgcJAUbWI2wma9BWHqTCdGfV3TznsX6ZIW8EIFpDc8TSTDwSDVytM

IkSRFgYUTHBpZaD8MdaD76BXFIZf3+DQplbmRvYmoN
JsH4GFDco2KjPXu0TR4EBNKpREq5jGGqRLUiE2Rbn6YyOp25NVDpWhjpO3tyfcbuUAkvadDWOLRnLL3i

FF2XQGY2SYnpVLToUeKdFSUpXlhhNYOYYLdVJyJcF0Yrk6RqGuY9JSLuEsSzWTvnHKHiCyQ9CP14xEid

RP6IFEXpGVKpES77XYZ1TRGsTz3CXu2FWwDyLW5cll
0YFhAkEIDaPtgEJer9XQfzTB3CrDYoE4HrdWAzb3lFLzCcQ4IPSTF8OCNkXd0IRNJtSjWiFVDaPLexRY

9zEQWdWSJEjEzvtmZ2SQ2YCD6uhxXqMU1RMvPeUm5pPg5ELjJuA6EgM7PgSCNzKJFFUSlhYA4AWVvcDU

8vWM0Rd8BWxMOhkZ6mlu9FSJFzDCCdLygxkk5AQxhx
J8E0cWjxZXGfXkifMOJLRLypEJ8YXCVpEQC4GUOyWgGzLHAUGsCmS41eFV2BJ3Oxt07zPeA0IKIrXkRo

DVfgGO28aWfdmaMsaNAalZpoXY1IHm3+DQplbmRvYmoNCnhyZWYNCjAgMzENCjAwMDAwMDAwMDAgNjU1

EpFpJo1FEHAwPKTmVXWtLuJkOXRhUQQfNEhrQCRbRE
V5UWC5NCTfIMLlSQ3TWkTgULEmGhj0PXOcPEMbOEKrzi9SWWRuBPBrNSN7UfOcACVsGGEgHOjbUDNrOQ

KzTFovBQInOJUgAT0MWjTdQPHhVXIhKhLlWXCsYWAbyc2QVSFzKRFtZiB1BOYwHEJaOWCtNKsyZMQeTS

T1GtVuYVYfZDWnJT0UQrWcZPTgBAjzKTtjZWAhNKSb
bg7FKOLvSCQfIJE8SDAmSAOpSNQwXHkwHAWfOLOnUAl4JLFnRWLwMV8GDrTiFFWhZXP5FBZeGNBdSVNa

ll9EMTSsDGJsACp2YVVsGGGaACAdDXwtZTDfDYMpEYWyNNDgESZvLY7FYsZtHXLgMUYkFdPeCBUbCURu

tx2CBVMuJPYsQfA0AKAjIMDdLXUiYRkmTKGgFSHgYg
j7IJQrKUMaBO0YDaLlXVZpAZP7HuMaKDSvKOTubo2RUXMxCRXmVJfiJPRvIVQqODNcDXhkEIOzTDD4Rc

W3JHFnWRNpSS9UAlIkIHRdMoY5EpmfHZGeLSGzlu4UUTRqLJYtRDU4KMHsRPXkQISoSNxyGBEjVNQ4SQ

D0YHHfIMXoYT3QJwEzBHWxGhN0YbiyGCGkQXLiho8V
ALBcIECmLtz8CyRyPQAzBVFxVHacFAIkIAL6SYH8IJVcGCDxWW7YNlJnNUOaYzk9NlBnGSNkVEKvfw0S

VLFtZMLrYSRyRLZyFLMoNHIiWUldFXIsVWX3OQo6QZOaKLWbQD4WUwQxYKUgZwwnORXqRAQyDKZoyg3S

nUOrfMibjm6YLXaRUr6GmKwuVZLbJPfyCf1jiMTiXD
BkNRPEZo1KbgLlFVJcPKJOOTohAJAzPOYrLGQfZLC0RQOcXoNkOaWpVuwqCaKuMNLcSTArSaY6LqZ4CT

DlTFZ4CQS8HIRrNGKhPFF5MXAfTFPsVsV6V6P0NmE+XB0mIKh+Wp7So9SjtmY9viTtGBqvMHY3JI9DWZ

XCE0CLGx==









                    ID                  Date                Data Source

 

                    [id number]           2021 11:00:11 AM EDT [facility]









          Name      Value     Range     Interpretation Code Description Data Scarlett

rce(s) Supporting 

Document(s)

 

          Progress Note                                         [facility] 

PNSBMf2bTtEXMwLm67/QJVwdGAFdl6VfTWxwXWf9BGabWUXsZ6UaHND3tI4nXAG4JKrIIvChJaCmZGR3

lbm
GxUbcZQtUoGYQsUshKToVbZEqcKdkruUNjXG7KzMJ6TLAgP74vLQFoHZKdP1InXVW9RYA+Cr4RHYSfaJ

CwLB5IVoeP2QkqCudYMC7s3M9lWrJ8fCPusmmq2YAsrupiPNIMAHmS2pPD5GLlLgxs3jLz92jJLL/fyX

Qgh7aaVAhAp6he0Z3nLfCbo5+HTV39xgkH/8uPOpFi
BNPftnBzmQrjmoKbyVI2VM5K34CN/WYkSy5krV31pvvGUlQ7Jc54KxLIiN7Qx7Nd+Kz3XPR+F76U+UoO

iiHBklE4bqF9SlzcCK8VcBJWSwWEuyt7N7QwkzOGkKmEGpbZO6g0FRgHeQoM4jwjIM1bUhGu6BA7L1T2

Kar2Do+ylBSKlCDUCAOEIQtjhO+fl/iwJ94+OSJ8Lw
p6LMfs1txTOAFd+3ZxXdfhLwm6iMNFvdjDESVQgU1VvFNu+OSZQTa3mhRlaIozYsvtL4Vr9ol7Vn4Yyf

6tXEKi6BcA7FosbK1bL9dCF6hMw2OuEnykYXvpGBakN17Xs5qxsPvOP76DHA7JDC6uN7AaUXMTrT3wOR

Vir/xJ1sDUJc6M/W5K3tFpa+LcaUvXnZhx/8oObPW4
Wkeem7/twFfbCVZx0EGu3F46olABuQdxFE3Qu/Trq8nTSXPLGjskjP2aTZkzjRW1evoUqeYI39vY1mAh

A8MN1w9Tg59N4lLa2hGrbgUUiNhdHXt9IzK1dau/idXciSBM7bUP1XavxJEk5ApgNetn3RE/9ODmp6lG

tySibXNvsDM689Pg84rT7iRO5/zYNzzCId362viK+2
rC1dY0RGFhG9gCnwi4iCjm3hjtFyuSN7JG/U7cKDKpsetPUrgUhCbEwhD8JNo/EsWIADupOO92gKYNmf

M2NzHKV7GTE2Z3lYUQHSQEeIO3ByROGsAcejQmYJnk61LBZYYcdlIm3l+M+Dp4Bt8vPmBesfWntJ5wTR

+ypap8ri3sGskVOc+aFS0fDU8vtqAheHukyzH9/Utx
gU2ECXSqZk51d11KsLGc63e3S5tN9tpqcD3Ks9Ix1hyo48FB+asJhVVVcMkJguhxHu3xgmVWd621GfkG

5HKnK8fbGsDw5XOLGJOzToW+8VNO2ERzSE+wEp9vo/moWjstVPX2FvHpsAGEQvtDNes+XEAyJTH2Rdi4

DzrMa9cnKp+ORy5xWwr1PSyJLsRW4IVzYOKMtX4AxF
4Y3IeMMk8jXp/5D1xQQae5iAtWNJYGjaQdsoIPLucjB9L60Y1ec9RqO1aqpSstDpG4ykgUHh3PJe25jB

4Xv3j24/NLH545dj20c055xP3fw6ysiLAlUfENEOVK17P9jY06PNygVNNgsjvaWkG7s31otIrab80O56

Cn9YlWF4XQXAp485UidEVUxjthK5z401ht4pj5VM0y
4wcHUMr5aT+aqi2BSrcYIi1uMDf7JRo8Ub/oeKmWY3iN5Bc3BMhpIvKHHcnAPZcLfXlO49hvQuUmu/NF

m2Rqa1uIFSqsN674BJrV9Zh0dVyH7hbzv1fEcE0UBzHXB4OgvBwNkx0q7ny+1bR3y/honasjJ7ZORHF7

vbnjhmgLhBVSQthYVpXd7L9Vh1LDVZepRpACJf3QSP
6nWt818HfVKeI2pmmb09ExaokQFxdRnxzWd5Z0aox+xgJjlnZXnkCxGP2hibgkaXyQneHgrhpFl/J6DY

hveDshAHsw6cxjKbpMntIZ5r2SVbCjeksBlij2oLULUz0aM4mtljoqBlRM1HVgH1HzrdZILF4/hrfrxl

rFlV8+a3ZWOVFruOPai6c025oHdRSca8qXPR6T+DS1
L9Rxu/WHP38Puf6lDdDvX4u8T24y5MClmPbVR2eRJKt15XUTVzc1iPGef4CWd5FWeQ4n42Zwh30ob7TF

mnePWDeCp9lemlcFDTjv6GTzS12Rk24k85cvDy+lKWW794daSI1YXTdWLa/csrZlqzCflqT/sNHAlmxX

Gdh659b9xnJfQlBjG1rxt+2YpSFpUHUkrNdS34ANHt
S/HiPA7CvalhXBpDPMUqMncz8gpLGr98o1wmkf9wI3/XC3MmfZy4xE+ayX0LgL5Az5w/7R66Q500TuUz

pwwRmfjUj+WfLwnOAB3AcVXiQLlHLmGuEH3QllYZqYmzpQ5JGJNWuUfbCn6NguEQ3LdhpuO+niVgyVP9

hPqe4ALto+kWq1hDjjXuSBds6h+BpSHcm6B2nAzsYZ
NXO2vwqHq80E7Kn5p89EBvtDui8YPOzjomSSSXz2zXOkFrZe1+mVJwVKEn1Xd4QcF8dtkd6tCl21DSTE

Cs05b3CPUD6P6Clj2dPk/ZBxaNGTEgWmvbNNDTT5y1cnWyxaXU+GpSqTfts6bHPjeb5u6hp4KXoeSNSU

NcIAYcjCGCFKYb/iii0rHDU3aLjUIEYXkVMkPBqzND
ri52gpLNDxSAtkRH1Hs/BloDsCI5ICDT2pNablt1wT7nb7416JpTS4rGd+kb7AneKfywF3kOt2Nr5vZN

IJGZ5UJtNlENjekivgdomTiGUINa2MEuAPRJDm19D0Qu//BmAS/GEBZuBcKQN6vwVnaL5QKM6uz2LbPR

r3NENxo0IiKYshPHm6QPdqBKVcO0F5cWDeOOWlYU6Q
ITFaLD2EQUHelkXwMgUdHTIVAlPiKFOxXmEpp6VtD4BiLOIqHXWQZKxpZYBeE23mKOxsQq09WWncTTLb

PwLzPHy8Up1QRvViNYVpG95vcEFogMSwZRVhJGCHFdFxNLVmA1SqiCZzNTngS0KrY8CwFV6heVZePA4c

mRFiV1KvZ3CjlwqrAHJUGtDmWSNfOKjbZNDmGgPzSE
xzZSA+Yw4PGUA+So5NUZ7hc6QwZAe9OZXiq7RkALsqNNmdGtGoSGc3MYZaKxjzIxt4CRS4UOB3LUIgUN

B0BGx1NIG0VdvsVNeuXJHzLxLcZoPrDww2LJZ7PRQaHhssNuXcWEZ0BINlCmowGQK3BKR6FeL7VMPbJE

C2COA0DnQ9YKNzLQG0MPO3ChT4DSYfNIN7YWV0YVFu
RneaJJd8AD1LHNK0ZITbTUy2XSL6OqGaHAD2SLD7LbG3KvkjLqWnEQoeEzC2IostOgVuDJw0SEN5NtQr

Cho0KNDkZKD6DiumUHZ2PBfdLvM4SkCqFat4JBE2QlC1JwdcJlGlFSN6HoT9CAAxIqOnZYT6LkX0VKFi

QhN9IJM0TnO6QRGaKNbsGJG8ISZqAuozNwl2NZP3CQ
D5DPPbBjKfUGL7AxQ1LMPjJAObHBL6UrT7YEEnXrx0AWR4PuH4PIJbCsYbXIGbUpC2ENVtMeOqBXpkGf

G3SYLoFZD6ZMP0QxK9BHXbOpOgDNYhMGBqKqqhQKP1NDEvKRL0NcEdNZJgJK7MEGJ8RXAwPQJzQRZmZI

BlKdSuHhV1YLV9BGU4PTQeNjEdSRl5OUN8VCPhLuCo
OSm8HID4HOHyZrVkNTw2MIU2YUNiWpFpSFo8USB4VVCeJgTxVYv4FLF8UPLnKvBpGEm1MXJ1TRZvEnIn

QXf3DJY8FAMkBfAgCGg4EANICwWdVlDbXAq7MCW6OBVuZdNzADa1LTA9FINxAoItEEg5BAR7QZIrYtw3

HEGjAbU1KXMpMDX8VUN2KoT2UWKnKvCvJTY2AnGsQd
RrLdH5MXW9CPB4ENBhPAp0YWJfSnI5UgyiSAJpVIYcRDZ8RCheEmGuCHGuKhRfVjYtVQbhNFE6PtK7Jh

xnQcQxKCRwBqXtWaYuQyW4LIK0LbN9OkCxZOL3VVpuTCK7BSFzAlB1TDJ3PaI2YpgpIyM7SOS9CuH8Ir

myYHGfISR1UsIzBcI9FXL5MhU8EyqaDjN1NAY0LONg
OdmsVbw2ICV3GPX0HeOwYtVcSKl7VMQKAnMzTyq0NTd7ZMG6IojjOqy9FDI2CJI2DvuxFhBmAEmsKoG5

AvXdGjAkWIL8DtM9WxjsUyLmLHT2VgR1WVAiEDK3JFR6WwD6VOQsRBA4BOb8IFI5JPSwGQE7UIP6UlM4

VGZaAWQ7LNA2NZLhQrbgOws1KVY0UVY4KXJyXRytJH
P4LoF5ZGTrDXV5ZZB1EzA0QTKeMMT8ZAE9MZS3CPAbTLT9ZUX8RxH2OKAzIMD7EEBvLWO6KTVuWRReTY

1tXMofobSfKmbVCljkOACvCnbKKwNdRQxLOrGzBTEhNGzvGN9Vn489JOMdN5ZqkSXkbh5JCTStWA1Jk0

34YjVgNG8RcxpagD3LZKYaNZ5Qm8QnqeBnFUI9W0Eh
rCcabWeefSV5JUJhTISnV3QgeQRqKeMpKBbzFSKnI0TaZZkrCAHdYPwaHHNtE3ZtlbQTRi03VHxyKB6r

GRFnKQKnMFZ5PYMmKPplJZLwA4s7DLhqE7McI0ufVUZaH4FgpAWhUR4BKQQ+Uw1AYI5jy5EkVYr3USDs

o8QpEFinNYv6JGjkEPBqI4W4vWUtEw2srD6IgIN1mR
EqI7JcvRRItJHcI0Xtv4SMc872D5PntBFnQ5DaN82ijA5jQ3tehoDws5dAblFvDBjrIc7VIDGhMR7KxO

ObgSIeHFHeSvRbKTWxoADoLMLpKzV7SLxnWNTiO2vhCNEbroL0QHPlWy9HKBXfMY8Xf485TMPeD1FkhF

CkvnW5JLOkKu4RMQP+Qq0YVG2rh6UcBGz0LHWud7Tn
YGsdTPrqKzOiAWg3OGIjQipqRtBiBJG1DTQ3IVSeXZC9JIe1DFN2IvLmFlB6SMCaTjHyLrIgIfr5KTU7

OLQbRfzdHxYlBJU1YZWwHrmqCZR8DKT2KrL4QWTnIHJ8YNN6PvN0HJZyUZS3GON5GjL1APMvEHD2YVVo

XcXoScKfFVb7OS8VJGI9DOYgTVc9CWDmMVT7QnRqLy
MkDNurSeR4FfDmMsHnPFH2WtO7QXNtZja8NHpqTuNrWgyeSZL7UQexHwG8KQDhEMTkDLblWiZ9CttvGq

E2CAa1HIT8HzCqFuM4WCInZFE9AyTwMkF0XJh4VFY6AeecThC2GYZdVAWWYoFsDjSfBBB6EJOiSeGtUW

r7UOJ6VfXfBmDiZKY2ZODiYDM5XCSfFxXbXDU8XzFj
FsQkZtZlMPXqIETjVmdlYau8BFU0PfClSkpwFZp2EQViLBY5GZNaDtIeELHeSPTvTHrgYLO1SQOuAfA9

WLCiWKK5YLq3KAE1COMwAHuiTOI9TjQ4WYSaKoi8HUF9TUOkERnkYXn0NSg5DLG0CPIaDcEwXGe2KCL3

HSBgAgKoUCz7NWV2THCoLtMqWKa3TGH4DORdQbWpSC
a4XIF7CJRxBlMfGPn0HPR1HDZkKxXwNYm7XUC8SZQqFnRiYYy3MUK8OKLuEaXmVH2QIKA4JVVtTmPvRA

n0BER1WUJvTiNbKKn9EBY7IVQwIjLbSAl1YIBfChgrVyGtZWR9KpA4RHLjDHU7PUW8ScQmNQAoNJX3JE

EmJzG4ZsxxNdjpMWY7BfR0YQGyVrRoXKuqBwO7UUBa
SOIbFAQ6HXAjMdSlBeYxPYUkKaLMSdPrEGo5OCA7OwLvNqJaHtTkQPIiDlNeYqBkZKE6CFbaJEE4XgOt

GVS4PMHyGQP5KfFtQuHiBItrPbN3AsZsKgLfQTbwBiIiGLTtPWizSiG2DqdtXfR8HMK7ZzH0CdpwXhg9

HHM3TNVjJdmvEzg8WFegRpK2XuLcPcv7IX4RMZE2Gi
crWwg5ZHl9PST1IxljDGk1QRm9AVC8KhUzVvAtBQzpTvW5LlMdDuL5SIZ0YmW9VEHkYNF9NFF9PwV8OI

PeCRV6OSX4KeK1MODrWBi3KIT6NiN4OYQkNLE8DNJ4DmD8XMEeFrx7ULJ6KIGlNrtdXhf7KPQoNLSXLt

AmCtXyNDMwXHO4OGEuRnFzSMSlPAF6KTTxZUY5LQIc
SXH0ILPqAeZnZADpLAX2BDCsHKH6GHCgGBY3VQFnSZXWObAzVF8knp5XIKXxWAQmIyxEUaLqKXjWLlVh

DBIzFFgqKO6Iv921PIQrH6DsqLXbkp3WAFWcNA6Kl264SbDxAS1OykizsDwRw9eoUPauVNWgS3VcI0Lr

aVZ9FZSuA2BnYXRzO1t9HObmBI1NGOIiQC77ZM6aPS
IEOvUxLIJiWedtN9KxXaWCGaKxCDWmKl8znAJWb9pjTjQlGHLjWtJwKCNiTMpiRG7RGiWmJVAxEIJpaZ

ytSZ7cmHIyTD3QhAAjKcZvRSbrZS8+XScqixRwMgmRNbTzJZZgi5HwSHlcEDi5VHitQPRqA4K6lZInHg

4kaN8IkRI4mPKxF2JlbMRXiKReP4Mec4NBm886E6Bm
xWVhEKPmtWEaML8hf6OfcrtpB3siOO5ztZYvB79quB5dFArkBFDsM8MnunL1F6hgzwDnVH0MRDR0V8rx

mqHxWAPIJdUjCDUzR0nwsYwrXQirHEWCANyxPNElS0RguyLCVWNhabfasR7wAUJxZXIzVb7WTDK+Pg0K

AP8de6DvCKorExDpWZ4efu1YZEC1HT7IbHs9EAPhL3
WiQUXcCHRky7XwZB5AUN4xfRawYHWjQTonO3oryvs7eJAsOwPaJSF+Ur5NKCDknETyDH7GAgtW6WcKhR

RFtv+gcns7unXECUFt4oGHaUDALKYHoJAjwK9WNQCLZPYDIEk5GVzgh+OuB3HHYIgPdT8UJYg9NYPUfO

JGCE6NGZReOOjCiTcHpcs0W4HvYS4ABiji//732v
X617nwwPrY8xa07bQAQZ7GFVYJ55FS8wnCLhZt+zu8MzvFASCjDkTi4RoJ9Y0ulLsz54cTB0GqAL0HdC

uWGe/55jSz+BibbASK3vwMXjdlngIBBK86Xharger9+e5gvoAD8hh8M/7fTZ8+aP/thx19oabVISC2fP

ee1AJoMvMJPfcRnDuKUM982lapy1bkd/vPEzif2vT7
la6k8Uxh4eWNwYiG7VaxcSS78TXSj46ZyXz56gSyZc17hoFW+VWz4m64KP26xm6u1/gq0zDZm3P4AiVF

5eEOY80RdcGslo1FVcS2+XTWj66zZyZUTn8YgXw/dYqaMXDwvLBNb5xZ+HTnGVKAyXnkL9PmiQtYvcjh

PuO87jHW9S7Bcvd2jvjgWNTmyRoHJmmUq3Af0z5Xzi
Qyyhp8PL5xoIixWkkDBz1bnGCBQD+PqHh9BsLqcB+uApVTgAZ26U4I5tWoItE+y3DrE9ZweJKKMrJn3f

WP8uOZGmwW5AtH/3jnZRAvD0PWmYABYE5zv14zH4RK9RouiurEnVZXKoAPV9ulqL3CD2YAlHvCV4VD5P

eoGCKKUCnI/NxxSAn4HL2mPSi1RbfC2MCDsGjYWsi0
kC2jNtoMvPr4m1tVQYGkt5TrlD7HetPephe+gq+nv2I0W5wIcDgESSxAS5whlrQ7269Fe3BwS4TlqDQT

xbvdlRLJiWDuntzrt8B+7g7XlzL2VTihfXFoqHi6IlhwRHqQavckCMfRMAwv6BE4UZYpRVICy4Sj5Z/C

EY4Su65gdufGOKQ6YMeZhq0Ea1AQPMdmxbmqo2jz9r
v94WTtHMQq6vpuV0EgLlSuFBMryxEC1rp1S77ayUquMKqSVytTr3rU5iv2PdjBhcqTgqxnjXdyXCGcCD

y0+0B/Nx7Oc9g/IYT8XmAcSh2DhYX2bFReXrMH1dVfhU+3PovCP8PH6Z/xZtMWmn30Jt59y/0XPxHaWX

6llIcaFr5xd3tsA/lu7U4U7FdwGPFfX/9FIzH3X5oN
PtdIjexfcj+lARbyBzcN8tuXju5qH6J9tVFh6VKL4BZ4ZGO+XQ5RO3MygxSqf4HKpSOAzgG+EbkNfJG+

RE3eK8IZ558V/jSn4W6nqY7rrozmKSlI4zXfXst+4V9db4js1Xr8VEWjNX5aIrm8ITkLJ6MGg3/MpL3t

+f/vWZdmc+bPUsgkKcj2dyn3kxIsaoSYo31ADjLMWB
6T3Q1v1EAhlW/lfwrh6JWFoQDvkDsImHQ9K9Kv7d7m6dDsurIB5v9RIhbYZFM+QGRsBC3EJ0rvcvIV3J

ytiaGmfxbZ5myA/QMuNT3oAC2hI8qkc/3HuyyvJIc6pynPI967m/2j7TzcmP69kk8xeHr6Yc1cp3z23k

fr2+Vj+kEkMyVP5se8jG8hxhlKia/x7umufON4andG
fSgc1pvL92rFQbiNeiKQaeYYjOY5uvQaY6Fh0jg+mZ6uW0zQ33kxj6SNI1QM2DL+VtYqtMN+abvWQvMZ

RH8Wzs23hfU/K05VhmHkpLKLehO0f5js66wLC41W19zd8S60DZGs93E6eD9qrTbMrDcS1o92Tsp81mvV

0sdT5On/67qi6Kkuavq/y6Mpf20Hz9A2XA6wNR9Fvj
WbOxdAVWmQfYeQhbUm0BG7XEReyh7H/UWl0LER/ryw8RK8lG/GB3wDP9lE6FcIkB7q5VXnjJr+gpjIq2

urSaNtYp6ZUd0RmPWWhrnB+Dt+rg6bYfBYB0uAXpYmS5pDdnb9K2BI7uFYbdY5cfaOI3KVDlwJ9YlBlw

pmutO+gmo1h/Z2mYtvvSXfeUgPyjiIg7TvM3nLIK4A
skXDk6XB/868VbVIG3VrL9sNdmtLC8qaAc3IVMJyWDq0VTZ9ppvyLgjz4jSlpzZRmz76dO9otnLQiaYD

XXWPdTB+pOSfsxcLMLgCp50XkqaGMGkddtIYqhqeLRwDvrHEtnJRD6F08AF45fI9T4Ews2X/F9Hp4+xk

tCri3Eg6qvdug4bH3wQhd7BF9cOHUPi/o6tRHL030v
nccLInvVo6t8/CmDRT2sJbBJ172jsJoq3Eb2Gu6uYFKCN+CHzZ3mRRNYjTEhY0chgB3hWW3OXCsVU/8s

06/VF+crinf2qQLau/8RXvNUhFbb5AJziTu2xgGms0YjvxRdiupJQx5hDHKn8tB9i6Fsn7Skq5ycfvwr

0OIs3yXvOgg34RtWbdJW12/nHGAzsAvQaaKWivBYPG
8AToBDsC4yYbMturZz/gYl3LWjMOovQdVZmr1cnm0eYGBfS5EeAhHYkUupNC1uknLbPFpNFRM0O4qFNY

Ega9mDnet0DTpABHPhGA0/J4bructcMtaHzQlTwVjK9waCHY+KbYL0RFOYljdYkzzzQsuaU+rdtys0hi

9ndtNGXBZJpnsg4Z9RNPoGb6ySapndReMqVBTSxQ56
ZfwJYZuEVulXu9oUxgoh6E0TP0EKQUSo5Ej5BVJorCnXv3flfrXEQb42B/m1HSO/1yX8Mh7m/PmCQ8ki

zHvU3Hg6myx/lhjbZv3oRDyMbvH2wZXOqqWCJ/Y9LQ4Q9KsVC/0Z3INnAVOhSHHEMO302I/xjJUfsJWi

zhyqN3a7ND6u9b525wq3dlf15EtVftyN/ljdrUfOdu
MGbhAawbfS7FEVikG7r5aBp4qdbVltgDfXBv20BCfKncdN0jm8szM3J6T88tu+GvnpFn+Wb12/TvItCg

DUdnoAdi3kKsXbNYXkSgUfv8M4SpCK3fTfkHNVCfKhVWZR+3PnJ02AMaTDYNo82r3sHOVpB0Pl9mu7NS

vS8ZcIvKeMlY2n+qz4xhQaf3aM7AxvSzdnotzf8eS+
UDYXIPLRBzdumBqKydxpSq2y4Pmrc0NrtRkkY05UInWN2A3TmRbwm+jhYbqavSd9j1q8gQe6FrArfY8H

bfP8rwe5hXE/OqK0NuDPRv/8Y2rXjjfHq+HlR+ql34MWc6t7a6VEl5bn52KWE7gT0hLSJkBJFcVDw2re

E+HiR+pt6LIoKCEUoutn7bco2Zux4mzlf1Bq7Aegxg
C6uUFqkYFixKM7c/Uqniq8uPI5odo64ScRO4SBVrDhNsHoUnFQYkULWUWUaDuxOb8ZKGunZwWIhWbNvC

w2PHc7rKvL5IuCr4tiHBVsUvHBcZhyHVHsZoYJCzmf6TsfnBrXLr9dgsLDssern0LC0RU+5R4ySc52tr

Y+gurbwAfFK4YumJRe+XnZlEdjAVkxzevW4amsdIDO
m2pPUz1VJ+wbpgFdXtPR9TzSBdmTVQFr3KqOQPEZfkDTWGLgDfOfk23zqEOGkEBWdC2RloTU8chMDF7o

DCbyWYYmsq2QrfP70WTiO73dFn0IOuMeJyw3rNHLdguCAjRAuGCGpBr1c5VW47LkzKY13fEHa8/+0Iii

+kXJu+J4gXdorqh1sciRTN2aCWVjW91+8eK/KFVHzx
P8BSOyjCflkbMeZcknabaFtzjIspSnByjLSpaq1/k2rDjepexxTOkD5huhRRGWa8Dy7d3kh1ZYvt7hPs

Et916Vzvg7M8gBs2RzdANWLRiegO7W1q15zo3teETXa0l1I7ot826C7i96T4VZZRRxgvnUEqddO8h18w

cA+pXlXIRQo9IhOz/olWx7tS4arsynm88AFMbsdn9i
vLjAXHqaYSV5ztaUvO4t15qqJmBGt1UI34yoXlS7LO03rM2d6uweoVrix7H67yu0vi4kZ0PQVmwextMf

cZVBlQBEY3DCe6Nof1lau9JDcHC1KqhfHGUxTOzZrcMLzt2VvCciS/L9mZXZpA4kApPUxsRjmedSWtcV

gybN8UdekmEbZWTpl/JHr7jNyJkKjAZ6nQhM3QVlQS
OZaqELCR8ddcMihdTWeDnmKSoNTsSEdB/sa6H5mb+CLqA3E4vORf+ss5+Vb0uuQwjCAyNAXfYaSqFwt9

iHSkM3ZWO/PbL2BLndCpT3o20oKUryuoHR5iOQ7BCiJ65AJPCDKYs5hL/+Uf25qH1gSa6eQ+NlfhmX6o

gz94OqPsnZcHZrHlunYG1DXWSl3vZhHTLeH+RXpacr
nhZ+Bhk10G518Wt7L0ZWwfB7wshHzkIyc6wJ03U3DTxZUBTrdesUOxmyfoRSIZQt5bjEccGpwY8jsXnX

J9z5QyekImvhwwSQmQM7eu8Nt7KmlOHbMJWmMSoENEUOPrGTnuF6ylzlMHzD5HdTXVjcTR3TpucaHDA9

39mXPsKTuyyuTEV1n0BdTNoSfuSKxWA3BUOvBNMc8J
Mg1ZsiLnPiKHVX+SVb+g+/tRa3CY2yD+mD6HgHfYjzwiiW2l/fGgO3vGQ/Pixvu4FDj+of7K/KItXHhx

nLwYy6VP+VJajTViIdq8VQeOF3dWFu1nCku6rstqRlLoZCTgSFuKVUt6PzeIUOTS60rqcPAIuKHLIGhA

JYGSATGazYY1XOXrpPbZ7XbUV87/zw2aabCOa+/VeO
M2AyXt4Dzf+zyi/8TDuVKTivx8skYHDr1jnyAR9X76znQU2TjqXicOy4OR2iMRLNs+3AjgjsyYGaVsfI

Nb78HYtKIHb//k13wB9Cslg08wHMSVDdvKpBY1AYuPLPYeQF940izR/uUPiFl9fzRwufQJIP8O1kihkN

WWT7id/ZxbzrHZdTF/RqehsFdbC7Rhd+MZjOzNK5R9
KjibKXTVjs8SFpyAQJzfgCeeM28UbTgxMa1Dw4hncdQtTzwq0qgIS9AoCMJApQvbEIEli92zX2r9S+23

oPek7AO+7Q88x78sANKD7VPw0If3Yud+0+CQHgSRg63ErdTunt7fLUfDLbkZeTUhvd6JdlgXt6PfLwg8

K/9rwaZmf3sZ4319sHS7XmE1aLtC6N9x66Ap+neGCg
twWryDwwZzEWrhsGcQjVh0jD/Sx2Toor9e6TntAFpt9J5XPEVxaKUUorG5HWzFz5v4x4b7JpspimAicq

yFhOv3sa4npMg8DXU3YYuRDt9b+lDWYPmg3/E4y1Y361C/WxrNdnC11J+YRUu1RqpcON9C3U5mEv0+S4

epdjPYDpTZY5EwT2r9wn8pnUeQetac1FhgzBufLMrQ
kuCUpuTFItqiQ7eBVez1xNpdV1mSFUUpU/hVBRnYuPWA9QRU1U2tRjOxm1MqUjQB7DGSHpgLhis8fSV7

Yc7y9u0PwG3sR4ptf0c6V2hhvzi1pkLMmx8jaBDHtoOrTQFvShJztdQXWxUlVou6ViVBA2IEI7yDLWH1

cCt3nzeNQzffvXiNsrq9QeO5y7nrpNv5yYAQBls66v
e4V92I33B7uP7qj3McsmzrFtNLtws3ZTJ7UaM//KR/46MErG2+mzX7XCxNLwLlJJ/lgjL4dY7GkpdUgA

KNtu83X2BXtfXdSmPMCGUhqnGRaTr8gjcC/Diq5kQdkF3kZwJhGDIAN1iCIsnlcmt+A4yOiE0Yuop2Wm

E28YxLywxPhGwkO5RH8FVlOqQo3DyUe7LBcYxyQncH
uVJ3IFwJIjIbFaok+oy9ZfKV6PujICbegMtDiD2a+wVrWnPWYed/WkybCMJ2vAjdBJf4kC43fOBxRamR

OK+4xLVf+epfr93/g9uU/VUYw9/SvqzzKoMYi+2PMxi8GKi7JPVUnGKoxjWiP+y/K5lOuF+8wsW1tVb/

TNz1oKrmjYuxBZu20ooUbsL3Z4Mp5IXEqAbhYu49oS
Ue7FyYwZRS4A5HjberXWMLE4V/JW7Yi15ikrjcPsh1H/DgSvBhgZo3yMtvgrZuZpJYbYxXjeqav39z1N

l2jqjYQY0ZtpnupI+zwaCbHbjmPKCI/7peH/QwJckcrgvH7Pn2Tog3L73I8xmlHQ1CE0SHkoqpLQnKlB

Rrj5n1JFPzsDCjr+JVYIA4VCO3hkFC285vXc5sRABO
7G+Yy1kUgruE0SmddEOAU9XGy00UZWbuGYJ2dQts2uhP39OdTRz98ne9S2tcQlx43cvYMsVlKPXKN+Dj

n72aI33Nc9yaJ4xURhO9v/q/oUJnaE0FCPeYS6yy16ONoA3g6e+lfBjX5TPYGqOdT7e7E/u+VEQI6w45

/1I+w93xKo31pDmSvZRGc4hqxRbnc3+TVDp2A3TcpY
RWVcrjlPRsN4n1Xrd8D57kgsbdBpwE5aCwo/AgzradTF/Ml7mlpaddaZhk0uysiPF0ieNdlPtHirIhT8

nTu4xMjQqmw6xldVpBtxZn4CDftaYfuUFkDs5B2uj4NG82OkoLnjueAaCbNUVP8R3GKRWZtq4KhnQIC1

3fmdMT9E6ov0sXn9ZMO5Z/RozFV6hlkDoKcSz2eazG
93vwV9BjH4t7jT89qnw+IliouiA7385bPJg6rj/iw04jsizOpDVRKhlaGqGK2dTLXoF95Y9pKDFyUV0/

r6IdWmQRt+bjM8boDUGfZ+xPBLNG71Zrdbkz/oMWMMjcMYetxTQY+ePwPz4fkL1ifS4fzlEPcZbRujd+

hyjK+e3N7DbIXH0h/a3qfkF40wFlY4qIHrsyWQebRL
Fdpi5y3oo1YcQe5/6hpX3X7tXdI1jNcVZ6Usc8J0H9NHgO9QdzeE9u5SzBtsaatd2X9Zhf+dfwmpA6Q1

p+3EwjmgFUqkNWNsftXjO7VBMVbbzHerG6ifU6NE5E/2tB3IICpeDwSdVzrcjm08LRonsSOQr8D5j53e

XCQTOL8pH3mbm4OKlSqnyA1jaojBA/4XMJnd0AAcmi
bq++soiEvEb50cNq10YE4/A2iUUs1Mpoimfqy/Xtkf12d1bILVBiBTjEh6evrsJy+LmvL47277h7Ns/w

i5BdDw2uhiPSdOXztBUq5WkbfmvjJTiN+vQsbgGLFqFcAqhuR3o1ZvA3dT6uPlzyv9klhmLjA+9Z6cL9

Ixj/7x8DP5iUXl3BY1yGrHy4J75z9V36vYMUBAvOJV
XBiBAeF+hrs8Kwfws8YGvPnius/520Lb3y/bsEf2QqdD2xvSi/W2gIRy3NXjDBkSqBXib+PCweVcKDwc

MvS38ra/E1zmHOPqpHGEcS65jeRxLnfuHk0xrO5UWqn7GiMTRtQCx30pOZYR1LyZAN9wBNwQ2i/1yfRT

rJgQAQ9Qa9/RbQ/Z9b5KR2OZO9BvnmsAColOalKdRg
XO5bo7npo6ZQKHd3gdaE9Lw1Aics73XMB/B/kglgJjndEUQ4WeYh3YvnQt97Myuq2Xba5CO1vWcQT6/a

ItN8fLaWs3N/WBfL0qKxbIPP8V4Acw+/Mm19X5pufvUTqXO5ox0jqQYKfQOYknGXtALv9b6h+CjgCYV6

WhXG4ADZZPpcvOa4LdxTabVEDV67Dfn2koO56eZ9gj
KbhM9uLNJ2I/0WqCWnKjWGw4TNfMNo4jbHCWr6oBJ8ObxBbToc+plLLeH3nDuSv/mbyC7RyXDIL+ADuk

L+zVi4OCOrwTQRsvM67rD07CW92MuPwneF1m1qNQ6Y6LdEsOWgN51J1u9gZ3lX4Z5ir1oxBzZtW1rRvm

dfIbZac/0WabT75EVkgsY04IQeP4lcHf+ak/g74Mug
f0ByYeiuQe8HAwOhV/A7AT/q/fmsplguiw2JgV1cSJKC0wHcUNKcubYroyqlrsg/zfQqlmHiQ74X6T7N

1Nd8qtBq52nIaEhpBy4m+Tqg6KQUH8MyPo+dO3Tyx32yLRozmAue2bb9WEtn7YyyzTwiJysxw/K/vRoW

l1oxvAtYvPaIJfK1S1aeSy87+of3a2Z8UGI5xV+Uou
Q15T742oC8fMwXFMNe7EdvZyzIWRvxtC/tr5aM7vWYULHlBqfWrQGAiPEZ5uCm06YtqB+ZkUluwXtc8h

PU/H1jOn/uh3a5YC/Wnl3WgAFbQhdHCO7O2LRUOHqq1T0z3d//KlqgS2Niun/tn85orwEjjL0FN7PZiG

L5XkYl8JgPtdv374E/9Ajlan5aoIKt2/EufFR/Y915
8NajX6UVl5RntnZ+kjfWD0UBnea3AxiZP5cDVHaJUOdQWo5CIOM8DyKvCRh7n91N/sCT140enrL/6lAO

VTsk3IbfIqg4aRiV3qjSY/V/DE7xuHa7IAwpm3h7H/Zftc2YeoM6UH72X5KRvoBcQBVcBst615DYmyj0

bAwkmR7vov7+pLyLyiuZ2wrr5DugZ/Ux7P3QKvCuEz
3MG7conrvBDu4iFdK90q3JkmoHdFahZ8sfiGPoTXW2Ua9w3u03yfV6+CKnbK8PsPZpppBealVy556apz

+Hm/lPcd0nxMrX9hW00YrWuDayaDAkbPvZpjbH/7EA9aUeC0Zn+5mbuXzPtTPF+OHKbW1EQM6uL/Ctt4

MzPIpSE1uNdC515wp0acumD0568UmV5X+VYoeiOtjd
gVm11pQVn9UH96cwIxkWAGc3ZdDf4jDxculUFhuCyf6dQ8/wsjJ2fwN6M528btLQn9iQwymdw4nT03ei

Hm0m/UPzT4l4vLsQZ1wkZwj61pxW3ueD4b/m3R49qorHE98L58fds4C4dUewEfOxcT7fjCr+rJmARv5C

SgJN1TnZxxN6PQOOtZ2tBoe55+7+P3mQZc10MUul7/
YF96jLhKaDAiKYMgv34X1qN2DW9++QJg5ixTz+9Rnc/c2fJtZz8O/vXSh4ycdTWO5a+gS7y9ZADLywqt

u9o+PaASVG0a0tAt34sqSv1y7XoQI/XvTOiz9lGn1aDUnG78Rph5OjNDgj7EF/UxJSeueB25V8sM06x2

7zp8qSMjF1sRx1l1w5gqrnkvUVqJFY/Ft9AwhH/5vV
ovZ4/X6iG5KMLiBZpvElw6yoT3Jrr5d64dejY60US3vhzt0T7sMX49XgXyXZqNRGnCbLjexWIX1ulSxp

t6H/t98mrSUwfdb3n277BWJW0dOqF7w9ZOOglTLb52Vgul6Z8kW6K77XMCD0+A4DYZdJs9GstPW7NrIB

uVXGjfBBPBRFeagd7XlLcekrXAUc63bHre2cSkGkuX
VMrBQHmpEQesKXyXXZvpFtoT147VjctxVMlJh6ZwHgJzgw2VIJ6CdsVYXC/6xTzFiYW72S+G36K4fM2M

hKf/lAeSY6GqdAZhWG2zFKa9Nhkrn4D+yZnvvwAdDIreV/nb2soTvoi+Pzl8/wVg/WutBrBWtnIdQOdZ

zCTlBywDJJurj2GV3X5QZnV0aQ9khJkCguCMqzeXF/
fZHnoYP2izQki0lvfPwvdhqr02lF8Qim99npslVQlguZ8Df0vFPEnbM6c5a9Zbe2RyKbjimjIQrJhWhu

nQ5yejCKuUouc6E6HbNej0K1023Qk+ixQ8yanpZ22PtOCwDfpbEr6IAbJjjqRZ5o03O06ht/Ibe5XG7F

tBdd759MpEIEz7RixxC8IEE9Rt/ruMMsxNBPsEtoKy
Z22Qze1KkgFsMYJ/jrP9mjKT+0Oojgy7CjJ6PH+fjbZlizOMWodOA2Q5tcDusqEdJoEXQBpb6lnf88ga

xTVwExTYCURqsr8g90n2/IVhw9rBL2wx6o9rSTG8FFXhL/nMidTD+RBJxZj91sfWUugPQhUaabHX5rNC

+Eby63+crGnufMlpmQWlq61g6SBd5t2hksVi8V0U5j
+K34oBMIHy7QBtbrCh6gw7C2TasRzeF/PURAlZp3UGCNeRtpH0Z6T6wpjXMmc1WMfW0kye6o4Y5QiC1h

x+igM5nau2uTGFnQuWAPz1uiiLcz8VZ3KZ+TzO3Q+F6nw1Ys1Ba1PkVItYE8wkkr907tDNm/fqWBVg97

jUeNfYd6sCl+fwfc6tl0675+/SLNuA3lQSGic1kiC0
HE/utAVFGoH6pfzvuqseDg19bUTIJ18DczeKvMLdFCTLzXV45nQvar3sfP0l7Hy5npaM4nKP2JZdXUFa

kwdgTWHNtM/IlNJytSNo3ebAYVmQA01k1bxAt8kLOZ2UAEq785u8ntJ3+Qlx7qGhQDnwnDstxD78/M+F

0yB8ORNvH/vkkrnmk0cijsYo6Y64t0S67i/cQ+MxzH
skDaAVJ1ytJF5r+mHnfYQxej/S+aUEcnZprZ2o0x+mjjAEuV5c1I8iW8MuM7EA2p+47N2Av8So7JYh6i

Jhlik1Nirq1j2CiBnZ13T3Sv2Q1Xn+mSf1sp1wxtwdzG/T2yT5M76b7z/uWABUrK3ox/c9GWEYp9kgmd

J1d7h+wL8xPjQx+S5SaTWlGqC1zm1Cvx4/PPe2TK0a
9UXC+lhJPc6mc3hHiHaC1aO8duD0dN2BGphgIkf+P8rfTyrsRG4RG482YfA1/vESOFWBL5j9NvSC17Dv

OElPelo9c9rsAWuOqFs2Bx38wwp2Qu97aU2wm7gj21+3Hf/zxonAeozLdG3T130pA/nunnlBudE+Cv+w

ix5FhJScVloE98qSLiI/wdmRXSbSlRlGe9q+NFXdV6
xLc14/POY2SmSiX/koLHZEzAlEQae479yNbYflmimP3c2aaW7Qq8j2R4ujc+B0Cyf5aJb6ls13mmf67p

bT7mEvulsS48cO/eMGK2wlc7xH12cvVeJFQ/xNjHlUKF+7yy4tUT5umycppRcbGXPx8nbyn4G7pj8xyQ

tdLFvmGsNCijUtHJyqKfjHgbhxXc66p+8B08eTMxOS
w0gx6a1qv5dLR3He5qqV8uq3oK1Nh/ePxFu1rAgBT9wsxP8qcgpxkNzYwpT9D/Od+7K89/FldB8QhOnv

0var1b06b/hcYbLy8AZ9aBI8dEfjQ+ik3U7niy20O6y4joWsga+UQ5f7uAcesAK1TZRAlrwVcbLSbJSw

T7wwKYi0+CqiBdNzAhFbspTAk6p40Zgac+XLfw5jG+
6S8OjbDXoZhRK5o/BT6Cc/gh/QdPh/C9sgiP6l4UDD6LN1Ke60v8RNtHMn1AriWMLybGuQOe6XZXHinf

yLhRPcEK1FiLbqbO7WD7P3Muz3tblTaO/GgGzIF7r7i1Ha3UNk6EkJGxNW0PwoGE0Fx434ZBHhFgT9hf

7ZMueQMvtxDzGl8cv060cGizIeQmiYrmyszdWIuPBU
wD74AP3gCPU2vqeHjb7UxhyOeexZui785hLfJ1Z3iQ9bB929bxdmMcVHvUr90YbE9zdcKjeR/ZulMWo+

sn/AvfGAjiAkLKdo4lId2LgKQy4LFM1DMjr4skxD10MmXSJ3kXxrtai7YGuJxII9atiZfIw9GvXIX5Kh

zJZeF90jnigdpC6RC+YfwG9KPMzZAffzNu29l4dmDf
v9wc0PvR/fczHop0KP/9/fDtPeDCqs28N34v7e290Tod4xyBKYbEqttdrh4bvzspzaPq8HP2FcvKWaut

5zo3UY/heB+6Wtu0QyIFcne8D453NhPMK351WQJfNKtT6Y3lsGjc6H9ZwsPm1Yer3HKDwj537tw259l6

NQ+6l7P6l7vCljmzhT0KpgkdFl3Cyxls4E1E3PJiPt
WNeyTlVzBt/txjoN+seRvFjfZ17heTtDmnMArFvV1ZgtOxIvYBOaegW8Nz1PwQ12wiwquLJCObCSWeg0

KhFtB4L5MXXlup0wgts7/dGg9CcOgrhrP52PV/JZDa+o4Kbe14xOzKnfH7tJYq0I+5Bwq9+x9KzeVW5M

/Randall+couk2qmJGPclio0xgBPYrWY8ms+5+kBi5tKHU
hgnbK7y2yk0SZEJe6B/otarwxAPNsgZ+93m43kbYAbrO0eF7YoXl8M4oS36/A8saliI6N5wh84M4M8Xs

PuWs12e6NoOgfSexnGZ540vcZ7ETJ25R33bS+1uwCIShR80NE2dCFJtLZAyHvVbqu9k4w1XbPXCXR69F

O3tisYhBVkR9MXE8kGgLP2M/aT1WfclMfp4yq2/Nsh
wos22xXcyX+lhfhoDklmIBozMnTCe0twd5xVuD6pcmN3jyZD8gqyJwn7Im61W8RB9KjnLan0GPEqRGF7

ydfya3wG5aAoWHo/LlAA2S9L76W9yqNGV8qnrqZ1Cbs1KIO3WGNR4l1I9SNXmcG05wz+0ye5ZuwpaHhz

jT/WWVQx6YAFHSaF2AWGSn+jn/02Bd80YzQ3WiiGQW
Ke00FvLrl7fFZtr8Ugm7AJ/Ckjlg12eUPS2Ut4J+FJiu9BvNjGmeT/RqfBu1EKY5e5lOsvwy6Xy9K/Ct

oZ8bMd+j7CZkM/vROlj1NxOf49Lxq0jgfw1byMcutp9c/EUwRtdfKxdCymla7eCfxN0fnDVjAGbW9umk

KvdvQzEXobfSjhbnrUGHQq1PD6XtYFysZEdqbvTMeZ
4eB60b+hjih/EnTo6K0V7OxQHkXaT8/tNLub4U80cjj7hMY1VItxKbvQbZv+BNtqf2Ae++xf3LKT9RK3

kVgUlf6ym5HfpqQ2W97KE7suX8xCeBvnsv/70LDa86+WzNz7CNvxVVT8WxM7TfchmsS5D+arqIpU9Owk

3iLRzVKHezX7yqthEqrufuBXi+V4rk/uZ3pfo2SgYQ
QIRYdTIHEL5fiB8/FubIZwa0YKXQxaLkqAwmFW1xsTD/R510OoH4j/c004sYu13PKkT4LHMzwgFT8A0S

4Xj4E4jOsbVOw8J4z2ttVXsUr0xBD908FHBm9J/aGoBQFJ0TY5acCI6/hviot8v1L/nM6EqmJ1lmYAOQ

Iiw/Dz1qCzukAlnJB9R/MM3smEaz/jNXOB7MKa5YOt
/jsut6JENpLO0hl9Uv41RS3f/eKg570w/Wv2nQZ8cJ5uoVFQg7ZykHa2jqs2Q0f9rOmDQPcTsWHRC30v

qLyS5lGqPEgWoN0s0ieg4Ma7psMnh6DBSMyPFQ/AWgQS5goxECaRFftbe1voykn3Uukox131bJd/8i5N

5J2Y+bE3tklBwLg81UqwkPTUJ2k3wk+3MolAaC98Np
M1NoC3fI3Q8A3equ9YFkvAyioenGipoW/33v/PfW++S8lfpkN3hhqD7fkG4VvX/xwb6SaA9mp/AfwtPc

0WnCiTAzWHDfc5Vl6oMCCS6da5k2gAb9yAZ00YVrtwgAu6jKRMaFJ+5ZtAD5AAEmkG7Nbine6f4M3yK9

1zB/LBHVdUAGkTMeDezP9r3rISiQQr0p+IWrxXk8+2
E5/Rs93q/GpVc4M8d+jZAjhQbF5sQuFkNgZbUKJh/HPWrG+YDAnKGy6NW7hD4KyuoeOA83UjGR8a95D9

/FnYa1xnyU0UOHyHsbq3UxpHEHbsn5UIJXdjady7ifVPy+n4c0MOTXzJ+ganSAmxd41NbWu/7lr79pz2

y1iWiXygt3OPqCfxGTMZerr6SRH3LjKQ1DNnT/Z5qd
U/wf59sF2WpYxVIcqyvN9Zut0xOwwOgOSx4/JbLnb4Z/jfEIzJ8064cPVxHeogEzVIGdF2yT0Jg0SLOp

rJnyKO1PFgoZSSsyb1CW8aIXas0cJTb/IayazEJls40u0Y4P4e57HJDI7mTs7U1dxqAX+P+V0k7hV7FA

8untR2Lza/cR5ivfo5wUxxTwtnK8qK2fZb+CmeNOeX
Oh6exSlAQ8FmbReIAlD5Suc6pto6r275yotnhscOwJ2unzm2hQ/Kii7j3pcF2QFApC0kaqzO4WTJtHnS

73o3sXpVA0Z6JePZ0OFnc2idpxZObcMUCUlAWZcB2FbZtKTi7G+YhIBJYQGlIT+CCs/lCflLFZv/XM4g

OKdFcAZtzmAdp4j196beHdr7CwL+6OC2jN8PfL8WBC
eK/ECJP/R8tb4U5ldIheVluUbB7F8NfS6hN5nPm8Kr7MgdsZMo+jsgpgI4MgZiozUW3jGR2TRGRdMUIr

YpFgmZrYDtkEMZEokfiCaOiHWFLO2ohBeOFnrJOcrQjNEV7TMOElqUbHxcYogPGW7VzYBW0L4KrSXslW

yIPYo4JL9rbh+M60kc2cw41694dEyUUamdLXhNmU3t
GFwDHsOcHF7rDU7+mWSOYpDXdeI8BT2ultQLCI7vOOVNx91QoNk8zQRpxdYu7/aCVxV2eDUzYCIPWjGf

0foVQv1/DEF5vQY3UBucML+/Cb+V/W52c5bHZF2dE4VPaxLBI0gLpejFWrS36hu53lZx6KocKHQN+CtH

UneBQl4QOjMY6vDkomYjhQn4FTd6fdvLuIxdZojMce
bsuaNQd8EubNaHgaccGmcuFblJD8VVCIlEYDpO+xm8JSfeAJ/XkN2Nm3PteA9pl6kUTYRhFU2vHzDUG8

lJ75CLTF9OsLeQ+Cu/I/SAwNd/PTdkkhNiZsR+R+xuhdVU1PWzemABEvR73qrZvMETpoFlv/J37dD+hh

iMGKpW8ROc+ni15tEFFw9uED8rWbzpOBJXiflYoKSE
se330+HuutowU8YIHWdP9ZQh3iooAWSxJ5w8KRGbfKA6YUa+5M2s+8sWsn77RDuQtYe+G1SV2yzgRNAn

EeOL/TBZrT9aSx6+x2fHd6+Hp5lttenNTfK2WIRXV3O7H9JhHeRomcwPN/Esfr7gWnEljcrkTIZfAEYI

YiZhTqLjbC6iJC41beJubhaLK2ahA3e5/l7n+M8Rr2
LiPxJ5ZTp9jdbpSfW6cwcdmuADPWJzR8PK8/c5NZuMfHbLeygoz6vYIWAecAwDNyh2FvnF1Z7TcYl0C/

Ma3nmzoXATYBMxOuEyZIgePglNkogs7IoHHvyKG+WjyOQVEt6PS8WeeLa0WBr0DCP8PoQWyEUVScHHU1

qFFpKJBgXLr6dE0F7tPOopjcmuoJ1xKytj6IN/9LsK
lNiFdiyrn1aSKM2bNtqIf/U7GTWPijWYwOFYxEQsajmulRAoMkKEh2XMpjKSUPoxUtblym24vvWgXVVf

k8qdkE3MekYZKuf/96hI+7JSXO49zPx8N8GPL5MbARuGgTQx9EydYt43XWds6j1XDfVYkjzGj7hTOBYd

CadeFCZD2tXAqghA+vxEOPc91M4s+Clji+3hB3l7Wg
iphP/duVWonEMGCNE4YPNZFXlKvJhRhRzVP2x2XK1akE8mfIrGPS0Sz8Gf/FR/SvyqSylgl4+EpZ30et

Ll+GUe3XXMAokTN28EFCFfZ6bsbfP9H1Qg/X/4P/wvwNr/YyBhJjyEOqOwWQW4ogGjdM5XRS1jz3HvSU

gbFaXdXZ5swa0VGMJ5TA4LuGn9BAAaP7CbXYHvFUEi
c7HzBZ8CLJ2xhFyrGsY4De8GYlJft1WoFTSfEOfFyYDFl77UMXi9K+o/+Rjbl0PMM8EktAIHXp1v/YGQ

ZLezkAZDCdq1N4soSAhLfmCIAm9RdkQB7rApS0Y9rpL7FpxeWqpLsdxLRuqd8s7jktRTRPuOv1crkf/8

Hf268LJP1Wuf7owGXOkhgG3UqfRyRCQ1fZGdRx2jLv
ggW+puAvXo/As1fMCAlMNXoOgz1bX/Ki6V9Hh2okZ0oSMSDmBX1C2d8KOWTkIyHy0envHgAtSuxWCbA8

U8Czat/hO1B6ADcR5koLKOX3n9b0L5QNSl19r9XD9wQK7a5UkevWcfQ/RZIyv86tbsp9aMLZnzyfUqeZ

PkOG0SKcFqHP8cyp6HDCNrSJJoYlfEBhw6IVwbGO6X
bVMbQ9TqhoDMWGRrwbrniW3xMAwuYJ2By111ByZlYT7UTXXVYQAsJOMkFTuBKiBzS2IrZ4YipCB3RPQl

Y4WvHIAwB2z6ZJmsOF5XGYOeMD93NL5tEURAPeZxG1BpMXfdHFHcDLmtJR1Nr985DyLjdNAxTEQoYxCf

DQXdXQPdVTF0RI7ILFHbFRCzoTiaWM3sbTAuWN3SuQ
OuTmOpGNsxDP6Av141WlyvWFIsPMLdTSWHILt+Wo5CIH6zp9AfWBcpKOVgFL1hno1GEAuUBhPuL4E9sD

MkTw6wnU8OpQD0nKXgI4RDMLEalbJOkWPyOs6YBCZaSd5shG2ECQCAUNArVZJxHOmaV9gOPR7FGAYVHY

IqHCVghdRcmEsSEhKiU2RCGRZ1x5KioKycMn9hOJmu
EpFhsNX9cvolEYRnf8CySM9BeoZilptzYtHhZLOtlaDbxDyzZG0MbFKotLUpSX73QIF+PwXRLaHkC3Rl

qaFWXFOiixbmgU2iWUY7XHYyDc9VGWPeRAfmZOUjBW1KXsUnZgl5BH2gYZt8XNxnMQHjMOKoWBp+Pg0K

GM6ek2YrFCguOzFdAQ5qef9LVPxWWvCjM8V2gYFvTu
3rsF3PvLR9bQZhX9K4eDLnC4Tpz8OHg982L1TLXTEERvnXycsvcI2QliKaYLxuKq6MYJRcpQp6uN6DXQ

yyFC5YADFuCK8jGQ13Ve5zlDGaFfV5PAUnGq1MYjAlT9PiPV6bD19wYWHnFiRgBQTBXt5+DQplbmRvYm

bVVmV7CZSwb2PkOKpwUJ3YUS8mp8MrXPynBZWjg3Ko
ISl2SZ0AOFDdWTEvQ2EomMZiG4WWJn2CHUp2F9mkEDoyUe6PgGRaITYdCThaYY0By489VWm6TU9WDTW3

NTChAo0YMZIdSI1YWIPfWKCwQNAQXwZbXCIpDsHmKZAuVPCVUi6QAsYoK5lMNsufZ2YxXEzfEq4AIyDb

P2I0vXbZsMR9EED2ZC6LSlJGJsSkBGd6G8O4xXQqJ7
U3hHwRfNY5SO1CLM9QQZDyQB9+DsEhP7CJYZxTLHX9FI4ZlNNyPQ0IsRSWC2PxgSVpFk0tKDTabIgxfJ

k+UnHdB5CFNJNOCRP7UG5CdUXtYR6DlBGQB4NbgGQdQa8fTMtjZjDfUC1tIH5+XA5JGZAHOzBNMhRjWQ

beCVsdFRZfGVy1Q4Y9SQNsE6NLI4A9N1e9q0hciu9+
VK7TGXZwJ9KOHWQACqZpQJgpYNabYYLnIHq2L3V8SMHfQ7OJJ7spL0r6OS3+PiANCiAgID4+DQo+Pg0K

EE0df9CdYEpqIIHfZT3uxv7PIVigEVRaL1PaZMXpLTkrW4HquHihUP2UHNbwFPhuYM3BQGSsXFQ4CP5+

JXrwjPEiBW6SKnf/eHUjU8jmxOVzAIoaba9v98k/Jy
OeHL0gCqCTHU1iH6PhhNf3mrCOoi7OU7ojSgyxDe9+AEqlYUl5XidhdK7bmNPrpYw8qFY2ew1lSl7cYC

kkCFyczD5bybS3rG0nLEPlVtC6xgF6zGV9SR4pAc7LJAYtTKjdDMH9NbCLNYjswS2kEmHoHf1eqEO3gP

abB9y9yv04Fl3tyrzhBXo9WO1xHk3yKr1hLYDhk5cb
zEN9PC2pUrl+SCldSLXsXV7lVSX4HiCCNv3VKCG6C1h7kW7fbAT3QM4YHgScKUMzKGNgQCBsCSXiBKXv

ICAgICAgICAgICAgICAgICAgICAgICAgICAgICAgICAgICAgICAgICAgICAgICAgICAgICAgICAgICAg

ICAgICAgICAgICAgICAgICAgICAgICANCiAgICAgIC
AgICAgICAgICAgICAgICAgICAgICAgICAgICAgICAgICAgICAgICAgICAgICAgICAgICAgICAgICAgIC

AgICAgICAgICAgICAgICAgICAgICAgICAgICAgICAgICANCiAgICAgICAgICAgICAgICAgICAgICAgIC

AgICAgICAgICAgICAgICAgICAgICAgICAgICAgICAg
ICAgICAgICAgICAgICAgICAgICAgICAgICAgICAgICAgICAgICAgICAgICANCiAgICAgICAgICAgICAg

ICAgICAgICAgICAgICAgICAgICAgICAgICAgICAgICAgICAgICAgICAgICAgICAgICAgICAgICAgICAg

ICAgICAgICAgICAgICAgICAgICAgICAgICANCiAgIC
AgICAgICAgICAgICAgICAgICAgICAgICAgICAgICAgICAgICAgICAgICAgICAgICAgICAgICAgICAgIC

AgICAgICAgICAgICAgICAgICAgICAgICAgICAgICAgICAgICANCiAgICAgICAgICAgICAgICAgICAgIC

AgICAgICAgICAgICAgICAgICAgICAgICAgICAgICAg
ICAgICAgICAgICAgICAgICAgICAgICAgICAgICAgICAgICAgICAgICAgICAgICANCiAgICAgICAgICAg

ICAgICAgICAgICAgICAgICAgICAgICAgICAgICAgICAgICAgICAgICAgICAgICAgICAgICAgICAgICAg

ICAgICAgICAgICAgICAgICAgICAgICAgICAgICANCi
AgICAgICAgICAgICAgICAgICAgICAgICAgICAgICAgICAgICAgICAgICAgICAgICAgICAgICAgICAgIC

AgICAgICAgICAgICAgICAgICAgICAgICAgICAgICAgICAgICAgICANCiAgICAgICAgICAgICAgICAgIC

AgICAgICAgICAgICAgICAgICAgICAgICAgICAgICAg
ICAgICAgICAgICAgICAgICAgICAgICAgICAgICAgICAgICAgICAgICAgICAgICAgICANCiAgICAgICAg

ICAgICAgICAgICAgICAgICAgICAgICAgICAgICAgICAgICAgICAgICAgICAgICAgICAgICAgICAgICAg

ICAgICAgICAgICAgICAgICAgICAgICAgICAgICAgIC
ANCjw/nYFsJ1kdcWPyplA3C2pmZu9UWq3ZIC0dz7OnKFXePGqiemVzNzwMPjLyKZEoCfhLDod0JJxzNR

7WsMAkZ0NtQ5ZxPHdmKY8GPAXaKYWhsEXjMXXcCSPzUfF0WPScWKbyQT2RzGZcGQtfDHXcFXHaGM5LEI

ApT113fuXxQE0MHu5XIoFyBL0cuw7LPChkBQCfJfoA
Upc0RRkaCN7HrABrqZVdWAKwLDMSUqWgR4eqm9TmAiEdNPZAIFtcCU9Ie6LodHIxCLy+Eh6CAK4bi8Yo

SGcxCJKvSL5yly4XYDiBKoMuU5NkxUbtNTXnu1xqZDBjPB6muHGiRZE5NIUgIbx6NYobXY7oKUnxLYAX

OQDrdZP3IeSlJjOeRVUlKUqeWACEGFtCPyMsT6Gnr4
VkLaK6PUSfRbFaHFzmPHEeSpL1PX34qNprNR8LGKZhDDLxID65QLF4JSKmVj2RXt2UDpGkGF8gvb0HJo

MxYMJoUolVTmo1YZtwCW3ChERjO0DskRUpi8rMGfXwL4HQOHD8FHFoXr2OCKAyXrAfKURmQPwsKQ5nXP

CmWHQBnFaudpP4YD0VTQ7bfmEhLM8JVoYbKo5pDv0X
LvMwL8XgQ2JaWLErGGKHZPrgME8NKCdkTH9zOX2Xr0VIsCWrjB5mzr5XGRXmPGFnXdpbad0ZMwsmJ0Q9

gBhlGAIbSSyrIFOLIVhmTC1BIABnVZE6WSDcRIKmPNKGTpYzI77aTX9YC5Wpx31iLuX1CAWzCyGdPKrp

HM38qDibftLgdXPzzBbiOM9PKb1+DQplbmRvYmoNCn
rjQHUKCwYtOoHYYfLlVEPqPBQwZOPdYrL3IqJaZd7UMDXoMLMhJKYnMjOdCJJrVAZwPBfxJQWwSQA9Cv

H7CYYeDTAgLD9JOeLkQJNoHQi6NZKvUZUjGGZqhn4JVSQwAVAiRGP9UsAwRWLkQDPrWRzrDPZkVCEtSP

piDVQmACMlOK7MPsTwITNmFHWfKXKuVJWbGIXety4L
MDAfTKPaRvA1RvQoITZsNLLoKUxmRIDqZSDqQSO5VUEfLNZhFY5BCmDkAWXvRQV8PdtyJGDvMZXdlz4K

PFKcXDGeGWA1XfLyLJPzUJCwFHslPTIaTJM8Lee4LMBvSJCfQV5FTbGyZZLlZVL1GtIfRMVtYFXhid3S

FCYpZWKtHsBqWXBoWIOzNDBtLMnwICEeYHM7WqO4KU
EgADHeFJ0IEmTjJFJnGBZ8WKUzKTGrPZJneu8WRZHfGKNvXzV4RlVxWFJbFGAgNVewBVQcRZN4QdZgCH

MdNAYrPB0UWbQqTODgVFn7ERVfIHIuYTXxla1YGSCdBEIkWWO2RCHtTKGwAPFeJXtoXAHuPVJ1NWKdEB

DpEEBdAO1ELnBnZBQwASoeDOufDPHoRJAoga3QpUGh
tMdosk7NLNlKBr1AwEiaNFLuNBisGv2hiPIsTRFcMWXXPj4NrtWyJGIsHPNIAWweNSAwEJHtRHVpPNA6

FcmiLYHpCDIdEYE9QdEkQVRxJLAlFws8ZiK9ChR2HxW7Htd6QIDdNqGcGUY3XLD7WyCkV1S9RQB2WEb+

FS2bDGh+Dc9Gp8PbluJ0rbBoGTzkQRJ0Dx5DYHYOK0NGYo==









                    ID                  Date                Data Source

 

                    [id number]           2021 02:57:12 PM EDT [facility]









          Name      Value     Range     Interpretation Code Description Data Scarlett

rce(s) Supporting 

Document(s)

 

          Progress Note                                         [facility] 

WKFNSb4xEiQMBnFp32/YDZihOOOkf2LsRGgiHRl4XOmpQITnR5OcUUL0pQ6cNPT4OVeSMoXjAgTyHYY7

lbm
IxHszDVmJbQZXyCdmNYnGvKVjhTxuzzDOuIB5DrKX5QZAtQ78dXXPlPFMqJ4EuLUAiMlw+Yq7KCXCuoV

NyRC9PCixP0Dado+PG8XuB/YwBHzlp1K2eAk0XiuBM6rgRZnngzJFgeyi7Vx5WLrkDpGsQfg02+NiVNG

M9stBXbiV13xDsPLdtsWyEUaq91yx3tN0nSlj8l/On
NJFcZPb7u0Dvd1mdsAz6R9vC5NYm41EBFmiqDj4+w/znSlcfCY1Okr/bhRojPHa5UcqQmaqV8d/Y+F+M

N2Ir9CGyop2grUQq0WfTRjFjVcRNbFhfPfy2dcQcRbP3CkNWRx5C7ZDY8aNqSCKYPPwlsWSpczU/rBY1

K7Xn1jGZ/AqKdgNvJTHZkKUAghKCDgRdEvQh+PWHEj
4ds0+aHDXhjbR2AtOCMiMlfSgll84g2mWAL0k5WVisOTJsaqtMg64CFoo7ZH95QrOWKhofjS3a+i4PBW

vP7bogl6NOTOU0FhpQmOv7Ad6uaXQ+yB800UXmEacG9bzQoYExuwcM9LH+jgcE3CpBaTnzAIkAXysVxt

zePgfHY8KhsskcAClSVsAZlgpJNXBdJaG0ehG2a4/a
Qksmg1v526zESZG7zaOdL3Q3hzG1Sqv3VBkJr7XN+HZcwcv6H/HdOD27C0xRydIaR4hATE+Cs6f7Esvc

uoyDmhq2UhTkTd1tmkEme2SeTUC46yI6TDqzngOdPmVHTU2FzTuSNg1GbFVXgeNJzpBwtCdZchgE2kzP

g5PTy/x3wD5+wywppY8gQMA8Oe7QMfyB3qXlJdaT/U
jsoHGInhFE4iKqD+aJVtRBLirzYiSX6CViQ09shlvHIDXSV1k8Nl4G2R7Y06x9LqlFzpc7rSvUyizKEl

0KqIHIlO0fCvL2S/u+ZCJDmwIqfKNyTcznN5iULJGcxizN5bSgjeRZhvM66AuPJq18l9DLNgabkS5p83

h8/Qr8jkVeV9vzROylm1Mc/KuR9K5rIh2pIO4x91aB
pd62jDPc8P9+Cu/AuwOC0vMlqzKTq7Al23fk6FxtpuIm2NO7KbhTLNHh7wrb68MF0X0wDDoMkoyafJtc

Lc5gDNv30ahyQ537BM8B5IHjrZupPJMOiQlQDHgcYzdIqiZwBGujWQ1K+wi/siCEAK2m+qEYNqMiJapU

GiRPVyWATCifgSJL9e2GhYrqlM2oFMecvr6xcTcUg7
UZ0HxBbKehyJBPd2zM6DFUjjKOOGbUtfN1WpOmS8m4Ja/YG8TK0zgsrnyWSbRFDtf4umRJixW5Mbmsdu

QxxKDcpLOiUSjOy9xulv01fWBINDMpdDI7RK31LEz5Dj2HB+iEKuCL3W4aJ+65gYT35xeiJAWoHP7MOi

xTJpKZZv0elfQuJ/B67WP7cZSdQFyBok6oNuNo0itg
91ejPKurAZ0ieo/81bJuxd/+ycY/96c/J5RCrxKoJf1RZn9zdZlhTvvKNwvDr8zA/MxZXHiWyQVN9vwF

pQERxUfw7W9HH8REUGh4xy19xmxU4Zwxj5yUuh6iHzfFFidhqD2JK+8IWfVG/zadLRDHEIBxqz4DtTr4

N1uCnsbbSmO0FZGlO3k4sTRLECoJojx//lTZfPE1ui
fNqOuqbw/pS8pX5C4tQ6MPwXc87K3h6QuWq/3cHdxC+iop8Vd3sog0ZtochT2l1ywy9sZMB/aGDc+/Laron

4+CU637yoOcJfYtaJCpxAKwAhU7PMtwt5Vs0x+7mEZcgDa5N1ZMgn1OMxygDZ0EhaRFlOt5hBnZFkI5u

bUrQ+8WTWsEvVdnsSf6cCNViWPGBnX7wTVoKl3iU6U
+CvsBNNPoRJMy2kFlIjCrjYniXOrrkehyaco+6A1NXUqPHDZfbeQh39LXZoYbPTfN0aZ2YQYVZiCs83n

wsEbStMTUiek/+Tgig5GVZOJwOm5J+RpD5+hJTjoqgLiQJ3w+gVnaD9ci2F5RP5bGdAyORHWOLL+kT

EP0DTNcgeRHk3ZUiWVcv7F7A8TILQBs2NgxPIoyL44
pydCjSSf3esw7747nCQChnEDRe2OdbinkklOStBuXy7hJVKQ7nAs3YDZ9Wvlw9DNCKjC8rdgWwo90Rkf

BvkQCI3uIkuMm17KBEkjKNrUEiUgawl6JNnnlPKqiKnETHVEghbWiHbfbdH6u5tCjIWGMUfSjagbwOhG

ARSmF4qxos5852YGYscOJlBDGDFfmQmXqIMwKVGZdr
JQnSEel/RUqAMe2WApVwAwcoXhvVZ5BYzX5CJWA88RJ4xq1bb7VDb3CuH6ngxGf/gRH9e8OTFgDAu10s

oTKGn7B1JebP4vWxdJs+PBuRDBQ7jD9tB1zg6ZDS2uitrru2x3/4aIu4StyvU5EmsN1p7cI7cKMKptAK

8mFnubMujYNSRtV6ZQU76L4uZyYjyr57lj/MbIVuj2
fHk8GTZWBdVP6PfOEL9O0vfIyzlb3vViCOSaLfY5aLZ5m/uYu86v1Ocnf8R8cuhmNV3rff0gnl5lm9xk

JYUCe2CK36Ps7C5uKDZPWyZADUrpUbVqJgkEKfnT5RDWTFy0FQ+/fFoiFvdvmkTEYKpwBBIX8ThOim/m

xKWuJQq4iJODs+L2gwV/gDyzyKeGniE3bcDXQhZz8z
znWZbDvUILlGCojFbqrLxF31UEmrYCzBFW7W+NDPgpqMeymFQjxQvXRGaDK+d1mLSJSYeXBKwnyqSG4K

e3FIhwX5lLXmXQiZ+l9eN6tDCEilV90p3RH6scnzWY1ERzwvI7Un54+BDxgyMbIe/YUUpYsCTfcOPq++

Cz9YhrH4HLTp++ZLDWDxdlFU5vRxAE/Oj1/vbfSjFc
ApCbj8VgNdNisRA8LHt8PTzqTbSHbq1HkpVIv24FBn0b8hY4dyZc+CP/rTMT/wxIsMtKV/GNj5P/SzOe

jFspzir+Q8b6lgeL6T8TbNnhyIJfBCWK8LdCymkSuXG2ADzrMz2b6yRbsBW6iSVx8+5pA9Wzr0QhnbmM

Caopfekdmdt2pUbE6BI2/ZK+qGo5P0bLSVaQrn6Cr9
hT1n/qe8ao1Y6y2CTr20aFwS/NMxay8XlhZYyjF/3HH2Ky1p/Roahurqj3jUe09ophavvkD+ygnUe1I7

46ek2elKuslm9ZKHg0j+G16ntTF7CWYRHt682USWIhBhql1jJ8+ToROo74c48iXF553tVAMKepcB3GSz

x+0xH48wtIL5vrZibMdUEGiL8+6baYh9K+Diu6PLa5
vTD+Prqy/fled39KnDn1BUMxul+Dp2jLy83ALnBsN7h9OSb3X3LLJYvNxkG42cIRYFZbMli8WiN3TVRA

AJhayruWY781EsVWld03KBxRYd9MOHCXTp7j6NngIM9tqhz9rbbB6a12QE6P/lssUe+t0HGKxlXthOj9

51IM9X4GK5QGAJkS1u48tcAerGuTKdwT2XMJ8mG1d2
9qcKRl3avfKpXYTVmZik+77AYQYnub2i/z84F1VuIWlNfYwMHZm8ajSDU6py+1VHNWHBnVWDcaFajw1a

TtPiWNPrdfagTjjdcrJiyZIVH7+vXYnvMakM1km10k6KvzFOq/ngl7BJZ230rE7hTL97tPFCbYyQpGPr

bLwQyr0JUR7MEUaZV1IM8zg2ZJmOnVtyGT8ErYFYog
0r8ot4SjITh5CGbZvi5ZjhWY+YaIWcgmJ6owvglDPL76BxRazZJ0Ip/BddZm62frNahj4o0kB9PTYiGe

oDHX9Rleu1Q/THAdiAjMezTf7ntwEkKglF+4i+LUBc/TjHManrlYc09AU4ilS/DFr9Az/2sHt8wZeG15

m0sEDQJfcUymXq2zOlTi0pOS/ctridjI1L43SUPfnF
lN4UVzRiBnUMqL4v8tdjur7gUbIqTQBi5TpAmRbOJj22NQgyH+HyAFSHnEE5VTeAnB0WgBM2aKOLfl6k

bsZ3XzNhlFemkHuQBONFNGIatGFDKCUqRTRZgmKy2XAPIFlQCqPdA1fuXqtoCiJmKK81G0QjyA8Gu/1Z

opD6fAtYjWjkeL2GbdkSDPaTX8dYPQD8H5wliz1cMn
S6MWZx1M6GhBLwWxUt1YS3Jg4+nwi5PtpxEpxlEnipchGihN5WVSfm1X2B4STEkXoJ7jgkGulfB8a/JN

2nHayfwJ+/LdiHXUbENrZxUEI0nfBkwG8WGY0wd6JwQKe7UQNyg7XaLClkCTb6FEohBKZoH2A6ePXbLI

XiIH7JZRQwFH4UXMXzbsDgXySmFRZCTaQvKNFfByCt
f8WdV2NkOFGnDXVCCIwoPGGgM81sZOndCa02ZDofAGLnKyAlCSu9Du1QVcHdVEKzH96trCCofUEgISXm

WHTWEiQlFUJzJ6QgaLQgAIqmI6KeD2WzET6aeCKzOB2cpJXeJ7ShB5XulemqZHGZYgAsJNPcXLgbAEMq

SyBmYWxzZSA+Yv2OJQR+Nx7IMO7pm2XiYGd0IMHdd4
ReAHvxWDc2K0HykWZgttWdKzksqRUVSIUvJZRtK5obhfu2fCSnJKKcGm1IFrYqs3TtOEBlUMpBsz9a33

8bNxL/fsD9D/bePneM2X3OYLZmtKQvsYS6xkCc2OVhKkrTFG4oR9A789/Tv0FqhrTyaVU7aW3xAl1gKI

tyS9Rgt2tX2B7b2BCVMJpe/W/sXYpqQ7pWM//BtsMi
uRm2/YgGbT/1eesH9LJ1KGZfj4snd3V4bA7I3A4qz9826ba/lHE2R4pp5dLgdkhf41H5aSz/9Y6PGyl9

8+xlqwlDfAtyKb576+AuXP9pz0XvfW23f3aI+weU1b4W72c8AKrS+sOT/g+s/2CYOUudloxYoBJXhBZ8

ZwKnM157hzfPupQw4GvkX+fszoNaskYZ81UrQ3V5qL
Hs5yB0bj0vnlzJeMyhM41z9hA1Z7eUxj+bcJqrHHLMSSwFwLHOzuNiYqAqp14TV4w18e68bbDjPH2dxv

QMF2RBqQKhCIqmuYmixEF7TFGxz50VGci6o87IEhpk0iLCXybMh+bGIYWIpdI+rHUptLkI7Y/y0gtnnD

YctrwL2KS3TDUpNd+y9SU6D9DF7hrTWVf97Es1OlZT
G0xCVQrsa3VGmPUZ0SxKNvUSnFHEUo5aigvdOowKyJSn6GN8vlSdwSbeZWC8zdZIdvmrtTd0IJDRXTG6

EydJskBH3iaYVzQnfLVnYZKAC4KG/KNev3KrIxnD4YrsEbxfS9M1CqaAU0+a8159nEuYIiUK34utU+XV

uFqyDye/v/ve3wds4uPxDyDGISgZZst5v81rJlIx9t
rGqMvC1qrZddv0ShgrBBZGIE1wSwDyWRk9WjRGmu/BtJkNtmLLOzauzqZwK6KlGmxBCbFJJztnBcSDRO

WZyPyqwz2g7FaiuwmKViEa1uyhSsxwJLwqXp2ZXoUVvhxz/eEfzzjPs+d/ig8Ibfkz9ihRp5uAbLxUvw

yCDFxSwEedY+PT8WOCGp5i322x89JyJjqUN4KdtmZx
u3fHbTtD2iNVuJ5TdDRlt5mD25AZ2n7rb3NFVunusGJpMRDiOYe4H+wvEGEd1n3EzqpJo9FRUUWrzhL0

hHM4/8dJGesTSnZHrTvnyQhU1QJCPpWq6JKERd5Iwv4/OHDpWnDs0q2GKwRXYlBW9QWVQTGZrtKGrahQ

aSPgxHqjD1h5aBq6XKF3Tv/ktLWRgBrzivBO95WS0o
ROymnwGOXDE5myKaLwVJjQ8b2sZLVyBlPTmDFth1TJlWbiXgaswHXAQVB8mCYDC0+oRRlBcgJjJulfsB

OrxwEpiWctl862J/vNPKSf9M7NtBRlRszk1suOiSUMtBxEbSZ6XyNcKuuoqBj0PsjmnxSyVIa3MBdBBc

PhihzoD7U/3LyiaMWv9uBmHUVGkRz3HIaUwU5zdSO+
e5iDiKOAosnbGbPbdYOazuO2ACsAw2PH46E5ORZgqM5AcfS8QFYpnXFzaWHNxmZ9jRtVJBbkIyuudr3a

eotQs9y9Xy+37T8oRvJgo2z2r55U4RZQDt8aazMiVQpMePVLvYnxFpf1WCbmXubLb3P9MedqVbf+BUrj

oZqrZHQDL1osCeI5Yh3v3VMt9p65m5KUcpIZMeISxC
IZdkaoi+l4TX4ffFrxLZ6Uj7val1BPwex7fObToHN0Z3bx9sHbDj2Qqx09uCntwyVfdHhBgYiZOIl90e

pA6fV8iRunBMJhOfoTx75gdSQRvv9NERCkK5nVf6ct0avF4oK16NOtqkd5xiwzibwRkqzwXLEymXpO19

eiPRmTeAmuG4EpLXXwx58/eemsemV27PEvkgB6j2G+
uOqIxcFjdWR/WAaYIRNUKUOgzWBtWTg7yI0ehydwYr1eX4aGM7joG4JlwML63e1MSMzeDImqjaZlDpcS

RP5A9PzWIvPNK3nvI81v47XUcJ/ibIw2aEMtGLVrmmXSbXRPikWgbfev2mYs66U3etwNu9nvV1/mkOtb

CL0m872zeoQx5ZtWNZW2JvvV182Z7NvbJWoZpZL/hc
osl1uN+sZCHhUqw7+jR5XjeG4LLcx5/cCwGx0ghzC7M2gKrU/FOCdwX6IQLLauNKRKc9nC08jtl4Mn8b

UVnXCsvw8TK+kV1h9b407AYXCzeThGJf0mnMkc0RS5rztN9bFDlkoeKNRwZ+on5+X0mHmfa2zVg2VnNX

jAefEIVtOXi0gvD/DpFlLExQxYHq9pMs9Sy2/jbPpw
iE6U/OvWCE+kXiJzUKDp2GAORv9zAuAcWY5ms1WZmhQdumAYpnvwursSqcOXnzJOor80ZuJ/St36/eZQ

IjoB1sVlgyVAmvIrGnqhhNmke0hT8qWX59FcWilQ4fqK2s7ZD86UTNqbV+tYpZ1UwM0MmLV0PQrWFJV8

U5tuFm0KQFnidp3A9oI+qRE3yY+8mFY2NjD3KTR6OR
7oC+MeyzAK6s2RwD+THZPghUhCBjZN/awyguZ0jXwcAOSDCe7+cXWKWdU9o/49FIbvUDFFNgOZVIe6J8

KcJA8YfiXYUM1VlEy6mba/diH16Zx1u7uos1t2yQy8S9UwsOSHgzipXXsojppqXESXI6Pf8zsEQVv5bZ

6BZFeNWHUfWGD3dYXf87Rhj+TIdmMaw5Hkc2korbOs
dElW04X1G+RbXww1kaUvur6IIb7iKLDU02pJt8qJdBgSgzcwbZU74f14D/pyOHwUiYPY/NuAxfaxh9pm

RiY2HbdN6bfsycrKXn4nvQVuBueuG4xgSZ7lk9zmGqGsRVAjlW2WNDPsqxIW+CGL07GBNF7WQq8UVoww

mkCdEqFTqya1DFtex8wXw6d/Q2ld2HA0mDQXaGaYCi
Igfe3AKSEcxrb3N43WqaUQ239joAm2S3gXkxSGJr9eVwrEK+2f8faQfJ46CzLTSo+ml4fpBwXJGwGOOb

BYOmda0JUNaTne9uMkKQkmGfMPs63i55yhCV1Bdl2F37NjSJei/EuheaJ5KpMKwDXqabTpbJj75OMvu4

IQsvCqG/rpViBbrn6E8sTQP7IyU+txdH4W2PSO915K
BNW5yqRQHG0yimFubxeCBi7vf8gXrg945/w8ijtognKc/JhhoRxkzh0m11Kz7/894E5pDmtKUbDPIuM/

i2CcDYqVmikRW0chNEFKophGMumIhBFg3CH5Wupsg4YHZNEWe+st0Rc/ybsKc5rrE11BpIdrQInKUSdK

Rw8Y7sWq44rFx0qHkTB2FoYw7k4ItKy41A/M+yLCMW
iWf1o3VPAz3fU/aewXBZVMv/mFX17x5bEzAxAhiVwZ7RCv5sOs8YRUTYb+dGt0Vb59Ss/zvBr05iEDpy

FXudPGx4SnBgLAnAnVvmQITKTw+wCd9Dk26Jr6aFOw6GJBTzZd9rPsOwNqhZj1KTRq6jOw4PFICWs+James

o1Pb67Uq8gCn8fUP+Dj/3QM86QIN1PPD6iw6WmNWPn
WZpjyuSzLyeVFmhaLVPpYgsPWqFzPJlGIuFrTILqTDefYG4VWEicVRasBGSxS5TbihCywAPgEGRrDb4R

QZWaFS1EJXRivVQdXVHvIcRhNJNNDtSgVWQsDFAglVWZk5ngIjYeZCN8LGNaImkeIH8NEVPeFN7Ri843

WD50ydY2INIzXr6LAVJzEN3Voe45vSR9JODzKfRoGU
XahcDrWDBggpO0QE8OUsByYJA5gGYwDenUDF2TAVWvdDIoJT3YCAVnwFOnTA3+DQogID4+DQplbmRvYm

wKYldrGSMsMzxUXwVxBSygTvnuhHQrAO4JcOU6CRAfN27lIJVgSVDbY0YxPYC7WQB+Gf7WDPMcuCLeIB

7BSucJ0I9yy6w7Jx3hHL0SJvPK3iOAU+fNKDOD1Bve
tb3g6hAGfoFQtRNmmvYrNakmw23/FC+ys9X9RNaBaiKUW9Kb5ULX/onVFMqGMn62JgrnIItcJt/br1Gs

vjptaF2EdKmn6O1ag3+g3t693fNxs12pJ5g3/vw8qMWC6HH4Nc+Liema55my64i9sYktNLvAAn2jYlsB

p9nmT6/BSBFtZheXi23g3r22QHYlD5+/hbw990CiBU
9tAR5jTO4OQ19FS/xmj5vMEpi3Bu5GjL3GHna7Gc5cPdEFJkviZuyDzsGPfL6aWtBEZvXu+03UtwUVhC

PuATQOuwI7rommUQvEQEShssETUdwh/rm8WI+GDomqTPu4Fkj0x/ZXdYcgV1rkSPUFv7KGPPKS031n4F

PlfYvSHZsTppgfBmgdRwjdDra4kwg0g4m9tQigeYc5
EcgnuBn26EfrdxfmuWT8ew9dHwSWqyyU4YpPmEHEUXA/5oddgbl9cypBwf8DzZij62wEEm8Z8h2mnEy8

vRwgNntakmcexOeUcklOJvE5Gmr5WVH2jCxuXbPWZdFrlDXwe4SWhCjaZ8WSMeW91qkyQnxD2xfV9II9

yFhghl7unGqUZk9B5TqVbmG3qRGcNyGqwdADtNxlrm
rIAtk2eJFNczVsPgOB2ybO5jwvMm518BOb0gQA4apvQaTqtmk26b8Bfj1rpKne/Z3DG7JTtc7urIyRth

/zA7hEXaI8LG+pLbb8qMBrtq63tyrvlzUkCSynvmpFWLyWN1SwZWVniV+A2hv4wPeLbFic4rv+ucjWmw

wNC9Eq5BNcPCCaN/7rC6NUMjskT8Zaecu5FndZbAaq
qFI4U+2bIjh/WkMRi0lKZURU0Nyc6hPZH8zBssLa3a9LCYvlRQCumsC3g6vqEN7kCoY1cRwp0OU7phQC

MFacr3Lz3hF74b/uvFi41yfE8zvj9njb09FtvQvgubKyGZZH/6dzMY4SgWJLi+bZ8ggiqfTI8UrBuRBq

aK9dF63k9vm9EagnJBwAIugyAhUcgG0BtA8gbCSpFD
5Mz3rGQpTiMKAXhJZvhcilug0GzzAMK3t2/XoiT/sKe62e0w4nP9Gv50zlNJWBtpOIQkkeohdeWqsXdY

IZVszOn4hxcfAx19aHn7uYA3M7Qjl/647K2UOwNkp24yQry/l2RrmYAHYI/kuhkkK+EI94I1wczr2zk+

PUAS2Lkq+wfEKwhMHeC/YPRKzkkDzOu8nAOLuzzk9S
+TUJCuIRTbP3kKJTiChIfRIqYiZW6Bw2qSk2PgpsjZEqR1TCFU/1r3Giqw3eTZ+psLh8KNLJmpxYpVP7

GBfiBMM8WCFne0UeS8OE0W0Oj82a3vIp1/Ab59EcZP92F9LTrrw/MPTshk5q51LjOXIMqcTmiIA53Tjv

y2Os9ApnYVaiNkAVOBJymV4PX8uRStUGmIOIMtUxEO
PTnYQJ98RMueeLIBzIz0rZrFg0y9oNsKnDpWlLKy6mgRie9XNRwounHRytPmTQKFmLtaRGaczIRPNmL9

vI5Fkg6MYDohG3zqtMba/epGyx2qb2Tl6KxGz1medQU0rEZY3LrIxBTmGGJPKusyk5L/hYXv3nWHGkEw

ZNsijfyBcL06nkjKpdl7SE9UdPpdxYz+sS8QSlTchu
fuqK9vM/cJFI0FdwEJ2P6w+8PGpwZqOhPaqrcbwWIzSjWNDo0IgRaHM2QPEWTxVBF4KdgUumJ2DWKKRS

/TJ1eArnzKW0Q4gUUrIfIO9r+8Gm9IKgaEOuhdf21RANfWqPKYr6eSqSgnfqEEH8kegPGwUAwBOoWtvE

8oX7V8C3DTtN2CwZFJOiCgihkm4dvMBvyX8cCRSW1l
c4GBDWjzeHRDNZwGKoa8t3YWyU1OjZeI/o+RpdxPIlG1S+7A/2DbpAeC8ccMkwoUseDyWBa1iSodDKnR

33uLdRcX/5GozSl2RtYoYwn+2EVcOVuKpmKw08QpK+PUBRKdBODNvBxgmJcU3rE3l1bcoiiUk4Q5nczC

wlsl8boIwueXq3vvgy9ky4yb1eBJDzFWp3Ind8P1ic
AH2l4dYcFWW12RHXfN+nZFb6nL1ZtjnKUm0EGmJpTkOldGGasE9CXAO9IL2U6pCvJopwYiSZU9q6T49s

4tKfl1H4PoNKFB+hBaS8b8VIhLNcgrfyTxMPVLOtP8Sjxypb1N81TMNd9wZ98/E69hHSx4g6Jwplk03e

9g7VWDXAjYQNXvoUJRfJhb0eAXV3E7jCo3GPQb3gaB
kAMNFzvfDpPlRXV/JCFUf5FnSRU83uQUYVI3uYNfxqQeK/f9D+EM37naqhlyDz3sSGeb6XYhRFvOedrG

nnfbEGrxqxP8xDBDFHVUygc8Pxn9rNw3XNQkpxvfi+/xHRSP5qWWE5bYs1NUdZ04gRkyaEdNy3cgwEba

B3p9dHUzebspNA0vQ0o+0i763/6eTkVDQ8O9mqzAdC
tAF3FzViPcQpWZt+G0foEq/AUqSEt7gd0cNWkbTmsSwUBCp127Fo6Wq0w/K9hW27X3aMWtrTK737Ihiw

ezz/3R3w2jVZVdi++3Vr6jo0/b4hdGtgAUHbO67hZQHHmJqlr310r64qvORHHhk3QuEx4A6ssGLpjr57

dDT5RCk78PesT38n5HtR43NsgcLdjK9HVemigDhUOy
RwGY+NL1f4VBRoQ7ePWODWs2F0JGAtv41CADyeNANogRPFBgEfc9iNQOgXDv6tqkHaUe5wG8sa5jXwvL

nN9lDCItncPRTnnuFfE/43cc6TTQoic3a2sQj5K0zGX3igYUqxvSqdQZB0Kf5N7LsxNPUdJiAQV9eQOy

SkHhKYqm1wINd4feeYlCuDsV0lUusgdZLiRZkgbbzw
IvrLsLsiWd1z45XkCzEozbZrW/3nVpOqOYeSRuhM4Sd9wLLQNZejuC3ZyAcj5NNPcMp60ptKBJnDVt77

KkBfCfcetYmiiZL0kcPulU7qy5X+5JzW0Q0asWV0cGJFCi9fkLVfF44VfqL9zySRBSw8kNo74dL8Xkag

EYd6andqs0LOPQizDwnT5U5b5sRawfesh5pQpWM/eY
N+nt/Awdg4ov6zmmGS3InAvioJia6DJS4Ne8Q7kZ4R2xEegSpRYcyme/MhCi48JaZ1U9qMtU7u+VTtMv

Q+ArwNFZEffLcR6ni7de0Ee1pcfbkJ4rxyfLO6W8xzp9J17U+lyvem6j/68C3uw9FgV+ZN5p4RqWhZfl

nOTgC8IA90DqNJ0u8c4Y4T+3FNdjcA97mq5vJ899rJ
euiiswJjmo2aC8M35IfOwTMHJ84/ighdtHYJlAeG42LZNjQk2vBsv8oa1/8ANzHWCw2EKO2ru5KbXVHd

QGugjuWlDgoCZvncATWjWnmGYaReOVjGZcPtRMCaULyxDA8MHDrjLEtwJJXmC8QhdfOroRLhQFNjCc6C

CWMjDW9FSQEmqWUpMTMcKiGcRFKUQxVpMNZfYYRybF
YYt1afOnFcHVO5LGWjNsefLO7KKOOdXD1Zg721CD23lrU7KRPjSm4EUFBeJJ2Mpe28bKL8WDElKqDbPY

EnjuFaEWNosnM5WO3IRjReRJH6kSXsTdnXIM0RPVYitNGjRS3OJOFieCJqJQ3+DQogID4+DQplbmRvYm

iYPhVcTRYwe3LsHEtoGHv2J3RmbMJsupBpIdiohVIS
ISBhCLGfZ0pwlmn0wPVtQAFpHr0SQrApn5AkKEMwNRvUfh7uKRZcNbS+62d3L1joHsfSHVYVRRXgG/mK

q14593NVyz1BHukICHVSVTZyhuznA2vEvyuYb7EOJOO7uzrHJbkbbzc14n2I0yM//ppoDTMzui3h//s/

f9TpgtaXJ//iPHTzvftuD3+yTg9/N34zaknj8+nWef
Om5Ni2NT9l5wpo90W9kclvOHx9Tncud/NNlcnoaVfZk9dHylcWc+l4ll4/feo8f/iVPf00nFwOo4h/+m

p/8ucjZ/ZT05CFXnGk3BQkwWdB160qCf9M2LwLx81rH4y8IqBgp94bkOACvXgUN2SD2wYFiH1zWso5Q/

NmEOha3+CgwSv4FSP8SBdf+4NaDtmb0+aKNu+KZr4F
ewO/Lr2TVGBQV33RKq6WKa6ulYPZ2HXXLwpWpKDyfJS4G0l1+du8/CcrsNgESfauNfbiKpHFTH1Wlcu3

8F6WE2dR1VEZeTgtl10HQTMGTErFPqzEgOh+m8Qu++u5XAduAq/HPmWAgiOjmNB/mv6fUyuFxWfHpcXR

7zbreEe7HK2Fh79XaTr+DYH6QG4jwDJpxkUKleeUJd
IkmKIY7IInPugQ0jDBK7ZpqLETxo1f8xhNSwgknaf9OqzitgKoSC3LqVBV1CUzKfTWlG2VcEOWCu5O+9

Yfs8PdBYiTXyCUlN343fvJQ3qRxGqBkIAZaGfORmIyRfXtdGUDbg07y2t7zGF0BNEpnp7gNHK30oneuc

ykCFq+xI23zGLrd3U+0oC9OXFNLjcDRcFDhXUP2Ieo
AsR+YfLegZaETUKiFQ4Nt4BfH6MC2zKnC1ZLu+an9gQrn9lXpDfAKhCkxsHdwF1oiheaMMPNbxvwdHcy

N4bdxRK8sCAWaAhhnOJcjsJgxeFBw2rKZBXc+cPXVvHlHMGZIq23A5OTxbg7DMAwXM7vrTsQ+a26zL9j

1/GVmGz4ysToaq++nef95pr6534fyeiwkKiA7Mvqsn
2lc2YB/hBGaoHggeEk6YQhjNnrPmyQfhHaTZtTYlQ5YA04vCTqvW/VtJwupaYASTKHBD4PbtHDLAtMtt

9jVpY5YeHMtYHZj6cmcp8feWSXdS/jTtlxQ9aolGJU/fIidiIlXY3QIARitMFkCJmWX1S38Fou4K3P8y

MpZJsL8yrkRN9J7+22SjsOpPf619lfU8MVQ5NJTgts
A1bwn+93YicE7pSeqEHFYPSKBj9P3RPFQaQPf4u5rWPjNuyO8o3vhBxX6xw34e/ppNRvWeSOIIkbWWZr

dIEDpf2YooVJmxwae4OIP2MLOaJYpNPww7uwgzAEJZLPDOJANXdpiNYEU8KEIVh1t6AvarnHA4MjVfIb

m2EOvISpcvki1PphGG05E4S2cQASrAv8UoY6FQ6aah
Z+JlNm3RPHrQ7lUi7XpfPQYyT/1T64fy1scDjBapsn5VVRNuqwkbqgy8t3e6w0DqMgZ+8efXjK1sKdvX

PL6wzjzigKRgyXQDLLWvMsXwovPVtwJts/eiqPhsJI+gr8bDuVewPSruU17aIiGhZ4LxyW2XH1OAawVK

aR3AwIFRLmIKOCeLYmd2RAYRLNlYMkSTy+8EqvSOlA
MMsNbV/WF+z6ZOTjbksL+5GvBpMpn39ts/obmbWllZp5YsWEDZisZMmFuUcfeEVUGgyFQeJO5ZT3x04J

Oh13hpuHrT0CiqVCDYDFV3Fz8PDPYqW4sBxlJhaJKPzyvlDro/mvuxJksYJaPxf6CanO5LGwRDcKxdv8

cv2cAJOANDHIhVMWtWyAhASkt2P5Gd0PulFCV1TPJh
lhsGopiBZzskLOBtadcEvNi8/MBYxAQ+Laura+M7FQtIZlq5JBMCNKIR1z40vVgHWWi113DeDTKYaunb6

wGWsFsdxQnkESc3Zyvx0EVLQp2A+FIhyHvugNiJSrkEEA3y3XDsYCNw3uB3ux7oxI+xglAWNqfA4rMg3

G2mLL4j83crLsdioz4f8XLfR3yI0fliWeuIqR4tbCU
hFp5zf6TiCrW8ukakxyvl+74hshgkvtxUA675/yMo+4TB0leUkm2T2X2a735SPag6mpNxdaXwyNU9ZL8

VmhzXQnIDCkiC6M4566g92V/pb5kVAgdfwr8UEKDIsPsB0zCU1kKouzVcB2oiVBPzozvtG2MQpXx3IJk

FMG7INYmDGwXAFdkyGmZDeQjRy8PLrRiOssn2QEyZr
y0BdFbiVc3axXL3tKsdh1P4MrhUmKJFCboECpOOfut1BWGFbMeHYgkI5FCrn5LOQr/D24WgIsN3WZEUd

iSiPtPp8zqsm32sZjeMPjEzXA6xNxLwQCyF5DIaqcq13UzzmWmqNCQ1y/NDedSITNFj47fuFE3K5d3a2

6kMeW3qAW9jhS2mBdLddAYzo6DFX2vCsm1Ao5TvukN
ZYRTcdW2ZmN8u2QKi9SagRT3yQ1Y8MJFXhohNk31Eh9MvJAZukmJPssnkLVPO1ab+TRIktBnsSVXIHax

+MlFvgNAEFzSVeuurieYteHSp0dCRsvAJqWkoWkFrPmUXInHp2McB2QvO6EIELDGc+K2IvalchqQJoxt

v+vJrozVJ9BAxkNQ/5zikoxOo5YKvyHbZzNkP7iNho
LrRIcKflGMRMwL8SAm9q8wwnSlZCROPt8FAlIakTtQ56CApGXMLO0XGHHSHYKWILysD48F1kO7tE2ZAZ

IHgWIEI3zFIJVP3VVwO4bXjXeNfk4estTCp5ZBT6JHUBhE/vF7454yKgJthnRChSSXOOFJBxaOgGxI1B

P4xOF+gNLjAliI7hSrMJhDG2ehoHxedL6dBNEigBe5
Qz6fVBqP6aHrcsetk7iaNJc3NbZ9ypG7m5CvY8YVohpmUcgPAUsl7oS2thYweafGxUdnbhZhotOTCIlO

WJaMrsElDSGWs3aUKyuAcs6ZsM0TzlJkx6DdJPXVpm7+6NEIcamLRNfgvI5TeOKnjYXmBqAJgqUZJAyM

N4Hbyg2AKUF2DWsuBjz9zhfg29Fy8oHMqINthpnTmz
AWH9Wc4NyGFNruq1PJK/kMvcaFQCqb7OFg1sna6Yy7KSnIvFQczhLYxgX4SqQhFEwkvzgpRQ15XJRSHF

f0fFMGFrE4VYFU9o2jeNXpuOu1X14gE8cMzQo6H5UliIJHLBso2Pa370dx9wcQ/mNYU6WcfTnRUIQhai

DIxJEXTS0XgkckGxBAlHhTPTbyb0hjyUbhyKNaKLvT
u4WSBPBuU7DVUtb2jLgkcHJh0pz8beDInP6BNmYun9rRVwt8xXMgTDJhcXLKz6NJpaXQheXNzlaSGJTv

WUp6afyXsDMvz0UanYxSohAuyD+icbA+J8OqeosMgeX2RB94XZ+8HfjtMJbq1CUicYaqSZb/F75Vrf/k

sEmKykptXO9Sgij7A1N5LUpwr2lHgz2O+rKtUNDq17
FIKjCdjVZCn/5DRyxcTdun7NCDqCtSMVTI0DGbClgUUVC3hnASPbgAyfFIttpa/T5o/eJYZ/3sZQ+qNC

ZqsDPOi3ppWKNNgYtA/uSvbwa7jld0jR7rjCeaRBAytDL6j90QAQQdAhgNH6JhyUKIieZVOaCqKTktPx

kPFbPHuX9ogOzhfvZxGETnusKsqmLytgDcCFEiK5Jg
unPlgrt9wlr4xGqgD8M+/QR6JDecA07sFC9/jTFs3enj/Raq/d+0/WwIx4as91T9eWHR4w+8+W4DbYbd

oVoteb27Es94h3ivWTFO78/4fM652HrnLiG9/G99NzEoUH82++gOOO6OcY72qXRMlgQy/r86s7wQvNJK

bobx/m/Dv8RYpH9O5UbpVOCNqNJruPTd8xJ9x9inGa
MshcVvkQ+XpXtTs/pN6WQsLlgts9hYNANb9Ptp7fRFWPwg2aQlkAbNmKC6IItCC27FdUmLd5xFeXYGEx

XmIipOCh/Bpoa+6p28DmrcMCpa4RwXTmslJcfVdPnCm72H/cBjWW67etaJdxruxa18faAwO64ylfMLNX

9mMcJ6WpHkogZWb5m3JJD5kW+JbVz12gHNj+Zs94pq
5K6Uevpi9GiKwxcC2Jt1lS6ro28eA5MeNE5mO3+m5alnKLAfzuvFcV4FEjDlFLAmNHy5HWMABzAyELcT

ws6NRNz+VwTLDyWsMbNOPUOVBzK9+sGaD39ETD3VM7PJXQWtNvnaQqJEXb8nHtGv5BhJzHDxNN6huI5V

Zy62Mi5vsj0/vFRk3ZMLi82ClqJ67NQvv22AcZa4Ze
CjiXlpv+LXOaqdNnAHJwW4TU3U+c7WUhxerhb2qnp0blkutIAprNWZERDgSrr8V1la6uqjeemdiVC1Ih

WBYQb0OlYxZ7RnqIfp7S7MDqUcVRL+m+DoSR+6b1YDRiy1xk/me0SU0HhWzwVsMlxg7x/MLhecPRbcXt

INklz1WqpjW312zyKWdOIzLO4toHbASqVwpk8HpCzu
cg7anWJevfgEqiJV+gGKYwrdZTxpRGGNQymS3MYbi9jSU0LCjVzdPTJc8V7uIFZ+zyQK6oMgT93o80Hf

X4y529iwIGCKDe2NzcsWspwE087ho/7a2fSnu770Eo08EOgmc12doV6yP8ty0RECBfi11msb0w/O25GW

KWorfzIdvFZrPS5DHcOyVM7kmp4HHXZiGRIiPofEQc
RcTQtQRcEjHNKsWIcfLV6SBKqaLBoeHQKvX0XrekYpnDFkUOKqGo7DIHEgOB7XUVQhiOVfGEAxJbYgEW

PCEiRlWYJfKRLtjWPIe3sxBaDfWKQ9GESaOggpLY0PBYOiVM5Ys514ST32sfJcNMBlXGLLYrOjTKNaI5

KfiRWiTDabA5CjC7DlIX9qfKDcOF9omGHuL4FuU2Yd
dmljZVJHQiAvSSBmYWxzZSAvSyBmYWxzZSA+Ch5TYQN+Lx7BZC6dx2KhNPniAvIjIH9gdc3PIHT4ZJ5Q

wTi3DPOlZ4JpTXWtXWHdb8SwVB2COS7smNinMPFkJo6VRgRvv9EuBBPuSGfJgi4HVT+bQBC9V+p/mGMi

NWF3+i2bYBH5LINhGsxyoaBObNJSEqUCgml5+y6swX
stwRSA8tdh5umxQKMSGJmky411keUPfdx/+qGg5ZY+dtM5zL47r/6HM2vteGUQQ+prLvPYnEEe38C3sH

0scnCYl4+s+Volre6YWREiqGZW0v7Smx4++fWup4F4jLEq/TFsBjBxSvsSK0w/jx8FLW9bSKKokC9ELM

QpD8au9rapnqWuDV4pOqQsGRxp4KkDVQgqYGDcgp7z
rVSA8rK6xkN60MVRZ7vMCWUVaMp8TFMPNTSdAqO0FN+ErxTZxYDk/Qnc+Evb3NGeNCnMPGHQ1FoiTQQZ

YLO2l3mzEUqrx6dYA91P+24NYUwQAbI90+bm3E8coDB1xJui+qhBu+4fnudl5GlGxcYlK5YqWFt4MLi1

RLS6bT5Z/d+R68LnijhizJS/YrWoZcPlDgjI0G8gEi
Kay6ibgZsmpYsFFYc1GP0EUlCtjca2IT8OLinxmQvr2hnnSpZY0Uo2Ft2LLNSzF/TaaseSoqm2dywgxQ

TnO3cv5ZRPlrU4QGTftq9vjsANGojOqahrNkPZp2OD/MBbc8SPbk9hZFbWz5Yw8S5j97GZRg0Y4xz0Db

DtgztyczcJz+WtF77dOQQruvMgFt1C209f1MBv/MbV
m9ur9h1R4jKqUOhEgoSUj0gTBHY/qrr842EYJLvy1oRye4sIfvQmUUqx/R9GLDwgKUbwieYnkACvIL5E

ZbOnFL1wbh1LWVMmBJJoYszKVtTwEYuYWcMcJLOfJRuwNN2WZDgqMZaoNLNlT2CeqeJasVHkSVQuKe0H

WAHwMP5WRIBscOSmIYUmTgXhJCEAFpJbEWTxZKOtdN
WKq4fcDoUhULW6SLWoUekqHY3YOOJwAO1Te595EK49jcQuIwZjXGEKUrLxNYZiT8EabAOhMFsvS2NxJ2

BtYU7ifCOzSR6uoXNvU8WrO1LqsicbCKUVFbApGJFgKWwrZQIdBlJhQMdoDEU+Ym3RTVS+Xw7CCL9sq1

IaKWpwXGIaOJ9soc2UOSClUWu2RRN9MWLoXir6RBQ0
LYSoDVGuOOL8GNL3EbB3ITllPmB9JUX3IJHgLjGcMlKdZFS8WLH7UESmQxg3GKPvRaNgZnxdUke6SOY0

OkQ4ZUNjJPK5LWD8ToZ1OOMhWUT4ICP5QjJ5JAKoJTM9SLK3QjKrUekpUyg3ITA5ZYTLArDgMXb2UWC9

KDX1GEMdXTPpWVL6MoqwJsH6ASgfYlD7YeGnZjA5FM
HjZIB3IwrwOoOgBDA5HMY9VOBkGlN2AQR0BxF5ZlTwHqUiTXb9EQE8LpmiSua6KHgtFzK2CwmlOhZfWP

gdCyM0HpeyXTB2NDD7LwQ4HmqtJwRsBX2KVMWfMjngEed7OEB3BIX0WffgHDU5NTRbZiK7LXLkWIX9DA

MeZHH4ADTuTUZ4FDD6HXU4BXVjPIP0FKOiYbSmLlJk
IBAjKFSfDfK7GpTjAUJ1BXA5YqE1ASVmJOS3SCVkXdY0UWLhIay5KNH4SrR4QMMjGcScVPNzGSDEObNw

AOFdBYFrRTEgAjLnIrIhUYIeAFG3NVExKhYjGAk2ING5HOObAvJpQDw1QEJ1QMOvCnPhWUm5ECL3NDHl

DzOnJEb0FZT3ZOGcKzIgOOj7JUE7MEPqZcVpSDf7QA
L7CCJeFyCvYBg1ULT6CYWnEnQdBOr9LZX8UEIbTLjlDNu0ZAY6JVSiDrZoCTc0LFM7QTTzNoCfTFt3AW

V8TVNwXdZzJBK9DNJlOlEgTER4PBW4DaM5YTGvQPS0EZZ4QGU1ZAZqVhGcOFrpKjWbBtFqMPK8CIV1IF

CoCvAgKdZ8BAS0BfX6MYAaCUA5OEMwUqWqAsDqYqFy
ZC0OXCK5XyFxFXN4PAQvEtWiEmUlRfSaXUR0FXL3ZAAzTNQ1GIrfKOG4XaTiKwYjJRQ5YoU5NkytFyJ3

UMB2DyM3IhdlGjF4URKmUIJfOqLwRDP0MlE4LsiwRrT9ZES4NwVvWfliKvw9JKA6VHIzHeuiYaVqPRth

JhU5LfbxEMaeWIe7BJJ5OavoFzp8JCo5BYK4LCGqRi
n4SLwdOtJ0UuPuYmNmVZlaJqM9GgjcGyI6MYHzGCM8RLXdSDP5TBC3JtK7AMYnUJM0KKM4IkU4QRqlVB

VbBSD5HrM4YXOjVXJ1SFV7YiBkSjauLih2CTA8OPBzGzhaUTR9BE3IGXO3KXYjZJH4VOX9LzC6CEMmWQ

W9GMJ5YlL2NZgaMhVpXBV9NqV5DCNfWUK3DEB5VsM5
UOHtWYT3RPCfUNJtRN8GSV8jd6OaJWsuXWWjZR9mog6BHAM6ZR8SOLInZK0JpFSmZ0UkliDGOMLiqipa

kR2wRCnzSRTnA1CptuSHMW6vA5ChuMJtYZolUPPpI7FuU4XztKZ5RYSmP5AlXFMrZ8o0DRvuEB0HLQBt

VV68NT2bCMTKRnUqNJOrIuqtH6XjVpZDYpGjEXXgJf
3jsRTMp0dzRoAzMKTfLwKsOQH9NHfuBY5BLeSjYMFfMIZgaUmsWY0rcQCoSL5XfFZeMuHhCWkpBB6+DQ

hiraJwSdkBFwA4VTAjw1SnCEzxCKj7DEksKDBvX5N7iTDeFe9agM0VoEQ9kCNoW3UnrVNQxORxK3Vze3

VMn537A3OwiPRcT5FbR46fwN3wG9awmcTfb1zWnaBs
PDsbPd6GWGUfGT2VaUHgcEEdPOMtZwHtSFQjwAPzJQOuGnT5UWqwRZQvB4lsUUKclnQtKJLvRWWEDzBq

BVEsRa5ooFFtq8UazUD3r7XaDGJfQUMHZFdxLN1+AHnupjBvOcrECuZ9SIMlq1IiWDopGAthLxRhJNn2

DRUcAdpvLwRtOPZ5PVF0XQAcOAZ0PVc9MNJ0WuAbLx
C6TNOcOnIpTfGqCtg1EGM4UECqKmwmXrTqOPO9NJWxVsxaKKT0DGJ1HsM7XVLbFAB5GHN8WaU1QRAnXL

S2KOB3CfT3YDEbLGV2CCNdAvKzHaRtYEn3LO0EZFB9ZOLfTJs1UZGoABZ2VlNfYbRiFYrbCxF1YdExXn

EdRXU7VdE1QRStFum7APcfHgLjFrrvIGI3NVvgIxK2
XWTlLLOeYSbdGzZ8MijqGuJ8BDl6CXX4ZtUjWrC7KVYcFAP3KjCwNdE9NSp1CRI1HnrrYhO6DEKoAJRB

FfHlGcHnVVH5LAAyQoHxXZd2ZEP2AmSxDrSmHDY2SAZaNJG9FWVnNaSbIUT5KiDyNyFrGgMoLQEnTKZy

AmypVaj4VKP1ArCwSkktNRw1XWHsIRK2BWOtOqFjIV
VaGASbNLolVHG7GHXcWfZ7KEKjWRU1PPx1HXH2DJBtNYrrGRR6HgO1ISNmUxy8NJX4TUPiDHdjEEv7BF

v4ITG5XYMuOiMjAYn8AMV8KZTbJsJaHVk3EKV1DIVhJeVtZWx4QIX4WPAvJnFbWCf8ULO0RYHxFwWfWL

e8VEF3LTXmAeBtKUj6XQG0SPHmPpVyLCb1VJJ3DNDd
XdVqZI5CJBJ5BVYpUzDiFFb2JNY7GVGtApKpCIa9MFP2GKGaBvPlSSj4JAXzBhnkFnAuTFB3SfG8BIEk

IMS2QEO8NrFgULRcJSI9DWTjJwB1CrrfEgcbOAY3KiQ6AEUlVuPhUVibYlF8EVTeLSSvAHU5IYQrHaMv

YtAbPKZdVhRCAoZeSUh8PXG1YhIkHgYpRtXiKQPuAa
YvQuCkBUF2EVbrVCU0OrKnCIH5ISZyCIG1IeTkIoJdRVlxFcW7OmWfCyFxNLtvLkKrHHRbJBhqYzV5Gz

vzFfL2PKH7NhY8OscuJxu1BCK1EVMuXtqkDkj0VHsmRoC0HyZcXou1BA1BZZI8GeswWfw4OTu9EFU3Ol

zuZBi8RWz3OJM7CaPsFlVjAXbdOwF1AiQoUzW5JQN5
QdO1YNUbPHN6ITZ0XgY1WOFqBAS1DDX6BtI0KIEtSLj6RAS2OjV6LKVfOPB3BXW6TjC3DIUfYav6ZUR2

MKFjXfdwWye4KBMrTYSNNtTmSjVcXNTbZEA0PDOfJeXkMMSbKFA6LSCfBMK9VVZhIBJ9HLFdInPjGOPm

JUT5CMYqSFQ2OZIiLAY0VTYlOZGMVxCzHE9vdg2SUT
nkINEbJkgZSvFwRKaKNhBuCTQvNAblSW9Wf291VBRzW0XvjDWetc8SHNNfOG5Cr254XqMaKJ1DjlgwdJ

fHw7gbCKisQBOcC3TqM2EmjNO6RFIdU2GsROZmM0r0JTuhRG4OLBBdME84NM7rUMYSXpVtJRRmMpsiZ7

VgJyZMWiRzDWUcNj5vbZRTq3hfDyUrZHQtCoHzENSb
WLjfNG0JTkJzTVPbUTMsqSecVS1jaRAlCN8QhLDwCxMmSTyxQF3+XHjawiPfYsdKPkS2MUJim3JlKFpu

CHc3GLkcMHLuA5U2mAVsNi1hmZ2MkCI5hRSfK5NjeUAMgYBgS8Ujr2MMa727M0AyvOWtQLSlfGGlIZ7n

z9ArbncyT1haOC3hvPXaU21tbW5uFNocTSBmK3Yaxm
Y3Q1yqkfRfET4QLME8G5xnopLoNQRNSaGsRLHfN4kahTymQUT6IZLyMp5NBWEtUC3Ud326BFOdN3SexS

OwenJoITPaWAMAFdHdMw1HHrKtTN8fmq7UYvSsOZThVhjADpBgPEkfEfbteYVwNH5LhWU6BPSxV17bAG

RpEFEfR6UfVRLvJdQ6TY9BGU5vhUdaTHE2DXF2Tz4X
LoMbq1MjXLSlNFdDgid5GHdLApRWwKbspH/p8UC08u12lSOXCPRHBqKyndNaPGNkaHAPEGLEIJ2Lwurm

mUoz2hq3GHOzuPMmvj9yAJZLTZmzTIOvsFOCSSU1/mh0305bJtwP4/v+53+wr1u8xxa5zngbKe5032Ws

1rPFUTcMXCmc20eN56s9dckcVYRsBZPbnoewjKc8GV
4JDIyoncqev9ztZuEDURf646Yalnm/iQRdcg4AXLGcebGZvfQzbVyJ5Fnd7T6ldDlJ6r6B5uUdJ4CTlK

HqN9EWPPH+1urUFSurDYVCHg8aoO1Xm1gQifgH9I2lmDSK7dAYrmJ9Juh/9pGGA3z235U+ALdifervL1

o4Y/qPM/BU2E3KsZWheU9ZL69tbcKNvFEkBI5Yha87
Ep99aLGF4Sz1X+9+aZW0bX0roAVuQwVvfaRbxp+CWQQRK3m3K6jDJ8Y72mZHixgslsMYG9+BJoF5aRiZ

A0i75L+UHXU4K3TAOXirDEpAvN1WhgLsDPxsDlkP98uQU5S5jPUXQjTfoJ17X1glA+wwPrUZtkML/BGC

SMVdoHHcF9zIELOhrl2eMp8igs/FkjoI6v2VsWibkN
jywYW7CGJXfY/gWlgh/gmfWgFOyId+JHVZqo6lPX4PfOPzGhhKLenCYmTFpIYcwT7c1lx0Md/AdvGPWj

aMATlc1Pd13x63O+4m8XrK3PO7leabVyvpwQXoDxKsu+QWyD1ruHc2bVRKX4POl6EyOXdIf3btIAxUQ0

UCPBSYjzHvaXc6Wr4nwUANKkkR30DK3kYFXwRyBymJ
iXvOmB7rLIw0tFP28egYdberJBk8JMfuLzhhVJgy+E9f2MUoZfjlr+peoB7AmWw1BHUaFPe/x1tvKpqb

wbvCli66Ok2TY0yrib+7i6Gb1EU72CxoU6U0+Q1MjMVawXSEP/gVFeAj1OH7hEKxnfw5jxQ8IYxAm0PU

wHS9mpmbMNwE/b0OS/+Aebq9gBIxTF+TNknHlZyO/Z
vMN8VYBvKri++PR484byfjH6J44HhRTLBe0PKlT/Uw5HxzXIK9mpR2pYGdsV7sU+LaE0Jr6Li0cd9cn9

su1R18a+1lB4V+sxQajpXuYPobKQ4I7j/i0tAO0Hu7wNvykyGnEuvAJL87/Ujv0EgSXIPG16T212ZS+G

bbYQ+5f57F8H0OQpjdmc+R1fKP8Sr8QyHmXE+yDexp
0jK84DT/Y1+d46gK6PxGoFtzVpdYF+Sfj2Z8REmjgCl0HZzD624U06PoeSMIW5eQDnBCPS0tqBumNaik

3topw7ZLvBcHxA4b5YXit/MYwYS1X/mw4lB/ZQHLmz8/1l7W/soOiGdDzkGsj2vC3fzDsRAZbz4W1dxB

C9PxOw/Xbm4j7Cj6F4Ti55QkTmmNvAZ3X3HBR1wOVm
pCN3r92Xjm9g+wF7CX/sy+yvZ8xHnyahfhd7BlXD1z57OHxxNstQAp4YO1NtuJWdFscuVVaLuzSywGl0

Vf2nLmGkPTaFP5OMvtmVY8Ql+sQWPeDn5DPCMhVuIN5GBxOufN9Ni4jckA/CyzIaZnL0EO1N7kZ4AzEk

Qhbtjpv4fs7Wwv08W9P4Ki8CiOucL3k2fPf7Ip0Sra
GovCgfkmXM9PoXszPVU0WhdPscAqQa4WshnY1SW2QwMotXHVmq5I7ELmEQrafcDj4RkWF2CLN2G83vOn

YuLBeCg6Vu/hPFq4itKm0bttLT4DzqFivqunToiUgsVP+UE7pCnPO/UyVRBUqxk3QpuURSUzcs95DwCL

B+F99nC2GQynDVAGrmzrT/WrMOTK7sEBTtC+FDUQhV
BUGe9Fh+B6mC/8YI9jSG4E6mZahFwiThhfx+FLeBJnRSf5YqVMyWSvCXOl1UIGawFBehrfrpoVMlH2ww

2sQbxf+W98Qb0IWGIUCaXkqieiLU4jdyUDj+WFBWdXR8IIWCbiU7wQj8B03TWkspoillzcnJuoPvw4BE

4KnEQXPro6qJ60RQ42Y4qvIdh94xopi6vtNu3H0zuK
DlIe5Oeb0+jgZHbSXjRPHvvuPAmKNy3Wyf24fFQS7yPcc9KgIBmaWL0QVtxLrpEYK+IcvL06uOHoQ2Ga

rFTD3qf3Pu9q1WIXf17R8iP3xgnr6exmu19ikTqqq/z+ItW08X6xIqyoxSpr3JZwZ0Q4KJgJ++ACGA4H

8C2/rLrFxj6A6SHN7FYZSgvw+86PG2CquFwovTyaGe
kUfcFIBGbvovxBdAg0u+08FduRmycEcJeco2PqygyiQAFxXGcd6jSsmUI84TBFHzdETPnyL+RDDkRxdp

effMwMBDpAcYfeRTxjIDtsnJDtFZmTqadyyugijb5sFBQ25wrqRwHCGb3fzaydSpXQCvdnk9orugTeBq

AKLmJSSIoGDPi6UyMU7XuPmorJYQZlRvhmlMSQmkW0
0C5OT4w7peVksbKvTH0Eu0Yor0L8TbIqOKouXKbYxccGOMEhcNlXEg2KYNeF1zN1UR5fMujlxMHLYpl5

wRyBsZOGr7tDO2xm5DdSPMo4oz5YD72Y9wCsqknrscufXIxhm/46LSnXqa+jkiXqaNqJ1g4xAbRxR0ay

NInzwezlQraJt54EKG3SPGKLcYKyez7tfbr3ztvEZn
POqfsSms9b3+d1MjVfLHafUhonSxifVFOxGKlwTv2Lp/sNF/5WxxF1WNLiI/z+EsknQHxnTb5occTER5

Yh9iC+QyjCfvx+it9PhE/IXFtOOTC4cTcFtnM5WD7uKKLjePcQf2bF7FvS2vPT5HR8afSC0oO+xKtb+A

BDHwgfYNP+tLVGrgZ9Lvv8DIAIHrUPkYSWvwMCj/DO
3k13PDrQ07etN+4G45IoFHp3L8i1V/qTazdLbmDEytDN0mV9/dtG72ZKJqFNuHb1cehnkKgVVNBdwISz

9cW4VoPl4eLNR0cKsPB72DWywusMWeFlEXzQIcLJVLS3qrSkSbS5Y4v3N/sFUOi+igZQVNgt/LGTV5NN

RN4PvEnI09XiKq5dpYLiehU66jD+2RIetB6tJfV+LP
y+yzCM5eu3pR2Sn1Z7dpNRSyOuekLdMrbBRjK/68yoDwvZCa+rqScJfgSIdN4Yq5ajIK+gKM0MLSelH0

XjyhZOi1saVqlCbaR749EXFsQ87HebfqBcwOuQ0HT97SFAztbaPKcpkB8VX+sasT4Xb5uyqK0ZmzsnWV

+vzxXHW6LAv56bc4G76PKp6QrBSh3qvy52WVvq4/EL
T2jZeep9wfPmo97seSdofclgDJ2ejFOvhMDG2ypVPB6TDh5eqMAn2MjLJNmZHteYqfzKwPVonxQRPekb

4lDmaECIZf2pwg+vdbCwpIp9FsTPWjXMU66YcQVb4sKPhVttpYksWXqbM/nV6mTJ4qIA34Ax36Wo9JAh

NiBVXndSUBwKO4banxO9z6Gj2D00Uydn0DfTC6qFr+
FF+BQv2OB0cTd2EmqvNqyxshkxZukjPvJvPfXrcVivI098m1yb6xHSn5gBwPfrVLEbtcwHOjZBa4dewl

Cg8T2Hlt7KJNZG+CXDKU/IS+G4Ap54W1x5y1A0z3anMH8T4RGUWEwNi4rhuPYZgy8q/rfyT1r5EAMthZ

eAaPD8qsLer/9Gpifa21d8O6xst49DsWUnKqqTOlpG
5QaPqkT1NKUmWtXZLGFGtd0AZblUkuL0JDrVgvcyyoLyZctpL7PfHWzgaYpT/leFHgOx7G5Jawbp0E8I

LjKEiqStUPGX8akQOTkLV34hHOe73Gl432Y01/0y4DjGbWjcRIvNsN/vC9meZ24evtJOKwoivNM7HBU4

PAZTVWMVv1yLqW5yM74Yl/OdU9WYl8EB0ixvIRrrNt
RpYjgmPN0k3svdBWnXrzogkUNxBWFijPhbByVzDQx4G5pAn8AmmWqqFhnUdUiPYi2Hb3u0HcNvWYal4C

ndtgyrBNLdC7CoyL/yCRUDGQR2USxbjFEt+8ICPN1oUVBGxrss1exihS3v4Dd3qG3p2b1WRXH8J21GO6

RqsnYzovZUECmnhel2drGj1qA7HhssskGXSu7Hz6NW
2U2+NPL3J9RnEqxgSv6jyMk2TEFMn5HMsr20waOHO07wJs84iy79CddLo6hSxuh/gEzeZo9TYIV4tLhH

EJoGa97AkD5Z7lJKKe0z/gGII5z98Ua8Y0gyvMs0W3uDl5erhMcwaZz/OR2urJ45OaqhS3cKIHjxdm6+

PV8RPo/5ColgPqsVini+XmiB14KJ2xheqDUXuK0SmE
bYRTZViY7uwPexfbzBHG6TAt9KgHIHsG1JW42Im5zbrjMOTPhoLZ/N7VpaIZHuq2joY5Ftm0CusbXYp6

kVWszXL5Txiv982my0S476w/qC2M1kP4078aeIEJRiWJS4clZlORTape5eGvtk8HKUcQsFyvaJxuUHk7

7eAe6M1sDv9xxvLMlpLYZcnhLFyVTvehK1+tSR7FWR
Ca1XpODEDvsBuP7DjHDLHL2E5rdrADCsa+w4VQcV1VgKIVoIXhwialv7Ou7eBjL9ZQCSa+rqRXUNSQzh

gLXv7ESMkublc/7LPBqxApe69Hre0Be0/CNrBP266/zNZ49LmRFmiu8nm7e9ECm8HysWgUdxs4612VcW

RzrwlpD5iTw1KQ+93D3ae4ZMx9bfgf6DD6hsegkrRV
pvCT1fUzmf1lZXfrA3z5qEQ49q0vMtqMAPS1RLxTeeRx+bhRsq0RemUVCIv8VeWcV99+s1Nt4an07MTV

P6C2RaTgKURv9uTh4GxlYI/0GIFfFL4KhglBhJFBURwsIEExwtJhifMfGTeplfVooQTmMcVDQwouoTJS

YIbKZnIJyj59PK85WqcVQivKlJ6GjgZoVciVbAtH7S
/ATiyAN4wpYNg6epJiUTNzwuDDRDcd1mZpjonN4YiGjWaFIRNrwR1YYLcGJZFkxjI1upcGP3TLuEcD0L

s3nzHapK0Rglc4KiGYw+tbs3ztnfPP8IKnDgkt3wgi0arOtZMLi5epUb7XESgo24dq4VXJiAJmainMsY

fnxHFQNKeRrIlPxdnzZvlAE2Bd8a6tM3SliZgEmzo8
PgDrsn3vpTz7XyX9WKQ81iP/gd6lQQg+0A5pVz48+yAU636ch5t9q3LmK6qh10M88nZQR3/TysdwdWvg

ncBvXuITGibur81R2ocru62kCr/yUTwdRZaE1qmg90t2Fhf76xGSh/wmfHbwdR+2Dqe3hp8N4DdVrD11

dGPN4PP5BNbtzpU+bG7sQ+Ew+VsQT4mtyya/Vh85VY
wa4ewPLXtv9FB5JS6rBtkEpyNdkrsJRet3IJyF87lvSKKM22XUfOzpDoR+QdOBo/LtcSm8XUXuIayfAP

3Y3CLlNRs8nBHZfS+rilVVkfOXTZABA02mlAYGR6Lb7bUchEmWCSEbh1upnYPRFkatloZWvGWRpDBcm4

EmNCVxwTDD/BMI4lVfVVOunAR0uPi8sW06xz+xaHgw
f+6eL79bYHzTVKvBSAGQCfGgxrR55pd1NGmjkn/UDDFx4+cfHhihnTagIW4oEQabbKPnbvnHYk9wNs4K

eP/SIA54DsnPIB+vTYsYwiQwYl0iGJaR9UWZ0hXDEIez2QwlS6wf8sw2J5jK9zey3x1q81b1Ck+wMPZD

3hlKoOapaB4ycPGzeAAPEgRI9o4CjnNgiArC/pO5kf
m8uTVlzpg1Lm6bXH3RuvWdli9oRZfmHGsmnjnSb7GOY0NA2+TBpaXmepj8+1aE5f6fujwF9fu/OnuLdX

cIiFbq6LSkSBZY6othkRrPHTef6oXrzxbtebueq+3EVP7J7SpD9Onxps1Sivh3H40SsXvh0hnjUOZ/we

bnYT4vDitRcgO55YlhfMitChM91td6riJyOU3IbsPp
eMgWMZKrfZ+u9D1kGPKXVRshlAIpPOZDU5i+om7kWIxm1MOYjujS4zNvbfsOAejKwNtrunDU+yZhb1fy

QFEnZKLZRoyDF+XEwVO03bcxNFH2ZmZMQDZgG9fqKEYM/qGwzAgmv7LjtnsXHiBP1doATjDvWQd5jv1Z

Gd2hG9+od60o74O+GAZcrcIi5UBwkkIzYBcHX9Fyxx
4a+hsNHGk5L7FL6Dh9V2NLRhr9qvTP6p64Mnas9ScEl6heL8iuch+L6AUJWaPnHHa8sworLZAAK5UtVO

x5IKVjKtauHKDPXyLitlDU2hbJv8QO2G5VZAlNcRXpxRDTZr7hFV7vCZPcuGPgbGVfUoZIQXECRhCvwe

/LlLeeoVgpowXM8nWwMRenKxCP+7QbYILm2aLFIFFD
DWUv5wIFjIDMAOkHt+IPFcHBXWd8RNdslFDStZ2F0r5rqQbvwxQu+YS5yheAnyoWKlIIYb2twr4BrPIa

3mvxAJXvDSwikVC+bOebQ45/qJgzetGHXdKL/XyTJLZIk228t3q7UNrz3RyD08a0DZo1unmVu9kTZaXR

9Cvjm956advWdzCPhrZKB/VKblkQn8kWK9BIRoFs8k
35Jd7RYuW2ElDTR4ye1+puPuK83lodHuDyrQzsonz/g4/HnG5/7vgt+EP8/YX5SeqQg9xqPKyKkzypNh

iaopdjGUeT8KaVErrlGRWMpq/5FHtrZ052TDs9BmY3kRZJjYJEydi5cE5QL55cDsDgJbyI9D4jmjrDGM

ybzljeQYCcf2979yCXZnQ5T6cbjvFVUX617ovlFbuy
hL22rkLH3zpleW6+bXsahvc2eWWSWfv0cv4KIB5HPqCMp3L/weykHMiszidRGLYvxbzvkY+WLPBi0Fw0

Sz0ovAkHyW+cgv2drZv21BX8o3ASzc0swJpfd7lEpUJ8JJrcLqNd1nHZKD9sPmRmWamKGvZ3TYtur2b1

qDs6cIQ0Qk6OYqy0XvD8ur5+QwMRvXSMsmAB1zTNoL
HsMU4P9lsyJyvKdyfJ8LZfRbQS6VJW7iz3MKHM/1qCKOFM+a9dK1+y6d9+3982W9xL1SDAGuWA5xxLcE

YempdyD9So3uxbEWniMp2GWX7+brv3/yiwwiQb9kgDU5H+E1JiFLiwNxMcCUTiTOCVhUA6utozYlgnBL

oameEOxneEzFdGKDAjGaSw780KP+o1lSd1/vcPdIP9
/WoX5Mwk1f1OSA6c2LUbZgv6XfgKwFjxMWTVwcLQeGXZrZRDqFjZr9pKx2C/Y1oWpRPKPomjWCct0sqI

YPZWlyYHovidWwvc0CYKD4wTnWi878+VdQSLMITqT2JBd8sjMonK5W4RUWMuvATmHQ1vIcwcqthvmPfa

uIPk+nMxytoZ9m7L9VM2u5EBpq94vVlhFJQBWAYrsp
CNLMHIWaR8wYL2CDCvE7yogZKPQsJEioRLZfRXRrGMLczoCuWZTGhFCnyOGTSnQ7pfgWZMjzY2WoIYnE

ewhlVkNN++DdifaxyR7ZL4oIkMHjCLlRHqfusExQd5/OVyYddAchFyvS41rYr0p9m68ohW+yqqbIgd1f

00udqm5aXcsxJWnE5zO4wENOfGvRFAwKUVh208AHrl
yxzTvmsHtWDpjzJHJJ/84q8jrUOhiZbz2APZLxWE+5vch/uu0HD8AkeCLX0nK+8oJqe0//fNllc981iC

2Lgvf9Y7yoQ0G3Mw+CviXnlKwpX1+h9jfv9hu9cFP6jH4fFuYgZzCkz86Zk1JeBqnl+QclB/O+LfiuxB

sPKOsrnpbP8fyYfyIbPacpOMnzzOFxfGzEkpWBi3Y1
axPFBb9aoScQipwVpCsudOUZcMDWQ0MVlolSPxGkohKNrF+GdMZX7LRkL9JfU7hkdQj2NIcrdRZENgYs

lyrBAL+NoJRCInsjKCmGSyCQpLRbCGEIh0C53RaPy5PljUGFoqH/mnMY8GC7BS5oPdV5vNVm7a20r41S

OMVeca8RycZlHgXngYqs4yqhcpwZgKMgqMmSO0Lxvs
NJ4+aZau1Kia/SN5DFP3SCorgF0ZUeG4BcjZPH+l6m1eBQyTdOONR7f9ZiKUxULiP3aQ5Qaoan0FUd3W

PS6+i4PPSL3MdsnA9BqCXuK4OuvvcWp3pwxbnZH148cfuQ47/10M8MOi29o0UIQykiPMpzUpYTnp8ogr

Y0F77k8AH8yN/P+8BDli8m8crcf9487WHJchbbmFfj
0bC1ra6aZTGOJLOWVVAJvhU/vaNVQPIrkKs5NQWJwysipB9pZ8PJwORj6xfnVFeeR8xBScqBRVXunOQQ

SUCUYre6S2sflxUVTOSIUl+1lF/7CZMKMdF46FSDfxFXqhxeun3KLgQU2JJQ/0jGGIZsZGrR/W85RqUY

NCG0vttbNC7XtuJhJCS8qKftOsRO8DbTfY3Vn2/kDn
9QbhEiTI896rKwt4II7zcbLqzkHyNOjKWJXPRCtQzOTh7AiRyI9kLaXEGkC7HlFc+yaSDwHpp4SI2qc4

iGTKUjJ/CHXIe0yDlA26YhQtCQlul1NGrf6QRr1xnbgCvfyJPM0XaPtiJahxnSMMqADg6MHv5SNvY4Jr

q8nHo1LgVPhmKZ6Zwm92j//f5Srp7fzdn4iiiw6mpg
3b6TNM+MGDS5p8p21LfnkAj7rRjDSJBoFmTf4Rr0/QaiKO1WecbuxKo49Sz5z75YQ1XPLAKVjOLbZrra

D1B9KKfZDKbRHxQYIVDCSRYljHTMF6PBkhYph2EowHhhq92zm6LhjEWVlw43ajcb/pO80ZtFGXlpZP00

Ewqo8eovHumBynUntaMzORBo3nqUAAUmIqXOdIYXuz
hFMkgPS+tgHdrA7r3anjGiWVSfPo1AjXGKELHpQFRIDmVwYSzjUSXiDJaJR6Y8eQ0jGeYbjtHkBK9seD

SQCXLwfLTFbofLqCHD2Dmmf+O/3ItWsi7ZnMUuQ0rH9V1xHfftveAtT5IdNlmfraSsuSNom7pFk2sYHu

CcPibyrURXM4OIbXvBXMsamZli8OSQrSPWAD6VR1Sm
bAJqK9eHNycHAPird4IaVBFLANrnEN1FvWig8RF+L+Z8i2vJnBi7bm/6yVGJO9jZdfvLigI5Sxhbu1bp

io7IyA82oKwtx8iX6BygR5+pYsy2yIolUpvaT1od3rJS/1k3LehMdMrbv7j+g2YN/BIn6X12GLvcvq3r

g942gChunhYBAyWHJRvFYFfz1LZC7pUnlj2yHlTcN0
Q9iAOSB+FJQwhukFhCy20j4pqgD8E/n7sTX4422v6P0vfouxh39ec2a8nFWQmR59s2QniKHadJYQZTU8

ZloVQC9nhNqhIYOZCEQAYIaKnncdsxdsfSPv7w4Gn3rr+Vuyle0KPxcdImjnsHHrdcCpRcD4NTWumrLz

4FkJoqW7/K0xcsjncJXTTkec2BwYQKbWHHuVjcAEXy
WVK1LTijaLplgFXbx7xZ7w0WO6NPXbOWiRYrYDSl9YkulQH8o89gBznk9vijhHrb+4ixAZ9S3mrHmX/U

fyk/ASE0DjnjokOwOMyNubRO6DEiTzY/rnzmAGYDAr7p55s3JWGUp8qdT2iFrWeAwJupHdbvkcb92p9H

w6dzCOntcSttEa7PI0OxqcytLMC77acZ34lTDLp5sd
uqaHRYw19tIvhOuYcAcqHJf2QmZ7cmjG+A8x/wGQ8+iyu6kdOxGDOOGe9/6n9Gzka2wNZ8JoGpzKJmnV

wDrFX8njNWoMJNGU+czlnq2jPpx2CN4e0MlHbMvxnEL3edotTBddFZDSEBwp7kTgkG93eYouJmIAxGUI

PxIZrM4cqmivEdyhQJ+5RfPzTW7l2v63GBOlZcLC9o
o3G8CMA8Q1QojgXHOjv3cI3H+JKqo1wDCP06JBlp7q+A79Czy3ocqYbtyp+Nixv4NddOFk6mLzRW/74l

W8/CWby7IbBHLAYW8USND7niIENYgIHOH8UJB7y+6XjkWnnHzy+ogs6J3dsI8Dq2uiEOdv6lgmA3I9dV

1nFJV+LyRNpy6dreBA8lZyHPJnCokh0qh4O4dfLy4n
UfbCoNh1gmjBy6riFKjIkgRvnHFVeaa+NDACy1Df6ECcYvVTvGfohbGJWRSS7FoTK9pFKE3yPyMSLscV

m34Z0VWY+ncPmhRwhSguV5dsUTvm3TR4AuaE6UA+MHAEofmNE1jdi3gTE7mOlJxTdIiqp7DkaDhtD4Mh

fJ9kjzVCs2572KyWHYXyhZnkFvPuIof5FssTGgfsh3
KyVM+b7imiRRq3PIJNifn5pJFAgQje5RvDyajGOfpOUezG6uVW9WFKZCUE63mG7kTW7X4AcVyItY7eeA

UGb+OUJF1ayyne0DK2o/DX4ek/ySX8AC7YRxbJhzG6HWKFqHCJa0DdMCZkFG5fx9HmV7PxzHpZEKhW5e

SY2tlmwsCsO1BpmNwG60uqFJF2hhAHSSW+D+G64Pvd
x/9KLb47GpKbyfxA2Ur/SQKQVD+tiLNHtAKQifUDpuUIxEObfqObFlAdwHIdU5n1HyDCM6IEdcoVo6iK

4m72admWDzH0CxwRhqzGS4FUws4ayOGYE1xLD1OCpOBXPqxxKll0pU5DwlYig5/mIFdbfTIp0ivqdV5g

XzjIZZE5vWaO3JlFyH2rC6PtG4Ko/Tiq1egR1BH9X9
10QlWSqu53YD4VBjvnAzXjn9PlB+hzeb+ZyZplFvehR+aRgPPBIEoysn7VTn/DE6t0R8L3Jp1QdR3i9z

8/iCO6jSzeVxsDiChYxePErhSz4k0OFVIj5KuQ47FFb6AKX98/N4+57H7zMGldl/R/i48pnmO0adEzJK

NjTRuOmad3sU20p9DuPw5SjQVzUrycmbfYPT2cm8ma
aHtJAUs/hd/oXV871EFUzfKjnDdjMy8TMot23RVZAjcpSU8bv+EF//2okLm59Hsxzpkz/OZZU9n+egYe

IrPztf+EY4O61n2PwnA8bDVJwmad79wEW2Ye5PVezZKvhT0IHPmOg5YhUDakdONLKmwLzAqMDymKaaVw

VVDi4Vxtm2eou9/hRIhsz468Lt/iRDlrYlfOmLyVFB
s1VVPNShBf6b3LhIa99dsneY8a2zKa55fDFJDI3enFAGieXmBzOaeqoltpdPWyh5kfwHxAivFRocmTpg

rHr00o+yYALER2ygxw03lNOB5u7eFp1fHNQ3+vBw6tAI+r32DdaQwKBQkmrjAaOVB2ACR0mnD/Q6GbR3

gD9lNE6fFAwEq/pZA1imJLwatNVyRHdnny1PJq5eX+
4Jh3JumdtgvDTa2HS4pxHvn1YHAMyXIjKknR15QHKwHZwEEg0iuXunQC+cIE+oqaa0XXjiv4Of9QYQC9

ShENojB1py6N/xV/dLtsORmh3rj5nQrkyxovCx7y9dvAyeIW+Rephvz/KNF4RvZERMX7ns872RigDcke

iY6xQXTBHLkhbsh5T6v0bRqaUYhD9YQIsLWotKxHpZ
wabVGN3NS83C+eoUhhSNcQVG1q1UC8ImIOdSvpYJyUjpiWODz8yocwOHxN0d2W3AQOobOMhwaMhlabHK

HClwOmAn1zIyHoANFjbxtRau3rOJL+BXgIFVF5Hkz4AU0LLqmGwXAj5ZWg+/5oMULoBjs89+dGOY6k8h

If0dWkPxq2FOAe66fNGn7xk/+IGYNm0250ee2UywtP
48Akq51jl2mVA+pRJwX9ilnc6mhn0mEELolLuqBLHgWHA4MRgKOr8rDoCInLt4g70xzazEJ4wxhyGARL

6GhylN41atICIje74A+5P/49DHWX/KPeA/kLs/psUCBVLcE8/vXtJ7KWdLopG4Tmn/o3JsiOxvEeUWEn

KTrUei5ZkbbXBeJTKTZrytWchj6AarPkoEKy4NiLHu
dK8zpOQolkLYcl6YShfY9RSxnthkht01NIJkC6VT7XqMuv4gXNKJ9HpzXKMmDDdkeHDqlqmfBUuLxP3z

LpzVIQZ0fDRZMHoMVDMzscfTJ1JOxawoyglS7ASlNS/K/NlIk6vy6iH+BoJoaK7vgatK4pBFyg6oYMAH

0qee2rJTdilYJfKqD/UL1glZqYIw/EJ5X8i7NsoqsJ
q4477lfqv049XIm6y4ppixf3cg6/RG1zWxZzfWr646jpvq6lrLIn7kjE7u1jBj66jwsySzE9//mXhhaM

dccT/yewgc0A0ASUXyYbPL+uqqfOsvUa8cmYbFaX8y+fa5XmWlwqZ5TFgA8VGL18WzNIvRalsxoHesws

/3hfKV4+nKn0AA55wtUdx3SPEGpV7DshabowqNgb2L
X4W/hF7IkIGG7d+A1PJD7x9GrWUWJaNuyJIVyoYM1i7X3yXoD9667F9chaCHgfvkovf9zL6d5t4pU8eZ

4qXt7p38v6ZDQ1OGVD3sDIx92YkyDI7e5Qpo7EIhqOpHZCQ7f+Ep+2aojPdF1jpYAcWJAw7okMy6m6nQ

9KqEuh5cCvkd0Zo1fnTbiRzL+CxYxzZCi1cT62Ke2q
l4lk/umWwcb4ykfvn+W/ina15e/Em82jIXwxa9+bJLH+mUF9x3vDGvyBi+4/hbhvd5COn80+3BT386nT

pTa69ZOX3PK1aZBkz1jV7dYtrXRMJKSQz9VRvbYgdOh2EkpNcqvQZqvQJc70BQhAcDGTCgzL1eiZtd86

gCxmR19P35H6MbjG5C2oMLhHNli8HalgoxgCVQ9pzf
tXgI64M51Qh3vdzG4Dfjq6vicb0sPr8cx5eLf5R4cYajRA0acAs6wEG7qfl313so998a219/n/P9uVLx

P1cC6j3nIYcwuHhg7lUhd8V5KL6TBitshpEeMCwOXcFMgsodcXBYC2s1CAUazBVnEamIvvDKMqY8AvQ4

zXSbbJdsi3Ty/mu0qrRpFfsa1fhrD0hsEr9t9FrCPe
V/RV7SWSXs0F5K+MtNZXgRJUgcDoNGVqYSsD9hLyNNtGghibAPe85znvEXa0hpdzW9zVzYuubTUjm0GE

yvd6hQHZ6FgfMxaLWq6M78Jp93ax7saj+9u65EAz3hxqa0sxub5sDukuVGWwODWeVMGHZlsaMsDdPWnI

GxHnik58o+0XgChfZbt49EavIeNwGcTQr0oXXnUvqu
1+Cj8Vqo0CYn8N6n3j3JmifDu9gssEe5sOF+QbXHQbAh8lzT+Rm1wbWP2HG0zDKApbEkX873M6a2/qjH

4f3rnDuOeFeflKhZoragZl+4U/vQ65V1CKNOkQ0OuTNRi5eVYEyNUsnyuBxzMDTBI/rTqzN5FPavjiJF

0Zh1LtO+qDDfrKS7/hn6HqKVA6CSyv4oRUwwxXlq1V
yRAPqDBvPVpFbvzulLeBMR3iDskTyhBvyLxNVKuL47DPVD6sgaLbEdmkTDaVI0vPtlzJ+Z+ABL4mZQ+p

SO9AVBJB+SfAkwGZCNN8NdwgQtS+TRMNDrGJ5cIHFt8cMpBqN8Ohtln+d/eajTxx6H0UaNDpG7X1kMXa

9+1YXL1hERhXmn9zfpXKDN0MzSUtGeAdcInMAxMqLj
J/sDLzOMIsmQmMVYbTTjt4oS1/U3Xefmaw+ZHZM6HE5pjan44WkkcvwdiMm1UflB1vmAqOkRjYIjJyTB

7BXI5uIgXqhBT0xCTwz/7rrrIEXeks+70FUVMsumGjLYgptqZYtyO5FHAgplSGTV8TK/bfQFcwXd/Cqu

3eq+9lhdw2vU8jEZaaZ9cTL8x/irfzvZ2+FUFlv2vP
Cga/YNu+6tm/qA9SkzO7vV9j0th+GXIoDuacEKkEkcURn2UcAiN9ReryzqUXOYceSMUuHaWfKse+c+g1

FnTFzkZXQszaRx7kh3zs9bDfqvVhXbZzsXGpqiQvJNqcw6XDDHfpSnL+Ai1jwJSE644aeeHVjy3Y21dR

xG4vtJNg2102TqpwHXvGkxLmrjPhSn0YBzKXoqq6gt
1QL9z4jySA+TXLo6z9dCjzwIlQFsY4YMzTkW46g6QN/yVykpCPbgB4KrU2X8HHG1z2vBz3Oa48G6owcT

OiLZ4HQcWIplMhTbUm2CYe5YEfTFG39G0TZ1YNDHuG81yPOk48enPp1ie+IL9nhkq3vJ8z7luFgiqVoP

Manh4yWEP57fl5jrfYi7XrAn61vcLVL93zPCTaXjOg
xRLDlvei2GVaw8QnTJIzzEYDaMQcDSnnGTWVpSbOCC18WPgznUeq8DUIQQoOYC6cadQE9QH411Ki68KE

3ItWIwuZIO+k3rhQFQThwgU8OtTRls64Upberv2JnzQhtPIhPqSxbTn4GLZcCltUcEUtBlUxjrOFi0HJ

Sri2TUGhmWG1m64k9BgRcjxrdBSEwejns+xEtNlt4b
8pluM94GoD9Zv+R+SQROW86a74gotqR9GmtFkLPhSB2U5+4AVKWqPGEDEeykWkX9JA95X9vc3Mv1cCEB

04uMXORzdwrnlpd4JPHk3rvFx1q6s98j5268xbnfxLxqeJs4AGQQBlLd5r07dc5ytvqhHyrAwf/gP5MA

kJ/WZnkJb71Nab7roqhcX2+AmmTpPURD3Q03aW8aUw
ltJCFoeeXD5lEq2GY+u4j1NDtBPN2TOBlsKlBzee3NXQwhCBqbrpYmVJtCcYNez6QwvLfWCaC6ti0ztU

Kgm6dE9Bfk8BimsOceM/qsveer6ycg91LlJs/Mm5+E28AUTrMZeqeqnDZG0/HDTGnEY5I6rYLgJYElji

gg7toaYES/9nzJb6q1OztBnoiPafXAY8hxFn8US4iS
47AiD3VtpHaR3bvngSUDpx9V0CqmZW6G0o/JacMSOjwdwiVCi45rGHk3u4xn1+00I42pRSyuJqPR0oz7

yI2M70ejUrguEKy8fla7l8jny9eO+82t3/3m8a/JECn0o8cUstW34kECEwzlASUygKZquKPqOQBs2T20

p3DfkYmAc4Jo1dXcUvXQtWOFrYfFiob9QH5oZ1JgNo
IdaRlhH+Ow1d6lG913hrbdrTs83LzUxfX93BIt7oQx5fcZHF3wyMmTrfDK9ed7v/i6+s+ZZk4HYEvr85

R7HU+xDcIG+8VZ63BtkvE7R+w95W5cZqza+0vfEeWYNWJXqMUOfvUo+aJcAk6ep6LiAAFst4bdM8ucti

JShZs8mLRhUyDflgX8G7X0Ca6HILbNOktWY2TF1h7G
iZYhvI9ufPENb33nTwVp6k2wpHdqhfgOqvXzyHTs9fZS5v2mN/fP1m5Z2YKZK/yXnfCLsoySQZWtNpsF

J7X5z9yU1sqKtkc+CMOCPJmg8N5Xt32XDiF4Iv6sMq4njTprxQamQx/M1hKpCkBcg/abbKOAiUs0Kw8U

lVvESV5je+DgftXvXKPLRXYOsNFxl44jmOnMnnubmi
LUl7AgBut/coUOIxjYQlz8MyRBIkyIXIh17BIwtU4DLzA5O7zS+GlJUYKh78rLhFZvllBOHtohwAMCIn

/+ywq3Y/jQ7X3ch/l8tT6grG+lNOR0wnWU+VswQ6kSHVOjMd2j6kG8oLk0RENPW7O0Zex2DGEvGvTlK7

eoyvsr6HCjPdeA+3H+405Ttw3BX4oAeQR60bv6NAGa
a0yMuRvK2x6a7HjjZ9twj95Tyuh/ixrTb/vpRb4cENs6F8xKMTNcFrl1Ad8J5QoVd3EeofLCHtIVY6Lq

90b0l09bASG370dffGGd9kuqlxPP1A4q5N6yoqh7dzy/W5hcTSeWmfU5nr99bMHc8DJUPs1stueHNzDE

6Ni54+zjjlLa5D/0UTaa3i8XfH95+dCI1G5AQk/rws
Dt707XZsk+/oGw/Rja24KcgTIK/7SA5VXoE3YcpKK1ZMDF8KJueedxCzHlCzhdMlbigerWebPugBlnSd

je82taaSljGQ/j6iF4mnjoxtnBcOook2qRqg87dr98i1ocJoo7lwtpPuNqk7BNe4Tuq1YA5nAnh43qW2

b9ecYb0Aa0YHi6oS4lkj60gyIzT/mlfpq6D1r7tQ69
P/G4muGRI0fsZQ/BVIHxLzRQETPEeE0R8EGnHUK7iq+ID+N0OQIeyPuOTGWK4biLqFsKYjsUaiYZXvY4

Pa94TTtZq367pAxDI4PlLC/TT5yCYaHXO3UFHLueABoytLB8LC2OPiGByvYskePsCHhyL/OZm74kiAe2

hczXrmA0Xce2cSCRvmZniuQgQLt1LgG0Em2i1ZDk6g
V8+r0e2IWPrfrj1ABE328ny63uUZqR4R+nLFILowoO4Gc2v8VOQl40ewHoN45uiuZwLpatd6aS/W3PqX

rqD5wAXAT62sR8NMUEEhxYpp29NLBmSztasvve+sO7FErSh4I2Nr3mnFhGSbuckyUYs49/faxLpLyo+c

1vq8QuC/4vj0eWAfzf+GlIt/5Lqwjr5O+x3yJbIcHH
eR1jxW+Vn7TDl6zvUFeus4Lg1bfToUMjzU/uYET5cD3h6e1lNEFq4rud5szy3PXQ6SyxzylBPXaPwkyf

ig2AeQ3+lk6nDXeglV0rmQwvP+4az/k5B8S1C+kvdf1jBL1wX2qzsG82ckMTFuyve5+2iQlnAnkkVtqt

cyAG3OI2UUsvS8g7dQRl6Zuc0ylfej6hX/UWMwpT1F
vqM+oz5/nkJkwOnFR0hKcG98c/wHhhbEr1j/ppvunFu952lc6PL9z3p/9r25f+H5ztnp/8WiTXlxFyuf

kBIOt0dIRODHmmlm5e9LT6xTdM5ZUzAkJHmkQM1+3wgIhewKf2F2AE+Wx+qQyugCrO7Va/3W7LoJ/Y2B

GqNLHcoIR5uxgsEMuN9gxk8QDHm3ljOXHy6LC7onIy
Lp/ya5Y6t4jFMovSxhMx4asLuBZyP6LVLLRXHPZSEQ/sbz1hTbY4YBg8VEVRprD9QakoS6so9ZTYp+RA

5VReAtJrqSCkCF3XjYP+JqtbUg+REK+k7ghIJjGQ0oS5Hq4fLANYpOEuSAyb+bfuTu2jF0h8Ckcqe6G6

GFj3d9lpeq+dBpofbmKKZYV3vJ/AZ6e1U6/7JHWp4W
KUoUJHs/xaf5/ioqQJl6Fm28ijp6/j+bV8Hz1yY/Tz9Is4eymedwb1jRKM1523Cfb2oKsge/wdSe9tY7

7uXz+p9oELihcXY33NRHjzCvSuf1DGiEdsLSBXevdDkSwC/LSK1AI5xryJiLmVH7tHXsrNqKbNYXEg4Z

R4Xkoig+qjdU1Uql8oJtF7PAFo7ZO0BFPj23QYk2jw
PDW8GG+OtYdW9oquLji5ijsYZ0c+DWtfmij+k/lFErfAZ1DY3b9pZlvvGuES7QzUPgBrycXLYQcqK7Zo

SaWfA+Tb4Lv5TufMm5QVv4O4s1OADWFSmK4/ttN1nkFI3RE/S5nx6vf/BARctMIli/HDFi+mw2V+Vf9R

4PLfR+PnlffsIUo/sf2Ewu1gKn4HPF9XsoPM+LNb9H
orHDICz1rlhDWEtWiq4uWfpi5JIyzbhaCWpoMHMDbRp4DgyVrwgfjo2MFFEi2HaX4DIUk4PJdZQwYoHR

Am9ev9Tx2IMUxfwelvIgHhAUtw43tnOS2w+YWZsXySn55Ng8MKjDgVCVrbyV8LVQJN3eKcp4VKjM9SX1

B3CcJcGdAQFO4Jweogl08CeTM8VoSHJOane7Ffhpbl
h4J3AoDYfyd/tTdjsgBAU3DbHESNYzcNxUzLQEcbTqIU7Ju8xyVd5ggL67Q1kmhiGfHrE1JbKCg/Fp8r

uXh8ED9BerBX5U2IFYZn1OyIJ809FXzSqZ7v1ldRsZuj4X9N40OyN9y3AjX5zgcU7jwiMKAOnL6H7VvP

baWvd4UcEkS7Hl0MrWkBJFxXUdddHArb35thk2plZP
A/oWF3I1hDycCEgL4p0Ok6q+DRhZjOuYqeCXuxAvLd2I7Cboi3u0hG702PE9Pm2HgbbICGczmvZ0H16t

APYZkHOT/QlVyOpuitxwRdoPfYGq5Afg6u+8x8WhAT9jfFv8n+pr86h8+IDrkFuBxHPXQdrhg9Ik/TuL

I1fviRmvKqYCYtlqKG1835ik3g36EnWtQBYj5G9GKK
pdz1518tGwEI8oGT31lZsOqByvBCEAniLNqI00tAErPFaGRnNZRYI7I9ZvpK7OnhRa4iiIspr5bD9VrW

s/a2QIZZG49xaqkgXF78byMx21EbpDsqaPoY95UPUKviRONGJLc/z+d8/y29J/GVlmZqa43NPeIJRawI

t9zL7z9vISKnHI5if+X8KujuqVkkD8HlY+B89ny1Jp
xak/Xn3yozVAyMNWc8wg9Kil8L2wkhKDJ4GkyONfb2XIyiqwSzLAXJpVxGhj9hg6iD9/B9MG+L9B8An8

SYGX3shMiua7tPHcU1wJ7iwk+5CftzH/wa+3SxtE/Il/ZbBr1wuVLIv28bSCxTx0iBfFO1/S8HYtr7/z

a3gzFtCVeCvlyU7zdpHb5C27nC1hG2WdzAuOtN01ek
BbAlxschV7zLKyNA4PEJhjZNEuefJVhlp1BP54CIua8uQhKJkV4Oy1Ie1HLnv/Y+a4RGYR+sVDNhurAW

NZtNFvcX7wMixMvJPxb8iYPNabWACB6+Dpnp1i3eezQJfW30EdMCivQQZ1YNHxKWtst5mMhr+gFlNGKl

wa/tktq9NxxO4ToFF7D6rD4rteyL+EChdr1J1n0ms4
dHGsf0y9ljKOltcsEyiDnqmmmTbG90gQQ3mKFuqghvjSk5F8OzqM7i7q0QLEvottSKUyb2upFcD2Mryl

CxvWXcNZK3GJlQlgHDprJ4DUs1y0iK4LRet7sRwxZDfbi5tOds9+MkqoazgMurywr4Gr4W164gmSx5qP

EHsT+e6iEuktln14Cj+P2tuFTQJ9xMIZX+8MASMZI0
BGeEsNu2vFDXrVQ3+CHaC/ZhSFp4yfaNf9Ydc/O05QvUPF1+Iz1WzsCHmNBN2gdGJVZG43PllfO0IHjL

QtiPoR2TKUba32CAOlNq542ZhGz6K7Rc8pIvH7GYrVSBmjJ4FG8m/JkE+Iz+eJz3BX8ebRHiYeoLLFPJ

yGFrltyRVrIizgx3fi1RH+CGABVFsYsEvPM3gb2MzS
cri9mgE43X4+C7evBe753ko2Fd/6IwiCxU21jDbhKW1Lu3nMaTofM46Hu+4xTuTauuvovaiyJw7eZuWg

TDZE/eM7sgEMtM6wFwbEznyETT982F311F+dsN5+5N+HzUkNuAdd+Y8gXtHalyAIKFxNAWvHK0hhfBxF

oCZrK8lu/cXTQ09Ge93Y8pD/bDCugM/3QMb9o7z224
XuZlJi4qBkZaKpZ1aeRMHFm9hCd6ZoiP2sIS4Q3z0gsccfjozs0HR3nmwPw9Ol5QnPFMiaKIPEGa4H1l

2Opp4V8puiJSweX+GfLPVgr8qRFcWKCKxXrO811TiyDbyhyfy28EUttM74/Msg4bl4LxRNwysmZWm8W9

wRo9TE2xYOF2GXdx87PN91szax9DHMhi6Bsb5M1Gv4
MdnPsjeOMlH1B+/q0SSm6JuzLRlZm34CWTbE27kfh4zhaVhqHYLJAAnZgRCf3TanPzNZgUhGs20LDFU7

kHPI+4DsPlSH+V3MgT7RciWI6nLyKEaDkxOj1jkwjP/pFWUFH1vSsNzAZ4hgaf23OpwXBasX1ezO8IGK

YoD4WatOA95E15jd5u2nxjQnDo6gZNzh917elOS2Ge
ei4+aaxGu6K+PQeFImj/OlOb/q7f9d8zPtIr49oPV6ZFm4MPFotMxGWj/sdt+44Ik7wB6Ax954nR8AeK

Mth72p+U7iW3dZTzjkzLwVcqeKiL/CyVW8enctNZlv96PkaM67EA6+yhtxb90GzML4WoMWVj9jB+BKkW

6yBY80eXzJ9LiGiojHrnIGjmOMzKVgD92LwgnOQJ37
bnGWOOdhzVUXlZ1txgXx+HHwFLdpmkBquCtSvCTXcxaKSI2q9Schhb/dqT+vQ7F28zuh88Kttra+3rLr

0F/LZTJ74GCv3kcDZstvi3uKraOefQ3+VJmxvdfqnM/r7J772zxGuPc/y/ZPgX9SSFS7Q5rRXRZozI+9

A+OY9+26XEpcSseakvRC1ZGLwbRJVb8sOU6rRzL1Td
RPprgOM/YPgSTN+fDpYnhPfb0QFdPXtuDAitgtgsF0/0laPIldR8881g74kEq+F/IFCfH/8Y6e+RrsP8

X+ZpRuU2m15/fTjIO0Z5jSWeJC9IQwTF0zJQLLvScNU56Ho7UMJNXrzI/p/T068//eNPPcjUuIxEcM4Y

j7ebFz0avi5sCCx5JhDp/hMASN2AWhV5QibYi2X3go
t46eJf0piffoEtPVI5ZCrQa6VIvvTGmtD/c/rShFw7fh2LaKlWk0Pyvik+YcrH8Luba4FbkvxuA8QY+b

ewut6Ltp6dYBnF6TEjCMyx3/wklGhvoexxI3//pZcwuliEUa6pvyR4YNSa8Q8j0WZ1CN2+nMC4zrPN/m

MiPRU4teZsr6IR/q/j/62O/B/m4BbVP1AhS+kmzjIw
jJCui/9bnEl3/491+S/o6FQ9/b+Or0mlqCPcTSUBsMuDeW7XdaFFeMK+Znh4e56z4l9/eLeHNgIr5Yew

fjrvmfZMFmQlkTbv/kmC5cH28oYtk8WbxN1b46bIpq6ddEyNE8OQAf3F3H+FIBfN5KQopIb/xvIzVFie

aNbS46wKtglOZipMKxX91k7y51qxGc6lnHij3PylUD
Phy9QW0AvC1ma1P5UYzMgdV+2Hrog+IB0gT6ERMes4bxI80yfzqKwfB33PfzQgPxDZqMleMQCc6aeRkg

StRxupY2woxmDdMTifgauE3oK+eg4t91WbxYg5ysDcx9eRhvVyi1vuS42m8DPe0+sAt+rg+dsApf7Hjt

03sDsKp2wOna1VijG2l6WQ22KNyoc42Su2S+ut43tC
6hEy913Ys0n7TRp7iU4kQ7u6Q0JftL2p3U4YFZ/aF0boS5RYiXdE25fyPM7M5EH+V7OK/A2lAOlX2F6x

sb6Pt8jzmvPDdXEYsp9um3ljCudKP8wB6l8drOAz+vtPPd3SBgV3ed/pTPBlwLkDZ0mcAHr6C+Ac8sek

1ms+KacAnkL84ruZ/ZvdiX8Tf67MnTN1V6Fhu/d3am
7vnz22eu9/LKnz0/00J4QusJ7q+1wi7UEF76DgzKFOw0/T72GTma8S/ck5jIgtBLuu0cfI05mRC9JTkf

74vZqaI3m3tgRFeF3oi90ez392gQ/M7CeVUZ6XaaY5RG95cNgabNgITBf0W/M/hMM5JcchxQNSVCzN0n

KMxRf1so34cg5qfejU9JZ2BiXdW3aqwPLpOIJwkKRM
HjQii0n9pJHKbdO3WDvT6kW1ps9KeZJHRPW+qXP1itCetPrcV2NZUJb/xBUYL+NyczVbLqe8kh8B+DuS

a14C5O2Sqra3QH+lmOz5PdvsUlZLPnnYg8SCPIl4MwpAlntY9CAaUerjU/1HbwXmVJoiyRfMVcf4m25F

BN48dxS8lt++a4Kq487udq+m+6fhOMfraCsgjL
7tqxhWcXDtYfkU4PJ6Lp3pQzFr4wDW5MuPhqGehY7MIGcqawt6p2gmUcpCg10QicYKva268QRTJCu83l

rwjWjs3EDKQAs+Y8CrOrNpnPI2oENpwZH13k0tnMS+p2nHL+V9fNr8GzprTmX9+P+kXk8PxlQ6ER7z39

RvRDowfcR/2G1j5kqMcTP64y0zx5t8ABbj9ZcsyAG9
pKltbWK4LcbaNE9/TpDksIM9fjuLnvsQcLW+FyPfvQhc/7s48Y4nsfzwJJv0pisTh7J+LDNunc1kDSG3

hjSi+9oGpmH6XNHUIb3ysmEuq931m2YwO1P+fPuzSP+SVr5cbjBu78AD4N5MqCMaw/F+Hli1Nync62xm

FC2IdS9R/FfyD2VNxAz5Odx8/Gf7YVyNxnGmxmXiZ8
2GdZkAa17+oHd+dcd6SMtvkBK/yoxpAKir/llBkWvMlIrw4y35XGoj2bIyL4jT0I2Cuj7HYzEp3rX/hv

7cJ9ETp1iv/XhOS9H5HrkxzVudt5K/yNLLEJY/p2kRiEUF5RC03DNGg4wE10jMlrzov21z9tYcCJpyfg

2P7k1VJjijo+H2iwfz+QRmfi75vQ1ye26bDjDVtoye
U3icOa7+GDoZk4CpXQuun6KP65gKNtU+iipToVr+GNq125cWTdVWEkPDWEbZRqGFoubnUCMwQ1gNG5Xn

txfGYJ/3RweE7w6o7Ta9A5N/Marisela/gC4yjHs2gskhbDFiOCu4yNMZnxI2lGdztcL3p+kogYFZTuT5yRnW

MxgzHasbJ2diNgY64Ox+o8W0syjgp272+r4b7T+pF0
X4wXIMl9q05MV01fPihQF419b/DAtrl8lkyN0z4/tlkq1mGy6skQ+xGE6rgjBhyn6rEqDoSfJGuXI1iJ

+OgAZo9p1AtsSp3P8ZPrQYJdKD+0fwV2UGaCtjayp24XPVlgU8qXSKEZyboZQXsve7HDFKOekHBvXnAd

RCZJ+vLfvsKvDJpf1IO0S5LPUVJLTtamIsg++LafXG
MOwz155BD9Opyr+yms6Yw3tsX/jK81p3j60fyfF7Urr/K55ZPwhLwtzkW4y/NvI/m+Kr+HUVA6tSSk3B

4uzCngHnjh082q/BZ6T/oQ+V8yRhnXaWWn68aUawUVZSee0yCWspcOhwRYR5t0NigU/0P1CWyixNvgXd

jfMbxg3UGbMDh/zSeZZ/C3pc38ihA250Iee4Wh6Yaf
s0S587I3RYdphSp/4gkzvA95Tb3lXgY7T9fsdupwl0JVOwMNWRtCCLaj/O9/npbM6/QfIM16+FO22RXY

KXQ1nUBdqTavsWqIPa/F+Ecjs+u1PU57Q5p8Myio8V0aISqgk3RtASrsKQ0g5w6U3XgmpvvZEKB0S4SH

tnHeprOrj9/2+WfUJqiiZAv5MYfHK/JOoDJYF2Mgcu
8LUG6v4eC7g4nm/4Fg65fNKwbsjHab+34/i/HZf/q/f+c22FaWLBn0M6ec85rjkenRj+qYOfpdkIGQYU

0MppwA6G7e2vBnI1wxAnhbLDpC+z+WsZ8XBr4VJbfTArLOLy/H6wtXSqkeH3MGf/tPN72pmGjjLxk3wj

9xM/t6PbXp/nfrmWH2jfComAkP5ClgbjRUHhY6Tr1y
fsEu1fEe5z12mm8lC79yxhFAfen+TfBIg5F9sXgoQvmsNip/F0pAirbY6vPKpySF8y+1ugrKLSVd9XkY

KcZsBzePHRmKzHx1yUjd1LxqfkHhu+1NNPtMuUveKP+K2KBveEjky8+jfBIBlK7RZeSE9B42DynO3gsQ

m9F35z6mBcey8tFROWo7Ls2UOuZmqVF7DZYJgsx42K
0LkaBD+TQ+N7KgwXtd5Oa17e31mj/3T1gFBlTdTe/cF7/AdRmyIdsjkvEiWWGS3CqcpNtiowc2nxiF60

8XJhMmupJhoJcpHX9affE+7LxokF9sbaYdP1tOZFwNo3xArZakL180qcikE/QpQw/hKACwf5Bij9PBRW

KDIkn1o1rKa3uRpswYb+ffwZHQrMZH/gdc0U+2N5mE
/DrXUUMwISdWRJgK4Ge4skn1/Jr0D5+T0zj/DQKiJoWIyeMPOZ82T7ITqkl8wZuKG70dVz8fk6NJ9vgN

8y1g+8gekJaO8POxA586Z9lLVjm5zt0uUiAd2pWijepT53kLouO9kct/CAXAulyhjUY8/AJaknl+g8MT

0OoP21g/F5o0YqIad7GWbLRul2pzObzubB/0+0iVwf
5g5fHG93NCJeTl4/xj27zttxU+L9fGqYWGCGe3+i5i3taVWf0sy5AzFE4EjNkx8RG84RcMDnpEXalnMz

SPpeidKZNuItwxYkG/VI0W5t43EtadYkmapJTD70Z6+XauSvvhtBPssHU/af1hL+z66aDbG8zY9pc+hM

ZzPOdFbjlPh/mxhAtmmvGKx1nfnZ83H2849C1fqTrQ
NRr+eUIid7UP859b6Lx32EQXY65c/M8vLcKCfW6lF0Qm1TuACYfLdwlCs/cqVbuE9/yA9dPJLi0z/75r

Qfvy92xH4l+ebILhVD/InCBraaQff23Xg/dm2gTocwbVVPMGqLzcKkDFv2pI4B6cAguVivGgfQuwIcDI

vHASSLuF+cI7bbH2o0OJIPOvC/PLODrTm7iJuu2MDy
k3c4sQMNs2gfpJr3A7zYVFRVSo+Hi4UYEEoNuUbvSUcTaoW0omEvrumpMj270mIpWoCSIQ0SZWMzg+jr

SyeQQyrtjUwfIkS14MKTw8i7BUpg2euy42cY9az4F0gRKeyCdsP40axa9LeMoSLqis3Jx6eN1q1lPH6M

+aa/mD2CmpQ+t8/I8eE7w9Ccs97amv2Ut8awxHeOxu
rsA70Ny6nFtmszZfxBZY2lm3V1MdGS3abp5ZIgy9e/ik7wvkAK+wjRzqXzfLhTgyYhJAiuv5Ocyn5sxD

+QmBGL4PUAWKSzN+BkleoSyaKoIbrHeK71ZfYe1qoqv47+Z8C5uvqKUBafLiq2qn1mn41CJoc7RSK11G

hE4TuKoJ+aCS2vMGMiRlvMMWds+FmoPojuxhmEgQZF
qSsQgRnvrxs3oic+tA/OVE8DplOf3t4XP4oTr22cdhzr3fFj+B8wCies0ZaDs4+qoDTpGszXlZ/vHS9d

JAPxk0Pn/EnAVe9PN9nVy3REVuJX9RK4983/ZK4To5whvkdCAiDI08SuCDc2scewotREZw9cuL/3+Lxd

hCrdHnubsXb8ukA1CYRVXkEJ84ztSkmo8xqOnUxky/
gDIuq8Ybn40o37y2AUU8C1FBlyOWfbZ9dYvgJOmB4hoaWwLCpkQUQFzE6XotXBolFFRrUk3/Bx4fLPEH

1vw+lAbVpotMkA+9n4pw89Prj8rJibZ0OO13GRydz22rg98jkUVz7V9erYTP0U75IGrB7d8ucVq4OVlG

Et2OSi30ivsP2F/VQre9oeymI+QZu2eCiP4m+nAfV7
Sxm5V6ZaNMRWNyJr0DGp0WF2Q4sonuWjlAro0rjaGb0em3Ekxh556ken2m45i8mMLFLHen2fjZqtXYQz

q34dTOMKIow0wEi/ceIfPT6V7Gab331RhdfrCa2rx5xwbw/3nKjQx9WZ36jVnXRgnmha7KME4Aw12pBr

09VEf5NUQFj/twjrk1wkAFQ9eS8SnpPjHoDRdklqHo
fedrQl1+HpEOxznyQoTg3nkUdGSjTiYvBRpcA+Nk8sNBdnHBzZ/z8IHzub0Y/DhiHKEJVGnGg+n8f/KW

i+/3w0dmxVJGz9PiD2ISIIkudE1A0BwWH9OXiY3dV3yiyKY7SNPdsb41zAcbacD32x29GrlQF1xLqb53

RzEc9N9T/j70XZNtjRjwGqfeNj6kFG+TNYIRSjXC/G
tfzoJSTUsTQijhuE5OktFYtsXaWROAntFAy4KKoNpHvy22ZYiorKPU7JUnvOY6GE77852wNVv0r54qgp

PW0q0q+Qrvilsy6/FE8/S5N+BgCV9ZpGTt4/05Oc5NzS0h+l8L6UO2AF1dA8GI+HArWBTCEz5q/zvahy

oL+XDzAfsYRoejv/03P//+t78uvFprCa3sqxwY3hea
yOYQcJZpsU47By34+D8Rz1N+ZGXkZrjACA5F/BKvVpsCofJM+fhJSuYu09L2EYW+vSq6x0Ucxxr7+h/b

WQ+0oz+G+Bcn8Mj55pZ+ZZPPaQveCiLL401DIn6oTnfTj/YgorPa3LUXWsuGCbxSzHyqyF43qV1HynzM

oNbLTXaX+jXUw1lzeDAB2CBAdZ+h4IbAM/uu7JJ0Nn
KCkppbVSp6P7wZzsCOIe/XSYXdmdhh3rnYAl+bnL+5AwxHsn02xLqsPSwTWVUsF/EajP/a1tLizsvS/j

NZzGf1dvORsun1Kb+Gc4c3/SKxE5xw2LTC/ls7ZL4yTsqu8I3t3e4Te/Qvqcvl+jOSnIf22xM6A1hgbJ

0dq3Ho7h4v0mePfI2xZhwE6i+tsj+l0YdoSkY07mEA
1EG/FsRDodlBKm/sP2GxjEdG5mZ6aGKvtZlXWsfko3yQfA0Te7NqUDiwvGcjzwujoo+Pouz2nRDmB/Ok

MfaT+SdF8uwJW+rwQj2vOQK8YPrJq4c/klpeGgYn4SccuAgCpk8vdGEhERDDSFNZ2uUgM8LyB/icGTzq

6vVNJeirtf3YD5evlq0kvJGWUG923ql7Xcb0g7Pp8r
LiosyM+NNSQaQleF3VEphQiI2Me6gywEDszQ+f/lHanZtmwTGV2Ibasu0jOWQqxy5vaaViUeRWhWjsFb

SZKEPiaBW8tabwT1MVhRy21Sc9wWulgXXXyoCuO2jihrxsnoMcaXHxTx5mTihrR284QV+maNGB3sfzXy

gXpT3ix5zEZ7HVUgOJsVPTtEexz74UuSh9WHvYG20N
0NGheJbbFdhb85HyigXPiZIqkaynI9TN6YvBN6szbnvMBxpdAEXd9iKozYzRI1kZHGtj9xTaG11iNP5I

q98P6Zj4vFGOMQAEM36+RkfmMI8XnimCJTTi1mZtflQw0ElOccS29HscrmsLL2yUrFWS9DxWDolz3J8Q

9ysJJlbmazVY8PBbJ+QRITmh1HQmwNGd7USAF51iBo
lgCdY5SxsqPnuJ6Zxc6XTAWvlP6MYg+CnTiC/l/6JmFF/dSfxxDcwSMs4Bpv63c/7+1F0+bFmqwF/Qvm

kO44VUsvu0FSrThmo1hQUgS3tt+mRmLAB3uDtEqMe05GW03sEZvqGt+9eljhyXLRrpD4bEiKp8jpJgvh

YFtvzWCYbZr3qZMGrDO4HN4ggnNQXANItteJ/6e66/
mzUwcLn42pUyDRyDzTlKln08GGf6qVPzZFYGSJQw2kuVwNtcZLZRdeWBjBLEIpXEFcgdcBPQpTy9JjkN

x1MzPwLPMBhBK0Su31j4AAURWcbw20+792ljl5tVm3seKbUaYrFD9fGDcoDcYVYutq+Wh3eKzv8DX5HN

17NGY20SnuaxAg+XTrMynTa3AKCuAYP98Bgh8TA/1S
Ax/6CE6vFoplpNiQRyaZ6fhQZ0/tk8WbQ9zIofMZc+7lbAf64riX17RlWV+QMPvDUi6aS+sONewOleZz

zrYEpssZvAsaPEpf40eJZGr5solQ9ZyYAebhBChinZwrOEyT14Umkm+kvCdr4BCDyETHnkL5aXg+0Oei

bpczs5Y8MbAHw7mc+k/fCvBcEyuwwelRhhtMq3Ujmc
IhhlMRVnBT7/2U6/4nA4s4Go5K0psvYeVFx0X5uWR5UY77E1TSsQaqSyxafxoeESx91D9gmCjlcaz6WG

YlaMCbcFRsdiEQq7PON4nHPdj/PCDifDIw9A7euqib4xLvyNlKuXYiEN5Tb28LaweFf+hFtbO9loW7fL

Fb0j5cFe28oWNbFyXMRgXlxyv7d+2CndEJ33oVRogy
rdznBfIfFOqqKYIp54B7urOQtaYQnfZPhnl8tU5HTQK6i1KHQyuOXKeyeeMaCIgbSiOBI7TDVyDnXyUH

WJFEPDq3CuHTpxyhrGlb0xMXCmF9mYTgVsDi7FERmtgGp92ic6F2y/PSJq+oUQQhoVzCdLR1Aw7FthHU

bs90aE6vfw18WJYyOEEBSUuyEPkCTGfIzchpdpurOQ
OosFh21CmFsxoIX9oYBMsOZIwZfRbyD+8RCA9cdmwKr9OS0m+bHKdg3Bl48LE53OxItCv54MKU/oDXmY

z5f2IUpxNCtvdkvpHwl0jaa9JD+ZN/bA2kSJCC5ki+ToCW60gqofCax2mTh9EtVhFV9m2Z86OQbr0dtu

+JKiCGVmEXJfVCtS13b1VZ271Et7tiOtNTudQde5o7
7DMqCB6CN5izYqwzLHf/JN9/KR2GSFdDGiXzDJU9rvDvbN1ACK6ox7CgQApyFZEyNH5hpq4MVOQ6OY9Z

vKx1JHSmX7SxITWbXCOvk1RuYD3ROX7qvBsuVss2Mc8RMiLpm6CsZDHfMIbNkCFP4SeMGEBF0NrF9uFR

G4rNR8S4RTXVSxfSNtc7RvM5enZXOarAaj3gUqzdbu
225n1hMx1jjGiQoL021xu3SOrK0K9WVcq4xyLF+kMwwGrYte7+4eoJWd7vy3nI7kK36v3hmkq6BAeqZs

2pc89zxeezCS2X1tnuTuKUO13Rz/iUUThXOuZztu8imP/l20QCB9LfIHOs6Fun0pi9s+6AZbev/VH0df

TAePqgOO++vpM0iWRxb3Sh/jbdUaQwy1pRByUd/3uj
lXRGwiXuDjtdMK9EXwMynr0yJnpgsTTuQ91FBxDpASz4AdquB9TUMCh6CXqS4lzElD50F6aDJ8PfdpZT

aIBclPDNJANEOj1pyZnIgRZCPZeUDk+HHy0UTONKEAMGFhouxSvNZEhmhSrNUaIjSFxfUJDWa9FdXMKR

EwLtlFcZ28ynvPqHM2URGoAq0dFBE/F07pM+v/SeOY
Rw6s/4w1bJIyVI3+DyK/G9T+MxIt3wt/NpRXnebyGosHAhIL4SJnDuNX5evc2JUbAbJGYuAeoYIpr4FC

ylSN2AuGSuP5LzqdOBTTDrlhovgO6gRZqgUZ8Uh115VcAeZP8QKHLNSRDsJYPiWOfAMrNxM8KbL2LffC

P1WIMzZ8EuQDVhZ3t9EVujFK8WOYBgKB75OI2eARTW
BnJkY2PaEDroRDYwIZtfJZ9Ve828LgGccZWwKZGnHkAmJWSlSNPlUNH8QX3NQDQhIDEtlNjeLZ7rzRRs

MC6LzACqKsOhZVbzRC9Mn033EgibYMTbQfDnOTXQAAh+Lh6SME6zh8HgZDpaFfAnBA5nmb9NBAbAHrLg

A8D0tIBzEo3xvF1PkYP9dIIbG5EDPYDpqfHIkUVkLp
1QEWCoUb5soH5RQTGVZWHnWQZxROviX3cGNA3DCGWOPFRwIJSldfFynCsAExPoE5ICBTN5f7McqFfxSk

8vNEvmRsKeqAP5tghqOFVrw8VlCY9HczBvnvafVaBzBBVsgaHbcSirWH9DdAAhvIBuZJ28MJO+PiANCi

QzE5BfnfDOILYmzvpszX9sYLWhTSDhGo1HEAJbWVbs
CXKqQM8LVaNkIzo7MY2qVrTaTjq3AUXiOOYbBwb0MRThCiFzHgr8NJNhNIGrLrr8NZH7ONZrLyRiKKN2

VLHiOGW4DOS8TLXeQMV2XLP9WhYaYWQ3YQI5UFRrKMG4XIR2WPMaSTT7LKY1AaOeAkJkDFO1AbVmMhVt

RSP0LUZpUETjDTK9PRWdFeMnWXD8IURcLUTpJOB7XZ
RoAFN5ACQ2GdGfJSG6KRY5XeYuGRS3GCQ6JOXzZQK5LTP9XCRlDlDrLIC2GuWgIHThSQO3TqNnGfa2GP

M6UHObHSF8XIB8VMEqQbAxYLU4WTHjDKFnRZN2LtMtHWHgUROpDiYxItO6DP7sAVl5VLkiLMKrWTNwRP

PkKuCiIe8mFE5EBw3UUdRfRZ3dum7IJoYfHNCkPvwE
Nzf5FKsgVQ2JwBXrS2TwqeQxY2DpaTrwIU6AfFJiQA2TCNGhOt6kaT6ZXPUYTNAeIPZsCKtdZS3kk8Oo

cdqpYZVciyQtjCynZR4HTGOtNROvI8YzNKTtmJGosgBvHNesHuRiJGNdNLybDE7Bp5RkaKTwESFlKhUq

MCBSDQo+Hd7FJK6la2HnJKaxLIMzHK9yfu9HTjP6XE
MfWwXjEDJ0JJDoQpafXtI3HFL5JbX2JDYlABx7FGE7XlMqKJVjGdHzCRHcSdErYQlaPGi9MER3KDPzIs

JnNig5EKJ4EER1VHJaLWP7XRK5GvT4YCVtLLF6MXT9HgY1NHYdSTG1YRD4WlR9PYOwBza0DDS5OAD2MY

MnPBu9MOXuLBu0TQV6AqTnGBM4UYZ7AoR4ItjyAwBd
THbjDuD7JhrcExZxGQp4CDS7HtVbRnt6NIPjQCA5KlmcTXK4JAfeVwB0CnNsPwb7BZL0XdB1EkfuTtFg

NJR0RrI1DTNiKrXqJFA4FdU1DVMwBwY9NXY5XbA6KGJkHAmtQhngHxq2JWS0FQR1PhmqOOP2LBLwErX2

NABaQGA4FBXoJER7EOLnAEE9FHP4ZBO5DLXaHPW7LH
SzRfAiRsCvEUPvUUEiRwY3AhIlMEM5BPJ8XiB0QSNrKLI4HNWiPpF0MSMkUzd9SIW6SvI5MXRtErEtKK

LaZMBSRgYcNGN4GKOgZvF8SAI9EGFxGrLnTSx6BCv9TZT2XLFnRrKuJYw7BSE4NJEyTyJiRTq7ZWZ9VE

EhBiMiKFi0QYR9DSLdZpXcMXt1DDK2ZRHgVrIgSUt3
TNJ6CPYlPzWtNHg0NMR3FWMrLhRhZLc0TBH3MFXwPkTsVG7SYkBsQMu2ZFD3SFYwRvGoPLb4ZHD5TBXq

OjEfMRc5XUY7BXQgOrn9OYPdGjW5OHCfKMA1NGE5PzA5LKRgSuOvKSZ1DxVwBmCqLuA5OJU0GLO8JSZl

RVe9VSPiGoP6TxecEOPvRVPqRTE9YWvnKoEtXJShZd
GgXjRnABx7QkUnKVQ8TJWaPyGmRvKxXyQsVOM1FYF6IFXeYVX1ASbbXXT6TvXySwGsXWO0ShH4BloxBm

B7RZW7SxS7BsbcDdJ2WCGrCLJtCbFbZVV6HzW9YhdyGsM9IDF5FvZuOknbSmo3JCE7VLCgQjmkFkYaDP

xiSpU6SbtvIVm6ElpbGgn0AMm8GOH9YpzwGWn3HQk8
BOV6AsIhSxEjFOgzVhG6DzXwKfV2UPJ6JhA2JKKaBQW6LVM1IbJ6MNHjZUF7PEB7WuR0KADgCZj9DXPf

GTO0RUOqAWO7RJE1BzS0OIHpDqz1DEG7MUIfImtbMai8GLW1YsIWYbR9FwU1GQJbUSN9NQY0NqF8RHDt

DFM7ZWI2QKM5TRVaIFP8NKQ7AlP2WUMwVVE9KDTvSF
H4BRPkNJCoED9dTRattsMfIhpWFuT8PZHyj2YaLBc7YK7FCJAxYCdaQO9Gf507IJHeL7UqpKPbqt6TDT

TiHu8npR8ksXYsUXZqLJnjLEHpbDddXHszHW0Rd4XnubFiMPV0L4DcfRvtnVpwaCT9PFQdMBQhS3BwfV

XbMdUfHNmwUW3HbKYbdkD3Hb3VKCKgBw6vcVLEz0lv
AuEhMWZaCuThSLH4VZqaDG6QJkXhI3b0AKqcU1KaF6dcIORrN7BepJDzUE1ORf7MVkNeGJ1ztl3BMwgz

OOQoOtxZBtl5UBwhZC7SlCXbG7UlqrDxZ9YgwKnkCG3WynNyPTrgMV3IKIAlDu9wcW3TujptwVrLzBDi

oGLoNE8iv8LgpatnU9ktRE9doDZqJ74liX5gXKfxAA
1JgSYgqCMbAEPhFfKkVHYbsUSfBMYsQxL6AFkiCS2WfCS5pYKoKpZfCPCNMVuzZQ6Fq680PYJzH9UzmP

RvciAyNiAwIFINCj4+SGtoyoSwMhgMTaM0YXDof8LmHGkwIU2CJL3pk6JzJHubXKOad3AuQQl9HO9SIU

SzZURcG3VarUIbY6SJXo9YRWx6I3yxPFqtGy3FqHIk
VCNuAWcfZK0Az700SFo7SY5TBHCnGuQqGPTLYaAmZHQiOzZqEAyiRXVTSDerTGLpC8QiRGT5NPMpBp1C

BTUuIB2KFmIrDqKhZGY+Dq7WCWNrKN1uiaJitQG3KNV+Dy5CNZHfYId1I3V7XRJeDLq7N0IEH8UBXVYh

ETxcFCxzSHKkJFk0P6B0YFUuD7BXD9Egecmecn2+IC
5UU52UXAGzWFz4I0J7hSMoY4L1bMkVtUR3FO5ILZ3FlCg7jUVkcA2+ZE3MY3IXBdKwDYf5Z1B3sJSdH8

C3jSvQxZT7SK4WUZ5JuZBnOMIiuzTmAp7vP9XFOHqFMuPMKWD3GE2SdHQvJH6AuKBML9GbrWQhYl4zGT

rijOCspK1nZe6bORsmUT8NPoRNHOnBQCX7VC0DiCId
VK7OvPZOP3DakRUwZk5mVYuusVCdjg5+GG9YMUMwGz8LNq7+PFujomFyZdgJGbR4BNAiq3UyJOl1XO7D

DY0cqHjdJNR6Xr0BaKB9kHTtD5jQPB7MgXYnA82ccUAgTVBlEd6AFvO2bxZeaR6XPZ35rDGba6J0MFHb

T9obVIbkr79cPOexYGbBCP5gBYJDDJqfUMhgBFV7Xu
PzizjpNRWcLt3HTqRcKVy1sT0siMJ3DAS8JpvwtPUmKYogYxKnRfMaVfT5pTdbbud7QBetKY5hQBjlfs

ptZXRhLyc+QJlkGYLrGHYbGkpSBNYfoU4emdW3dpGqEIbmyTRsOd9ub0i6RbbpWp7bGw3eVRv5GrFfRh

HqPPKkSg2soS92ILsdxlTfDy1GIsFhGVE4X7PqTbrH
ADWOA+IKfnPXnynVf0gCMhLJMnOs4OSROqGQXkPFMkASIcKWEiQDLcWCLzEHGjRZOgSZGdZKGxRJQdUUGo

ICAgICAgICAgICAgICAgICAgICAgICAgICAgICAgICAgICAgICAgICAgICAgICAgICAgICAgICAgICAg

LV4WJBCmJTQcKVXbVQTaOQRyLPQsRSFdUDRsKCDgKZ
AgICAgICAgICAgICAgICAgICAgICAgICAgICAgICAgICAgICAgICAgICAgICAgICAgICAgICAgICAgIC

GeUGRiRQHmJSOcYE1RHTIjEFLaOOSkKHMwQUMoRQChDGYiJYTyDNOjNRPkGGOyWVEjWNMiLLHzVMFaOG

AgICAgICAgICAgICAgICAgICAgICAgICAgICAgICAg
OEXoTGAyMTRpPTXiQKHzPWYvDRWcEN0RYNHiNTRmKEXvVTAcUTCnCMBnSMXvYMTeFSQdOVTaQYIpKFDa

ICAgICAgICAgICAgICAgICAgICAgICAgICAgICAgICAgICAgICAgICAgICAgICAgICAgICAgICAgICAg

TIUfRD9KTQGbNFOhSBLqPWJdKWYyYRRwCUNsGKCcPE
AgICAgICAgICAgICAgICAgICAgICAgICAgICAgICAgICAgICAgICAgICAgICAgICAgICAgICAgICAgIC

YeTBCsEUFaIMLaMRHiIW5FNWGeGIEuMJYnWDPjMALsAMQdHUMfWJHrQJJbHMEvUZLkLKSrKLVyAEEiYQ

AgICAgICAgICAgICAgICAgICAgICAgICAgICAgICAg
YBBsXVSjUYDiMRBzIMAzNDCcGBFkZWKqDT5KLOOrSFSkIBRpAOWxIALjUCLrKIXgDXZuJTJzDIGcTEZj

ICAgICAgICAgICAgICAgICAgICAgICAgICAgICAgICAgICAgICAgICAgICAgICAgICAgICAgICAgICAg

YYDxHNKhMR4DUQBpTLPiLUYvHXYoYUEjCNYkBEGmFL
AgICAgICAgICAgICAgICAgICAgICAgICAgICAgICAgICAgICAgICAgICAgICAgICAgICAgICAgICAgIC

VeLIZkSBKzTPPmEDLeZBRnVY0OXINsWCGaHIFpPOExJAWrIMEaLTLbCYSgONRxTIFoDTVzAWHlGCGlJL

AgICAgICAgICAgICAgICAgICAgICAgICAgICAgICAg
HJHpKBSwSUFgQIElRWFnNNXcOEDnHMKnUFTtRY3BTEUzLTKhUXRzCMWmRIPpLVWlKOXvCCMlBURmHHXx

ICAgICAgICAgICAgICAgICAgICAgICAgICAgICAgICAgICAgICAgICAgICAgICAgICAgICAgICAgICAg

SHMmFOVzEBJfPH5KQW72mZPtn1I9NMSqBT3biyn/Pg
3KNCgybhMpiURfOS5CSxCqGN4dnj2EMaNrJH1cuh6ZSIiTKyOvG9H4uPErXEIwNMSTUnApE00bMGjfSj

31SQyaZQWxQkCnIGa8Aa9VPmUvC8rrSNQiFwY3IPBaVqJ9WFGuJvW3ZGShEpOhXLPwVHPoJGQpVXUWPL

3TAfYtD6GbnQ10AMJWBz4+DQplbmRvYmoNCjMwIDAg
s2DmPCl3SG9RGZLaBrfno7EjPxZzJDWSUVzwZS5YGST4BVMuRANxPh6EQGDlM621ihGlHD3NIe3DTbHg

EL5nqp7UCuXgMLIfIrjRUfj2APhuJC1UpPStJXeOmw0rmmRvhnIDo6ZhfjAshHFYjWWiSXlzSKFAMDUy

g12vmrYrTCSVUXXkmOP8WpRxZeQaRWImZWoeRWUGQR
aZMlByF5Ndd3YdDcE7VUQaSxNcVCzuOGVtSaT8NS38vNyqVF1XOPZuRBCgHV48LWMfUWMmVm3JCh2IUe

ZdZC1lof4HZqTbJZWrJhsKRqb0BXfvBS1ZhYDdB9BlyMHww5iTJoMuH8ELHRT9FVQwMm9YJRJuBxPjEX

BvOGnhMT4kNLWsOCLLhCgqsfS5ZL4ZZQ1gvaJzHG0K
PwSzWw6xDi4VPpUfK9QuA2CxWXGoTWUGGJliGT9YYYdcTR4fHG5Fu6CZjQTxkY6gbf9LCOCcFRIdCzic

ai1QOrcmV6U8tFqgSNFcIcFgQBUHLCytEA1DDRTyZGK9ZPKgWBZtCARCMmZmS87vNR2PL9Fkt78bZqA1

NFDtCrPvTJaqEJ20oHkktjQsjRXnuRtbXG8LXc6+DQ
pizjLmAyvECsflHBMMAjQtDlSHAbEeRNPkPBNkTTIvXzI1MjSxEl3DPUOzXRDvRCWkRqGeISYmJBMjPT

ysZSRjHAL4YpC4KGOeIOVlEV9UOxCdRKJhWWWyOAKsKJMtHBQksu9IMMCwFTCeTMQ9MqWwAGEjJJHkKD

laUGBkTTJwLlk0OCKaFTBaFC4GInWhWQTtUKM4Udla
IECrTOIspl6UHPQuOEHsYzL5EOEaOLCkUUNyBXjwZPMwHRF2Snq0VFWjILTpQF6GNoQnZJMhLNc1OWtz

IUWuGLXfth1SCTFlOIDkWIU4ZCAoKPKtARMxMLmlDRZzTJLuYLN2SHJmURQuWG4OBdRjEOIvCNQ8NxHo

TBOzSKDwzx8BROOnKDAnHwBaCXMmVSAwXUNmEIwrRX
DhAHHfEbH8VEOaNWHnYG4WZlYqYEBlBSF8UlzzRTUjXDJiay9XJZQoOUTqMUm5DVFbTSVbYLJzVFgiOO

GmHKY6XMW9KYWuSONaDL2HTvXmJTKpRTF2QGPbPNBtAZHgtq1GVFWcKVCySkTgDBYpJKKxNYJgWIqhJQ

CnTBI3Hcg3KNLmLCNdIT5BPqUlVWHaZutyUFUoIFUq
TZDpng4VXZVrMOSbSxD7DlWuJRCjAEZyYMnoWRWeCIT2Kkm1ZJRdYGSmDN6HErPuCNTsDemnKndbFHWq

GMNqow3UVKYnXHUbLJQmSHArSMGeABFvMGirQPOdFOL8EsA4PIAkVVJhXN6PRfOaOQDlHaj7TWsgOWPa

QNCooa7FFNNoDBIpHKYzSVRcSBKgCPIoELgaMJIySL
GuUPQnCNByNGAgZK3INzAmIJAoEZU0SMYqQIDpUAAwrw5CTSIuBTG0WAA2XnDdBZUyJMNjJRtsLMBwIC

QhJpIjFWDbWDZiPJ6JFqNoEQahSKEOUxi2OZocG8e8VVHzIn2UM3Amk5ZuHtWfCIEAOQweER2nzsPrCJ

EqHd3LJ3dKNbm7LUq6EeI9UIL0EQLbCJU2ROOnPHLd
LSDiSyRiZzKoRE8aFPmdHDQtPTx2ASAgLWL3AIN5TPQqNsOgR3YlIJPdLVMuBxNrZS5YRy7KCvA8ASB3

fUKqOq2QUZL3FdAMVqEqYY6CTGy=









                    ID                  Date                Data Source

 

                    [id number]           2021 10:33:25 AM EDT [facility]









          Name      Value     Range     Interpretation Code Description Data Scarlett

rce(s) Supporting 

Document(s)

 

          Operative Note                                         [facility] 

MDBLCa2bUeYVHyFi24/QCZrqJPUha3UyZPsdJTj6DYutFXJcC9RaUTS0kX0kSGG9RQvNPuXwNxHdCWR7

lbm
BhZhiRYpHwDQVmAxpMPdPmXEjyFyfakTYtWN5RiBD2NOEdY04oQPKqNAHgR7YuZGLmQPo+Eo7UZVLpiE

HkKG2CMsrS5J6qRgwSXw0bhk/HhYP0izmA3YiTw95kqMquPQbCMrXX4CG7g02VlRrft21a0j7rm0N2+H

Tnf1mkJC6pkZvX7xa4OW1LKeh8t9dFwohc4DS/6tcw
zxUmPV9pS2q7liooAnx/Qt6BHF0SHFW7Ty7Tiv/d7K8hDmaMKfSj/yD6tX4jjYRtScyL/eGb/o+q/7Oy

MXnoMiKZYqyYZRpap5dxhgEELVZFxRBEp2eH04qgc6wXLcRHVwSM0EldAp5e3uyJYnGzE1Fekz7Gj7XD

ggdc/FxLKp2ALfiEuLKNqjhYRUZMwux0LLkL/PrjEm
727pnyPwBrpAupvMFuiP4lj0ofP9JDpN4HvZ4dwybuzdDPzp6qU+0NJwuCt9Xrug+hcmOyX3A3sTU6s1

rWGjEndWUBwXqGQZhlerTNhuSlTRJoAgYexP5wckYwQUPwBpNGgVjb0+c6oXH7L0y5S8dGSa78OP3GSi

9lOE6MR7OYBknZgyZWe4lO4r7CS0XptfOgcK/JgLw1
7heQeqVB0h0Q21Ufeq7yu7azptj7rw4OheSixFM9kSrMh9Iz7HtBoSCZkrRQTo7v9mI24itXmrR2HuEc

CB2ga6eE4onl1EnhjlaJMV1beq1CoWfhS4NO9eQHvlJ9eIgu1hoMKDvnC/F+Q4/Z2UrHeVxLtEAeAcCA

wk6l9Rrs4Fh4v4CaPqR5tsCTkO26CQkMs+iTHHMznR
eVCpth6A2X8TaTasT7m2F49aWG4YBhfGeak7J2VFGto+zE1UBnH3BCi+dK9POfK8ux0NhYzMKyZ3RLsP

fwfXl4kUGIcYMgGOK2ZD3voVAfE/skQDHCC3sPqdWokuCHnUIiEZR2dATwW1LBFGMkzncKsvjn6Hw5To

aBS4C8lr9I/ZH5+e52voXVrGN6HSsgdZy5J8s0K5VB
f0uVl1nURxXgMeAvXd4D331Y0od7Vbac3hNleFPTltk6No+Qxwo2GZNCrNJBT2hTpZnhCy1wUTF6VLAs

Lyo5/r42bpc7DVO3WlZ2eTRoswQTAbKUkFEHW6dboYDuAJTrifXQtmrAvb80nYS6msj4hgHICoTNVn0S

gSa6nK6RRdZwOiFHxMLKIQu1zOGsLnPAgjjEFBE+cC
/xHrfbK9RhYJbcPxTnSwLxZ8nAygyTWC97ei/zm+ls3+FrZlfgUb9i1Yi1OAzCY0W8KFX7z91h0a6d3k

qtqKM/s2mjheDEtoQPwiJKWkI2QI0fpbFODE5k9fm+MiWL3z71+yC5GbjbZ1EDqhyQb5zJK3p4U7eGOz

j3pJ6cafaNshIIrR/of8EZ3x3ruQM2C9emw94kSGc1
2dZ+aoHbrttyy8+Gj2TeMrDWt5MpJ3ykdfeI+vM0J5BKz4/N837X05XmD3/qsTqr5ghdf2k7uOgNKVhp

/Y1yPAvJK8U2jqxqGqXpS9jzo9ekaR98/d3Vi3Kb9F4XFXq4tHy2i/Tbodz0archNhRR1vqHR4EyBaO2

6ppKOUL13fB7FKCzsuFuWeLoe5zdvT+kvb5pbCHG6e
6MloBKbMef3ZX48PH3jjQ+b/X9u3pae9su70ViduICgymXklF/L/1KSdMYhV98wUIWQ2bwinBAp1QvvL

rarEdtPMiOFQPHir3orCDgUXlIw9NhiSCYYRyJKNyPUifnrXThi5LmNh+CrZ4TcQiIhLrMCLguNj4p5A

tG27L6jCi9oMFvQKKdtyzK6Jg06eYahjvpS7FJlK5Q
CSClNnKTrSBBMLotLnmYMNuJEoQdtOA2IQikVNuwVqFbrggXQRFXCR/XjCWhNpdd+zzcwNQvlE7UwPEq

u3tTvK0bfTmjthHtisYof31o2GtFlSb9VWOQFUCqBFGVFsOwLMAswb+tHdJRUiC3Ond/UfMonl2XSkx+

fx6WcAhHAUJX6ElMyUVwQUOwLQ8KTDJj7MjfiTMppS
hLJMoD+LKrmOcJJgadX0zXNIqi2Yjf6zSF8ZiWwyeXMJIi0yd9IPiVWvYHi5ovQ/YSKnewFdw6vY5ACl

0nn7TfN269mqTAlzgYfg/8MRvUqN8yq8FeCpv6uGC5Nn5DhsYYlHyLXXSY2w3xNFxUyhJvCVaaRuTBgS

2ZIyPWmRCgC3RVm+My0jkLdwOt7OqewkIZl3pmTndq
yEAY2mnVomaImBwyfQBr31O4Qqf+mqt4X/STob5WX+wObjG5LNDMVYErA0NCRMPQlRrHEvvwVbosBV32

lgRmqnq0Nmd6RCxKd0gYgAN/PjABDGdHmD6DT3q+r9Fqf+86bFt3LVp9rfdNNGj25/EXt0NtnalSHhvd

3Niw3xDq8yaGPRCTLJpLZG3llyfFG0KjOg1tXmJzc7
tqqP3+xL2x6hieVivhn1C9a/K8fXZ++qHd+oLsEfbqmcgILadu9rFFD+a9fAQDqLu7UP7FltYMlNTA0D

PXLoSK5Q9F+IGS9IcKTKBaGfOwXSER1o7XKNDLbGHr+SZL8JR49kcAbjK8e5+TIALeoPzFkm5ryOTNXv

86okovWSJY6Y49+RRph57/spfw+VP17Gw0OHY6YRip
n5+ewYs7p+dRVCV12GQEcTS7ClM3OzVQCPK2Gbqw9K5gWUUqncA9Ei3Lao0xSb1bmkf5ly03au0WHHMO

NKwm9SnaqXFWPVyo5drq3u1fkIs7wkCtiWV7sytv0b73I7k95z3KHf2u6rgwKDAWh2kwih66l9T1ANZ0

lV7GsKrvSuv27qqeGvV4pZm/YRxzPO760rziYmG1fZ
AGsmwKzz8ueJSKh5aOEDGLO2AvHRwLrCUD53D1wSlRxoQ6Diw1Q5FBqpq1icu4glCvINAqNfCBXfGKmI

u+2GCRNjnY6v/4gSawavzcWKGBWzrJqVN8wngnF2k1BKDMTXkYgRo4C63lWuJ3so0n18Qx1dtPLRn8Vk

Zg1YeB8n5TX4nRdLeJSiP+m9joX9eB9Mlw57IKUDJj
3449ygq2C+V36WjFObA5e7KJCxy3iyBeJoKEMN9+gZIjqTESXqZekmhR8Zo4nApyrAopG+dKMj5T99jc

34XVd7mhQBrazOkWPZECk0CMfEF5ytKA0p8ID3eVVdoFuZx0oS4Q30q2Rzo1fUH3OQc5pC5EouS4yzGU

c3V06UMEY182XwrIQP9cKgcjPl52f+Q6hGPVY6Bbg1
myC25YusJHB0/DvnscrOqGiLHPwIBP8dXlIghwQZ2iksyDvyNVpz2LF0miYkzXKSfRgSX3VNoDNnX/NC

uenMqWMO02Fyl4TvwmVYkZDzupvxE9vK7JafOX8C4W9buCt57N3zF7T70lph0FcOOLnb9Vv2Ek9dCXbh

jTtorN9x8Q7IzZ7g1DpucgFZ6PWdr67cHX6abRi/rT
4ybdZl9amtDAUQSaICP28VP/pamZPrpfh94AJkiYMqzAAt0K7LImV9AINfVgiTkx/tDpdkSaBdT4X/mB

ust23Fduc+jCNPiKoN8NjvMXs368JJ3g7f/AtFEMkTibRs7Jkn+swclQv157Dbqzse6qKt5RSNQkDSgr

48oMJz8sr1bdomCsLE4VzD8VTf6cR83GzbKAPcKbYM
zLyKjCDpf9emzoYzpwxgN+3KnGAhRTN0OA9HuKOs8Vd6+Mz7wtO31IQVgmUikb/6ow6OLw263XW0AE6w

hbthovIxh+AgEd3xZh9wjWO59azn9guvZyHrAw5aIM8gYGZNW0Nv8l/yZ6p3bEUjn5xZ24ddZt0OU6IL

wQOe6RxhqlL3luHrfDVjb0fWgL1vv+t/A8d5U0p/Ib
Q7Sb+NVAqOB8VNi5AU3emj7Hc/xlBm+NDzAu3SxrZIn4UjRsPXYyYR8noRt4DBdj7hfMlCzbQRpYvBKp

//EUsQsxVtqwMhnHQBmT6x1HkH+ZZjRrfxwpXY5JPJEdJPCC7KvxpNSahC87R7tL2lMDT1LdRVmviF3t

KogGLAqa+xr7uoLJzp1hyYIxESwpo3UlIRqMTSf9KC
C4j7endx1KVjamqWA/9tE8XZRn6nr33B/nmcBpQh3tu81oyVUj7UIToNEwJDuj3iZB1TfAszYNhAwjyt

6H7tojFQRjChWPkqr6C6Hz/mKeAVGwITcwnxZwdWHgAU3VPnGaIY2zea2GYAAhZT7xhe8UVDR1HX8BCY

SeYK5LpJSbT3QuH1FBIjLiGDVlBEDgDU43MSJeLVOD
TOkbULZtO3Qmx739qyNtbcJiVIZyUw7QCWEzPG4RBSLvKLVuoHZzUVMiCRFhQjZ3YWTeTTxzXSDdH5Pz

luJztsDyMKHaKJMGBBrpOTKpO2rby4SlSRw1MO0FVH0JyqUqc3DbhcCvI0usI4AVED5MQHGcU9KGQ2Gy

W5ryNzZac9LaR7guIbLno8YuLe3ARfWbYi0CRbBeFR
3pld3YWnYxDB7jbc7FGND2JZ2HxGv1WSTxW4NjAXBmBUMrp0HqBL3SKX9bvQprFOC6OQ1+QRwgMNT4ui

StbA1IPFUzT3gi83VHgjxwj3pwFsHBwWKjNpcxe15dXtc3jXmj2YPICHxmJLqaqdD/vpSoJd/j6YjZST

a4jUcQZlWw4q5brbKhne2IS7i1f0IVi5lNuGMH/0F9
+41jzlu9Pxo0KJmzZjeJCkW43OyO2m9u4kJR4Mh24CE49f9ad9+2u0+CZbasBnuGb2U6z9Hr57aN2v/O

g66nu724v35P0mDg2bXhNgRwToW/OfHjrmp/17uZH3BviqwuczosKbo7elsPDyTpz+4rfsh6OMiE6IIX

kwrqVYu+B1ZCPqmrJDJXoicEAMtS2VtzJAMSwZU5ny
CWQKIpHdSsrNoFMmK3PDWkoZzG3zyOGCo8KgG/YRSi1FyELTZWFUEAUWDRYizsVN59wVKDy+o7tFz5Fl

m2X45jWVLKDhY3vvbzaCzBrQWb5JFXN31hSFTogZuYrWkH94l57Sm6GqcqT/WC1k+Vmdf+9IgBKqKsit

qgQwKyGW45Pig1iGrPEpb4OCYCUPgaWty3UbrJok2j
8wVp5uLTRgKBw/aNEvK3f7T+CARYWNcqCCy2EreutTcFBQmdDswewggDytyUVSURqb1J1dXDx9H5bIvg

lOzvH3DEuhy2kmHguwIvO2lehMGX57SAqg1rnuCU+mhKt4FRVx/fTE4W1oxrMHB2HmjNLvinJfCpD9ta

i6gE98FWSI6RlN4EV4fiActxO9qkz6ELh77ReIkkq4
5ztIbzi2FsdQL1DUudGpZVJUrJYHJYSIj2UwbqC1eDpBxgQB6FKE5wsce2oqFWkDSpNliJN03TLC8ypc

lhjq0k4TH03MgmVVTa3BHLGJQLLEkFpfYGDPXwR0zBe88N60JrsfltKMw2Lwuq+potJQECqX9JUnpdvl

zss3CryntJtIn48LcHrekJkDLysbkyokZnnrSiwdW0
rJMZOB6ZNMbNQUsUVcOFlSWZSwVqlhsKKSZcnElrwgxeeXZGVJOEIh/DAmCr0Mqj7TwtZq9L0avG5RlP

J53tuqukXv3h0HgYBAO9yFqer57iMI/edioaPCf9kIJSOc1HoknRkHOSsRk1C6K6m9PEm6rawLOjykO+

3xulaUlTHjTdnbkydC7vdfMyrPhak5b6KoIYueZ0rA
xQR1VYzu6aZEmBsrJK9ovlaUFCgyIL6I8qwDZ2rEBxWqWGLT84Yx3h2gEY6VgWlHxxHFNJUyzBIZYEdX

j0265L8mekqK+zaGNOah5MN1yISR+xFQt6pd0jvfuLxiPCBZs5xcrzvQRpLlFdlOPYbNjnikckkhKznH

asIqRHmnwq5J3Vdm4+JQiKsCQqpPZdevMNw8tHnqIp
FsNRxzUs5xJeaK3MD/ErPCgPWigpHHPI8gvZKwTvVI8pW5n5Q5Bji9u7aRTY/yJFnKYSoUABWtRikST5

kcHYaBzUpxV81r4DjmUUXANVO8Ey0aAxl269JW5CESnrl+tXw/ywg5eFFnSLhEmNsRdIrDOS82llJ4s/

K0HWaEuofv86L9ZkP1wgUBYHRvxjIjCjyqxm+Z8RZP
W/UK6aOJlUTbNCuNg5aT7Y7seIQBhbMA036ltjxwqioP1IE1/4hVseBkoiTNoJzs5Blj51Fk1JUbE7zS

ICFXo41zLYDoBsTak/TlJ6wPswUg1nb2tnIIWILA7VvcQ0P2GXxwttqMxhAzBVex3l9JRHNG4whWiIaR

qnZG1JjzQ048uGLCD0Sr40emd232VEKOZUz/xhNMum
9dQKonHDab5+4p0ft213PBtjQh8nzOVsg9FXvCXdtSlFNQMTXn7Yr1OXfe8KSMt+ltAPJBS+ni5G8OG/

d2Eexz/HofzZTJzZuI/eu+g7cc5ODJx551Ym256ZoAoWGP0BckfTEfrd8scQqipxGdaGxOumAD032KAI

G8VEz69nzuBAl0viaEKTS/fWvbEhj5jMiNaztoKNf2
Qne3AIlF5u5/64+iWoxONeXnXoGMNVl49Fg/4C5zU8/j4v6kQIAMjkwkKisGZkPW9ODyCxUH7wxu8GXi

LyBT3qct3QKTO5EG7JDZVzIJ4ZuTDiO8SeU3KEIoDgDAOvQWUfJG95FPXuKZRYUFwzXDHtP6Ths883yn

CqxcOuMFLnSi2IIUJqBG3CXMSrTXGvfYCiGAJyOTCm
RlX0JABaJFicJXNzN4IvrkTqznGiSJEoSDFHCDdqJEHsN4way5YsMPb8JI5UEC0ThfZbs2RgjvWzS3ew

X7QDWI5IBEKmS9XDE8AvR3boQhMux3JmF0auTdUje8TaHe6PGsBpWl2LToWkSM6yjn3OKQTyBC4mpq7W

MPYxIRm4PGK2SFWzGvo7WRK7BTHpEQMkMCO1HMJ7Dd
Y2FNclAaG9BRL4ETVyTmSzXcOqBDZ2MOM0DQNnUvg6IKOdUiKuJqxcEvl1YKC9DtJ9FTGtPID2DIO7Ra

J8LJCmLUZ5HCN3XgE3GHJcODH0ZGL3ZaOkJpmwXmy0XEL2IYXOIxXgMRz8NUX3LUZ9WLOcCKKmQTF9Tu

wkXrW8DDzjLeS2KyHjFxW6LKTnOBM5OqogRzXjYNS4
APP0BCAkGoV3JQS6KfC3EyFnMpTaLGe4CQZ2HkcdPjm0PMbnUmK2BptaErMfOXjhCvR5MhczMKY3AQA9

QzJ8VpsuKbGiID1PIAIuHaorBjo3RNO6ZWM0CxkgPAB8HYWdMgQ1QPWtMOL1CDFoNQH1ZXSoLBN8HRK8

FTZ3HZZlVTC6FGIsIsZxXuIqITIiXBDtRfM0UiEfAL
K0FHN8VvX3BNWjXEP9AFTtZcR1YDXpTqi8WYI2GzP6ALAdMhLwDMCsAVNBWkCwTZDzXODrOZOqTjJyVq

WqTSGeMVP5CGIoIiGxRYl0RPL8KWRnOvZuTUu3HFM7LHJlGqRlKNk6OKS4APStVfYkYZw9PTR1IZYhMw

GkACy8GHV2TCMiNiOkADw8ZHE9RTPzXmElUUa2RFD9
LMBqRjVjPQs1COG8MAUzNJiyZIk0HMR4HQGoKwUeHGp3FFA3HITrZoBlPQf6YTD9KDTvZkHaVSS6VZJb

KfFuKMU3HJD6KzK7AJCzILR0VOF6GSZ9YUIiLfRbTQqrPnPzZgAmPAC6JZX2CWFwKxDjIqE6XLE6CdH6

KVPpWFL8QBUdAgGhSzGeBgScIR6XLVG5FlXdHIY3LM
XhVtXkJsDsEmSaTCY5AKM9NPYgDHX3CVqeXEJ2BrGbBsToYIR0RgA3RvtxRkD4CWA2QnH9DmskNaA1AU

SrBGVpVzOrKEG9HyH5KhjyVhJ1NVP8UwNfVakrPde7OHP7ZHTnCdqtPpIlKHbhMrI2YdboUBmdDAq4YA

N6CtozKax7QJk9KLP4LSFqHpm3AIypZjG2QvEmSuJs
MTlpXeF3SqzuGbK9GEDiWSM9XDIyDBD7PZE7QkW9HJYyJOV6RIH5NbT9UDnmIKXhAFW5HgU2AHPmEVB5

DPW1RjIhPsaoVze3FBL4NFDrSmxuJET2MD9KHOD3BUMdXHJ6AOM2GhE8IDLoYVV6EJZ5XdP6XXvnYcBh

BWK9IuX5ZJYsMXQ3YQG6ZhP4LRSkRVG0BURpQGLnVZ
7AIW1yr5XdEXe5AZMjn1LbQLkeAHh2KPdsXZNpY2D1qZEoYc8gzGZgz9VerJP3z5DXTyXgDNQzGz2vaK

2hwZNzHCNyYDyAVuBjHHFtFPAfHU15EHzbQP5RUUAJXUtkbEDuFXZ2Q3Myq6YfihZqGOIhKp5QWOLbIO

1XkWWvnzVqZr1JGKLzKF5Bm143NaAdiLVaKNGhWbGe
TUMzIUBsHCI5CN0YXHJgXC2WsXPtnMPKlpnvRKKrI3X3VZ7ACMAZMhNsTj2FFgEgOR7wol4QCBVeXHYv

TpjDSqLdRCoHNjTaPXZdJRcdHQ1Cy867I2Q3CvW6yFWgSWZ6CRA9vJSjWiOzDRXjghJwBUYyMWcdBL7i

n4GabybdY3jlRQ5hqPJgM14tkC7fJSbvUDGbC2Zkuk
Y8O7zoftJrVO0LPHZ2K9aloqJkFQZHPrAwMRKzE1gtiOldFCptRXMFYLnhEVQiL5ZczeYGPRNmiegjnK

4zTFiaTTEJTZfyJR9+ZIfwtyLlKtmRMbYuRYPfn5ZuOPvlXCdwNiPaZOj6QRVnZvseRvCbYAP6YDU4SR

VsYZZ1RVw0VZC8ArPpClW1LQZsXlVzAsMdMfc8LXW9
COXwCfcyGsMbWVL8FTCbDmtsAAV8KPR3GhP4LLWrSQL3JXI6OsF8BMSwCIW0ALE3CvR3GOSjRCL1CZTi

InIyPqPkBZg4GX4CGVN6CHDwKEh4KFEzQPG0SlOyYtPrLExuSsP5OtXwHsJzIFL7JwP5CNVcFjq0GMlb

RaQxUpesEKH1IMuxXuU1IYWxUNJdDFlmLvF3MpqhWf
F3JJu2PIC3DiQmEbL1YJUlOKI2LqKpFfT8JEz8COT2MuhgGyA7TMTeUFMICoWhNxMfVLP2TZUdSsWcQY

u5TJC6GnHtQjRzGVB4HCNmOXW3AJJpRtPtBFV9WdQsUzXxLnMcHZNyHZVfRnqjYxs9RYA6BcOtGrbkQA

z6URFlFYO3GPReRqYyLRVqWQLeOFuqAZO0ULSnDzS4
HIEoRHF5AFg7HQI2TNHtTGnwAOS6TdP2HEKnZqe3BIJ6NCDiWJntUTr2ZXo2VDF8OVIoPeKtDLk1TFE0

QJUlQeZtYYk1DVL6LSWsGtKlXHh5OJP6OQOoLjCaZWv4MED8IKLcJkDjSPv1JXM5JOJrZvZtZOs9FWY6

HWLtExKwEXh1QOW6HLEtWoApUX8KVRV4ISXjEnFuMX
i6AXF6PXLiGrDdYKx7YRM0IFJyAwKdBQk4MQMkMckfFoOgWLD7IuN4SSEmZRE1NAQ1DwHeSGXvBEF7NL

HaBwR4SxroSurmGNY4EyR1PZEuCqMzDBhlIhC6PLMlXTDtVBF8SZWhEiQkVgCfYMRlGuIIWuZjLXy4LI

L6HdQaQjKpRxEiNGIuEoWxJeZxJOR1JNwbTBM8UoAg
JSP8SMIsUZK8ZdSwUuJvYQsvOvL7LjRlBcTkZPfbFkOsISFpXFmxEeN1GdonOfZ7BNY7VaP1UrbmDxe7

AEF7CHKoSnioXff3FGdaIeT4WlDpNik6WS1YYGN1TfbnGch3ATe4ZKM4FlsgVWg4EOc2GTI7IgXeXrJi

ZMmrGxA2MfHbAjK7LKH7HtO0NJHsXOC7BSL9JtA0TG
NcMNN6GPA6CtZ0TQQuRBs0UWI4QhL7PQNhEYD8LHO7VnG7XRIgRpk1YAO3NTLfXrcuHdk9EHEuYGUFFa

DbNdIyDQFyOGK4HLOiXgRwKDQyOHA5NJEiJKB1YKVyXGS4JYNsExYzTEFgCLG8JFJtYDF6IRBqOJA5RG

OiHTDRJsEuOM1uxa4YPULwOIIySajJTvXsVCeJRmYr
VFOyNVbzNA9Hd625VSZhY4BskPKgju7PZRVfHS9Ex441EuSwYZ2IgffltRnGf0quHOmpREQlR8IpA4Vy

rHY6BXJvH7PfGXJhM7x5NMcwMQ1ZYCMxLJ67JG6tMRXLGtAbQNWuSfanL9WrOhMYBiFvEKXqDa8frMNT

h0amBtWkZASdEwLiTMHrXMdgBK5FOaPsMOHdCCSzvA
ogOK8hgLIsHL2TjCHzGiZcHMznYN2+EKmsoyGvPwdQPlGhOATku7CwRZteUWc0RBkwDOLnX8C2nZPvMm

7kdR0TjCF2dQPyA8DvvCKEnFHhZ2Tmz6HEr619N6HkbEJnPWNvvZKpVC5uw0YfnuglJ0wyIX7yeJFdZ0

4hxS5xRPvkCTRoU0FvcsU8R3xcfkUtCV5VLBQ2M3qi
abEzJOKAQgUmSFNjX5iuyCamPHQoLLHkMv7EBFHrPN4Do436ASGsA9SktGCqtgGfUdDsPSZMMgXhYl7E

HgGfKQ5qev0IZJKaUZOdFiiAGzUwLdV6PXIbAnUlATK1TKDrLvYeWUy1IXT1XgO2LVJaADg0HGzaPyUb

VkwaEbKqASLmKjMeDQoiEQh7OIX8DKRrNcDgMyg5JW
K7JMP3VKPgXQT5KDV5XtG6XCZiUOC2DMQ7IbE5FTHoFYO6NQW5YoQ8MXGlGcJdARLvXzY5CCErUMmvBZ

C0IHD5PGUyNnScUXl3COT0InKtKoGhKMgbJiM8EmAtBlU4PKKnPGO0OazvJwMyPEN8URK4EJYnUlMnQD

UgDBQ5PvZgCzYnBKf4XGN1IrrmUkk1ZYnxQdL2Rmfy
JeErJHtfNqE4AppvDRF7FWT2CaW4VgbnNdSlZL7QZQQnNyMsCfa4RILlOsK0ZCFwVZE8MFXlQoO9YFIq

RcUpSPI5KvE5CJGuHCJ1CPCuAcY1TGFaRaDdUSC7FRCiQsfpHKT2SPO9WIM9UIwuZuEtVUJuYYH3JJRm

DnHkFUX2MGK0DKDgTqKtEAUiYPP6EIVyIge1RVX4Sd
QVXnFmOUR4PZJdJAXkJWzfApbiNPF7ZYN0UNNoBmGhXQj0APR9KVQrUxJkUHn9YBF1DMAzSyIvCFh1IQ

Q1WAShMxOnDMr7RQC7DFCwJyJbIRm3ALE7MURhIaPlCKq3TBY2CMFeOuVjTOb9XSF3NTGrFkPiRSx9GW

B6VWToEAfmPDw7USZ5VBEeFoBoMQg6XQJ2KASzToHj
KHd3PPX8AVPnMqAqQGF2ETGsIrBgYNY7OTH2YxK2DKUcHLN9EQC4MDK1PCYoKxApGUytZqKtQnSsRAI8

WLQ0DRNkDsMmRvZ9QAT4YhV4XEZoMFR3THBiXhQfKcBjOiPwVA9QEHV9PfMvRIC6ACXcXxVaLvLtIbGx

GOS0GGG2ZMSvGLQ0TIbjTDF4IkJmSgGjCCaxQhX7Qq
PkQqDfMFgwFmC9EySzDnUfUDPcMQJmHjKtGLU2FyE5BfodWaC9TGG7PaKdErfoXqr0OOM4JZPhQdxzOm

YdCOnfVyF5SqeoFYnkRCm2APQ8VsmlYbt4KFi0KVD5SENlLtz6MTyfGcS5SfHqGeEgAGglOvB1UyzkRv

A7RCIdPDT1BTEyDGH3VSY3VsX2WEVfAOJ7LUI8IdK3
NUpeXKR7TVZ7GeN3CIQqOKZ6IVC9ZnQyHljsGsk6QRV8MKEoIcujYnOgIL1YKGH5ANJlHbHaMPHnMHM9

YHZdIiYiYNPfUCH6PPtmDsYbCZPbJIE2SRCcZsQePNCfNCH7RFBlDjLjRCY1UrIlNK3TML4ok3TdXTps

GKOkAE3srz3RAON4DT0EQHKjSW5JnXDaD3SqcpZYRR
UjtlgxbS7bEGhyBFTkE4FfwdEOHY8cK0TtyDOmOKMeoUJDYhJhQHApDWKdDK89NIvdKO3KECMBNLereF

PnXRI8M4Jlk0YkdvXxXQQdDp7DZHRzAI7UbJZdkfRkKw6XISWjEA3Mf422HkYadFSxZBLqUrUjNWQvES

PaWZV7GO8LCPUxNB7RgKLrgNWXuyvtZAGrR6C4QM8Q
SYCOYuWmXg3WMpRzLZ9wqc5ZAGSuSLReQgqLLtWmRWeEQvFqXWXyHJtkQH8Rn774X2V4ZoG4sMBeUCC9

CNY7bDBlWrNdRPOoevHuXTVzKEdnMo5oKA7QmjYuRWrlQu7FrG7WmvUlMV6it9OdtlhNIrQcLNIzLuvo

t7BLhPDiDPLtJ3sul9JYzWFnARN2GL2XBPWnGA9NjR
M2zRBdPYDiIUPFRWhdIHSlE0LxizRDNXSkabbaaI5iLGF7TRXfVz8ARIQ+Wk0XYX4xg9GoGWkpPeFkJI

0try7WUKRsLCj7ROV6EYHqSya0MBI9MZMbWJJyOGO6MIH5NtI6BMxsEiN5DOW2FRSpBrSpXiSnDZH7SV

F5OKLfWrn6HTXpHcDoPujtJrd3EFW8ByD5GOUpOHN1
HGP1KmS6HTFfGTF7TJT4TsC7VDGfJWA1MEC4ZmUwKrweOsp8IIZ6ASWFGrNdVUb1CYQ8QLS9SOJnJUJu

CJD3VbqaBtQ5OCfkHvC8JmMmZnT4NBCtSXB4EgkxLiMuVBY0OJP5TKKsPuM3EBG7AfW4FoVeGiPwRSv5

GIC2WtmhXan6RGgsHtG2SiwhYrImIUpyBuT8HgcmZC
E2CNG9MbK3QxipPhWxVI1BJFOkJpgkEib3MGX9XNN7WtpgFGN7OHKeKkH9FIJiRRG9PTPpWDE7CYDpOU

F8XCT7VCX1LZFaKWI9ISMxIkJlAtMxEPZwBZYyBvH6YmRuHRJ3VCA2ZwI2XUFfPJX4QAHsSeI8DZBkAl

b4CLR6BaZ8ESVdCxXpZBJdNKSHGiSgZBQkDZFgJBKv
OdUmChYuYJAwFJY0XMZsZeFmDUu6LOT6CUWqOrFjCFp2KNB2YZBbPuUjJVu0IQB8ZTUnGfYaCBq1WGO7

NLQrVvDxEHp3JZF0OCHpMjAaVAv0DCG8BPFzMxMvLEy5MZU8RALkHuKrLZx4WJW2BMHpHXfwREn9OFW9

IAAaDkJxZOl2ICU2DJPyOzBhODe1VPX2PFUqGfKrNK
U2ZHOhIrMqKTB1IIZ7DaG7TOHsJGH6PLL6LGS6UYVsFnPnMIflBvEdKzEhTBA3ZIT9BXDfHyElXqE1TW

R0MyF7SJXiVDF2HOSnUoCoNnWlPmXwFM2KRJI4NwXmVKQ2BBWiLtIyZiFmGlZlKJC6GYE7KCLzZFB6BD

ytPER2MgWoPjYhQBJ7VyO3DzvnDyY3DKT0UgU0Ivvj
PfR7ZDMmVFXcKlPgWMH0WpS4ZrhjYiW8DNF9LfQcFymfLmw3FAU2TDNmTwdzMjCjVDtvOiT1MmupOOxe

GQg6KVZ5RiewNwu8RKq5RUF8XHDxZho1TQnaSqR9CkSlQyOwOJvlMbH2DthfRxF8EDYzKKI6CTEqGSY5

ODT8DdW5QOMaOPJ6FML8LlZ0KUhbTRUwEUK2AbY0WZ
JbQLP2XXE0XnQfAsnoMsa2VVV7ICOoNsfmLZC0YD0ZBLL4PZYkWJJ7UMP5EsR9THGoDIL3ZZL1NyI1HR

qeLgXvMIQ3WlW3XJVzKZM3NGZ7HzD0IPOqTWX8BKNlETRkJT8XMX0ju5KjNAvjFLPcTS2wcs5FYNN6PP

4ORDPcFN1XyKBuM8HtmzVDSUZcuxxvwW0vPYofZGNq
G7IegkPKNX4iK4IjfQAbLLfsQMInO4FcU0PcpTA9ANMnM2VxYGItQ4r2OFrgIB5ICIHoMH96AJ5tIWCX

ZiWlSBHhFymxF1DcWaSYHbBlVTHxWm9rhREBx8ttClPkBDFkOsWeMGD9UQymRR0LQtBsQGZbKWUphYyz

KS6gpDJyJF7MqQUtBlPdLHdbAY1+DQplbmRvYmoNCj
F9VKJll3NuIHkvGDk6YKaeDZFmN9W4cJIlPd8rsN4RsVO5lXMdL3OorHRInTGvI0Cpk1RMu121A2PsaW

MgX0SnV81aiF9lE5adujIct6mXtgFbWSkgSg0MGSLsIH9EkBXrmBMmXCSmCeAbLXIprBJjYIHzGmM6TM

drYUSlV2urKYYlcgQvRtJjZZOZInZiUWMhUk4hjYCo
y4KhgZI2s5MoFThtNDMNSTlbFV7+GLfffsEpFfwOMxCoRYTqo1NbQLsgABiqRKcuwyCpAvaOJrQyMHRd

DfrVArIdYRjNTrIuMIUqPZCgW7KawOJaH9WFOc0XOKb4Q6tyVSfsSu0XuTTuPDQaWYlhQDDjF7IhkzHq

UCeaZ7XgNCYcAPNaLx4IFBXaPOMpO2AdTYUfMZXzFi
3RRZPfQKHkX6PlWXD5SUQtMc1CCWFzSBRrA4VwKPI5SHErLe7+KAoqRCNpS7aUYsrsZ4WwRYjjKu7DPp

EvDALzWGp0D2A5ZRJlCSa3J2WIR4WNUHUjUJlsFTkwSVSuPSg3R9V7PHMoP4XNV1Dosfgkds0+IC9TU0

8RHGSmVMc1U1A0zKQuQ6O2sHiNbAM7RM5SRU4GaJk7
aXBseT4+BY5IC6QORgIwAOr6Z2V9pUVqY7I5gPjPxZC1QD0VHS2CbHTcHSIsnzStYn4zY3DUWUtRRvPT

TPT7ZK4IqKOeUX0PfSHIP4JspGIyOm9uZXcqeHGtmG5uKq4zGQfvNHVqD0ZDE3RIAL7YCIw7C0M4vOCv

O6L0fBxKrSS8VW9JPD6PfKokuAOgJz7sSZsuUNTxMB
4+DQogID4+IGovnmWlIlaEGpQqYHGtw5DgOCz0YA9VMU8jfFeaJGO7Vg6EhLM4iWJwO8nFST7OsAInD2

1umSGxTFDxVa0UJrY7wcWzvE7TQY82wSBgt2D0BUYyP1jzRNici33jMCxjRPaOSA7dKWCBTHmbVNzdGF

P4YyYkrnbjPZUrLj4MVxWtFSx7rK6lzLZ9ESB4Vdzn
jQEjFPrlHsCoShBgHqU4oLolqpv3DLxvES9wGLngndfkHXOkCil+UAsbQENlDKGjLfdROVHgbB0xpzU4

vpCzWBxzsCNuMv3pa3x0VthhFq2cHj3nUVo9XkDbJwXhNECrUn6gvB01YSogvcHpIq9TOnJlCXB7O7No

ZjpSREY+WCmsCXhifUm6mHAkIFQbZq8GFVIiURTmVM
AgICAgICAgICAgICAgICAgICAgICAgICAgICAgICAgICAgICAgICAgICAgICAgICAgICAgICAgICAgIC

IyOOVtNVCfUWEfUIJfDDGwIMRiIOMfTNRuVCVsSVWaMV0AXMJwBPBeWEDcBZIbJNNkASKtKBLpBIUgBI

AgICAgICAgICAgICAgICAgICAgICAgICAgICAgICAg
JHAnXAXgBQOxYIRnBNBmLHMkMYYnCJZeUTYsABYbRVNhWPKtBCZkBJGnOE7QPTEyGCCoSUWjFJEzODZn

ICAgICAgICAgICAgICAgICAgICAgICAgICAgICAgICAgICAgICAgICAgICAgICAgICAgICAgICAgICAg

PLTdFNEdJSNxIGFdZCUaLRCeKKJlOSOoFA6SVSHuHO
AgICAgICAgICAgICAgICAgICAgICAgICAgICAgICAgICAgICAgICAgICAgICAgICAgICAgICAgICAgIC

OoYLQwLAFmNWNwIEVbKUYmQKZeNRFgOFTzASWoLUTtBAToVA7SRYLpKFNrJENvMMCkYVCbWKXdTMKvLM

AgICAgICAgICAgICAgICAgICAgICAgICAgICAgICAg
ZCUbIJVjOFDeTRGwORIvXUCbEITzNKWzPIByMPMlNOByTCIkFXIjYWIfAMAiRW9XINWaSOXyWARaEFQj

ICAgICAgICAgICAgICAgICAgICAgICAgICAgICAgICAgICAgICAgICAgICAgICAgICAgICAgICAgICAg

IHKeGNJfLUFmUNJjSNOrAVCuXRTlLWEwNBHkPT4FHC
AgICAgICAgICAgICAgICAgICAgICAgICAgICAgICAgICAgICAgICAgICAgICAgICAgICAgICAgICAgIC

FrMPMrVYXzDMFzEHEvFPBdJNPrYVXlMFObSYYfPOIlNEIsUJCsPJ3RDWYaZWYxQHLaNANcRQSeXYXyQN

AgICAgICAgICAgICAgICAgICAgICAgICAgICAgICAg
JDKpHNWjHWNzXYIhQQRfXAJgRRKhVBQqVSUtCOZiKRJfOTUvSPTuWQFySXIeNVOlGK1OJSXuHFLkFRMv

ICAgICAgICAgICAgICAgICAgICAgICAgICAgICAgICAgICAgICAgICAgICAgICAgICAgICAgICAgICAg

ICAgICAgICAgICAgICAgICAgICAgICAgICAgICAgIA
0KICAgICAgICAgICAgICAgICAgICAgICAgICAgICAgICAgICAgICAgICAgICAgICAgICAgICAgICAgIC

BvYEBjUKWkOGFsBTOhSLUwWENbNRVaQZOnALTzVRXlBQDoRRSfKFXdEF7JQS36wRVzf2A9OJQcPF8eoy

c/Cr9IFUzkabAygMWhFH8DLbEuMB9tel2EDxZtWL9v
qo8JPJgXBmXoC5W9aUBlXOWuWPJSGfSeU44kAFpfLm04QIymKDEuNeZvRMu7Xn3XStUxZ6ddRABoUdS3

TCHcPtF0SYMiJyNqOSpqHB8Jk7HkpZGrKUh+Pv9MEU6os8FqCMouJcRjXA9vnb9YKKgIXdKoJ9VszyY9

DHCwVCOcCt3EHQDqBGNkeLJrZoYmNLCTEyRhY2NglF
49LODJLd5+AKdmdaPaVfbFDuZoXPHwn8HnORj9ZT8FPMEmSJe3yRDiY7AzgmI5dUMxHL0abYQmWnyuQ9

GkvVixgDCXq3BxrH8sCU7YKWR9AYniEg9mGOBsXSLzJwL8IIAQRU6FNUWkLBWehKNpCMFhZQQSYL7WQA

sfUVD6VGYlpxDlcVUgCCgpFE0AGKCehiHdWcUrXSKP
DQo+Qu3PVF2jk1HtIAzsRNLcHX3yfw3CTGkPVxRuD0R3lXZjG8Q6GKqzRh3ABTXlPLHsUnCbLNXFRHmh

AB8HYW2xjzC6HZ2PaVHnBSAvSLBgnYCmYNf8Y11pmFAvSXkaEU9VUIT+Kiesha+Ja2ZFZRrUTApFPRcZfPm

AUWMWyXeD6SoP7OKf7MdH7MiCW26jLudxfTbDMoaUV
9PGJ9pEAWqPPXXOO9QuLZcvI1qozFgHcYeOBRDYlBiV57zeTQbUWFyFPZsHRTdHf5KCVFaC0PbasIcjG

wntoVpXJZlAMKVKL9VANqkozQzbFMprHqbYN81sVvmIU9RGc1RZnXwPR1hzg3JjQHjZj1HQFKmKB8HLV

FaKMRwNIXuRRP2MBGeOdAqNGaxVJQrKUHqJWX8YYHk
JLEtEW5ZZiPoCDCuKXJ9JeIpGHPeOUVzjo6BULXoEUQhIdAeFbWvRKSuKMQlGJxyZDYnXOMxLQT4LYXy

NXFhPN3OEdOdSFYjDMIqKCUqJQNfZPLkpj6WUGIbGHRzJfW7IEBuVFFgSBDlFZlyJEMtVLP5JLU2SEWc

XQWsIK0SJrOpUUYnDEOdDiYkHQFyGFCoxv2QCKKcSQ
RhGhXlNYWwIWFxOLCoRRavNQErZJP7HTZ9BMQyOUBxGT9IWqLsVWBjXMN1HGeaBIKzIBVjbu6NPOEqLB

QiKmO3AkWeUHPkIXSnVHpuSEYnGRO8AnH9ARDtVIOgAG8JBhZbMGJtZBl9PStdLWYmTVBqvn5RGMOrFM

RsOYxuIcAkVETzKUGsMCzuOLCpHOU2PFH6GIMhSEFn
PB5UUfUwXNPvPPfdHRWuSPChVRAmpz9FZJFvSZAtQSZ7CvNdNYVfNSArVDpzMXGlQENqHEPjSOUnUIEl

PE9FFnKpWPBsTZN1DdSrOJXiOEWkuj9KRUOgWVDkOOS7BdPfQPDhUMCgNSdfSWGcXDDiELPuRPIxOCMr

SC5CRcAsGGUfZXS0HOWkOOGuUXHkwv6UDMEnROPrUl
y9OSKyLQCqASNbOZn5hvOtmQBqFLg8GF3ES0BlzoQqVqPFVd3Ex070SYK3BNAhOm4KI8sjRt2kABWhAU

LHNa8TMXa1WxD5YKMaYDEyXbBuXTNtCBBvCEfjYQuqVhn4XNV6DoA+PLa5OMv5YnXrLTFgVKWhHlXxL1

E1PRV0EWF0DxrjWlG8WW6xJRQQJx0+UEehzSLfsMghMYKDToXlHNl0NXhkLBLARz1V









                    ID                  Date                Data Source

 

                    [id number]           2021 11:59:46 PM EDT [facility]









          Name      Value     Range     Interpretation Code Description Data Scarlett

rce(s) Supporting 

Document(s)

 

          History and Physical                                         [facility] 

DROPYy1bOjSRLjCw72/GSMokAYYfh4OcDGctXLp7DCuiJCLyT1RoTIC5iZ4bJEX1SNwDCdKcVyRkQPE8

lbm
EoIyiPAgVmXCFkXovQMjEpKPzbWzznbBGoRZ6XgRK8HYSaI59aFPBoNQKcA2UcYREwZiG+Ps3ZJFXtdO

YaBX0IVkxL4G0ut7xHXT0h8M5wYXSOr8qaivFlmvxZAJU31fTZGFuuDVu2RRq6mIWIFzWa9/6pj7yAmv

DSmVDHIjmjLrq7+jQwUWts311aXYYI0t/5RA4lHYCG
Kf5f/DnSV0Fg+fOfyPoyzlaXrf+9UsV8eNVJsR1r/0UQv7564kiMpyMPfNR+qz83HaMdzWrVVBL/POh/

R/r/LBWnnNWfo9KThGATrOkzDmgdhSEuSqAQE8lNuZowoYnCgmLNxP04Q547hCr7ejO6f+LvNqm5qbQ0

t59arCQHFu0P9YfLtQw5L0mOE7XIQJMtXCCKzXpv+e
M2bf1kp1+rUKCDBMDSKm1OKVeiEnx89ygz6BCqNbrLZrM8u3eR3QyjfG0pO9YbP56JiYbDQpWox0Pji/

VEfYIy1e8/eVbtCWJ1eDvbrE0dSFYTNuSM4qiCjqST48l5FgyS0l9ydvhC9plxSwku5MqWEmeJkhQhe5

EhPsafE7NCTv2CcaIHfo2OkemXoAUnCD7nEtIbV6sb
rABXY0gXziVGX1CQyPYvv9Cw6OELcZCNlhd8nYJLRmklEafH2jlzdnaAv70ZH1cnD/ZaEsgB9Xi21ISx

hHmww6KCitUN5cHQgVwH4e6qe0pZc1lc/CSovqBsVCH4WaK56jJmmlUmw5d9TBwcBhArAyHDcBqK5hdn

/KL3/uQi/+lVv23irAFO/UKI7ES5c9P9cRtAn7rqip
LkzTmqhHvZgYFhGd5W3hJJCZadeVwTTgLw51gyDW1JHHSc0SQitBu7nXcmh6eQTk/tLTW0KruPstQlqd

S7AlVVZVS/FVnU0q0guY7hWDtEl4TsoM4gkBTiEIaWsqocBwPOYicu3ROUsdR/vTRwnOciizKWLFPyiv

VE8xa0MlyBTFdBdakCJZRoqOxuyycAueRo1srvun0J
11i494NAK07TM4BzEmY6E9IvHboI8aXFVw9tWNWWsghsmpEMGiAAkmlqqfWmCk1v6wS7tqCSyDVmwP62

QAAjHC4B7XJBEBM7nF8saUgJf2/2NOtSbOOZC0f0zkRkqWEvQ+0wAacnC6NIhpNGKXOm/mY76xM1uzLN

nYEO42dL6rZqwAirF/DFm63zLZAm1nGpZM8rVQarrZ
XMGVKDyTCRnQ0TiNRizwiOk6//ZKKBxH1NNcfMlwvjw1usBikoiBsJ0B1tA+bZscuTuDiia/5/VKDfdg

FIs4u1M3nVKkuhkDO7fknlJByhQb+zyWxLFuPWKJr56+9BcMf+8W/S/8Gf/2JGIzuaSP/5MXUZUkpQuR

Onim6eceRHo/OExlife7ozlL6EJx4tMJlHAsy8KAhm
wWAg7E1ThdArdF/YPkSi3VFQKGMveDDuleYkUGUJRa3nE3J2Ts+ZgrOy8jXzZXgsCi/ptNCD76JTIHi9

NRwvHRcsqcSa/n2Xn7gL1v62uvPw9jWjaOXIZep2K/0lVKLbEVo+CKXOuFPu3dXRW+yKTgTq2Yo31VrW

P//xhJyd3/ggze2ZA+T67PPt+bveRVfMrsVGYYHZUF
WnqU2POtKYBmg9QTgMiQUXfkkgpc4YRTiEwCxuZcjeMJFPIf9GLS5VKs/LUMHDmNtP031Fl7vQ4+hnvR

gqkAaqa+fYrY+18rsim5mJGEJiHbSwsLQEK32NgewKAXg5ho/9F1FqUkQXbFetYEGM6PSmET3Ij3Nkxs

AeVgcTshYNjvyXYApZW/TLsl9PVciSpMuVT7AY8GDx
MLKOj72ahuUwbATE4Zak/qB+W4amaOADjgMBPl0n3sRuN/T7X2ivEvJsv8FN4GyU856JuLFFMUZ2DN6n

TFFraeCOBbBJj2sxDWBxFNwh18QNI/IKqr+Es0rgGg+16LUBeqtVQbFAMQ+HuWgeKFataJCGmhRMtMA3

Ic8lKoTmYbd+y9VuPYj+ycZ5Z73InnEzXxN4Ie5PBY
8JZqjl+kYgqysGB5M97vYBsR+HGiWcFaT3facVKssXfSiL0BZBEmAPCrBHa3Ol6uDkZIoaC7iKbaZrS5

gvhC1mBMO9IZvF/Xx9Mb18BorbAOXNIr5N2mSygEUb1zuNVPJBZq3UHSLT5hilLLSxKOZG42h2e4XRiq

t1p+wCgrzPD0NIdHeb0jJEiAZtaA+H1S7tKUb6lBAz
oFV6zE4PCF+vBzWWAGxykpG6FrQoG+IF5a5ioJcg9+0UgSlTc8YyDF+bffIKJVCIHHHW8nO0uPYK/w2q

p6RywTElN/PjFyP6N79oqQ+H8IoZKonaB7OtsZnwdbD3j/BOOCZybNHXDPm/ZRyMqkO28UvESt0LW3Eg

CTwbtM+4r9gnbEp6BVWO1W7Z9eRyXPaL2oKNPZvDVx
MrXPvDaFpaqSnoPtJSZ67ofWbBV3yNZMiUrvzEY9pqJYvsyPuCPjuaHt8RZhZ+Fk8W+GjML531ZFBMzt

WLA/qSuSkDQ0nLgLXjeTCLtsHs0FAg0GvaxKz1CA+iDzIanHf+FzhG24Ch8OPwVHu19ejNBG7DiYP8T6

mV4C/dc2GMDGpQ+/Q3IQlvC6fadiKsCyH5y3eBQ9Kd
qMzCDq7YzAYZ0hPJlBXaq+1TVR7SHksUR8t3nKh1ua5wxUGMhmzKdobyFd7ROB19WiEhEIi0dUG0WgB/

GmOGA2mzUvnPsGwlBSw5wqgr2lHfgArQ0vtt09RSt4+O4HbI5WVyGYQgWK0aVRfuI2Kq4mIZa1wC7Bqb

FftIYwo6UkOtyqakA2pTJIryVtuHxlt5Ij+W8+z5uK
4vH7QkKRW+svKgUIhAEuSSIo2OWzTk6q19EjAeDh8ZnTDPc0JEttLDWXn++j0n1O0jndEkeVgz5H1ytn

K4Tds4XBJyehqoxTq9zHNY/7M+7irDHoBJKTNdJzdTV6uN3uba11S8nu/GrV+qon8fo42XaW0D0O7fJL

ZXJuHBYwr45lWT98rrXaUIb1E65r4ZfRQDLdHooiLR
y9Y/dydnRuM0i9gGZWfRKh5asfy98U6Y2WkdxMKJYrMKYiDY/mSmhM/PoKrwLEjNyt2J4gANxG/MM3q0

Rl9taf4E3dLBUT2++6PTGjKlpArK7tTUcNxYB8XB7vfCadVWJJWLPYhCw10t+TD1lH+7Ur5zAbbhvh0k

T3C+3JLUtF8+FjE04LsdlEG8RgwQgPxOdqddp8P0Ft
fCB1fquUifrM4Rkkc9VViUwBe+CNv2liG0zOvF5zojNniwm4vZgE4IvT9gqJMzu8cDVYmMKCLSJsdIyM

m6NHasIMBVUS3rMO9IihA86fd+MnEl5UNz6KJHY4GvKQQV5rGLLzMvoWR3al1S5jHb4kQw0SmrMUcp5f

GUfncBAEpoEBkXO3UVQbv93NB3PAgk+9qybtjfcTpm
ehkgiP05GIiD7FkRwUEolOxLlHQx2+rWBsNYcNC/+xk7vzIj4x3/eil1KH7eUO/SEKdxU/743MSIMeTj

Iu53g3PBl5MyIZHRFRSQUUK+L9h99cmbNpAv9gz1CysmgdLcKp1Dzz74MXvJwB0DjuVDHt1vqGxALfJh

hEremCbDPdoHUzUmvotrFSsANC0tW0FQmxYGajukfG
QQg3t214ngHkO4TBydfvzRp10KHzVbywyXjj1UMfsh+ozszxRKlnhKB61uiimjDn/icn08486cVcbyt0

8wvEdM1kjZ3TC8cHP3oEl8MoY/Ct65zQSJnTPw5tDBTLdz/R/9eGR9YhcjGNWklBpJoUzd7c6JykVQBv

vnWB0SbpSE6KG3HW7PU+2sObqCdvYGbIjaopLwQa7U
kd0eFQl1XF6GTwkdQNfYeUMEmoZ2GSG9wTxJN5Oa7zZPPui2rFBshL9qmJxXAgrzciuwCrmEGBfohhcw

Qx/MDl80VY8Xh2UoqritK8R9yl7xI/A8XPq3CjLHnVdARRXKvS+pfrQEwkDstnbAiO6j2+o+63txZvEv

VTaCNLBTTFvUeKFtY5lYIwiw+WVrqt7vZNS8EfDF6Z
kMXWIHM1VAahcmp6bDiOXFXIMSAcq9J3fe7fpsqTc8hYXSr6Kbe9mr2u0+42UuCuKcct1B1g/p4jEj0+

QpnJafRe6eIUPYOCh9gjglGyFYJSpsYlefTD/GCQJaznLZzyC51yX0Oiw6IvVa5ZvROfYhCNU4sqPvzQ

9WEO1fw4XcJUr0VQQom5BuVMhzODu7RJcaVZArL2L4
mVQnQSXoIV0EBETdKL1AJLEsavCbRzNfMJJYDtSlPQBaGcIbz2GwF3OpZYOjJQIRSYdyLFEmQ71vYAjg

Ck54YXffWUMtYdZcWJo3Zr1PNsGtBUReV19cqUCxpFLxPQZgJNGVRiHcTPXlC6VxbVEwAKhxN9TfP5Vm

KB2tmQGzZI9qrJNfM7MnQ7PkoqvzAEQAQeUzILGjNX
xzZSAvSyBmYWxzZSA+Xb8EAPK+Es8BIN3fb7RjVBi7THVrj4IyVZnvTSt9R6XkcAPeclZrIumsaEBWXO

TqHWRjK8tkhdj8uFBpXti8Vt6UTjHlg7WwJQDzHWlHeu6geF8uRlG+kdC2V25lDHSe3M0Xa7WMhKdp7X

FksJITaTi6GUeT0w7kydgCWmUu3iew25ynQYRI6sYL
H4BI7BDFKSpOOm4ldcD0Gm9oo8tQYozguHi046EJpNyllT//SqwHSZ5bldtDNAa6ArxJz+19gUxoM30I

HxyI5thzzTVcM7TPwU+G2rqsApGwzBHux3nl7Pkhydq0NR7755athksJE/Y13Yl6PNkVYA7x/VGwzwd/

NlIY19HrxnxtDZwfxEwUZX+tMKaJ6wI/kuG/CeNc1+
ig4COA3EkeN3sTA404ZhmqBEcUlHvQGoYIb4HsdjGeQebgIOhFnNHE5KIUg7by+UhR5IR53bFdv+xkgJ

Vq6gKzS5G5T/ji2xKOFgLmzDSYoqHDVcwL/AHnKn0evkc3LYRWViQ2y2YH9FTOrOKXhmvSkazRhEz6Sg

fsYaO7OHEJF279Fi0PJ2DXEV3+eF0KPsp2ZhqE7P5s
YPEdDA/kn4pjkAboxQFeRlbbHcAJtwah6Snx0j4cxES51YIhe7AN8Z6GSiNYRSSklLYNuZ5esFtaciMC

SkZSZ6a3ACrHYN8n5jOnT1PZi6INGMBbVnoThDHQSU+A8Di+vL6TVt7RxMKg4ITXfCvxQn1HTYhnall6

7fZRhrYUPEd3J/VAYo917cnr4xncJueIKkR1RZpv/t
cLiy8uJFso7MWbfseBaczUsCzSqw3hCqzT58HHT9LziGATBk/3nbtnsb0XqlrqRCQKlwFDDzF/AmeiyD

JXAw847YlRyYsCYEMBzC+jJfLMif5iemnMdFI1xvdSRB0dGUNa/kGmdEdn7skhSsMEB97c0+T8Rf1pQq

+1L8zJT92fhVe3wb4KE/Xt3o1cDJbx/2tP7XR9Cwi2
X/EpYOZlstkWHRaoJrT2wEsQAaCXDm2R20+rV0//03zhsfc5dr5T98zTZrkulpg2HVfJBRPoc3NJY0qJ

NGD8zULl6NMzezYzaOdHYw0Xz6E8wl5f0BLL7uMZlshv1uWaebttnjIcHVgkE/TlOatup10qxsZf68fg

81Mk85GZbW/Mwi7ZJW6Wr41CG1PdoDzHuAAxBSBMhq
7FedGJEEpUgjzN9ZjunAGqUKyPx7bXk271QGz+aNETTWxsdx6ahbnXA0RgcWeDlaEoqN2gGhIgiuQ7Kr

qF6Fw2Kga4S/Cx333yO87rRy7EESDrG53UjJbHp/YR4N7CE6VRDEVp91bVbOKfP1IVH5Y10K92G5IWMY

ED3BW2BULBx5OJOhRgUuMaNJcmkYivc4cG3/qtYoOm
tAuv7+4PR9hXcWyTqzY8CmCR08LxWGv90Um0TrPEKnlEGt+Hbr4qo8GXPWt9tKSrCW5GT9yRJSnQMU9+

eAxxfoRaH1+PJsiu7vEBx37QQ2yRQs6VUFfpuFOAL7aJhqr+BclipC3s6N3XJ8A+7EW5sf9UT6K3CXSF

m+U5+Y3O03aLwF50LrakLBbfnRkSo5BeFySD5qUuv3
kDEJXpg7c+PYlsX4fLF5rJOJwYOwTyPA6LyPftXwone9q3RLlr7X6yb/n8wpinPFkmkqOt0IZsiUvCxS

T9sO/ZgdQl96jE8xz9jBsnv6d2Lf8AdgpFmQbMHe75TpIl/Ew+ib5i0JdlF6MVEvWYWYdthCWfWejV7p

76bpKqq3ZjIml4v6iFFjP7sEw6zKWmd5kTG39GUrEi
G905C33PpBOSMvcbGFpoyqRgWtSjeBOmpsCjUBD7BWl16PioaGfs52LPO1mPr3UrqSi+KGZi2eMkEjvH

9SkBeISCY3ZQa03jtIkM6iYvBDV5OEZBegNdm8JlaDLr46nBU2VhfHEHlogiNjnwbVWBDpJRetR+/Yaya

RPWuGZKMSG1N92SwSsMUQSYE7rW7omLbzQkOidkBfN
jiiEQUpciS8bAIegMcNFkrbYXtobQhHcq7tICa+1qKo1j7XvN8yB5PQ9sCcZBGFjJFAJp/RBQRxmEI8E

dPQmEAu8i0L1abg8UfQSeIL1I/ivBLF0YRnzlR1jFTS8IBFakMijCLo/UfDcm1r9WZSjbh7BoOcA2FWE

OOKyJfCtXCWcYOUc6rs2HBPKFLE2shjwwT6wozBgyw
drGczT/pYizlUk4FNRML/5VNL6Fin1vtnR8XMnb1qlHffOsLMzMEmbGg1YqZjhLZkMMIYGeItRkAxroj

EbwfdirhGSIeA6KH9rNmNPi/Qd4k1YiXtF5GAtjV6HL/xvKDiD30zwmY3qPPv30gcPrjN4P8T4GemuIB

quhVcCmqo8S6xqUg8lvecFDaiyYu2XVdHN+INRD21l
yzWRYXsiqxTFYF0gHHVkl3mED0TMU7g06FZFI5XGLVtOhZQdzYRganI1bj04b3o2mLD2KplgtZ0jJbNl

nCQ3zjka+LnnBwD8qenkxe5axIhtjNkCmjcQK2vB7B4R2rxi87weKh84n6sRpAwk1IRZaSffzFTkEcm5

sopRVMf+7qoFJFNqe7y0rXfuPIBplqQ7j70aCvTNrB
eEzui6+Hinojosa+6b61kTx8hZCpheydy26XViCYtE7cu2i49fKr4FGt3f/gA9JjSAinRzTegoi64ODtbJcVO

Dykydha5nRaroSX6sKNo+TkpuDNZluQhw2mHAGnEUU+99Kbaib3SyGZCj1gO6KEqAj6SlxuCe+IsO9Vz

4ppceh8Ldqa/7ILLHsBYh2S0qK+5MvwchbOulovCda
18mYK9ski8Y+CL3ELeWK4dXV1UvicHYhCU3x8X7zgK2Ne/9Qw/u4rRnSNeVIK3BJbpQRtXxKTUq8rLPc

kzkbBsojsSVpt7+lA7PuTsismpjpNfgVIlK/vHpLg/P1jhXVUA/sMQvd2u2OWip9+jtQU7XLGIhVr6D+

4iv0pBzFoOWEjx8SPwusic7Nt1guUoudIBqozrlYyX
uwhMLi4U7js2QOodqPby0weSVO18inPFytlFUwyaRwU52yXcCGab8/WAX4Lkt8jkH269dQs+FYO22Wrv

GlivFERPK37JV/VJFVBGhr3hfONbsk4AfOsmxgoQ8r5zPA/u8s5inKAAUQ1l1TvAYdyndJA2Y7V7a9Xo

pPkJbev205l+SZRbVXpG+eeqSGub+5mY67dWe98QCe
LwC2qqre8h8HugumumD0sfpYLuQNCOAuied1kpHtzeVPo5nOqOW2He1/lj0R8W1SsicJCv8BW98KZCcz

sh1bK6ozA3GGbJ5oAjHV9Y/asFvaBnl21pTTWoDAphn1TjydNyQW9+s8xndlBuQucBvECRfuFYojlprF

xFuTGUtPH2pKn+sxEtOO+6e2wq7CQef4Z0FaGWlKfU
3j/oHws3hp4qrJujqRN+DeGuXB0VcTsibZtzA5x6GlktIomsFdRSngLlhVcIwTM/hC6NNiM2sUAQnhxU

bx7R85uhR5SglbaxOVF9qC9JY7P5S9/S7z3wie3gaEl9zDqoZE4YlT/4hjACPTSUlPEvemgouXvRbpBw

E/g4/lqLYN+wKMB1qEH6PhbeoFd62NX0afxf2urL37
IOLdY8GnoQpujh+qo9JeMzQg15FzuqaG1tA0MfaBC+1cTJXcmGZ4b8TzQammIKrk/ecvXaQQ2dkVyjEN

fEM/18NUhVh8jjRXXr/GhiJ6FxB5FxU4INkkHlHyIyweQ1vUfltOlhZdzn/KRnnXzUbX/1j9wIKEQtpC

tbmypu0S9x1+/1+R58/R7iJrXDAMtuobEigTLxIU9A
FhUeFO4drn8BJmKrJB9obz0QITJ8PI5PSCJmTR6HzAQxZ0LzE7AHMtYyAXTgYMPiMU58KWAlXHVWBXqm

BWCtA3Zjy633zkUqbkCmPIOkKz6EFZCqDI6MDZBsCUKyrYBwJRQzLJBtAhH2PSHbWNvgUQCgE6PaqwYj

kuUySBByZMFVMIubRPXqV9txo2PjLHi8FI9VON1Mca
Fvx6OpqiGiF4loS1TXJB3EPHArF1SXV8BwA1hxUdJal8SbL5zeBfEua9UfYo9DRhBeCz2QRnXwWR7hae

3NQZQrEA3xwb4CMKK4IH5JbCn3IQUaM9JdGIBgICZaq7IlTT8LJO3hnLcjYwy9Wh2+VQptARN6suNdnD

9IDVMrLQ5g9JRQ/n7A/Sg3VroFtsm7tCRQMQVaPpab
Wnfa71Vp+0FZy/xa1N98XCbz+hF8htUSiv9ZPKHypUVurjLILblPpVAqvVcUaguguc8+T9ZdLbAR1r+u

geI9Sbhggu//llw/usAOG4o/ooeuf5/ukTv9QIHdogoWGgaHWfxLf8Hkm0v3gtlmRIu8Qg04pfE0dulb

y8Vk/+JtVf+r4gH7SGhRimuGPsyOwWgsbK2b+1+1/L
rsM9Gv2vJRyBBA3PYNRwRnS0suNSYX5VMk+oNpkGlXbPyOC39LMoTxFXWZJBBrNGuRojjIkycUVzHYfr

I/QrdFWidGxJlhRy7l7B7bmDtXI7h2tMSoSZ94/EOsXMsAK3/dAoq/gqq9ga+CPTpeWw2ECtYPMGAb4p

Hir1+S0gug1oy2KkNTjEyYwUffbWaUrBFSatwkJ6ro
zftJAxyPelbceajGx17kqgdAb5QImLB/Km6DMghV5rjmBMp/SIPNxtcO7bcmhDxtxNejVNFqf1xXjUyj

bLfV0tevwhJ6tINFPoOVsNklFKhhIuti8aiN09aVSKIFfJZiJjRiLyjpEXNWgF3go8isJ6BSdagUsIhq

WUXRcQD80gKVpdC04IMJTwgGubMHLkUTp1qZhonngQ
65ZEO2WYgImGsnh3Zu8Upd9PfaG5GwA77FQHka21sebiIVCBQsQzilNDVMh419lEXo/6CuuTHzA/Qotc

bQqpgNk0pql9kP3DSRqg71M0YxAuVIlbCfH4Fy+U35fl5+qJei/ctCc1k5hh5898U4sZcDi8eYphacLm

yvwfu+umz5vIZd/ddOtDLtkvNtq8ahYq23Uhv6iYHB
A9RVaDPVeScp+kqlTdpfhqosIbVZXsRRWAvaHVAEYBidcJN70VcpQEOIs0Ug43wTiCH8/oQeCjpOgI8k

S+TxgCEZVPB5Y3mEqF7g3ZCgnJDVBkTzGi4q1CFIC1OsdDPONrstMYVOUEmQ2vXA/0INS7KLE1BQ5HRB

xfUbR2qUOc4i0eupxyTGHXOAB0n2QSs6i29pF2kSop
kz9ytaAhD8Ao7kt31zgMhbiRiWadiEA8740W5GbL2pHwEUHKKAMkxs8PEl5HGKR3K0+/G2ZPqmuTHYwj

SpOl6OapqBN7ZN1l9B9t/twyUUMRpiv/+SnzgjuREcovDHmRHHEvS6VpKksMeCtck8oI8R7AUHRK6+eN

2o07AHPdeM6nE0gwnm/zovDzbXQft/7Mc47fKnHJON
xpRwoo9qmIv/uM54EqRP9cka/NUenZfVoVUY39fWn8mErICBnCBkWZvszAinoa/NZYSJY51Ge20hqIg8

tkGwD9w8NnrSMhQwkxMcP4drJO5Z9jIr4oRUJIfcCs4gGoYCT0WoES9pZwRIUvpsVr40lsSNXzmySEZj

5ZWqrIXX28ObGX/Noygkjbd3ft5zahTWYvvO9CgCAO
xCL2ci9wTBb+x+uKYlI2OL/m8Dk5nxOcTUMuUEqJOd4/LoAZ1ZeeXZzEDFq17xJcsI/Q3Ao7mmBQ3ZJI

3JMTyPMRocx74rtUPIgq1PSe42mJJxwsS6Mz2lrC/dw7Ry8x3ufeXEU7wfsDCiZyreqEnMYU0v74L3m+

NH8sVwwQwudIEqsJ5Gwf44D6rKqI9qCIDLovpFm22d
8KZ04eF45rwL2f8qaZPrEuEIvMP7Z6PYaRERkBj2z/euUAXrmcvRNPnvjyPDi8fsTitpj0xKe2fRfGy+

n86k4/z6favUYh5c/1uDR67JOTi7qI5JA8/Qo0wnfuzWnUV8jeloO2vmcCXJ95evCLuYv6pwl1dzNdTn

S2JAof0ZUAnlsaKWYuR4DB+x7Ml0Umzu8pwptC7znl
IW4trEeOA+NK5X2GEXV0JjWf8RtmR5qp9E4qGYiFO/Ow4KpgKiZTdiB7KeCdvq9R1eLj8eKRYXUJxkZ+

RrwEdWsvmTg6RfvLTIIHXnsJGO9Iz+VBaoJolDbft9NWcPMIn9Dfqun2V1pSV6wLbqV6p1bTix9iUB9k

FRWrN8G0NdQ3OLB9jBQc09TnLwkwPYMNfmbbTL2DPo
QjJYfRkWXZmJWxyZ5hep7IWebypkCUnOLLqWedkAectnNsodZo0ptjqUZRNTcrQmJO5mourbENcckavH

XC7HQUW+UXYDjbRY7fjlYfwFhX9DuEheMIydrgrnFbYCJSF/xGjDAPPIe2DcFy1KPkFSev387GraaAWi

aymavo5OnjA1t0Ijc7pibQrtQTIH1dDEHQ2C0DNIJZ
CzgdPZrLZPlfGlLZpnJ5y6oyDkYjmoBJPAVqI0Bty3CpVDSMxfaeOEsRGBtn5CbkJFzq4wCV0XEeEEO2

GT5AYv4xHxkV+6ENDfr88WlMhfAGSJUW3neATUz/r8Z66K1ONhBjacWgchPoVt7W5ZdXD8i8vsF5YdyF

eePrdB/xYOOzv1Huzul58FDvtjpKqR2dFvMWweIQi4
pLjLqQf4acOw9+EUDah61rRG9gzA2EUqDJ2IjIq6w/4dRt6/0XliLa8os9Yg6JyCVu+DcHlHq5p61R3a

NxdkcXPq+hTG9egq9G9gUyRHMRyDgZBSVkJd3yDksuFiphKkflokKVOBySbuhscTyiV+0kocLhVguq8k

ucD6EZqIe53QpiS+zMdFSa9H15eYuHR0ikyY2LACNv
JipD0p+lB00EM2cckgW2egEupQbn6E2G2EpeTx2hzNhj3Eh+WqSuL5DrZyRyqOnXtsz57HeN0yJ2nhru

VlYn380R7Y5lEV81xX3Rwrlq4F+Q1YcMhEUkksszFcLn4g/rcy3uTu+jHEvTKObw9sw8Y9Ulu4QrYb4+

V3v4PFN759y5+Hy9YsVs3th0qMjF9AjoI43jxhTjxY
Mohawk+IhzvTwBQRain91t2p0avPOfIO4+ZDhwkdEkB7Kc7kE7e2sPWXg5Wi6mcqWa7CPhV+vB/W47Y2lC+z

AcwOx5lN6DEUP7FX+mML+oyR3odfoMjbsnoMxEyshvMCnkoC7CxHdeQq8OtziWcv9hByw7iU/EwksVYA

j1cKOM1aFNVfq40CB8WIHAvaIgQfJhoPCGrWXIWFXe
0yiEwZi/SHqPSJEVfO+32dpZbnSFoJFvIi5U6kvNCr36HYENUJncp23c4OKICZP4HmzYqjh+y1txRnv8

GxMlNLehcgGs5s3+AGhlaqBFTNt00fkNLoEcy3APxJrreg8FB1fWhYHbQqRNP5r9UDWJQKVwVPAclkJL

NL9d2LvIA6m0IDK5nF2dbhkuz5uJ0Z9hRyFZb4Zq12
Cfi61WEjzLVLdAJ3bodrdogN3SQNYd+rIeEE2EAhC87VBBi0VSnoMYOjavZ+VjTNlSIeY3TVJ/tmRFSA

zRCiBsPdbEnu+sPfZVbBMNDvhp5YvHQVaZblagMyRiSvMzhq3QS9Iv7klrfldP+w+OV+rNgIrhxoKTPJ

duOaLRAu5etI/KqjVIjjkzfYThvn161xS54+JEPNLN
sWnHSOfwsuKMBmqFGDcONkVZwk+6OCGzT0toK6qETrOw7+lUqaEPmTX+pQiEY+BDoJuAvHb3wQZG1KL+

QUK/0E+vXOtcgeB14qAW/HrpWSbPGkMpI/lluSjGb2q0Ztd8ezZCmjXPz7/rUFEe2eR3E5cIQYeuFWSZ

5h5kMg3bawCEK2h9soRNLPymDvx/+L3I8ld+u6fPPX
0Z5O7EZHUNas2FRpuyy8EGBOwT1KYUEWR1zHf54k6PrQ6uAOMOnt33C8WokmT/LjFCuQdIWj7NFuGyJ/

P1VmJgHdisT7lZd+9JIP/tnvmj+NXo/swZvzsho5gXutT77oqRwy/WokQcfJ4E7RKX9hj6MwTJAqRXyd

bmRvYmoNCjkgMCBvYmoNCiAgPDwNCiAgICAvVHlwZS
1ZPQccKRsqILDwV4XvcyTmoWRpIBZqEd3CPGYaUT6HXGApqSFyAZLxApNpEHZNRiJgNRXmEMJhxVNPx0

yvTbJgUYX2DUUeTphkYD4LQGBsYG8Of059HI41ssG4QVEtQu2ASAXlUX1Zee63mEB4VTCiFmJxUQRpoy

EkFYBvfoU7PU6GRaXsKSX5nDNrNehIVJ4ZADAwqILh
SN7UDKQynEWpMO3+DQogID4+QWjshjDrUfnEHyPrBMLiy5GgNQitXLn0J8FixITinhUrOkxpvIDEEQSv

HKJpV9ebwul9mIKbBJNvAa4EGvTru5ZdEIEtVRtGrdEd214zNKA9F0F/gU+ECIB4n2obHkVJelSjPPOG

mzxmkapNQgXdMGXYH7pY3b/nvu0UwlcMxC4IsZ9K3r
JA6XbY3zL9j+piy/Psn/85z2rbcHIpjhc/Jqnvjebe3//m3T4W+EdK5n6NYei8/jhXbg38s1Aq46jfkt

5c77gpYz73/zo6LS7/qLC1qQtgpTKA3FPzF1fP5Xarw3C1N+/zbJwvf/3Ve/22DJ0bL9FpfuFa06OcC/

a3nUe7A+WVmHmn5+YukXDb3U3rUoTKak+dvvBO//C0
62hpCzvelAoqTRUWtvdj7FMiDUje6BOBURLfL/QlLRMhxIzRBeumtxKGT7VDwkVXQw9saCaMoRa/2sEN

JbCp227a7a/a8o3Nu9FOKvtGPeIDhnPABpesZl81KmGZj77l4zYmXCujJ8pqRTI3NQ8pvZbmHN61qEeH

glYDS30nLj3Nf7oUsKfBn5yQM0NgeK0pZ4RRFwZkgx
4M9cmNKG+keaUXZ0CMeADDwP0RPiQIDsarzqKB887L7J5XHzWaCEcbnuuUje5fTa2nTmzCP745aLOZZZ

xMN2JpKsT1t9RbanGOgDc2iIw8ZYk2FHVHghdJT11YxGwnfgA6hV/nEAxBETMjZOPLfMlRzNfSL7SDNJ

zSQNtBJ04q8GMn0RnLW6wnxiOBcFS/ShOiIxOX0/tJ
LsCv3v93asl/uXd+u5XoP65vs3BO+duNcjnWlOhLqqL8WTeRVh9uEmi0lR7CMz+7bzEo/5Ner6sYZxRD

AToqewnzag3f0m5JRxkf/7pG3U+TgIE3kmzAAdhR9DGRGhzqjeBDdPsprFsHpfjx7IiLsaGDPD22zuAS

IR5X8cLBti8F1EnYS5a45cSk55SemLeO2rxoDAwsOl
kgr5V/JAmVWA7XAmv1RJY75pjT/uZqtsoHon+N/1D0b/BPVV9/a6cgaLcHoyJb3yOTRhf7YtNCOd6XWX

rhe4ii3HQVqtnge5P432GqBq46HYcUx4/F5256BwDIw5uB/AogDKZvfyxjKxeHgaxxwVTM2r2cvSWLlG

uG0vz0DT6Tis2guTV0ZHBLUVZkjKvFWGfID6ZBpOW0
OpoqZgd4o92LsKbumZBe54Iy2G3VteGnFclJKdffDLZ44gNdTMRoI5GuWGSdqkhE4lqdiAT9tU4a3Q6R

gWej3+qBGYwCak56ARFQJrhQL1APioAeV9zLWLXUUi2pQGOZ7j/A8FXvhtwYkGFsuTB8OAcNrTsMwZpC

/I5430jyakoeXSVMPVqbZFdFVXjji9n1tcgSRuPh7H
gem3h5lt8t72CVwVfjM7V9sFvJe3rAovk7uQJCX9ARWUE5ZoKt58OrHYt0fxEwEsvO23UE0cJ0Y7pTud

nU0vdGpcvzg3TAACUiZW7nrTYcpGZvR8Jj7ZrtudouMYvFG9uHWmoPhgo8ZWQxvIMm6qQMUIti0cKF3R

guip2JeaMtYAlZVKCVXe4MVrOWOXmqqnoYyzCaIcCT
RxPdyvgvchKWYFJWr3APsnXZullu3TRVyFjrANdck0CIeVI62Ihh1vfAaE6lrmK591chCdLRwhZazWUy

uhvJpFY092GNNTi/QpfDqMtt7FfLVpiCB5ZvMB9K8ApHCoScA1nROK4ZAWNCaRDlSVCKoHfvdzIyiANY

72vBYlliGWMOaGIlq1L36FavHzNvDzadOqLOyxsm4F
fMaq3W9BGgxfNO7gp3nivDOSqnmbHxXxWc4dcW2+6+8e6G6jHRlZdMCc1rhAzDgw0+6mxczKeLzPlBRK

l0pFkMOH3nFIpqvO7cDg+4I38CAQgP7nBJMENTW7X7644HSxuziJPFR5FloC6OzoaPLoP8pok2MdnTMn

tNFAXvxysAzAXx5MoRvaZYVgnKe8AnPLgVSkSJXQCl
qk2aWdOzWrP9tRl2wS7Z3slhV/OxGIClWiSxjbXxFAktXeTwfaxLuuuGO4SloJisiPMx0tC1+SARJspn

QemD1S/wVIQlPnPcGau6f9SHOafHdJWTbmm03jz40uN6wjLC9tiVTbbS7AAyCMpapKa40yQ8iRZiUthX

6qLDU9AHmeir0PkpCIoRFPFHmbzpO8GppaSMbQxtgu
CJ2tTkSvbxa9ZVc00gdS3OmtHBNZUWrvIvsPHa8CdMnfZoHYFC5GECdmWL2GFHWflDRr1AkEdf+94EIe

xxIc3ZfoPUvBWCMOi0WotmODYSJEvK+6T8sDReza80dcGEQmn7oFnVlt5VLbriLb6TDaKwAuXTi4a+Dz

aaRFgVZLUpRjLc1/GxADb5oCBllEU1fPv62C+nfQvC
uzXY4JklNCV0TwOQ5pCkkvZ90h4tPKkvqlLSrLnK4epRAZuzbOJaopTSxQPN72kwTYYjvqrYEK57TNQu

0ZdyXsMN7ZMyyfIXvMiFJ8FgNPJgdh9B8CtLqdm4YHhc8XynFuW5H7jV0rnH0fWsHmo4GeMzcl7R/m5x

1NFLOTlsrnUAl9TI1AsnVJkBHpX2VlCdBrFhxTmN0S
rBBsNqTUhcgJi4uQrsUCodFt4ChzV5SLxJPurEHlWrzZiJOseaZW4HlVEm8AtctQhBtnZOpS6SlH/soz

eJkM8uQZaUKLGhgRfLKBpC6paYPjmLYovoHoVnUtdDEPVfxG0Vaw631UX6ExpfUbTNWacHcEXTSIGJ37

5l8cJoSIVWmWBXFAQLvJKApnFEPzclUDlqTnW6HpEZ
rocio/tsTLxCOPINtJ5IV9iWRF551a8gAS9YNqRbF+aaaUOfD0clLXSGwyUBTjRNdcHu3+twBDvn52C4oh

EdmWclMqvbmXdjR7SWj17glRAinvBQePwAV3Ur0lPIvvSQq2C4MZmtiXgM6VsJjnMs7it66I/PVNRG5U

kph83K1D/3S/y1fYCya132At04fAmtD6pQx0bTyWjn
nQ8yehiVh3C3ZHRe177IaxuF5o8u5Z6cedni8PHb03k53VsAcEfpjm9dAAm5U+XzaJ2qSl5/zbqvcFtK

14pbo+cFxvt6W8E7Vk2oF1Umvj7eMiT/RN/4x215wrltefWLC4EUSi8SCgvInZ+5rRGiALY7yP2Ghbv+

9D3NfEYmomuLypZP1Q/y3dOrL+5LE0KzQVRvpp9YqA
5VNDujxbLalegiSITTAFu55EQnC99LN1vkpva9hdZWQpBDPtU6/aIUFhARqfTkmUmuGLavatHTsuzXyg

ZaCW10Le4OBVGJa7LxNrYrXBQ9oLDhcl2vbT2xU/bR74gTHfPAl1xVBIilKIFuKPyJhn/qKfZCSDFVGo

aLXM5x5xiW4sqqO9JqQB26W4TomcGDYT811KBNn0gL
tuux+IEdESscRQFhOYrBoTDeEOQLR4HliKrFd/9ULmnDmNLFnOWcv3pDLSm+tMpl3xe/u6dAkHxy4hKZ

uZZZdgBiWSo67MRWAQnUhCssS+5THAwL4z0XDMXQcqAH5V6z3EB4/tkxv82rYaJuJML4AMOpgQpVWwdV

w90uGoCFHLTxikXsaOo0TsFluHDFWBtPANTmIAySPr
EcqmfVaodWIPEHtW03ecmIrk9Rp0ypog/X0xV+kO5YGpMlbK9vtYMpuz6APcvxmliEloGTvwO1SKkp7T

PMv3amNqdVBsn6YwCGkbihBtHc0lEDQeKH/IzHiSDV6hpNZt3b1ZCfPZKakaYWSW9ugyw5KLU32/wrR1

rgXor0RohwJnHcLbYMPxd273CIq6aYhrti+IAeNvj9
qPkbcOsNUqVNtgMAy9bGkxwz+vBQsv+NJXZqMHj0hDO3uR8B65HweDA11CelF0kOQNYKfWJswnkynoDQ

EP98ivO555vS+emV8BLbiTIYfQh47D4EZF9r3r398D+7pIx7J16J8gDefp8vvHnKjaUI64FdpPtsXh8O

LUgCW5FdQpS65v9UZUqIB9rVMPQ13kqGtgs7avjIih
Ge7E33fCouDMKliD0e/2WOVV5mJWLr1taIaBYU0pxdcS0GM3FJ1koi8MCeOvQy68A48Jz37IVw7Cb5cU

ok1AUOpI+YbDQKVGsR+s95RF8otnWXGMVYm5Zm+9qud+A7HLJj/dmfMT+fV/h2MjSl5DEM8il1WoILTc

VQixbdCjTaxXTxYlQHDfo7FrNYhvQNj7XGtyNMOtP4
W3kQErRRWcGK5PRYMiVO0NOEAiwaCiGyAtLOCZQwVkIBPmNpPbd4CoI6PpUAIvVXOIKTwbTAYkQ37dUK

wcSi29JUlbLAXzFtFoZOk4Zk0ZTbBjZSEsH42okDTlzFPjURUvGAFUCSoqESYiQ4jqh5NyNAp4UM7RNU

3WeoVxa5CdvzAmQ4jsG3UNIQ5WIZVrC9EJU8NoB6ck
TyAtv6TnE3fvUqNhf4KyYi2OKiKmQs7RVsSoRR2jji4OOHPsRMZhEqbCHdWiITbxOhpefGPdEC6AoYE2

GGYoN06nTUTkFOLaB0FmSEMdJCG+Pr2DLGZuzISwGQ1VJmnN9YwjifYCQA3m4C9mlapAUQb9M4LNYFft

dDeItIoe1FQ48oEgY4zOVk3/OU3e17zEVdDTw966kM
Ew5aIyMTxnxMNtm6wBF/343QwDyxU5Ju2o4ghpjrE6du7/jns6036rvtq/Qziq327je+XFeDBamidPej

bW8LX4JKkpl2g73us0j210d0cDf/WB6GH5EXb9UalD4deGYHB0z78jHeB6o1/h7S9WQKI4cH5Cw9rR2/

OS28hhvpqWu6m9xrclOiqN0kHdg/ag/3T7qmRy4+Ut
GRbOoMPdHjZq6RIK2fZf37ZZKwHawFOVGHFbXTINvCGy0V9QypgCtvc9mos6vcF8NYAxpPN+KhtrGWjj

oqjJ7WEw1vc4OyRcW4ZMRktKCJgIFoGj5HsAJv0rCtmeP7KnTRC0YyY2SStEXmMVudtr5yGHqc39J8af

NrYqdBjaEtqtGW/8Lkq78T5CFOJ9byOdkgoh9pDIBE
6whaMVB0GoulLprryqSD4uwQNxyf0bKcIR43xaqgzvZwKSLoM1HvjIeJHhChEyIkjJeSltVt0RoVxHnQ

7Mf6XjXqVYvfZb7DHAlaPJiZWdbXLmPGVq3xLIHrOaRbMWnNvJHlAUQOPo2zVoWbaWoVsRfxHIvfXZyb

Z6uZhuo4Xt/2XfggnhvBnOtzcOEHuExKv0cLz+aFMG
EbYUCnidVCJwAzpVXsQn5i6X0qYqZVB8UchWgzcxDBW8IrK1OoyOGeAesol8A36wIt+qKbL4ASNIdAxX

Jcs1NQEXHJXTfQGyRXyRhCzJgTJWQdLvnycvESyyz2dwcwXOwsgjOTqQUjxRg1aSFYWL0VPhcJh1QAbg

7Vb4EKNdHEbiC8W8zo5lVyQ+T9nfJL9s502ArJuvOz
UpSGWO6sO+BLP37iEf685Er4QPFAtnV3tGPibNybCowQW7cKP4aPeKYDw1TJ+WcIvbmLxTlKj7XBpAuu

p7kzDUPQrcdyOgKKwiMHMk5YIUVf5N+h6lzPWuxRGpPDRBIUHK8ye1HzmtLISK0g/3SeTJHtZ5mF2aS6

xPBggMUZINolMSfdgGANFoeP4hlRvRbhS+QhMucaBT
iTG8d5/pJBO0ojaVOfPbCAaQOPfUABkjPXe4VnYegFzK7VkfdctKSnOpyjhr9e0n5ix+fhcJIsS0uMIG

ygfzn2wRbzukTlgFtiIKiMBEOiMmtdAnTbF3yurBn7ioKSf67IFdXSIWdsT5XHndwovY+K40ls7xdDR3

msX/44apcUn3waIvF5h2GqCDm2QGd/2OGUVa1fIgu2
Obg1hGHIlMP3A/mL0YoKOo/RqCoFvqjjxaouCljqaI4epoGV8fTDPQYoRqtkL731MT/LOJZSmb6ZBT2T

v77eWOkXumaKMZKvCeiw9gNMKgAXAaWvy2l51YhEDydgbNd5WXqOkPCMZITSFWOhi2pPQ+Y6Heq4Zl3n

NQ4jO73J15YgjS5mDzl9Z0Mid2WwD8bjVYBqWl6oTQ
ZDRRg/gC6+MYRBH1H3J5QZpt70Ewci4XHreysQe6D2hNUoZiheBuwSv2JkYH/TRn/McU69Bd4/em/7IO

xfWjYFQ+juDzbqCog1feiseCpWt9t/mnQLGIuplXdph1PZvprI4Ct7YmmpEB1EGsmrWnRk6wGDJA7bPI

qSAcyeBRsbKttcSEvSWjsJ4DzJK91OzvpzZCEVmEvV
32wSlYr6pglJDYCnwArGvKKvfOph3ELin3CZek4PHxjbBr8CTrHuxCMNdyRUpBi9pE+HBVri5N+m0zs1

8PmZBdt4UNnNCFXo8jaU4jyWdEWZRetKqptmzKMTXpDBrYpN/SCGdVtwRbqVXtEsUVnO+2KOI97lD+oI

1sRMUvlV9E7C35hzXthF7FwsXP2oL0TSsxwK/hfFNz
/oknYRz4JgUYetR+7AnzioVRYrtsVlLpJQp7/mf7lCi5cr9zdVYLZygI+6V2JGryfzSyVLRtRvIaDwPe

8d/UEBhjnkW9JPnt25VtSsiKJbFd2DzxJB5R4uBFyUItxb5Qq5eEjSZ5dnK4ZgWTRsdXpXhPd9aphCdp

t56qMPS1hBrKfMbej5vmBWMGd1xf6qvxYnmZMxo6qR
8ZM1QE3kH+AP3mccnA1oQatuDqIvXRIieIs4ZlJyo8MPVANNef3ERW/q4DWau4fhgP5s7dv0WVjMwh7K

UnT4pT0sWYijDRwVJ+J9VNPboUBspwZuprjD5TEoWzQ1EhhB1QBmcvM/66K70uz1xt3igCvxv4iaQ+ki

O2mgHW7s4gQRSH1q7AH/FNhou4dTMLZ66azUE6cKB3
2AIyDyFqH9jRESUFDOPHTzdxQUUPPYI32vkAN64GJty7iCh1eP6HVsI3n8BROGfyU1BtC0vOjIJQDP1z

Jsxa+L2G4ebICebAQawbG3oxGP3CuDVm89Ck4MWgRt5AX6plz4Rj+aM6hZROrycjb2Lwdwif5YHLfC+x

q+Hm7eXLDHDot3A0QblMwQ8ME97wz4ycs3zPo051D9
0aFYvoE2kZuH8AEo9PfnXKlNs32X3jX+r2KNKqD6cNEISwZtTYT9U9pUUMz3rDZDh5OZK27q81r9RxOO

LeCHYBzrvr17MhwPnkjyX8lPWmDXhc9Bt/XrSdSzZid0lznoiFMV0zFRQjeFq6rNtjofPv3cVmvXiqre

/ebHldxxD/Ds/wY2pQOv/yvi+tB7tjmwXf19l4I83X
1x0NSa6bOLC//eQfLrOiUUfutxVrwAKzGX6ELpMsFK3ili4RIVSfWPVkRcdXCcXqFWpWTzCzIQFkPAee

LP9KFTyxHXtzICKlW6ZiddLlgZIcRUBmCn2MMYZxIU0UENBibXGhTOJfWzLrVKCKGcBfFRVwTMPeaZGJ

e2tlMlNrAZJ7YFBxGlpmYF0SBBCaFU3Qa980JR67wm
NjYoCyCSIJXnUjDVHjC7FglPQwXRilC7EvD1TkXJ3icOEsAL3ebVVoH5PfB7QiibjbRMHAVvNfHYMnYA

xzZSAvSyBmYWxzZSA+Xl7RMIG+Tr6EQR0ml2CcZZktLBWdEP2ifa2TAXEsLKl5SPO8GHCyFwj3EAQ5EH

EqDLBbINV9TDC9FsZ4XRefRrO1ANC2UXGuSjNqKaNp
SZT9RJV7WSJzWbh9IQXmUvDeUxpuFgm5GQS1GgF5JQObRRX5GXQ1YlD4NHVvAZI5HZE5XcP2HMRsSJK2

ZAQ4XfTnRjfbXkz8NAM5RHQGFkEsWAo5UZE5IMD1BJSkBAQaGEU2DupdThW3XSslEyH9UeKmQoL8UYYc

TWC1BxucXiFnOHP8ZAF9KEXyMkQ8URO8QaM8WyPwKp
RePIc3JVT4MbcvUjk4ESdnTaD7TjsvMpTiJQovKtS4ArtaIKP5YDN3FcF2CbrsEeFnWS2MSWMnEgttXy

s0GWR6JLD2VbrcABN6BEBgMnF4MNTmJUU9QUXjIBC6DHYoFSH0CYB0YVN6BFDeMRO1YMEtWsRfTfQlFY

HpZLAeXyJ5MeWyYLF0AXX7RbR2RLEcKOQ8UYGfVhJ7
LFOyMcx1LBI0PjN7WRYxXbRyHAGiUXBELiBhRJTsRFAeOIXnMySqMjAxJYSnAOL0ANAdNrPuCWs6GTF8

ZOXgYoEuNYg1LCM2OAJsEjQtZAs2VKE1FDIwTcQfLYh5HIN8UNZdTpQaYLm8PDF6HSIfWyWsLIw4QHR4

ZOGnSjBsOZb2BOL9YJMsInFdTJr3GWP1KORtUNsrKT
n0LIS4EJIiWiXdDSy3SIF7WNNlKiJyWXb9JJL2SZHcDcXyASE6ZAXrRzIkNDD9REO7MyO1KEUaEGH5ZO

C6FJJ3NBGoNtNnDRqpBdAxYwRuHGG4NZV5KYKvJpShDhQ5CHW4ElK0NVGyMEX1JJPiYdUaSoClIoUyVU

4XLEZ2OjLpMCP8LDTbLtVbVaWmLoNdVTD9LJM7BDJb
WAV4POgjFAR9ErDpEyAeBPO7ObL7KbrgLiK6EQU6GeJ1DhtdKvC2WLYbPCJgKuYwPFA5BsU1DwaqHeF4

MUX5XcMgZlhxCjn7XHV8IUAyAeogTjFzWQtlDgZ7RsomYOrnDLd4ZIK4QkiuGzq8BRz0JOT2KOZaVnh1

UUdmRrD5LiRpUgZsQIbbAlJ5QktmVnS5WQRtMOZ7AR
ZaUSR5VSP9LyY1HAOpPCL9DND1XlU6OYbmJOSqYRU6RoF6CKKiYZF2JUY2QjSiCjxuHnz3THX2VMUoAx

tiILT9BT2RRNW7MMLrVRF0GGI2MeN9TSVaMQW7JMI4YyT4MYasFvHlAZP1WjZ8VRXcSJC1ROF9IxN6KJ

EiKLT6ANPaEAPjME8XSG1xk7YbQYndUNZqDX4wqg9W
HPI9EM0DYSNsHR3SaHHyC4GhijQCTYMjkvmjhM3dEVwuEEEaC6IicvOJKW2xC7LjrFBeJEzzXKSwR9Vz

M1SgzDQ2GRMpZ8JpUCLfU3z9VRtnXK3LPSZnZO15WE7kOKOZEdNaAQNxDuskC0BwAfBVFfGoRKWdIx3i

zMFQy4lpVqUdGHWxVsCeOYR5BOgxCL9JBtJgHVMnPJ
XfyYufOK7bdWQdBG0VvXWpPgYwWJeaVY7+INvkoyVhMbxZXnV3VHFqw0GzKRtsZOq6JVraAUBlP3U6pK

KhAb9thA3RiGD3cUMdU4AhmCQMaQOfB4Jvh8OGc491S9VykVGaR8ZsQ99toV9iB3scxdAbr3cVziXjGN

qyIt8YYAZzYK7FcIBsaEEhVSOvSmKjLPBlfVSvFHNu
YkY6SYyzPRHnS8mxMFYnsoWwBPEuTRKHWvCjIMNdBw6ufPElq6DmsSD4t0HpYQGbYEEVUFdfZQ5+DQpl

jtAjEtnOXgT2UXOaq1OiEQvgWThgKoBfOPn4KBLdDmwwTxAhNEL8NIK3WIJmWNK6ZWz2YTZ1XrKhZvK8

VIAcTlCbCcJbNrd4CZA5CVSiHihqOqQdHYA3SUGiUp
uqHVH2SZY7PwR1VTQkNPX0ZXA1EdV9JQJdHTO3SFG5PyB7DLWhDSE5XKBnJuEpHaSyHXh2MA9GFQN3WE

AvWMe9TKBpDPW8QzEkMpYbWAtoEvR7ZwCwWhAmTYW2RtI9TLSxXvw4NRvnSeGzUchwVFZ1BTyiOxY7AZ

IwTBIxSOfkGfL0IjcqVmW8HIm6PHX5WgCyCmM0VLKy
VIH3EkUsWtQ6UCr1CSW2WxdzCfZ4IVXiVUPVEjOhGwQfCKY0FSBeSrSaXMg5YPZ7CsTdDjQuBYA7SSFz

YES5OPCxBiGmUDF9DmBfCkEgHdQpEHKuCXQaGhimMim0CKS2AjKmAidzYSa4HXYzCRR4UMMhNmZtVEFp

VIJiAWgmRCW8OZXrGzC2IOFbBZC3KRj1PYC0GMGfWF
zeKXK6LoS8JPSfTsb6ORZ7GHTaRFnrWWj5BXf7OIF9PKTnKuNoLVr8KVO7RGFqYwGfDLj2PFN1SPAmIc

TpQWy3XZP7NGNwJfLhETh1KEG8EKYzKvTmJOa5MTU1MNBhNgOjHGj8CKQ9TILjWxMaKQe7KEW9CXGnNc

PuWF4UTRI5DLRuMtVfKIy0RLI2CQTmWsDsGEe0QOG4
RYAcUnCbPUn6SPZiVgolEfYsELJ8PjG6FBFzQPE4AOG3RzEbAFFkJCL0YFVnYhY5HzieDnohYNS6XkL5

JXBvSjSqFVbqUtU3IKBzMFNfTQZ9HFKpVbDzGhHuGLFhZyEDOvCdSIp2XYZ6FsLpWySwMgQtLXGuHhKm

JrSpAFW5CZuyDFW7GkCrAFV7CDCeWGV9NcYsTgTaYH
ehXnE4QgOcXeQuCWzxDxTvDUEsJJgiGwW7OvlmLiG6GOC6ZsE3RqurJdm1ETI5UIUxJbilUwp9XFquHd

H7XpFnKrq1CJ6YIPR6BvrkYef0CDc3BQB3CyofAQu1OBz4QZU8NfQrWlJkWSvgUoS6VrBsNsK3CVK6Uv

L2FSDdYAL1TVQ1TiX6JAQuUNL0YYZ5GnO1XKCwTXz5
KMT9BnX1UBAwRZR6HKQ0IzC1FCLoCga4MMO3MSGmCbopTxf3FOUgGHSTEoTzHgIjIJDdHVW8OODmPkWn

XYNtRTN9SCQyYPL0DONtNTO4MHEfAwXyPZUmEIE0XMBnXZO7UBEiGZB7HCVmXTCEZqVoEK8wpa6LXWut

FFFxQqlPHaUeOYhCHcTcABIoAZmwYI8Ce614LAZmL0
WqtXCfez9YFIWgFD2Hc553JaMfCD6ConvsqCqZe3xzCOuqOOFqR2FwC5SglBI9KQUnD2XhOLPgO5v6XI

fjDD2XVYOpUM73QA9lIAUBNcUiNXNuGedrF8OsZhWNMxWpMQGeGr7tnQSLx5aoRpFpDSSxHsKsMEDwFS

jlEV7NZcHpNWCnARDbpZkvWI4xbWTxWS2DdYQxFcYe
DQogID4+UXinrbImVlnRAdA6FGBgc3SwYEdsWIu6WXvcTCIxS6B1rFFlCl9swN9LaGY3qNEzY9NmqDXN

cWHvY5Zee4CNm837J1MmkRQoFABhwPUbLT9tq2XcctoaB6vrSU0wzQMbM83wzR1sVBioZHIbF3FdvgN5

Y3nphdFqOB5UGJB1A1gigfGzNWDXNcVvBCZfI3ygcQ
ncNLU2WUQxKs3KKZTlVE5Ks014HEYyE5LcdWPohkDkYSGgKPVWXtMcQl1OOrKcZE0zhw9IIxFvGEDuVg

lGXmRoIJhiJhcfoMIaHF1FtQR7BKNnL39jVUTyQDMrQ9GxULVmZNL3XM3JPI1tgIvbVCO6Nha1Qg6LPx

Vwv3XqWCZkBToDzgb1RKuZWqWMnUmmeY/q1IC58t04
sTAZQjPANQ6FJWnKXAjTHEJNJWYUSbRueBvcrDvK3ri3FVLcyAPsyi9uJYUKeMUlsJMCgOBVZJyY4P6e

4X7320U0cGro8h//8/yuuMyU6a2nKlwa2AnklfSjGGYkNLjqNmHfPYZuo07p+MobClWKBSj3J7gtzkVm

TPM6t6JdkFTcXqCuATVjv7v3OIpK71yPF1nUqCX7vp
MQW8oQyY4LZI/DQj+/7WOkF84EW67TbJRYuB6t5tlyKey701VfQAucNybLavaau5suzuoywA7PMKeNAg

xfvGQNlGkAdzmo/JkLLp23+t2CR9R5S6b655vlY0QQXCJdZgs0NcUvMPj49inlg75o6zFa6zxfFXdFuo

sQe5ISXhFu+3s0EYA1/ETZYNYo3Oi2Sx+AV7V/x2gY
3GOJj06y9sZxTniooVjwz3FVrrU2YCLGS4MJUDC2B/46hNENwhXOoDEwmnMLMGlr9amSl4C4QlpS1W3M

1Odoi6PAkBT1bu0I98GI/H8ntqv8w28xwy8IFLsWC2RO9cGXbRRpRPsAxyKrvPf8wuNWufK/BiJiL+Y9

G93MAFZyAz6OsNKhGu+LX14NJvjOCXZY7xUj3IqnZz
aGJ2m6Oe71Zz7OGxmCg0Vsav3tOBuUfR8o38h49UAWErGqJi4r+U/uS2AZVsO/KShta7mq8zKNrTTzut

/FOrdVkGPOwdo0NgyZR3jdMoJXUTzOmdRE9wmP4jfGxgzOoVSijVsgZhqJwEnRJP/YKroEEGNi2MwgJF

8qLUGOfRsxj6ARiiZ7MY9K0aMNcIH0MwoMJjlP5+D7
jVYomG5spG/oZCj2RhMguZTyhJSmapYfso5DlW9mhZrqrfLowA/R3gU/1TSD0Xpl4XyD9X+E6/F7g/iK

SDYnJP5es1jtm+Fl+HaNCgDwMPdRjawfbUcXTRNBXmfqimtVuc4ox3C/xOJsu+YP7bkSPOede9tOe5CU

hSNSDA/DL+U1iKowYnYG4K/Ye+wTYZAwVXhY+Jt4n/
Ou9O29Fs43jBDB3mJM8Q/zT89MCQYRj1DMlMFrnnDs54h2g6KUGNATUNg9QdccUbT/LW9M38erbNLhvH

K+QXrlDbhkaW65Y/1gGYiiXQxiv9Ab/q0lBF8BFhsFm4fbt/G0GiG1t+Y8xvKQXXZhpu0av4OA2Cj2PW

uYJnnrg1Cb3FibM4SBOLROBXEvmWCu9NehROAlJc0U
ReC66Wi/3IglCpMs7zthljwJB1vI44BQR3QC+N0m/gBHxwHQCyAgdflQKbuydEeWo1XERk48VgbmtWwr

E+6a3X61FUxaioNzCvytyZ5UAd3kMkRQUTsGPYeGg5QysMcyj66CGuUM5l7DT6T50muPM5FUYAaDOvOq

uVj6bO2vzz5AjcB07F/mN9uoQ2hlRtiEcueDbLW1kX
IHYKrJTnv8GK6Mx4NTDDBponyGNGgMTXMKSPRfNb8rruPrwXiYY4B/icfFH/GrSTYpKuVLxVJcGiZNla

6QHpU+xs1Fi4qanD3dXlRqmtDlEe9h9uL3o1EJsBlPazl7CF5VSz4832RGHusIxOE1ZV2Wr1pv8R1oEu

dXauokJB4MnenwVBC0OnmRPtxMsRhhZR3c9QA3swJt
CDnLhv6PIPetA/zDb9jHIo6hUU440fO/wCQVskHcQGNwvUi2K3ygteiQ5zX3xeH1RjVltf9Wry4FDYmG

3hc/Urq3YIfQbiVqOkZ4P42CYlt00L+eBHniI/IuuKA0CwRTC6EKf3Grhzg2ug6HuOHVBdGtPR5BELWz

UW/pS9cL9cp/orF1t6lTK7l8wBynQEGtMOkUH+Os6C
OhrgQcrsw9SsY5i9IHZtLQipD4b0uHOFF7bKZjTT5MipO+TSrGOulMS6iHIaz2Gk9XW4rO3wFRXGlD91

UICNnbd3hpSeH93LTJXE7SMMpUqr3JY2B6nMyjKMmTGn22an3fPNkKtEReVMAvkb9riLZXVG/mpz3UPJ

h9zIyD4mJFke7OHpbGeGKlvK2XtK0K3eg4mhIZ3zHa
qXBbBD9bV0xpTRtxCkMJN7OrkWriTVt8ECfWPInbstlsXRbsnM1sqr9r8Vq1SOfznKl5oNY1b5QeeVbp

M0Drz7soytJ+JTMxKyvyY2IkFSvV0kI9NUjZIJmSm8VT4TOcNEePNePmQ5RD+K3qsicfEI5BvUSEfjjN

vQ68LwHwTfONH6kU2aZ6lRDaBJoyU2EXWbZcH+7CXk
pxo36R1hwEzpI7a/IJsKJSALI28gxZzrdY3xrmdghWHhm6MfpQZqZ1zGlQ7q+C6ETYRhEic6H7NjYuSJ

HmzI5XEzZWMh4+vTj7oWJAUAowjn8K3NB+OdDbE7NBdIBsrtnkbxSiDkYEiEtyZ8QF8AOliaWeEihSK+

mOmCNGtsaingElYhifDaOAcYtB+AahIURsZxBrDcIY
fDKKPHz1RUFcw5T9qDqPivJA+J0RHKo0JtF7Lnr7Bw8z65nAnrNV6iQcYUxccO34PucV0+ngc/RsffVs

Uvj32F7KbWbIDkl+ygbSkmHaHbeKbIeBtmyaalvqrYrmfKxtBT59OsU6LIBRHHGCtt+7xPmg7K9GPgVD

IeEHEmLlMPjW5c0WNWG08EnlRUJ2Lxbf7uDJWUg7Jf
sGjBD+YveAinBo7Kc4/wD3Q1fVcYLUMxcA8xN4FOgM8uGC5BM+K0yyI1QY0KI+oXATRGHuRg1xN+IbhC

c3kFgR0FLTBgyE0GyRQMoWJ9/wF3ytv+IVUsmPmzdJ69Goc9ify6iwWh/Am16HrBnMZUNJcI3I6Lp0g/

MKIqaUtKyYuTB2wRhnT4Rm/taiHsh+cm17e6JmcfgY
sXh0/KYnyilMnKYfZj9rak2JNXZKTX6iDVWx5Y8RnUsDnJEE7ZmbTYOYKq5ByFH60u7Pf8OsnLEWNOmM

/BcnmxXx96xpapIWQIT0o3DUJOT4Bj/q8V6fUV2aDA0c8KXjvRfaZ93KlwwDCPs3N/pNB7aMoJowg3Xk

Nxm2boiCm6CX+s0N20XIXCOYVkbuXsKs0JNqt33E+r
LT1oE54cBLNWq1otzG8CcTArEgryVMtLJnabQ1SYiLnWvCU3auTHhKIEbDwxKryV+G6FJ80+0YokvxNc

xvDJron86MIE2Je6/QFU4RO8/THT5Lx7dSAR4feBj1ZMIBb6+Di8EU5RWN0OhCLQ4mkr68TPlc0/ELP0

dPxaz9baTjyh0FwWwcetejUt8qyMtlRDGcuTMEt9og
ZB8zpMRa7TBDKCvSHgnNtygFnUY7czKMNfwe0GKloFEMPo1ffc+rijPgwTb0IbOVIgZHB32AiVYh8sSq

ORnCSfMAmnRIz+2D3fam9ILT4pFM63CU2XYOwWmCnfDNXFymUo6qDf6wRwkG8987HxrC2cSqVeoeMxBJ

2ANWquA0WlcaKOwmYXyAnzzpqCYUI+IbhIZQMHc+Nv
plebEfcKyGw9dOgCAyLQKwhdgORnG2t5gjxmKq3qTEf2xeZXMU/FKouOhyVVJ8VL/dE5c1S5GrqEgDla

Tk0LKMUIGMj4mjouWs3c56g/+GM4dUjEHdjr7mpwOk4xkyJ66P9o9Rzj7fLI0SgY7RUNo8o8GOLVvF0v

kC41qLyFbNVqKJ3ID9CywtQq2qcn8zSa2MJWIM0lkY
eHDSUpPwFcOIS0YK/awduuqJ3FHB0bY1+a3uicag9m9DBPdxiTGq2CSlPLsM+gYkt73bEO14FkeuoO6P

3jt4gTEQnAW+Y4Z+90KTTMp1y+wPL71Cj/NBDh9VBhVdfqobjXVKahX8pw03oDSka6sKQD+QRML3RTyy

Nre7sWb4X1niJFp9jAeOZFKMBbWuLr7Nl0tVlk71RJ
6Lx+OiYDp0q9EzEBKPmWfrj2L7nHbEL+JJsKFodF2CPm35HSoIdxcuWAsFxxebZjewcJt/yQ3obavGEq

4cazo+edRV9eXHweTDBeTu23wWMVZdS4/3pODDU1+qh7iirjk3ejuhCLncNeDutZMxMEEKwmFgZi0u1d

QbNGknMOa9+Q4zggN333VsN0RIpjJyBi4/b/XQwae5
MNPg8u4U3VDrNoSuicNvOW9VlN+qoICWUz+a5f4otVyGuzAvtlTY7G9tH0CqT+0U+2Wsj4wQ1VQ/wfX1

Q6oVFd9FMlFUjQda6n+zw5MNidEDhMMr40ySv3rJb5I+WdJHg1lXAq5axFwh+SRG+kOaLswixKddCl0Q

jMFinmdxMJaa5/YchXXVoVHTEfO5vcm9GWHdtgiMv8
FziHE1phDooI88iodD2oedRfITY4tpGLc2wlrsjm44KQ0Z25PBh6eA28Q/08+MgfE4a+2Xa5eFVJWHyh

nfWXPFXj6ikukikRtaE78CtbiEWpvtEPRqyCiqmOZ8mopU4KFePbugPuW8lgFycNBjfnC9+mZI6oFXnn

yFrMESHgpktd4J/RRKUSU0hzdC6Ekad1H4H7LgcuRj
HZqsXPCKoF8gvAdguxr5ITukK9HRb+dtnhgP7bIP25TpjlEQeClOA6ZlSDmnzS/WvqEiMbK0J/pz5u2X

F7o+gblvIByMRq8fknJAfINo8HPYN56bIDopSuHbOkc1yGqy9/ckCc1kY0xtOec4BhrC+YIcHe6QjDHh

B+v/BkGCx3YrCR4gT+OLf+5O64F4XQ3ryEsRyTrxfh
Aje4uTTM67MGSneKu0VeRrU12nL9UqybeQ5XPsYqNOFcOjxnNWrkRsDGyt+I6y3SK7CspK5w3xovdKwC

J8pAg98NNWOpKWxrtdEhHS7lI8EMsSgxX4alqNYGqwgeF1Kt0TUK00wqyUXJUZlM3LRqJ1ATmbjzyynU

77QawQW5n9htl05jKG+YUFbXR1kQDiIPMFEIUVgPKI
LuF7MZEjNx4nQJQg0fBLjSXqJcx2nEEKPUkoySs3XwFqFC2b5outabMcX0s20E8ygoSS2CCnRhnCE3v+

JFkgMqMPVydKkW5cxOgeX0wr0AEfDtfTxWIlZ9JWbRZZGaN9STKOZ9eZxK/vaoj9xsEQT5aUpTugl1zk

Uwri7yene2QoM4EDT77RG/qT8tQJ7+xq8zCe30+oJb
169d6+d+v7PsF6lG73O46VDDm82VER4p9cxDNQL0VWObJGCHT17VvFCi/310u8/SUQGWqeoI93EYu59a

VCXvtcqJb/gM+F1gnl7cJRu7OLvp8WrUyoIhBCOA4UC4GMmtHbS+bG7sQ+Ew+RfRg8pkt2zT/FwQ2SEA

Q4vyRNVzcX6reZu7EcodR48ST8r6xLO0shT7+u7pgU
lv9+rihW8fzHIuaCrikJbFhyfFFlVHQzlvDLpod7LYbCmITP7NDVmxR73RnCjTYgiB3NKJkCbneShlTe

AxXcjOIQbXKuQDjtCYCPrsENx8ujXqCGo+2XHsotfjZE4XpEgB0b2F8f7J6UMXWdapfmD4OkCryCPjlk

k5ZTOa2iioIJWstlZZOa9y6zuaGOus7EQBt7Ml8tyh
g34aWvDY5HpglTPs6wxgwagNIUx1Yao57wef/uN9G1N3ZUNmYgm35vb+UnQNom85hDhnqUcQAEmPI7y1

O9lm4ndb6Nm3WvjW7bhoo91ab5X4D2HFCw2bFQrAMcojsqdnzerBcZP+gj1EcK2qOwaA+F95W+k/lhqW

CxwIT5kzlSEjlWf3+yspRkLqonKUDuoSDwbgcje7kY
ctoyIj9uBmjah0+7hZ7r6LvnnN9fo/PTrVx9sXlRatAUwjAMpd37ylj3YrXQGSzf7kYTb09Nv9igFThg

91kW343vqA0kR+evLveYL0k7YWr2ufgv0ai5zEkxB7NnX2T44Clm9IeDXkP1tV1C7YgZeKHsA1HoO0zU

IJkND4mx8OOM/d2DYRXmf8XVVzcVWTZ0lsevYKuklK
uVZMK04VXWLl4KtLiq8IYr3EBnsykq3p0AP9BXcX5GDdOpusyjuk0aaCP5BeQxGKZPkhzKZ25aMLpVGv

f9BxCB0SwOHXrf2yxbY5SXaRL3oAkYipr0gxY9idtgxtyccUtoEaitG9s4W5zXF+ktKNTLj0fXmSpp/P

OI7qxlTJe/9EC7OmoLfEDjEc3aoCAfiAgKqZiZMSoX
QhIli1vjTk3tEjhw6tSOha/jbQnQJuMgjeOmfE7SPZQOL4Y61R3FBk1SMmicgJTEIu/fvvKxKS73Je7o

M2/NwBH1759LbVBvjsziitxinWigFL4esCz+j5S2AvNPOh2FoyZpzPUKgpWEQ1zmJpTh//asKikuyEcx

11+fvMsFzmJCWA9sNYIrIqpWzUJkKXZPc5parKD4hZ
GLh/ec+/5bnLPHnwlqiapQEc0/ycKbr3bnjvwJFeFPY5c/bBWq7TiNY+fcOHHopptHLx/tdzmzCotsl5

64fl3L6CXkGwYwWlTx2wEZY1/hb33KNoBMagtuBp6DRAqdFGUvkUPIRNUvVAlxoxIV7vZxl2kiyDIcCW

kovQ8HOJoHo7OrhMouBRJSnNkCAcyCdvUyWifKL7D5
Vg8GAI9VBPdiXhmAXnpDq4uxkJGBNBIbAl/Bve++Kh08vQLIPpCsG/nojIHwc3PoaeHPWwozbV9Pp6uD

xTg0TMaJ+jNnuLpJpRa7tPVkt6WlAgpHvQ3Zku5meVuz7cwHcDFX8qHAxTAMxp758+AO9TuvEAL1T6Us

omIzHsDhGmfEkLyptt353xrQef+lJqcoZ9lxumaEoQ
T6wj9xflB3t7+NfXZKNtC+KijKExk6veer+SzhivM+QlpJUWfZ2XJKA+gcXUOSV6LCbSC9zNwZXJKo7X

aEencOEv5CnJaBqXo9uYQ6DgbyjpsbpxjO8VBMeGfXGYhsvbX/hkup2qN70vUhsDU0iLZqEdqNNU1kAu

7akT07tc28eUiFxIkv+Ygfs8umfv6Og/YjsbFC2xWo
7bFnENd3Qu4vBx1a1jLwQNgsk43BwcAtt7+93/GvRV/o7bnI2s7hBudr0equNZBsAZnCr+mrCzn7WJVT

b34se+5lL/7hvd/4UzTGOFGYs1MTxeURhDtKRuSoP7QzgwgMApi006zjQ5oopivmN8mVeA4CuM0tvyZr

m09eD6h6VEaekuw+b4JfhT/REOuQgaDvVB8SO9hAAq
d8MFh7nzAHBr4sVm9Nha5KvoSbTT90ylHaav4RbN7huzrToUVoN8yFNYoDT0WHX1lth6SlJrSw/V6XXT

q5VYZbnbNRDxnwPtaxtL8FkP3Tga6E5dtniuI2k6UWk7/SIT2Hg4p31GhMR6U26CNMTg+BhROHx7TTO2

0Gljc0xE+yHbmuZEDuD57gcg/qWqrB42Ow1O5ghk22
nXeJz+PDg8syTh6rhov3SlTDfJ16eX7Kl9u81f04Y1OUMeGcTweb2xNWsHYxxzmLSI2jOS0fGEs/wrlV

K8QfsXAJzApWceUOta+xYyfZgxIKaIUrnjzJ3o33dQecvzCZDlMeHAYOxO8AWHKE5FjQI+sohWh8GlVr

cK9jaLERjCmeoHdleJTfho4t08834ioD3v77vNFtk0
38Wb4Jfv74IXNhQBx63ew4h0+8/+ZLaP/IYJWvjxHLTJNS0V0MbqPVMYnWPVORqtA7nczRkg+dYbdkkq

lilGbRkZsKrntSjwF6O/mE/8sV9BUEP8muQiK8FmnZJDorDlv0tXY+xPaXNVJtKmHNH6oTCdsawzIWXT

fXPfxIGOPQ09zQuq/y6ZlZKBgYh5RDUIm1xvVh+Pjg
eVAm3J+UjgsFZUA9jMcTi082+HtTZQYTTlS/3WCv0oyH+6wpWDGTJPhGc8bIJBMQBzRjTPjqRc4sihNI

bGGGS2vT0i+2TxTQiIpug8Z3XSsvQRSwELOuvnXmubWLo9NXCWZW45vqPEzJiKuIeH6Hd3YGcqBrSj8C

xpIFnA5dkglXkugzbdgYRuzW3YO7PGrZxCkadCftNB
XMvxDeVPiqXiFhKCWXPl2IQoUCv+KWxG0YOuRCikITg7w+vfOLjm+Y/4MDzMV+QAgbkyKth4y/L4xz9K

d2OqjcsG85YOwBPeB8SCeODn5/aBRrv51i122CIGcwfH1VpOtN7AJFJe6Wkm/U6UDvfBxcDaof/LDjEe

knGvpMj2ZYHT1RH6Yh1BdTrVaS1cHj1iyUXQiwc9Mo
WzLk72r+YNBz7B26TGnhbOTa9DzAPo971y2THjffEdtaKdwwvou3xBRjeXepUdkGvJD92lQKF2705FJb

vJSb4/alGiKeDMvnCIQ6nA97E1Ujmkr+cxPqLGPagaZx6BeO+AW2C/LgOLOhdxc/jciy7QOLN40Zc3ie

ewiSOiBNb0Henh8N5kFRHAP0fdurvihrSaSiFFDaHg
gAhex6jRDg1knIXDypckmoFv/KnlW9e+I4ZuSUZn+pXyQYn7DaggyyR136e8z6b4bmBg/bJz50+8K5VU

PP9/3SNnTaxbf/BJPx0MIHumIAXFlipZg3jjknh5w+rvYi/7k3CD9AqcVZ7Bfz3kpmhFo57j+wD/XXqZ

Pss+uiwW4lhmyYaJ6vW4P/0ZqS1Z1Wp9Q44zXsCua7
fC9l8qWsO/JffZkxAmL9YBhiCLv0br7hOB/rvx2LjZOXcyzsgChibKQr2YxnfSEaVlyoRxswV8yrbsY5

NM4kldxfI/ObDzvjr6Ge7eznf8FA9kTtqgNfVzKCijtKjuGHsThMAP7FSebbtnq9cmLjINSZuNwFRoN2

qJk1QQOph3IIVMebHyvls/Aofpr7zHJvsd3kqXqPVj
2OuuASNyuC/YhArNUT7xWBktIjopJ29SvaF3f1luV361zQmT3pEkd2Xmp16ba3sBNrAOi770uP4nl7qV

5dzWwPiSd26sZSKui4+LcXNx0dmP7lg5HeWYo3wARxT4/+WX2cN7OHJ1vJdUGAQXgJQmdmB4CdC1pYO/

wl6Ch6QDtICdBvcoHQRErv7fdSv96N2Gbeu837zlnR
PD3jL+t/8+WDT1+1kCAn1mylSPyiMIOX7Xt4x38aJJ+mCiu5oV3/zvl+47SXFididCtprKEUO0hpIDto

3fbRX4ufKhI15cROMXI7nac+L8Z4olPLPB5IN4ZUw7MGe3yz5AmyM1bXNy85i+YD1a5N5CnJgxvDVQMD

DanfdklJIhHpEtxttr1GqZF6hmWwe9vwMs5Titdhuh
1thleqj8JaUagGiNnL3p/EIgVTtIP49j3ZQ0LcTRCiTmskVdTGNJdLVuFIX9fMAkZSHJ3DCvmzPInQ7a

DZhJdY0ERezkOkIteA/oFMSKv4gkUACxtmEF1CYGsPEHlg7UIakEQGmPIBzE6/0RkKppIUsyiOjw6XKt

3QeHI1Dx1Db1J3mAphbG+TLw9PnTl8KUb5c3fRghXW
bpzoHSKG2pTiWdCoNMu5Gim0QrcR9lLn51nf+auRLVfMHXtHjbyr/lg6Bc98dT1m2KyEm4bhkL73owKq

u6WD2W2sd2WslaRe1HJfBNHOwMvMBPVi+nQIQSa1PnftcRSw3GFy6A6+Nu4NYbKQRYMSdW/xCzrDckpY

FhCEzVYBoazLELVtSoLkrb1yXa8nTF3PD9chn1zbtI
rQ7dymagl9h7E6ozhz3zaCUDrTyyKjmX7nHA82ftGm5OdslidPxvDXy7aoXafaegC9SIIGuLzEMrMTcJ

0pVdZBizXGNRlAsdcEPHSxH4YlsdhUMaWqaSuACwCGFf9DmRkJPGGuiAsSXEypAAA9fjFt7rntML08R2

ViRXVakzSO0SanijIf+6O6sVpLyIoWdeH5QH/t23cy
Uyo++L1F7pHk80ctnqx+LxZJDOlDmWTgTuUoSqqmTCJRnQlVUHqEijxeHh4OUJnEbBFsmSksYNwJ2xgP

xmqpXOqHQHLAENxSZOy4O3XvUIyki6lO3rT54+4IVbP4K3WJWLylKIBO1Cyt9Xpbv5MOxFc82fdh2Ke5

V2tTs0n81V9CLCi83OM5V9S53HX0sOrs7W4V1G+fNy
gvpehI3hRP56DVhm4jCZBmgqDkz4K/+6qTPe+jkSbdarGoR3trqGhmBFIxAnJs7bPDQWcqu4Qfjow9md

UpaoisL6X9mHqrLf2wn2HvdEEbYR+8/qCQ9u5xWfhuyivK8T+Fxc8jOmui+B9Rw551dxvYX5SHB3pvqw

p6bEQgTf6zfsMuQoC9dSIaOoCiAmAAuMlS7w8/yscV
5pzawzGfqGd3dPEQwPCJmTOXXa55XkgKUndTMRVpyoYVS1vuO3fGMVajeLxvudFESeM5KOdtGyOe0VNK

w+KFivJKyg8yKtH9J/fZPL6UQYNJfHMNrnY+8jieNTs9JJ2VZhch92uXpc2zV5Cglx8EWyrjqN7MxjSq

+pz6SUFyTXEVz8OGGyuCWfXjcoGFKWzwaa1+WbbZcL
wQOezjRCOBL7bniDfikL7vmwHb7Ra7RUefkmcA+DNCqsAnSDJ7yeU0f09trbewcIKCSCC7TAizsWZDF2

6QuDbcyuBnNtQbZkRLV045VE9C3x/fVr87SPskbQoxTuASrcp/qlneChwmdtKgQSV9xOgL1d0xghkY45

V9AIGG7xh7YN9t69C1hxyJriQlZHlLtMBwaK+3KHzF
uYaToaSZv7VOKnS3k0PSTTgDQUCndhTYygn8j3xkvktxpnR+qBhVOXU5r2Q50GTEuRvTn09f+wDa0MLq

WtvWvGrkgLatASIfbfVUKzrhMQcnW+z6w3G+DkJV+I7ClZBytNb4/hp+pdlOof7Fu9/dha0jFd82+oIg

hgxRhvWSyqNtgwKmovhiG7/zxFE3wIBa6N2nye8yGN
6yC5HPL2HYcIeTi+LGhIZkcpNXeZyhhszFd4MzvAxjhiC8vdiZH9JS+nHpz0DF0mtQNerez90yAcn90r

T7N18zN0OaKHVDhm3D1SNde4K5Re1EDYGqS7UuTF8O2N1nEiUyznVH2DQZVJ8dMlE7QQj21890s/eddk

wLrk0ai66uH5lTq0svryQ7h//PWQkiUqpU1QYLpqKm
4dgtOE39Fs0Qg1Xmx2ixiUNbjeXQ9YVlRP1gqB8fkZltZMlAciC9iaLfSzbzocWYDpbxaRGllmdGuKLf

zpm1UsPc8JlRSRkF2MNRfwsMK7CbVO5K4PnG7zbAV4EB5aOY6DvT1zuKjHpOxPyuwCPFLvrbNfVwdxul

7DP5lXtK6Pxi+MS1LKVMGyHMfgb3QqXXdegMbfo+ic
eOjqfum2c/sQ0gYn5tGPKCxIAw5iUvmVyKDlxAyX9b3WYoKpECj7Y2wMmLM/LCR86PlZbOc1xU42mnkS

4ibEmzBsMCOOzC2xnDenhRYksnccC0SfuCteA6GCs/UkDnvxM75RgUH4BmQZIyUg9gDGWtcanz7PdFau

HyD4xNKcdYG8DLAr6iGLHhL4eXyRFtkQLStzIB+YnA
7ImyHub2/jL5JkSc7xbor33bLx0OPty2juMI0HZWc3a3nCkAIhH37FAw+cbT7DKLHABBeYBBdr0ZNCZW

gCrXuhax8IbJ+l5veGFeLdaKzevm68NmB47ylF304C4ktl7AOkSHap/HKp6ec+JX0abmx8Xcm8VN/gvu

a5k0/dzlfaXi+5cPqOhNue9o72tFU676cp/9T7Y/IG
pkxfBgM6d8/9n0/i0DQPoqTvWqTYVKHJS2Xg3kpM2gLZDGCptVjg487POdQBzsW3/oYEgLSTGL3+UP+N

D/Y2kMwJV3NC5PrXQJtxh4BE6VUS1dELB9oyJ5/+kc88Eeg1+e2HayTRaW82cLHgwi+zy275ovI8ZQfP

UOh92aPWiqys3g2dq+b20WgHzQVf4kaNAmFHxnRkHM
4rPOXMQqNrMaUnOJ7tzUFlEZC3izVC3eUciX7/zZ0pw/d0tmHgrfyS9zB3wuEoh1qddZHTCkQUoGEfVV

zxHTMOvbBMCKDmr0EDHH90et0ZZnfQhNwWqyu5haOcFEduuWBlGeHamSB8yzkKUPHn8Ia7CiN+uxtZTN

Bm01Yrt3od6OFgwhaN31hbPm5niX3ufe/NXYSXA7c6
TT5eNRizdk3Jijzi4P6wEc8u5oGBTPARke+FXn7ESVEE2yI+apfrqbIXCtC48k49ZQ4CBTRH/IwJDQFH

cS6HwGo14vsU1jxvaTXk4fkuiaSV67Lga2BsEmRhOFMQA4bxGkgNU6f5hZubeTHTxGxDf/iWIaa4mxcT

+7gnXa1cho16JoaS5GEo7R2WGezf9DLJuwNVvbQMlC
aHJHNfSlEEBbOe65FZxS2S0kdDxXnAfvqO4X9vk4KLIh8cYOcxHFIYJTXB9pm3HfHLU5LNouRuL8aAeS

9+1afakRutPpgfWCJyycksgi5YJSZIX5cCZsn0RK68oZWgt2oCxNloBsjXDLXJ9Ij1VdJX6ok7KguDn0

iUqf20s2ePJa+QVgADPV5qqp6BA6jMz+qcOnI9rLx1
zl5b+mqQFk1gNsx2VsI5ob6Qv460owTlFxdqTAtp+27/6nVnhSTf757340D08qdVdQMZbkK8dEidSXJb

mFUCBbiQV79sCFW2TCtbLVJJmVJKDPbUqMQyHbKEn6lNX41Fb9XHLJzc1rIofT6DDhUBx4pkdtcsR59N

2xP/g/KI9I8VbiR/8LsNovUohRMFG97glnAt/PUGmE
Z7LnU/iBDA4hg4jlDpQIMnrSlKkHieXdADVILRgfaoRphqTF/JytsFHi42qDUldZ2KgSWPjzm61RcqXk

J5dHusR2dk32/9MJzVwNEvNNEOXlS1ReWgKMxa5mN1GWk9vEvF1yLNFGVNQ3pbFsp2gXuif4efrXhMtb

dPdPQTudZxT9faTbLKLytI12Eah+kxW2wAtIS0ZxIV
8G4p0hvDjXah2ed0mSMtGdO+nihUfoQoMhkGrOwnqgQWSJW4TKz2NVwlHcFAGRQf0R979Gd/bPueLjay

bv4a776CGYRh8oWVJGuvxqw53mW+69wi9LZFooD0gt+7zzic2xgRQjksa/QIziIvHEK9KLsBL1kc56Ke

AMqCKseoKG93UW5eDGrHmdYfaYvnB3EqK2UYaqRCJJ
G1tndHL/hrIcaUbe8GIgo9n25xJl/vxaXdLm/M4fENWMTz41f+d0efyQFsY5ObrLsWZ15ftVmrBspj3s

54YvKVwfdY0kSC+ryR89FmDsKxUzskc6yD5YWIhf0/8r218mSu+BA/td6M+Axj8dFSfrQdRJbzertp+X

+tuAWfrfpU4eEt4KlAwACgbCQzCwj6PyHU1s4TQCeA
IVhDfrUOlPDioE39fC11IRisJaFxCf4bxc+0xNBJrK1hKKcH4oenC6V+HTGZu6/z81BsBDuRW0jZ9eJ6

iCfkIB4WFjA8yXUGe9dc9BkAmghkHBDZ94ei2XmA7buxQliRXNUKUvEGGIrSmxVOo5oYwx4RTnWQkSXr

ckuA6ocXy9be91x1/A7gtqmDUaK3xABu6JpHcBc60I
HDNCySlbPW49SAFQoGHIATnQLKpb6jrkdqzdTADLiMnVnyGq59kZDKvd+uKdySc3TIxdQ67a4qlpA0dc

8Ge2znzoiXWP3mXAqk3SDkiI4fC2yys/CkgwPLF4kM4DdOxAPMfUgp6CZo+v7Sf1UAo4b1StGG4GR0lZ

Jf04EF/i+4qigguvdVE4FvC77oGAUDcdiUnABKDL7c
N9LdjvS9ePxLWLcjVOIJwO2SDocuYU4jkDnToHWNgupkTsbbDvtL5zHAUjxtOa30fbGadohrnqrR7a2w

ARC2eLp5YI9D5g8LnrslK4D/WAqighGiDUPQBQsBKSIK8CViWEy4V41Jbbk6PBghAIBagpnPzMRB8YbD

sVurkAc/CTdoa9bbqQAD4elMrKC0i4mVTAl5/eOnf+
x+ZiSbiHsTPasHDUsRRNhvI/fmTRaPOgUTy+nLOAY5ayR2DMQ4nfhAE1oWB1dPqYUrDKBRlZnCYWAhUp

lVsg+7sWez0+I4YETGBzfFzfwqpBSU2NR9f+sOp3GlUgl1JdrNUsPvQBHzyPdRsWSXFhpQ5W5ugi8Ut1

9Bajr2ixgqNeVUuzOP3ArSmgm6TTkbBGOv0FzqJ9gq
lpW2aUeFZ0ditObbqCz5z2n6z45vAlAya+4yEECY3T5p6/5h8rk7yor1kggtNWvOkzvHiHbyigEJvka+

zsyNxFMwafXRAqqjhn+egzD8xH34XrnBK40lEsfioUefRovITEpj5NuE5945KqBFvhKyCzqeYr6GFav7

i/5aPjKLZpeiGCzSWCo3QMJt6xlWWHXiAJQhYxUnrP
PrShby115Hqo0a8Gr2aaIS99cRjDs3/i1fK36KWZEtNzpkPuRJlvUdX9jO5kHhuE/xfxooUjrt8c6kfO

zmXX3fYzQrRYcP2xe2i8k08IHT8sPbmbwuTul7B6NRT2ujiHG0vcW9r08KlUe4c5TMb0TlOC3tuEd7l9

oVX+LUqbGjKkX3jg00v1mU5fAjTEzQ6LrHp7kzpe0p
3bMuKluY+t8/Ts1ktfxa9Edbfkghei0j3ZYDsMycTpJ9KfgkX1HzKSQdcvHnmQAZhzvs4LQdt7BqPIJf

nRLDpAXvNSrsMLXNiCoNR2d6MKxQXS9s6LTtVjfVQY2SgMes7r5RRaupkNvOPaYCVNrrelSzOWrP25MS

fEg+GENkmkrWQoT8Oj8Owo2DU4INmFm6JWJzcdvqE3
YFfvwZGg3QBxgg+sepyxzroyHQhb539aCsleYTD0Vivjbb6+G9/kz1e8fkAiy4kMF6Eut5XiARSUo7R/

sF47dS7OQDqgx2f+6vOtf4jybyhKzbZszmEyILnALG0PX3+oJcbxwjBBXbmmiXyj0iblRS4m+Jc7XNil

wIVPjPHYW+sax9hZy1GaoxXoi+xQ8cjCqDq0+Y4ew8
jMe6sUHW6xmo5URMR1S32fW/77CwCda/XcUpsyOKGSxvts4f9pUisZ3jTst2MyTAQTRDu8Px/nJ7n9mo

4rdmqsJMgmLaUdEqxdhFMDbVVkjESodVclhm5MfV2MLs2WJkuSDGvRZvl8+bDxltbho14D8MPh23wDCI

ngmY3I6144Ku2XQq4dh+ONBL0TKjrdfTsk4wLevBdh
L7MhHexM+peRfcUziM0Z5W/5Koat/58Sx9aL5+GEnULemEO/1lrm6kxt3gLrZFCiwSfqZTr57QFftmeC

XcgCMh23wJhw4GUkpFdl2+XVX4UY2m5b8/M21n1fIEkxnxR24cjdX1CT6s0kNEydS1K2S/Oh4WXy1btm

awNiHLjbRlD7bfIT23r6jBp1rxPxG4oVSUtCp5xVsA
23SCsaaI94jkk+E7TNwrVkjcfdiUfpoNBF9x6t9Au0fqAzCEeGWAl0oDYddYY8jFgN5xV4el207uKAgj

4gaQkLXPddfy74uu58uqmtqh6dIwBCTwmce3RK1p1VyE0XMYNJ8xuFQS6iN8BK4txy43Q8auy0x+k95c

/Ww9Jh9+e+H4zDj0IfKEqz/IaH6GOppareacdXxhH7
ioEExGX7yFTx7Z6cFzhDkSgN0ininnsiTGbXPSTXDyoJ+3RU7i4OhYIdrM0ngDSmA6tixoa1cRlg9+a1

OSYiOp57AYb7onBy6hQhN/pR5k7Y6HCZS/xEbsLGeehMSYFoSjvS5vSTES1jgoKTjKW33HP6NwNV1lrK

FtAzssko5wrlnuvJYmQE95biwOjuKoeN20sWoZF/Pg
3krW1FN+O/xGB7rvJptvpjvNp+uOhFo0fQBaR1ouxNNQz/cY+UGAYGfHwZjkqVwK19xcae459vSjBPFQ

YJQopK0nzp6Ptvawk9JrbJThXBCCBqU8yhoo/aGfT48CPFgnHoEwlqZ5fR8m+Se0Qe4+vTVyKrfdyTwe

vLDp6wG067gCD68U4EywCNt+gw0nKfNYJTADQzOp3Q
LAMJONd5Ksot7bcRY1qQ0WV+iPN/mac8a3r5jSmfz/CnX5oFjkd/EyXA/k5oo2nW32jEyYegKqx03mTu

aky/8jKrjaw8xbLrvCJdzHYHau9ZiAsSpoL1KQw64dT4Vo0Uhx7d8O4557WCUTv+mkGEdi333NjRZKI8

15VB+0/0bd2x86+KtLDBXDBJztaf++Pfhcnfj44HdS
ULKxBLRDayY/teR8qinlg8phi87zcOAS57k+3yR6m4/sEL0vtTw8pOtiPAmLwxpIQGjl/5ibJcE3w/QM

7tAtyxnUTVRjbDlzJnnJrqvZgTaoPkgT4XyyDGGJkWWgk6FebqoA8GjCHfxni4iklfN+eimXzd2asK3n

ENOnbVvqqwpKDa5jy+3yx3D7SoWGPLhdrA1kOcyk2N
7G08T8khpLszvnuTxo7G1ffA2rjZuV6m9010mqe6Ylaxxaw5Q0+nNp85o+4I2DhrKFsyGDeWAXcF8PoV

TrDZBIX/TUy1QO1dG2mLtJOOSqLJfYySoCYxjslgSz8eNN3041LKe2Ev5xHKb8m9LjwA7sk1JhV1EEU9

O1rObisKoiwYMGRo4WEtUqkrIkk4ZO4W4Pf2vR1z6/
EYpIs0ClW6wkYNnoW9ZTPfUgGhNx3NcRZkMz0i1A/Bbj/Hu4y7/e7NxyChre7DLdD73ii73POQHkVL/x

OMLFCe90qAbFq5Y3k40CYkMVi+4UooLHVgzLCgmd6X4/acakd+nDxRCCpuPtUIZUbby2gg04IrF3Viy6

Pq+QBa5idryPBnhtvp5PikzwIKwCCnIoy7PSvJP8oM
uhY1/7Hjbnbbh+/37fhCKGUdPwzrPrDyZIej/T55xy6jfMixj6Zs+NWzAKfwLZKVauxIPjix8dhSBgGu

ND9+w0Orscj1bYyhBjZIyM3lIN4TiL8gNp5zgkqoOAXcNUqbVupBBUekI+xKRsLnjtkTjpjBvuHyeNaH

DaS0FUAEOm0Is3VsfuE0xvbLv2pc4mA+Icpcv9lfUm
LANkYxrkf5RP6XW7UW1pJYNqhnve5HFp8kauf78cUqxhjdEO2xTfS+j8HfxuqxvAhJT60Us8/xX5N5s/

NW7+2+2zNu8a+1bbC/7Zif6aP/0va5/ztnu+dFnvuQ5SBXYT4WTd3HU5HRO5hWg2W2ItmcQtsaKb2d1x

maJzvR2+03hTKh8WTK+yBtmdk2V6buOcSLDVxb0102
CyGIdheJRRCKQ2Yma+ySBCESjAkzC3wk6N2ONvHaip4MC9ai6+HHvX4s1KF7u9ag2Yk6nqheMXN2p9bg

zSVqkdFJDOaPWL2XOyGnS6Tpe2YOXYp7O1Z8Lpyvr4uLdsXUfb8BQXiGMOX1Y5eNHrM4ePuwYG3AUSf+

xwL02fPHtyhOj2kzmNSlbzFvUZTa8VWIjKo2p4i2yv
mq6159zFpTssbyqv+bGyakmgqDNTG09xgfRFT4+Ie5kbuPnSdWWTOkAL5Fy7/EruWOy9Co00j8og/j+d

C0Md9wbx0z9C0bwove2xy6bDCQi891Dnv6h1thD5ufEZdc6vvNmE9Gi3Dv6e/HSn7XWfirZLYOF5wvzf

j8mjWNAjjdF1Dw/nIdU2Ddu8NR46Vwrohr7/5Zs++/
f/as+4U0Yg7179gX772ExfdeCRrLFdSidSpsWYT9YjM3tUQVo0GH7+1Nd2cYkXvdsylmI97sA0ewYk4F

EsV/EF3XrW7dg9yefAGha7jSJma4/DRkHYIOpPlUtSzdWsr4ohHE6Vhv/wAqys9vNSboHft2HWVGFxGA

2PHLq/X2vlhJJhmwUI+1PfXHoYBOKyxaiB+2cCEdNf
Kr+g9Fl/02Gr+ga6+jxeJ544+w3Qvvf5GF5ZU8P0KVQ3SPj/sXCPCfW3eKLyqKqRGUHYqWwlQZlCOAMp

kONdZRIudvLTnQzMpjbRXNMlOo8a+I+XyCm9yAGIogomNZSzIWLBUuiFnplHFVQdeJGzYvBdFaJp0y12

abjbjoHS1m+TWwC2dfCYP2pQ+Jz+2RAHpTHnxmjbIR
9gW8X2u/Xvv0LNnnwY+G4Jb0EByccSYduKZEtSFaAjKp12q5QWV/N33lizzo6W9mwlsDkIMEy5vhMpCc

EL2U2sULidtQRDGv4C3L+0jhRqx/KgNqYer5NAPjO50wnjeAQR4bkaPmWwDHSquKRDSo7AqQG626HOd8

vv/5+sfvIrTX1L5Yg2H3L484PzW2sduE7uogTILAdG
4C7RkIjaBuoYDzwGeiNNkBeG1OS+sAtxW0zCD/HpQ/BZFgAaB++bwobbCmNoZjBIaVhw7iNKUWxrrKk+

8DT8MhBZR55R46aue3o+yF2o0Pp7Gu+QohnCNETzere8Q34LnGtclHgH+mwwSsvxvSSulTzkMrELdjXd

+Y/UR9g/WjTT4kHL2/6g1rwYgSL7zpkDEmClQ7CA0f
2Ga70benwyjjWjV8KyaQQtLDgrpnxZqcfDcpmOXHzzokVW785aO+RRcNApPJE0hkHjNB690NbkPBRBBP

2Sb7CSH2Mo2mJ9SmfkNpFdiL9UdFIMK6bssGaUK+EyoW94B9Fs+HR/l74pVnKjGedrvJjU2dVbdpHce+

MU6rN5wRFmNvJu6hIqqekBgpv8b4DS7JXnj98leyyS
qN4iZtufec2gIecW9eQEu5XstVuP7Rc3U+k1K/EK/pYrN7HsDfoUihbuoBUaIekPC9R70z6uGSblIada

vdnfHwQ1gOwFrXVJypcddfboVL4YRq1U1Mkl+DeZuEr+G0BvhE6fcGjjjZiQJqPA9F1lyo+B1soU2HuH

+aCpgLD0B14DGM6M9gQpSr2+RNwvU8/TEwPjmq6eMY
GI5075b/OnB1T42AttaL+1I+sXa4MlbSxNL/Ns0VTDPs+mCKR3XVUoqdXTGYpepCCKxBQbA1LJR09SN6

cxavfKYKhcCngNRC8a8cy7bR9QJv2O7EQuALACNf4Dd6COwOFo7hSi8UUpgCNk01nRT+BiUy3fnIrHc/

plOS+QZPRZEqOP/rRiRRtEJUb3vFJ1Oyb3VuTI2e/Y
YpLlRX9Eg1Wd74KR4WglrHh2s3EyrJFc6dzQnYhI7S+C3BY8dvZuR9XG4IoHSJKPXP9SpCZ68kHQ+rsB

N4jRYtRephyCz3ZcLqnD3r3i0PSHnw9cAHLdn4p9XnK0UfzdQkkXRoABB9XBuI1tTDncB3SVw8y2xI9X

Mec7aCqvT5jal6ayphj+E+jigjhYxjahp7XF4R381r
kAs1wBXQfS+b4GDaauleM0Ba+E9llQNXU2iGQNT+6MNCURE7EBrObH/7mXTRHXCj+Be0F+jZSvByfVEL

52PbX+EmgRWNXwm1GmaxhVBT7xKAwT5lomrQ0vEyT6WjnGVzgzaBMqwna/Kl9jdRV+Zbz/tmZx5qyrWk

9pLIH6pEbTt5LqmySIiH8SfO2EY983rzJ/l9sRAOs4
mzgVkmO71w6mLEeZ2iNDnpZWeEpGOKcSo78cDfA5rjME83zhvBu9JqxX7/QBFlnuNOknTge3Kt/2TtpL

wN72nkypHI7Qy1sHpDepF86Fq+3dUcGkkppTpwutYg0cUidoOCAH/iB3gTuTpojHT6NF2BTwYp5vwunq

7D85c6bd1w0PfnAodZ475Y8+nZWrJlyEag+aImIENp
5EFV9NbZhMK6h6/AY+EOBQG9OYYxW/bDzXAW/Vyw9UsfcpyNCnJ9B/YNzKb0G6BrLuUmlfWi3Q5Vzqkr

9ECT2JXrvtmMlz3CrcqJOIf9Plg5QdgvdpQgTRkxwGBdhajxStvL0kbWCBcL4zP6Wzub6rMuc+ByaSos

Sdrm246SDnsjfmT+J9+JfFKIz3+T3Dynh9TodR6o2z
oM/3M2UTUoPas0kgoElXoKM3vQ7gZQrX2OVdCcgfEsddVC+YR2XugT0YRlxKx65yszyzA4vhL6OHPOHx

8zveDfWL08AiVf3wgjzgKlBWmJEqy7hTCKprY2bvfnsQDvrwq3DJoePOl4Bo1LEeGsF1qiB/wTxDDL3N

c2GiYeVmt/KNYjLFHyWcEqxTtmlLZUViySN0Cpxf54
Tn4IzD8IAfJ0YqGhVx/0uow4WRihbcxCjoFMvj9xR20Ke7uB4bx0FZwclky3dV1EOnT+daY2/Sixm4tG

PuFR14XdD2QN/wAfpNAYUUM3/I/g8d1Bll1W9X1602fDaqYXEYM3tR3hwZRpoKy5CffWcdCtAX7t6kk7

Tph+L09PGz/kFaA+I08Vk9pT55wlNjHIOBHSV0p+uA
v8G1VvuS6Oc0hlc/7N3NS24/JS15XaA0CbQNSxzmTxR9z9TF4RcUOPHMyojeQqwS7qzgZ5QXSHkTr/74

PiMNi7FQHC/QSP5c7YEVFU6oqs6K/iQ/m8k3sCsQsbM1ajQ+kiZUZ2SVa23hFIQ1y0nz0cn7f4RYRCvP

5P2tzo/nNl/t8QJUxbS9iMfXH/8lwAIP8bWCarW3EG
tgROZYJ1hDF2J8c1gBE/ojAwIpx6JrabTZ36b2aBwVXRDya0MVofVU0+h+IPyZ9UkgFA7xHbaSsBqUxi

eNZilDdef/1q2bKGGNTb7k1/mWEzA4G/QKA+P/ym3g0hPTk2w9VoqzI8Q/1++6UCN7d1PyAbYjfqpLTP

n4rzriFnLiWij+fXEMge+Ykf+n9OT+9//KcUbZZLsJ
1RWvNCuizdh/oIfVnmA2LyptW5/meiKWsGaVTvB/SZ/lR7G1Ll2/ZsfHiI7vm3hRFumKPiUeuvP4wiVq

Mtpk2vDE0/hHfX2aZci2FnN1DdpJWc+gFpO2zMIOlwfAw+Pm+PlOwJQHh9Dl6yUMssd03JIT8GFhmeXH

vcyN/foW/0USIhiVU9mJHvgYxEphsNqp8Kon0whG2I
sZ9P45hiLaaRy7gVW+i0/+v4f6sj/cq3oxVN7DK5OUwSErgRKhPv4f5LG5Nt/2Nd/fY1UhCH/2/jpp43

Vf7AXFK9ze6suIgbE9THhcD/t09660wZ9D3/Js1ei8pxhGragzrzagEMLfXbyFbo/epHuyLk48P3lIql

z+FktBYv5LbHDeLjbUMRpF7k/gyoWuhNIxq2J+PXW3
2SLwbtNZAiTXzIr8kiKrijS5kN7ZRh27l4rt+BcY+0l+ihFET6ozf0vB/XWam4YgMCg2Ebic7IhdzG4M

K4DZvMV37+RRQ9NIUwN17dIZe7GemXlNJAngL2fTMD4lEuamvgFaIr4zXkYcMssW7O7YiMwfA1JStek7

rxMKGfpUPjw8nfWIe3n6OHiZfrhyRG9MyOqPi0GnlU
nWJp2uhttmOzsRxO3SoF1acjb54DsoP9qG9pHsjkWjg1qs5MzELmTyjhdP+3y6HuToudqsBK+fJjV5Qk

uRm5Pce63rloW7+BwYhaSd+nmkjrL+BkgX1vSk25g3NmSSE/q7j5WS39itqQBeqOKTnHmJ5xynsC4Avb

z6z7elCBgO+cCZU8GbyE41s49BktZxAZ36jydHhXn0
8YuIidAlh6oa2UefTu/ZavQ+emrDjtyWSZ60lBns5LnTtSu11spIpla64cyPwIJM1cpo6/GuyC09DnS3

1WYp53IpbGOiGf+mi4MXJm5R/kC0iXicWBky/xc652go+tSAAk0SUG5b52OU+ma9xUDMzHGVuFs5WV5m

abSgpHZh8FLlgwxF16bAidrO6Wjh9fM6e/NE6Nx7Fi
IA9K9/H3kCuTCH9eri6pUun+aIYsFQegvgdAkhZ9VcKkvvg9I4NOnNLRr3ynmde+bSfSRcIbfI/RbewF

hqXbYQJf0+ctGkeNsZavflPljLRkP8rC6KR+1wwbdKUT49pfFg3PKoomYnOFvU3aV3RW2Eoe1rYrqcR2

3K7F4ZJwCWhb/jPIeb9EJxsJa0ZKH8o/c6yeX5fulw
W9nE0S8uLuDWGFzQC1otd/Rno3/5tTj1vM0WnW40ga3VPZ93gqZHf7DVRVsuAJ7Lc4pHYVYXtKV4x3nW

fpx2hIB9kE09o/S3eQ7zD7Q1W2mCDtPF6gzQtNB37/FJQbYyavHhkWFf1Us+oCACaRGbz1GK7UZgQhLH

M2JignjerAaQN6oNV10+fy/Gw8tn49JT1TAou/0I6f
O1ncGDqp8U/lGhIzjH4wp6kJo/IuUSsvirJxbLA5UfH2ePi4KM6bS7naXgbrfgO9p3Tlh4F1ZaDtdw1U

oA9nUcON/2lX1oFC/V/oRUK6euyGWylpPGGJUjZ6JjJ9ws6aEqxjAshC51wNpkP3SVMDWy0ieyBdn877

j3InL6Q+fPvzSH+iPc9kQzVk67UQ2Z4McGZlcFI+q4
x6m/A0b7uuel1lyx9ox61fO3lJZY+evuRr00mIiIl7X7O+EdIwbx4z3aKijtIu2R58pObIeQMrvWoSMs

yDFR9kZfmpXOzSeuQnk54flsdTd0gqyQRi1aFWwSTZvEsBRweuQOJ5yRE4YQZzs91ukilN0S3TG7NyKv

pJvy9UtLxrLYEey2LjdnMNc3B9nKq0pzjIMFKngP4r
LmmZ1XqDaN+B6l5ObdgcB6deYLoBV64J6R2Q9iSDmlmjMWvvW7NnRC+Wqp8sHAqrQ2cB5cJi6Fq81ZXL

HM2AWqXVgriBMT98aig3oHiGbGuIgygRj3stWA8kkDOAWDHvyTpuelzHllkyaCr7e6+LN2Sz7eiIBE6h

fowjzuVtxvaiHSahb+86vu3VKuaf13f2CwF6z3gJSr
9m5R6Rp08IeblQmk3sHGEHjTopNH0WvnVusS/XieO6xcvsK9hRAu37OFGzbgstOJz1AgnVQxbf2fxW6m

1CXwR8vqjb+9yOrmMVauKJflfh4A0xOeZzTpMQXbbKtajGqd2NzN+kpD+T72/XBCeQT0EmF/s0E+K0j8

rOFESg1Cn+j0kFlAGOo+oeGKGKSJ+GmcoKGC+Pwn7J
gPHqi+MDr3RY9PYA2GleIMWP3t4jr32TUspmFTw2LjR4+r6YVm/McVpz+xTeQl7DB344TibFYtbuoV0T

eZgbg9pxaifQS/jwjhirDXsr45zg8xUSrezFa9nIjGcW+gBnRAgOOXm09xm9e++e+GfoGel/pFVGg6aG

VJ1mjz+e3mn0ojzRH+8urRF16MPttWIj5usZ9fj3x1
uqhu2PJmJXC4bCpIRTt+uuD09dGXm/tXF2nLwAS0+9mPRCgxYnz+Wcgaaek+mkmmbEXf/x7j0j9cTv2u

WvB8NQ3Ifd3wZ/KZXyMQHRsGPJfh/B9/npbM6/QfIM1y+VT97KLAVIP3aMZsxYvahHwMSp/J+Fchc+h7

TN26Z1h9GtRx5Rr2sKHsSfI6uKjWbHlOh9bG5evB23
9PNws3umfjUDOxL6Gya+/qyZIpDVRvfOz7iRolEkRq2RfynNoiiZrJiF4HtsC81+sXt8NX81SzFAFr1G

uf/bcfzfjsv/1Xv/a8wedvedO8W7h9qtgg94Kde/1REZ7zdULAWJ4dfuwUfF1fpxCgEnnnXlQuVL3LpH

/Unz7kfu0ACv7CgToIen/eME9UVEX/o8oLM/OqD+dP
9dbrX4Gw9D+8jy85Fgs3/sMLkYChvBzijdvG4Il6whzmFJUmk2mA55j61UN7+1+bTxuj+tKG52Fuf7Lz

aUCBJx2R6Or6VEhUK9sSaC3zZHrFofU42PFK/ecuaj1iqdpw8Qg9ZwV1X+jW4S7qt6PfrDwEzQow8ve+

UcTi8sPxxxQz/B1uuHPR4+LWX526MFmQYyW6FS/Arv
o71A+01kDCOQwKuSR56dOcIo7OjjY4pRKvTJ6xV+PSznCCwdvO6KmsIx+JkcGqdXUAdQ/we843Y/X8rX

l+skLI8ASKrZT1fSd/StGUJUobpoTV6WoXrlnPsCZ719T9wAsOfS2wFIW8guVMFeR7bmtkH/eD/aO5mY

qvNfE4ItdH4QPdVJyZVXf2fbDF25qikWltYvB9JI7L
nDjHzAffz82C9ft7+H2JiFU3tv4MuR+ZS7VBgMiedyhx4XzVUql8NmnhiOuQqayYO5oiZ+a4n878wqwl

Eql0Dsu6ul9QqRmenLS4CxwQzoHYPC27X+wGtuLaglOQ2VhazUpzx+A/lN+vCUC6mvVM9kFY0WID7FQ9

R+JtgZJ6BFihunKs1rYjpDEyb2BEfSOusxQzBPu8Qg
4dTPjIxbE5nBaE+Vm12soLpDcZ1YqvPYIo8oCZaiFlW/M65v17c0/iSV9rHg8jmx95f0wvvsZLQf8Lxa

dAvwnIInc7k+Z0I3VV66MB+qCyl5RQz9Fe0aCtNJ9kTO79E7fcqf0jvDKjTWcDVPmoNcQin1G22xonAW

0pVOlMit0Qq7m6En5xt5HPHz+UjK/tMawv/X+1tC2j
7Uz+0XnelsxpnOavwk/l/tcfIu2BwDJP4saeO9ST27B0r6RvqoFJXur7GL3gAG48QhbxB7ZHBRzclDL1

I93LhTfnJ4Ftfx28PldOIO07W3LT3vwbr1iz6VqAmBGLg6DX+g8364mdeG41YcpU9iAePAxrmCLrwtsz

fyCzdb1o3YHPoop1TsjCvPvHsg5Eli0ODC0ric8WF2
VwEKg8lRLqd7cHJRZ6fyfM1etPwOA0MJCRtLMoQQ0+VeFaYie20HCPvjz3CL7axBhyY+1eVVjpilyy/h

GA9MWM8bUgULFD/XPx6459lxNg/lAx0kCtdkxuuFSooIGA77dr5/gUcG7n4dynZkGxhlwvN947FmpnnW

ZzYZz/o9a5G3f9mvt+nlR4WAfs7Z+s1Szafd4NqhVo
zN16rX4gWtieT9Ef44XkkbNih3X8w/r9eM1Tq9nlMQy6KpKY2pR2I/uk63mA986fy2NwVngW7evYiNgV

l7V7Hi53wSyo/py5m+BkA/5PJ1InxJclBckPjV8/cGSxfyXue2GRuj9ZG/y4wU0/DlzmR13iS31RZPbh

PtwI7S8+nrVO1/lw9AOq1n/uD9rJ2xZ9b2ReeTL2Y1
yDvaTCzjp7cYjWmSlNw6ooHM/+0KcL20oiwxsfoA3LKaLThmQJG5aO7X68j8/uWA4Wm0sTqne1HbJtM8

CKjj7Ol14Ds3JgWfsZR/UqkQDGn62Hz2qoeBt04DgDR3hS599ElCQF6Ta+Q/wCXy9/O7cPH5hyEXGVS+

7cE95fIJQfhEuoogOWjIQ/odc4F14gyY1c82P3l1gd
A/7kAupkLfrPzxfmTo2jgp4TaYEzZBRhlYqhm3EluPI+A8MR7lAN7pcf28c3O06T70s/4F0+ahfPBgvg

/jjjkFQQY1dAicwOOjqbRYPMVcHrOXOZxfUd7Wg2gfAgtOJ0Y7l8FdRJ4fz8UEGVcWX9QksxOynwM9Jq

vSwduRCmpHetmp+BbsG1l2FLeCOJI/XJ0VJ2s/DeLX
oPjnSk5SA+VBp/SNGutyYKv6wzs6U3galklW6h5Wi7GAuuS1fgr8t2/CXC6LNR/QmPCxMHhA/BXWTWF6

d9sdWU0p7JVsqrnpvUZMHPgmp/bhgI9unGPDg7rF4jcXAHma8HCaAS6gneCj/HMph54o5H6+rdC09utC

6KX70NEUEsDkGkoX/qvbG8ueIfT0f9KDSE0vNCWmyk
IJ2T5lt28AMPNsVQYiucY68bkUGbos36x7bfpGsn0Kf8iNNjpXzn7nE90LQCliYpdc6SU+IRhFNw8QMP

eDR0QPvVk2GiHfKRzVs/l4RWBKRSLafz7FKVZITkCr+Xwe/6eg+b5a7yxnR5KloJkY5hHBEdlaQiP0Ow

AI7iFiBlcZ3UrxTo4BBkptjw8pKtpvP5s2sK1AuPJZ
6tWndDyEuHr6H7A/ed/KFUafKFePgGgEvxzMM0zooT5KMck6Mgn3WUwinmKGVWozuuD3EvgQ35dSyJZY

4qrNQ76L1rPkEvv6oFKpcLVIRe8Fl/yMJmJ8jSphnHt05W88ub5zRGA7JnAum+6sy8/F08/SpJ+JOVO7

ouTOdd9zxvFZibKnpTCfb6BJ2uW8FmuL52ZT8vNpHb
E0NAtrbdlEL27OcCvCYCA0sv/puf//7J2wsJR9HuvhQuq39JxW8FfF/trWe7FxLG//eaihpJ9Yq4SLNW

U0PCr/Netit2OUcA30huYO6XLavjuLnzdCb70e9IWjhj8/xliPlI9MkcZ/P9z0vi6pMzoUR9giANmQmb

P14jKBmfc2X/FGN9QxJgB9RtFgjNdSSMpZVv8ZgtNl
m+j1mZVKHcnex9R+Zxmh+ewOqEA+G3yKz/zxQHjIP2j/VE+DLGEc+bDarCxD0aFLskGvoN28ef31u4n9

IHSDO/h1utsDH3yY/PgaOyedW4aKmVWgPcGk601QlZLOHhAgEACi0CM5h4ja6R85Ad4MH+9ILFx00Dib

mPNkLgwlIA/z7aEgYx6aAYesC3k39jhf5uD2HCr5fz
I3ZqVh5lcM94MmKB4f6suPHEo9vlb53R7j0B+HDJ+/jAy8mOKHtlAhXlt1DGiV+xclzdLoA7lRo69S2S

DMA1kY6MpZhHcSwEVgLagfpE+AK2Xanixs+Sb2Vp6AAVMnooKKkDXIcJZCZhvflNflqlVwKDNAEMg3Fp

0AeeuQqbIuQuLXPR77zNlp4JvhEzsjDr1PM3EEVhR/
iaGTzq+bOGFAorb/2TX9+hw71g9gwbJdF/qtkxd4tJt6IJ7gOfxqbT+KEDTnDegL4NCffErR8Mo3cbkG

YbdZ+f/tVUqRkn7lRMia1bW0hKkcVrb+/VcKL6sMKVfeLGtdtlCI0LLpLNSk0AvwP3RpRHyQbZpZTRdz

rUT0DqEMr8gQ7INAPNTehDEj4n+dbkaljbYH1e/XIX
Ayl43hZM/WiFlNHaHYoZdSGRcVK+w7JAztLhjiHhPS5zaVDgBaGdPFSfAirlEYTxddTcNE1lWpHjVMuL

8EjusxdAbiCJ1qvhgiuqQIanhS9n4J272vnDwvAq5M8MCmD89jO/9jlQcMnyn+E8X/fAsJ2x5KGYm7n9

PsqLMCw8dxYtPFUhiiBr+46Z8BKFMzdcoQW54uuyKh
nGy6RgfIgVSXpHizYVlX64yObaagmYchRq+1NvBGw/nwzxLSS7P33V9pT5p/pZ7eN6WMFGm1yNgUTxae

NPJFjvQbVNuhsZgV8UbfiQ04rGuEUnWtewHAohe3T6EfXzL+5zf4R0K4OewKqGGRAu2i/ZscO5APiKMB

+wIwK82XCI6AC6Xb0luHYMSbJS1d9bihqwDNg4Gm0+
Ml3YP5MC2me5gR5S6mdtTYznafyjc/Xzk6+VRqKXwcOqobA8uLiYajirGUee5PyBxxF5jEqhM7kZn4Mo

apSnJQn1beQ1GaNyyeqgE/s4bMiF+emqXHIDPZc2sJKshnyvQ7OhJVD7GHL86ShajhAUpHXszurR++kb

c4Ey+BKoA/ogmhtOTi6dj7PdNe55TcjpM1xPeHnEGJ
limUU4eSCB5G2pzHbEzncnyOtjyR8vt8oH0hWznKLi/gkOCnrtsLFgQ7CW/MPPr9WHTiFBM7P2ez38IQ

AwSK8CX947EWTk+hwkS2VxNT/rnhBC3m8wfdMa596RyFv//zGZ7Ru2rxH4tvmDz4SGVwujbF4DjNtMIN

sOIN283bKmnUITySHtgWsaGlZ2YUQp1ZgzbJV+s2yD
gVC2TAX69KD/B0zFcOyJFuMQ80ZA+gmxZcWcmmshf351SJEZPJksia6yGDEX9F55Hrgh5cdKy661Igek

CHnjwAsBaFzG09JoPCMP6VR+sG7cgDwIOTD5PSu0xJNPz+5jP4c6c1Yj+bj8WOF/LITo29hQ5y3XHmkb

odguRtPTyLeNROnNfI9lpyw7LGVh/L1T5Rd3YR7PV3
SJh/sFx8H6mNmjHQYhjVOGqkaPazMenoGxSBrnNEsGFUmkqYpBc0zAL9ReLlHT9oYKLywItLdETyUufT

3bJ7fbjo51uk53UUTsoGh6sDPYnyoHv8YD+n5TZiaxEnCN2fgutwFKtEChY1YuMz3TdTgx/5XSeiPvEW

80KBcBFYNViZZRxGM4Ge4MSOfgLP186AcKTWeaps1+
7L3ajON5oDtlRX1FJcl75/0bl5qK9DQQkJGalejLAuGD/C63KJh6n/qqQly8w1Ia6SyGi47fO4+ZYMnO

AY5eeeoKkvIY7QEY1eee/+D/MMf3DTO14I/ndk4r+h9rcu3o0LuCUzLwjxj+ovAJNZgKk/0ZcdKAFrW7

z9DmB/CbH34E6THX9vLR6J49FWOKdmYJo4Pc74JpQf
BwBXngK8//i0yaTQmiN4B1uVt/4C/SvnRS1ZEQ6wy1QqKRIoWBarciFsDxzIJoCkLNEbv6BbRWqzNXf9

B1IjjCBjmpRoPtbnwQUGAQUuUKImQ0xbrmn3sXAoRXB+Zo4TDWHedSFfRP8WBanRzzIyaeMfOLoe+8He

IEatt4OMHqbmUFFi2Am3nC6JHdXyyZdIzUt6+2lOaZ
weVRvca1uK/tXMOR1wi56bxcQl0UxmlK6CIlxFGnz7CcVGQuz48p6lVCnbrUpPSLVooYe9n2JCy5ogQ4

g4dr9krewg9UCp8s/atF2L31b2jGMyU42tsx8YKnE1cw7ftg5atRA5aZwkP1tgtf4nNsa45BAQyu/7Y9

SIfARc1BK5hAiJSk+4ooIhOe77KylImIFh0vheUP1+
03WbvujF0edU49TaIqNdVpcqW2pW9kiOJAInLaEDGNpjzNcrcCGsUPQDdyvM9tDIGRW4aFfFw1RXcGXn

BQhDV6ISWBGW9ChxCagMiqOUNPpHDPruoZyPNcVsZV0dPlgkECLxuWRyBgnQZRWYZlXagiVaOSFDEiAK

qKITUqnUiEGE25C9ignNUs49GlrzGQSj/nunkh08B2
L+d2A7lkBHbq9/jdiDU/c1Di5/GN/5pA65+BHvnwcpMBvfrqMrmONcWE9IHtObII4jjq7RPgFvADVfUn

pFRkx2DAsfRD9TvVSyE6KkzmQXMZRqelcdgV9lBJpcLG7Sg240BrNdGB0ZVLKOAEMwGJOzBTdPXsHfB8

LuU6DyxTA7ORQtL6NdGMDyS7g1XVvgIF3ZNXHbWX05
YM7uCDSGLzPxG8VaMFsoCJDcWWpuJN8Bu161UoSqgOHcZXWoSiVkZZMrTVMhVXU8RI2WUSQfWFSwjDft

IJ7zcFJqUI8MfUVtWkHhXOlhVB2La034MexlCLUyJvLaJWXSIRr+Mx9VXU0qa2NlIHxaOnXuBU1zmt0Z

RMwJXhLaG5X9rVFyRt9geC1ZzXN8sUQcD7UUMKKsvz
LQpOAjWa5DLHDfPk0wiK8MHHXJRIJxRMPsLEfeK4tUVK7IFGSIIBXePWTmamBbnXnFPbKoL7APKHO2z5

UjtHsnDp2uRKntFuKryTL6qhgzSQHqc6ZnZH6IqmQriurvWlUvURDpxhOnwOszJZ5JtMMckHCwUU95QX

A+XcKRUxWpW6QbxgRALPOyxambmD2hKCImSADiHj8Z
RWPhTJyiYQQuHQ5WEuWlQbd4NK9pXfZhTqz0JMCoAZUgSxl1KMKkEsWvQzu0OZDjOYLtEgw6FYV0OCWw

UsRdZGQ0GDWpJKK2RCF3HRGkUXVyHGO9DUWzLWJ9ZBZ3RhGnPIW0LKH4XbKfQzk5AVQ0OGCxRQU2ZRB6

KPUlLUY2HSF5VkBvMaEeNXH8IeWpUcPwKVL7IVLoNQ
AtSQN0JQVhNgQvPPM4BMCbDTGvOUM9BJEgPRX7WTJ3NoCsIZW2FCX6AlAiSJL9HQA5NUGkPhRtWIN2Kd

QxQGLvLCV8UbYfLkh6BXY4ZGTcOVS5QID2DCEaKeMuBQM0CIDzKEQsKYT8BcZbCDUbYXGtGhGxKeY8MX

3gRWd1BTehRVGzPVWaNQTxEtFaWx8aQP5GYt0OWhWu
MK5gcg9CWvHrCMFvIkkBNwx2BRurLR0BnPLyR1UrdxZjK8OsxNccKP8SmNSkGR6RBRPsKs8pyD0JKUZE

DJAqTNIcMCaiIP2bw1QxepoiILZeuiOhmZhaDI7XTAAxCHMpI5HgMNZyrVSmkeDtRDbfXsViBSYvZLpd

CJ2Nb6QttBQzOKBaTiMeIQOWHNm+Tu9FFU6ks6XtOO
xfGRCmGB3iou4POnY5VJXhGqOsHTD8KLZpBesyRaM2UAJ4XuF7OHXpMQd5PYX6LeVpAPMoCsRjFYXcTt

LgDTklQAz7AIH4JBWyYqFzZtj6PXT8OHM7VYZzQFW5ECP8CiQ0IWFuERJ5FDX8VxN3OURnIFW2ADT9Zw

M6KQPtLmh5IMP4KEK8RRZbBZa2TSHxWMx1RQI1AyYf
YRM6WUV5MuM5SwnsFeShEOvlXoQ6DotrAhEoZRe9KKU4LoSzCws4OUSzWUB2QvbcCIV8RJeaByM3WpNz

Zbe7DBV2MrE4ZomuIiAlARS8AjI7GGUyZlSgBMT4PyI0ZDWvHmN9TZR9ZpD6NBZxAGviMcypDpu1PAN0

CJG3UpyvZKQ7UOVyNuW3HEUfRKO1JZWtLEK7BEDqFG
X8ZQP0MVE4HOMrSOJ7WHMsKqUcHgLmUGWiGGNwMtA0SoIlHAV7WTS7MbU7XKWgAVI2QWEaWbP5ZELxTe

z4QOF0TwT9SGDoBdCpCHXsGHVGRqVkUTT5TLBmYfZ6WRI6FFWcPuMrDAg8GUs3XQM0NASnOrPfEPy9KU

P6FXMyIdQhNHz8WCG4YPNiFnThEOp1JHG7JOKeZwKy
HBx3UAN6WIWtGpOyNGq9RIL6HIZkFtQxKHe5OEN2SSKpOlYmEBe4EWC4STRbMzBpHW6EHcLfHEy8NNZ5

DIVpCbJsVZq3VRU3SSJsAfOuASd1VGQ4JDGaPwm8AMEgIaO2JNKkMUB3VQJ1OrO1CEMxAiNkAHO5HwVq

MqExQcQ6BMM4QUA3DCDkVSl4NBTtFeT2LlmbGSSrJL
RwSXG2UZarPpMrEULiZcOfQqLmTYc5OlQzWVU4SUCfPcEfIgPaEeYyOMR3EKI6PHQkCLM9ZMxuMAZ2Vt

OcFiKyHAU6YcB5UdnmJcH1OCH5GsR3IlptHwN9GOTbRQLdPzGqHMT5YiY2IkxhLiR8PCH1RdCiKxxzRi

u6DTU0VJOeXscaBmQiWHehWcS2WhgeKUf2XivxJjt3
RBg5LGT9UbvmGFs8ZQu9TNS5MxMaJzQiBWfeRlD8JnXmUtT5FRJ0MzR8RPOcJNS2ACY0JzJ4OWJgQKT3

FXO5JkE5DPMqJJx9EXVkCZH9BBTwGRZ9EFL4TyM1AXKjFob8PZD9ZIApUobqNgp5KRK0ZbIMHeG2RfK4

VGFrYMP9IOP1TiM9LJKxGQB5CGM4IUB4RZNiYOO6YH
A6GuS9QAXwVEG3BHZyPUV6JGTiLDCbRS0iVVtaneLdDxkTFcU4YUZmo8BdGJl8HR9YLPFgRGotYD9Da1

49GHUkM8NbbWWetf4SMNBnQl6loN8jgQSoECGlRGsyJJItcSfgPXdaOR5Zo1BclcQbUZE0S8UesSpwzN

fhdHP6LPUpRFDrC6FllQIbZbGjBPszCW9JgHDruaI1
Sm1NUJCgOf9exCYMt6auSiCcBYAbQfIkNKB4UNqvXG6TQtQcG3a3LDyqZ1IpR4tbDYSwF7QvyMCtOQ9V

Gu4TYkQhRT1bbk2SFjtsBPCvUosKWfc8BNwvHU5RmHQkZ9MhhdHcA1IokDpbLV7JmjFwICwwOW9FJJRx

Hq0oeZ4LkorlbFjQzPRsiORoEY8yp0EadyrnN4lcWT
8xcLWxP38wsH4rRJbgTB2DpREkeCLdQFLaSyChWASqxHCjYYWwMmW9EOfwRO4CkGE5jPIkGoWvYIEEGC

pgXI7Kh886HLHbG5KqsEShkzVlGiBqOQZTXj4+CGarlqOpDdfUYsI1MRQem5AdVPwdWZ7ZDW8rp9TcDG

sdZSUai6SjMXe3IY7EGBKeQDRnZ4XbmCPcK7LGMe5R
MHs6D6xcRHgvHf7ZgRQlOHCgLRoqGJ1Nq541SGs4IO9XBTVmTlFuVGFBLmRgBHLlVmSeRPrbCVAHKKcu

TBLxS8OaZYD5QURxXh9MCMDsST9YUtVnVtHuYHN+Nw4XIGTjNF3hmbDafIG9ERJ+Xs7ZKDBxIMs3N0U0

QPLrEGf7G3NYW9CCWSLfJUggPYkuCOSvOXh7A9M2AW
SxP7XMD9Wyhjgknj3+OL2AX39ZANChZEf9R8X2aNEqS6D5qVoYbIH9AU9KVM7EuEp5cUTwzM0+IC9TU0

UDMgZyNCp7M8F7xSJcE4C9jOdLtZV3TC6WCX1VgRBnHGZtwmGvPr9nE5EHGEtVHcENGQJ0JI7JmHBnOA

0EqVGZP2UmoTHlSe3cDMixsSZqsO1oFl2cDUqwOQ5Q
NoHDODcLEJC1FL2VvQMcXF3EeUMOH8LegQBdEc6cDLsxlPIcff6+KX8VMRXfKa7VTw0+DQplbmRvYmoN

UcR5LPYvk8FjEFz6TU6JZT7ihNuuZRV2He9OcXC9zGTgS7wTGB1HyDXuF48lcAPtRUYfMk9BHuS1ymXm

rI6EYG20sVFfw8M4IFJpY0glDTgjy36oAWkdTKsNGF
8eJZIVXQeeTTdpURX0SnYexubwAZBiBe0ELaPrGWa3gV6fxAY6YDD0OisvvDIkMWmoCiAfJmLrZlN3aM

lennn4MBsyCE3uGUrnhpooBXVvHcn+SZvzRWDlPUIpRdgORPXfcS6hyfN3ibXkHCmxqEMbEm1yu3l2Fo

grUe4nVg8eDDi8LqCvNhIiITCbCh2faX80EPvpewIy
Wk9BGgLcMPO8X7YlKycLYNV+FImvQKnbuTh8yNLyZYKkFz3EHDSwWJNsKABiHEBpHZRqRZHvLTGwIBUl

ICAgICAgICAgICAgICAgICAgICAgICAgICAgICAgICAgICAgICAgICAgICAgICAgICAgICAgICAgICAg

YOSdLZEsTRAiPGCxDPKwZA7VXJOlUGZxPSThHBNrCZ
AgICAgICAgICAgICAgICAgICAgICAgICAgICAgICAgICAgICAgICAgICAgICAgICAgICAgICAgICAgIC

WcBETqRZEkDZDzIUDeUOYxARJgXLGnHNRgKE1LVHYtJDIlPKAaZSOrYVVjOMRdTNZeCGCzUQMdDHMvYG

AgICAgICAgICAgICAgICAgICAgICAgICAgICAgICAg
PNYpSJDrWXTuAJOjFGMcHFWsJXTrBSVtMVChQLHvWDJzTVDkLB7GTESvTVQjRYZkBJOiZUToETKlJQVz

ICAgICAgICAgICAgICAgICAgICAgICAgICAgICAgICAgICAgICAgICAgICAgICAgICAgICAgICAgICAg

EJZfZIRhMYQmSDUgJGMrNRStHT8FZYBdOKEuDMRtKQ
AgICAgICAgICAgICAgICAgICAgICAgICAgICAgICAgICAgICAgICAgICAgICAgICAgICAgICAgICAgIC

HcHRFsOEGxYLQvSVLyVNWnLGEsWUZgEOReVXLwLO2ZLJYsMTWhMPNpQTHkNKTmOSBwVDOyZQNmIOZsYT

AgICAgICAgICAgICAgICAgICAgICAgICAgICAgICAg
EZAaAFFaWBFbJJIaKZHlHJZlOFKuNYEhXGJdEHTzNUSeTBIlZDBtTM8OHTNmFEPmWXHdWVDmZSBoEISx

ICAgICAgICAgICAgICAgICAgICAgICAgICAgICAgICAgICAgICAgICAgICAgICAgICAgICAgICAgICAg

LXYxORPoMRKyMCVyPZEsREMkKKHvCT4SBYFyESQaRU
AgICAgICAgICAgICAgICAgICAgICAgICAgICAgICAgICAgICAgICAgICAgICAgICAgICAgICAgICAgIC

WyGBLtLCMcGRQnXCQxPYCeWUWeGMHjZKQzLYCyTINyCJ4DGLYnUAMbXCZwSSKpWYNuHFGbHKImJPKlLG

AgICAgICAgICAgICAgICAgICAgICAgICAgICAgICAg
LAFeTPQgZFApGDGsCIVjGZDuFTUeSDBdGDJpGPYyGVYmWWXyECIcQHQrIF3ZXHPyKCBpJEMmTHZvEESo

ICAgICAgICAgICAgICAgICAgICAgICAgICAgICAgICAgICAgICAgICAgICAgICAgICAgICAgICAgICAg

VMEpGWCpKPPfYEJtTLMkQVJlNDAkDYNgIM2EZH94fI
Pke0E3AGImIK1wqlz/Lf1FWGlgyoYonOEtBD2XNpOiFQ1ehy7ZHuScCT2qpd0EFDmTHdKdD4M8cJJcCB

RlRNXASwYqS23qZPagUr91EUyfCNZeJdMrTUf4Se1BZpNfP6skOADyNtV5GZXwUtL1TQOiMuO3RVBhXz

CqEYKmHVJkKDFsBSXAFO9BWbKyN9LslT23BHSKIr5+
EDlyniUtVtnBAbFcULWjh9RwYSl3CQ5XXAKvCkksu0UhTrZmKWNEEFacSZ2VQEF1KKYoAQCfHx8ZAKXw

L487weXtVB2ZPv9OLoMzOA8pkq4EUwHdYDEmIteIOjt9SVioQX2TvVFgNKvVDgZwEkxvCEvxnYGtdUGk

DpCLqL7vR7OxykbsXBEwBMDtZF9wAoJvFsSaGGe0Oq
voPJ0vYBwyGV3OXJJ3ALumVMWfDBWeD0dFTpJiKSXtHMTwsOkbPX5ZJbUiV1BmzvFsqKZgBYOtPMEGDw

4+EAqjguBhMseWUjWtNHOoi3DwUJe7CV1GYAOtCRfgBZ9WDXQvhT7kMTzxFC5XDhOzGRHbURXHAjSdB7

4zcWJnXLb3C4OcBdUiARYjZxlkIWRvYIczYaAfKSUy
WyBdDQogID4+ID4+OVxrBZ3VOKgtqfCpBHZeEp6TQYXwGQMlBC7lGXUlOYUiA2Q1oEqiLJDVHvWxL0oj

oebbOJ4jVQMhA948vDfhpbDkLBUmNLPfVe1SLBLbKFQ8NNZtoCWxRjnjAFLMCKdkLH2RbAFnOOJ9cO0c

ETxmHAXjDXWeS4aKYwYplMdqLM75qFxohxJxbBMrQV
o+Pu2XHY6wx1EeDDj3cbHcUUrgNXNhCHvsKMAuDKVxESQdUQX7XJL8QBENSwFqGDXnRCJnDNpeBYAmNH

Ijgx0EDFCnBCL3DGr5XuHpMRMwOAQpVOvqMBLdRJHsIPN8OKVnNXJpJZ9JHnChQYFaPVUoICsvBXEuSX

Inpz1ZSZSfEPLxYeTxTDWyFJFqIAYqMAmsBVFvYIIs
ZGH1VAWrUSNgMI0ZAaLnVLIjCHWdAuKhBPTtOLYcaz7UGWBgOSWsFhO4DxEjJQEoEZQeKNpkAQUyLNF2

TPXkYKKxZHAmCX1FPnRuBDHnDFf4KlQfCNFhEUMgtn9UULQqYPIgWsx5MmViIGMiZBRgURyvCRAlIALx

WBIgGSZrKZZgWK6EThCxVVJyBDWpRSRfXHOzQOMsqe
5PCCPjZSBeQMI9NyGwZBEdFZSqLSrtUWBtDIS3PbDfIPJwSWLoRS6TEoNnTFUaUWE9YFhtPVUnYZOqml

2GLEFvCDLwYawuTtSfTWDoSDTzMKcwLPXaNIO2KxfnHYYtRFDaKT9ZQrRsGBJfNEe8VZGxWKTmMGDwgs

3EUSYtNHDaTIL4ICZrFYLiSVYlGNziWYJkCIL3PBCk
PQMaTOLvVT2JRjKzFUOlHlm2AOCvWZTyQGTgaq2JHKNvARWtLLxdYpQvPGZoHMChVBtgVCOhERSqKhG4

PPUzCSNyVX0NEaPzGKFeTTO1HELbOZHfXYYxsh2AKTXdNHT4MWY4AMGoPQXfERLzYMtbZAJxUOVhNEvh

BFTmPUEvKX5LXkApZHCvTVT0IfToFJLhSHHezk8VGA
GkYTT3OkX1HJAgCOUhKJYrHNbvJZBzEFAnRcOsTJRmVRUwYL2AKhNeOATwQWJ5CDNlUKNbWJIhdp3UMW

LtFYR3Iyw6HAPwOLQbONBiHQg5euGajKDiPTq8TK2SE2IagrAzTuEKLq1Bb632MFQqWFEtSc8JL8ajLl

3mIDWwEYEBUj4VYXs2SwgtMKC2UeF3KfVvOGT3AcUi
VkchE6J5TWPoIdDaAfY+KVs6BKLoSFhcUjzrAoC5GqdnJDLxYERoEnZhAsP4WIYjLY1cGOMGYa1+DQpz

fDOaxHdpKQULGwM7JXi2XDnbIFVSAf9E









                    ID                  Date                Data Source

 

                    [id number]           2021 09:32:43 AM EDT [facility]









          Name      Value     Range     Interpretation Code Description Data Scarlett

rce(s) Supporting 

Document(s)

 

          History and Physical                                         [facility] 

BBJRZq0nSuUZRqUl74/NNWauQXCxq2NkRVhxMWp3HNpoXWYnH4AbDDH0hU5aEVI5ZMhJNeEnMdXvDHJz

lbm
OpKjjFJwUrANLrGdaTZmSuGClhMmhwtDSkWA2ZnQD7RUYmE01pCJObMUUeZ7ZeAKNuOWR+Jf9VTFBvdR

JnAT1HJhuB4X7ss+UDDN5r7E3lMYDgC+wkpAf5TYhsiLhzzoz33vbqSCEZM6zPwoXimiHjRZe2/anlZS

p1pPPGigEB2qpRy6mr8RhHu88JnkuJ0j3G6KoUQTPi
4v/2c5jR5pOum/qS82yvsbwukD/nUlVVIXTDQimsw8u+2C4pXrjTrxBkhzutsA+FTKxr5aXAABxBtqyv

sMG/PYwoC8uBnYFvJQEUgtx3BP5mrv0UGTluowXzJj4A84GEu6dwoVnu1QpCijkX6uwDW4ltJlU13oyW

R8QYhS4o4gusFDk5NB7G7subkjIqB8aBGRPLaAJiZU
14JVjkEcfDessoUgS5JhB7gVwdBdyoTgd2i+7HydCqPLE6b7Mwj9DdkBva24PdaNkEX3uVpXlxv7x16A

h8BhzW5xsRy80y6INDJDugdJUKf55jYRTCC+ovSX7YDnSbHV5cD/Hhrb9SLjW4bexydEdjdgcmA9pVhy

3YpslUE2lUFPrfB9FTBRm7HLpaMkLAwqVla2tMlL0r
jPNNW2/eNHbSOp6AMfJauoxuq1uXgBArwbY7eQzBIq0ZiS4XFbKosp4jnt6fH2awcf3bS4Euir2t8dTT

mjZstI8FP/0iQ/XOsypsYFp2pq3eInwotLrKfXWYxORG3YtC1ygInS+GEd/p0exXifLVa5OCAekkR1pr

d8jq4udaf/cyts78txCUpZDKYKAJ0Zs68sj+c1hcLH
ZYN80PU801g2HQDhzj2DK+PD48rHvCjYQJld0/X20kzM4pVPagHkQ6s0+l1BJYbkT4gCwMdfY8OmWdMm

+GwR3CwF7j/SEzGqN8qX1WCQ5jOPnEJ+kNVZSDVOfWgjewnOLFS1B29pHLC+qF/e0AwSsOamhSKYHC2Y

t4AMaj93KhGkNsiCpTnyCM0sWde84sZ/Qca/vSz0m2
qBE7octDcojy72Y9qrhBLjF0GPvyiFIPoHgUh3QHBpSBKEDtdBu+HvBmtEYEDZ2e1S0LpaXtXnMzTY3d

SYF7fu9U17gHIfx6OnSVdYDs1xTf2K0WQZCEefN6iMKrxf+qIbgxAxRYRu22B+GWzKHwUnRicwIWJbUo

5RGSBrLU2cHPAUrAm30S0hMH3dg62Y9fsIPDqzrHc7
A5LqhAKcR8ZNg5G9/i2jpXP1P60rh+/kBdtWrq2GB2gZ3yq1r6v3AWkWxdEWqI4IUXThCCr1F76CH0+K

yC0NQDrJllBGdeocRIJ+aT+Mdt38gt3CJAILwhLmdxDQRW6mmcj2+uomjbbPdjvk2TvHNOPe/s5jzpYQ

o5ZGv1j3vCaRiebP7gpTmUTP5nrAZ3UTaGBB87USGL
2d754xQkBr8T53rYktmqR6Hg35TnEtCUzG/8n0bcbUrFKTcyEkNNdGn9CJz4QUd+u6IkYQOp7mHPxZTe

8ykhzP3nxd4LkOUCPw9cp+rc0EM+5Adl63NugHzhoNWY8VBtS0pdu3egHZhCfYGrbI4JDvJy0O3v1F8u

0HyAL9tdb0w+4VzsQl302e906v++2W63ta4AwETW4Y
mRh7SRolvoNYQAPQ2B0nKiERS7ynZi9mUECsbCVKON8G42EUzBctprvGciA5qv3ULSl7gpFvcGlKOlr2

goRWWC3mIn3uaNjRMxNPUViDmunGPMlRsFTTyaIsU4fKiSDags+GirYMvIuKFJy5DZfgKwiRd+xWHQlg

u4h/U8D/JE0v61u0ZWhE88ecv+pLLWH15zkEhYXwHs
LFSSKVm8lWASE7sEA1NHOIuD/ZbUPoxINFIppQ+BwkFMcZkFeCehMi9CSXzX04a9vGuxEgkPXCOKx3Wn

2xz2lTGIt3cyKaaK7LnUuvtv2bNpoJADA9XOO5AQ91cf3uX0OVWLZfLqp5Nz2fwVxdEi3cJpfduP+DbB

3eCVrvfL5pcE9SLw1jRm3W6md8hBtkvC3diqXxK2Nu
NbxRfnYzDMXf5Vr94CUpsjgLCAQJWT+P6daf3XRzxYfosnOBtvooSuRW0eNqnwO8VB9l2qaXAUbQRVz2

QPjAJ6/4UZRjy2anTu2pPwGNSWWY7iVt0yiNZzxflqGmQDw7D/whK5IKSJhhQVlwpxfAGNfnb7b1sTst

m2Q/OQWhXzvrBl03ZmKZXIMl1tuJi/ScemSTHk85Jz
sjpEunxw+PXspNGezkGftzzipwynJly3ZyE7Be/owF9hGGJ4e4v5lzkE+pJZrcp9FaZrheXbvqwqBAIA

iI8ez9mP1W9va+s7NOS0NnjezBrYkfJZ98bK9h/ukMTClNVwwqzsoxykqkhhSySqYZjviYSG6WzhAusu

5dCoSbs5dFyPIVM/hzaxccNdpnQldcNekBUdheYBC/
ZMC4BKcLrwnwZUO2JRuPvQpvebWUqNgTn5wP3XGA5kXEVoz7uLMDKL57RGlq3DshHFCyd96J0GnFEyKY

b/EPm2rESDhsaCJeU9fe8av/TtpUdtEhUG1sMTb/PO3+w6Cs0Oi40KKSYodscSDXrKpCf84bd9DuEa+4

XOIjHK2jf/ATkEN1valze7aPtQSXJ2kEHMz8cloZor
KT4pzAPas8PfHO+rZvhbyNXNlrFq79Xg4+gXQiprREoRTW4aQMRLAhQzx5Q3f5vY4RFa2rW0g9JIQFQL

2ZD+CCvTvYTAr1bTPz/nmKeIEj8O9fuNRpjXePnyKQ5xDfENWuPO/czDG8uNx9tTQg7clyiBkXrgcv7H

913mDYxNZF/JaGpv05E4jbid6HI3QE1x0kc+wo6NiY
GRyVStyPw8rKsxbSqxKeDreO98lEiUMES2DmJnEh4YSh3FWrUV+f18gVLafcAMwyS95CB0/VyWqFvv0E

chL/dULk/Z6vSTIH9k+ylGUEcHYSZOdKwyPq0uvqi/jO7qj5a6Xi690UadwXSIJLangU2gcXcPD06yBr

LcO+43KMbpDqCG0OGkPfSHYDzveynJT/LPP/4aBLH+
2z/Z4Hun+lf5oNC/Wo5nSBzuiVSJajBKi4PJo5bdPgIGLcdgWqfone0jcIEU5R4wnHLy/WEzGN5Ma8s4

i9oqkUt2Vlae+Wh2Oqq1/5LGzs9hkamKZZD3Kwm9z8XeKAImu8n9R6esiRjTQbyXkkbg98BN/boq1ARi

a4E3/JW1qcFJf6pW/vQQHWXJO3SGOar/F9y3mmDJ2M
NuT0X3ZDNKGz6JPH8bFxzdR779NQcVqRL3TXt7LHrzEMcr0/n+wlL9uBwEdMfPyE6bVpuAb8QIwXiSun

bBQ+fytAqXEe7OVzUlxjsuwKQS08/Q1EOBrwYzgHZGdIvoAayDMXhpWDKVltpjvydj/sVYk8USD6ckFQ

mUiPMzAgeoSIAz5XvZmeIqPsgq6x2u8XmmgUL+ZVk3
6W24UUhKexPS48bZdXAUV1QyEfH9KdaoTL8G6cFUj7xsAVx13O+9vgm4N3j7/uR6J+q2PGixJaftd00z

yMxAAwb7jNmPKytO3M9NI1vr8PIo8hfLc+edjORRtx5+zplspPn7qlwJ1PPfe7SGCog+L8yACiDdpOdD

LVfTSZ95r3r8TG6AHx0HTM+M6IBHAM9ko1Hy5qFArD
TYUVVbmehXg1DJC/xvWdwWrM8vW+fRbOdWdbRMScY6b9gMmXDovhCjEcpH2/ro46aaSorMw7CCT0qnDt

nLXIdNmloztwrTBhueI3ZB0/FG1RDQSKQpaa7r/WAlmeHjY8Wn3TTvdm1e+5LEkGd8E8snb4KhLDw+Xa

BtmkBRepO5tHv0c63ivhWooekuV3/SzLUFdeSWFriM
0NDQKgoqQj9VBzXtjJ7xBe07M96BClzDbisU7nOZ6CXtAlsI67vZBJxo6VFS0aX3s1QlcM0wwYwrk4bs

ZhEax4Adow3MWZao5ko5AirXgr2woWYFvoaqva1IC7zG9GfNTGNT54yMuqRhM/7xxIhQ9d2ORULbropv

wUTzr1BGk3JFH6xdXGpNKjzQMXsAXoqz54vWC1n6GK
MHeCsil+ISD5FVhyzIe2hmbD4oJbUrtuGUSeqIgPOZGRPxQdrBdU9inzU1cdCMSdl2M9cn30x+957lEf

WPsn276l8/krFg0+ZMq3hrwibKsQsbHT6p030jcsTkYCFFc2LEvU9bFwMJCtvGVUArQ/F0ZP25Jw1oaz

ZJMhWnTYQ4gbHteY3NYI5vv1WkVWz3JNOaa4EiIJsa
VIo9HXzwODOlB7P3pAJsBPMyTB1QXDYpYP8JJWBtdtCkOmKqIXKRIgOjHACpAzWhl6PlP7OeLHQbCFFF

CEhmWDJhM54bXRwtMt90SRdhFQRtXrYmORs2Kz8RCmRmAADhP25cuWZtyQLjWFZaMSWTQrVjZDStI2Cx

rVQgQAdoC9OeG9UaRO6ssRSuCM9wbVApC4ReW8Zsxj
ljZVJHQiAvSSBmYWxzZSAvSyBmYWxzZSA+Mo5SNAW+Uo7NJN8cv4SkTPw6YWIzu2BwZQejHPq9C2PhsT

DxpaVoWzuhzVBAQUXfSGKbU2iyucs7lYTeSnimKw0CRzTwb2ElSNXwPLqPie8qT80unPE+L9D/wKfCC9

JSoyRfWZTJ0ee97ZGUFziO4r1+fBDIag9iffL2f/yb
/gNiEbRGK81QQDMkSv1Yf3Uzs7HnBI9/kkdjQty//2JYANVMjtyp1zHyJIq+IH/+D6peVbswqlqHjZ3d

n6TQMokhSIDtujo6eHe3o+lVc05+GY3nN7+Ilg8pupPlgSK2Jlim+sJn9VqjK1456DlYf7CdL+THF8+h

dC8wqyra/VGwz4X/PGktR9Eppwk1Gn+0TSRiiV7IMs
I+3xv/QMb/JfyshIC7Y9RKFongqCySdwBP0+sdMt4Y7wvuTZbvcw/xOwM9QfQgb79cQIyjSQR4l14l7V

fglhYWDrz88Q5ymZEP0+8Bw4+jaRqrPqJuIJGhYGIWvuABHc2OlEs+B9t+LFgaOezFs0HrgkXeZ7MmZD

dnlBW0maLJMVB7pcVjt6liqKTmtB3Fwko4tf5KdGkh
/Rv6uENYerRbBMofv2WtL9euRFdCMSN61FDW3h4LMTHpauKOjLXLJh5XN24JHrLQzMW6zFsMzmcPSEap

DR/GVSIL1vnZ6ohVbXZG2BFsJXgKWgFCHF2rnYzaZyCYUQXPdRcMkYztNEfYQCNO9QKUXzqGrN4UIEp7

0C70jR3eL0uEr7i7MWSBvisEiPEOzQt043fGB8bVRI
RXW2JwFfy/Q/Mrk22UcX0jBqzf4ZTUO1sULScwharCc0XWLPoQoiPQNTk93P8dbWU9SUPBwLSZHYRPMP

8ky9DSOBIQXHrFylnWEDoeJGpBNpsTwbxcNDpEoYz7VJcZGQTybZ4Scqqg/PFzbJaa4souAwRVm96Fi+

k4N18mrjksj+rbDntWGI5JUbSoblcwqJLZ8hknT+2y
7XYPdaH/LV4FNeUIvEPjpO0SOXU8hSPZTomWaj5k7G/tGhj/q1yIHWqRt1pas0uvVMcp6nVX7mwHADmc

pwToBwt8de0RVRmnTCiux0ep+FUImUw1glwE6jJtmyVQXkxVrWgFYxbyjW/tLz1guDF1H50yUkQ+8pTS

Ch01oFq/Tqq/nZuc/gYbTgoV9Gy4KR66U/riJL4e3C
U0fi5GI8di/Dyq8FRAfvUunWghF1YBVLlbHF560q/32cFb1OC2I3AkDMf449zf6XI8ZvuG1af8UQaJ+C

hf5UHvOU5qyADSuVcE9fuuaPd0v655zHZ9cWilpldXRYvU2arI9oD8N3u/mHPj4FS/oXY4uRw/gCN+id

5aJho5GsMkDlwSglGHVNU7TDKmJBLeI+f+FhF539JS
vnQKyn30C9UYxaKUf++hKlubGvUbiUEKaH7oBfAnz4MPeHIFhKUg12lU73E5lc7g1zmBVXNGxoGm+TZX

oVglpOVvi8pn3UYpeFZY2Uj3MXRFnZ7gErhclNvzIOZ3HsznsaqcYO5Eg8mj0szJmB/4rudgKZ3q6aGK

5XGz/3fI4YmBzttsy+pnXN2nZ7R4zCgA5/VYEIq7XA
2wT0tnEmD0NGq6kKkwIsRMPEJt2n0bJVVQ4ikdf0fjsSYuoRAbB44Jr+MrcWzjqv79XHc0R2/4UAsIeT

UkEe43SR0LGzPjRLSj9Ze+MswUG4GK/Mn9UkiWcwDcdWhGUSMUSYHQNi3AgWU+Jx/7peNtqDXl4oCSTu

nI44JcXL1o7Hbg1/gO6F8WKGTPZujO1FTkRPjnY3l1
wMJ5sxfCq7lsQkYZp7soT9M7ApOjciCxcNRbGHhTfMiMeQHscpISrzwGTwq1pLpNVk5m+EKgx9BOuh64

Mt9JFZMcJxcpxXyvKvUZ9btizMVqiy3SCsX1hN8SvY9cNGP5DQHBl1EjLZDoU3keeNMljdXAG7joIUcn

PFL2K7B0HW/5Qhso2OgnXlSzAgH8pP76Nr6WAIPDM3
x2aQpHSvOh3UxLEIfwtFWLyipUcunE1p0oZHu8uJrqAuH0LfjyB7u0VnR200tZgD0TD3PP48bnLJevWE

tutj+clEw/pOLu4WYXyhVp9haXlCg7kaRuQ1swvxmZbwpJWvdj9+Fo4ibg75t1d+NBgpVDNp4N8/rf0C

QPHd1FjArAE5BNAWfsRR2R7RGOhzNBLL32XqSotAIR
TFDb8+d27r1g4PW80HjUF0cIrIfJO2CE6ZF/hDSHUEzjvpIvOyOdSzpIzUIiY4Dwboc1BS3t7jj1OnTg

ZkC2P6GizutDHjL7IUDSYlcjucrYnLLJSnsEbGj5lCKfBqd4BGtvVUiF/IPPDtl53KQkMkYuyNj5giNd

u5CA5S9avtvTIruhLcNviHxWfI0xQo1O6+ll8C5KsS
GlRqG+TiAuuzRYroqAC3Y+H4duskemtJx3+JMzxmiW28oVjFxwpNsWH/jTHizgBhoB8tb8ksoK0BuSGU

xBLDj3xOWyqyyAfsoucHS9GdyoGMVYhpXiRuTj4HntVZ1v0M1BAixtBpg5UBBy3dvQhmgIdxyAuvVunf

kEwWueaNSOozcdCoupRRwuS3ZKl2tbeu9pL/Y1RMjp
m2X60D9f/eQxf7PyaW74Tt40mE6xCDCz4G7Tp/m4bBiIwPUi7nzDCidGkb+0iI56xx/Vx+K+wC/SFaY9

UcoT8YMGdeLVtugDfNzkj2BiCAgF94DRbuNKlMPTdT6rUz4zzyuLA8U0u4ACLE8fjeEiOVp6FYlN8a3i

MIHsg4pGW7ugueM/O6KzFpzT+SOtyARYmu2xhBf5dU
Wv0kltKnNsCB7ZfffCd50i5VCwB9ZkbnH6jRe4RdhDIoWMP+w7JprzWyUp/9Yr/QvxUahLlMJb0REeyB

DxCqZHC7tzQnX4v9PQrgnaRTfaqt9GaAdMx0qed6cKdY7OnP4aF2oq/ojloeCAY/mUMRr9iFEMjX25hh

Cx7yEAE/Ac2a68x99wZiLjLzE0eryJRnmecaPWYlLI
6rUzslRmuNIWyekfneAzChQN62GjYBFaynUuCZvmcjMZARRmGdz12P323JOn96muhLgO4Hya9QHh1Jbe

RwLMPiOycicRmxx1CDbkdgrsjNtTvHo391kkXtIKz56T7DWOORNfhksHpCmCuhq1w9zF+n/cWxT13vXl

Nii2dziaQpHtzkz2f8sPhH6NvvxDUwi685UH06f8vx
tn9jjZKHPFKjAOhYAyn3bU8l3yB0tY16OlRTuEIrYLqk0KKn0CjrDvcnhy8kcjKWI+7t/qHQboPssEhe

evl9q9tFcfHZg3g/CMA1eEnQS6G2JUAt/2lTXjCdmNrlPwXtEhzZfWLDGbqlqZ7CbbdJAmAuWCAC3ptm

iWeJXPI3d0mt4gmNtwipa2H/wPEkDqoKs9ww5DP395
PJHVpR4ucCD2/4QTaPRFPruUZWyRwvg+FCFqpAlzivI4RuAlXi0E3fzHehmFdO9suyQWJE2JCg9YNod0

rP2cNJCuzRT0cm+v54p+cZ3I5bYQWTFgMKPI0RSfSE8p65tYPpchVzWdPtIP0Qzdu+Wvook871LMnh/o

vnYm8xM1LfJRUIP0hq6BaQlvqKkmyux/uKTM6c7Ce+
zGcXI1Px0qpsLsqNN+acRfmRJgB1ZMOfh/nXGcod7Gaai/m0ync/G8OQ3arhR4SuZ/QMPaQC+o+VXuQD

gY7NpIxFaPlQK58f9IwWXHaNUDBWKZttIzWF49NSmHQ/1QHGuqtCCnH+a/XGvx8Bt22lDQu/Sok4/63p

/7B7BxIkdGozpkAlOmsl/Yh5z1tpEv/wfkgcPLDQpl
xuRygNWoXL0DKaTaVR1wkh7DCvMyON5cdl0BQVD9AD4NLNQnNX7NpRUzW7QlD7MMYlEsOZFbVHLaGX73

XSHaOSHWOIjkJMJdU0Gzd282qxFcmlCcUTEuDw1ZZPMbOD7XJBXrIVJdyQTvTYJoWFFiCqM9XBZdAPfs

IBIxX5GiovKtohGqIDCxPIASNVwiOHRaH5syh4OtIE
z4KF3HKK1TroVww5IbzwKnH3yuQ9UHVX7COEPmH8JDF6SpD1olVeOoj8TsQ0kyVqBhm7PrUv2EXhLiTa

6NDpHpED3gpd8DFKOkPT4hkd8EWLK5TC8SuQd5LICqB5WdOXAdOWYnt7KgGC7BFJ1ovUnrTxj3AX6+DQ

yfSXJ0odSrpO4OQSDoNF0y0GTJ/n7A/Qf6EhgCrvgv
GBsZTkdcfQx8cEVj5JY7/iDjKJoq1h11gzSd/Zr+9YEwFOfy8rjdxDLUT+fDLiOJfIaceTgzpLhJEv5+

A5MgOiCOfm4EQ72hylif1hz/Ynj3iQY42/QKuOZ9338fln10OIv66stXnu8jr/Pv1pY7QFw6NgkeF1N8

Mf2ynQtCWKckAras/Rf5kiXyR5gkdHk/jwc8buTEWs
1VPyxz7C3C8iLZw6bTgw9S5mNmu2R88MuPR8Q9FMBJoX23Dg5cw8kVBLwsjwX5N3B7tRnpYfqBYD5B6h

ojFlEuZ740oh/0W16Xj9YDNQc0yOcgUZqEh34C7oLAC0KRM5DGPt1DZoBJ6TGxi1SqR6Sgx+PpMW0aYp

rc7gIauuAWEDpb+VkRlas7pqmafKQLTSEy7LCBcGwK
NMcGdm1oDStaFBixGR8Va2YVl+r68QNJpyHhpZzIYuatH65bgMGTKA72aSc2O2oak+LTyf8VLBzGKXv+

e/ynPyeNV5G7CAADjTs94OABAFsYcp3hpjgrEeNeQJTFYrdgJmCjZ1R3YkZt4D7jBpYKQxFIzZkICdEh

rY5MJGtMWQQGUUPvEcfMdGnCquInZFC650074B4LNq
COueAYP1Hn7l6BwLYsCNvxdLKcNbEBqcFfJrdnufGekctuywQIMcLfrd5KR8HyapMoyKi/fvZ4p291QS

Dq3VBWHgqH/iubY8rxACO80EdGQHVefa3lXpDrfirN4Ch+Xb1bVO+bKWT/6xLbIn3oYqk6kOxVaQRz4H

Q1lWOUmcyibnozswpnVwh/7gtzynD600x7C2UA4ptZ
90bAGIL4Bs6fGV0MsOdfHw8LFCuVxS4EYHcAYsgyn5XOAcEdg3JzcpayHTIPBU/LGvegfR8ni8oy4Ky4

7sQPbW2k0oZ1P8YAl0WR/a0thrNtxvBwahNYKlDc3XmZc34uhhYWD6epbAcP7uo2DDdcCvIET/8LH52T

WA5GqJuNPolT6l0LsBw66g2w8QqieU83HZmA+rhKXu
o2a05fYEit5h8IM5hK93lG8DwZ1FAtyR5tjKLhCBno8oGgkLTgydcbx4INsySyA6Lv5PTQbflZXznUv6

tq26JmqMXWnl83RjIRnt+dqe49dvw2tC0NKH9BuSx6/aGvR6UnplY/6ymvkNWFTTGTYv0x0SDV4CL50E

jk35jKUHuaQIkOsB0meu8X1t44Af39Xa4fHfM0s+jj
4uBQjBmOSuAaJPhDKQfLI2j1snJ5PU3Nfs5ybbjGa9dg1IkP8OgPxvKQYksLcgeWiEHCqBDjDSSe+2iE

QPAXAiwlQoti7oFTz9MK4KDwb/hO7ASJiyYwApotzFgAgTXV7eEh8ZWX/FQMh+MRAxDbaWXKP2WAS9mX

P0dPmYoKqRgUj5B3iO6UNqe1lh/mq+PVSWPuTJTlpe
LboLCAP1tGIuDRMf8C8HXiM/earCQf1R78yh645jMeJdLlBLsY+Qx7K7Fc5LsHBY7wQPyNu7sL1Xh7G7

ae33Uxm7pZGptoOyKErpcl6K8tjkJ8qpefQ9lXvqkUzbOKiNEq/yuxqnS0vggPITjyuZaUV/XcsR2Vs8

fF9Yhlk2F7aF8UhdIumHclGvBurE+F+VOUFOgyILK3
iDxUMolPrVygJW/f1C1rgAmp4cC+CsTzFdeFSmaHOCCiiPMgdDmCC85TmDC7hBPgNR6mrTyCJpKBX/vw

pSkiivc1fL5urDdg0/PwmXy+Uw8I5DlvohmfmqMU4+chom9Gxgc7MDK2F6j5pSLiGs/zjHM+64t74hHa

0A728vOdohEz2Yisev9Ghu4X0byFgqJrJL/RW1cHwd
2TUmBSsNvi1QlaiqGxKdHdgXXcC5zEcchK+yV3VEjQ9E7ymEQrVUo5etXH4qD8F+pt88vcwdJ4cUW2Ui

9XObCAP3dOO5YSbRLrYoTHJYZgZRMTUNxghdsrHA8Gf5qey6L+vMVyZBtHA5z67inCpKsM8NW6CR7B5y

psMR/kSrGTWhxJZfyD7k+RaF4fBgO6KuFrljTNORjI
XYQyOGugjHTJG53QgwfJDdGt0JmuG4bFFbJYMTeVtK7XwEg70k8SH8wBhWieMMX44RQ57mrSCinkFilM

WS/gUO4NcJFs0AJXDXtXOE6FPQCXOobJHvdZ8PU6N42bdjsfLTXgS2DQm/+iDUOfMwtwt5ZK0r34nDjd

qN5l9oebWLtT4jF0mSYrd/V5s7v+EUBw8Yl4aoRi7u
LGH0jhp5OeFgQiwcT2Qjbx9aiy6dy+DjqsTZ+oCjFkrLhn8ymMcscLjDEAActu42RLMAcun0FeQLfLz8

7eNbPgGC564qHjaS3mo1NX0ILQbdZSm/cmYL+ZXQad4lL06QVjUXob6YdxxD/iEuQO1DnrdpeTRUeP0J

xW6g0OfcKcQTvt0+d3qA28Zgnc6hSE32oeIRKWuvvx
lV1Kz9LZYgnqki+B7hHMD1OTDKd8wUbSlb72Ls+cNtlCl4eZAzaO/9a/fc+6k6fFcctvbWkvIC1So0Yv

nRoCAR0x4AQ6lF9bqaDKzh/VkFW8U15MpYynlNYiQEYVYm+MoJ1LjD+JOw81YGEjy64AzoYQ0SB8eUFc

snRs+HIrw+XXLJkSHf1EfqI74NkiUBMTgR5LgiR3OS
UoCDvUpcCt5IdoJkah6i0A/SZ7uUy5JWChQKDfTXiLekyJoqTZc+cg9MmGe54mj0KvRxM4muovbFIywQ

N4IvdmnF+pzoGk74Pnrulo4Q40GUCXEIwV6lInKlDtqfBuMCIgXOiNfjrQl2MiGt0Z5+S3Kqc6G5BIdj

x/4nZJ+Q27oJ3RaN1Vq02UH+/67iasI3+I9Zd1wIjc
Lzc/ZKiV8dmVhrhxGED4CnRznZgmxU+B1LJleQfXRXYw9XcgpdH2yXU6eV4/mSsroBhW7SHNe6CJhHg0

C5OxL+97+FxqH/FpVhxfpZGWXA7twUYIGx6bSX2IA7xEHyQdHtBpQKBePZpxmbfQ9+a3eKqNf7TOEqj1

ffmNYEbWWZ9sllgBsRb5dkjfe2CXTGvItAORHQwxtk
zrgIzee6iMFuFJCGb3KrBxauR7Hgq6KrP97/9aKSlx0IePU6bzEwsCaEBXnMpZNYp7LBb+oADD9C8YTW

iv1Jq419hR/Umo5yovKtPcKWqXaVcezfiq85ajT3J/Bixy0+zoamy39rDhmMxbE4VnoHymbrWMjJtrCM

a0C6vKwLFNV3LAPR06jxjg9q1b5Yk1GEdYVapw08GS
VJX14D9O1EytNP86Fs6hVZX24EHBoYIPwCI1GobCHkMSS0Ty3t3tCInBeMe6XbsGIulwDWUilbwj8Z2y

UoKjfSQkETPpOmNvA9NQrXPYCRo9sgZL0hBGcgWirp4CD0EFtoxF7BsHkLrPNfTRto2v0Mh3XQ7wpojk

jCpqdml7ByvR6d2jYIUTwcz4KHWwcHVTeeHbpXJxl2
ObjbFwjz6MC3j5YcdyNswPsHGE+YKhCzV7Py1Q+BMn6/Ik2a/L+wPUCbxD12sAsNCgmNRW+DoaiYWG3p

yuO3EZFGjc9Nzb0K3oMgFOskXMeU9TMUvdhLn8dXk7zq9JELjvvJQ49hAGlBpmGsjdZKOvpLHJBdxU6I

mIfia3pkNcDzqH6I4dLHZrrykkaygiuhoJvYIXRYb9
5F3P4te/IvOxZLZuyDt/z26CXO64VGfmVOdIvTwHX1xjXiWeOMSN0vQIdcV6XhuqswjImnRYq18DcKBG

nCbu9rYLL5Qhvb0UcEgV/n0t6dVG5/dL5q+/ob5XaZq5cjd+uUwIk2bGVPODulIHwlBk9gesAfx1DxVa

21ZWMpGaRRH9wwFexF6TWL7dn1SnSFn5XMAbj2AlLM
eaZAe7AYzwHHOjM2Z8vJNeCHLbVD1QKKHeEB8PIJAmaoApGbYxGSDDQiSiCQQzTcTdo4GcT4NoPRRiPT

QBFPjqRRLyR04uQZvxUx71PTprZELfZaBjLTj7Ka9SYaHmNQDhI11niCOkvJYkFXDqTDVALeLeOMJcN4

XkdLThKWzsK8GiM0LwEO9zcCOjNP7cjWHkY5HeR3Dc
dmljZVJHQiAvSSBmYWxzZSAvSyBmYWxzZSA+In5OIUE+Qa4ADL9vn1IbKZmuZXAtXJ9mfm1FBWF7YF4L

aSr6DFDpU6DrLLJiWPQav0CtWI8SZX9wmVhiMqF5AS8+IPcdMDV3ckMbmE1MHRSsCO7k6alHan/gvgOf

Fdmpt0d4Q4KTNcX4cwXCyNOmQqrsrPpKYoymNmsmE1
mLi3qJxiYiB9mW3shMozWt9gV3w7TDn/kQ0OlRpu43g3hNtlBNXVR0+wizpIl9vm9+Y8QcQbMg76vskh

108/3lgDslluAhazmzb7TU0WghrEkd0u+Cn0X5lc+qXF7dCj9otIUui5fV+bZzi8A14H/vinxcLH/9Vb

3695fOXmD3ZY6x3rwR6e396Pz8+OoxQsqOiLa5zeTh
skVhQpf0Gj2u2KD5+JOwNWpiwAKcEr6ckThh0SrOQg3vSNKwSV+v9GYbo1W/11BiHKQIdOTQRJEDTynW

LDKdCLlgBX3FD1g/uIsbGujFLz+Rib+nU/kHK9BOYanJEcTZkfMRNneaTz27GzPIJ+wod90QpZk5oT9L

Ttdrw5w4TvU72byc7rZ43TP3JYKJrLRxGH31Fiw1vG
ejILMB+0e7Mhw5QWKldnCx6F2DUoOamWe6Zf21Ly6O428uDPCr3ejIJLfwZQrWXHuYyaTXqvue6K7fwr

j3ASaMmR4NCMcj2hgJncht9mAJ9MKeggjGU0YMDk4SNIZt5XOSZDSJ47kahMhzDHjftHDQGWS2niSpm7

6IiUmNshcK+HNKAExS7AyZ42oK2TQqvjMn+1lmAss+
StPXiTUl+fBzlnkt9S5adc5h3BgpWV1ho5x9wyEYwoIWpzajxO/P9SGb5J6Ywk0y8/n4oc0SZ3hPYXa/

sI+zHRvWGr4DgueqXMYYiPKk5YHPU78/+mQ8iGT4Ji7pWhhZ4PyoZOM4lccOXLBeFdU8YFPleKUznC7d

X7Zbzcc13WASWVnEvcwkEe+tUjUqa8IhEipX+3biG+
KaxNqxFXQyHiK0rDvdEOGyIGeUo3S/XbD5DJX7NsG3VpVW5y+mfyj+N/XrTs27y4xeoS3OrYbcrue7wm

ZaECMR6rtDkxq6sN80ku2siLp6T2/sUuelT518Q7lU2ku48rciTDz2yRc1D5uoHtgwg01llIDagcMjcd

Sau1oYk1jmS3LUeQc6ynajbLhBMJrlgFg34152bHkJ
N3ut3nPH/RS5h9Cb/PLckdjkdPmOcVuAYKTKrDUzM4B65J1lernABzDFvXA2SfWIPRkrOdc+k5XiePHF

L+qczRfsLPKI0x1IH2Zvj7ToDsGwfERL9ZUq5Dgc6iayqV0HGLVDLfDALulX9B7iFN8TlZ6NrlVGk0Fh

QzsDDKv9Ztvp7APJz88C5sLcjNA2eHO21xnFAPI1e0
1TVwxQGxD8xa1GE6tTbidQzFYo7A7N4QFTvo2LNFHcuc3WOc+fkRY0LeLZEO+SUzr7tiOsHiyecO/z5X

i6+BEjmW7g+mQ5xRedf5NPEWeIHqxhYXcvUNMV3fKODoqrXI+q2PKN6AFYCN9For0khrAJdqCXip34Oa

I9TA7IIwkaL+qN3PqjPUea+jRsjY0mPNBsCSMbETeJ
vVaQJ5MmQCNdjESsMzqrmaPc+amIdqDxlU2IHy4dAV4vBkghin8ZEixDq2X1xfH3LtmLbMKngx/MIYmM

aHC4Z62IC4d4a6IU3h6mEYXq1jKelp3adopFxsQH2yYAVyrrKgjxGHY094bgZHhdA8bMZRm3XURso7Yi

xYyagDEZ1d0cqrIwNQn8itGrGZup4Bjr+7RfS23LT2
YSSKzhKZPeVyo1U9OqoSyNpjqn0Dg1LYHevidLunlEz7sPbVpbR4efRGo1aTSnWjb9XpVB2Vdsepr8Kz

rQ44w5tPcqk1WJu+X62AG+efHUmSMak5j8HGszAc/OAkDup32Ec2T8kczpBt3spAWQXG6jvOpnC5Q6zR

H0SuJKOjJzSlVNhlmJ8SWiMpwbgmcTMRaPBULczZSl
xOeKsz1vrlLw5mYf+xTF9ZrlBn48Gv6Ly0aMwECa+fY6NsUyTOGAxsPjB2jXCg2BEnP/UMdy1uOFxuAp

pitPdvCPC9fsHFmrbZHdFduAtXkFjFJ4PN0qxZW0++Hz1+xjm3CpHETYbwD8Q9HBO1UxKKqWI5lK3znX

NpfVmxqTbIRwTg4rGS27LGQoJQLjEH0V4NHmxXVMzh
Asi1qF6tumiF0S40pwlIxP1ekHXvYpAW8UEW0iLiCj8vkiDSYkZRb1GJhBU6VYFVbuvgEfFJBPNQJc6Z

1ughR0+6x/dcdJQgbxgBYgYDf3h3yQyNSdgwbUlKlUp+zfk8cVLtpntDwJ5zZ+QlKZ4UV/zE+WGwsSEI

+tUz5w7lVQJQE5NlDj4Dy8H5YirF2GWRoV67Gow+Lf
dNrC8jOmHUlAULyA1CBDNdt0Q4fPTJRwpLjVaTmKqb1jnuQaNWAIWOGTq090W0tRGB2m8mtDpY18SCKE

XBoHFvkRkCsRjiuCkl7fpNVMBlmCrLQNjluv2+BTVV4T/VBdKfQblRJzOWMoCdH+n1L36GYHdz8GMR8s

rF9m7iHBoGPIHHRaYYyYe0QxV5X3EelozCMrBVCZyM
GwW7O0U3xXsyvjogyf6SZzy2UVOoWeft7gArquIL8hh6HvyM99stVZWHoPlotNknFZFST9rjqflEQwhB

U3fz5RkHd2pS2ehF3mvyzcklX8HvZtdaRRfCsPHABKEYtRulVRREv+j/JPKpSkuihHzGmAgFSk02iewT

gpaHHaSmY+sugOyhk4QXvATUT6dtTvgSIc1S3mWhiU
YCzAvkpS/ZeOBMQj7c0Udm6iLEwNphDKUn0lyDlY9+WPcJUpexMPLMSqZPSa023q1y08KhQJQMA0ttc1

zRbu+XTJkOGPOfEKPbUkMzToKHank8CzrYfl2d2vujf2D09fC9hzFZcWSY+Ujn1qUus/0Ee3NzcL2gQm

qrbgn9HHV64HSSwmWZegcXyeDT98b2cpKp8q10UPzE
TUaq/6p/v8NovEmFcSBFcI42P4Zcgfw5KZpLgowQchEv1Nh1a/I37ybpHIQgaL4gGb7A36CylmoQqpzK

UjOH6X0uY7J4QmktdDa8zay0De1d58RX3xfi/d55dNjdimdXfuJguth8n8QnRV7xIF/mGRFQk4P4omkn

x3223qNd9qq8E6bS+890/lOOivV6jn4IzFIwof+H0P
nOfDBAEG/guypgeax1LadsRSBn6jkwWk9eulaMQZICZZB41r8QQqG1LEjXvhwcTyPPn+2Lctp0Z5MSoV

SQszbH+BXLLyRbnTRFLUgL50v2KzYxHCuXcxqh47Eai2V1fyTIW+QRxkY+OWl463EC+phmjV28q7bO6Q

POxAgttPl4zdlhtbStKFZj1kNpUTkPk3VIQEhHKRGP
r+Q26iHv23QnfNa+dWPOptAloihYNtZ57+9D6UefFMAetbHLC7qJH+QZOD3CnoB5abhwR4f832U0IUPI

VQ2NZgNzoFfDodwV8B0DDFE+R67W0CsOLc5QcPtOSg5FWnbdWSw/bTP3pAAexSJbxAtHYgJNsjtFUxA+

k5hZk/4pYXwYYBhKHfFf9+UAAJL8LNZY8R9erIUSZD
4CeULUyoCPC75E/77iYMunyTKFINj8DatN2jsbRNy0M9KD0YejIiTKRac7IBw7LN8H2jXe/cQdzOHK4v

w/F93SvitfLpCukJrpdBa1NJop9EZ6ccw04m4UUCh7a3w9D3SFrsYKgofc+cLMDaSY+NeUl/Ck4ooL4o

Qh7QYDUNMgc2UUHXfa/BR3SskmwPc4tptREQ3ECaFY
7lTyE/BLwlwio0up6dpPAd1V3QGCAq1XDwMPpxZv98s1slpXSGkoOa8y2cm5PaIx+0Y1u2vIqYOUE0GB

qfcCzZ+fD5kgFzn8TEUJ8C4Ytdneq1TOp78wqfumhgNm9Sli8kwoNKJJJrdRczY6alaUa/1/d81H5O5H

/IPqB5cWKM6LJ2iQ1fpYr31Z4MSs1nTil7YHNzrewh
IzmdrOQFJclmNPTRza+/A3wvvUubygn3uh1wYO1USw5RKRslQta/zMOBhEbpc1G6pOFlYMOHSTPJHfVN

RpjnFT06uX/FTZjM4v2bgN186AG4ExxlGbaYI3NlKWUr5oOferV8+SN0cdF1L9YmJ1ni5YP9cisx+w3E

CfD3eTkuR/I3ix72xLrMWeHqJPA4kcPbvW7XOU3vm4
IpQVbxDBMkQY0jkc2DQZC0TF0PVNUpTG4KjFMyN8HeC1QMYeQlATQhQUAfSN15JVBuAPMTQKacSJKoB3

Zjo340mgPgrrZfWLDpFk9STGZuHT4TFEIoNHUxtTShAZNpICJkSnY7PIXsREumIUEpD7SgusZuziMqKG

IgQKCcBn9BVJMrJA1Qgk88gYD2XWSnVsSdGBDgkwRh
YXFsqaA2VN0PNkNbNTN6fGDoFuqRIM9PHNYzsKNnUA1SHFWxzDIuEZ7+DQogID4+DQplbmRvYmoNCjEy

DITmr4TvZZauQFi0N9WwiXUudfHxLtdqpTHSGBRnFKBoP1ofthj7oISuHLR2Ya1WRuXln7RvUVCmDDsY

ty4cLG1LXiS+r9T/aD4ahVNqmdTBTGXuBoLTGiJSeN
PtjENaxzaxXUp/XV8ta2HteDDtX3FN38Nhmfg8jJ1n8a7eP9WY/d2XwDs5RWyb/13/fBXVjJ5Bmp+Ym2

m+qi6w9nkwkBP7d4t2cUqGBojr2UTfb0wsqR1tA+ZNrz+bv+h9R58o4dEu/UG0VA7Fmg7F9ER3hfXxW1

ggX16fcq71c0W54pQMKWLMp/puk/he3/SeOCbnzPTH
+lQ8HmNM8D+tcBZE5eQeir3xufA2rVQWcfxvuiqiGH72twxbhmH3nvSXCj9MKDOO2R2XXvGTUzX1BCZ2

fgkOKdYZBacU/FSChQkaiI/XcGvAYrr3JkJ3aFm4cj9WCooIeZUVRRVOGaVWXFx14m7FA/DIilUy3IqS

5B37CU1IgzGX1UMu/xk7oIMqR737bzaJlFPfvarHxx
eBY04BJa0289AFlR0cwWmhn1g5/FNZDcfp1HcEAWzhbNpklpqMAWy3PYbSF/GnA7qgqM73zXFCmAJ1p0

MifEEHjFxMX8CHUyX4x6DTZqYvauZEYDo9YYsjef1giIvJGs9YNiQfqfJOUJM+vHDL9FRLRSAVwSxX96

HrVwA9eg0n7FGUfS+c0WQesGKR83+YC8ymC6mur9L2
k2/CJMzJBDmxnMxcqsqxL+sGUK7XlEZxj0fbNuhMTcAvgxn9iJvOB1YH9WMwgE7FAFL0shF7Gx5swqfN

/BEbnNhQLbcZPO3wLJNySpYNGR+FEEOWWNpkgi+SijhBPGRCeJZV6zNxLMoURk8aAjmITfsnAQGoI/7G

bKvVCqWVkbZSiFkF4mNGKtI9iu01BTsQXDsuTMxVb1
Yy9V5P3p3iA9ouKsBG3Jc9fsmCTEGv2RwT1n70+TvQS1rzFRhyN7J1Q0ZQw7XRvEAnAGTQFM9cMcOVea

0BEqAeH4CH5OyhGr9nHwdsVj0VFccHt4YKM93lUhbafyVSWrzQJsIK/EaXeWjFLtswHqIbxzhAMmgW4J

ZAxcPa/+4TSVLUrucFRaw5quMzNOxeJZ8mR4DtfQcC
ZeiXka8lXJOkqOf/jZEUOH0sdDJ91/5OEdITZEXKgSaEoa/glGgQURoHYzSVKXeJ+UJcnqHd82ihdP9Y

ZdmidyQf0aAXaw7Vxcr6UuRYBNp+KZveIRrzRPq7RcvKLRyyYRjhPa+5oGG2LSFqoH9QP20JyphWJWOe

KzhXEyViebWY/o8M11rQKkBvRhy/f2PPgqoyYZZwFt
jOrqEknNO17v2DOtnoPrF0Nqr5hVIjSMXmfqGtFeJyOmYoy7LEg8draRdvZ4geN7n0H5XKojydYZTL8G

r9AkYZF+rcM1da8ElCbNikU+i+ZrBWRV62tvCB5JKMrs6TYvfZzJnMIm/jeiZImoeV61qlhLPIcSK8cZ

mw4DRHuC7pgielj6Opy2H2Qmlky+HB++NDeKfsa6Kk
Xw96GLEm0ppwgxsSHIXmK7OKHzLM4damu27aQQI/AcwmnSkywlAMp02oki/L5MvBnF9BpCCC2iDrCyON

GvU5wF19zJXDTsPR30y/Yy9N50eKwTbRmTkQjgqYHhYIuJk3FTkn+OWzSqfFNK+tElyEzOzh70R68+Yj

KHKxcB58wBsQPN5+mqQtu4VyMKjdSnIXQlwHKlQkm6
rkrbGtElqIZw4II4FlVBb75qkl68DBwogISpdWd5IQRggovgV5T5eynE8Jiez25Bmb8Xb74PyXfJMF4w

fyktCpniU+tLUglyR+15ma3jNmucuqlJVEYQYFQXuFI55SwyNRnvZ8JaobCiUoCVwH6xCth7JzwOiMNB

60yl4fv1KvKewQxa2qlW/ETl8aCXytP+uiyiKU1kRR
mRcrjkIx2iGUc9w2O5I/qTl/tLaUw/uhioZdtU+3HKY9dTxxLvT10Djsv4K/KCZ4C0+1XaTRxEqjCah/

8XubcXnQFeCOpuSO9G9tCGH+qwdqJK6FpZ/zVoc9aRaAtSyTJoBbaqPwTbEciZpqLmpIR/dKjOA6mOk1

PLeJOMhr+Jkh8hERHWoA9HIPP81Uxk4sFeO4ClwZ0I
lCt1C5+7E22QP3jicGmpV0Uu9BElGGtUQaK/i7agWqlMCFSvllEYsVgVqtAVq90sZqQAr4y40QB/5W3D

D43UOskCzchc+oc0AVqQHPh9SBVAIpaL1LyV4NbvsSLX22Af0hJBSkj9K1GtiRxo4AqaVvF6SVwS3MG6

/Cedric/Pw+7A6KWIsm94WiszfUz5GhTgbd1ExVim+YbO
q/dII20i0oQNWD9chYDRlYG52WHybmLxG9sGAyGN6RUrkzBh1/wZOBy+xmdNqbNePx4t9JJ+msPCv2e1

22FQLHjtK7YyR6rNkMJFMD3rMftl+Q1G1kbZVplVXicmM3fpOLiWMdNTm4pQqRI7DNpwHaFm3AE+6s/R

2EEHur8WRF8G5o7df3JDoy9Mqit3Bnz3Z0BLnlj3Ju
6HAyUKGz/P1qJ1003idGv4Hbh0Oi7hBuTiqwbblt1SBrav0fSr2FaNzdK7daneP0bQMOTrZuY1Z8j5oP

GDO01npeoGD3A1mP+6e2YvNVEpVXKJupop74UumErWorI6xYMvWLyy1Si/ZuLwYvQ+n2jsdtgCRJ6mYV

NzuG+fUreEn1Mm15pKVx+I20vr76r3lxKy0bkPDCDP
/NvR0vUO+bUCXVFsrCT0Nq6LTFg+qyfzcDOdg39u5dPGNHxLfgpdvkAdzHhjWsx0c1rqnV0uA6gkAnR1

2JevEyl2VcXReFRPlO+Q0cbr1ccts/rlZ8Yht1kj0RTLZtfCDwr1s4Ymi+E8IT//QCGZ8THBJdGuOBZ9

ejZgdU4TYI7gh9MaWPjvKlRsVA9tfc6QZZQ4ZE6XKO
QpDF0CqCCnD0BgG0TOTnTaBXBeHVZlNJ94LPXuXMMMVFeaOGAyZ9Lcp603juZbniKiXCTnVp8EKZWjOM

1FPPQvERQlySXiJGVxLYIzFhR4HQGpZUrxFFHkK9LryoDwixFnZIJcMNHqLf8ZZNHxJH3Bxr36nCA0LC

JgInLqJDUozlEgVDCuhtZ2DR3AJeOlCWW3wBWrDmzS
UU8ELMHibJVuRW8URUZpiCTyXU9+DQogID4+TIblslJfTbsWFhJ8QTJzb5KlENscWSweIaLrFDt0SEMw

CxidFvt2XKV9UMH4SBPuDOJ2PCk1TWP4IflxGEzqYFDtWiUaKhHxRgf4TXV7IOWrRsduTxMgDAF2ELYy

QvunSQM3AYW6DfM4TYGkVAM9YFJ7KjP0APQgUNZ2NF
X5CbU4BJSrJMU3BNY0MNSxLxiaWKs7QL9EGAQ0JFWdGRs2QUR2SpQwFOQ9GHK3UlD9MjvvBdMfIGomHi

E6RmwlSaWlKBa6XTN8WvVbZim6JUZrDDV2QwjwZMU4MGqhHtM1GuSoBia8ZSF2MvC7BzxtIdSrZYR0Qp

T2JZDiZlRnXRW0DcQ4YWXoTzV0TQM4TvW1TIWjOGyv
FCR1UNBeKyvvKwh4EGD8KMJ5NZAcFnToUMU8UoH1OCHsSPFyVPX1ScL8PWHyPhi5QKV4RoV4ILGgMfYf

AETuPdW8FCTeMlPvKGdoQfN0JEWeUPW1EAJ2JeV2PZLwWjAsXZQiMMNeFuusZSH7RDDzSTD3RhJlEWYf

TX5SBWS0QONaNXYqCQCpRVUpKrAoNcS1DAD2SKN1WE
NfMcSyXXz9PQJ6LGYiOkQmWCq2KVI3LLIaJgTnVFl6GDP9AQByIcUxPGh6HTD4JPSdCpHhSHr5HTL4OW

MpGrXkBQh4VFQ5WRInYhWlHPl7BNV5KSTrVyEhXXb4MIXGZiSyMgPrEKx4MSF6ELZyRkBoWLc6DTE0ST

OzXjAsJDf3AKT7MOIqXsg0PNSpWoH5BYAaRQS4BKI4
QwH0AICjKgZgZCJ3VlWoFtCuIuC2JOJ7DBN7BTKkSMz0TOGcJvU0XushFTNwBVYgQVL1EMxaAsAgHRYw

QaXtXuTxIUtoYAL0VmX6FysjWbVsSWFsPmWyAwMeUyI3GVC1CqQ1HlKyNQQ5YNciUSP2ULSgVrD1ABZ3

YfU6JsmoRlK5CUB1HeY7VbdoLPCwQDM7PiXoQgR0GI
B7UjZ8NfnnQwI9UPA9IQHqDqsjGeb3JVN9OLI5VbMlHgAxUMt5GQXYTtBkFot8VId5BUV0HuvdRvn0VL

A7LTY6QsoqUcBaVMpjUpE7RjNvKnDbJXA9FdF2CbyaCeCiGVM0LtO5VRCpQFQ4VKR2LqA4TOAlETG7MO

r5QKR0NYPnSLB7SNW5LnU4DNVaYEW1HHO0GPHtNirc
Xix1ENF1NDK0WTDfXYbtTAV6RdH4EGArUOB7QVI1XfQ5WQExPOC9ARL3IUP8INTlEZT5VQF2GdN6BGSs

HNU0BPEuXIX0PNFaQLJlLX8mFFxghxXvJbcGMtN2XRZck4JfQNrdRTr4KOmbPRFjD5R5pRJgTh3khPOt

z9KzhKI0q9BQJfHfPJNlYq3gtM1bvWDeLEJeHLzJGj
JxPOPqNJVtXZ62KCezXR0IGEADEItctENoRFW9T5Ahx4LbyqHeKIIpZj9WQSZuSB4DfMRejpTfTt9DAI

CpXB1Me532MvOsrHTuPXQuFwZrWCNpDWNgMWO8OI9CCVYdDH2JdEYkaYXCzmaaNONvF1Q8UM9IXEFLPz

QbWc8DEeWkXO6tsu2WZRApHSOsVenAWaIhRUnBKeUd
SBOuHFwqQF1Tt572S8R0JgP0uYYqILG0MWG9gRZaYcSzXPFehdXeVYReISexBB2ce6ZqnoruS1ibKT4y

xAErP94ncY9pPFeoTMSjG9SgjlL3I1atuvPrPU8BHXZ1Y1btkgMdOQRYPiQzCQCxK3mprOxsFBF7IIUg

Zg6DYCLcVV5Qf901CEEjW0LsdHErmaBoXOTzEEZSKh
FiAd0TZbDzLV3dqu8KSBkmTEUjSmvVRpShIvL4YUFxRrWbVDH8SLLlEnOlSSx1WFZ3ZpG2XSBqKIx1RP

veDxPgEbtqDuYsKACgAtGbXTzxJPg4LHW1BUPuMxShVfa0YMH5FEB1OOLdVHM9QFC9JkG4GJYbUAR2AJ

K5WzL3SMKxFCF8WYO9OqY1JABsVoHiSEUnUtT3OKLs
PRddIQQ5RXQ3BTBxWqYsCTb6QIU6JjWvToIjXRpxShR1YfGrKeQ3EKQsAUW5AzghJeRdGRU6ZIS1CVFf

ZsDrSOLhATJ9CiQbVaQoBEv0WKC4EghuOmi5HXxaLtN3JywsElWlNZrvAhF4KpomJYC1WNU0XvA2Yhxp

HcDcIT6XNJFiGfRiLag0ZTCkAnS4IKJdZYQ9QYQaHv
C2ETDfUlLaNDG1LvC8MQCxXPL7AHWbTlW0IEKmUqHhMSB3HJLbLjwlBVE5ZML5HPE3WHncIuRfRPDlIJ

E4BDLdJzOyWCJ5ROK1OUQwKyCtOTVbSAT0OTSuMhw6PDN8OiSSYcEjRYQ6AZYbPDHmPIzeAzqqAKY7LE

H7JIUmVmFbYLz5UQZ6IFEcYgSzRIx5ZBL3IWOoVqUk
SDm0SYA7BFIfHoXbCRy7MVS3KAXyUkXpNJy9NRF8URUiHgMgAIz9HZN0DRVbOlPhGFv2VHK2PTOmYtWh

MKk3ZZQ8FRWkVPvzLIh9XNU4VXXpTsTbIKp7PSZ2CAStDtInINf0NKZ2QCKbNgUrOOE2EMDeEiJxPZX1

QFV7PeM1JVPuMGC7HMQ9UXQ0HELfOlUjIKgxKoAoJy
VnRMI3OAV7BEVtQgEmIiG0JJQ2HpI3MTZbEUN8UMEmAlFfFsSkFeLrZH4QVTK1UwDlALQ0TMGuEeHnKj

RlWgXtCWR8BIB9RGYgPJQ1PLqnKBA7VjDwHwJyVBenUeR9PcPlXbYwEGcrOqE4VcWqRuAuIIWnNONbGh

DhLJF9XlV3GzqqDwD2TWF4PnVcUvbgShv5KXZ2KRDu
CkakTmFxCWurTfY5WwnlHOkcYNg0UPK5JhvjGqe4BOx2CNO0KMYmTxt2BQsdHlE0FkFfBpHvVAiqWkR6

XtqiHqW7XWChLEJ4RDFhWRJ4SMH2VaH6UNZhRPW1ZXK0TwA4SRidYBM4BVL8GsF9BTHsWEJ4NZG1NlJf

JvhwMal7NFV0DQNpUeyrWdWnKP1IIZF5LDZxJaKpAC
BbQNJ9HWUpAqMvWNLlLAZ6JInxChQeIHJhFVJ3JBBoRjGtSPWqHAN6TDQwTrGqVPN6DwYdAQ4LTH2oi8

UqAUrwMLFgBS9qjy8EXET4LK5YEXMkSF1WgUClJ1TjfcULVIIfuiwbpS7nKPwyTQVyR8HjuuUWAX7lI1

FakDIaCXZhdGCGYzMtDBNrMZFmGZ36RDseAO2VPQND
ZEitdFRaIFM9F7Con5NshcOnEJQqFo9LZKGeHL7CuDIghhOsEm8SEKYeAS3Ri253YkWreLZhOHXiQiOt

ZBBhTGLhXPC1CK5WVMWdDD9AxMMiwVYWasslKERdX2W0WW3TQWQKSyZqUm0FPqNmHI1uzt1UFFncGXPz

YatBQaAoGPqRZnFaBKMfOCltJQ4Hw269L4Q7ZyW5iK
CpYVN6XWW3qBYnRhQvZTLtkrYlCMBpOUxdEa2eKA8BerPjDSitEn6TbW7DzeKmBA1ps1FmodcPMvAaSH

NlHosuo4CZbQGcMKTeD4yru2YPmXYyYXK2PX5CDMRqAQ9JbRH8iKKkMQktVDHFZJutKXItI6JbfeCULZ

XmjzwmzH0mHYE1RTDkVj8RDDB+Ly6ZWW8nk2XlGQcp
IROmML1eug9GLWC1NM2WsSy0ELSwH4AyJKMsNZSke9IyZJ3IHC0zmRpyZfN7CUlxN5cyaua7jHHuMmnb

MjQ+Th3TVKXtoSAcWK5BLpaN4A5DxWEJ0oA29c82m+e6qiTUxB2bkZXUvgCftM6fs9QlALCKjCMoAWYJ

w6iAO+JOnEausxIMJCWQTHAs6ZGAUPjhPfiGKMZRFE
kHGQWT+82O570S5+Tq359j//kJ73ri6uSIz93N0puXF7phAdXFvD6AsZN6aUtt1KY/OAtdN0selT9aVR

MzpWAJi4uFijDoNvvqLJnrJLUOVG6xyzNV3P7atnqD8C1LuwKLj0MbqA5FrqYAlZcL/zuBr92wwF2R8K

5STaDW8X08c54SoQW+4hl6WVRqMKJwA0yUV1yahZfW
8G3iQJ97LaXYD7vFQBdG/5dhk8ng3taH01oqQVHqS1k/u2z+JRfddyHegTXYPuEgUNuVT/829r5ZrC91

86orsamWNMdgc3Otds/qf6kRus9ot6bRUg6pEugAwNz6am60WoOiF6yNjWRtQykeiR1L1Ow4uS4IT3+B

AdZ3uNwfUdA95v5GRtMrCIKNmgw2cINjdM4ZYqatAh
vqTsqP08lOB6X6mPTYPjEosME5Z5nn3+woPrUZdkIL/FWPBKonciGqA7oEJGWuvn4xVb0fNI+XhbUWrP

9EVJO6Cq/2zM8izoNDlV/ADXCz+Hw51bYcXx3WgQOqX+6o41xXaKR7JJdJfrK8zKxkNM6lXS4V2T5fKO

gSdop/5EtDPlbvUS27hy/Lf9beh7PwifvgAfiI3W3d
BgmspRFz/fzCqJOzx2K7Y2IU1XW7kSjhESSw1JoyrNyHDgB+GaJ7IGhHyOhcVBjnQy0SIDDwuDG1gL1v

uNAgW6TJfOlubYEmbVOlgX1uIc+b4Dv6alxst2xS4nGCC7a0U3+eWt0psBBW+/BZWndkCUuwH7Knnxks

B3mS1jKP7lrxzkdUO+ZBoY24CnnqY7pI3z5TT91H/i
Xcb49hqNchnlaPnYH//ID5L53Bh8AaJb1WmIoqahYkwBRbGByvxBr+p8Ns7O+2IPsEYQ44Zd4lm/i4tE

o8vgJ2ESTzXFAJ6YB9Su6NqDxqJqwZ/TZ8ld7LPbfJkAoXm4Z9gOisx7SI4Q5miSegV2qDkxGk79BQcO

vYLLaMrWS/ZA+wvWw/VepBEASUt4LphRckIpF90zX6
jpcWSzfKK+UozIXxbzqs0Efg336ipqDFqoC/XLyU7kjG7BzwhR0uV/x+NQ2lzdoFU07nzppnmtiolh14

oa8SGuOkIVbWiu92rR6e18Un2TpORObKqAkgC9aUm6EjVOjBaRs0ZSdI2/6V21HnoLLCU5mTjqTLNC/O

y6mvPPpqk8o2TjxU8xjq6F2V2pq3vmpU5gQ/Xj6qON
RfWMDyxo+Au5a0b6lDAjo73wQu9ZlA/uiEZSXY6PwBeOSv5PO5noYjZqNdqrm+tNnFo6fbiGSe21INLB

Lg1OR8DJkyDnZbu5D8/gztltcjD7WzxF9DYpGc9H2dRUyFylR6DcEUtHXCIdftbUhdAyfDZtWIqiIMZK

UKsS9lABJZiLH7KnkG0zP/DL2Ua4t/4leTbFJUypeK
afs4GSluUDK5Ul2fxD5NcB+VX9BavvparkCv/CAxrhBYn5ip6Zh7ZmH4+o9yEaLxT7YBhzRUVYRJFG7V

JwcAcEBbVWevXL3Bdp0K98IZSfEafOWxRl9gEKNnpMOqK/Rjo+GoVIx9/kNeBymr2NESmMTmVYlEmexn

FU9cE9Dg5RK8Tp0T7/IjoeyOwdJvpF8IF2bhYOCxuV
+J3aW4+Ra8S98ESIoD+FseE5G6KJmkOYvg7SdrjouY4rAZ8cnnSwXptPDBkCs/abkd+Uv396klPEVzZH

+SHzlVrXSm1iPecZCFEf4JS4Prl7K22ZQeDqvYOmop+UdhwbfYFg1aNEXs0okPmCzABUJ4uJI3d94BaB

mDOLwpYcFzIof7Z/rNlaD7ev4+FJ/O1p2Jmc8Vme2Z
65CCQCYbreWwMLaLB1hEaQfGoQFgXueCOdB7KnBQJKkDs9BlDiPz6ikb0mdLMypID8SHNRglQOEVbAvc

LvLzGzHOFwJHgHtn29FJqlLudCtCJ3qrbbrCTBREw5nnF3J9o+LD4AM9GCvjepU7qXWdrhOscN/hLtjA

ma07LnSUYi3dV3k31mCyexwNQ4j4DdtRUU7Of+r4Ym
9AbLn5Wc+/xkh/nJrtUx1MgvUcpbh1URx3YFZhQ0PjxQIY+KXvGc2WlPbm3CbccHGYdvokeC+CcdrTWp

GEFRN5ICzHCwsuFpAPerdQlZn8vh34UjkUecUDqVT5VxK/6LU2qEUxdeUrkw6KNktO8VqRLP0RYIhdc3

UHPWZS9WvJNhbxen6DrdOqLofokPn1/3mxCxxECGKX
fnAx1I4pCCtT8q+vuZQYbd8RWh2IA5ti8PFe/YoB48eTAmHcsWPBuTjB3+G5GvFvFrLlJ5I/UbYFF33K

AwCg1eBQ7t3lK1kyfQN3N1zTYD5jQbQvZfXwLFILwRyFUQQjkCFPuEi3TKwWJy3TUbVQUcX0NN2zWejo

tJCcIvl4xQstgHIKdkeEMd/Wh7HqxOZ2/Fezrnqn10
Bo9Hw60zx0OFqW4KikrTQhRp2OAU9Btk5KxgQJTYSgSvPUL2ZeY14BH2KRxyoNAZiNYIAkoDETC2Wogk

Z4ZsXVYFSIvoztqtJSuKkENiQUDyfHJ+PRjKD6aNH/Gsqacwhy0e7YQPnSxpF2SUPdEG6/LrlHdkS5bu

uorCy3iY5UgZqtCWahEI3x0gwY0KChOh5TPfXQINJd
BfRC9J2NTR2Tt5e2m7yUwgYxBOTX5dfnZ/zetyPb2ztC5kUCw7E74DM/lh3xJcEYB+AmMfBvRWMI5SoC

R9pvQd0m+tFL8LasZYjsgY8uUzBWBqA/a1EPZL/G8gqF0Nhkv+ZYPLp+XOrxHZhVZNuGx3AcjeZGGCjB

hliAUDD0LobehUbEasR6RBMCuQyvHkRMeA3xBuJd6I
5IIMYiLML+bMdHu5Zyc/TN3ZOLDq6B7SMOYjxTZ4U4mvPcu66HP4D1EeEixN2AR7/AcqDZV93PsYyD1O

O0HO/Lwu+nK01AaTOJ5CZ9Z4NiXwTPDJAdQEIQNrX7BPlkd8o7ALehjE6vl8yvHy8j8Sh3qMZDNdNM5P

PiqZknjhtymRY9wVepViAZtHeaeCbcss+9B5S9Xo9m
gxoRmnbm4XwrO/IhK3DGPeIT17DMQhYOx1KHLyGhFrDFM87YIqwFL9Y6p8egeOIg9Wqtuahuu74DUwrQ

JNTp+QHlFOkoQ8jIjzNTNupBg7ACS7prljtO69yy6NGFAII1Qq8wo5BVBgB7ABbSd1QNg4phjGwSzBuT

eFwEAVNcDlF0WTBMe8HC68flQbFAtTiBPMZet8jz5u
lWIW/wLMWhSFBylDQTVjUs75cx1kqljWzdkDlhRTfxjth+uBYfXzwCc0dlx6XL9uaT9zmqqi5a8vO8pU

HixHTX6ZY6PRTpEP3EwVFFWmMOLoQYuv7qVhKM8T4NZptVKy7pvuk/uvFajqhFTEXcgPgGofDmfIMQYL

4OmB74NxTtkoNsTcKS5YW+yXDKE/NSSP5PL+4ZLt4V
Np4dKiGUshD4MdFEucVcrHsuvNE4d5ml/3lF3vEL5E6cKgAccR9v9O0gJ9ScSgm6Xe+JSatk83DzdBmL

chIOPTGsW3r3xAbDMQKoKMUiGrkpCNTFw8w3ChxV8oLjiue04PtParyFpPJDc1oov0M/qnJLyHl6TBLq

6j32S2FS8JTkExroGF/qbvSasxZaXsQ4ye+9xrMuxx
rCom0hL5Z06NqHwUksDU1iJ35k2BVYGbbL53qoB1talPSeT3vQu6IQ9cTrbJLPYb3BW0mte4W2NiYsY7

qmbD6mzSHOPE4jwH3ldWmeq6wZwrIZHUqeWl5l6rmHVKsVQn6Nb6SriSezDboHGxBiocAVr7Llgj6TzX

hg3lbnVKEP8xFWku0QEIWfhEJGAYXD5LIdcqUXei/I
Lyn5PISsZ627MG4s6Vvh9kQ6kTWIHIKuNDmjZCKhR8ubv1wj01A2ApMLRSqVgI1S2WwPfE4PuyBZBdg1

Zl8Bvz9u9v2cjWQG6024WzjE5iuhkH+JTAZL3xZcr0+8gohs93lwMnp32co9Loyifn6Y9QPjBamVA0LQ

OGlcM8e9k/aOmIw57SH/UtRqHyWIDaDBnfv04tjKtd
N0sf/g+ljMjhsW75LH+sedH907I33rLkFK+CMxrmMsHvtyH5NlNT7uOdyivIRNe8nvttY1CcE9EuGHFz

m2ilYP4ldHZPWKwpkDJ89hEiByEXdyyJYB5RrG92geuhO60llHJaLAayosQGgc/zxLhGXxLHWdWcqNLL

ykn8rIjcRYV43Zlxx22LdcNFx30p/yT2hCvXw291s/
EPrQNWOJtuGAdXCD8mZzeuJSSO4Js1NuJsaaB6QwpNpkjHcUElwDxIl6xpmJse9aKVaf4e/CqP5Oafs0

CgZvgSlDJkzaMiUxY/DWfol+HalmVrexBUheLh3D9esqUPoZ9VIYVjWQj1M1naL+ez3jiUfL7kWv2spI

jT2VCRU7YmyntBO3ZCPq/nUhOg9J6Mun61hMzrHOT6
4F/Gie2quXKI+3Oy4INLKD8qu1KjP3GUI910fNPf8lVdmn6WYOe16VmmK6/qMyp2KQva5IKezPEeU0lS

QMb2WKvZgW7K+T0KUBW94cIrfo6lM/61D8M0lWJk6vrXVfIHJK+XKMvtHgT8zssvr1mA6sas+ZZrcPad

Sl8ZYmqMtIMgOnqDKZuneRzsGu/rX1geGfVSrVkpQa
JhuGDHzqlv3XgXivtwAsCQOyaQsydopsLugLZKDfdykMacanLUwMYOjre4PvL1suG9rgOZNPRixzxG0v

BbCR3QTewgzbaidxRjyG5k4qqyX0xYXRGhyLQUiwwXYM5jx9fZoCZSpvdo1EOqHOMhM7xQU+w62zeC9J

hkLb4wzg596d8nZvKzO1ZnZi7BDlOta2o/PmeYvwgv
IVcsyPaR1kPRoRBtoIQOvNqf/SckTErAZ9un6q2BW0doABMovXCASsuJigmOAq5gFBW+07Bv3a+eilrc

fGrbc/cgfhPKNx5DTmTJ1YR8Bz45qi/Qjz+qp7dK/uZYqsU8LEWb83r2516wmR2s6f3p6MbipqUe7eHb

x3F1a+JonCbByNAjyzUf1yeVSpn/vrJV7+2OjSY1w9
MmjueWo8ipOqwN2Dl5/XK9iaXIV0lNt/WmjVPkzE/GC3q2jN06oi8Al/Tzgfa4M8So+Zj0J9zKvWO0gC

P2y+Cfln0Lt97aGll8ifMuqi6m6NvX6n5F34zMQWnFQkZ9I0HSuAsd2QNTG4cSnZpSnR37zwNIZ6CwBk

upIZxWEc5SuASlZ7NGHruNq/yJ2mZGwcLFCTZjtjNW
AXhFmWDV5nnaOIvGmtQJoOGcCrtasDN0PAfQZGDzsRczjcFzIcdQC4X1Cki0RjdtD8v4gDWLi4Q9+t2r

sqNkmMzAcsuSMPs0cfNZHQXvyVMRy8X5NniZXiT6BBrx2Mh2edPp27pJyOB3SlE/URw3gkAlokDbZfMp

ro54oxGusHRg1Q1qSDajsmL3lq+KYV1vj1kzTnDQTx
gSSx1v2sbw1g+4unK9jzvMep12mvDn55to7QGH4NXNO2wPZjZUyfZridF6ntUwNHjaa3iZZ2vVgup+F9

uM4ahoKwKRqorc7ndXMYCqVuy8xofWwEPruQESrduPwxOfvxiaNokssYh4nFNhhtYk8o30j6Besyeraw

/CDz8J0nHiBevOGvTRLuE84yQh+W7TXP7ce10mFs05
Qj7ocQNax5NdR8Ncvse2zQHfjZvfOHti6TSi8qorr0EL3knktXSbjU4Q71Ajs1EDNLNU8mb9T4WwSmWm

jNBBuSPBGCKsROTni3CUOfJsLWYwvtKBOTFvXO9B8i3wqJLgcm9ZQJ+J01kCVYklHn3ph1MSn4OTdrLz

u1v/CAQCFvx3OQjJVu59luH0qtAS2BIAqO6JVg8sLO
spiVP3VAQa7DBKQ2gdPG1Wu0C0eWKPPeYB8jEhWj0w3s7B2k61cjcobtZggZwutXxh3ApoBX+mpWHcRq

dlDhQQt//YS9TeoZix+2PW3L7aLxIxpS9bmoDFvrSrFkHhHTYwJE/BdhHkgAV6uDMxw5oAGhdrco7JAY

oGhiyOvdgnODevJZXE77J1SAI2WERDocIKEIR5wnRI
mO220JR1sQkcCtpR48DlBrgNzkyLldgtpfRErYiaoqse+sSF0SFFQDH1BzzOsgFPLzMDVL63cVkdc/Pa

zJgovdLpv9UvahUpLcKKRMEvyBNJkD6vInQTdCfxAv9HxarG4+2OLhPee+swlrZMLnalqzamHS547g0x

axHmsw//xR7ZGI70+fyZx48vJIEXjCPPky7fXGW/td
cgaIOekwT16eyiE95FqGkQdLcTm69ASYY/weJ8OZAINeytdFd6KF4NtQbQqrVDaH2RpUKzait1Z7HDai

iuvcsFrGlcrnxxXNNuPoMJtp0xAWONAlCdATjGFijAlJh3VIyE9PE0QFRdDHofdFkiYJQgVvcywkOBOJ

aYiUr8UwQq6Iat4rgVmOZyiDZZxjmVpe0magbDRY8I
gxf6RzctNfUv1GF4T+2Simn0Cecc7mqLmaMDiZfiEqt/Siskuamle9niZhBNX3JKPk+PRm2120eVK7jt

aMRJ0E2Kfv1EkRgXEWyjGyssgow616ReOxX5sRaxSVchkz4gKe4o5yW1jbvT5jL4UpeWFPhI+X2gMSDb

8yhnY+O9dckV6RyaLErvY0brDOWYqMVfQDKYJieV4p
snSCDOv8K2UdMaGZczKhVvDpwp3jzN/GprmgRgqcEvM6WKMbq1ITwdha8X/tMlz9mP52KCpcgT4r8JtG

NHACY2cDVmsrF67lNb9xHZQpZrp+Hiqb7ghrR4Cq/TraiUC9CWa4CUsRUi+Xn3zMX1q1fJESR+usK9Zs

yfnm1+91/FxDP9q72889f/uAjpn1KdBBjImXgpFg+1
tXnv3OlCWcH89nb4gVy7j4677NBCYU+qLj6CVcmNPcYiiXWYcH8OfkFdbBVl58U+cLE2zn+cdHLhWmyz

Osl/ahBhgp81rVVw9Ic6unsi+p7CpeJ0FATlZzWIpIG4KYpwVTNq6Lgx7y7UDQS9tBJ6Awf6QtkNkOz9

5kfDbz11Lkj7eqqANnlVpFVwYT3vBPbQOAI0huBixQ
cHi/12UXDm/HSQfglrBGSmkNlineshPfZ9EuU0w5qAtoyO6p6GDm4pPXI+Fi7b05FeDPBH01UWJD2zYm

9NCztpRIS9Et1h3td0hGEOnKqHaPV3ffn3bLupI3VCr1s9zs828bRcWxinBm9woB96muap3F9HNuNl5t

K9Gvbo+6T/8H0RFQgCbPXwx7qDQoagvg8sVB1cdGL6
wZ444nwjGRUOsgSVXqmjSzmUIzG4nRbcCn5OJfbpgdIfI6O02eRMpjiDJXFArTAYRzAqOYUSN2/vFWeh

uygc9ZlYdxplsi6sJpsweb2AohMNyef+pBE403ipS3Gi3w5pvtMi2sx3Szji9DHAlZWp89Yreny++98S

LavzAS7YlclGWZyEO7E8EoRDKFiWKD3GCdaJ8hzpXn
T6mYYOcv1BkoIUWgPqFtLfBh2g7RAuZ/upN63COR38gF9A3AWrYE46hKCq1daR9u5aSLpsjLiKWS7SNE

QvrdGRtKNkAQPvEXOEA37mIjM6j8EvaKlxTtIWJENk6xMKe+AwlhgKz4XlHgFMDBAC8rEpDaYq0+3lSZ

wYQT1S+7sIm42vM+QudCUEWTPaNkJxgY7CLPsIkWwu
eLc4gfAIKgFnLGweS9c58IXHgwtsvdiI3PxoaFaEpBwHpxBZbjXrPaMWGONEX9jubPYHEnLVWiO+HsEO

RetNjYF9HpjNBhk9xkOqIzLzlhoXPZ6ZX5mR6U1yUgYkbd4ypDyMHvKByORedKXYoEqMFNK0IO3QSztK

AaB7EDyRJwhFLrfy/v/KLjG+Z//bSwnL3N32pewMpl
0z5Q8ux10x8F3tnaMl4ksqWXBN5P0fTblh+x3AMxv6e1PnnGnyngfodyAcW8CSWEC7BlqwO5eObaXEcQ

Lwg/5HjY+MdBmkDysEzE56MYrKh8zqUsmaT4GNr9XtCMIorfOXdI1Sb8yA/V0Fm2S77NYncvvXw9j0HC

8834u5JNWblQ7SqW2fptSe3wrPyslCkAzzJxQW49oA
KJEx/55Rlw9sN0GyoR0NuT7kySbW1fv45Q8ekrbf9bqVyVcFeh2Iw0PNS+gm3MCOzUbGshsN+juvx3ZI

TV12Me1CgjlvWP2BTrvVexu8H2wRLENK9teyvyfdqTjUzF7BaOUvRuaL3syUl8fcaZswne2abVs9LcIi

8pFC7T9HPEq9yBDSIeDlzsB+vGK8+koj6y9drP+/aJ
D96+qK6O3NU8r1WSwZui2S/JhMz5sFAf+ICF2JmvH70jydoxcj+rvch/ll1SO6IzxSH31lj9NVzb7q88

I+xD/XXqJPss+dewk5wsuqLtY/eL5R49h7T41/Cc0T44nwgLbj2xH8k2rGyShSfcExxE8W7XAYtUnu5s

lRzO+4rmdjJiSUZwhipvPfaxPEb5CdaeyVtINbpENm
jOwmwigQ6AC2kbrqyH/MyWJacvUDh69iuxtRU6GHvoPigXFGnkquKk/EAsELuM25ZGJssozi7lnEfJ2O

Oy91FFsJ5hPk7D6Jjb4BFOEeyRrstdEOerND5aDVmyOnxl6ZWXj2PwzZSQdcI/gzHa0LD4pBJqqmbt6L

57ZkfJxv1gCh352dKx/7zF5t4A/O84zn6h7NzZPo56
kcY1He7qjitl5iVrVD38HKGTyh7+svCAAgrtR7dcMVfGbQ5k5cgYue6MF/yIAwYKYgar1DHIoqEXIlzQ

hXnRUjJRYFq2pj4MK5NP/Zic4KSNilq5BfGmRbq27kqtl01t/DVPzfjr+t9++zRB4e8g9Fg9PmsZtooX

VDF9cu+e06hUM9fYo38k227mlu+6nDTF0hbpfkgazB
cTxpwVLfh9dgeOKgwKWZ60qTIRYE7id/+CsZ6nWCSVL+ZCbpWuXtWevYm6dkpTSBCQd97qPHU6Y0N6zx

Hq+URJnaPImqjsVaVZWZuVBbhiDyRo+K2l/OqnobeSs+YfuhKgC4ieBwfp0lmSXiqaSNB0o5MJBIPbUC

03r8KREGtOZGhVj4dQPXXPKnTHscVAzpSVmMGQ42FG
vznMfpM7/EOq3aZ74ClhpQdHF5Q/QXJZSl2iXBQUvutCSpBGhzSVvbg1rNxxiGTsbCGs41894zTnkEGj

aqTkI2BJt0ExTN0K3+KeFD/q/Zvu87ULF1HvUQ6Wyq8eIeClbXBfnubTJJE5BJiQTTmEJo7TTf2JJsF9

Yzp2rVz+lxKMWEUM4bWw96u/9y8FJl7dabt6vlXc8D
cy153gMUxgKZP4W+kk7AkgfOh2yIzRAYDsHdSC5Ia++AWLkj3LuhtyiEd95Or2d72NY1FDWDAAcIBhM/

dZcrXjIxgJGKZyPqMgmxCPRIfUNLvvS5xX7vuUD1ZJ0rugLQuyElS2bpdwuy6xXS9fwjA5fiONPyZkqE

Gy0baFP71z0Eh4kqveTkWbMwHjWnoICpzmtQ4OCSRs
m2OXsTAwx9WPMNEdiDNJ6jNdvhHOVTgBNZwtS0JMDVucoqGKYiQAXuPclnBTKFquQpQGobiBfZ1x2OG9

lmuS577XzOqMwRsfUKP3uour/Oc+YC0cI0GuzIQjZZ3VE/w1xtbTvy++J8K7TTb81zwwpN/NaBbMmSGa

MQoXtHmJteuOAKIvvaJEASTb79zIHeCCWwNpMOawry
fBIbX2UMIfKaaOEwPOkPWDqiEUAc3P4DgETxasslO1Cj38e9FPrU8AvYDObejBuSB713p7c1ee3HBwrb

47zsb8qc0CVzBmwB515mADmq21vB9Y0A93ER/bbWg6xoH9s+ADP7tjGsZ8kIP9TUkdctRT8bekhmpd1D

++5yTVongeZ0lgMowVpJetiLqGBGlOZPt15HCNQbhn
IJuxOc3uMLimgmeBDJ6g6rsIk/dqqRxw/znSF2+88uN9C3gVwSxqntT2l5Yjh8hL+St+j+VeX49gdePQ

kZGF8hxmur4kBimCT8eaqAnSjV6aYVdApMiSlJKjU1E851/gqUQ1HzgfOArcfh2ecMHdDZVfBkFXF19K

g0TIArUUjStbxyKKAbzErrRS6IzfRtvr2HGRyR5Gnb
yHYPq3ocWwbLTcqVMxZ6oL5I5UC9CVeqBTuNMzLMDjvM+11jnGWS/ST5FElmr87zLqV2eGfrflu6FMkh

lar2GlOGj+f92OAHfDFBIduIDBfiOAjXcpHUMYvykub2+FnQjjvaQW4FdZEwVVXewhBkzeF0dcxXg78c

3Qn5roXTP+8HIkcDLWkW5gpj9lT3zeUNms8GZFmE4Q
devdMWIAOdKjwiUuTKqUnCgMZVCKvr2Xs8R0H/sAWq2EIqlnDkjDBJArbo/hKJyJcdrv4EtVRW8ncqH2

w6gjx+C8uD8VjZX87c5A6+h5cF8wDoLboHgXPxIifSug4h4JH1SwfgUwxHHmYcYUlT0wELIK78WZPFsa

QEQkjq0xnpyqnkrulZ/rlnGL0X9dMX079NZgvjVuU3
q+XFg0GZwWrvTqSoitdAwQoZfuVkHNWvnIrmeDr/5wnK+DUFW+AnBq8AjyEo+/hl/wqeNbQ/Cc14lu74

lZQ72+FRtaI6VytoVcsZtdtVydlsrHLaivdQ6djqz5u0aov+vAW9rL7uOX8TUvX8PtaWKtmSolwJUf9m

lbzcRoWBbx1mmlPR6mKjNHPwJ+XMgyiOE8vjREmyth
p3pGzy488t6Wm/uAtBeA0RAye9O4jnfBKF6Hl8QIhCsD6GEMvgM7W6aMcDkcMLJ5PNRYs3oAtR4Y9s39

r58vkttswuOji+isp/gRMqAdPlpoFO1kbsTFVpdZUcD4tJQxmOa9KubCK/0Cs2Wd7ekj3qgeNQmhsOP3

NEdFllwnCCkqscoAtgHdeC1c3JaP/oykhLNTwlkpYc
onPaDxZWkqAi9EJAq4qcYCtFM7bQJlAwGBFZtWftH0dBB5QySOXOC49VS0yIC9UNSuQtEiKvanWWTA+e

bGOGzyyff4ZG0q/i34aUw+IB+WTMJNxFVnuy0QJfXeK7Phnz+iMqUgK+xu9R8lv1oQKxgGfIBsZmd2rX

wrfjPGInIpkew18glXRMIfPE4yabCUkBIT34Yu17Mr
vnC62dgzE3ewVxdKGVZWNqj4lMQo8uOXInsmlU5JmxDkeP0Fyz9SkNfuiJ23P/KP7FlUiWrXz4VDoexg

udv3XaQdbJqAv1DRC5ICXKUBR2uXhTLLg0UwAKhbfMOxkRG+RpZ3xbzPOd7/kC6KIYr6fryxW2pKn2PG

Pp9gqWD5RaVcEV1gttYVrN86MDg+vWV5UXSPCOWdNK
cj09UA8JUnueLrZxq2QsW+hzeb+EsAtqHyrje1gKoAqfpoY9/6o4tu8/PFhFTr/9GKp+ZceX/0+tce2d

hcMKX/gntbJk0/r0jyngd+2NBuamLn6/v8QdNCz75ue81R8mkIW0jsTmqnO3i/Yvp/n+EsDbAXExmyqG

QIGzytnk/ZtzAUmzzaBJh5gw0c4uGagullR3WlwDNd
uTAa2lc84G0fQyW1PZ5W1bgDnYfcHljTTcRxjzGL0l3rqNr/df736ktmX791apIju+h2LYcOV/f87Nzz

w/gPOsPYuednJ8+EN0F5jn8zVZ/ZW9xRTrkNXbi7EdFDiXtEuwGfOZwlBtIDAZGYBzNhVLnZSXTw7/EW

d2aEuzk0/uxbyy50wwfLEGgjztIvT0Grv38yAKprut
2NCtID8Hb60WKQzSiSbTxZuJcNWdPavAqddv1PBLBklKSE1AqsbSGkb0kFPDfNN7ptF1pIOUqsd+KDaY

+V6skcmaAGW/y0VHz/9vVUHjLb288JSDP1yZM6g0Y+YKsDufqknFX5aAcp3hV2vDL6IkckVXH8YyYRCa

hM6qq8pj1oKTAVb8Je1HFLhjxBM2Iac57szOvhnJvc
y1ZeMXaNBIZ2dtvC4aW7SmTNitnh5cttdffEKblNGKnW3JzTZTGiZHjLgKg3M64kr4cnV7Ca8WeZgR/K

ypI/PgxVp0yV6vA1T9D4IpW5QwuJ3du8OhAI5pZSEEHkSNhzDAM4mChWIfHj7UBcZ6osGJmwyhMNBVtF

VxRohucdY43HTb9u0QpIAPAat3KLYxBG3RKgxEvPPq
niOtz0BOI5goMmBDUAbn3RayHtAIzZKTZsxiVHoMiGZTTZzxxoG6DZWQl24sj81OR+8XfKJ2OT5KFv9/

J6MKCYeX3Uuxg5tRmswyJ7hNQn34ldVGs2H2tYKg6KquLRroQY2GDQT3ri5i2h+2lkgL1pD4zbij090R

Tym2OzfXEP00h0alORucyrt0w+eundai2zaCqf7Sde
5SHX+RLVyYxliUgM1pksMjrSfcZDCDNp2+ZysRpKuRxFxQSwfkqx0Pdccs2pNVV00bDTB4kmExApr3xW

bM+84Nzsttgf/R+EPsj6Y+4h/0OquwAnHwgW0s02Sf+bx0mqVDYn8jJl/aucDo/q2kZCxByemNHFPSjU

+DVPQhixIHlMAHP7mOV+G/PYErZptkabFONsE+MshO
6VPxb0XE7rQYLKnP9X39l/dMDCoMMyKuGtrnPvGAcLKfHwMoVoBKdujZS6qhjO7BSyBYkPfmnDIxBEBB

aBDOINOhpOcEzammMB2wtC+J8DShlSoscsN56vDgqKNodN3pmZX2c8PuJAtRSeT/FroQVUMced0Q1GhU

jZrQTv2WbhUwdOShbBIBGdvVGWRo+YwgYrn+h796xV
++rv1dF4c+/x7r2incJrsg6dsDwAxUs0b90mb2c34yHOe3gp00h6bFyD5qgyC/C1u60glSWsGdIGbIhk

+GoEtglTtESTJNLFl7Rt7uinqtUHqWcfsvyV4ZwuRUvRvF3a9Usbur5Ekc/LYBlCvtfsSK/PxK4zSgx/

tj6qhoN+n/BidPiAa95S2L8+XKek+U1hvgQtPJ6j2R
DRk6SAo8H2nS+dajT9xd+wZ0x7awfLbhYJkgT8a73Cz8Psogwx/zHuK5pl/8CB/R8963wNjr1m3enguh

P57bg2b2O2GFdwWaqbG4MHQ4mZfaDk0tmg+lW0RyMCjoLlJYr4HAQeeAIhLCgTjKFqU1A0YbQYWgZex9

Py/xl2wGZgqjai8fytB7Imsr4n5FlXGL9/LzqtwaxQ
xMlNtg0Z6SyfntM6GuxkDHUlbBpZPrZ+xTHFOccyxzrHNsc+swBL4PQbHmOqQI5pJzeGh0Etyb2+vXh6

6ZKCJYWNhffYHnbcXXp/1/q0F1V3rdS3gAUfkoLkYPcISLN30rmJaPOBO4ZwCnfOkWwXBYCAhBDIbJXl

2VOQhiFRIYqSPts8G8S7m9vyUO73aId+jLyoTD9ZGn
7mDd0NR+5wNDw//RRLofbmWnlj0kKbMBT54b1PfS+vz9wTEB93kwlUQPnqGcvug2PT66F5HP2dyxwIpb

lhB7wQJTmAbbbFU/T4Sa5fmYzPNC5ONn9r8GhSRXoYSGTmVprySh0WPqHxZRGDKHLKxJomGToauPVglE

aorEK6S3JfIqLmTpPYmpxarniRRBdrHuH6r3qTOT4R
sMDGpvT7mFxfc5GVItpdydzjySUnFdofhRMqIoGt9qkY6hcTUiPLTtIVJpOa9Br9nCOjMHcGHJwsIZNS

P8NU2isHcItiDKQ1+Pg9C9h4tMenPdYA5fGvTnjJldNVX4+dO/9mt5Kk8INDQC2KIEfTrhYJ+R8/smi0

bvSrUi4S+1FqRpeLCtAVkDmHOa6AbHnEXYKc6NkYsN
LQfYQoEi6ROYH6EPp6Ip6jmnIs+x8TXkgM3Yvpj3ZLAV4P73iL9YCy9QEMfjbtFJtA8wuOcMvMyjAF2t

JbcHU9xNhxImLbq6kDekZwHaxKgtLZjXC0oHdHcfCHcjBJnw20wwayVHwO5q1MTRHNpyY7G486ITrphq

+ie15+YMyAPmW/HH/urnI0zjdAUk+bEwiUZ9+05/66
2S9ft+0o8MrF7HVdHc6zGSlxWRz30OKVx9qtn6+2QTNbxCA5I9MjGjaUfAGBNQn14ugnLagFrn6RJpPZ

RLTKI3ThaB/5pCXeZrxwoLZJhW0wWJXvnuk6YUze9WlzjAnTVsBp9UQQ0NxNuqEx1rfWsTG0qSe59nJz

j+vXuUABLeR3PTj4BO3OW4kSkZg1Bq6I89F7nkWE5p
Wpruralwe+8zboXV1bDBH6j1764KDIjpF9GDvm0f2duXw6uWcAWs80xGzr4qMbtVHdyLVHcdqbxVHwBy

beykrPq/vqFu9qNftgJXvp09zbE8p8eovnlML1wd9Gl2Q22oatvysb7sB4v88y8B/bgHMW6Jn8aKVE38

siUyM1j3emDKXakM+slyT6OGlAZ9RKr2VCDqAKMC7W
+/wR8HrSwScENRoUxH3N1Ak0CBEKY5bmNKfeA0atxpPEmSP6YIerukcf4ntp10dogRQDIk4Im7x/Kug3

YodTIsTil7YX4Ab4leD3GCrGwjReIShjSAnRMFggsIMJ76ZjihYrKZv9fT/cIjCW/5tpxy+isb9WxCPc

gsvBs5N3UcF5dwpMcK6i6+StqngIgwGxuyh13Afki1
nlOgw9Xm34ulC5PJAAC5Pgwv1ipcSSk+a0SnkdTTutGZ8tiUXAKmcCJ52fW4cLRcC+yVn9SNhkd/JBPm

42Yl4Ix+zAhY+C0bpy4X0Cl519h6/Wij3P349jpLm5u861at9Lph7/f7EQgsP4/tMZAHkHjrJP/vvzAJ

1r8OQl/VPqH2OnzG5eEvaf9aJw0K0XzRrvNC4QQQoS
D+YnvokfbyPTEF4bd+ImI2lO5+6K1FtWBxdHTpFQpY9fpwqXyj5i4eXQfUPTh5xBIit3smEgAjTMt7lr

TK9t6D9H1wQHyqxfrikthad4IzLG0MFoqkCzDq2MtIbu94v1kEzwsyjbUnY8nqif8To4gEJZj/kqhq3/

GstQpDO23BHeMb3BM2/R1uLSN+7bIgDO1mP70AXDUP
jZ4fJD1jK5i0Jo/dL6sELPWH/NKVtY4FDDWrXrbM9092xwwm6rB9U05eb42zygbjNp/wcE6iGHHwhw4Y

4aLpumvT9u9McNWQycpAiusUkddojWIV2dPnVkKpvhDY4uprVhRphhTEDYnxU4FU2TLCv+NWLJtuegV1

gabeitqohfymz75KANJbfdLAAIUbR5zBdAl8PiEqe4
pnrbzQpliqqMt4Mghb4UJjp838eCEFuf1PfJnw0KI7f7tDfm2cV6aCbFVbsDrxn8zgGplP19ghknSRoy

y/HG7C7iL6eW8zO2374mnhiynJ2Pvq990lH3N887+bxkV8jFQOgG6BQ66GVnbsQJPtVun9lymf7aQTY2

yg2sloSUvBnWcLg3BB2gi0Mdnt+zYe7fEP7ja+K9zL
nDp4fP3hSRXhNvKx5kCwe6sYrf7dHh+I/pppA26T7X/Lwg5FuvxLTHbb47p5GtiCm3Yp/I02AIDnjH7C

jjCpNzkkZe7SfPwZl2+zGj5xvy0TMkLRP2+Q1In7PhqjuyNn7+DrKAbQU/47+HL65Dqf93tTp6dF3FoY

cAnffCR5ykc6dFIb2kz7PRa5VACYY1+emELTbGxubb
vaHJemIoPhV2glioz9/hGy6N2Y2lx0I0ZAPST84fekMXu+ATZ9uBhYjFDEi4+M4h9X9oy9J0DE1u25TA

Thl14YEZ06qL1oPcy1tuPmfYRxcs0LmU57VN1faqFWrzlmt9vfpH7TNVJ4mLymLupZlOgtwTGZ7I417/

S3P6e8nDjkjR8sqp3BXWfD4ZQeK+ZFb9oqMyoq6aer
a7BuAuzC8uXX/it9v0oXam0D0aVxFRygGb/8o9Y5VzXutHq/TqkzUmHw+ePv1HRhzoNcOD9K3x1UNGgf

TjWb2hSbZNqSH3U/YyWc8Kf1z4bYZbVDfL3a/22KYSjEIo5zo8wOzyCPdSKaCp254fyqNSS5wv90h23o

3mUisj4fNmycl1kPJs54PD58q+upuFtkI9Wde3P/eR
0wWAmL0EcfCO8C+Px8HKp8Ihz2tXJFuUiVpYL98XkxtEEIHMK21HC0dl2DWfVdD5XZVpw3n8vM3x1ki8

ghBr9kzuSxKrzeWkU38xzvePpc0poekJkxerPkTh2ura7IU70apCgYLRyo29lW3re34o6c/D5yxpytig

jj/RJ7F42YqQMBG7z/nYi8r4wule0T2uiw5yP4yVSg
fAE0CgHtpSzCnUwf9jbTkksz6lhO8WNTZFlWfaBCTthhFp8lyUaf3phsxFM6gBOIKdXFaDlyuVg36cQb

xsFTxFYPUDWEXB+bKTOYscot/rNX0NtjyOQnjnVoaOkldF9fez5I98n/rlnCm7atrAjigbpKRJrxNbjb

KghCsmXi/bb5JjM3c1yPm/TaiC1KIdnpfc7RwoMAfh
l9SORyE/SNRxXmvZJLFMo2bXXuuW9Q2LyF8DHWlUTfijiCFl9dMb3kguH02zTnGBiQuqjKcJvy9mgxTc

VVsTkWqTk+x4Ib8fTvEfZPZO2N/23a1kNaBXGyMrP1V48LyZa2T2KoX/qkZM4PBP0nah1EMEeqm+GNZ9

TFRYFcp3w6zJhZrH7BAvozqla+JwTNElNubE0zYO7j
Z7kUyrJiirhfp8jguFjtrz5ajVvQOCY2mI+uCeS5KXuhEb2trXihC8Hf7xuYbTzHaa4RVHeD9bhlovBJ

N9w+XxrA+uOfubxy5dvMxTr2/eXweGwfrpM1gkB/319z38JpKQYV/ESrtXeeiiOqy+qLVfJ8VHPFwgfU

k1iY2wG8nbap9ThIVFxp07Bo0dzB/sjNmZVytXOq/2
XuO/UfWf797h0714F7lyN1rqXR/es9u7y/+l7XP/u583fx7+MWZsufr5FWFxSnfrlYQVdfD9VJlPdvFy

Jp5RtiWuJyDJarG79+0ztVyvsRx1D5BW+Wx+zBphvLiB9Rd/6Z1RpR6j9KCnBKGvtWDw8AMTEa2R3WX2

5sqP9fvDFDnctRlkIMW1gyj3AkfH2G1EV8XecMXY59
mt9kjvOCAVcvs3v9FFYTI88V54GPN2QTtUrTvGXavxbW54rvfSk2K8kt0GTXcrGh5NEfcV0NTWeVAbDl

1AvoYWlG2bM2uIDI5Mf9NR+tkcZSM9i0VEYXqe77S62Tyoti2xgklgFd1AQaDzf3fsg7tmhTu06uoB9Q

/YL0RaShJoj/vEvToy3rVwWabw/AZ/w884r9CaWzur
Me/3H8Tzo/FKUapqF7NCnTwpJzqdj5xRG2LcvbEe2kLWcan+3Crg6fj/xE5OR32afBx3H8rkyt8tKF9I

IhufcFmVN+iMqwBXOM5/ms/9I1B+kPg7uHz+x+BxI2IfnzSq62ez0Pg6XD/Zrpsv1Iulzk1Od7Mjd68v

GKiYzM9VB9XVIsuDtyXdnh2hvnKmJMb7mjXFmKzmKH
KLVqrxBMfx6NSZVJw+uUdqOzEEHJGH1Q0PMsYNRshb+NXV+fnPgKcQef2jI9yh+fRGQQvVFk2JD7c1iM

4O+VX9Z5/ZsqxFC4C4G96D+j0iB3j7V9kyUPUypFL/9D+rRn/NhQKK2Qd9ALlRxPya1Fe4Uv1VWOiwBA

B9zyXSgLTD8VqZvCq1HmuN6v22VYrsqUmIWFPiK9JD
xcBCTsYJZvrx5vPdcOwXLL4ke4OrOCck6LinKzgrL16FGrYLRGgh4EIZsUMHNRy/gc/hoIP7Kw34Uq5u

EdZi+sN4/xJMewTpJIw/iuEp+Su8T9eC78ZwJPHQ6J6grNtS3j4Giyo7tBFd4/Wx7cZJEJeYKRdGcEDX

IzFPBtKBiJXsFVjFXtEew/uE5GqddyPaEcYcfKbCve
Vol4BwTgN+I3kgvS71CmQsbCyFmhXfPPg+xZ9dOr2/+uo0S66QBuJPmJmZ4keo+by5Ap4LQgu7U8DWIM

17hdFk7bE52QpWm9NTlNR2O9QUCZqheBfIsyIlkn5TUaYIUROeykqHewJ1LMOyqFM0c2sl7CAPXeX7Pb

te3bTkUnkNqoHvHaedyT/x6O42CXfnORw5zAUTjP2O
X4Ox/RPPJ5SWb6wzspXKjX0f0vqbD98VNzM5U60vhP6SdQYj5Jhe97VxH1BeNbIbltBD8va5Iq3o8yM7

1kMjVK6gevOq6i5FXEGQeK4yeAkyzdFu1Rcce1cad4c1PMqUjh8Ncp3JEBZhDwdEjZ7urTxcsvGGSZZB

g+iG+MKyXUYhjp5gXLTS9bdmO+K6ibsScPz9D7pLpu
R8ZQoUrPY/a2m0pYmg3wPuRIokY6nV2vbSjaUgbhLOVqc2oQXQxPRkVIyw58ue37+o8oXxAtOtx1YOBH

ovsGpv3mXDed7ao0b+dCRQqFVnu9JBzDGSWxRYjlJs4S5ABAEGgeT54v9iYYEYeVDa8MlYteEUsk4BU4

REoizJAQCEt2SwXNvRhliSD7dgiQvJuckhSZ9aN/M2
CV/ZiMK1gIYuQNTlW4TzDqL2qD3L5/A9N2F/NjFjNF8kaB3H+hXSMupfbN0pXH/Cc4WbaodpXK9xYy3D

xZW9342A/59AeFfehDJzk/ezxKXV2L3zofueriD6L2NeeqRrdMIFppmzzSCob2EmhrhLKJsUNbOrb0wY

3yFznu3lymviP8v+Hf9y6HLihScxJ9kxCWF4YlxlnL
dGNbqiNMUvPzB7iEQNswp/HBYZX2cnVWjgxgFs94GCRk/gqShSBeffmwYOGTiO+P25cIHfSQf5S1fwdp

5PVjGORNk1Z7vab0LDVK1h+TONT+vl8wwxpF/TKdctKN/BvCt78BO+O4kOhpOdI0gOgJze11sLnSghKX

LkQ1Rn9fgRmZrtf0HQehVlU+6jHMbxPl/KfMUy63cG
deO7tLnMUbi/EDFhP4QzYB0g6Cbgq6VFo1e0YG8Or9xG8TfXL4qwG9h3+Jvo7Ch4qpaNaawSD+STOO4o

A7r80xmvYCyE5h2QynV5/yIvnz6APdlmmUD3vBMuQt8BCCIJV3LKifwci8cS8WP4wk1Mo9BmjMGq5la7

cD62/UGlprcUDLYV60FzhfkTTF6KCozA49zc9Z7cBT
M4mgxNsmuNMNFUcziEEU0KqLE+5oaGSlK93ak4bh6F2XL6EdwSs1OjN9W4FD3tFG5yG23jtuDaI8dGdW

u5YjoCWvG5yt06EybeqFU+wzEP4UY3TVyJkb6DGoIZJ+JzE6kt+BmV54x7pUI2b7yhyOwWAzk8/0/WTo

bg5F9LbwlUiV6YS4odFJedQ78e7u4T4fGeAjy/4kYo
Jv8B3pE5TRjHaNMgMHeeufs8P9NxeTWIPo76DudNEG+764v2HS+BSlCtbF1rVH0ucKBbnccOgl1gTvZH

Ux7EEK1A1UxRd/hZRKmDsDIHvwAx02Y/3AxnwX/00Z41/l4j/XIHO5CWVWd3f0YOa6CXeehLYh6oQUeS

Do9BxA8l4kraAKglzTu/hMWeI2FCd9FQSptzOwZcK6
rFJnz/HiXuDD5ajNK85G3Bh5QEIw7+iyjml5tMCcWG4k86GVUn7rgtk/KdyCeF+Mt8JQ0N0hzDDipnvb

3A8A+eOqCyf6VUB1ftYStg+BIl2B67qfVyNPH+9LdiO1djuY7sK5Y79E6ttkcjK60WKevatmoGB6TN/f

Q+xdfoEqCEY1WYVs7nRdkUJ5WVrXL3Vse5tToHb5bw
twyQihzQG4EoseD0N3FoMngrfcOhxxWcSRiw1K2BdsP+GGRvk3RnJCLXVbRiXdkw5hoU7KW73TUI1mwe

U+OmHWsi9MGrQhT9O/LfD4ope5B4pbvSvUwDbHmMbuVL7RggyE5Va3AK7PbbA3ozHLLzB9bh167Wwybe

3KDs1D8K7VRz+A/xdumCYYJ7b/+1zy4H0Cj2Pm68/3
7BjDlEOWI9V/fT9zr0FhvNB8AESPs/C+MZZn+a44Tp9/U0hMHEFeOc7/Z10Bh0iI0aZxDK7AFDbCEli8

xjN7GnkdhA4MzdMbpVUP3Ovf/4JI31dJLSrmFzKNwB8uRID+WUSSfeZXPJ8vz4ZmfnFhwvlqPFJSl479

5FZoI7AgLe8U0FEJ/5tQDJfM6dOb8qAouyX5tBHOt4
3oO27OLsaOATp3URKj9TxPUEW+ds+vvHFoClsartvF9H8z4K4i+fgHPnNcQriJf/130QIcDfmvXV4ix2

xQeoiQUdf5uZj8esK5trHwsbEZUFF0L+CuNHr3l9XOwe4VD2K5S+MhQcdbbdtLrIzVaTgK6sUobgqWje

dOstPuww0ikU/pLfyDZYMv95swvOvwA1VjPo25YiHP
0d0rWY/0p06meed1kkm9/F+JWI5zH+CbRrB1cT5XwpsVo6EtETZmi+DYHskZ/4of/n9PT+r70C0Xu7CV

sZpTUvpMvSfYj/eVwqoSuq1ikK978kkaXbyHc7zvId6ZQ4+5pSfZ9/5+HDDNgxBrPXIM0pwZdE0fBnt4

X6eV7Lc5I/jduxWC+U43KeL1C3YoSd0x6KC+VrBjJv
x7Vi74s2YO7AYdxNWXbM2UHaD2Gj1ymPVDEpLNyaEr4+Sz5kVweURy4mliQ7HHX+5I6l5AN1WQ5+h3Qv

xnOQfmfqNFO2/kTGnkGn/V/H/1sd+T/DmZSPmxld46FI4FCdFdHlS/+3OJPu/h/r8p/R0al6+n8bN/W8

CWt/oAOoBO8OFh3h/PfbxOp2ofko/rcikdb18/E0u8
SMp+Ym99B5b3KpwjJyxmH929+TgDjoL5eps6FwsL4u74vEgm5wdpiKJ8DTPx0E3L+CJQaI9KCy3Yz/3v

RpLKtuhrtXc5DqIpZaTqKzRkSa2P7t27fsKu5XvVqkkCwlHOQQu8UF4IdZ6il4L2UMgRuqBa4PciP/Krishnamurthy

+wALYOUOoqJwClfjVnHqEghvltx8RwxFEn3Zn9PqtK
LYmkV2yzGDi6ykxvddTijfP1yLjtSxOm3sjB+RL00CqdOcBtU13Oe7AVNo4pbLZi7KS+flkXwqHY85Pa

FW4uFitpBT4Md6TxuVPUiQVziH8y1a2zuwPMM7aEk3L5xJvVfN6A55oibg9lFKQkk2i+ad2C5hrUQnfl

rEYBCHRTgN8O09cSYHlWOeg+1URafxE/2Ml5B2rwEA
iYpxc7Z429vsRD5YS4zDOyzgya5WrAa0CK4TYUg5j3Ml0Z9rh/OrHaJ4bknpmqz7Wqt4IqqrV0e9uRdH

9N1Qqhs4qi56fZNiF54sa2PDtsjvpUmGD8/lbEdH2/Qjt++wMF7K0n3QTEnhJsXEr02TZ9AORMkL10Yo

YE4zaq45US3beS2ppduMZgqu0l/zlbyLRNMHw+X+vT
09czMObvTJ1fRU/nL/f1rXMqBacpYlcy/LWdkr4/Z9XjbogINEpxOJV5A86/I5cRuWCMy2C+A6GVQr3p

WTzRnugb5d6kTCZsV/RtYzN9lSLdmJbn33m7ofCaAMdXcvttKRCmxw2qgK0Vm4czmxyJRpWw72fTlzQq

HDyuQlH5yjqdsgEVNoUh2yWNCGICB+TwuRumyajk57
sEPueGc/j94ljgI6U/Em0R+auanVPkaRgPCMUXw7PUPmzNmYck8Vw7cCIYVXkTEO9utOQ7MUXgUfOR9O

JnTXOCf6W7vODdzCEhPlp/Bekv+F6Zomv9wep+mr4+YNx0MeCeDOOe7XMjHICVRK/QN2h6bzl7KYYsTH

uHWcZGeCoFmE/3ITGDpVdcUroYsKmqAq74UviXAUq/
8zAMPHxq5OeazIC+zrFTMSgd+QpE4tUnLgVlhAdsTtpSpVMI27RwO3/Tjp/L93Pt+Ur00xUtaxW/QTt+

xdpLtPR5ku7d74i3NDjC/3RhY1eYiwsX2BM/611F2aM09bvdYFdYwwjAm+hxFbb4s4x+/wLpuUiR+zpe

pHVsxEwj/mcj3/0I9H+5FjTvE4iHOrPT44aRTvs77x
p0r2Zeirm2KPbBRrX129DpSDNa3eD07ViE8SghOSizIa141+3NPc5XLftD+Wayq8uPheaU5gOIq2f0yN

KobmLk1oFYS/6OiP6O/CugYply7mr0Uqd1gYt8ledSAqD5liPmo48fkScWfLL/8LidoJB90OGljY1NeJ

HLBWS9H69aC+kaQMdC8J1s6FdUPZ5+Uyc5cJQZ6nDS
heJEO+DZVFEdYxDx6sne/lB9AkkfOCzIcKWcXptPTViVnhKjKHI7ZJwJ26LrDkiuKsey+zJYNsltsjnE

V1RDdy6K9o8i2fmGNmzfXbl+8WAev/W9defB2KV+0dv6UhH6WtY6CVx82pQLOam8QIvleIm9eBFS9bPu

E/WDXUcQ0xYoHSia4mbdmQy32D6sjOVyWGD2djBOKF
M+v2y1Ady9+4Ad4gglx+6j3i0Kutb/Zp7m5muN+UWNHDd1ExsGvwxEwgPoEMcyY7WyJHmqNg33vG1VAL

61rC4O9H1RoTyFqB6WIZE2GAiT2/qgIumsbc8bP3Kr++60/7DrWq7wpAXn+s8CSwqiNWRdGj4DrwwSwS

b6Vp4v3hEI43mQ3gNEhbZA+60LcPmtcSi8c2w9ThcQ
OKhDjYAS853Xcm6Dmc2WH+iAkH2rBo5TUNyZZd4nX8vvPwv2Ip4q6nRcb7u6RNAmCp6AXgmpMSz4Scpf

D2jdW0VQJGVtrm3Evfa044eSal+sBRrciqLkYdCAh3Lb1BvcCv0bj4jU5I+VAAvBVhLToI3Yt3yaQ/pE

99r4a30c+xk8D+7i3FyCZevZjahfJ2n/MPI/l7JW85
DM1krWJh2I1bpRvw/Qqk738w+hZ6T/0XwmoFH4S9WcRo85yl/MBVXm5xkHgU2vju/ROFawJ2dPNr/vv1

6n3DYk+bVJCgnlW9mnhsksJ/m58yz/sx4O34aIId02PjCXRGezSWgjHja5EswWsatDj/0ry5iPhytYFN

9z/kBfk+ukyk/0n8WGjbSPu49f+30E3+enszn/Bskz
CP4RYsfNaWI5B1O0COtWRGzim6Du086Ggy56EKBJFNq1DyV6lhcL9QDSGjh1jDRd4SEM2s/Tof2T4/Tn

6zNxh9Y6OQncfUac1eL6/50J3nOhfMjuIn7P4hH/FmhlENRvDNxmQtMIZr+b/Tp5K50eQ/pso3QvIej1

yr1ayuQ/dlz+r82651P7CmNDMVIT4Q2cqD0C1lP4jp
5+kmGvXNcTmV39TzOyYiRwQdDkVwjbASCoPT/W1NmWg5/8hA/uQN+ZQRUN0gwUpiqvMaUyFC180JI4k+

xwtAnYp4fP7w/92G4dw47X9+J5jgeAQWCraTCkQPqkq27ImRB1U/O8u/I1hWwecahX7/3l9PMMiiVe42

hBUTsvvej3cJ6ArKMzT+Pp9AYECD92LFhOhKzl0tQ2
G+oiTD4wsTK6hzq4pEQLkLpZx2qIae4QdmtaUQ0c2+ou1SPxm/d4myfBSNLhoAllW5+UHdKDUgmE8mt9

H+3P2u2JR5MBtWHVEslc3WnmobH4DTd5+ERsWst7c7eqK3e7lgL4iD9Ybg/i8Ku2jMgB7d/Mnzf9+1K+

rbBN1lneWeqC/yE7alPDr5MgHmCFrjaOoZGOU1Jo1h
HaW+JapMMNfI+3EEgqQ9JHv+TpqIb9d/pZ9qwL3HZZ+KvJDJIl5fNWU44OOnw6UYLMOpjBIUKHd3OUGv

9exrQfDNyug+8gd7q5QVnidHnIu+c7wJ/JsoT66hub26XoHKvJ+HI2pKX8FNHLW3av/ZvxuT26/07rCp

Ml1fLlAEhmlOjLVBqFJMubnOQb5SvD5f25HgKNv0WW
Y+cB6XUvyhVyT2uo7kiG8iWoeglbXgn9NSTf7FXhjnRdThkLkndZExtz+pIz0GsP1JH0VtzJjtiRrzSX

pGnB2YigB2t5ZGwDJDv7gVfIXWpGMRaHSTi3lwkROwV/l5fGzN3M/10r8UPvw05kFx+mdyUw28kU6Qec

l6DUh45d5hP3/ZX9GQFkSbc65KPhHufk/gXA96C1vK
c0mg/jGAGw521L1og38c5EKoSc04giayXygL7L5k6kPVPzk80k+4BefPthu1I0e2fV3v/WEP6/3t8S0v

ahfm6/5FoyL382FqSd3z1lSuV18QtRspTlJF0462Mg/VNfDcLZf9Vq0fXv8J8fcNhauxmtioOJjGz9qN

Z1027Q4Vp7q9VJqn1ZCkxpgJ4ji/MrF4hng+mv0MuD
DJRNA/N0je3M65hMiL1I6iiMZtSA/FePXeanMgu3Uuu1Rq/mDMOaseyibzlhlfd9Fw4gEy9E6m47po0U

q1Ap/DkYPVbVP9bJ9IEErgvDbIwfkEMswGTZQVQv3T9n0NP1xEf4WUBpDsyIIQ+C0DlkM19o0QfLsmmL

4qTlaIUx8xZqQWOl2lRGO414tgQSfsV72GFoP3giwO
VIDPPfo+j+s3hvUW9M5ww0mlRzpnltv891JkmDiQoG0xpA9ga4Y2rSTchSvsM59LdthR1SRTjAFz/G/Z

KqyD2ikE7767ul8+IZK0Ie3hD5r3I4q5maLY1mJnpkaCkVEtHJ6WQ63+hdl6kP877O04QDj/d1XxlEhQ

wXfpYMTt5l1kjXvmpvbzW2ie4UN2JY7ZavRiwIcC7/
PsW/iFADx2D5On/jf4bFFN2m4uwe1n7kO59H7Vg0rjqj0w4+TtX+F/ljCMYZ4h4gF+lVFsaY07K/vQxW

s0uOZevc9zqEYDpdZQqEburh8T8JlseS07x8Hdi+vR3QUQBWPzFEP+dwOb+R683FXO8aI2qLGvkQB8F7

T8xYfy7FV38S70IVKVoe93IUNBUVM71/i3cuCWl4bN
fOz/mBn27ZldMGMNm+T8uwpj3BNKsRM1qcTmFcGd/Qdh4pXFEh2DqpRtFDpJT/tzdHi67zqq8zV0UOix

fz0z0+xiqaFqhW0xfn4T3Mos7IKTZAkHG1HVcuT6JvqQOHM+Amanda+pwmgoZc8rfM+/PM+nlGQ7nsscD9x

bUvabSSreGdtXqPwcckvQzxRipF9AyuWIp4TKpppL6
Pked+GBx4z30KfgH8561TYNljcncZDyx7V55w8eNStq1EzXiKU67LA9d8Faan9Z0jTuyW+uh0Yf5c9K3

BvEOtqk/LdV89KO+LyL4MQiyGu7238FR9JvgeB367YS8+LOnip8Mb+/LOschCewDGhM+FgYPiL/GuilM

2759zwnrVD2xQESQWBN0yAhP3m1+tQk2zMDVy5vaoP
25UAxNYTdvF8xzCsNZxK/k0G6SMywIj1NoOv56AjdmPwoKmYsJ1Ry6rC+VY+ZJirCk05JONAl5ZRn16I

T2Cr19/JcAAUMkRQSP2w+veimI3sxZ/R5vv/FeybbT+YWipSoThsgwujg0GFzCGLvjgGdUukgcAuUIdN

iXYaiymY+UOniCjEoO4tJ/e9WCRkc320JlCNFXNbNX
NJ/P4/8UNN//G4gMPJJvFo1NXY8wqEF6WGrqh4xBEJ8cXztlgX5Nt+tNwcIINfCgQPS80kQxm1ZpBAyU

RZpMwTOgkPyB78z2H+08CknfLKXeMNQ79eHI6Sd9X4pVBMd6NvfxCfSAKY5sSkD8SVAqj7ZW682oSarl

Pu9wUMvQHTmC3+K4o1FvuPLFqvjCcD+dyQZOt/VMGz
Y3D4dCE1M6dphZx7N746+uejl4fXuXutWeXZrh427a0rMcCA7iDXp5v6Cn9uY5QNlaQcPi9I5gEM5AI7

DavoPvRXUF+vozZJLSn1sia/M/Pff/e350aRBxYfTo1vboK07fOBbs6K+MHOB5lmBuN3/vNFQV7UCpQs

NExige10KP2UeoVq5mu4XnkWpHBxXDFsYW9po9zR2V
7HArn5Y+ms/RTuM8x0dV/f2PIZA29jPbaYsD7XlzW92jDAp9k5x+5Qahg3pa03gdoGXqhCel3fVyt9W3

xBG1dwj4MaYv1TIn7u5dIH/vPVEBY6CF8DnuULHx2Xk7o+lflOINEz3aU6TPBVxd0L7NCsNt2/Qn5y21

Q1EhdRq/d/kA+jf4mD2UclpupveLtbHj/EpMty1xYS
AGMQO6GClxSBxYgzKimO8Dn+Sv5lf4RdqTFwv5MQZquEQwRJGaYM9+HOaTz5Vi/QHpNn2/UMmDWcF857

2nW1llUKdq6K24aLj4F6cGgixl91Vus/rbI/wapENrh0u/P7GAe058TgVfDofSV/3nrzBN7OF40VZfiN

bJKLXHyGugXldUenlByEODxoSIpqY4Gmz1Dk3Nf1zv
rAN4KiL3rK6NqrFOZhnnZ1elSg1UPHmJJMatV8p/7BjHKtX+xVoRgLbXet0adMykaCDAMOMXYlO6TsS0

XsC29ck6TjxKJTB4C9rfT6/oJd48AzEN7Ahy6i8lzhFkNrNqvJTCOnw7wHdgXoL3ZsiWDaP3CS8wx+1y

Ouw2K/+/nyYXBtgpOOPbdBbXqsJTrMIGt56D3XWSUA
GbfJXaDTQCEdtdapgZII8xmrlCYRuopNJynpmRSPAqH6uZyp2dJweXLYqnT1ss7qmno4gC6LfEK3VQyr

2k3WkhVa6TMikt9kWOzTJvVSPoFVgnlCxB0Stj7oe/BBy9bU4SYrib9sCr72TogkowRrNgpMIPF/RSKm

DZsqRfR7JATWgpeoRWhcwVYjAs3mYdrVUgAtDCJdOO
AGoSy0TD89yD2S+IzlyvCK5AqhpjGqno9HWVACxudv/se+/9Lk0bS8/W5kXkT0AprMuo54H1f73qITIn

WsyQVMyab2Tcf3YGtQgyWYUZSsVMAP5pofuNiSZcvHXLd95jiWKys3rXS1fR3JD0hG8e/1y+sFlIII4v

tmVH8ZcrlqaRTBnIM+KqrgLtVnNkzNs80GOsRXkhJ4
RzS8H3V32Ow9e7+DRBvrDV8aqgoh4fVBumaKXGp/8q6tq5CJfG3xlK4mZes+ldFpLfh2M8047hv9ZzLF

F5FvpxQw1f39UpmMexDM/lO+RaLIlgKHRA955rbxRg//CfKWkISN2x1Df5H1q0eMU3rHK4RfnDvoBTwU

Tit4GnEfu/WNHe95OQc9peQepUciW+zKwhI+I3qSCQ
zKyW7qGxF6h57NDAGgoU7OnX5lZdkIY8rC0WdHRKgkim7nuZrFqSJFEP/3g+envtvqfO1SRsT+tkWGqE

Q0dhNtD9ZJaRxZbpJdXeYWTJD0MsU4YjDUdBi1UMcox3ieScyeNqVCLOA+QbyKoA5wlcBRLl+qtaY3qD

ggAZIhrU5xsY3Y7HTndDBhM/gu8YmKjmI2L7okU5gc
upzHC0V4CO42u/7BTAlFejU34bRkTxXMPesZp1X9wHoCmvUyyY4daVYGc+DtMFuidUUW/HkxmqSqUxWB

6y3DHgIbfQ+Psm4kYEani8sOLWnkAZySv+mZtxSm4zShCkyyszBMuXrP0nADqn5e6cTPVECr5zZrFe7c

bKmkIPo2SnmKNxjzlYXgD/YePNC0P9rip9lgdgTyCw
oBOizlldhKIVn/hpzJnh8D9e9jNc74RKpepROl/kDF4IPMv9PhH7uSoyALhY4kGyhyZc0flmLjhXau8l

aZhuuBpEVwjz+0qMu9G3tSWbaeLwYN9TKaigZhL76b8I4MDsCjimy1MTkL3o8HfNhM4mo9CrZgTxUpLq

gU9TT9Bq9pY8akqq14nnZ5ILOutHr4wCXSjhaCn8JT
+79ATcopDMFC6bewTDPIqTi2B1AdMj5YPUth/ze05EY+Idvu+QqhVsW10EMoi0PdoV4NPXsqnQd9oRSx

B2cIBfdf4iqvZLXnTs5VTtctI15OMJxl5KzW9J7tBhUm9b21peHJy3pvIszaY35VVn1l+9sWmnYktsco

kToIye5uCSEv/xIwVMYbLTKbR3Kl4zu5JxtoycKNn4
8r8jE1+vAn7yarkFZKVWc77uy8w/AMzkAC6+maQO1uvdCPDvB4F0FtO4rJrqKznjQQGIKDm/ASz/CVC6

B/D+R8coCQK9/hzgwxdvDlU+foPp6V+Y4gliIUyqjzWhuYCuWT1OAgGfEV5fjc5YTmPrGKDlMbjVSiXk

BHkjXdfmpXIpDR0XeTI8ZQGoR57wZMJeMBAmE0HbQO
M3Nj4+DAduUYG0uvYshL1IAMV87NziamVGPuW1vJsCZWaWpfCQJiFTBvqI7YXFE0bEDLMByiEgWZ7vMX

OSeNpqY/bMDwhsLeDCITyi875rOxd+Wuu5ONTaWDkh4i3HTXA2r2g24YydUUsOyhng084oyg/DOAk18W

tnT85fsc3uFbY7zh8W3PjRn8Yui2ho1hyWTM1LuvB2
6s7Z93M5e9bKLSTONGJI+vZTr9q/1PIybA8cr2llB2+ZVy8y66F9VjryRFsgqdvl4oD1Sqr+sWI8QCI5

kdkJmPvGkhsH0MaupFsyJJjdIHDqWGcQNYGFdcmjPdzmiY06VmslA8xZpTURYHUoXzyRgPtBxcSqGegr

6ruaG6nrnEl6BxGeJWMLzrCqiUfgFKhAHwoafJBbJ3
qTryr7jATW02ffRASPWLQeTbKPByhgWJClzOaTnlQJUDJOKJiN6Pl2VI9hwFZXlJqcsbkpY+Lx3E/6/N

V31nLRtT/l1eC695cyjbWWt3P6xaHV9bpOmHbcEJ6PGT4wv1NdEIQeAAxlxbOrFpmJOnGyPXIdh0WjKC

k9BF0KUFCrMWoaMG2Sr534NMBdZ2IfxBFklc2OSMIr
Zg6pqE4vgALyCCMIVXCGC1IifNNdCUmiDQ0Pu2OjevPfEYX6E6AtiJjgjCyebOH1OPMaMOGjX4MdgYTv

OzZpLYlqRE8OrIBihmUaNa8IQLNfKx4fqQWOn6gpPbMzEPJeXnXzCRXrQQatKR9UVbZcM6h2ZWpwZ8Gf

Q7efGNSyM5DjjJXdXY8DQFPaRd8spIZltANaEKStCR
FtZp8ZKz5BUlTmNU8lqh3FHrOqGYVkYphVCbc9XPsuOV0XkTKnG3GailVtZ0DscPdvUB0UVZRNx129OG

sqCKLzBpCoPVIcfvIuMWNWWXDNV4YssEWvK4DTHXOmM5zMSGQuZ0caBZ00cPY9VCmhUN4PIJGNcIQ8QS

8BmtEoMNt9G5XwZ0vbpKG7ZNlTPP6eLFtvP3PdSMHd
rtxfMNtzBL06zWB2BVSyP7QnaAkwfJFfbRUoHu7eFRdoKX1Me764ORLsP5UqxNHwnoDrJzMsEDOUOdUb

C7YRCWZqNGLjX7nnIIy8DBBkFVGbIpW7MZ2aGDVxYfF7PV0rKFeyVwK3BL2cSTytLyU0BA4yQTPnHqZp

PT5aIcpaYdC2Io4vZxSmHsUyPT4nJxKtQjL2Cv3bAh
UtDkC7Yq1iTcShKxW7GW0zWpGzLyE6Uy3uQnCzSrS7Vy2uTsTtEdNfDn6rPqyxEwIxOh3kNdicYiNdRS

9gRjzgUmZbHx5tFMOvPkxrT85kOCHxItW9Sg1dRRFmQsM4Qs6qCGSqRvU0Co0sCLQkBpSyL58uPAQrEa

MhAQ2eHOhhBcY5KO8sKRiyXvL0Lw3jCGUrPtlbGn7w
BLWyCkWbND4vJXBpWcAkCQ6yZLQ2AAadEGRhCHB0ZnGqMaVkHZQdHBv+Cw6FMT7ko4SxHEqfWOGxVA8k

li1OUXzRQmWhL4D7mFEeVk5rgT5DnVO6yBLyG8A0uYTcR9Xql1OWm730H2AQBXQCEqlYzgoepS4DleXa

CXlpRe6ASQZsoCg1gF9ALTeaPW8ATWDbAX4uNQ43Um
7dnBWwDsZbLFFvSj0LGmOsA6RjFX5aC93mCNQsZNUhOKYMCb1+DAvfjvUpNdjFQuU5DEXdi0CqCFygGI

a1MAZ3VRYzJjo2OLY4DWPiVNGmVOF6CAW6PmE6QGlxZpW3VCB4YGScOxDyUpCwITL4EBW6TBNjKdr0SO

UlQgKkRatoWzc6RQF6PlG3GZMpRMB9MSE6GoU7QUKf
EBL9NVA3HnV0RIEqZGT9PSH5FqBkUglxKwy4RIU3QHFDQqW6VTR9QVSnQCW1GGUoUETlLdR1ARY3XgC4

EnOwKsTeRUH3EoU6PDRsUjy1CLldCwCnZaauRKJxQXC9RhB9RDNcACZkKPeeLwI8XdkcUqV9FEl9BVN8

UtXzSuB3WAXxMLV3TtZsFaL6BGm9XGG6VrtzYnZ3UU
XfSYXAGdG5NMGxGbcrHib0RAE8LYM5RBEeBvCvRHE2BvM4DIGmPZCyGDX0AwL6FBQbNii2XSR7QeQ1BE

DrJpLmWIWhJqI7RKKaSbBpUBxnHoX0ITOaEDU8SNP0McK1MMWcAkDzCXZwEWUhQkuyNLA5XYNsUPW6Kt

ZqPXFqLF6BTTBbNIUeNQIbJeNwXdPcVJUhOHF4NXRd
SuIdKZz8JVC0RZDvDtCkICq8NLV8DXHtHnWnQTi6LDL3MRMlSePnGJd3DGD0KCDzStLbJSf4HMH6LAAf

SwFeTQa3WFK2IJHqVcAsFJq3JUM9POOuGzGrHXi4VLA0LKZgLHr7JPUrNrPpRGf3CFQ4YBWxYtRaQFy3

QHK9IUNsOpOjJOd1PIWaVnzxWyEwTFD3YjF9VCDuMU
L9USI6LyWzQeJmSSS2UAYnApM3BsxrTdjvJFC8DgM1NROnJzOnUMnaAtW2RMEwCXMmSMW6JZTeJvCyVr

NaCKPmOtNLXbN3TsM1IeixOzKdRQOjIgQyOzNvHhI5CGE9AeM1EkBkEQK6SVwzUXQ7XEKlWjP1EKB3Gg

E3SiglSkQ3LLF5DdL5WbnxFBQiFHE8OpPsUkY4HWB3
ReP7KyxlMuQ9YPT7SRBoTsktRly3NPZ9MSG4PtZoDuQyGSx1BMUKPcp9NNB7CgcdChy2EWv1UJP8ITIk

Kmq6IMxlJlX3NaHzEbSfJIidWiA7OzfcQvF4MOLuAAC2YMUfTEV6QMA7KwC4JJWgAOL9SUZ5StQ5HBbj

QPCbTEK2XeZ0TGCoJZJ7NYG0QqVvGfozIee2EDO3OF
JhWmfqBWA4IP3ZNHO8KKY6NaZ4TUIkJIG0SPJ9WyZ4AOBhGBT3NIMsLAY7XJHbDYA1MVY9KbY9TVPdVZ

WqHLN4GgH4QOGiFOPOUtXjRH1bwf0YIcRaXGLdXqeTSjl4AZiyVW5HiALjG8DvdfOARTMswytzpW8kVC

skBM4Rt586JpYwOD3JjkhyiOrBpVBsmLASVdQkD7Hk
I9YctDJ4ZMOvM5CsKMKbH0k9PWavRN7JAIKtMW98XH9tMWVTQwAdV9PgLCelOYl6MKctTV9Pb746GvXv

uFElKOAeRnNkPJPkVRCpQAT2AG4RMJFpXCXswVhgIC2vzCIlDJ2HyMCbLzYxQUb+Ix2VFN5ef5TrVQxp

KxGoME9jrq7JJBaREmGiP2E6wXFtAc6frY4GsMH6bO
KvV8PtxPGAaLBkG1Kca1IMn672U4ZgaVAlXLj9VSwvEy2UxgNoIAcsVd5FvZ3WtfFkEB2sq7FeyqrUIv

LeG1XhfnN0I2bfovSsCA9WIEJ6M1lgnxZmLVOCTaBbQ2vnMEKqdwStOJYyNZGJCcDqL9FwfhOXNVUmbx

ncqA7rESN0GRWqTd1NEr5ORsJoNC6wxr1NJjxkPHGe
WmgTBqrsTYuefgSaUahLHfLwENUfIsgBLiu8PKcpIU2Ndg9lF8Q4PSvxAPKBB9DjjVUsUD3xL6GJJ9hy

ROcsCh5CSsDoT0TtouWxYTgfN2FcYGP3HIIoYb5USXTlMI2NYSEnDMKwEWRQCpQvVYYnIfVpXsGgCRRV

RNnnSFHoC1TcANW3BEBnXy1+IVvaHB3QQ9DvITW1ZZ
w8ID4+YParSJ5QkSPQW2BvaQPqCBcmU2RRRM1FDCG5AF0SbOMiTM4AkJODI3QsqMJrTn5bTFYyy7VxYt

6eP9HNYAHHAXCmPVhbWZsoQVIaBCi2N6X3YRXuY7EQX078gHQfsFl2Pe8hF6FKZJnILmViICcdFYbmRI

HeLEh5E8P7FVCmS4CLV8KjTyZoqyEhE5Q+PiAvUEFU
GY0CAPMKIZj9A3E1hNIbR3G0nFrQsTZ6FP2CCJ3UqUVcjXZjj43+EqJGCsZmY8OKC9OOKE4CUTc9S4G2

fYUgP9Y5sZgAjUK9OV9AOD4BbTdcuNIlBe6nQYirVNZ+Bj5SEb4TIuYmSZ8pha7DRjakVBFmBdhGZlk0

S4jeien8zFUpInY2E6Y5GuP6sCNwJH6ZC0F2jRQuJA
D9RONczUG+Wd0My6GfYLGxUPf9J8agNMItAPIvDvTolQ83C++6nqnzcJC8R5e5FOIXnKVkbFcVqoBpK4

pOMTF4n2G1DCu/Zz2NAKN2bKr1jBMkEEBdPZb2qK8juSa9FgAuGR76GOQzOJdqqA0qGoy0U0Rzr9NbNs

3pIl6vmZYyAo9GIcBzAGY6ifNkErEFEmU7hKfpqopd
PJW5T2n5oJQ4Gz39y7zrtbRom6AhStV9TQerEDOnWtDbtnVqYID1ryJhiT7fipFmId1EQOIkAUllzvMh

XlXQVs4ERqHoGU86TcgbpJ6xcER+DQogICAgICAgICAgICAgICAgICAgICAgICAgICAgICAgICAgICAg

ICAgICAgICAgICAgICAgICAgICAgICAgICAgICAgIC
AgICAgICAgICAgICAgICAgICAgICAgICAgICAgICAgDQogICAgICAgICAgICAgICAgICAgICAgICAgIC

AgICAgICAgICAgICAgICAgICAgICAgICAgICAgICAgICAgICAgICAgICAgICAgICAgICAgICAgICAgIC

AgICAgICAgICAgICAgDQogICAgICAgICAgICAgICAg
ICAgICAgICAgICAgICAgICAgICAgICAgICAgICAgICAgICAgICAgICAgICAgICAgICAgICAgICAgICAg

ICAgICAgICAgICAgICAgICAgICAgICAgDQogICAgICAgICAgICAgICAgICAgICAgICAgICAgICAgICAg

ICAgICAgICAgICAgICAgICAgICAgICAgICAgICAgIC
AgICAgICAgICAgICAgICAgICAgICAgICAgICAgICAgICAgDQogICAgICAgICAgICAgICAgICAgICAgIC

AgICAgICAgICAgICAgICAgICAgICAgICAgICAgICAgICAgICAgICAgICAgICAgICAgICAgICAgICAgIC

AgICAgICAgICAgICAgICAgDQogICAgICAgICAgICAg
ICAgICAgICAgICAgICAgICAgICAgICAgICAgICAgICAgICAgICAgICAgICAgICAgICAgICAgICAgICAg

ICAgICAgICAgICAgICAgICAgICAgICAgICAgDQogICAgICAgICAgICAgICAgICAgICAgICAgICAgICAg

ICAgICAgICAgICAgICAgICAgICAgICAgICAgICAgIC
AgICAgICAgICAgICAgICAgICAgICAgICAgICAgICAgICAgICAgDQogICAgICAgICAgICAgICAgICAgIC

AgICAgICAgICAgICAgICAgICAgICAgICAgICAgICAgICAgICAgICAgICAgICAgICAgICAgICAgICAgIC

AgICAgICAgICAgICAgICAgICAgDQogICAgICAgICAg
ICAgICAgICAgICAgICAgICAgICAgICAgICAgICAgICAgICAgICAgICAgICAgICAgICAgICAgICAgICAg

ICAgICAgICAgICAgICAgICAgICAgICAgICAgICAgDQogICAgICAgICAgICAgICAgICAgICAgICAgICAg

ICAgICAgICAgICAgICAgICAgICAgICAgICAgICAgIC
NgQKXfQZMgAAEmYMJlWJVhQGOzASSbISRtZBYbXCSeATGaBYHjGLRdVHg7W8eoMICjTIXvHZ9cVRj7Oz

8+AXrPGkHcRQA0trCixN3DIK4jy9TgFPcrFCUoy4CxJXq5IS5IKZCfETuxWN6XMLwrqm7CPMVmWWJpjY

KAj5uyWrArJUI6GEHvIlnzDW3GCSAeL2itshAvQSGh
WDRRTEsrIJWXJNzbWXUYCGFsHSAvFvKoReZbQRMgVR9LFSNbG676lmZhVX7IIt5QMrAiGL5pbu0WDqBw

ESYuAavGAji0ZUgyXF5YmMPheQFhWSYpWHVSCdPoJ2ipb3MtYfRmRCGUNJszGU7Wx6VmrPDzTMx+Pg0K

CL2vs4SuVBdsOKEcSD5fcd2QMEdWLtJaX3YkzQezLP
eiFJZboMKVHTJzbQOvVRmhd5LqdwbfPBXxVGPzXT5fLaBfIeGyGEj3VpBjIP6iUYvzFJ3ZCZW7ZDcrNT

RgRDSoH1hFCySjMCUtJJYbwMcxYY0HXsZzO1KlcdOjnZOyPWKcQKEDRv2+DQplbmRvYmoNCjMyIDAgb2

PsPXm4OH9QNGPwSNtlHC9YDIJdcC3zQJwoQQ8CSbCl
IIGdPBOLPwUdT75jsOAzGFl4C7HmBbAeQELfHlzrALEeAKpjNcBeZSBhByVmLFloCW8+ID4+WMxhXN2Y

YMpikxLiFTQvIq1AJTHlTYIzKG4fUBZsVVDsG4W3uNhyFXCZNeJbJ7dqovxpIH5kNNEsM743bTldinHc

LXUhVXJxTs4EBVXeIRT7ETQywIJkZnevXQHPFKnwVD
4DnGRyPYY9vI0tLBiwGOIaOQXsI6dGCxAlyBmhSR24qSdmyyOpwZSxGEk+Qa4HLH6ky7WxBFc6gkXmGU

fyJQTwAHrlUMSwIDJkEFTmRTI4GBL4ZKLGNkNfZKXtSIXcNHqyONViZWDuoc5ZICVqYHG2MZabFMUhGV

FxZSEbLLvuXNIeINDqMNE8LUCpFLQeMF5XIwVcGJRf
ZSHhVUfnSYDsLDXhnt1BCLOcZIBuXrJwYoApKPYkECWtYGrmJHMwMIDiENT3FVJlEOZsYR3MFnPlKZDw

YFMkDEGuITPsICUmue6YESLrPXVvDiQ6BKKqHVHzFUGzBGceADZhJTI5FWP3HTIrWHSaGS0CVoVjAZBv

XKo3HZWuMBPaXIVljt4IOHTrHJZoDlm6GIIvFENlCE
SdFYqnXXGeNLTbHVg9AJPaSTBzZS4UBrLeXYZyLAArDOHvMVSnLESjrj6GUXYgFRBqCNX8SVKzBMHpLD

YmPAmbAJXtIJL8FLBiEFHtJFXkNC6RQoDbBDVmQUM8ZPnfDCMxIAXvot0GZZIuBPCtImimLNWiZQXiVT

AsKNbwTSOrLYY4Tyg1GZMnQTVnHP4AZeLtRZYxUEa4
EYAmBSTmFJNohm6EGHBcQYZgHLR5KBRcMVGbPAQnKLnoAXGpTQV0UCm2YOYzVWLmWC9LRqBiOOLfSli1

WPOiFKFzEKXmre3ACGAcUHOyVMx9XAHoJAVfEODuEPdaCDJbZCDaBdOuQKKaDRJsKJ2RNzVwYXYcGQF6

WLbaNLQrBOSyjz8BBPMvYQW5RRO5RzYpJQAmFBFiCM
cdJIIoVCUwLqOrLJWoVAZlRH6JZrEtMMRbGYJ9LZqgEKQxKNYcox9VPUVoAIJ6BxZfJZAxJJWiZCSpHK

utTSMrOJCmMle3YBOiLLQlJX3PHfCaYENyVDW6MrDuIOVgBNArpm5LCYRiMKW4Pzv8ZiJuMRZlAJNgSP

o2fzCukCEqIWv9SC3VZ7YykoSlKmDTAy2Ae406WGTp
BZKsMv7JU6tvFd7qCIRlUBHPMz8AUCn2TiY8JfSmSRM6UGX4VoTlQNA4JnY0PLClBqVfKxE1HMF+IDxm

KgnxPMG1Snz5YvB0BJS2FlchTUtlHkW8XOCsQwbsWe7oEKNGHc0+NIfomHXiwNlfWQFUDdU7XCLsPQln

KCPPSi4F









                    ID                  Date                Data Source

 

                    [id number]           2021 08:59:15 AM EDT [facility]









          Name      Value     Range     Interpretation Code Description Data [facility](s) Supporting 

Document(s)

 

          History and Physical                                         [facility] 

IQEAOr5sErCGAhUi48/LNProGUEge6MgIVxkTOr8FUndHRSyD7GqCCU5qT6sRUM8GOhXAyIoHePiMUFh

lbm
VbDghXCuEdYXGtHnnDKtJeORovLiyomKRyNM8GgWH5XLUiP45sQITuPTBfT5GsFCRlYJS+Ue9AYIGcvM

CdMI3WAfoV7A5zi9hCKX7e5W1iMSLRy2qewxXn3a7FEZuiz7FWzfZhYFD6hZ2gJpJMsiHx16/Fg8bmsa

KPUNYixqYd3b75tI+OASt9vi8rORnW//6PQlJvhZ0u
/l/9hFRF6mivc30n15rcRA89/sQkLR4XUlnt5up3S3a/v+OqsC7gnvtzvnx5+fMSUdQN2jXrtTwsw1PV

rZDkggAJP6XiD7pZQ1HnLXDqouUYeUzCtpKOHgKcLIM6kiO1kUVZbR4fNnJT3U1fBobyNBPGKWi92QmC

J8eRCzl4+GNOWN8XNnfJi0HbKPFazaLWkLFuO2ZbHZ
/8UNInI/fzCvH4Vxo+wsR1/ZBnz7a7Z+NSXapNGTy5rap65zbNdz2G2d/K+LYNKfthT6cGLSUeq5i74H

q5PhuUyp+Js93g3IRD0HPDBZlB6890gE1EpnteCB5RYzKjdGamUHppQLlGZpy1Xd6n4YlEGVd/LcvSlh

0KncHKF4eVYUaw11XKMOz9TE2xNwWUdgHpf5fPqC3h
WEFUO22eDcUZYfpBaOr1KrjSrrYRUcNqTBXp35YxYpbggVorAdtNzgqzT207O4xbubcss8J7yCZa4G9u

Ikuct/nJ9/EVblkKd0gxqPtpIDnyOwV7YOqhnPNPVjvVcJiDbx9Y6q3WO71WcobTOXeG1jdOrahBVn4A

Z+cXg/cnF/nrlV85wMsBlTnLNJWdKIrrvVV8WeY5IH
YZU4zdA1wdD51vqMuc5Ik+QWx7pXyMgIJIluf/UEt1NXoYepJWHIH9O1+h4W6DnbW0Jx7NwdG20iAlNp

+NvJ6CCPdC/oTcCWd7mJe3vKFkUK2IweSqdnIHlARqQOL4MXPoNKO7t3lCCzUxw295fBJpBzawSz1A3c

lHMme5k/E3SBN4OmeRW0ad8BlB4OusVp5Apj18Ni+W
hfNEffLKQ4qa3lLSmNkV+w9b2bw4f0LLQPooyrPTEAhYtcBvHZNQ4PduwXHBF5FGfRDa0zRnPZ7OSJxy

Es7O28jeoZhEQ6FjZvz1dv1pH2/2jL4rICLJl+W9i4CgwoVtWopVfIqKFbWTbMFmU1QZQFDQfWgi+Ug1

Koz7kWIKWJZZmDfxnuChzXbFyhvd4RK0UUQNGtCHOT
+OtCN4jZrqYN0BBNBm8FE3rXaLwKPKI0aUmcD49KpLTXRI9VfN3Oa6DU+EhX6ixZAc7t/G9+lz/MjO5o

tktz1mb20eJFQMijPWXIJgE6P8vvLREkm9CLsVr2hRRyLyi9bon/7xV8+7FX/1Ddw92X3/oeABRgcbPX

9NbEIkUlyvhVw/Pi/JbyR61Ls+gPuijuVCT/OZZGwV
9tpTa/qegDFhHBeuD5cnpKa0pMsOF5Q508eDTgkT0xmgKYFzFeUvHIPftpTYWNXJHH0b3tMDwf4mELlh

/TZNQXSnOeAGhYqoulb5rQH2iMb2e2ka8cCAbj75qX7faNwhSKAYL4+1YWZaHMNpr66xew3N11AshNMQ

fbJSwEXkQmRbzyOZvKEWKwwoX+Kw9ptwLQerbOesx0
wMRuc/n7Cz8+Gx7zDpAg2mv71pvz1gAXHO3bkt0MwYe/qSHFh5o4BEJZHHZHWu67XNTQWPDdiKvmSXSR

AoXYXEe62+BIuoiYeLkM+B3bVBS2n9VnCBMAEuBZx8Hv04KAMuad6uehXdOUOyClItzyQN84jUYoWjRB

FckSinAr9FWkQU/o4EXVTZq2GfM9C3fgE1K/GNdBi0
6WukJU6qbshlzCD/tDNPvOpm4QP5bSTpMvBL1ncqI0eOSB7F5XEu8pkqsyM0JkiLHe7HunA3Cn1fLeuW

FfWSK8GF7V7uIyZQDo5snCigBntySzywaLRQvZqKnOzWuW4gxMGNOYH73ZKXhPESDFI4tArSSvU248Rl

GRsYaXz16kXrDKIGCtHawRPq4zKYwsGETDAzPMWDwa
1wryuObX1B+B3clGPo+jeO8Ok5AuYImlyX0fbhPsM2EnEylXemEzHARd4Xm68VAfprzINNSGGK+Y8skm

8ZYpvXvcaoGPoph6Ip9L+iAsnl48QKrjJ2JbiFlDAvE3pOUdDwBZiXCi02djGBPsxdEcBA5ywP7x6tyF

FPvy7pcZBxhQH/WMvGkX2eZ6EYZwVwvx9O5XI3PfSt
Mt/1kJ9FxbASjoU3EexXuUf925+l+QgpyxJSPWD4Pbt2i5Icelf0j0hNJq/TV9I5N7pOYkSFpajqoFpk

Zx7qiOcLc4ISPrwT2C11DVH2LQR4eS8m5gGs+g6ZdmEhSpn5r5VbxVEyA3y4Cf4FjP7RCkxaR6LxCNX6

UXY6B2dhSMBiuroyqclpRVwU1qVajr5crmbJOVunK2
1J0DUkhuWmmy8GozITpk1At1v77CKHmtE3CT/Dws9Sk5+FtGvEbMDsZk11kVHAbnZ8qLwa8pvA4Q51Gt

pdOK7DlrOmTENSt4hWLmdS8bnZLnCn12fQVha9ltWoUD1NRb9zSRQCJSwOzAYlF9rCKgb7uz35WORX3q

s1IHMMecidkXjnHbamQ1MnmYSpjzJL4eaRXBn0iAuO
4yJ2LQJQHRsum2+TxkNaLIXNgy1I9rBqhazMsTk9AUAzZApwswZMdSOMWXxJa9eMUadfRJFMvRJ3vspe

nczPl3kWUQJQEcJLlrOI3Vk4sZ6WQ0bunrT4DL5vqAHJKUKe5z8DU/s16Qe1VRLReEwwQgUP54A6l1GS

IaEeq4zXH5pSLNINmpmmCA5pBYieHfn6m7Z+X/GyuR
Yvonne/25xErusdAe3h5Asj5z7/QunrCzdLGcZ8/WYJIwRhIgkockW7EBrviMWYWg1qJBTZHo64+yRUAktB

OXOSU6eWWh6uxFwRq577GRuCZMCadKbZ1JfY0VtYr3NHn+FPJyT+VcKIoy6ARm/6GPtYm+9efyU10rHS

PlBRO1+X1kGj/M5ou08+lyuiukoGBD7HSvv7xMj03+
ZBv38Sp1PlDfgZK1Wxq+bdNG+gmKH/Fzz+T7k1kH8ymBJL/kg0L/ecdfGWHVjllsomEOHlDnppn0L8IQ

guHEEnSJG44A4GzR1Mu87SNF+khPYBtW22f8Sm3kwAG4GNgy3HeaOXdQylEMddZXRHFBNtZvfjjiMaJ+

+eBtftMrSKURqKeVd0Me+Jk5kK7wZ2ocd5xpZZ6Fll
Iz8UfvyMx4CFF1r00xeuBNqjcU4zd7s353iq/QZjtr6BUQQyFN6Gg3K+wpc5F84HtvU5EKvnjdr2r11b

7Md8YXHqh0dnl+Z1dVAKGK7tFzLOUYWKJGykBmoj9Wy9WB64nVMTdmMPcyVW/y+/k5KgNTrfbGnJVNwE

yXZzK2DM5dxxVT5hWU6Xfn9vHiu5vVqRRopTNfhBoM
NT3eOTD8u0XJVB1UCoBl5zASddtOG+xXlvXD/e2EviWFqhCegxZzvQNzZrhEWC49mQwL7LsAkkHFwdJF

6hCM6blUyyy39zEp44wNrZR0C4Iuhp1848BXR9bLy0pH7n1WQJ4MaPtFXEXWXdLV+oPv2xqjB5vYi7zm

2slIJUrOp/L2oCr2ejUe1m2kFqaT7Kc3yNiX05xxCA
6aHYKwqVm6wZPCST+ZbKYLYZ2HjjLiUgemhXUjQAyrNYWiPsr1rqmFQT9F85yuTyEywmXY1PnmmlVyD0

WZQH09aAx2bJDxciLFC4VqTdvTn2IMU7wwTug8Pz/OsqP4yhsf9vhf7Cx3AN6TUR5Z5K+GKe8fpAKx+F

ohZMQCQzm6p08QBe0v/dmigggSTUa8JNMYw5vUUAmt
bbAaz+ZxqMqhLkrw45yL5h+tnrHZYAsIwUiXjjxYrIFutl3Ls5drLLff1K4kDbj1mhVLWittQUmuAQjZ

UidrdstwBGOYS5WH+3jfcxdtMTnj4D4gyF4DPW5b7HltFpZ7An7C8A6lSQm6n+EOWQBkwy38CI4lWcQy

FeZUGb2izfDJ9tof9akVP7kw19jE3zWXhB1isN/OLP
4ZXs2kDVKKD3SB4zsVVTTMG/WCkvJNUlEy91VjGsuTyg0XaaXFYRbECb13+JoZIXUFVEhQlqKdOgSLaJ

CxNiSdZq2bJYpZQix6M/hMN0NE4g/5tT+Zf/OLjlLUa6rQzvkNG6fMJhGqUH6224DRhl7pjOF0MnUT7m

NzIjEbTIUSvT2k0RmY/FvlwS0ZuC0UVK2nh2CjQZHm
BLbgooKkLeeCScRzWNWaCshUMhHcUUuLWoIgAFQgTHhaLO1JSTekPSjlFMQdJ2ZnsiKvyIZxJNIlQg0L

UZTgDP5GGSKteLCbOYRcAiVzYZNLEzEaJLXlGCVgiBHPz9xxMqDwRMJ2XWKsVjeuKY8PWKCcPX3Vo005

TR76wmK8ZHReKc5DFFVmHX2Cxj11gDZ2RXPgFpMbBS
LjtrXxFXKqxqM5IC0DKvKxLTP8dVJuEzoHCI7JUEJtdYNrMS7RXERyeTAcUG4+DQogID4+DQplbmRvYm

iAYuEiSKXlByoAPrVbUTioUixnsSUnPU9XkMA9AZOsO40aUSIyPWLwF1UkUQQ8Dad+Fj4OJZGcjGOjSZ

0WAakV1K9zblmEUA9VgH/sj0UXKWUg3jPYMnO+rLGB
KAwrRzQmtrSznBSYFnJO0Sr+m3xq+iqsuvakKQvssmLBdxBeN6A08bqIk8sYA7e6r/+wgyLjnDP7/+5j

UUk2/cD+/Ft2hx6He7aHA6IPo5+vp2AaALdmMWg6pSCBtqBmRB9TjuoZ4tg/dtfmhndP10ybsl3ke/n1

2GJ4gVD/nj4YtjkXi8bsmvpDxAXVP6792dxaZr2U/3
SNJu33U0mo0YL8xBwTPJT0flggpD28/oGN/27UkLsJbA4aGb44h+ZxVfLqU4aqm1dxUww31nnz67O+6l

tA8nqHeD67baXtqNAK2bk0t1Jci6+7Haxa58O/sdMBXny/Bww/ofyelHkLpaABjACZFpT6yYUgt6EP2b

+77yckugy3LgVXzjsQFKOAXOrCfBK8unvioqeEPqbB
FsSChAcn0JzxrUDPkaq2HGEr4QmPDgLdQGkkJp0uiD9ATm/D2WCzfogUfAzSlr/kI7zVKqHTsB5KvPC0

s487glvVqfp1Eyr5NEyCvNGUoRuXXndOnouZij8GfZl9Cg5R0cIIOxatXoYGLSqDzHZKTYN5L2CsZA0X

jbUBBVpPgZefpsgwpKJkUrtNM8yEahb2yaFCbTHUNx
q0mvBzM7rLScjIqZMXU0kFMUQsUTpG74+idDXJp2CkLM/L958RfAytc9d5mAsue+rZcftAqlSkY2hNPu

A9k41IWTcTuOn5LFGdIUGgknQpNJgPUNJNZS9xVbPVmclqqkf0IiYGVZGL/LbThfC4KZo+CGSC9jCIKZ

vNL1w77FW2onWXQiI9+e1iUV+58P5ea4Ac3J5pzfZB
za/Nb0PTa9mXSv7c7Lb0h1s65z7uswh/9DMpQuRLAwAp8E9TFStFJ0Igzdoi9i8q/86ugfG/qU9It77B

ov09h3SP9ypgLLrRhyrZNUvtTeg1lSJ29bLgqt5HE8Lc2dLX65mgB2EpFSQYQpLnZEtEL3hUV0oYZ1LG

0mykSDPIgLo03GoExiL/JYQ0DH42saA/Q0JdxM1dpw
b9BZPMchKKSlMtgJ4vd1ULfVUthG3mEaXTp4HOxnEVigpFyBR8Hxm1Qk2d6TtF+iTkSjjPc9TsjBp2Ur

PdBlZaBo1+gg2F5gXuHOtFm/tYDnQc4XYluvf11KH8r6Kic6/7mvyyRv6Kj2ouIYffIqfTCVYENkMGXH

u5wRIufwufHNZgGkBh3AocSbb8G2N2YYTNvfoNX8pF
VGSgfZZOR3u35rbzMK86dGNiHZ8j51i+yv6hBv3uq/qjqM2djERBFz0W3lRydRI0hsikPo3FKfH17dEw

Nrrn1qrf+lFXqkchy2H9za7WvL5eQEXCwTJxoUYE8eqU2T93C10xX9XexYGzKI+QcC5XKATv3EtxMz/w

nO6O/eHJSRL9YWw4ecrj88znpbScq0UsllsKNbLH5/
r1letXeQ472Q0iykL11SddZW1b+9HsYBRzQNn0UjG0slNfSrzE8ppcnwZL6KVMQzPFLF9qxyNPf+qPls

sfSL9j7YJ2imybUK3HnHMMHHz2yLk/jnbYsZFMtKmrQyFzclE1CQD2b89r2HgztG8MS0YMuheKBCEnp8

6h3Nm/J9/uTKFlhTJjdbNSGkTjDCXQyQKQUPwhyM7W
fhEGZMUUmypeSwWSNoavA0g4LJHFKQ5JfkXGVveQJVd5V8TElygXSPMHYoN1RjlqJdEWk1f1CuBRjD/C

Bqu+3frv24Sbj7p7nciU8XK81CIFaKqoHv+X9j8WogtTBcFRKlVbokiXAsIxJWHkpJOZVkMQCh+8BiWi

aO4zKypIR9KhNUx61QMupbGdVsrONKypO/1kRGE8b2
eTrEVKtb9bi91CU6XFmcYKmm3wG4RZMYz4wbSO4ne0tZthneB2KIf03XByyPjkrZ6TllcSd0XC887L2K

47H1Edv+KAnVk6c0NKIxkc6Qs8b6vZ+OKzj5UJdHo3dWM5u2IzVMPRFdGVUPeF24eewjLj4wU0VAuCu6

huDo3cZbBgzVnRh+W8/zfiZoUboLLVQwOn5PiYIczQ
UARalrPzF0PxRnhKgeV33VALyy7eoluMTUEsC2a2yttEbaiEPKBEGx4p/OCQrpSXLGK3vCeGYneF9VjU

CnBLhjsfabeMpVWsspnh6nDRjSxUpYTmMa2YE/X3QTASePKax9PNmvCLZWBNCjTB4Ca0st9KzYxY02g1

eisDk/tbPVjltYMvf7SiiFcsOEL5Q9uec3iFILZYF9
lgoFW26vfO6MJE4umha0vRGwX0Iu8X81y4GVmusSD/SL890WQayYGDBKj1i6ihCzAQ6cPlMxbjeUDyay

7uzsnHV1LvtRzylU/vmoCdJJyGDb6hcN/Rh6hINGWLdM8ltE7rS2K64+I+N4g7xzDk9pXJ1frjuO0nGs

dxW+3aGZSsuM/EM47tOdJSwqJ1AduBHGmB+3xGFcbA
+Me+jX9wakuKzuBc0BtMDu+/C+ySm09lZ9GgKS/Qhq0g0AOj0rRSYXql2aR1eU+AuQ1JD+Jng9tuimmC

/L6L+8msyeS7JD1UfcI+MO2xuG+AD/Uv8dITh5hoN9AvLioHpDc3OkbqV/z3LeEK5gad0DDxsvCLKDQ/

WVET1KgiZKMqb2GPxZNx6xlinWFeigvNlKxatm6BEx
8vwPQWN2+MV1LYS0cS6aF/bVb0HPPkpDcKbxXf9smss5vVHhTbotLlLmpDn5LuN/ibA1ROn/B5YoadEc

Ko/9Yr/NtuaLQYdUHl4ZXafBElNkZADQnbEbF1Z8LBjmckYYmdj+3adNqaW295Q71EiKjLvD6jD2ju/o

jlkeBAY/hVZSs7xTMdBPo30CI17xMJdcAY1e0c30i3
hLSmio63Zl0uhtDjADhLmNKE4MO1HCCUP9CYsOCMAxCJCTcp9zZGW3NDhuvVZ5LeQxqxhymfWLYLkJ07

kMJK+pyMEVOLM0NbiFMuc1MwpHOdpLpAFK0vAdu5vXtn6gQb4AAWrzVmKaBJmjr5fS9D1ligOuEIX8gu

9u2DBQh2zCkG2FakoEkwsETLmptzf9BklQXvHgPIo6
yV9a2d4/qPIt1BOLnkv1tuGcrjU6PZ5LazuikdwtipBEr9AvRRtj8zWqOD2L1odnxMGF/9VLqHOcjpPn

ZP0yn8C/ug9bny9TY/Fc4bBSaDYhS1o55A7vV5sNNfCJ4mQ6iASmMY6j6NLVns6CfupPY5Q/JJxxqQat

GIKyYwsSRYIQ10njRU4FNalBqO2dxuLkvx1eaweUrF
cCxIjZB4mNt+rshxuXn0ilmr9HdcR8X2JlYOtN/B3j/sTCd1Am9rjsY9FQO3ArglMHnHdYQn2NDzRDdM

QfxuUlobUuJSOpMidkrnVZY+us7SLsWDPuEt5z6DpzeM98I6qx1pew5GR1oj0XSOEaWb6LsL1v/rWTKM

fPMcPEUuSJFMDyXpL5LEjfCMrqY/7Gwfaw7Ni2ukOg
U4/VuSdV3ChbQd0Oa2q73Qzed3pww+y6C16icT0MIgFQnaMc58OsYgLVb+h6CJWMk/0aQvMvcN+PH8bD

Ejfrktm7azyI12Tqm1Dh5zCx7y4sbeB82czHaYEwxZ+4B05f+A6iYY+SWxtjAGs2MzHufjOMViLxxnBY

SJsiQXS9j/fyg695W6d5aLvrFtdh4uf37Aa0HOWwHT
uC2/x12P7Z9z5+71+Q58/Z2b4dZjRVziamBdgRIdPO6GYdNeFA3fke8CShSdCH5pun7CPTO8DS2MKFRy

QF2PbVZxI5AlX3ELAkOyJWTdCGHfMS81NVDzRSRGAHtjTKHcR2Oxd030xiXoajTeTKZhVe7XKUDmKB0E

GKOhCXSanIZdSPWlOELpOqG8YLVlPRjyXBUgI1Supr
GkmtMyBQPvJLDESGztYEPsC0qvt6GbAAm0QC8DNF1WbtVku7GmxtFsV0tkJ2FOTU2YTZEpS4DJV2GkQ6

rwRsBeu3WhV0raTqOem1AyJk3RKnTjWq8UEsHyBV6grx3RFVCgDQ0brm9SCGU1OK8NxHi3REJzU0GkVQ

ZbGKOoe5JgVF9LDV4xpKahFou8Lh1+HUniUVY3ppTd
mO4TRLItCR7n4NTO/n7A/Xm6BcoTBpypLBsSMwcseYw6tBYs5ZD9/oSlTVge6n28wbBc/Zr+1FKkZHsm

8zhyszAQJezDQjBYjVznvCppbQpDGq9+Sn97CTxS5H8bgqsyLdGo6R2/Sy5v0+hwrimd9OjS77q878eq

D3af7SQq2P8bUcsL2H7ga/WbifCo8ta7sQpeFT3Ws3
JarJbbb8/eVfV/pVnUzKI23Cdz4Srq9HaCxHGtau9Se22feydWtk9OvCqAcPn3hLSnmlV6mWtjbNf1It

8ls/tBHLin5uK9QOjEF4N1GJKeNdevkeFJG0dsXUyf32S4MfttAuDKKScBp4J5Vs/CHxxxeCXS36eMY/

BJxT/Sbs7BwXEWRVL7RctaX/goWZOGkHJYdLBoYDEl
ixks/vpVKL+TJg1Jh3MK1svBfjNVPGJgYy4g6LlZ2jWmOu8Tm2JT/qig0/QSdDqoYtlW/JOWAMDoI35D

bIRG5G02yYu0I1kkczMXbb7rFRcINPShk4/WryMn3dVxHRMuAdUwuQYVPLnPDWgHj23tBIgZHGogpIDg

RPFVkjppPfWEjXrjtadah8CL0lBecNE0AbYwAbcSjE
YRAE/lCryJNFqaONI103492Q3ZGtWKwbJVn6Mf3q96iWJOylXvWS0iuR7QRV/x9JeDaXvOw7iyikPDAj

0HLeJyxlnkSCa2G7n10UodsE8meki6XbSasesJbedydF2gcBgOtEhQ0zg3YfXgYjXb2ZRfWIXD/Kb8sC

g/1lPI2/Leb6m5hDpo9JGqlOIKr4LkO7CdKThv7u8C
jBb9xuXm+99MqHGD1Lgc7x23PR05nF5sghR1XoVMRxNRWep179DPEVy1FP+7dswZQPXXY8LBpWOmlxHI

0TVXtfeewIuoN+AyhROpf0yYz4c76w26yGNiW8w9zc1D9vTcLYX/14ekocor6CtsLWRYFRu6oxmxF/ph

WtkSDLhendG6If2QGaiO5BEC/3WZ38RPHfZepgAEqe
M1D4Gkc52otwydqCVPNWyukqo1RNErh690mAHolYzy3g0ZmYtFa2QAoMpQcVwvu4vupUTEWMa8Ixx+kj

KNIRcPsjWNWC0SfuxIx32iDngUfHn/RcTkUc9HJMW1SXcug7O3aRfJhTKQe51kFGdUrSqL0V7/pHFCKy

pJ+eJWRpsAavBMRtUfz7HNHNSHpqDvl69cUKslWrpy
u9dL9IM33D2eB3Wi5S+r2j2sAaz/vd0mEXaTaO/x2Vej74YZWmZxK5qLY8OJePsH0g/GfMT0WYu7Yh4X

iIPdeQuSNQnDJtIOs5TCUu2aW9ZtV2VYVYnAMwFv57kXXuWpvwwmkXsvrs35IgN1rDSijKnSfwOOw1XJ

UJIfsKDmToVZxDzwWsKO5raAp/YYfm1MOzhgLcDeac
JuX8rYh3BPGh/ZioS2qot9zt8xdbUIZKhD7AmQXIdOi0ks7sYTPlz43LMeB3hi/e1d47MDHeWsCqUWpR

mOu5xUFdQD/IQoiteBjENGR4ESOwG6Ta8R3aD9Xbj8LfsY4otpuZBBjpr5LnDWcCvKHtJOM00kOqCMrd

Z7wskHa/G/HxnQ07sWtyPOxb4XZBRXw4ufYQRnrPSF
o/ZcyKPaB+LLatXciF7d3r84AtZ/uqGKmCWYugcAd3th6ppXR3TxsJponYVbAF02thfU4tSWWDcI/wFI

saNK3zYHJ/VOXFMXwjnBNCbdqZsMh99bLbpaK04j6iv3l2YS52FTVut+Z0LT8TE5+AnAGpqF2rjGG8o8

NZ6WP9KGCwo3p3GhsmribJHOv31zILZ49/OAnGc+50
DqiRm5zb64fwC+G7zuEtkz7pRBPMd035txtztkMcujDP1N/2TJfSKjsBRsEr2PDu/1pDaq9vDlg4H+Rh

7NxkklgK1m8A4V3+wjsLOuknOr0N8mMwQ0Kq8fCVJIlpLRHmdQjIcmL1HhTjPhsHd3NlBQnY2ORJe1gK

Lo3s1GW1eHkB9O79EoGW5/z0L2G8OhIev8O9MItpKY
6mQxH+WyrNmHUnKWWpuDBiaZN5bENJ890jN1aCSOEJidulKQ1WVzQuKQmz4yeEHOXXwfazIbuSkB4DMI

NLLLUHdHFJlkHvzdHXsZV3d59PweC+VsBEyXeK49yLef8ZR91JBY4lYBoLCgXyeNULBHF2U8961DL1qa

BIVMB7huEQaNNAoRYTZb/yiRKYlZXvhw3CJ0PV6WJX
3Wx3c2djuBmvzBaA5iRLtazEkpfn3BJozCBD+vNlx2YCbgoNyUETRTbwGlhPC8CvH4FyxI4WQSGN159D

QpeK+6VwlLPFB3GyoJAUFC0j84iJFPrw2AEiJUhT4fPkxP8nptTUTVPF0T11TOi0LKn3cEbHB2esW5Tt

sDcNrjq0d+lNZNjj1XO5s5uC5rRYOfzDi3XLiC98XZ
k+abZt12uBNgcW62aoWtdRyYzcGb3fcuh3JTNabdHwy2j+KSkcKmoxxtO8Go5hF9oaj0Q41i67pGVAni

0xBqSnXE5ZgsBVTt+comht7pj7KG1ZA5yyzmbPbx4AxUh8LRZG1+cj/Vw2xzsGq5/Uauyk2f5M0pEBVN

cBpJtIHFMtItqyNwp1GJs64SaFtop7XtrUL1UB+nYR
nlCw0dqtJQtWr4ylzydkYLaB+jle2JdirByNSh9ihW5nQwyCUTBBlzEA/LkSN1vOdW6JWF4Qdxqn+gkc

fXneJ7ZBY1Gdb8ppvERRixdF8gLiUX3WVLadFrGJsnrW72JPP+mA9dRJvGpND1mpr+FDkyOJTrlba7c1

ztumGDBWlLpPq7soBgmYfn1b6xOStWDq/moJrglufP
7F2krvba1d+Z0u/h5qxo2Y9qsu9maWUcR00TRssC+ajVTw4EYZqZITQnFjPKPOB+E/E0+RPPd65Pdxr7

4YYsWuuRemXwV0Kq5yZOKos7g6d8oiJ4EuaYk5t/N5Z9eT/U80I2tF+yAxbOg5fMpwGqqqcF/bIFfTxM

/03adSMigriRoPLg1pKrKjJtu1XJ3evfiARN7LWQjJ
eH3SkP+JlIohUAJ+tuqmgTWvvT9j/QEg88UDg3hdcOV/X2zzm5DfHSStx1NfOYTklVSQlEyPgS+10k2q

IHiyjmpsU61Y53EQOLliMPnBsHJ/1qyBcjNZVJm5xwnNZ96528A5jnL4S2CFcvFLABORyE2wBR848zWx

/twpnMo3ZNtwvOT4cB2iAsUFma1w9trQUYQp6qPWep
2tHwQBRa4PWTVkkirNyr/tyo5wsUwp8AP8BmmFTJ15waIjg7fHOL4hbUAo7jpi9MBWzOxPQZTXhBE8Gu

MhkzQCfp/cMBwUcLwuI72ZME+xXEaLFAf01kFt21YgQRkptHFxF+53XtYNTNhGDJFSoiNHbv49qhoaCX

AZurn8OVDq4hmZZ1SsJWmFEeIwnO5J39Irxr/PI/iG
+0+ZSbiIwSpe5JQozWjhv2TjYafQPAw5lGWqiIwbKKYkOY4t5WFp45PPAxTQwYSvFOvgoiUTEptMKPfZ

JhRVhra7hkUuf8UaWhcOSkQx31YuM1IeVm53OaISl7OGDGfx3M/V5ezAgDErZRmUVNiEDwRKETCiculs

1ABIhi9jBSZAF/IJzN7NZnYlChJxFNHXeQlW9t5Nwg
UhS19aWmw1aPpAfgSSIsrL3+Dk67WqrIFyLWWwyu6N1Wjg4/xzmKFbzW916/SygNHgq1WdF4ki6IFmgE

E6CNEnwELbjCgVvdfoe7k4pe7m7FfmwH0YNGmwEN8Os5egHQKxVkGPAG18LD/8amhzqJcH0S+XqfrE2u

H/293zU/dM1711TXLq9QLwi79qtiN1R+Bl//BKEq8/
CWRsRlNLC9vbUfpX0ETD8ho5AlEWf4LLQce7BjBIgoJKb0ZArhZSMyX6P1iZMzGODlWR1ZPVZtMZ4JLT

TszuGsDqPmRNZJOpBfQMCpHyYkx9JtC6AjGNNbJFQBKGewTQGeN94eTBdtHi65AMcsUXBiSnAnPIa8Mh

0XVtYdPOVkO29nwLLysBJpOHNxQBSHLgExBVMpX3Hb
yMHjJVjdW2ZfP5ErZW9ivLReDX2awVDnT7HfV2ZpaybvMBOTEgDzQCThIFtaOHHzVkNfMQjkFFT+Pg0K

ICA+Jj4QFB7gi0DlHHagSBLxYV8pzx5GEFN8MY0ZgXa2GBRfA6LvZSLdGWQmp9DwKR8MXC1zpZgtKdO1

Nj4+KLcpYIB6avEncE3IEADrRQfn12iBjk9e/0M/LS
sO0H1u5VzBxxAjoXDZiZCZsNrX2BVy7kJhGwMHREh/m/2nb8HeRNV5l5ytTsI2XEBTAXCU597JgaUI8J

//hDQx1xnr8m/14dYZ7BUd/j90sPZlOZfkgmIU7MOm/ejVYjU+hiDh1psbU80Ps5ahrF/Ik34H13//fX

R69T0/YwfB20z2Nd4rJ11tu2jhv44Cq1R++NDA5kiw
Bmd1mU7F0qHKv4T9V/ujX0I2G68SodLjhVTbmUrZ2UEdiQlc/nT6Qp3+nOmKSSnHhRTEStidiU8iAS2O

s2RuiRX6rUfZzEPAXI0jVvFlWQYa6mvBJDuwKfF2r+XZeut2QUizMbTW0wXNGnXm71/IxD/HeK9zW5XN

AimINiCWarAIKfe3Ye01WpJAs+cGZ00z9okjMqwCYx
yVR+MR9JCejhSGnHXysGKuo7woZq8otZ82DVJowQgmCu4nL7c9IRvTPnIW31l3/PFXY2J0tY0oPXMwyY

ZyeF5ZbwkH+tnrlcE2EIRGdukNqwTMnnjX6r5Emcx3IJdUaI6NbXBdnS+H3xLTdPci0T3AIY/IxUBwUV

AWBANKYFWvqBj4GKNxm6FZZIPgMXDeb1MaemfqGvZt
9WJPymUt196mnngFYdt4dL8k80rsRF5U9WmWNfuYE7QeLqrcJLCF07ZPKJx/z5M71WXHK6HP+oXjCWrH

E+yyAgJi8hf7AAzVvSseH/b6cQBOen/bTzEs/rWVubpMgSxCGtSFSqSCR4hb1/UEklo6//VNepAmyZp0

ToeQL0Cba8gkYAiqLQ314wOo1jdWLhYJkABeuz0fMv
7yUmBTAHRDu1N4Bec0aRbKSzQzq6046S2q8DixSDu9zztj9sVknQj5EphgQX/MD3SRY/XF/TMbCki4vE

mxuhlo2w5C/3m9t8esgQzZcajLLmX7po916m1UpvVmYJOn95fkxWpuFroNnOHC/3uorYou84Su9pVSml

fFpBfewhTGV3tS37wtBeBOkuT1yHJT3DWg1N8oEosX
NUVWatNoVtrNd9MnQedh4K1kYPtIB06WJsq10aUfX3KMfD+mbxDWTH/IwmL2D0ps4N3XLFP8sbgH0LNB

PWxnV+vKSRICmWzE+UTYoR1PQwzb+JFUuHTHVT6voiEdcyWAY6x/UUyMhsOGyfgUojNGJsRXlIPfKbnf

BXQxbBoBNyijd8KTT6N0F3tJ36ruWgcQBm1yvUvTgE
JtoxLx++CNb207FtC3YKlL68T74Nyk3iwDuJj7zuQuJekGdao3mYHu+Ue2U1yxGqZmN5m/HJKhKovx7X

5kjG8vfpVNyDvLhgsgMPPVbuxEj65EPAM7DxMYjya/QmlU7BUyiVaGm8HoWHv6DgUbzGaN2ap1WPPwFJ

UW+udLuwn6iHI8OMSQY6DnC4ljpSUGmTS4A53JFL+H
y6JIWesGNnG0CsyA1Xq+IHpdy5g3EevNvJWxF7IYMmRoYSHjORlS1q4Vr6p0tQL/t5ZYflnLJ5nwn2Tq

IaqUs3yst4m8frUhSBGkBn6m9/zpX0z8wlgg8Oqrt1kjNfJQ420V3hA5ikvj2EZ9lOfcS9sl3+QihaB2

UKNG/iyHguiOLb64qAqL1daYTFBJbG6BASszvRuhFc
eZHPV4v1yifHuJEs0MLZyOQRj2Kjhpae6oS+aJFzJteriA9Gvlsl8Q3NZi96qjI9pffMJ5mm0Y1+hmwG

i8XHdf4I203A2PBPyKqa8r0IlTbBeZ6xISP7O8yRrEtD0SFAHeTBfo1nc94FtmImbV2ShYyraNrBDjUh

lOQDV9L7q3rPvMA7Vwr89IWEIALV62Ef3P6O1UmBtA
KjifxOaDezRh6PVq2jMUE0ipRuMrAaibAA2JxeTOAxGs3oA6SXku1KuhsdAxysGybc/qA238pG/mo7Iv

pGeFhx8FIjvm47xUA7BvnqLafc+whZPh8zY6+JYRHqbaAizG4Y8uOraLAZNeaIn0YavBXF9iSvRDwlQ6

0CNipEgjMEXnCUjlowyzGnSVpxiT5oM94vG8abJDX7
pKe4DUTsFeGJgGz60XXevQTiMUhpWPWBeI5JW4XyQU10FT9uOmPhzhTvCAr9mwgC8lsNbf1MCpdcORQJ

SsxEPEHzCEh4qPxAnmHNABijIoVtcPONmLOD9C8qaoGQK9a/onulZxoRWWSFnONYfzljSZSLSViR/Vx5

a6ic3gv7vO73CZO9npLke4eeUJJ/thfrLcWJCAeBz1
eYjRJcKqIKtNMZbhvrY/XESa03LuDwUsDRQ3C/8+wEgBrrZpagYE33qvT3oPnSe0nH4MuYzLEeD7bHfs

6AuTIK7bUQOYONq15Ee0cWoB1G2sqaGS09FpLlSWx6QIcQ2NuyGXvN0h7mjzSRdpxfoYNZXub9c/Dj5V

5bXhg+pKod+olJjLGUJJiPY/isoOIqXisHsYd4TC1j
fkvw5YrWfDhpjfcYyqNG3/iqDU1QPphuMYoDOkFZEbNa08xbxV9Yp7pKtIn7Pnx2QKc1YXUzw6nrabli

D2CHsHsubuKbT2QkcLtUJcWnFY27d1NzdQLx7ZUfyVjMZWZ0qURtAhXYPmuIG0K3Q/EZrtrU3HTKPVYn

QvK0vtFlPHxGsPLYYTTdnPcjvJVYNUk/ydGtr33RMK
DjoSeRNJEv9xRh9Mfd4nnu8xAxBQX4wI/lUTIPihhIrE12HnmXmhIpbZMIUSldvuY4UuzeCQra3vPPYy

DJIlniP8faIwoAELmQlbisWYS1x/AuitAsuVpiE8vf+ta++QXvsVplpjImPWO2mLvr6Dlo/zQXBhRCGY

S9AlfPScnz0he4djGjZr4uDD0jsJaQoXtgxZlEci49
L+ZNCyPEs3bQYP4d4BBOJNdivLTnMf6KRMtOSPXT4gogZYF+uAUPUjWqq6XU0qebmbGqEHU41mcI4woR

obFaD37GLezNkaaxlEEXZk3y+Aq2h4lHQw+oU8yQt6MI0b3Xc+yiZqmE/Uo0xy4tewXraLb2G+/T7JV8

XtP/B453Dn8gSIbZYe3fCc7woVuEi5gd9GtEBfrgxf
sbFN1EjmKl1Ufr2Esnr05IeKN6fdfgt6LiUsLbMYxbpdlnjhjgs/tyXRTRWC7FMStorxdhWUC+3hnjOo

audd8JqJfBhI+6Bj7SDsOAOJkHawprFri4TVkLjdqImPTgndAbLEdbWz2fYOxLqCpDVEQoJAYhCe54x6

2mEYe5WhFwsVeBipgOMiQd1tDcGfnw0AXcNnw9Q7d9
mxjHps9jLGKUeX4QW7DQCdQxZg9BVjc3CtGwqZFtF4ZhmQgYtdTt6grgYjJtpONu2I5O3tHC91vT+ssO

QPHpbcfg+8s0bDDN2hcrhIPB4NUBAdDWb41osBSvyihuo4bIvwW8jBEgeEXmZxd+ZLMsos1oa80B2sqR

gWUPQqDe34uwpnSfJeZHFcPnKeQDwn4QFdadb9Lasd
nvNZrnYtrUSQxX3p91dhC6CHrOcPT54FzgnbdZWiDJW9SxDaWcn6Rj1FZ73FwOlDe5fNwxmuwAlCOkft

coJ5T4765di9gFjBPd6F46XZ2RZrIF+/dQ7c/pFDX+RJ/LCCEAlIzfyEOOhv5Ly5nf3C+RSbwBZT9+qp

0/t0UxjgItp73EbZ9QzEaExfDWJbE05XOW7sMa+2pD
8W1oJfQaoZM9tmqPTc0i1PPSulF+IdrF1UAkOEqHZ0GAIgzH2s+/KnkJ+EfOOp17zbV1PJfl0iF9OETE

f8NOLkrMXKK2iMBcNjYQDUsYYG+l3ieSDCewUIGQi2lpBiyVmN9P4hwsi45nMVkCEEntlFugZeiuGdN5

5CRpj1se67erhI/V10hifk0mMZYhb5OI4jq0YO/+8a
/xzoj4k7c5bC5om9lvxyqu1VTmUD03j5cvWCd8hOkJeCmC7LKbnbn5XVXT4o0AfFEyj0ryicNfl40JA5

HD/d2o7xC/1wsYBKBpHQ/kEibPa6bj6UfhLdfoBpBzNGpaePQWDh8fVk+/SLZXz6KAq0uHf07vXRgl0s

ZOL4bs6G8dNDPz8ZiSwYSc8/K5vl6/ECnE9G8uoRmw
ek427VunISW39D+Yl8/D1Gs74SJWkttiVqpPPpVL6PIfRhCS3qri5LKCUdINLtYdcLOnTyKRfMXdDiNO

OwXSdeAT5IFCjhAIzsIIKaH2TnheLquUPtGFNiHa4YDRIhQQ8FBLHogDMoXNAsOdFmWHVZRqZsYRRiKC

MxiSRNl7qzVkFdZLV1EHBvMdakTH6FWYUgHR3Tt823
BV65urMsIKSvTNIXUhLjDSGfM5CedFRxZQjyY3BvJ7TuYL8lbKJaAA7zmLLiV8EiE4NlialeKQTLFwUj

SSBmYWxzZSAvSyBmYWxzZSA+Da1QDHT+Xq2SBQ9aq1SwITudNhDcRP9ycz5FZMB2QR8KhRj0HNJfP2Yw

GHYmHAXyr5OaAJ1MVC6apEqhFeB3ZG4+AJkmNCN1jj
SwtV0HTUQfXXlfN3aEuj/U/3TyMtXv0hfNUxETgUrrCHDmmFSP68mldX4nJejc/pr+4O85bpSiy6+d9U

mSdufGHcpvbHQ2dTZHoB7u//tkduLQcRyz+Nn2V3c7NdHatroZVU7aceafN7+ySVd/63mHG9eclFmtQf

5g3w8Mg9PG3RN86/Xniw+9/eDr8i8AWvhannq8Mtik
d9huwiVp1dEjI7/1sXhvM9sAs99jaFZa+Z++73g3x961ShxTB1d330jhQwGbNGkNCiX9jjx9t7x/u3E9

Lx/FflGZuEEVCgCOiezXZqYVjsCrg8KbJSmGBwLhpBIghEvRQcb5eu+OS8w0GgTNZC8OrPJKU/HLcrCm

gQ6+h1BLA8WqipLiHbw+ALjsRGsDyCQzQFGHpl9zjV
c508egf0ApCGQzkEK6dApFWG92oS+YLVVA3BunSPnFs4wCLcNbTNg32x7lOKYJ/e+8ckjXDbzNWs+odf

8MiUDeVtF4moKVs4mEkhhACTKg3JVaw9ApYhUK4vsGgsRLTh6whG3JDhKGxCDoa6+akLCngI36C4JtOd

EWatB91VWYTbPpUABTjiF2vYIAxl9H1hWgsaoInFXC
lob9DkX0wBohQCbpoGY7ZFmneCKSw2NrCybQj5ZLOjApVNfElv7JrZkMCO4edBRnekoThiipV2iPEG1W

wLZog0jkRwnANv5ealm7mBvYTjRTpeO8oqfzU0jDtbnbLSblyEmitENEMrDD6RpELeKZVcYWlzPtYqTb

I6JQRZJ2AzIKMI8WFQuTHLuAqHi10pCF7db8UBz7Ar
dWBuNKuCS1bkD2oFraCR6PniXTLf6eCVTBYLW+HNdjc6a314V70qxGe1QkBC25tJie5aMxm9JKCGMTqV

igtsgPjcAde/EyqDsUR6GXLIirW0rDAVuUzlSrwzB8yWM6QWLbnOUcKzlI+ViAYupGazLxMeNlfGrCF8

tFSEPcQoi1MlgvXJQEDn2JQMdW6htoPAlVs9JdeVjX
RkPvvY5oHjLfCiT6rL1aM7Rp49MAZ+/BfLNXYYChFMLUymIRVRrLMDCiMLwTiSKbWWOz/2+RF9qpeBVe

4rtEbjyTqoPkTjw8EeplCUlYskxgqdHRJ9uoT2a+Vylaf3IPtLMycT+dMa1KzJkYO3HeUR7qIa6rkDW8

7ReEB+djPUBeSRlXNCJnGm9i5DmlQfwMk47ftYXXnt
URu6ANKU6XVlmXlmPoJrECw/Hf83x/d5yEDcmI//cptpC//CYLoVMMA6L9hBRpSZJ+g2QHLGEMNUu6DB

ZRDHQSPV6Up7etDLUssDbJLDeY7DWMktMjJXLA7+8Sa4wdhQGSENMbX8oblSNZeX1UBFOhMmstRgtX3Z

3FEYdYD16oJHI8v3J/UoqJK8s6rXzMs1/BUeVSY0hm
VYYyweiSRklWj0J2NfL87ohJ+8/J4K/eWH2ul8HQ4LSwsoBMOW6VziSl5J9t+JvEh39RJ5nE1aRXksbr

UIPxBebJ7+TZpE2YuSPbY42q2lia7p69p8omuOXHmoZRpLxh2gIDkDTCik24Q/csfqCD9uFYwg+iNsXN

a0TMOep7VSYM3gVn6SVh6pmhcLH09owv35WqX43eZN
gLpE9b3nob75RJNbQKG8jUD7lnXf4i8wFVZXZCykExBgdDBpLaifxatZi+N8BwrikgjOQceVE8akkqdg

fmtZQjGbrRVDUaio9W6GMRnNy+VO6ZMqtHYwrWUetPc+0f3yq53MWBLcvPNjn3ndhX7bZaHwbnArAbii

RWYjvHkXiQRCKtMGg+e32CcwHdwQVOjIZPVZkmRLDA
SUMcaWYDjocYbbWu0ZZEVlehlGqg+rVE/UGm3TAUyQa2GgsxqNdIJrmRuxGVHQxpZ4Q5mTa2QBiDru8c

ek8gZy6RJQjhb5BjTiCnzl59A8fFouI8S/xVNfNirKsrAl6a5wgUBVyQB1MTHIN7W782GIsj5hQsElSz

7iBRodULiPK2L8le09HulawkXDnPi7exWU0F29IBIZ
d/7BABQZDO3d9h9DinXT/bV5C4r5C3MO2bv3MidLjAFpXFRZMY2F78UtR3xtzbx+zdtwXlncT0Z1az0q

dkVXnED+xp90g9Go0pq1r0oQTrK7fDtLyAW/E5O0IOHHacxZ3c8E+VVKqYL2s0zB6C2zw2SEzUQk0yKo

COFK44bCyzflh00Xfn4LQiRBToOHV/N869AEnvkFPl
4Ws+bTVoD2k9/rgDNs/H30KuAtJv8GkBjlZGZQwYjgEf1xJxkgS3DS6A4MEUIy9FtTtzioKWg8OlQ4gM

5blGAUgRuMqXA84w7aRMEEpt/tzCJyqk0GXjcbxKwwt5o3/acOAVTfcubBiAcFsJ/YeApqes9zKxeqXv

71FkyOCbYFonMjClbq8idRzcGI5XK87Mm6FKSObr1w
yKfdBqWzTg6SGCFbNZSYESb8Jv0D8UNKW15NUi6wAw52XpKKqlLktig1BJzcBV87kN/vfMtXuF6HJf8l

olgPKPJZbFXBYxOIEUZ1gkoBd4C+b3CWMfX03WBRgCTGT4Uzua4IJF0ioAwArVaGIIVx/hqlZzUYHSAn

QvRZfZttvSGs+EovU9GfckykOs9J6+OlKwqFIgr46s
qscvj15Hp3p30Bi+LvfOe9Tx4keP2033D7/t0ur7ChBx1Js/rVY4dzClrPrce7ZILtjsy8rQW41tpEFa

zeaqB8cCnugv7Kg0sjB5F8AzC2o40JvuN1PEmO8/DsXw8NYys1vSYr6FJdWkGpps3EBFE24PP4ERDXwV

Lfp+nBbnhexygdE/BsK/bRWCsMRov8q8hdKvKPKJQi
Gks82gDweJ/Am0N0/xeUj4xm96Dh3L3eq+MryR5QN3QvkmX/u6W0wbmI//IJQ0BBGJTbLtBUX9hbAmqZ

6RBN8sf6PkBEhwCrDsZZ3gdw6JPFQ4SC4QKKEnNQ0WoPRcU3MrF2DUQxSkPROjIJXgXG64YMNmJMMBWQ

exNINgY3Trx775ruEgswLcTTBeJi0NOKJxOE5PFKCi
KFJysNZvPUXcITPhQaA9QKRaVQdoELOiI9DoezDqwuZpAVYgSCDeBi8CRDNeUJ8Edh07aRH4OBIvMxVz

URXiiyUvZYQakzG7AJ0VBnUwONH0yJKjBezIII3COMJesWLtVI1FBYXlgPJuYJ3+DQogID4+DQplbmRv

IclPPiT4DQHxu3ReRZyvMNgjQiKqTPr1NIOdPhnnKx
x0OZO1YUT3OQOcGHK9NOg7SYB1ItkvUQosQRVdFsFrHnJzGij1KXZ7WCRuZcmhAgMgSUT5TWQgGqkfMC

D7ZEI0OuS0EFFtYLS7YHR3RgH7MJDlUSI7URX5KsC4YAXyOHX3AKE1FYTlBzaxZYv4DM4DTAK7SJMhVX

j1JPL8OwVfDKR5XNK4IuF9UlaqAmIqUIllCjA9Zupy
NqHpQGs6DJR8McEvQeu7AZDuIAA4NsriPGT9ITskKcP2GyPlCkj1EVX3QbT1GqliNeXaHCA4IpL1QFCl

GlTtMHA6TpD4OUQxOiK2SYJ4InL9BGYfFLqcRPN8NYTsYkwuTmz2APX3CRV0IEUtHdUxJZS7FqD3NAGm

WESmWZG2MvR2ZQHaQam2KZC4YgA6QVYuIsVlDMEqNn
N5HHUuNrSqJXrlQqB5KUKiWOY3XHF6LnE3RKFuYgWrBCIgCMMvCyzjULJ8FQFmHOE7SrLyNCRcSX3BMB

C3KEXbJRQgCCAbUJRgAoNlQlZ5NFQ7PEX0OMUnLdHuULo6KCH0KNCxUcGkWWq9NPA0ESTnVpLiYJs5NJ

H4YLIeOcWgRFu3NPQ9CMAeBpOaDVr7WBS1EIWkTeYx
OJq4RMI8KKZtMpMkYCa9HHU0QCYxRbSoNDt6NZYGYaViSlWcFHp1IQD2KPQyPzFeLNn9HBY9HPIdEkSo

EKk5TLD8ROAaSxj3NXEhVpF8ZUMaRXB0GRG1WmZ0XSEvUtLoUIZ5ImUcLhSiVuN7JXH2EXP2QCDgVYx1

KZMeKkA2BeyiXEEcUQDcBAW2RArrNbNkRVTnSdWyUc
IyQIbxRFA9KzB5NxmpOzIaVKGgXeZsXmFoQvY6PET5RzD4JdFlTJE1FMqcEUY0WNLrHwS8SIK4XmT5Kd

bpJsM3CKV7XsO0ZnfbGRYxTVA8LhNeUjI5HWN0GpM8RnfpMnB7JJW4ATOsJrfzGze1GSO8VWL1VeGvUt

IfYTr2ZJDTJmJxPsn6ZLy5UQQ8YttxXyj9YRO4LIO6
TwhoRtVaGTtdEtK4ImQvAlVrUTD9RbY1VdqlZgBuOGC6DeX1LVYnRFW5HVX0SfH4GLItJCP2PNd8IUZ8

UCVsUAS7WHX3BjC3SYIoLUB6VQH9UQPtMxyqCvf1NVZ1SES7ZHPdWQxvVMW3QjD8ZGQtFNV1SWM9AxT9

VCBxHYG1UAN3FRP1FKUoHVP8ZSY3SwE6XMIbCHA4SD
MeUOM9VHVnHQVlGO4yVHqsljJnAigQLsD4EVHod8AvNIajASs7GTcxPYQyV5M0kHJoTf6qjSSzj5RtlP

N2j7HARbWqUWNxKe8eeN2xrYUeDRZlHDtDVfBvPVQdPTDrCK55KTzhGX1PDKUDLZyjaWTaSXY4N3Acv9

QnvfFyOZWiZf4TZENzPD1NaKBspyVrLi2UCCMrIV6L
a035TaFydSDhBYUbOjNbSFScNIOoRIL0PU7HLDRtBL0KzKOzeZLChrguTKIcM4H7BC4KGAWBVpBrOe5B

TcGlMF3qqb7FIETiTOKuFjeZWlSxNWbUAkZgIIRxNEczQN2Kv565R3T1FiB7jMPnEGH5OOU2cJGkXeUf

SXDjyiNhYEKyLLqgHE7dj0JpttazX2dxDO5oqGAfY6
8uuQ9zMWbsWVTbI8OhbhW5Q7vmtpSoMD2SUOD8M5ngstIoWBZYXgOlFXSvT2dhkEjzSLY3PSAdKa0NSS

LeDR1Yu123XKBkM1YrkKHvqiCkQBRxYWXFXcOjZb0KPjRxIC1gjq6JPDezTSBhVyiTAsUwQsB9NDAwYn

CaRNI5JDLkFrRzDVx2FVR4IkA2ZESkFOu7HXrpPaPc
GkpxPrSbBGIpXuBmPZttZDi6RLQ5CYFhJiJbVlp1BES7BTJ8KKCiVEG9QDE3EdQ2REXfTGT1IOJ6RuU4

TTXiIAW4EKX5YeM9DMRyYrYvKVBwRzB6NJScUWwdIWJ6IVK9QUBbIwWcHOy6MJE2TiPmFtEaLWmqEiU8

ZlWpEfJ0CJBlUGQ0QdurNxKmHJD8KKX1BVVmDpBdDY
IrXBO1TrHkQvKxQBc7TMI8TerxYuz1BVabZcQ4KwutJwFjXAuoZzT6QegoUUL1VTA8KnZ8UbswIlHhDM

4WQVFfOeOhHrd6YEEcJkU3RPSvDUI4UWRiUwP4RBTfQhQlEZO8QzU7OXRmJBW8MNAtHyN3DAFmXoMoZO

Y1FICwKacdAHI9IIT8OVG9OVcaLmToHPUpYAF5YVMi
OnWmQRC4IVC6CAQdYeOxKWKuNHG6GCDbGyu6AID2FaEMAfQeVXB5KAWfIXUqVBkzUdtyTMX1WCQ3JXBe

MsKxQTk7BQL6WLCyIrInQUs9TJR6ACGuQiMgSOw1MIG1KWJtTzOgYJp4RTF9KNGkRgRwTJo6ZHB7GCNl

NpWrTVw1FQF1HQUwBfHtPQg8WXS0HWCmShIjPUi2LT
K7IRJcDYxgZNe8SJB8IKXpXxAsGEb8NQQ8CBNbTfVrGBn1XPI0VZArXyNjGMI4QKCvIjXsVSQ6SAW6Zr

X0LRRrTBP7HPH8AIV8FRKrHlGaPRphZkEdAjKhWOS9ASB8ANUaZxUrPkG4NOC8OkG7LNBsBQJ7TPTeDm

KnIxHnJqYvQB1HQUZ2DaAyXSW2OOYnIxQsZvGkKcJy
TVC6RBW9ELJbAGB7ZUytEAI4HeLbDhJmZTntQyD0WcIpGcGcSHcgPzE1RyYbLzRxZXVqAZSnRiYkJHT9

EeL6VkkiLrN1XAP0ZjPeLzyxRzb2WZF9CSHsBjorOfPtAWeaHgB7OeqfSMkbYZm8VPV0MqlvWji1BJe1

AZH3QXJuRdz2GPrdCpT0DcFpSqNxHGgeQtP3JpsxLi
C7AFVkNBS3SBZpJXR9XDM6DnG3HDIiIUP2SML0LhU3JVaiZLA2MOV1SlA7BMQwRMT2HOB8TqKeLvesLj

m1SAJ1AUPbLfuoMgLxOK3IHDZ9OPUeEyPoITPoCMI8XCAfIzLqWDRzLZG2MRezLjKcYWDdAWD7UYEyAo

BjEVJrZOR9BOZaLpJbRFI2IfYoEK5HCS9cc8LnSUze
GCCwLN0cyh5HICZ7OD2WIQYuQP7PrBMeO4LligBYCALifdegyB9pCVuhSNHlM4VsvyHDHF6oG6QclSZb

PYYokSTRGvPqUZMeKDWeVA92JIupKC7PSBCDNCtopWIxGAL1Y8Pvt0WgzgPkDPGqKe2OXCRcOI0EmIYz

bcPxFq8OIAGvER0Tq071UfLmkQHmFYNgYiWsOTMlCO
HsPXK7LL7DPKBjOR8DpBZynBLHupmsOQLtV2V1ZG0MWOGATvHsFt7SMeUcUU8xax0LJJqzEOTcHtmEUg

OzGMeSPbBkZHPtDElcHQ9Sx381X0V7NjX0vNJhVND5MMZ0oVXxKtFeREBxjsRfOEGiPPokEo9bFR4Osh

OwEHqjNa3PrX9RkkQoLF4ok1CwibgHGxHkVPLuCamr
c6JFvHSyTYMyH4eqk8DJqEGuPTY0AL0TRQByOX3BhSU3sOPdBSlqWRINZKzmYIBnJ0EfgqMTCCXkgvke

zL1kLKO3WGWuUh4KUXX+Pa6EYN9ey9CoVJfdPSQvTI5cch1ROXE9MD1SkYc6EBNpB3SiUGMfBSBdk4Xk

RV5TAQ4gsVzdEsZ2JKVxL4mtjhl3gGYrTcqqXzY+Pg
7GIRLtySQsAB9MNewX8S4VeDZQ6sI36i49r+x0ptMDbK7lvBFSqxEglM3yn1MyMSHAoRUxCZGHd9mYJ3

8UnzqoSIaDCvLCJ1RMsMvAbDHLmIvFMMF9NPOTMaUt/ufUvbfTtDjMfN/3P//z/O8885QNkAmSv+rUWa

hwOnFYjCeYhJ2Cr0VgXE8/kxB5bBXPuzixYEvuPAAP
3TJI9uqKhf737MdxXaRUsvRHBAujecqdfg3tcP+TJhyDubyO7c+WlzLE57QAB3/7zamywfND7LyFoGhw

n2/WvCVLJ/kv4zoAGr1LPBZWdA+hoRXuRuhPit0jscOSqC+0GJ5UhDK7Mil6aIpevjr43GiNUxfpnjyz

y+ZfctFdU/LGuVdPWPsSrfuubB6dvyUW8njcem4d6N
a6CY9/SqOL4K11ssR8uus6Vu4HugGKvogDl6CLoT/LpUambTErV5rpAGK3+qCdZACMjP0p4YFBMXDHbJ

M9gLRH/txEHRx7JBuJta+JrUYG3D/s22kRQUezl4xqJpFwEvnl3OFVUunguBlvFO/5vc6NaqbBCkpD79

QEZlLibYkfw0YkwDNUoDAYg6+S3vjkuGtFnK5PDpLr
6k1hcnptOATe9Km8FB0Q/9TK9BcOpJjCTJcpdb1zBG5PxWRpXmbDgwmOEsfCiCTcrK3c6sM3OD/BTvH3

LbwYOJgchl3q9As3Z6rDgKJ+cT88CB+zf9mmVVW9YZBJL7HfmTuxqYo3oSEPL0RNX1HuYRmBr2zrVFkE

X8PfKBBYmUBqsDl5Hc7skKUMQzeBD5Oi1uMZH/LkKd
zpbZ3VsS9cZIxPuIM585tYbv5uN3j0RNsVAvjj1Gvi1Ff7xXuiBqiyV90Maf0aWw74iXu7Mz4+A6/Cfl

hPhlwZob3awWbNr3UvRW/7jJtV1qiwdy/Dc5Ie7QlI+Zq6JIwQIwfKv0i55IE2E9fc9OzZgbA5QPTUB2

VFYMEae+B3OszG/l6LpX/D7aw34VY9yX4Wh9ITPy9L
Eo3YW6GCA5Sj4dvPpJ9yH0jKF8n46LTftZFDbKw5y2mr1Xz0pyhGcmJICI/ugE3wL+Dkcxv5bkZsfSnb

zzqv9pWdc/hxr4WTPBGtE/yItIUxIN9wExSmaNvjuYV4Je5BVwyjyvSsfit4/qVVaCtgHPLDcmz9/fAo

snnR2Pg/james/I5LlyTybu8DuX5zmXnv2+b6y7zKzMUf
Nzp2Gc4jZ/sS/Yt+j4pkRQ8iYAEObAB/EqGgGbXn4Y3qKgRfabd8/wd+SWJBaxj3Ubz9rx4hdocDKafe

X43Sb+JxhO9knd5cXRjNFpF9+QN8m/f61hZeLXUnD0+dMY6ADsY2LOu6mKBO1kW0l1o5FuybEH1oYAO4

04uAi/y3H32xAStNQ/XGx3WJJeK/3NmzlvF3fnjtBa
mV26jM2DurVh/pW5IAubw+iazRBRcQO6bfH3VmDICwu7VDbrNQXES/gXThE9opeOEk6gE6pOd5EeNiB0

J2lnPv4oYKlTV4LTgq+Py4Cx5yqZWolh7HkYHdopCl6DchPlwL3CInaohV/Oyah9CY1xLwtQ/gD7Elty

H3QvdoU3c9barxjcRswzDysWZ1FvOccpWonKnLjtSd
AYIwgjPU9qQ5+G5pIhJPwY7TURXgkeHlJKFtl5U/sx2FGXTiq+fkVYJxwX+jqi4Pl6NyDOJbVBRZ+0EU

qX8LDmuZ0Fd5tIV16yGMx7otaBT8WgGOuqpzmP9hNviSYrGs7nhcCM/HPesMV8ZudttnUc+XBZS63Les

Ba0TvuPpcParrZKCQvwVuVgTpucOJ8EWAJdO1GQXIh
NHJRb/ETuBHmCn+BWkjEP5OMzJr7SeaZhElAjD5K06ggYd+upHqT1LFpqkRawXVaDQKlxebvGgYZrK2h

O4mRV9L+Mw2CW7M+iTPbgm5c0/wAvbOXQKsg90dgvQTLcWbQhM7On+EO6krqPmXDLHEIU9K9BRYkyUgr

O8UIdCBteWnC0y6mUumXvsGccKrlXnEBJPTOD+F3Lc
hLOHobUk7j94YCmTEhhuEwOJ0SCQaxGuqpJanYb6ka4/Ipwoosxa8HS2W5XDyyuxX5iAyeX4xoJ7qbLk

kwU5zFP+lvdevkNp0ccRXyYmwPUD+lXAJ9igtWARG9yv3qZgV/oAmcpi6R76JXb606mMcujmP7IsqKRi

EqnPaawMqkBqlSuwjSbvuSwOX3gh/DKV9ALjgnASz6
UBbiNj9LphJsDC9l4TTgbkb4NcIJ13oWzZejv7Fo6wJyUFOqWFZf7hZwcAOz0CAULIlXZFqUH3uRXDlc

6SjOFTOSz1NmZeEBpnT1PNMj/1GDSF8nSDEJ7aqEbJpAMrJn4GwUdBbIkg0YXigKe7VEwtQjqb8bzG8y

S1EXBlYlYd0zo/J7G2GBqTlpN7QarELyiv0IJZUP5g
RK5B2UV9x1jiBxQVJiTO6Oy9Jbv1X0WdRaRDjdOUvHypbHTOPjoQDMqwjxvJsTRxW1GNf7kVOT6BkZYn

G3aKN1NuvqDCxGfwql6RwYREq5yf9VT98A9xEr3Cw53Oe8fxSrhlT4ReJAj89myxkvCxcY53HMTOFUOC

DDF+CVCS+GkzQxfxAaOfViLNNcPzLar8jquEWsTpPI
LkxnN3PX8jpZduZefByxZdAmS/SQFPdzETBuOY33r3pJ4haRBV1CbmH0CYFxZ/z+Sevg5KKwKx5ghzGn

EHsQ+xDfIBThIH4/jp2ZpdduLv6IxlLvMfcsLLFH1qyLIvnJQ5/wAVkp/ErhWoQgfIBXj/AXfK2/4NUt

vI+y57K4rKa/4Og1ftFrL3PLcKC8nJwPs89CQ9VGCy
tg5HdytBJZVYSHPsuImCeVqQL+8hLwdM8zpCUzr85LF99byVD3dA8Xl9BRUQAq78BK27NHADOrYrCGlL

NiGSa7K93G0Py3mLKAnptvFGwY4PkoGfK87R3KFVjRPIQ8T8DtBwOw8kQK5EYOLMaeiiFADlDKj+f0Te

KSCk1GWjf2FhZ5AL2JZjvnO8QXiwlC+IvHxMmxYj0L
hd82F/tm7lZoqAb2JkvGepClGdmhLfVczVJyYzL3Gf/1aIQu8NmycmDkL8bI8bXqgPMVz8QG8jMRcVP9

LAz7M0W0WAlsDoqmwey0gZC7IC60W1gdHhpV4PtzT/F2ggZYr+GGnoYFYNqbt3SNWOnzA6RXUHhZKRQH

DchQT0jFjoT1j9aPYcTbwhAMF4hqW83IhBAOIKnX0G
s/YWM7b3eZbCNVCYGSslmVTq5hkX+wxvNY4+OitZRqfF20TzkZIkiqWdDMU24C5vlJPgQouCyo0RpdCp

RriBv6XbGkY9eVo0XOevyojZASkqYT8dxykKKxQe0Byzv2CxZV3bQmtKkA5z/Zur8mRO/Sandy/noUzYg9

j8EA9VPghvda4zIzxSaE7ph7Gw9vfTdSpm9tQ++
f5icXDMJVAsJ1aaRILTBOOBS77qMEJMs8lyBveNiup82/C5+eyG2IBADwSh6VhSHKkDU+QYhwHyjiZt5

d7qB7OtBztIe8NG5efAEF+LwhmfBQ2vwYZjkihb6zKhykwl5fuAVFQGGu/B3Hxh7lmr//8bTtWNdhz7J

RKZ51GfL4R3bg1XAscKvLl9xQURCJeZaMZxCINTuK1
LaBxbzeE+HERmVSZKVwENnb8YLQ3FnZg0b1uFs9DV72G+GD0EkKb8THl7Eskc/UQtG4LgrPIuWsG43km

EF1BkwgXKSpkA3wOMZPaOQr3i1AD4/pEilbjvg6bl7syv55IYXVnE8GG7shq8nsfZCF4tx1dEMea0R2h

YUWj7dUrHE47W7tPxuEGsuvkUO4oa7dPIzI8GyD8oU
dZQFihyYbv3b2iuHO6XNJyuko3NEsiaXjSYcut0rfvyuCzpS7jQ7ITmBokRHXJoVnaJ/HIeYhJYP5gw2

Y7pXnchAK5bChhq3Dqpeo8JrlMpHHJnEYJ+QjWxquwRGXhxrOj6+mCUIqv3puafFnphiVDg8JYOIn/jI

UqN9b4v1tJXSRsxPLWaCy8YwE7yLIa2DQuozZTws5K
gn8Ic3jRlb0w53gbpviNpjGM8CqeEWykXmI88k8aMLr8qUD85xoj/bxLWN15jU6xs5bxtfv6wraEOQQ9

Rb4oYuBJM9jlJ+sSycumjgRDsl9WP6m0+HnEfpYv/X7sGeS0ehtlqg2p/RctedA61WubnE2mFWlyqas9

nNL5HVO62ORbfQxmFcka/NffU6MLg4mqdmOHD6Z8Rg
nbJYePNoH0Nvgpby6RODA2WC4yJw3EYr6/VAI+Vp6s+zRCKYJTfCszC0/wmEZUnvZro1y8UtMP4JQxzM

1ySyzjwRPY/zoJnHeRDzxP/Ks0oW0Tfo9pq/wBMpZjcFGAdDVuMcroe5X3nJmaptwlL2KUIFH7NJYoM1

iy+ABzBzRJ55FMSNY65RWSy9zz+Q42okIpe8SOviPe
LOXPc2GAjboqRu/TJ2sjXzDTh+kqmOab8p4MCOcg3ldhykqVkOBnG3Kmi0qwy41kDortt18pGl6ssNXL

wvDz6hZabpuoAB+oKMiZcd4W8Kt3Qd6+oHBjwL+rNj7RvPjf92X8vxQ1Pxn17lHWvvgdYm0zrIgfoEi0

LB1M0XKsnEM3Fc++Kp60HucEzxTBOCSYP8suaHRcPs
IbMH6/9V2bcQpfaAXh9s78r/9lC7wtYeYaw0iGjMfVeg0KVavcxWrxBTVBS2Ak3KY45nhL2g7wf75Wls

FAFnKUanMyCd9TGp6Wpv/Nd3N4EZTTGZOeZcxJtKh8aY2cltSFmzOALOoCUE+K5TJk/sblSEfMzV0ueA

c4CCBsnKVHE4Ibh4OnLVKkAlNEOCkoggXK8HaIDv0V
TGS4KRVVzhN2zplDefXPF2TzgRdbcbIFFLDXeFwEK2JAEUjiXTXOcdZEYZytrzRDH5UF8DS2rPiPBB7F

M7dv1guMfAwta8EffYnAzIwZNjbesA5mylCqVj/XnT756BoLJAGhOzFnfAIE+oK0Hc0JI5LljPxZghWV

KGmOzeWWKaTbXlAlOYoPMgp1v2ABi652TLF9+NW29/
xG1Mmfcf6KQBoKsNOdlKesOT+hAr4BS74K6IcsEXl5tqsTa34y278CeX3g6McYxG8wZ5OubLIHwdNyu5

VoHaGaUaUQJ2BXjuo3Dat/oVG1w65d7YL1Imznadm/fu5UJe+1yolv+Fm44rOjP96RM/tdCqfZSI+asf

POgireR0s1v+JTA/4girdC0CC2FokPHoU1kxw3u1ZS
Br5nq7bSZm80JCfXZ3yDtv47dWfyvdwMumCXUxh62ofYAF/36CSoDu+MwueReOxo/NiuBwtKJBVKeaAU

W12/UUDAxwsgTUAdN+CFcEbEzFCNtXYS26JluKSN+TJ5kQTnBzjVCXJg3nZjHKIWjxoqbSw7GGd9TA1r

FbLLbXLwJjBvlKb4zIJqvrLn4zOZhsV72tjJ6sAJ2q
+TaQ47kME9VBAqNLGFGRkyoG/mI1wo3IRdqwzTCPSV35+RzGhnZgrPqDYGSfWtTUE4Y48Ze3amMXretv

P/GCYPzhgYWTFXlXqnKs2zBGX6nZDoX2QgB6pZnSQZx5WzgX4wn7IjcFm1OvmgJ18zFitqiReRui3GTR

i61OJit8f59cGBcwczpD0MrxyeIo9d2hF6H9rl7Y+W
HfRIFG8uMsqyVywiWvo61rs028vSfQP8HjMYhBqi8gpnlnu80pQl4aT8oWt4UMjs/gUPk5foKB73a3s0

vLr7el1hTjJTPaZkY9d/rpYziQjurUhAayqUjczhwto47hbCDLai0OTop48unO0i6Qg/WqD4Zsgajh91

rWeF1EtzLRlczaJpvVl9PT9rhpeb8jramDJnRmq0me
Yhkge8VWl15iwCV2/vmJwuD4xTIFS2CStHe6tOJSDPPuq9YW2lshV9oY/jEbmPyelhKk5UtDJ1jE/s4b

RYGEnVILJRpyjB/IBhGYA7pbHsWFrAqSqIZHEE7sqWHwQAlNCrRdZR9Dpf/qUBqLfxsctpxGfkTn4ia4

YLd2TK++ly79V91UROKCdnpQu3FI16mAuBBb6DFYNi
389RPWumBRKL/jMyIMwxXQWgOjjYN3upYSj2Q7slUJvaUK9SexQ/FnUqrNPt9vZFm2r+Zmp9MD1TRPa1

8VwayRlPBnaWwnJMVzuFhg7un50Mwny+CC44HyXymjcsvyz5e76/DXH1Ft3E8H0b9U6YASD+UpDy/EOP

8jCtpLDMDCIVDRUzWIvINNDtCm4NGUlDr/3mztPV4V
KoQAH2zlDTQBkkcAyhEFMCaxwmhRJIQQCWCjkf+cs/wTUFiX01q6/zHfgzktM34RfWzdss+WVRvHeqzB

/BciwYtfmj+zGe5sGN8R38j7bwfgn5wfE+65YrPCs7dRq5X3/qQShpnIRm1c1L5t5TO7d41RpFhz1UGB

Kh8+9OQpE75QUcwrgpt7xkToZbr/AYLaUZ1/WupsFm
QCuLlTzHR4XiHUdR2ZoXdHDrAASWak0c9z2gALGIkVDMxWpiggEIWlU6VuzzVzG6EWuqkjLIG0XEwz53

J2388MXDkC95ECFzQO8+yWMPqTTEZPXaFIe2jAX46XeJU4SAkBjGp4qNhYCNhDVqpSBYqPc+QrHAucjc

4nHpaz855iAO5UY5VtV9R65L93z2od1AwuFIvTlof1
VZChYrVLcfd7OLtLHo00GEozXKVqC0Fu89wZxO18l6+rjPeBFtrz4Yonq275bsx26N7ASvVGjkvLAGq6

FOAL7z1xRZATQlYtVpGO3+iIQlBByzLTVnreFWBiAK8okRbnHSSgxXrTlZmPpSmOjo7hb3o9ggM3lXYB

RgWoXMJRB+rtMn3cw88yA7ndirQ73zb2g+hqGg4oUl
TQTVR7UpW8NsY1r6NaRD47tQYPg6dRPZ6N5V7x1WQWsRDrVN4fOOwK5ezeK5BirZfPLE4mrjrrIjpoD6

6d3y1FO5dz/AfRzeEbJq/a+ORNc+6oAxv06EH5mROrY4Moniq88oBy/uG93/4LlpTG4PEAXPv9XQk3ur

hmEyRnkbPKOdgp9/Z6nSicGIXv5d3vxINRDn+wXuKf
FmmLvisfyPgw/GnGp/5vgl+NW770YETfdFj0whEXvCooifFsmkohrlKJmp0OvXFuzoNNGPiY/ujK0114

mrnqooajU88iRh9CnpTrmd7ngqYDiZNBFROvhvC4LoeyRDkzZ1E08FMmMtc0kbBdQma8nsR+qLkcl7Of

iwh7kkts87k96pD9abuNZ9CMqJrUBR1Tn4+ksZfLdh
XpGyAinWmT71J+drt0ti9DmUWHt+OZWQSok61s4W21avmgr9wX3WwfsHV+nWe21/xWBH1ol6k1ixF/uj

9jKeKpPVRv3mPixsvh0xkZCrnub94Ass8uovnYoT/JkQBoS7L8Mr4iZ3+FVpGvZ0RTFianXLxi8i0jDH

HVIwzkdiKl1rXqBBGVmMG3HFyrslxH+yhv1PAlqLoT
CQXPqEh2vYSQX+a8e+U9XyJQ7iGqflqlfX5KEd3hyiR9h1dwEGTcEOpGfZ8XHlhgvV67h8b/5kto/8JI

tCdyUZZlIg/aEzQxWCpEYpoUjcA1sa0HNKzzl45mm6OXxuPue3DGoLWRyoR2nAes+YT/cLsCv8k32SRn

rMujg6WsgK7Z+AoOIy95SWbNfveLI79Rq6SaAZC8La
fpN6gRWHVECrS7yh74u+EbQBeGmqp1cY86q6aoBnI2+NE8PTxhy3OdMxa6qtNJV1KFG8k4o3NeZdZEAM

/yFpLJuIN8vLQP7Pf8iMSgUMaPa0F6a1AHfm8tpgF1f0MzjROWu62gd93mPJgX4x2JfJC7MbwqDti8zT

zbeVrC1JYPiEomxulOsiPRuyrkkzKWRgOyV5NhTtDU
2mUsBU8LboNGNIMgrZEKB7TxHcAmzIVXeWb5fHyfdN9LJ4O/NbEFa+Wiex2zKIpGN6QFpKul0yELS0pe

EjrN6qI3tf0024nAn8b/wlOLbD42wTw77pQ8x05cxCO4bero3xOcEoiSN1ghwVgUnxtKOH42Csa8sTR5

cia581uD2ksCCo7rNEAZm3Jg62xs5bQw84CpgOA4eU
VH97ARC6ju2XfXrrvNYsoroDn3AwbyGU4hwU6hEiA/lnQpLMddP61xG+/CGGJXZLxtqh2VbNSAwYlZ/g

wIZARC9sJupdiqz/FmBgpEZ0GGz8DTUtZpbgwzEvdwe5V0qJqq+NWSwYvznPLh6Qo4b5vmmYXKQu0nGr

RCPep5aMprePL3RuCW7hV0x6V2Je+aLwPQyVKBz7AR
p+eR+SPMwgum6QDfWWYjnJUQyIFjhx2YoxlZhBU0Rj39mGX5Npr07vHqe0cuI+3mKg8Qp6wyGv548nwC

hO5QA2Eyw8O7mz26gYjfzvr6FdazlzRA78giFwF3itwSP7/3/vT9pDuPs/84k+gbrD3t4my5F4XoiP1x

mGa3y/6u9iL/dsQcnvQxD07zyj/9gz6bbitxk4UV2s
epk+tw4Vks23T8ciK4Zmuh0K/v1DYQWfh3YWvo1mrW31KfaZwrkQ8T7yW1aa5upfdAz7fdG6i+X3I47+

xOk2m0gXKA7JwrhLtPFEcaZzRR0IUxH2ER8iH/dzCgdxSVwivKxW0C7cVGSpRhhbyJtbBjj13yOYqA9i

CuUXgiSmgI0ylAV9uRTuq3KcMdOSsMeG4tons9BJ5x
ZXqQy/RggN/PxoMqMfTLVP6L1RBN6qJBkoj+YqDDclHBrH728OR9F4S3mBxxLUAXgmlmArygoCiv+9wf

vyX3BJLCzhho/TFJnczz01dScssZpjwZF0iqwHVfPEWPgiF4a0p1sxjkqAvyicu75O2BrJHMzqF6UoG4

F/w5lo5xk/7UjiHIKRfKdElECdFpxAVsn0xGpK0Yrw
QP9qz0+fuZpSGw3mtgctbEKaYjgfHQJxz6v4XBi92TU2f4MiMb96/c8ZAIV7ijr/OYUB8qGQsnDgF5ka

2nwyR3H81h9Wb1gW/O+S6d0w6z3vwic0206SDAqr8qVHWAvFw/27ATAjjrMoNVIjl+0O2oikqJB6GcOt

knZQbDVUGL1+V0vaSFg7RN/Geq8lAx8mpmYJrd1tUs
iM2kz2MuL9YuAF8PWkLQBJXt9xtV6bpgrx3LnqtHZrxOhF4iPvMV8HQ1R3x6r2cb55kdz1XNThCh988j

LY8fPBGKaJrC5WX3TeEHUSJNcjCeTWnWmnZX/OYw+29x1EewkSUmJLIU6tNZLVj+IEgkKLhuokAiyGUM

hgKZpIXoLaBvIMZC2AxEfFoMqw/DqiTHisRgm6K1A8
YJSqZpD7efnlj4Zmjg69cST2urItVpGtfUCwQyJlTeMIsS+CPStxKqvGuQzNmLtMEVuZl3BOztpT2Rz1

27e2YxK8jmg7qEpFc447kxgczgcshv4Ucfq805HqsidpqbvEEpZp21zM+nCIhIK4GWFQdGtpGWEFF42L

dKg6wdWXtLTHRqMkkPky8TtDLtHbAZQQsrG/ELOsNy
Qbjpg4pqDZzkpIZGY4LbTQPrrdSRL6yIMakQSJs/v656O89sVZyh65vce6XX+vI1qONNc5T4rDqeHRap

ByRiCuqMed1UOnmsRGZ5d37LMZT30hINNYnDqGduPPU5NpwuYAmm6qLRvTOEOzZYCCr/uqSY9CIzFHjU

LlBmWryNFYL8opULJYSRf3tP+RPtS4nFNvqRXEiJRX
bhR1d2YcoQDUUsdVDN1G/3WLZqwMGPl5a3jQUYL+3lvlNKPg01Tevr9n2zLU/HTt65e1eeL9TfOszypR

kFITF6YgsWaYFtIhHPrODYQqRWODrV2fBbcijsqBflQ3QDJ5Z8dKHaZNWFLk5O5oOUgk9dlRP6guG5Rv

qh7Upy0glWc8GOHIS5icuKfRjfECfxaP+uy4nu025N
97JBwLR2h6vxwiv19TkG50GHirfdDs6w6kYfNp1cK568KF2m20hn5ZMfxAaIP8dSwiJW3WSjH9/7qpM9

275rLTmu8Bcx9+DeG9h6pilstkmRiY1e6sMMxNK8wRbVXfPkAV7WnOQQwz6G1u7yqSOTS+tGM3g36m/X

Gju01u3ahxw5W73qJoIp63/4ET3BNwmfsXIiWIUgjn
dKnTVqyLDHUboqzTanCZO0WtKV0c4VxN2UiCCwaq/CliDE1rN4SWxXd1mMWfvyKxVx4JPAHUxZiVvp+J

KZPn6SzJX62twxupSjH6kN+jBlcJdMWlafabMBzgL9if1O2SaExi0bNRQeSPaq/MTZt6DZwJBuNbvpcL

7KQbjCQ34By3zJ785SBJfSH7pbWt6Q7rKEIU+o/1J+
iWX5jFNKYAHBFYSp6WIYu4THxOx+HNNVDFwMkV4LfsgkkieDngRPOaTNRt6lHr5HmrNXAz/kzLdQLDPG

Tse7GregpFnaAumS4TtUGd34I+8lezjKljcy2szMCxtGdQFIBQwvaZYLrJ6Q28PMksG8/wHnP+AzHnxc

hrXBAQAJRgR6AW+wY6scXgvhtiTE2Pc2ZOkvIfIHXM
rYorepVeKLroeIFkhw1xGxu5RP6s3OtDtImidDV8hpoRMAOhUUcmkyUGVnbmumXI0gkZadVHmSyXRH5a

Cp0vvMlauA4t5oq642roz3xchReabIL5NSUid8gsTh3Uwj03f1xoFBsg9WJj5t7VUqDyJyXpwa0WnIew

9YZnS3YfLMwW/A2vz+Tz4Gq50oLaYLw1+SrWdnDfX6
eoFAUOWR4FMKx97MPrle+GIbv+iYw/C94VYYDlGzT77sOC1Ddl9Jsdh7PITr4Gy97CEsahhpcwOYPfLi

4TrvqmhQ8EI8Dc8yiVR0B+4pa14tUoZhd3F4psdVs6sstAcZlxQDyNkgXkcDAEezwlPXckXPG4wzTDdr

wGpzFNrQOrIMgr7oDEfbESeM38TazGA2IubwuPrY11
61S78U4hGyoj5DYQsatLqh0MCz/7QDXSBjIdFosE4k7W6GDjddPcOKLHXCfCPHnTnIYO8nTWjTzylSxo

4rE8XhGFLFz2QZMcuvpVXL+oMAWq2HWXghaTr/XRDAyKM2QclyWntIPbMyE3hKxLT6e1FnSaxcW2tGub

T9glLmKwlmZSMPupE4ypZuNEusCWomPsTy2c1Vj+zO
qiLpkHTI+skwuvH8yDk7GmRynOOrDDnoIcLH8VbEFX5GdoBFfAZLim+IsF7bBrKAaPU8Uh55Fe/E181C

kZ4K47KpxMD/XYZUQDyEsNEh1KGmxo+7gIvQQ9blxLnsjrreFXDqiUOGVIfD03JCLWxrVy7FfvxG8gyk

yfeRGX583iJkdgvWfS6BPkFREfXTOpaBjNtvXplVHA
E7TGwFgExCJZMxHYvrf17BMvwOcYixkThHnTN5aA92lhHjHe1JeoM+z5twpStx94l0zludIOSPEaQlhe

4vJqS8zkH4De8CPpxoVwc65kWxNZsRxFLQylbFnw8RwS+Xzux4dzAnQeP7Vv6x2bDxwob2pY9bf1ZxBp

xFKmlvjl72rc0YN7Xd/0dsdv2c1ZNR577/dGNzwZT+
C+1blBU64BU6eyN7K6+0jJHekchpA5TjSvu30/3Oeh4ycA65rFzRt1o/2fT/PsNZGmAvJTJkUckQNnha

PZ+Qo5daNB2pEZWu2bdfpyo5eJ1aj+vyBOzFBEux3Y/97N3rJsCbh0nnreUgFCeATMGbxfGRgfwesN1+

EPf/4Hdnn2LUn283/YwcVwzuzC1iEz933qawr/EfdI
axc8/AJm3kX1S79ahhUl3vmz4XwQbeQvV5NIJoCE8KFZFgRgFQQKrlZlYyREcgOjiEXJWD6+OjKk9fS3

/v/IyDnG1qwiHjKrT8XsMHpcJSOp7WQ0LEv58jGQTkmbPp2RgLd+7RIidc3dRXC1lHewJPPIrotSp7qx

ubuW0+x1aGpgDDArXhQrPmrroWI4C+mfYS4m7d41W6
bW0ggqmFzukSgSMMsOc7wb7oqQCykPJmqtR2HQJwi8XFVrozmzHB0KAjvFW3uEisGsxnZEOcImPynH/4

GOqDTDsEhhpE3q0WTwMxzjuOD7/fQM/QLA6J24yUgXPEwOf0ZI+PciNpVkmoOO798AFNFe7dELKMm8Ka

xaSJaQB9jCxG2wMhqcuMfkxTQqoDxyulkg+UlSV/ym
3MD4hy9JyvG7ocNdStxfyhjBeeXART1bmQb1BsuXDZSHgaK5rGTGG0eF6+vvRcWFTVXWWgetSYKqrJTV

soAivXwTvqumPjKNSkGwcpzJKApuX1TOkrRP3nt/DzJtI67kDSAKafLluFaipqPUQcOGCGMOJKTKhepD

gMTUaizoXvCnAhuEUmimg47udPh7HvfBWM5jC5Bghi
afuALC/drZt86NH6AwyX3pOkuuX7MgoRFY6nQFeaQ2XsoKRpi1VHqQ/b2qVAkB4sO91J6dpdig4DVbg9

11aJg4mf/boLQXx802ewlwigtpI6JtZF4RXufkVv4YEt45aY2qUo3XufV8Gg6jecKhG62uPFnOVYIXIr

ZVaLuVRY5wfUP9gVyLN2gcBLy8XbpPo0mjrK4O17ll
nZbbE/+B0DtUp0n2yV/tP9J4ImDNLjrL1hfH4TE72ZcACdzmlA+6n8QG625qNFSkSWzdWZez3n7PuDH6

7zqNECGCN01mI3RbpkUOjFqV54VbiVJyQUKU4kviM7Tq1yaHdhXT5ulrf/eTbE4IEBq5APK0iwvuXrbg

mVaBOUsJi6Yxfmrk8OzR50KoeoPwLyARlvKxxEwOuJ
GF4cPNmCfNkrcyyL5G3pLnHI43YWYh4+mLaXJFOenF9Gwi8iLWTyIfb4nhMIs6oOwE182dkQiEWf4wJW

7MNEtKOrd2UnoEz/YSuPLI2ooB0EIya1cokx4v+87fPhZ9/Hrth0kQr2HvVo9DnT9kYiuo086cJj9ERk

J287t2/7SfHJvS+9ceCN1/9N9zRhVWsMjSd20GlyV4
mM62zgKBhXOiwvfIwAInyuts6If8SL3fV4VdHbMz6EnQpVD2gTVU+WwTKEfK/SvL8fw83tqIT+/Fpd0u

u1GsRZCeuKe7qe0U10lJ7Q1r2X+8i9pBwunlGaqsLZEvHnXp8Re8o2jl56r215YmkM3rGz9nKcI83AE3

VZe/Pj/WfXXnBh/4ED+27ut2ZYU1s9dO6aQvRujn6m
x0u4uZZ5EL6PYlplngxt6AjO6mQi8hb4j+XiUTPwqoWdyCf2MDWmmOFaRZxcj5HkCwSywWwiWuas+/P7

EqyEZreU4m/RF8z117kU5dUPd8o+RwCnl1ckZKnGQt5KkqtEONQiTLQmbOocheWLwIu3fpBXn24ecnZJ

fA38pkYYb2Y6xiXgEjmEBikeN+DhU759rxF93fCUVa
obC++1PeK4p/LOtll4c2L9rWSEddzFm0nnTk4NbgFzctslIQHPPgTSA9Us5cLovFkZirBkGrZla58mbK

gzdjoflRfiedVdwnBRKik53l23SqxuCh2FoivpDO1JHopdoVa++LHgBP6yHNuIu1Q8gITh9XhQAD4e51

RIFiubQkCLnKaSm0WU/ySM42z1oMjUwUGypzTTwkJJ
yVQl/TgQX+L7zFzG+jhEPsppVisPm6kdreJcqSdYChMgZEdlG7OnMJeZG2Gf0Kf6IG0OM8W+VaNtO5u0

q1DSe7wTBcSn8j47Bt0jj/J3Maw8y7FaolkWYOPwREHrWRMMRpwEFLYXlnDRr+rFuicc2nbQtgALpJHN

uNLWDWKHyhMDTkk3hTr5tEiStTRFDuDNUklmTrKVaS
dyriP4qETd5irni/s2QYwFeafX26W5R/CFQ3LAj699y/6I2wVUfCkyQ0reyARtYI0Q7w3+GSVm30SRYI

8T3prCiN1biDCSgi7HbiwYLxbyUVY31b1QLssN0XGOcXvQFlF5nzH8NJ6FzioU5n0asRSJwwmZFumdmS

5M1xnUngZ/xTqwoNZdq2LZu06CP6lP1vYCdUThHme3
CzfYy+LLly+FNJsIW1SUcQyDMcQ6CSbqAvCi7VKhXg8125T7mJLExr7IpRItzLosg2rbgYaR8x0F279k

4JgBfcruHn/seon4engADl+bEwiUZ9+178N74i9so+/B9MhB2EDhZd8dWFmcAVr02VEXv6ukk8+9NHTe

bGF4W0XwLpoJhPWABTo54jehMFuTge3EYcMVGYTMG5
KneL/6aKZcOqdatQSDvL7cJWYbtgq7HEoa7BeeyKrUGrEv2YIB0VvQwbUh4gaYfIL5wIy59kRuy+rRwT

LVTdZ8TYo9KF3KQ8hEdOp6Tp5C60P7uiGK9zCtpfxqsdt+6hsjFZ7uOZU4v6697JFPwvJ1OUll3g0hyT

s1pGyLNr27iWvj4cSimDXelQEFbfuilDIrOljbcpjK
a/ulEa1wQdnzCYpd77xsR1v2whfqdZB4cj0Li2N84nobhzwx5nS5b86g2C/syGVT7Zg2wAPM75omFvF3

o0klEAYvoZ+kvfK5GZvFX1BHs7FLXeCDFU8H+/xJ5SgYlZaSUCjXqI8P3Cz4PMSXO3xmCUojQ4ozhqVQ

rYK2USdxqnoc3qzi64qtxFQVOq3Xl0q/Hqq4CnbJSe
Yrv5MT9Uu1csK1CNgUgmUbCVemHOcIBTadzIRB88NecfSyUTu2oU/cIjCW/5tpxy+veh6FyWFasleBq1

A7QtF7bsqPhQ8c4+MlizqOcxLawnm71Dqqw7grRep0Ff01uiC9NZRAK5Kqjm1ugqUYi+b8YsrnONztNS

2dwEBTOpbLV03dY6iWKjM+bOv3OKnmv/PJJc55Oo8H
u+zAhU+U1cvb4J6Tk865y6/AJe4M297nsCv7b769yf2Xps6/r2ZFbfG1/tMZAHkHjrJP/acHUW9n9PEy

/BDqI3RkaD3mFkum8rYj8V8KiXulYC1IRYbKI+QhllozxdZZLJ0kb+AbD9hS9+2Y6JwPMsuPEgVFgH9g

bzdQxf5q7lYCjKWGu2jDHus0upXyJsNNt5quFQ5q3D
3E3aIFjqjqgdwzsbt1JpAO5CDrgyXcXk3HjJbf73p8pSbruZmrDqD8amai9Oe0fNEGi/kqhq3/bovDyR

G82NDqLw1MV2/R1uLSN+4lXeHU1uY06CEBFZjZ7jNC0lE1a4Br/bA8sXWJTB/WZXzV7HQJFeIwsB823d

guru8gK2J62sg51usoyoJl/xsB8oIKJsyw4F6xKxha
qJ2s8DxQNHjuuAcbaYgbnbmQUQ3yXzAyIskwTX8mcvAzNwmiUSAGquH7RQ4CJCo+ZALOstszK7aojvgx

sgfhyxm79UUFQjrgPOPRMbE0kZzJb6OzLlj0uwdmbXhwllmTs2Rrvx6ZUzh117wTEDgj3DqNji4HV5r4

hAax6jA5lZfWPuiGwk07yqMfbW11db1aMNjwl/V76T
5tk6nZ8jA0175shbjkqD0Iov924yI1D122+rruZ7xAJKyP5SV78QWsdyWUNhLld4cfpq7vWLC0df3wzi

DFwZkAwAu1NX3le4Ckbq+vEc4tON6yg+C5aSaFm0wO1hRQJbLhMm3rWad6iJqh6sMw+I/xaaD18Q9N/J

hc9ZbluHWWuu34c2DsfHz2Bm/C29TSXfsZ6RnmDwCd
mhUz0HvBwAr7+lBf2roo5EWaVGF0+X5Vx1QwnovvMb7+DrKAbQU/47+YD48Nku70pDg5rH3RnBfQctaY

J5uyn7pBUv1ny0FQq5NFUKL3+KjJHQvJsmqcksSDbzDlJrU3wjhxc2/oMe0Z8O9xc8Q1BAQNG80pwfPY

z+YYP5pGmSqVXEb7+X6m9L9nb3X2SY8c09THTjt57A
TD46jT6cFwm1tmXioLQsju3DbS2aDE0pqeBBkdlup6gafY2ONOB2gNmfRvpWnhlQgBSZ2P353/C3R3x3

gGgbjQ2iej2XCVmS3CYrL+PSv2dkKnuy7jebc5TxXjiD5fNU/ry7g0sMzh2U1bOeCTtcKh/3f1I7FxCd

oIu/TqkzUmHw+lMi9BBjcxKfKO5R4c4NCWgnktDv3x
XxIPdJJ8T/KjPd2Ob5i7gVOiUOnQ0q/39CMWuCXj7in9wPiqELyJNgGd267vi6KTB9pk90b62d0aXotc

0yVMpao87HHq56FV96f+pRgLwzG3Iyw6V/mF6xCQnD1MtoSX5D+Li1MXm7Kwe6qKZJfBpIpVT98XodjW

DCAQF53OF2qd5NMpZgC7IXGiu3w3oE9o4ru3afQr2y
feDyAkt0Ltw0ajctvEtl4rlbtCsaiyDwQa4mze7KW45iiMlXDNee45dP6ku65nUbVD1txvsspsix/bX3

d56VhdFKT77/kNm8l4fvxv5G5btn2yR5fWQuzFW8TpTyyIbHaPvo9npTtbug9zcP5XGRSTfUuaUXXgja

Zo5zfFmx1urjxMI7rXEMPhRVyBstyYd19pYrbxMSdO
YPUDWEXB+YqTOYscot/cOW7TgasLOibpZphHgmuP5gdz7S45n/gicNp1amhPcrzbrBRAblMxjqGrqImc

Xi/nm5PvW9a2gQp/SicO0MHuojyw4EeiSSboi2RQTmO/OEZmUzhUWXVAf8yVGbjZ6V1DrC6QIXdKPpuz

eAPt3gTr7aqzJ94dMdPKdStrvHoKxj5ypmBuNMcHpY
qTk+v2Fi6lCnSdUTJK6E/74v7rPeSKPbPvI2X0+GpfifF9DsJ/seNudtuH7/ft+EIoZR0/DOs+sPJkh6

V4PSamurPWxTjqL4x5ArVCnqLIK25FnCruqTlzIH95Y895T83ROG7FTqePe04hlDRDa8yI/xd7LbPRbg

NJnSuVI4rdg91p2tGQ3L0R5OsGewxY4LEVuGJ+4fJ4
7cdFwc2OYO6AmqIL7Bf0siyF8OLkwgGJsd2317cnaWRzrNuqfy7lPIYYLKRcrVequpsCRSBNJk5uqo2n

H628Vr3omzYApw3+ah0nti59bwcMg8HicJo1d/Xmq5mk60ag6l2gT48ax4VyiM/w1a9e0i/S9rn/O2e7

5we/Qog6YOGzthUUPGJIPHwa7MZ3bSmhm+cOA64VDu
XzKExfN0Mo5IA0MXWJTPf3TgPDtHC/VbfGLD0Fxmuzmuen1K+t8GtKCOZFARaG2AkJrIvwwbhddqUysc

KcNQt9adHRoNmb5vV5qJvboclFtcXYPtqA+riDu6I1gVWBIj7wN3UCULbc0wQvPhezsjNNDU8fP4NwZU

okbNeUM0YvI6aGcgIKR18qnTfAmVkE1PrXxF6VKSMP
jM5GEhqgAOgS33MvgL9IUlMiVemyTk15aRH8+gEIdqbvflQ8wfveGAeLEr6Xj2s02Th+9cDa1wdkH+IZ

Jfr5R/buAX2aekjMqmFEelZ/X7ln9BLckMzzzSm2A+P35dxfFY5ILVsxiymfguXVRz4wpmbx6JiWnigE

VyxfdqUw9+TvNw+iV89aIC9zM0vE56BUsW3ZlM5PzM
cBe8WRGPElYZq/w+f+ESg/7dR9XsDnJ/OTGzDv/uca402/0kz7qdgr49liiOyh28bg8x0eGJNzMVKepN

eYDq4F0CxRN3Mnugsq7CvYecEmg4+R1r9Lz8Xhu35j6WuVcT0OP8CXKdtIwpRmpe0hgostRZg8ufXWkU

ehJDQAFeheNWtn0LJTCLj+gElLLHhWBVCOcD6uLPYs
fhtX6aal2fCYriLcNf8Ln9L88bmjxH6dRAsOE7TrOI124ic8Y3GZ/QNlo0Acb66c2L9oY95aJ87DtIVE

8fRXkYw/xUZ/+8hAk/QSgPEkQLhDf7VtqQnhd7tpGReTYqTbhssGXuy5MNM7B8T0RRTc5Kbch0b0FJOP

qENkGWmjEBchxlMc8+6bN7SGOCGBb4zkxxQA7+U6rR
3rWyO/OsKXk3K5lddA7ECJypkXK1kS5RQOdiPmqPjTjtVTQcwqsD9LUOtU4QTZA1lkLawvzjWpQo+AMF

QAeiwqaQvlmOwhAK6JudLZ9f/hKmdocDPEMNcUGFHMEVHsBfpDcUwV5BSNgR6XQue0RYTTkP9rlVKKD5

Y7kdFb1n/G2wzomyZDc8Hx8RyZ1Br3FGfjJMEEobgP
Bre+//u9htMlaFt9lhp0uqWnvece3PbHMHUU+ExFpKTsuWcBsiS6aBpDsaURZwmUYbtQiFyn4GC22xhR

yP1rtN9FbQPWyW41SQZ6FP3HV9inL1bMJuWOfYDsTFJr1mPmcT52Zb4o4zJJMgrM9dilLEGJqAk2W1bj

FMENyF+VvacjjUradHVoT1xTQAu/T2WW4KBPGvkCf6
Vj66y6LR8T5iZ8wxhlCR/KLxIT19mywwH3LRRas/Zg/cXX1eLfiG7aEpSvdixPRYws8FB9H01rT+h5LK

tD9DMUVrx7myoiqA35wELRCQYmUNZ+B8W9OyXvwunUwDPdkudZ6OgmYwUdCeYuPYmRkYtz/Xz8N5oW+Q

N5Q34V+hTifdaLx7hY+Xjqc+VbrlpwQ297ElVqzVOc
bp1Ob6Zihoyh58rhxyOs1++5nO+/pwqiwmnQhLyMZyOO6eGsLCGMADjjFMzkSaoCJ0wJk3/PtlRz8D7V

Ur8Qr/E+lAva9NqEs+1N1wfLEiMF8AKvUMj4SVBDpbaxcEcMgDuR0sGsiApPZ++Vg5IxUPauIu8u9jdO

78C8r3Xz0MpBJ8DgXT2S+MOpkD1UYgHfzH86lyxbBl
/EB5VSV6lVcQwgWPIiQdtYQxYC7V2N6Xa1M7bT0lodL4iNdgkkpi5VOTtxj8OAuMj9Tg8fwyi+99CcUL

0y8MBws0U9RnUawxaIZ8leu1ylBcG0QkObfPcrWQ1fHNJP7pY2ZeBG07hS5JwiF6RBkXttNp8I/swaoV

Y2UXsNQMyHTEZqWkjB4hWnLET+/eQpLS75twoRoivb
Jr+sM5Q2Bx2XtIw3/30syPpAnxL2qDqifOlTlOzUTdE9yVEukrFy4nyY/xkSjtS6v4itgYm6E67/Hel8

yY97jsZ9ep6JsRcY5/2LExPIZarZlj1ihBf/pzM9n0liRhoK6HXurSxD34mVS6JOvtIvOdvcWA/zNU0Z

gi8xjGkcIB+3IfmJ7T0AjaEU0gZGyX5tjAHX+rSLqU
m4wUXllgAafBdHIJnTn6dh3G5aN+t4+8gLJryAOblsnbBTx5t6FOqzT9Nfg4Lx3CkQcn45ZqBeRi8UIB

pLoN7rPhRJIs3IKnP9Q/cQfQRixUX9B4GQ4XlO5IV4VGwcD6bp84bmC8lKqfp+ZN28RBCBJNN7jGAwhh

uok8cUZ9+0TvnJUHQOH8CV9PfwkZHp6akB+yABz3C+
kAzzEhNwpDpUfcw3HpNMrD/xZxLgM/rjSd4X/Xu7CGmMzX+bXGXWfkV8oCksWq6J5+McekxthMeUiTjn

NtMQjmGJIpfT5tYQRZO1iC5baCBmXfhjYCfzlBN/ZM1xeWwlBtsYfXVRvS45YXMpK/vqHH1sfxSotw0n

+SNuhGLiEeV+jULfN5rGl5flDMZOhWit2Z7L5ZYHrf
us5WVstkxT1Iob+dcdehGxurIT9yhzrwSYGwJ2jnKuL5oaaiXlCmHxwcLZX6El6ooFjVe9zYQ/9sPNcB

b8Rx/kUeGnIUMsHCtH8QU9BWzFjeKQ1mIFGZnZrqOgrD6KVTYDO+obQ94N1co1+dR6XOxsthQ3KWu6ab

7BkwXvSKtC066V70HR40YNJlvmvtewhJoG0DHv3WLp
LhyXhaPpvTm4zFuJND4p14s1QwuYr5qaCsNarGHmJleHWqi/gHoQ8/V9KxQtPFJbGKh/lwLeVhNpbRZG

3jaTwLAV4xZ0aIQiQ4TjHIj0rheg41Kn2IyDAe9TNwmZKeWFwNmeElpLUiMLH+/EILVEXL17xVkkc9xo

FKxpCUhCxlW4RN9zT6K7fcql/W7dix3Z511s/AVxM5
xzcqBJ7sgX6H6BuN1XSy2FrVzXY+g4JNS2z9vPiZty2Teqqj48XODe41WX5nGcB/fC+dsIS8l7IErSQj

jKDeBXrwS/BDLyhmudGriCkyw1MKcT6K36vjv7Z+Cne0o/WgFc7ElNqON/AB+c6EyTRrk7n0v2KgMKmu

zWzuyx06Cr9dAyeTv1DiNgZ++XT2mPdCAJ6fMdlpX1
rbqSfD4jQ32yT+XZvE8SL1+Pp4/zsiDEC2fEtpaVGM99K/uLiZxRw2iQD84W/bPnSgr47c/b8vJltFVp

Ex+pVqQEknt7rdjFJyFCyY1YhcwjsiYU8X0oQyWOyk/qiWCAhgqHoZZ2AGLzru4XDoff913Jv+YODIT+n1z

kCkpVT2xiT6V2C1TgQZBtRBN2AqTuMW6xjsA7/Q0U5
acLk/a3Oj+c2X+/de9j75ZyTj77e/eyqpsshG3FXUAmWTn6J20oSpZMpEYn5iMNR+TT08WGXXX5E5iCh

E1BzfLD7AyWQkK6A3KPb6L4FQYqlVUEhzHzF3yH4OPju484jYPT77//dHdQF0sF1cSun+5ETEHw/9AoD

4/+IPQ3cNsq/m/xOduQvo7/J40JWhfsSxK514v/DLE
JAyvRpqJtCsiA+NP50mZuI42yR/0a26Q72/8xdRfaaubdBZh71N7DQ5Y+I+vZX6ahXx+hjn+U4OcnxCc

BZ5V4Et+omQqX0ml9+12RRWcuCheLgdhpnYN5MU2dqqAqe+5/UnD1b4tNgcaBwKxbPvlzz/Pojl9Dcfq

zW3B9i1IWO3+f7feU8AdWzqEbrF5hIyZxnO2Ckdk1a
bmt0lW59+hb/McCkXRO3G8mk+z2Ud7n955tp/K3e+M6dZ5IlKjUY8zkhassAlx2QB/6/h/qyP/Bzq1ws

DUNPxcuok+Ax9yaYvz/xZn0t3/Y13+Mzo6VU//b+Zxsstr7D3BIZBd0OKg79S/sQPOED+TnfvfxtPtjv

37ndkCyPF2/OR+Ou+J2gzPLRVHDm/+M6FpVP3awF8I
NqTP4+R8M+PLV0ZSvCDu5AZkulO+vVUqW5M5BbkC99qffoXAx/KMipIJAOHMAh9oIv6S0embViwsTnlY

r8Ooj1AM776vRWnJ+Dmdb8k+5/Bm1tcCj0ty/CRC7AjhZoSp2npnAD9k4g9DKW6Kwi07TxjHvoiFJwAl

ufeHpVltQf6TeOjofU/gmloKaEO1Ui/GmtRP3e9s8w
nq9q6bb/QG7UdDF6N9xabTdQK94vN5dGa6ZrMyKgc+oo9vC2FwLn3GI6byNA1ErugJblzZFNS+RT14Pu

pDK+kDsInM1zoInFdk43u6D8SLIPDH6Z+oCkV2XUfodGj3+GxB0hvvP7uzN2MKhMkV07kwJCkg2co3a9

YlrbuLo+EFY3+rybYRHrW+Ly1tvkCXro18q1wR+Ajc
jMsBtRrEB2pe451SKHNo0oTMxJ5KKlWBmi8VfGg7z1M8pxxsX+texbFuq4bBrp37bc0E8ocBU3AdBPqi

zumw68oxx9+/7yg6ffz6apU7/JVJnZ++Ht9IygyCa20flbb+pdZ1xTVymWS9GG8wYa6MZ+0/ZwuZtgmG

z+drffp+I60ZXdNtmu9y/xhYU71qfTvnK35zD50/tl
PS9+fBNwkqmlHb5YnPpslG9+hhcMvdpyLmbM8ljoJRap7dG4p95k02xcayHx4u/txl+c3IFj4If+2aic

4ceU7kT5QkX9LG0kR5gXAqk5qCO48gS6HBpcv1d1Rk7UVUfkcuWj/T3LdCJIxyrT08AJnOsSGETJ3aRd

4bSpA0pyRd4IAEu1dxf+mO3KvmIXQ/P04XxVaikGXq
IzgGWgBsBuzR94M39uqvMuHhOwNV8C30wh+FFGeHNXE1LEqSVqGwBf5pPKctFRvP0T2Pgbs/a8KTaU/Y

6WwC40bfZSilzRifaX2hirnBbWLvSyxW4IU7Tq6xCgOo8iXX9OoMiaEwzM6AkPCun7iAehJlDtbRm69D

lgYDli220TNQRzi86ycwdA0w1GBQIad+O1Y2ARsJcO
joCgyFYqRn2RA8NBxL48Yn0/L9XDt+Yl91EGou/H/Dhd3nszGnhO9cz1x1Qs8DKkC/wYlY0JRzKoS76T

+8X4E9MN37ctsPQdWk9/EK+frBEi8Fyi+/QHouUuS+zhouPMsp35g/pfh7QMX5X+8CBUbB5fWVRGH11s

ZZDv02Az4irJnsFu6RAiFTpJ18dInGGQf2tX80lnL1
XjeHUWdHs/58+/ZKu57JNpN+0+ldgRPb1YQ3HZBP9F6ltQhi2BT36ApVumSuhtC/fyvZojx4R6+PIh3f

uO8qUfGElb9AHclwdzuhszUh0A/9earygW82JMhld9IsGZOXPc4IL+pZoxwZ1NF83mz5kbNWt2/ABi7v

QOX6MUPaDMKS+ZDDRInWZZu9l+e/TI2OwjbUBbFwvx
kJU4KSPrxw/NtXwK7AtyZ7yEnRpwbRKqp+CTAOfbyeTfDCNYQtl5F3A7d1EfxOEkqJq277aHbpyY3dgV

D2oM+5Up7GVE5U3K4QI139/WXCMi6D79sltP2rXOy4EEgCbKORivN1/QeWutd7ydhbsq49ZspsPKcLoG

8vtWENpU1tSf1Hoe8+LEy70aacR8z5s9Fx12/W11c4
/yF+qDTL0A8X3zZrQlFt2JaCEhlF7VzBjTbT6v5cD0STAg3hz7K4woAgqfDkt5LNJV6ArfS4jinHtyxi

78vPNPq+O+0/rUcAQ4nhPQj+u3II+4wG5DcX+5MhmnXuus1Du2y44WR7YnD8XGKmErT+UgOpOqgr6v0x

189K7rLTBoIlpkY8K9BcJ1Nnh0AJd7TfJ0zL+zQT4r
AGw9zP7vdDOl5V63t4WCre2i1HkPvRm3iCpvnBB3/FxrnU5gcH7UYKLSrwB6Cftk437lNnW2vQjlsrVE

iJLOIexIv0wvvVs7ahOsH9H6ZRBaHOcCUka5Gv6+jVNhS3Gs8bY+h+Bs9D+3w7XlGIIfedoqgO8i+N/M

+vhZR6tNG6XeCEO2E6F3TtV5Op4417L+gZ6X+4hkZz
xF3XfZzEA42sy4HJQEd257Qfj/kpvdPAWloG8YVC4/oj6ou2JOt+UhTgrPnF6pnu3mtNvz41l4+xY/V5

NaNOp13Z6TRGKwlD4DfPnw8T6xPlEsQe/+towcVuhsQG13r/0NfkOqnyE/4kavScJpAyB7c+H8H3+els

xx9F9nhKZ8DDPdClvw2QvW9YddFNdednTMf8w2TzVl
3FcAZTey9BrP1bfrC56FVRIb8ngZs8sZQ8xik7g1Oj/nzdYOWXWC/CmiAA3SOa3B0/N8A+BEO2wfHVmK

I16P6LwEoU+y5Z21baWwIoe5XxnU7Mk/iL10jIzp6m/Ua5/9tx/N+Oy//Ve/+4yimBBNMeNay9o5k62U

g+HP/Uwc/XdPPZDwt8563OFjKrId8g7WkTjzGN8ljk
rpv6oXIzbM/xdX5llvUDfMU+RlHQslND+etjsq08eL10/pEM6QwVTnK2oV/b0W2vT/N1rFDQ41hN3Fs5

dwOSnWQfNWYrqPfonhfUmywuW4V4vEK6D626gnzYnseotGvQGp5YprSmSOedIEf7lLJmIdqo5dUSqrzM

r9t+7lwiQNEMccGvfLsWdk8gCpYp5AdIp9oui6jVBx
Xi48M1niTxagrQb2w2TBoyiu22bc2wijBo4NneT4t3Dr+pxZW3CpYDLCZ2bdrSnUq4p1AZZH8Ndl9lUc

MgcI8UV44Ymc45qcfH6SH8oXtfa6yZG8I+V/tgyq1wskoV1jGr/wtE+zrlgBbM1IWLoKmA1SYXBCt5XZ

ATYWc8GevaRQi0uYDyKEnxe+anymTBeMYFIK10A9n2
mZi+BLOwruReslX65hICbpNBy33ZSkxO+LRodKR2gtkc69xH6lOA65UNrh5bh6duuEOKq/N0eQt8AroF

mM5+x+kcNk2S1zBkgX3KnGxLkWiBFsb/Mz63R/uzqV3A8yU3Gr8hPz52UncuzqhRyPcQqiYbOUZrbx5E

UkdaqAHJfFppCVLiUy6V+B29FItk3uLFa78YJszz8e
PzTK6dCzidk7poT4pYNqOnCZEi+Pr3TQpWjSAG6gvcyOu96ISmQ+UdcC8xo0tp8ziKBIDrePfN2HhCry

7xcyNB3Y0vscBQI/r/JYzeFmzSy+Tnp/A2pr6ypCPacAF6ILV2us94rh9/nySrxx9QJ2gpOq4YRnJxIs

k+ZCa7dfzsETsSsuEdQ3B201b6QpbXHHW+T7iNfq49
PHabDOrQIRRXG7U/CFD94swZnZv9GIm+0xrC/1w2X1YgReHR9Opc0MnOrq6b/CT+X+6ppJ/dONNZjjPG

0/CinbZbsuvUUnXU7Cp8aU4rZMaqIShL7hVBgaQqfrpHdLnbYrUZq8mP+kZkUQZpGondrDegf9Mc2/ma

/fw0UXmG4DCYP2ySaxV+17GEJ3auaofQIxB/B5Fl/K
8Psl8jiH/e606s44yiUUMAZaLvXAjM9M9692CqHYcILmmQmuJYvnxpOhoSORWaukqZqPuVPJ1WCFFDSI

dBuWRo3O7sqny3hbciuh1xCGHpCdrzfx4U9NIDhGYYM+ZoXGURL8CcS7iST7NxDisM2Ohx2+lFuilX6C

KHq5Ptq5Xl/z2K7j+7cN3R17YZJA3VyxReZEmisXfl
L34zKbjW/DS/sXeDuiSD6+SGrRgr4R01KBPJ/Tq1N0qfRB07iDvngBRX9EE+TjhG+t4+S/cLaD+Hxtb0

8vA8v417V+hUHVxPUz9+jurNPtNijplDgQG2v7ahybfuJ/fwKdXQ20I+n+lDjg3RPM/uZXiKbIOvpy50

Jonr+xdS/NsnCfwMyWvwFD/PsVVK7vQ+3fui0KzwHq
R+vOvZtgYi3vxO0O+j7c3RvdH0uWR2gsSgf/vN1qxaKWRcM8Fwv65jdC5vkCnDYpN0HvR/7ZSJjXSuhp

+O2jbcSXfPNOrEyQivUbiwu/n+xhTRh/yQDWQTeUM2hlla+GzpOrTVS/g+EP99Yp3XeDZczG2A+fuqCk

bEkwx73tm539gzUvcmuvdq7C/VXzA84GW2MPyQ7BER
uE0mSP10L9KdRBi+GtoVKwUb+rzGVBVxUnvnWwxVU8b8OX/d4/L3Crc5X2Bk8YDj+ybcvWVR5Js9wd/C

OzVfjHUB2yoxYyRBfd60ntY9vZ4o/jyD/sDN6yY84YO+D+CcjOVDwOtRRuRNAq9CmXLMMF6L6ivRVKFJ

PtOD1/Y46eFDZCPlm+lAbZpvtMkA+3U7xl75N+l2xP
qoK2YY94HAsyi92ps37WwXPu1M7glYRD5M32W0fA2J4ciVe8IW/SJa1FSr8uplvH0SeDstq1oxaeV+TA

h6oFoC9x+eVhY4Pio9F3A1FxygT0OQ4HSodHR3CsQPNRgyfmSAeyCtODqAdRVV8980hL6v24x4cOuwLt

fI93t4sNGZGcCWK91FwtAFXxe2qPt/zmWqUB0P2Zcc
321XtmytEv3lT5vnma/9mVcMt1Hk72jVyYAetlFMlOxtiust7iVKabMBboDeW/B+EIEOm7bUUOd7cEqU

45y9xafr7t8lhr744ZxYrcW2xg9TubHrIv1plGdOo5dUqOAGoi10cYprYbmFmLBw+nstRNBIo9+GnYMo

S9SaltF0pJ8/p6D5/p/kzi2QWdMRsMWmLN4Y8EdIDS
xmJW9zAtUnzS14ZK7Sj6THqInUS6gZ0gWqTQnVYfzWHuYkOF5xJVkAmji/fx4357BQuMgJ/FSw7IYnAV

A3y5/KnLObemPpWRyJbkRtCLooqbWnGFcCfyw0tc37GXKmPPGJRV6FCsyb6frxeg3LS4wBO4W8+Ew2cL

grT5dG9emYSswNlafPQadO0zf/2xdLe7CLq4Hsw5m1
U7t+WlPbr4di7Q6WlY9F1ysGgPKuBVHE1yRyX7HxUveF0pXqxO+fK1DhHxgPu5wLEI6b/+m5///0vemc

IYzu2yt+F/nxfM/gOcP+MDvJ6K0ro/85eMpypbEfiajrUTM3Tw8WWthNpsJ7N5zVNCVbzaJeATFrxrOS

vmXscaCfLz+K2jj2lgeacZ6JFJD2BzuIt5M8a4FtHW
N3GT2lpKhXwt7D9jy/QOzkJd1MAFV4amWaQ5YwXnpuyx+b3XoQp3Sg9Del638iOmZ87F4kPA2yqXqF+h

4IbAM/rz5TP9PxGRr7JStoT4egbZM7VueHeirwEfmbnz3kxGJ7+VvUY1LY9GD3jUCI6YqMLoZrPt0bSI

vjzjyjWLpQ5zb4uuivT/oPuUe0u8uK/tgq3p42Y7gy
9HFW35AiXSLiQ+Hd6uUWfd50FmdIFFmo+7UjkWKf1Yf8LWpyIfEyyHW6oQHu0m/BibC6EQmtrEdN0o1Z

b4cMn7/M/H1ZFCpynYON0ZSCMbs5Q/PTZDoOx7HnTGOniJbstsvAyHaHShOHGKv4oPjPH5Em7s/K1qn4

Srf9Efdw3W+suR63RNx2Nsu3JX/TVuM8agcJDTdGET
tR/vBPRRap0seuQ1N2wMQch+LdjESRaLAekuIk0kyc8b+BdYTYc9z2TSQtlx/PRp93Hwpl3sPfbiIW9/

AaR0CXl3CsWNrcDbbNt1zGb0Cl1UnbTIOX1e/laE5hp2Uvq9s2/+1eOat3YhuZNl4HEKjxEPqjNP28SR

hSGXJOP2PuOZmGCjERJ/T0euhkpPMXopHv33l4xLn2
IjHKn1jsUNFpPhdRnGMG133QXcHW/6e66+xsu4awz6mzoZ7f0gI7/6mDCD8mzqkis0MCTjFq0tNLJdrJ

3LIbIljbCUoqhCIOVQMS8qVqNAkSxx458AHephw2FYeoHwRx6LGVemglbuHNzNqxpQqBuND1vtn33u+9

hS2j1qdwu7LildNU/5MYSsgpAf1Nrs+k+AniHYccmD
f3ZDGuvFyP2a1u0SZQ5y9uj9tr9pfxNJYOjKI8SjhJ8Y0EdGfiFHwl3jhCEnkDCrednfOWRQ2CVQGj71

K04SqZ50z71qVAOPAfCz2tzW+rOjjKtIIXBFXbWXWx23R3pJ+e9gJ6sT8of6TbUf2yutoRzyWmnoTD8z

Hq6urxr4usiA3b+mzPSXbwqKmtSuTj7mC0iwUBur3g
r0T5SY5pBbqQtZXs3BEHWmqKC+2TZhm4TQTOU1Yw5tVr7Z2AsQfLjhNEuUdnRhky3vHFgg7zDU6gIJv3

A+Y9Jpo+d/HAfycwsr85Y8YL4gC8ZqvvJrnEyGYeOjA2ex5twaHJvULqM9tlh1m5bD+xrlcPF4aGFCHf

dPuVrFcg5QfIVBQVLMSIEQHLfaBXeOOAAkXEFncyB1
7ShckVR9XcyH42XqrsBIoJpWVRWKgbxX2uvHr0LAMdbFAIzGnLu2i6XpHmv9pRZ9EH+M5P2MHHFnzrca

tB+RJNv+FKFi9PP3VzCbvPF9qM6jBpHD2ZOuKHjQc6cZv1MsYlON69rSCw/6p+JNHE4wd4+6v7sj5mZx

Mx/TfwpsaT/60hxmjQwc42vbZCg/lVefR740/ZZsKZ
dkqhVJTrfWMp+PdbqnZ6xpn1Jh1OC3FnvmMzaPDw3erBKE5/aTqNgxtz2Yd1V4WPvzFEtuwbJ1OKRHaQ

kPKYiGsUtQkIYGPtLpX98MAUQ84mUV4GEexHavqgy+BUiTFOMu0BFt2j/kVmBUrK7OBT2YNGiBZj2JSi

MM6ogpnTTq0oihsryAeaA9qXuO0G3yqyIpq0v1GuPV
N88sORuUySTM71cwkUMsqe7tm/UKLii4o3L77BxRZqYDyKgP/L2ErZXxOecGHDATcDO9fMh8Ew2lgIFL

YkOslWAphu9HRrV/j6ZoLcF+xtMZlFQchGNflGUqp65fFGpeCyf65lXsrFbHwXjSOlnCMYyO15ETwEy2

Li8F5MlDsAO6wG+iotmgQzJTszADPpiAbHL/ldJ6I+
4TtfqRjYvfMLM1ZThXUKnXW81eM+d1pFhMGPDy95CiSj1tdbeSXGuGqcTVfbFZd1XU/f716e23R5sUcc

WYb1Tkrjs7lcynX2Qr+1ZjmNwvd3WRStfjTz8Gm9mcdc1VEt3UinkoLPOmgTEa/Kv/2I3TCQONASc8y+

g2Hyq5qCh7gGemRK3/IeGgfv5q2Ib1gRwD/gVBD7Oa
9RrV0GIK6U5RsmtZmU/ewGde4Aot8Wqs8EswwF4e0M9+UHAFeeArz//MRg7wycUM8fOVju/dB2Sh7LEU

eqslDmlUCqNQ3LTmIpJG3tiy8OIbVfGOIgAdfRIsWiTZybDncfsOIvUL4OxXS6VYHeM22tJASoHFVfT6

SeZBX0FS6+JJstCPK7itKmlH6JRYV13K2jumYLF/D7
mo6utxe8kMRyfhBZcXVRf00l0vOuemxBGbX3E700jW2uh5VvC+LPeBL+0tqYouwemf02WyTVT+jMDgZW

K13N6IupGCKw4Pmn/d43cpGf8hKOaIHhbxM/0lR3d+tNdCor0x0+KyAse8hlY+SUDhnA9EolL7VLubs2

DxZC4/a/HuyO3h+gBTewGStLZqG+XepV+zfdAstuX/
cy6shUS+Ppg/y8+94H51GVrJ0tzsB5O0hZCj/zSB1Kzr28UvYeKBLfK3hlcwVs5CMdh0zvs0SqUCwZu+

F52JZTUhYP3/HNNrmfmLmKCVTGCdqqYEnSIBzqPPO3Pk4BJviFwoECLbvnwjXlSGaXTg5YMsRUmGTPAr

xWkmAFHpoihyYEMieMwSoSrMBgFQlWYLCKBCswQUUS
NFqhNuwVRBMHJOjLcamUwKKaAcgwO2qd5iG78gw7AeWT7q7IckEDCYd6iZ+S8JDY1bGITz6N2/mkDrn5

Aerf4gjPOuViWOW4jbJopM8ZEV9bj4OsZXpjMfIcQB2qat6RKWbBHnHtR4O9zAVwVx4liCBwo0ShdOT0

t3YKTpQwT1XbihDFBQ1vV2UYIITQIfuRemjjwZ4OPJ
SnGSKbUI20TXcoDN7ZUROKRQjfpRWeSNP7X3Xyc5PorqQbEXDvVa7SRMRbSsypE1IsTrGVZwDkD0Vcqa

HVOn58NHdcAH0uBWZtONIkCWJ1PMSvPJcfPX0UhTWhxALXodfaNIGcS5Q6HX6KZPQXNhIvJ3QfizUHwH

xlMiAyMCAwIFINCj4+YLlmthBqCahRLtCnVVCgd1Gr
BNl6NW0QBXSqCZsyZB3Qq951S8N5ZfP6eGCvK9wUYd7hyFW4kBExW1Roe3KBy240W5SKSLUBUyhEwrkn

dT5XDCDTo9nTWU1vdF0YCDXopNc5fX5BANErS6uNI3ytbFQaEX7cekY3TL1YRDzvt4EvmWFoGZHiLdRi

T90eAJFghT4iHMtTWLAniRy3iNcxM5M6gYNdGM4eum
QgMD4+PT9AFXEaLj1muBSks5TgmQS1l1HbEgIvDBJBYZxbNT6AOqMeVLVtR5gdMOOtSvAyVXQrGXiyRk

vbUNR8JIgmOcttMCA5JCihUhotNDE5MUqwPwheIXL1QAr9KTQlAJV0HIy6OOEbEDnaTOm0ILFhLOpuZE

o9LANyQChcKZl7WJLjSJpkSCfgPntzMAi5ESw8KCTc
HQj5FWx3OVPgUSl1FXfxJfKaLUp6SLcrJaTvBSa2SDn7BWPsHEg6SEisOpIcNCyeXJr4YvXeONheGGs6

KEDtSRloYDc5BDPaBDxmYRx0NYLdJIq3MMrhZuMjERn7MDw9CYUaVZx1EVpdTnhoFNs7APm8DACuEKpu

BOm1QbDlAUveZTa9SVHrNVqdPQa8CUNzOCFgEFIqBb
MaUZHpDmtxVrMbBDUyROH2WeKyRfMrIGRwHTz+Qv2XXR1ey6MhHCkzQVMqCI4fca9ITDaKNlRkP2M2zE

ZfXg3pjQ5YiYE8bBMtU3A6kKKhO1Uja1DLs602Y0VYWOOCZzvTlcofpY0EwbCcZAsvNw8XZHAxxNl5uS

6CPHgfTH9BEAEvYX5tXF39Gd6fmUCfPzIaPFVrZu2F
AwDpV4FgKH0vP95sJGKzRRUgEYHFUf4+QUontmTnLyvYInK8JURwn7VxEPxmTJr0EJI7RPTjMcy5WPZ7

AYBvYWCwIGU0ECS6LjK1TYgtTgZ5CPM2ETFgGzGvKvKlROZ9HGV1GJSkKcz8VXCzMiPuAiquFbe9POS5

FjT3THCeMPB6KUW0AnC0YFYeQQE7XDJ2BzB5ZZYqFW
B6FUP7HmBdHqslSbn6HIZ7PLAIEbM0JPB8PFSnOOH2KLKpZOYsHdK8UXG5JdN1TlRlTfVzKLV4LxY0FH

HqJtz0HBaqToCpMhjsERDgQFA5KjP7KSSrYNAsXQetTuT1AabgQfG9NWu1ZFC7RzZiAnR0NSZhXSA1Lw

OtIuQ6EEv9UCL9DvlyWdD6SUUaGRDUZbB2MLFpGgut
Eig1EQH5CPW0LWUuEzEdKYA6FyD1NGMwQPBqNFU3XsJ2SCIhPur8GIV4WvM9CSTjIkMnHKFuTsC2EVCn

XrGjOTdfIlT8MWEpVIU2IFI5NhS3ZTHjWkEpKCFtCMAdVbroZIF2JVIiMCI0LsFnCEDuZR4YCLTwQIIu

CFViUzUbHjTyXBQgWIO8ZZXfGkDwBSl0UZO9ZPAnZk
KoYRg8ZMT7BFDvReKfJBs6JYN9VEIjSnKhQHf2EYB7JVIjVbLiYUs0MRA4IUEbXcYzXMu6VJI1UIGbBq

PiFWp9LOS1PMWjPsAsKAs9ASN3YSZbFOj7CNQgHyWsGEd2QIZ7YSPgMvYlKDo6ROP0PJQfVxDuQBb7UD

VrKknnTiFaCFF3OzW5YUSfSPW3BLT3McUiDiItJLK1
QJCqPkI0RttaIcxfNKE9PdZ0BESsPlXpWOqhCxO3MMMkAPHvUTR9YUSmBeSiEcCtSHVuGfTRSxN5GqS5

DpedOjOsITPuJjDvSdMfWvC0MGK3KlB8BdDyDNN1KSlbHGK0TDSyQyL4XBV8PfV3NohjVyQ5UZT2KoM9

SevaHQOxRZG7NgDvBaE6XHP2MsR9EesbMhR0ZXG5TN
UjSrnjMyk0JET3YZV4VaCdEaVxBLi1IHBJHyd5DFJ3BguzNcs3KEk2XQW9MIEyMuq9LHenWlC1CgObEn

SeJBlvYjW4GgqpAbZ9VGNvWKU6LHRiSXO2EJO2PtN5YCRcPGR6LDK7SbY0SWprXQSlPZE2WrX6IREdUX

S1XNY8PbPuOkgsBva5HOC1DEImIowqKQC8RP2UUXM5
PPF1ImR8MLRaQJL2BMF5KfW1CMSpMRY9AMCaILV2WYDlSHI7BQK5TdX3VSVeXFDuFUJ2YdP3EENvKAPZ

TbGaVJ9pjk8PIrVcCPMlXysCStn4NQwhCJ1XaDJjN1OjtcQOTPYzetjhwD2pIOrbCF2Rv830UvAaKM8Z

adzrkAbCkNQanZWZMfLjO0OtB9McmYV5LWXnF3DrVC
OxV8l9HRfwUH6OETDqKF68YC5oJGRNXtOcK8ObWAfsEHr9JWjbYF1Zi741GiQbzJKmNQQlRfPyUVTnFW

TrXAF5PA0MWKEvLAWadSzwGB6piEFpIO7QhINiRlYhGBr+Uc1DVC6vh7JnEXisDbLhFK4kls3LXKeXAv

KuK8T4eGRoHq6tfE7NbAN6qALzF4LtwPGFfSAxY3Mf
f4WBe987X8ZzgUGkVDf8UGtmOk1OsrHjDZebFs2NoG3WmlHnSZ8bs1DcudcKOrZpW3FfbuG5M6xddjPg

FZ9QGBJ6N7rvslDsJHTHQzAnO4fzFYQedxPeLTToOTLZBvBzU9KhhvKXOGEjbdtamM5pSQP5BSYzGi4R

Rn6TOtAbSK7yeq1GNtqwMJVjVllLJtlcPEyrkiGqUl
vHCwXuLGMsDcaMPou5DVdnFV6Lyv3tQ7S7GMjbVUHSN6YeeLJyOT0zJ6QFJ5pyJJdkAb9RHsJyW8Kflp

VeTLyqF2PaRUR7TRLkOj6JJVTiKH5MIIAuJYYtEASZKgDjQLKfNkEnCpBpDGPXAHxyUJHnV4SqCVU9GF

AgUj4+ARtmNC6BB1PmFIK7LRa7HZ1+SFfwTU9ExFGJ
G6YflPTkDUqhH4MGSA3RFZH7WD5IaGJgMO7ZcHLPJ6CurXYaVd7nBJOma4LaKr7aY1TMNBMOJNJqKOsp

VYvzWDOrIHx8R1X6CDVqB6DCR502cYCevWe0Ge6rS4TWFDlAAvVsZRkpEVptGRHrTBw4B8X4AAQcE0NU

J0PiUiDumtJwJ1Q+ByQwXKVWMI3XMIEYKLy2D7O9kH
KwH0W7bGtErUB8MI0ADM9FtDDwsDGhq83+FjJKWcQaG8BRT5GZLG9LATm1V0F5cCOgM1Y3bAoLrBG4AQ

8XEI6IoQsjaWBgVc1xMHdlGWG+Wo2SJt2EJtJmLP9fip2MVocbAITjWxeVJtz9B1rgcxc8oHHxTtT8O7

G7GoW7pLIrTP9MJ0X4uXZfTRM4UPRfeHQ+Af9Xg6Ld
LBLrSAd3D8mdWBMuJWTbIiLbjZ65A++5dkfyoMP9E7d8QVLUoNQogXiOziLxR0nDOMS0i0P7MVr/Pg0K

JZF3fHk9rHQqKJHfVKo9hE3fiDx1BxTaTL80DIIvZSvqoX3uJxr0C3Bkz1BnBv3pJj0dxRUpAu7KMkXx

VIH3vfZmKuZLRiB6gOierhvbQNL4Y9o9wPJ7Bs72r7
kaaaFlj3SlTsY8NHlyVUDrIhRhpjSzZPM1whImjK3mazYvAy9CMRYkRKutqoLdQyZDYb8ENxNnTT10Ib

xanN3ylSG+DQogICAgICAgICAgICAgICAgICAgICAgICAgICAgICAgICAgICAgICAgICAgICAgICAgIC

AgICAgICAgICAgICAgICAgICAgICAgICAgICAgICAg
ICAgICAgICAgICAgICAgICAgDQogICAgICAgICAgICAgICAgICAgICAgICAgICAgICAgICAgICAgICAg

ICAgICAgICAgICAgICAgICAgICAgICAgICAgICAgICAgICAgICAgICAgICAgICAgICAgICAgICAgICAg

DQogICAgICAgICAgICAgICAgICAgICAgICAgICAgIC
AgICAgICAgICAgICAgICAgICAgICAgICAgICAgICAgICAgICAgICAgICAgICAgICAgICAgICAgICAgIC

AgICAgICAgICAgDQogICAgICAgICAgICAgICAgICAgICAgICAgICAgICAgICAgICAgICAgICAgICAgIC

AgICAgICAgICAgICAgICAgICAgICAgICAgICAgICAg
ICAgICAgICAgICAgICAgICAgICAgDQogICAgICAgICAgICAgICAgICAgICAgICAgICAgICAgICAgICAg

ICAgICAgICAgICAgICAgICAgICAgICAgICAgICAgICAgICAgICAgICAgICAgICAgICAgICAgICAgICAg

ICAgDQogICAgICAgICAgICAgICAgICAgICAgICAgIC
AgICAgICAgICAgICAgICAgICAgICAgICAgICAgICAgICAgICAgICAgICAgICAgICAgICAgICAgICAgIC

AgICAgICAgICAgICAgDQogICAgICAgICAgICAgICAgICAgICAgICAgICAgICAgICAgICAgICAgICAgIC

AgICAgICAgICAgICAgICAgICAgICAgICAgICAgICAg
ICAgICAgICAgICAgICAgICAgICAgICAgDQogICAgICAgICAgICAgICAgICAgICAgICAgICAgICAgICAg

ICAgICAgICAgICAgICAgICAgICAgICAgICAgICAgICAgICAgICAgICAgICAgICAgICAgICAgICAgICAg

ICAgICAgDQogICAgICAgICAgICAgICAgICAgICAgIC
AgICAgICAgICAgICAgICAgICAgICAgICAgICAgICAgICAgICAgICAgICAgICAgICAgICAgICAgICAgIC

AgICAgICAgICAgICAgICAgDQogICAgICAgICAgICAgICAgICAgICAgICAgICAgICAgICAgICAgICAgIC

AgICAgICAgICAgICAgICAgICAgICAgICAgICAgICAg
CHAlBOPgPWOkIEXvKWSbOXSaRSPpAZZhDYMiSKl5P1roWRTjFDNxWI5jOPj1Vr4+BMxQRbFuPGW6vyKp

uS6KKR0yh8CeQMheJDFdx7QvSWy5OR3CZJPgHVpyYW8YIBeyif1DTBJrOUQbjWMMn2hcMoEqLQD7KQHn

DjrxHJ7PUVPiS4nuefUvXAZmIMIYBTwvCJCSPMqeLZ
VNTBPoKQMdFaCfDnLdQKKnNE2OWXTmZ755uvQjAK1DKi6OFsOtTZ1cpa1NZjVySRLlZggMEdy5CYbvBI

9YyMLzdUEcVBDjRGLPLiLhT7san7WqNqGsOJMRAJwhFF2Ne8SeqERlKTi+Lf4HID4qv4ZkRMjjHWDwGK

2msn0WCXrDGfCnG3SxmCwuCLvwIXDmvKWLJJRnvRGm
HBfhw1LilttcXPGuEHCgGR2bHmRxByAfZBk6IipvRF7tOCmbOM1AEXP3DHnxCFKyMXJsB2qABjUzCAHq

OWThcBpgKH5GXwDrR8YuseUzxTDdLZUoZEUMRi1+LCkvlyQzDriEAeDlAOOio8KfPHb5LA2WYQLnCIpr

WO8ELQImxD8qEQsoYF5SEcEpCBLcNEXOQtDpA65gcV
NfGJc3N1XtInKtBOQcRjwmIDYdJImfOtCzJDKvTtIbVUknOM4+ID4+EQvsZN0OYAfrheGvEESgAi5GSF

QjUGOgPP3gFWTgXSLqX3K6jHfzMLMJIrSxP7dwyfpnVU6eZXCjC217sPfrcqXbRSWaICXwEf1PSKOdES

M6RTDgkYXuTcghLIELHRryTR1AfMOeBIN5hJ6iPJav
OXSzBQDfG8lCEdIbkFprIR43yMlfhlAxhVCbFUr+Tf0TRJ5nh3FvOYb9fcTrJHoxXUKzOFceRNRhNGHo

ZCSbBFH8VNX7OUSBDoNsCTVcJWVuLNdaJYRpKOHtlf9NBHYtLVP1DVs8YKXhOEMaZITgJAhdTVDuQSEk

HqMkYWVvCPPyFG8ZNsNrVNXoMMKgPDmdZAAlINMkso
4QRWAtMJZaMvZ9CpCvIKUvUCUnVQemPVPzWJVmOHX2JDXmIENzUZ6DFwAkNADgEJY2BVNyCXHvHNDdzm

0NFICyJOQtIaHiHSXjAAHySRGfYWtxSABhBDR4CSK9TQSbAIMzEH9YNbOgMZPcCCw7JuXiGCPaPADjys

1NWJZmGPGvGrvbIPBcXQPuYOKjUOomFQBlWXDhUFP1
ZFCfJRNqLI8BWmMdXWJhOTZgHgLlGFJxCLNvuh2UCVBwDHUpRWU9OpRpKANfVJHsAXteZDOmGGV6JAG2

XCJmUFCsYY9BAqVbWQPhPEQ8XtRkLBGsERMzue3YNWWlAMVwOprrQlWyRCAjESMdZCknZBJkEOL0HNt1

OSVxIGRkPK6MGhRqOWKzZRjyMlbkHPMxXEKcbj9AIQ
BgINKuKAN8SqBzNDSpSTQbBUqdLTPoRLE0ZwZ5KTGpHGJdEL4FYgGgSJOxEtr2HdzwXHAoUPNkvs4GBB

TcECVjJFy3IZGxQOZbRCCvZGlpBIGwNCJiHOoiQXHxDCRwBJ2WWhCpZOMuCYD8BbQyJDZyBVTroe0WZS

SpDIE7FBV7KYBcSIZsTVCjOYrhFFIdXCKaQwC9KKTn
NUDfIL5SGuDcBPOpMGE3VOXhZMIfMPYszl9PSZLfQFS3KeO0YiXvDGBzESJxWEqgNKSrYWIjXZW4XSHj

NEOgLC9WPwAmMKFlQTT4TZezQZXjITFeuc1MYZWiNDH4NQHgUIQqSJScBTOyIZh8uzRliUMtOSu9PC0R

O2GxutOzRwZLKk3Jl793NRNrPALfJo8TH5qnJh0kHY
LzBXPWSh5FAGy4GPGsDXP5RoSsTIO7AiO5UaP5FMA2OfG7MdfgVFwrCmF+EWfrGAHcPlp1L4ZlEyu1Eb

ByFQH1MYHhEdntR2ReA4E4JH2rQJPJVz4+ERbhrVTarXmaNSBCUcV8Kef6FKcdOECXJg6V









                    ID                  Date                Data Source

 

                                 2021 04:29:00 PM [facility]

 

                                        Cytogenetics ReportName: AZ RAYAMRN: [id number] Number: [id number]

366Collection Date: 8/3/2021 00:00Received Date: 8/3/2021 15:21Physician(s): 
LISA,SAFIYAH,MD  LISA,SAFIYAH,MDSpecimen(s) ReceivedA: Tissue, placenta, 
fetalClinical HistoryCLINICAL INDICATION:   21-year-old patient with 13-week, 
placenta andfetal tissueTEST REQUESTED/PERFORMED:  Chromosome analysis 
CYTOGENETIC RESULTS:    45,X[2]/46,XX[18] INTERPRETATION:Two cell lines were 
identified. The first was with monosomy X, and wasfound in only two cells. The 
second was a normal female chromosomecomplement found in 18 metaphase cells. 
Both fetal and placental tissuespecimens were received; however, the fetal 
tissue failed to grow invitro. Therefore, the chromosome analysis study was 
performed on metaphasecells from the placental tissue. Due to the specimen 
source, the 46,XXkaryotype may represent the maternal chromosome complement; 
maternal cellcontamination could not be ruled out. Genetic counseling is 
recommended.COMMENT:Spontaneous loss of conceptions with monosomy X has been 
reported toexceed 99%. This finding suggests that the reason for the pregnancy 
losswas monosomy X; however it cannot be definitively determined because ofthe 
low cell count with this anomaly.Electronically Signed By Zack Rey, PhD 
Attending Pathologist 2021 16:29:28Test DataChromosome Analysis:Metaphases 
Counted Metaphases Analyzed Metaphases Karyotyped BandingTechnique Band 
Resolution Culture Type 20 20 4 GTG <350-400 In situDisclaimer: Conventional 
chromosome analysis may not detect submicroscopicchromosome aberrations or low 
level mosaicism.     









          Name      Value     Range     Interpretation Code Description Data Scarlett

rce(s) Supporting 

Document(s)

 

                                                                       









                    ID                  Date                Data Source

 

                    [id number]            2021 01:32:00 PM [facility]

 

                                        Surgical Pathology Report See Addendum B

Shawn: AZ RAYAMRN: 

[id number] Number: [id number] Date: 8/3/2021 00:00Received Date: 
8/3/2021 15:45Physician(s): RAFI GONZALEZ MD HOSEIN, SAFIYAH,MDSpecimen(s) 
ReceivedA: Product of conceptionClinical HistoryMissed  with fetal 
demise before 20 completed weeks of gestation.Addendum     2021     RESULTS
 OF OUTSIDE CONSULTATION: This case was sent for consultation to Dr. Memo Cunha at [facility] in [place], [place].  The consultant's report is as
 follows: "DIAGNOSIS:Products of conception:  - Fragmented immature fetal parts 
and placenta. - Fetal foot length corresponding to approximately 11 weeks. - 
Severe maceration of the fetal parts, intrauterine retention timeestimated to be
 more than 1 week. - Chorionic villi with complex outline and occasional 
trophoblasticinclusions without significant    trophoblastic proliferation; may 
suggest an underlying chromosomalabnormality. - No evidence of molar pregnancy.
Comment:  This case was reviewed in consultation with Dr. Bernadette Paz,gynecologic pathologist."/pws      Addendum Electronically Signed By:     
     Rocco Thomas MD          2021 DiagnosisUTERINE CONTENTS, 
REMOVAL: PRODUCTS OF CONCEPTION, PENDING OUTSIDECONSULTATION. SEE MICROSCOPIC 
DESCRIPTION.Kate Bey M.D.;Resident PathologistElectronically Signed By 
Rocco Thomas MD, Attending Pathologist2021 13:32:49 The attending 
pathologist named above attests that he/she has personallyreviewed the relevant 
preparation(s) for the specimen, performedmicroscopic examination when i
ndicated, and rendered the final diagnosis. Gross DescriptionThe specimen is 
received in formalin and labeled with the patient's name,"Az Raya" and 
"product of conception".  It consists of a 9.4 x 7.7x 2.8 cm aggregate of tan-
pink, rubbery irregular fragments offibro-membranous soft tissue. Within the 
aggregate is a moderate amount oftan-pink, spongy soft tissue, appearing c
onsistent with villous tissuealong with multiple fragmented fetal parts with a 
heel to toe length of0.9 cm. No definitive abnormalities are identified. The 
specimen issectioned and representative sections are submitted in one cassette. 
 MICHAEL\Microscopic DescriptionSections show fetal parts, membranes, decidua and 
chorionic villi withmildly irregular shape, stromal fibrosis and focal edema. 
There is nosignificant trophoblastic proliferation seen.This report may include 
one or more immunohistochemical stain results thatuse analyte specific reagents.
 All positive and negative controls havebeen reviewed by the attending 
pathologist and are satisfactory. The testswere developed and their performance 
characteristics determined by [facility] Pathology department. They have not been 
cleared or approved by the USFood and Drug Administration. The FDA has 
determined that such clearanceor approval is not necessary. 









          Name      Value     Range     Interpretation Code Description Data Scarlett

rce(s) Supporting 

Document(s)

 

                                                                       









                    ID                  Date                Data Source

 

                    [id number]              2021 10:27:09 AM [facility]









          Name      Value     Range     Interpretation Code Description Data Scarlett

rce(s) Supporting 

Document(s)

 

           Blood group antibodies identified in Serum or Plasma                 

                            [facility]                                 

 

                                        Performed [facility] [place], [place] received 300 ug 

dose of RhoGam on 21 at [facility] 









                    ID                  Date                Data Source

 

                    [id number]              2021 07:20:06 PM [facility]









          Name      Value     Range     Interpretation Code Description Data Scarlett

rce(s) Supporting 

Document(s)

 

          ABO and Rh group [Type] in Blood                                      

   [facility]  

 

           Blood group antibody screen [Presence] in Serum or Plasma            

                                 [facility]                      

 

          Blood bank comment                                         Health system  









                    ID                  Date                Data Source

 

                    [id number]              2021 03:23:54 PM [facility]









          Name      Value     Range     Interpretation Code Description Data Scarlett

rce(s) Supporting 

Document(s)

 

           Leukocytes [#/volume] in Blood by Automated count 9.2 10*3/uL 4-10   

                          [facility]                      

 

           Erythrocytes [#/volume] in Blood by Automated count 4.31 10*6/uL 4.1-

5.3                          

[facility]              

 

           Hemoglobin [Mass/volume] in Blood 11.6 g/dL  11.5-15.5               

         [facility]                                 

 

           Hematocrit [Volume Fraction] of Blood by Automated count 35.6 %     3

6-45      L                     

[facility]              

 

                    Erythrocyte mean corpuscular volume [Entitic volume] by Auto

mated count 82.7 fL             

80-96                                           [facility]  

 

                          Erythrocyte mean corpuscular hemoglobin [Entitic mass]

 by Automated count 27.0 

pg           27-33                                  [facility] 

 

 

                                        Erythrocyte mean corpuscular hemoglobin 

concentration [Mass/volume] by Automated

 count     32.7 g/dL  32.0-36.0                        [facility]

al  

 

           Erythrocyte distribution width [Ratio] by Automated count 15.3 %     

11.5-14.5  H                     

[facility]              

 

           Platelets [#/volume] in Blood by Automated count 275 10*3/uL 150-400 

                         [facility]                     









                    ID                  Date                Data Source

 

                    [id number]                 2021 12:00:00 AM [facility]









          Name      Value     Range     Interpretation Code Description Data Scarlett

rce(s) Supporting 

Document(s)

 

          SARS-CoV2 Rapid Antigen Negative                                [facility]

     

 

                                        This lab was ordered by [facility]

AN [facility] and reported by [facility] 

Care. 









                    ID                  Date                Data Source

 

                    [id number]           2021 12:42:34 PM EDT [facility]









          Name      Value     Range     Interpretation Code Description Data Scarlett

rce(s) Supporting 

Document(s)

 

          Progress Note                                         Health system 

SSOBSa0mHqIAPsDz31/LKEseQNOnd0XaXLzhLVx8NUvqOUQgW6BfSHO6qM8vWKL2OXzTYnFoBkAaQlR8

lbm
XnShjNCvTbNYXyLlkTNlLmARssNelnbQToDI4AbIC9HFChM98nDZHdWDCzH5YpPEXjLfI+Ap8LGELusE

EhEA5BAdpA2TlIb5mWCE9e9J+iEWB6JZLDHD2OPsZGh24nHUwyy5mNpbpBQBTjEhcfHJk5qz8ZKvJPLV

1PV21JZNWyBJ58bQviCKRld3+WnAX9xhzO/5cfVSpF
dyi+/izwsfCzwWj9nV4eO6es9AW4W/Ex8D++6mp9vP19irYd5vsBmb0M1vKdtD7y5i4p8lbp/afwpSyu

SPOtmlWyoV307hlpmTsP8DKOPeLbBAlBjWp3MbPYFMOZUT3nJIWGkAxX43xGCnydMZSfhl5Xb0QDvd7p

hkgEwSNccXrw9T8UP4ZTFdzfAIhOEARrQhV6L5e9GU
ucb+wRvheH/zFeavd2QJwJ1pxngNNcj4Ce8tnMifxVYbOX9TIv/TRQMmJ/8joEU6/UpYBS2GuQYQ1FO5

b5H7rwTfDDbuqDLrE+pId5fRlZi/6D5dKZwLdyVlzEG1HQKba4lOL6BnGi1pnuFxRhlZtx+o6XzSG4fg

/qQU9wr6mKshHbwBNCHTdhGOAL/8uEvDXTBUlhtKXp
Nuh9pe3CsADA8Nx8vD8f9rEqqLuQqC+zYefGkQc+NOlOG7aWkBnvx3XzeTzt6YG5Ma/my71Sq81jhyZ/

hYeeLxhjuGTsn4wKw4qXe174QlGTf/1PIVRCAnweJ8SC0cEPz6ht3doQJtogOKxnmhmWYrIjYo3zqpe6

gZFcsvA4DSq+HsIAt8O3UxMJUqPjflrqndqCK10r+4
8X4B0Xbj+IDqBlZ6b7GqXc00sFOYcUXJycnbpvXCEtBnvcNgnN2S6z6fjBwanH94nBVPZcyiFTtE32L/

XaAXHTKXV/isBRPqf224tBZhYIrTP9lk3Fb7eLT6DPBLtqP4Q5utocuZNCs5d649bLLcLQtLOF34em8j

6bD6a7s3TrGhQeQHLZomEKTw7HG2I6YfiODp3oAK8+
QnZe8WMqQMy4YlV70/6nIQCBPVchqoMoRhZLgOZzVRVPbmzEGUusEeefzpmrVK16u+V1lWO6E21vnIv0

HVCHq2mFFDDR4i4CULAIB9OX06gS9C/dMwZPh09B2PKGIyiMauk8gFYfj6UEEpxWyzwdd6i7GGknfVPY

5fg+lwYMfzxD92zqdifsDkgMgFpKXzKRZwr9o9JwE+
hks22G9rQy2URf9RbqtTte8XdGgqRj1gmzT0iMh0bCLnAlsE/ZIhclvSgtznrwVjb/XWY216DxCYjrGr

Y0tRNjm9XdfcdxaQOLjWivm5Kcum5q5Eu2Wj0PYA9Tz6eu1H6vG2I0gRkCD8xiqkIfRaRf0LlrA5S4MH

YALU2vdYH8NM0/00iI3ou/RPtke/pLpB/xYYRez5zd
RhNO7VquIkiUm3GUj8RjIO75BqeWDH7G1XoJu5rDd5282Om7ICPU3QMqb4KvnD8usERYax+pvxh1x/UT

SByCKxyvYZIvExpZvcb4infq6vaE0MHLui2kQsLAkaS9v8/KQ5T0pqxpgYOa8t8mggpJFiEE/sdKBCrR

eg/V2RhFmHASEpBrJFzy6AZbrYe2RVGBB04oBWdGvd
5MLa2ORv4e7mwj5DguUMFCB2R1w92LsRCgFT9HsP6zmutnaRS8oMdjU8hBDDyuTYsoNNMWVqg7eNfwWK

dQg/SoT4/ShI9uPsj0F8BKgCTO3MaH0XCInoVn8VZZ7ixeuK/LnrgR9pge2MgnomoFLZhoKtE2XgfNHr

SIwwktG5k42eV2dM06NBdWRM4NB3CTAEts/IzdOVQ8
6DCs3IX30aifUDu0I8dYkHNndG3WMtX0iWWnRH4nv1JhezduwkSJ0IDkOrvFcXI7Vc/p3qCl19jdDLeR

263FtIsGSWsF321TsFv5KN6ow/g+yN8U+lP3zMUVb5tlzYIxIH0GyXO6TeXZaJgXPAHozF3QRKuNEdTi

wCTBWcVEgXtTDbb8QrfOFJP6YTiaujto52G8z3ydIj
wnq2Exka4mBQnh5xPJ+aF7Z01RDSSHlAF/+IcT0bfjvtA+zV61BnmgV2bBRHivsEGQiDraOejrMult/J

TsRd7wWOW5YMYdj7zSfsVHMAtV0E8wwbMWmEmByZSo3/DmyGGOr7WJIBzqZrZdjZayqNMFQoX5OY9WZ2

7h2u25r08/Pq/ERAOcvMdS4YDYq/KcMNShVeoq6xoc
xRSTmyNOBzXm1d9PQpy7sHOt9EQLe61s5mNi2VVWbp5j7S4XLZpW1BbqFMkTwp5eTd7CbLgif9t4apql

IQbWBlymxIWA4zzxqPEuV7YbWAhev/UNJMMJBzfyzAWdCbd+HhGPWsNxhNRwoYQ4NosfMsU0tb3AMR+f

AzZ+5uAGtZZ5JVNn1QENyoV7FsDfim/PYX3tsBZLbx
z6Bmx/jI+iHNatUEYGU+TKDDxJ2hf+97pwue4pnaalN6PkYZkdrU83HJZug3MYtNbuRdimxgqMPBM0nr

Xdf2uY+VQFOg507NFL235Dh1WPyvdsD160R/UWqTxyPUmEACraZbyYSa34nNK6ISEioMeL14fCuKkO3A

r/EmmXOoknVOKB6bzSN5n48KYzUosQwQ9wavli/Yojana
bqfFzUN58g8/lanlGcXiIy1lDuk7reFGKvKQ5sFVkFi20ts1vHcrEWj6EUMWFx9BJB6nYL77uNuVO021

GTXvU3AFHsYj9oeQDkO1DsxxnE8I6btRAB3h7+zph+iek5XVfmqQ17P+3aw0+UKGS+hthfv8t2T8q7ED

vftlZjbaqCpJBV6SoixC3FAcigHxN6XVtkUNDeitrj
h43R0jc3AHgdaiPm5kFGm1ixpbAgzucxYMFstUFcJCWLCzGVFKubLR/d2MRMt+ROhj4uuPAES4JmENQn

JFLjKa+zoTFyIRFGoHahhZBbe7ukWKzC7rzQ9vQLJ9RG2wj/Rt/Hc/iyKIPhRnT/VI0JCATBJSt81X5U

92gONEBeDIvx1LaIyH8kvSowtsgGZZ8ozRpieTzToD
ETu/h/ZaxFjQsPtunC0CRA0ob4EcPCViUZyzqqRcXzsZGcBaJVJcAynZKuVzRBbOFwWzUZWmDKneOU6T

IDcqRAczZBMuC9YuxxUjuSVuWBTqYm1ALUNqOL4RGVEiyLOuZCOpJfVlMYBSPtTuSZCvDJTfhUYQg1xf

AtDrLUZ2WFYvDvjzTA8JIFWcYJ3Vt932KF10opA8QX
ByRx7DYFSkXG3Ums77eBS7NPGlSfXaEXCpcpTbBNEnhzQ0FJ2RFuAfWEX9sTUfGakXXG6LCOXcaXFeXP

3DWCEheCWqGP9+DQogID4+DKubebLvYrhFErTqPRViViePZnMsQsS3CBRmSmHfCPB0MHFeOvczKmD7SA

L1LwX7XQFvIPz1VSF5JbOhKMPyIiDmLNMdHeWhIVqw
URn4CQO6POLiDjOfZiy2QYW2FZZ1BJOoYHB6KDX7LhW9BBMrFAP8XZB2XsA5UXTbVFL1MJA5IoF2EJMu

Vfl1UAQ6ACE3BAEiYSrnRNJ0TBO5GVNtWNZ9RHStSBNgVbH6RUN4YhX1RxRhRwVaZHK6NyU7CEKgWte9

MYuxGoQpDucmKSCaJYP8XvA9WLWyWGJmTQffBxI1Ym
mhEeT5PRn8WWK6EsYpXrW1OFExUPT1GtBbArF7DOb7TIB8HqzuVhU5KAZwWKRZWkAnBmz7DQS7NDOwSm

ygVOJ3DKF4NtCxYgWsWXX5WPS6VsP3PTTxCOP6UIH8QiYwXjadPVV8NTR8SpImCnZuCoTvNRBvUSYwXm

ElJHNdGCR2DiQ3EIYvJXG9NVB7AtOtZyJoLGHhTOB5
HSA5ZCHzZYMiDXqcOkQ2SBFdZSmqOQRaFSD8ZQJpAoX7ZUT7WZJjLeNyCUk7PAf3RCB1BZFiNwHiRVg2

ETC5WHPoXvQyGFi4VVM4LSQvIdWwGLs7COW9ORYnCvNxSBd6FKH7ISQqJuGsHGu3ODE6OPNyAsEkYZg2

XRI3CEJqLpObNJq6WXW8MMWlJbGgLZ1DBOX6FLBjKl
TyKHl1GJQ4TNFqKcNmYEb2AYS1LMRsYdAtRWf2MVLpIhwaXxWvTMH4XaP8HQUxOIJ2ESY2LrKtZlFxSC

Z8CTZmWfU9YvxkXhtsAWI6FwG0CNBiInXiHVyfTpV5ZPOaYOJuTJZ9NJTnBdAhAlHsUXXaGeGSNxXzUR

b3YLSpIzQvIpZnLcHzFHRfKgGsTuNxZKT8PWuvNWC0
TkLcJQD0KQTtQGI2OjtqCbF7DGN0RnP4WgdgRdN1HOV6KhVfMFGkDAozBqG8UnoeRjM3QIJ6WfZ7Qlyj

Bbw3ZHB6TFUgZochGmq7NRumEfA1WtVoZty8RJ7DBZX0QtzdMnm0CRe8XSF3ElbpRBs2JMk3VGE0QaFb

MrEaUOigHtU1XpDtGoI5PGO3DyW6DHHpSAK3PFT4Sm
M7VSQwTBJ2YMI8WhB2WLCmYRp9WJSdJGF7JAUrTGG1KZE3UiT5JIUoLul6GSD1JOVrPuqsQcd2OME8Hs

CUAxVbKYH5XAZ7ZkR5CITqVKH7QTV1UvX3JMUpZWG7KSCaDLD0YJAbZEZ3YUT6KmV6FKKtCRRnGFB6Is

A6GOBdJRKFStCaSX9mku7LXaShOH6suu9VEFJ0AP9W
AZLgKF9DoJUsC0TstfCBLFIcgotteI7bPVitAMCdX7YxuvSKKH3eY6EjkPYeYYsiJWZsL6FoT1XcdKW9

MXEoK6DrFUMqM3w0WLtqUB1TZVLiNW00NY0bTWEWQzEiLHJsQzorR7LoUhTOXiApUNNkFg7qoWXVa1zp

FmOrDIEfJhDdTIE0CGrcOO0YEnDcKBGgKLZxgMckXU
1jfBQuKS4FhWUyZeYhRAanQY4+RHzvlnSlUyxYNzwtNNLiIiqQKnEhRTaNJxZdLKUgTPkwSF4Di107Q5

S2WgX1jHMdJEU5FZJ3kPJgQxIoKCErwhHkBFOhJQekYE2of9OrzqjmQ7gxUR6ldVFwQ95bnP8rASfySD

PsQ1TxcbN4T2zjssCnCZ4ZULH0B4tezyMzARTZRdHi
VLPiP8qtmTqaWUBtBVWXVSgtLQFeK7SfbxMUSWIcccwiuI1gWSbjUUUETAylOO6+DQplbmRvYmoNCjkg

MRNiYbeJQnBeHjI2FTBlRsGdZSQ8IEMcXjLuJPm9SSK1RyT5YHAzCZk7VFyaZrVeQkhhYtCbCXZzQzPg

LEgpOIt6CWO7YAVsNbGvQlz8MGI8GCH7WACbXOU1WZ
T1FjI8TIRcMBE5TPO8ThU0OSFsTCA6ONF0BmN6HTNkNmMwCCEnXnW9TCTbAWeoDVH8SEL5LTVwYcJoME

r0WYQ5GcVxAgYnJMnhKlV6HoUkEfW3ARCxSSW6ZvznEsVhTWH1AOY5HSEcCpQqNIKnNLB7VeOcVgWgRU

g6ECF8AhhhHuj5WZymRnD3WvwrLqEtPOmeHsE7Olcz
DHP1ONX8ZiN4OuotBlYpTA4HXGFlKbUbZnn8PARtReU4YGDaATT1WCOdMhR4NLZmGwKeRRV3VoW6XKTq

REM0PAGdNeJ5URXiYyRsWRC1MVOxBcosEJA0XSU0AUE4DHxbYkSbEBVpVNN5YGQiUjIsPQY3SXV6UVIo

GpMmFQTtSQC9UQDwHhp9QBI0UmEOQbQhLSH1OGEsIV
DqXKtmXosdMZK0EDE7PCExEiVyILe4RXW8HOLnEkUgIYq5KWR3QVOyOvBwMNw6ZZC8DPDeMsZoTVf9HM

Q5QHMaAdTwZSo4KBA8MFOhIwVrGOl9RFF4MQRgZyQqAXi7QUE1GTHbGiFzFZa5FSW9UETqIOgvSYk7MX

M4UMEhHqDzUPr6EGJ9DVTrAtUsHMs6TUO3CYVxUbXl
LEQ0HMPgIgWuRUF1VKE8BcO8FGZzWJS2VZX4FLV2TNEuXtEwRGptSsWoFuLmSQU7PIZ1ACNvVySnWqG5

MJJ0XfU9CHEcCCE7ZNNtZjNnVjElTgJaXK8BJEB7RuTwSIH6XGQoPbNnQeApOhQlGWM3VOU3AGSoIRL1

KJgcCGP0UzHvDuBvRCxcCdW7LgXdSwAbCJygWjD0Rd
FdTmAoNMBaTRPhWtLcJUH4MmR1LuksAwZ3OCX0InYsHandXbj0AAS7IOWwSfxzPoZiCYfjUyY0FszyMV

nqMEa2TTR7StdiZuo9QFe2UUK3QQGfWrc9ZSnwBeK8PdMkOlIeQVraNhT6HqxsUpS5PKOkZAV4WEDqTI

X1ARN1YsP1UTNlKNM1XWG1OuD7JAjxKOH7CKF3YfC9
SNAzIYT9DBP0PfWtRynoHrt8UUQ0JMHvWiwoPpMmSV6ACVP9PDSsMbYyPQAaDUQ0GORdFsNuSTAmOLK1

VTntAqDyCPGvJAS1NZDoQxYhOXFsXJK8KNZzAuIbMZS3XxQaFI0WFR3om5CmLJqsEELzCS8grf0YMQN2

VW4YXYNjYE0LbSBlX7NxfrDNWUVwujulbI7qXNxsZN
DaN7XkfpAPWT9yQ9YkqYGfTBSpnPQUImChTKMbUJKlEQ42ULpqJR9XVRSLEOjtxHIdXQG3L0Wki3Drpu

QpYZSxQk4BVCDnTS2TaYPhvkUjEz7HGPBjOZ0Zd590JrVkpHLuWRLkAgWsFRGmWTGxQIP9NA7MPPQcDG

0MqNLsjOXQahgtOIWjT2I6BO2ZSCJUTsFwSz7NWgMa
CZ5ibm4QHQNmDVWhOiuMYkOdBJfKKwYxBGYyDUtmPL3Uu033S3W2LdT5jKEcZON6CWT5vONlOwLbSOFk

lqTtDOCgUSseRa7xII9AspPbSAkkYg9DmS4CfyPnYH6jh8HkonoZZgYrUMLhHrwcd1DZuXMpJPFwX3vp

l3RKgRPiWJK6WR2EYXLbLY3PwIH5lSMbSSTfPHVWWq
MsTSKzRu7ciYWge9JxeCD7k3FyRIGeRUGLTOrcHM8+LPzbkyAbZyyRUsAuFJZzv9OzZPahCFm4W4VbkW

JysqWnGyouaLOLEJAiQIGfU2alhpx1yGUjBGY2XqVuEKMgX0HfUTZwNDC8Km6+KZrpPGI9ufLjoN5BHN

RvlZo5ZAR2/3v4U8ePouYPCSFohAKsUHoPyZDuabXV
cM6EVXQFJgZ6lUpvNDTD7S5PCVIEOyHEWiOAcCnGRzzgWGkNROiHVVxlfjFjALyP7Nx5ck5ZQPNWS+b/

/b/ati70u9txPOK3ba9uLt4fXsZCBSxGiSp0jwbcrvJ6R67CbO6OhvGD8PEtXhQ4hmXGY0+iW2abDTlF

JBLg90+5YZ7/kpETV4m/I5Y1tx47J+ee+bFNFZHnQW
A55Rhbh7i4KayZ5lj9ie/c3Spt4v/++16myYbaMWTNaf2Josj2vMCM0Zhzc2V0n0fcuaY5GutsWL9ur4

+plu9zz1Swb5SeB743X0bjqhRoBudAyfGzv/xpG5HYbxsjVfb3v90bi0sp/0llsbqzoETDhs0xEnJBXK

jZsEiCtzVM1RI5xB8RxiQ72+Ihl78jU5vsW+tRIVvk
W1Pj2pFTUXJEWgNI9wbOi7AL9ggMjTZG4GBb3hiI+mXJSB0dqH1xbt8sFZNQaAgSKrNCxe7XtDstM03y

TiDVZtpOW+f44SJp6FA7oN4qIXYZcBLox4W5Sbt/SMPaG2F889GBG9B1nweNBrdhTnan0CYewK0GgYIR

I2pF/Wh9dbIWSeX5GE7ap/ZY0Y6Q1gR2B6AIwygz4o
3WE/Qkbdd+F11kSlCYN/A9YP3N+YO0GZVEkiuzLV0i9m83aQAijZBet5B6vHx1EptSVaeOkLXKrpMOKy

7fS8Z0oFPrF+cGWMI5rxrE7luMzCoqfLRaRioQf1cJ8Dn8zlAwweDKHkJETjQ53XoajpfssbG7V3uWNP

ORRO5ULeuF7jVDdO3FHwa7d6Yv1zzgkvkpDMwboLdg
5tB/1999PhJUt4BS9zRWQHAUqbSi4Md38yrJ+zRS/tW4CLtef4y6lq1w4KNl8Yh9TGk8Iw1wgsS6LYxM

wiTtifryZKjwnNtR5jRyaisjluo/ZGsyq9xhN9k/6lasg1mVyJabcjhRLDvDOaXBIBK4lF+Ui1+Jt8Wn

Mk9OlA/YF7OP0Kt2Zi5D5BVK2pkhOUrtDhMvfpmB6g
GzLunzmmo3kTvhACjwChv22Is6Cp8MxbgHxe/Rc/PwmMasI06Niv8r70UWcf+t/ZVxC8nNgGjSpK/

djhxU4z+72vG6N1sX5JEheoApEo3+egPSwQkxPmJDbdaxyjsOrTwts8zJlvwqOlExNRZPlloshoOMm3S

aM84ho4DHqZNcU4vezL+UZyPTTVobHmdw4gOSnSiNz
lg6R1opoq6kFxha2FK92ltqcVLZLovt8zDvnAyH3swyi54/+1Ue4Te1abvibfUwto0c4Bp6IQZpoNtFa

y0wIEGXhjOo3Ylxsh0y+GHsm9gNVI9SUMJmWLZAuwaSxFbheJsAZIYeli12yWK44E48X8iPfUsbrGKsB

uXIYvlfKqfJaea+6C69Qe6itHG/CNBqN9lX4Fq/tCm
2qNk+9AVxhxzQzy8/WPtG+107ja+cUhy1djZorHAu/faJ+jo6EE8DcEiCTsqcujxQgfSTyHDO+3XOpp4

4qlJzF6bjBEV2fhvq3hdjrm+tUmmLzR0yi5k6rruRB8F305GcsjLlV/fiYqyZdFe1AbfXE2Q4kx4n55y

t3HS5wxO7cdiui9+Ew0lrWPkynmvKjlnx831kSbwXY
SH40XgH7itNcqzR6cvbizoLsmUeBtqHNJbamEw5YjmG9Ig4pSvoR/yu5j3hGPpOy+uT8YM1zI5ngs0rH

dGbles0giTe7DCwdkg81p8PpNrYxbRtCeuuwT73xKL9FM+ynrLgYfWg6Z72GzypQ/ppM2UkA8nLZ9AaN

5HxDtyqJOEG3A34pyyU6TuuYgBJwN0G31hUUoMI6hv
gMJrzb48u1y24yP4z3frd8O+mMllm2IpzSZ9PNhfXqAb6lk9372BnrIN8FJLStWRqJ+dOgf0n0ST8V3L

ZolQnQadswundAD/SBgqDjGK1bURvZTGoRPdbvec2U6URpVUrHwiS5AUhM0lOXdkJkP1zeuCYKw0fFeF

yeOi3nAnxD38OM7MBj0h7ZhON1GutRNdZv2klhB4tK
OR+AoLpfFCGFFv1xaAuFzgYms30s1WTMJqYk2O8ggb9YL6Ivh2DuuT+47IxU9z1ZGsF8DxPUtHTq+TeU

AHUsZP0sb4fru9Udx/j7cSFKU9q+GL0QvmbfTUzPRc2SItFvqE1Q4o317w02XC399jwXt5cqWa8TLZDV

W9dD/lnGh7RS5hmO+pLi1Df2/vbJ6VL5YP4eQMB7Ax
kxk4JrFytvLEGK01YOtI2qHcSHhqczhJ+17bqhQa7QE1cFRxky7sFb+nXQ/JjFstXTj+izGNFrB7Jygw

kO6Fw3amqxcV0YIyNUsVSDb4B0lTsfKmKXt8qynCNaqc+2C9jvSJqUhUWdxVM0KQSKLRG3OKpmeEsvIe

gHeAwwFR+IdCWxT7DQZ2OTDHiKcV4AgE2p+w9Xsun8
Ruy7L4jdFZib95KqZRXaMbSi+MQqzs65A+sZ5TFBjJYf9a4gnLr5WUzqG+Eo3elmf12DcS3O0+Ip

Dsz5gLRxBSVGpTkVBRLQ71WgzmNOXd7llYfSRJapXgd8Tf0gPR8/yVkL6gOVAsg9+bUdI7/r9muApoX0

QcnCyMHvZ8BuQ/h+LD+m2+URrnM8+kLWNlkMC5TogP
RA8B2Y2x/lhxQj/4gSUS0xMcUDfBe1Hckdi/GMlR+erfHj0N2RWVkHbqDjrfiG+3jGyPfgek++B9H+WN

1nJ8810kiYnkg+MGpOWrijqDRWkAFdXa4iir0UzFXHo1zFk0K0tLiJfxaiU6edIeKjMe6u+gzYk0OqZy

3N4f6WSDWXuETX5aJ/MBwoBeYCJlxvw/W4VBM3MfyO
EIBehmcs4Jf7gd8Db1MnIAgMB7zyjWoUUyMPVWfm+qp3p8JiWqGJVp/I3yn6e/maoq/EAxJkZ8eDmu++

Rz9wq91JDC9HYovhE6hNaWQ+sOW7pqx6zjJ6kk66oVFTtMUqdICtNfQUx2Tl9yAuGFD2jaoZlzQDvizV

7nfdiCbVO37LvQs8GZ0/MnteBhcej/two5FBuTCa6a
FH8y19d1MKpZuZd8KFwCtqnAcghPtiUZr/ZbnjdwXDbCOJ3yFI3qBUfCGBV5g+aX0j794SUVLnJkJfRH

7bnIl5YqC2B+mY0/GlkzrL9nB1u3aosTXBrLbtfBU7RMVOUTFSVKwXFVNFxQJRcYeo6C0qrhQVWaIiRV

BsjmzjdPiYQdxfCjIwge62KxQDIOzBPp+JinJRkSkq
KqRDsycsx80BFxxLFxHCQjFVwKuRba34xg9FOfNzJDAkmc5fOl2lE59OvQB2QSC6G0iDewmDX88j07y7

d+aYWdxMCt4Z8uPx4XQSQL2LpGfvcC0n5VXYMzHrK9oPzbKBGQtjna9Dlo+nimD6YdN5qFzO4uBqdNOz

GDnyxZIK3AvNE2VgRmFTHgycng8uPdiiSkhmCtS9K6
aAdk+DhMiDw6XgTDkUkskOEFFKcIbzMwZv3yWng/6GaYE8Ype24J5qiySp6OKp1qRP9YXMnuMIvk3x9d

2lYGfVIi4qV7CMymzq3s6DTJ9et3TF4NQnO26zFyeuqkMe+3aIxpxqm+1tcfWoXdk8Sp3PA58vvvLd8Z

J88zBZRk7ltGEvkZpva4bMfK4XWJDlg7+xbk/p7ntu
d7J7GqXawj3JgQsn1QuE6gjlLTUDvXPeVHjyLMQqdXfbJzL25mnZ5pPGupp6O3+EI043se8lnQmyvwKR

gIGd7UPp5gCDeVUUQQoxZ65KYmvbQkzx0oaOnSTppk1E3/m4UV9QmuoiVJ+bu8N6kZlc8/ZmRf8tAWw7

n6JSCPV1EppXYFxsx5kxmAf1vHPgzZhcJdpeksZCS0
2DTu6FSTFy0ZGxlPA/6P6rQC0AQgM+GOKpNMqpzDsO39i0RdIMpVdCaA3UWGGR6MnWs1N4rnVeEdh6Fb

Yn8wwalvQm3UYgRtL8ViAWkqHUDFqzBrOXOkkSo+sv9WoqH8JrcelKaKQ5b6jot5Pj7N9PQ6gZYUq5nl

tB2hPFjhiZXLypfG2F5LET2pyZ8fkSifGzYm1b4SQs
YPoniO5ZcsiYJL2Yl/Jazmine/4OPvSYvys+VqOy4rJ/P8EeT8SuJWN5+SHQvI6kfwDXV9LZsPNYMaIQociY

0HqdpiwcYa9hFfriJiboTQwpmGPXaYulP3V+xbO/yMYhiOybnbNYeZScJIAV67+wuHiswGM1GKy6a8Py

dxPJPtuWS0pHeA8MP9vxJpiBv/TxB7Wis5XEvj7mHs
NJRE6PyWMQoeRhsyfuwX9Zlygy21fKQhGEq8lRQP21GNaGURFPWWKp+aEpgXx/Wv4TY0anrPW5SzaPJM

FgdHHVhODU/SyvgY9AeFc2XSj6X9+r5hokDyxk61QqW5jfSUh3BGbDz2uPjlmshX4bvxiP0Y0P5q/sr8

jr8woGQ7j6Rphif1IAy8jX6okzeazhFlmRK+f2UZ23
J7pFdsNqyLo0UBGxtwBGJU8rCuRHUmh46ObXTDKh2VrYAZ0WmmUbpfe0vHKoFtbBAWH6gEYj+vLrKaFg

Wj07W50TzeCinzJlO5LWH/ZBXEEoOCm/rC2SCaoimLSH1jTnu5h2LdIN9leYryejGXbN2At61pCRPNyQ

aEuEd0lbjFcvJzsqYC/0x7kuNg+CCvnSFhFMFfOovE
MVuLwXnhLVu3ubAAfHI28Me4kzxIWJsRW8Wuwlzt5Y4DhLKItN09aTzKmzj4DK4hnpc9BZzxlX8f7aCY

a1NPYCNtjuV8h8lZusZOZof5nod2rf5JNUzWZFyhYfGlOD1zoUVQO5OwWv5LIu8LajGgpGfCHK+33vM7

7Nyz8mm7jT8jHW1emKkBQhdYGuhNcYLgTGkJmd9W4e
NOgU+hMeNmKG2QKsyfNKMMeSpSLsdJPEf4rA/HpfEdubsr/l1HpI09Q8Xqxmot5cRNpkrEufIsSfKwjB

B9t8ZYQmXz7irGVywZQIv414H3JkZPN7XosHvJuj6cq5t3JOhfxmLqDqONgtJVRSrTpPPOgHqkjhUU8f

UQ6Mv6o452vqxKRPZIG75abU29C5jF/DX2HVHa+aDf
8PSGsDaoql4aEYHzYxvDS6D8tlxrJ5zZAlPw02DEUm9GjmUGwpbBytVrdu8kfAad/G8fj9zP3goPw0SM

Eij8LDRCJ2zNRDBO9ycLdWIqKYhuy97hcXc4QRbV+Rt0N2gp3XKneL7xrw6eSgVQhQ1PESRzSWS1xIG8

tDvZF7YiXz/oKc1gk69xV+84k2qerRPZgFevXdJYgu
N2DS4wK+F50Zzg0LWbLfOVwWItBuuGXhvBoMc/EBNBJbi50QZDFUFzN2+hC4TC+nhkIXgXcF7Gbv5Y8V

XKh0yazGVYi7JMAR3B3hKwbPVga1T/A9DLC/Mpmvt45E/8pH/jeRf2Yb50r/eJCncR0FnDw+bkuKs2Gb

BWd7O37rZ/WreKWbaxHS260n7l7B+bVhKpNoXL3Md6
48af9eiqg6FN7H5IqeAQcZMv4t+hMy5qDXI8Vxq+pOYhyKiyTk8lScZpMXHyjA/xyJ1m0QHudWHtNLYq

MtUciRZCWTgHpud7NT1Zsfx4AXtC5T/BV1i3nLKqAiot/n96KYzsk1Vk547qKb3ljwaBiXCD4Tqfs9Kk

mG6fzJQ4v6pDbU/56l+v3f+W79W7WVkC70S+rPMqgx
uQ6qYp06wDwNw0h2qmOm+trb1H4J7boD42T1ljbCcUlY3n24120kmRMcUEIadu27bPfDxLX9IXM5T37Q

0CnraIA+epGo20Yq/PAml16ia4LVWAeg4IueCiVEx5a0aZ7Huy80gf2sjD5BxdGkgDyc6CwhghI8ds3+

AJeJMxhB9UK7TNLRA9zDL0J2IAmGeuyVA0bHe/ul4f
6Q5QrYEzaRXfaSebDFeqets8MC80PzDTnjoxBiuSTm271Xu4reKG8uEXDlbw9cWIuTZr8jJB6guqBR34

OgEHOFZbj7wZMjJ5lUozm9M3Nw2a7Pxw1zOUwkyC4OVArAB8Bb/buNCIgPcninmW7PnH5/tgAi897+5Q

R2Eq6GWsgl2TltR/UsBrDVIZ0zMK+r+RB3Ykch9SiO
dc1fMg7FzybwCrO67unR/jnsrx6ZKfG8JV+74xRlLXPfn/Er6ewBgmovRP+4/HT2DY6m6La+gKj8RJ42

srToW3Oc4njI2ZmwAeuGrfzr0Mln6Gco1YMnGLOV4R9ZADcb6LH2xtw24On18rA75yara1VMmh49amOe

Z343toFGC/RswcxLLmstwN99d4EQ7Z5iqRSc21pA8Z
tkpuTCxh1U9KU9L4D9XUST0eOOpfUSYoCUWX/vbIk7pBwbyRgSFjBqn39GfhmRVxGy2MKeoW3yWaGoau

CMvAH19ydWrnRr3KG2vFftmd+G4fU4241Spnzt3JXZdqC+ADmqGYx761RwAAjh9N646JnEQkvJrkGhbO

8OBQ/v28yWN6F1wp5uWNHu0fZL4W1NvWK7kHxlbcoE
r+nuLseOphr90WYMq6zlQRvexUXhlIMqptQnHWvo6fyX2LVAk0QYAYtpe4o9//YCES3W3ze+9DqgVaCO

FoILIDB6xPAtNA+3dewSHj/aRsrkPmI+OE3B4lNILPxHpDSh1MlKlTDm9Aen2yMhLtW+E7CpSdB9u2N4

HMHnRzcf3py2ADqGQP17OWraDyd3oKAQpAzhqK8Xtc
wbREvRj/c3P4L5PCXD6XF+6yWDbwsKXCowHvq8iWvORxjHmCARei1Cv+y38Yn0TUHbiPX6s9NZ/Q8Yqy

wjNI0JVkg92PV3jmobB88YkzKV6t4ebZvCF7JMPMy+Cf8qGq/8XgypbU7+kgz7DbW2vyodc9n6Sembfd

STdKT/Bvk6EJ/PfZ6qnrSdhV1cLhPxSiIeMATxP6IV
Rm8TlBfzPmQPWNdyDQbGiCrei+HTeBMrpkXaSN43yrkc3bVX/qD3Xq57SJAOuVjGFSMocRNpJptYegWV

WlC9Y9DwTOSZ5U98r4sZ4Ij8hMjSU+BuD/kgzNvoB4egD59CICTca7J38NwLODGbMmJUOckgYb5799ON

2JkoIMoUxeGmVD9RfVwD2h84vrJLn6v+XBMako3e6F
ZmNl3trXcSB92K9BsxqwRXrZgsS7mYNz7HnvgGG/NH6DEPs8CYDOxR83GwO3AyUpIkfWM/U2du+Jdl+1

xU0k5UgUS7+zeKt/sQNRVvW++IirJoZ1e2QltrQG9Y+Zd86as6Q7ZgZFNW5Mv+QlznkeGR/hg8pt9mw8

RXkKy2kR5/8J98OJsu6EKVzy5+5dIhF7Dpz+f79afr
uDafg72immFED6/wT5jIf4PFg1sKbPYzZkfcD9SAVOpNvJ3X/Qy8lkruF1MiAC72XvkkrZOroFdj4kg7

2g7lo11k2ijjmxYKQu/WZQWljQBOM2s9rVyRvlzZKCiXCLcqFtg3bguh/6lSgaGx6Cj17c+1OBOfj4Fd

YG6WU9Mf8FqZoU1sx5HMD0AXBnwHZ9XJpWO2B0CBdL
efYi2laeSxw2CgZMk9RbcOFstsQEvueuPXsVbUVfhFTvf86pnwvk/5B4NizfU82+xBfMLwfj73QXX0qZ

+Qyaf1WbgjUu0zpM/C5Mjwtk7UhGH+r+C/GiiG+87FjWD0UEQfUpdUYzodIC9r+h+nF15//LmPk9GP9J

TmryqxcRPljS8L6pgfu1knEcn3a2+W7KuQCShytFt8
IRNeGLpy66NwRPhtFKtGyC+jddRpcTt7KRidnW2u+5Kqpo3z9MgBZ3GIaYy5t/7EagUju4PKim5SK1qs

uep9So4u8sdZezOjUqhQJinJyVTup8I3sXQ9Ewp3+nmxqU2hjI6+DtAdCKtme0DhehhHrtzw1GH9Y6A+

987fvu33E6hEO7v3k/2/dI78F+oAGZwCV3XqnLadYN
uuNJ0VgmG9qm4/8lx+kEu6cZ7+d/6pPJ0own/tREfFhuyq2u31tk1vk2+fnftL/ZF2xy/3J0eaxl3F89

YZ6b4CwysbeJgZzClnSt1i9U+rwy7xGPAVqi30l/dzo0OhPY+5mvdY1oU/oDNaApijrPvhX+N8ZLuq3m

gH/xOY2hQyG6IMtiRPikS5z01bdb5M8OsgTgAzpVZj
+kcbRD5c+0kSr6gomRrlGvqxW+wYbYNWkWg3yJiAobSZIFy1hF0Yj7ro3XuSDMudUthAsaSnpw5I/hjo

4mZmE+jtID3N+/Ce5cZJ2X3kxl4jLGdNnsJHFAA8XK4ZVT7mfx/mvS/qr8aTi1RdQO6IJykRbOE+d58O

/njeG/L4eb/BgcM212kOvnYFtaI5MASvB1rry27VHs
m8j/oHOba7zXzkM3aJk8bK+6V6O5agJVxlL3UdG77I74rS9VW1ntwqueixy5yagsTxAeyfql9wlp59Zq

q6K62U6tywxWOP8W7nqDdh6R1dxNf0oUP3IFWjvnPEZo13hlYrSwd+rA0huY0T93npBKuQeMqGQqC+TH

n4oyudPqeKq8L+hE0WZF7xsdi+Zzpvrxtj9i+832n9
6xt2pK9H8ZeWgjdP+/XKzCY4ZQe+d0/1QhU59Ux9HvDGmiHnbveymhTMk1Xz1xUmqHnelp7c2R0ckfVp

oWKxpi7B5BII04rchIRtq+5uiflj45nguX88BYVbquQ024Qmk/HeMDBOvgv+uHvp61QhcJ/lZ6spnHzf

kVu7DnW20d56T2nXjQXPZQPcbUqECwaRDGuS8NPFws
D11hu7pue6LlF1+gq1W0grjhZQ7vX4Xx6vdx0YUVPsiX0dMcnwAG4C4KG/dig0q7g6GzJxBZ5K35j8wh

2JD/s1Lu3cl5Zc8rfTdP6j+amZPhF6CAq3D/hTzaj3WQXpQOTgCI7BZeOVuMnW0OsTRZOJCgwAV9Lq9X

nyS4S/C3of/Hz2+s0x7I1i7elJlQlfR3wOgOhMSSpw
sL1tuHqzLZHaDK30zrzQJxX3l311NKxZD24BzEKZmnmdZcJ8RZIcHUE5GujWKbHKLhKNAp08/fVxGGPn

Oq9OVMz1AyECR9xHEDp6ABQPK2lgAGSzNtHCg/DjBH2YtKaFSyZJ/vZ9CaMiKVEGD/oYpYRJGBcZD1Jv

YvWcHgmEp/+KITOogbIdzAPBP7NgB/qm3stTzd5Sip
+/NZ1Frx3T+9orxbkiXe0/OXz/FJZ1m34UgVa3wp4S60c4Mh6A+lqW50nwiCrX6hJUypiY3YMcG7W0gZ

i7LE5b+3XptjHJDu0CR/2Z50B/F+FvDvzFLk+Wugy1W4oABKB659Yc0IBjt+0DZ/lrU+s1NBiWE5SruR

gk77FRUkjcB8yHn4SkR8fpyL9tN2sJbmvsa51H8uof
F+hEyD96NdzQ7gjujo517N+tsZpxTghrsqhq5JPirGAqWjh1lalkqC5rirmKcNl2vZ91wbd+n3qjbW8S

+iB1gxPPllPwu2iJxuy7Vv+T8vYtbVf5V1+mBKIqf3dIt6ERcW1aDTF4g2XcDmh3Hn+COL2G4ywzzKmi

qsQrbkzHCdyBa1xF6hsjhcOtu0k3iCxz5HY8r7gXWg
ANDA5K9cZo+qsI0VW9FU3x00ixsCgkZdMoxAh3EM4xiCt4CxDvd3BGxBDt889w2uacvr6XbZr7HxxQ7G

fs/RXV/6THLxAfEZVcwM9sH71mgX89NRcQMwuL2duXF85FvZ27JkuSTYmUfZkbOYke4kH5PwWKhz/jPU

rUwSfuOI7hF/ed+YbToiP5SqICvvh2X6+inkdTtEv5
SS2jU5Hw5un+WttItvlwo3eIgQKyZtcmHnXA+f3VqK3w4vE8ZvSqO8R49+C+JseVs0Yl04Vry9WpX5xS

+zSesvgcT+1DJQSLEgAKcws8rkEzJfrdfEyEtFfviYukgJRE8uQMFis1KGyoCrYodydqVOvfXw/RxknY

ejMpU20BA4VXYp76z7ShEtoQ813mY7ZTUKIvfDYee2
BjCd6YHwPYc5cptlpxtTo0/HhGtby2MVB8YR3FPll4q5Ev8WyuO13N/RL9jsRXflE77HmECtasipmlr6

aR5jYBi7eozEFfEK2Inc26Yks7tUJ4V5K1AsE2tjYiik/4R1Riyj2OHmxVJ0QfJ0iujbmmA7tXyMystY

PtcebSc++0xMCIz5pG4drbl9oivteATnxbU/65e3fO
tmqHNp8d/8DekztLzfPWT+RIwMvmpv9N8dyogs0La4S+LwN4wJ9PCkAofrV6VvZFPSLwZKaoSlpTfH0L

bFefVvT8EvP3FaYy85OWiozMJz6OOwGKhQDf2kwA7r7DC17ab28+kdAFwgiwqv02kW1/P8WpsBqyaLvD

MNKdyy4GkSbQ80dZs4dSXfUeh2xr6BzPh/3T8v64Dv
f1jKqgo0ap/912+U+990/KUu3sbv9L+v3puMJ09F4C/4Hs0zDseWURSKOsL9QdQP+QZchVS2y8Y2yxac

55Lq7HeUxcSl1QphPmo/L1714QV3cfR5JsI2i3neiBDKb+PFPt/qqsextIgfmu705u6G3aDZykqeb9Hy

1gGB9r5AJ4qS4EuRakjVH+Mk071+vkNflnxEt23osC
/Q4PrFEbJ7j+bdwMnXDC+p2kP5gWWVKo1EKfd1P69Rimyn1Cuia+1ZL5PrsH07WhwXzwL88hTWpl3zkp

yYSww4HZluUqRc8zqS5YwHyqvdbA+vn6LoFx+pdKtC6QtKbKpG8aVgM+RUyzg2L04af35683sxt84ITe

jUaol+HahrDxzfdr+U1HdG0iwNv9qqvU+A0J5g84cQ
v6zrKMqdz43qg6H3oWd5uYJ862XzrNz4bMNUF+PGE2H1p/dNfJChm0E1ND9A5GQ+Ko3YXwzoCtR7R08r

F+HFJE7yKKNOPRmVR8uIor8MYmCh+CH9B0+S0KxvAfcwYnjRRfBqxivom1YCsj6iEkZ/MdVmlsmFQmXl

RlyVb3gF83CgkzGQzUYb+6HmIqYspf1ybu7zSNU0R1
BCCfmEa9TWJ0YJpOEpIuz99jY+6CU8XihAl5LNl0LkwtnA879hmsKn5PCYiQKdn0r9b5Lb95pGHJ69nB

uJx8SE4WYhoS/eAI9Ta+a7bAxfQQNK462Pr3DkkcRb7rAcrCD5kC2i8eW6ZiyFi2Mk6HyyZ8F3xoF7Y3

n2Xsub5mg9fA9HYtfcXH34amtlmwIV4+WcA0LoTNDO
e+w0noZ8PYCbS5+55qG/5IvvkxzG5EnoDqYAtwXhP08J31ZaP0udd5SEzGaekg6Jy0vuRfBwRVVWtZjh

SX2+tSi/wuEsK8r/KRsPOnlYz/3+otLpFo0mNlNyGrkhbpavA2/m16wlA4q0E+uxz1+bANQRh1I3NyYV

a3UBCEZuKesKx+F4H7oF+fYOhkWWp/1LbXlmkNMLbn
ZE92vJIoWSEnT17Y/hnQy4d5/yU0jjHObwkB3ub2Ska/k7A8djv4v4rakeO+B5Hk/K6B8x/k7N+q303U

3aAwTvWP46EPDVAH3+0YZd88cqc9g8oXKoRtKhsXgxbIpsadO+JJclgMgCb9w8LDGqYBAnsNW4wuQxU+

lZKk0ksPN+RXw/oM86+qu3hwRmd+km/ZOkh+CB4X3w
Ou1gtCe1vLsekLKe+hqvlYM73pLef2yTNf75JX26hIFfU8L9y26YVPukbFSW9fbC05nZFjb6LouL4r3U

YnOQS4H++oeqj9z15pCfTO7C0+p4kwJGB19bCs6It8l293X6iPNhcBurrPCz/jzrLB1Hd+Rs3yJHRwSI

VKpy1dr64AhRy8eZdPLd2l28Ib2yhg5zavAh36xjs6
f7zMf3ljFR52yjIOkzDkxC46V4fQ7zeI3laSXCMkcm92nOeGZFvfQ+WbDY0ofd28NVbM+APwN+Bte5/q

aTu21jGfw7m38UC5o1E7t/BB2oc4iaXMcoAoj01bmRQ7Yv+LM/b0YIv98atZR/jpturQ4Ktn7CnErLdk

lK2CXAq+XJ6hTTKxLA8ZAj5zdNrd0ci6XcpciLIac7
CobklJ9dazC7FY0QVS44wckQg+YUoK6k5yKH1DLiJ4ZcBQW1EJ36/4IAMiyfr4CB8EA7Ow/fsJ/Toq5H

Yh2MlgHdugjYFrlmytNGxyCMtGdig3VXpS2kJF68gBHv4H1g7QtgZ4pDjw0X5Qd9YDeEpxIkkOD2kl1n

7h6o8R5fzool67FcBjDc/Egu/PdJ8+nbxmCXTOHPLq
LkIDiUmt71P9isvu3K7cGamsvqDM8HT3VbziCHIsgoJIY/By5S7vedh9S/GtI8dgoOpmERnv1y9ZGVan

dYUUKBod4QfZx0ABWD8b5MnlJhbSN1iu9Z2qK+BvMhbd3lpfhbq7lWBJQm5lU56kqw0SwO6Afg395xLa

LEjCRgnPmwdoC8gBi1m526hzhtkqVk6Mhn+MJ+m2P/
xuCHQ3O0R7n/KDwqrxLkqZQQIK7y55bVdc3/K0+5OxVy4hFW82EfmT1mhR6moI9zg+CYrV7SatP/Nbij

X6tlj3gVSzITOqnUkntFfj6ua5T0ECoRk1GcU+yLEwNHcY6J0V5nigWSp9pc/LjjyNRrbssCVXYYwONt

gsl7Qw4pg0iewPDOP2RtL2x4gD6pP4IelvJglskOUO
Thj/Vbpl1T6f9lhCjmVg+OeCx/vPxT+VTgjHI/KO7KFLqiWD0RtBl+F+1h/DYAjT2Pqw5uYRV+so1k/1

GtCH042+xevFlHwTYdSjGy2UhxX7Qsh+Tc7dWWv0v5zPIYaEPXSVEhxd9yDINg8gfWqclD2UZwyWBnSI

BLkYbV65IJjuOlq7ELoGiDzmSFGirBfwxif4F5IRPX
rES2iiW0hlsa1VVZcSD7AAsZwjUIcB9DBV1SP1qP/lBr1qlHr91BkHJz5882PTIbUSdFScqN4yE7S+90

eOJwwv3RpefnhuMc1Gl2xsrK74NE9BNUJokqEiU/fe/530e63odEfDD/ceh5jy3phpSwv+KKfxWJfyen

8B/J82w8XxHI2H3zAzn/e2KqZ8H9Yvh2N1IOC3f9Ma
O6WJhWwQwxpcj6oDng3ag9apd2HefCC2L2W0n4yCEm8opYBJ8Fj87UNniEwxumn6HlD94JzmEu2l29eg

qQqBv1QVn7Di1pk6njc/imtV8P6rD9a8EfDVqhJrWNTt6LB0g2jdm1VLF4Jp/zD2gIUnJNmSRoQewUXG

Ok7dMAn4TlEVp54wxGWniGQ4n9xqb5Lsxj3AnyeIqE
jyZG116AHL7/aseUmQYcM0RM1U3u96iKdfT6/eO/s49Zypg1v4sNTYfTvCAPeljE+d1xYnJ4OqBEl6JW

qO9rxo1T/RGal7BDB5s9TTuVVennCf8pPz0jRsa3gj31u+/LdH/GiqJ14nml81cG0WQ/DqVKNXcQp7rk

HobFRw8SUTzD9DBV9HAf7XlBG6GZKHJcovSAEn5Hnj
S3PqoUrR/tN1WjgRuV8NHmUZF1PxcN40X3/2D8NoE2aUfe+Y9SsVP9+cTZQ1dDwG8qJEz+WB70hiRnA1

MY7203dvpg28khvOaDP38Lwtn/sF+/b3qJwxer66g+d2KJuruy4uN9Zkh4SvcDzAuHoijTHjhc4it2Lp

lMUVIw0xxaURnkWa26jCvshAGE2DsqaqRAW+IFBY6g
6olup6tEQAAV7VC3gGhPmfRiWfK8RIy+UJ+UsVm/6uuvU9n2JeYd0ROS5ivGW0hb6n1leASj9QiuaKl6

kOJ4XM02i7DZh/2YYeS8Tjp9E3FhdurMlrI6lExy8LuGR/YpnrhhrAPeA5nA2wQIYlAF6quDe6MANsMF

0ZyKFfDHDLtDQ4zMBJRrR9jwB4F7WrVQUnghXTMMwh
CUJaRsGkstDwEcUF+jsjdUNd0jiR9uCR9WPhXrrZMqmHf7bKqPfgyxiRN5Oxyu4mX1SwAcu5WeFgc1K9

APdTJMeQTDJcwNXGEvMKcf2cmk2FJ498ePezLbXL7HqxplUhGGytnZFDR/ogrYRHs5RQ+FlSgjyQVmYU

llYWoujlqMQi/t8cMtqxmTszBaTGR02Bk6W7lyR/V8
lrcusTNHWkD2AiW6eyxJMoPtXaepDrkJBwgidBGOY3A9wEQ3BZlXCBVZAsE9gpIaNUv0LrHb2M95K0mR

5A67Md7OnCEU9dOiTjb9E2FWRA1FsgLpqBh6HwXndDsax8g/hxGTXL19AsI0F6Q+HF3ePQm4WsSrhFSC

LeJFRFYsxuk7Z7kznCkJ03I48w2PMN7470W4ITC1Um
fW9C2YP+UtfVEO+4E4qSjXyjaFc61dHmmILI6xkm7Q1ZSzpGcB11o4B0sHavxpKXo0xRp8ltB/BdNUGa

DE+bLeZo3ovM8XBqElHndLRgGizrfbHxt6VCoCvHw0m9oM54Vr8lWZ2zbxv6x5dp1gHhzb+FcA5I1BHq

+XMKgwEH78d0DBXxEr6BxB5SB4l0k1xoUGRofjXaey
RcMllTsfY/fSMQ5ZglXPGa6OiuZl7B6/GdO7tK8V2OrSVOvH7f6UnjBqNTgLBJEqnuPdjdDsct6ct+James

z0ccKgRzFJbkUJLIK6xEJp6Pwu7Iz9sQBzgm3Kt0aV/HkhGoORmYG/Aog5l5ETqmcUNoumPkS/O8jxns

VG2HoP7EHn9jN9CMKvZRWbwO8ULDp6lwrpuYgwzmHq
k1yK7DmXureC0E9DE+46QxlPyUxXqEIn2SNymNVxOEIWF9XUgiII53f1t8PGBi0lLC4BpCjIOpzOG6tk

dj/3tnbXCFeE9EVip0iXAqWcatL/5wNiXDOQ6EVM4g+uWmZTeW1VlRd32d2NmIkRZBIYDTYJm2XWKeqP

JM5qHuTzw8U8aCnJ0Tap1j/3hVf7a0iyj0ZlbCcXU8
uozhWNS4CLUsPEjVnT1O20UcblbnhNByKdDEekYpN8O3SVR7GvBZRsU5ZKaWn2l9BYMjfqFiR4YJVF3I

dHifYpyKmE/+WIw9yqCaBC6oICMaCXX1H78LMMxite2jWvpA1PtpRrPUkcVnQFfIS5GJ2D/KpLKWCXj4

kTEhNfLgD8PWh0Xixp5GTB5gCcBTzYDGSQYPf2oB/9
f/g//C/T5k4L3r6UId6TJL1dp9TrTDUtQTidxtTmIawSPsDpYFSgu9PmGFtdSZo1R3UyaIRdefLiOpro

oKKAAYUyPHFaJ8kagej7fFVkDqG+Uo4BLWFgvGOdZN8YEspSeJLHmkRgEAeI2r/42KqHQNQjQlptVYlD

Ehl5lVAWQqdwOlHN/W7Qr7oA871P5dahslXSSFwcaJ
XjXQnc3qXLlE9JkwSQjXTx8Too404KFPwwlD65itV4n/bWdit8HNSmB8dvkajQdF+VQmBv2OFa4/h0YK

tOjyA+7UOk6c4G1hq3GTzmSYjXPWoem20qjR2UWKFUnm7N3K25VIqL0ml8mkPxzPQY8EXp2dhkeUR5np

xpJPNfhTwLNq3+UXwrtJiEbmSkZZY/p/q1jluontCg
1aobcpeXSiLyGW1nWz0z8V6fBtscKrJlgniBZeOyULU1pkYbuI8XKD5em8JsIGymUMJzEJ6api3CJLqP

LmSdH5S1wYPoTj4vrSJvc9LdmNI0f3RVOcAcK4JkmeHPIP9hN7JITZHBImjVuulrqL9LXTCmCGEeDM55

SYimPZ3IKYCRFIbfrSYxYNT4Y4Dux0RqpuCtISYfVd
6RZKUnAswuG8IpHdFDPxVbA9FapbUBMb64TWorSX4lKPPfKJGjVOP4TOIkXRhlUR9JaVSkeLJPrpphFO

WqW7L6BE1UGNWULgZfH6BzaeXFqCgjYoEsVvIxEVJWMn7+IFhiroHmWmhUGxN5KPZqq7YrJNs8SO8XHU

NwDLpzHD9Gn896C2Z3VuC9vXUoO7wAGb5rlJZ4xTDz
X9Ctc1NDl839Q7RYJRHFQrgUummlnB4CZFASr8eYHA1yzK1BZLPfbBf7qG6OVKOzK2gMV8dbsBDzZQ4g

zvT0ZW2LJXyvc8WcyGEcVSTcTfObA67jOZYzlP6wBIhJVNRapGd6yHneQ0H2bDZeVL2ijeGnGQ1+IA0K

YRPoIs0kdSGyv8GbeYQ6t6QvSDWnTGCNFTvxET7GJp
KgYHRnW6khYPXdVfEmWXChCjyiHcPxVNEoSZ6TJu7+HXkmfwFgWitPMiS9HHGmr0NxIQe9EC0IBBEgHR

wjBL9Ah706J7V1YhB5gGAgGJemQQNbMnLiFMFbvvTpLDBBGGYPT9UquROoD0AaQ65kgM0hW0glQP00vR

B1GBzUOdUwS2Ibn4VdxyZqdcFQr780liZyDHPnIQDQ
OJ4FJYNoPL3Eotyoi8BhBXArIUEmWo5FQl9GGpNfEV9oka0GZPvbKBNlUhjPNurgItFrKPy5ANFvKxdi

Nuf3LND5EBT4HXNnZVU3NRh3JND3ToncUBliKVZrFkMvVcHmRay0VMV8JAAdNsnvZyTeQKV3LGHsRtdz

AGK1PEI9GjX6TJCkMMV6FMV7SlL6UWNgFEI0KXE5Jz
P1ZHBfRRL9LGT8TZQsMjmkYGo0RL5FSCs8VKQ3JKN2EMVmJAFpMNC7YyipHdE2OLacVpB1JhEwGdB1TW

QzBYT5AtkqWoAtYCU3HNI0IUAxMvM7EXD5TqC3JkYfLeSlKRq5LJQ9IzfmMas9FHuhPeS9SqzbSqZeYD

xaEaV7BbhwORD2RTL8AoK0YygpXwGmML7YQdr9SAX8
NKApPhvtXPE0HGL9OuQgTxBfWGD6GJL9MpQ4WEAuPFJ4NSB3EjNcXapfYUA0BKH1KpNuXpMyPmOgAOVn

DUKhHvRoQJAoCNT6TbQ2XZKlQSJ3VQZ5RfJpSaOwVGUtHMV7FTR9RPBpJJMbFEjfUkM9MROsMVd6IMJu

YVLvOWTuAOYfZqWyJyB1SYG2QAH1DKUcWoIlLNj1KD
X9KCFtIoHfNDs0YXR7WHYmIxFeCCf7KVM2LQFqIcCcSSx5UQO9WCTyHlFuXZz4BZC6YAEgEjWpTBo9WK

V9FTGvJcDrEYg2WHO4SVJiNhWqABg9QDEGWlm1OMB4RKYkQnJvZQw2TKN1ZXDzNaYbPRx9WWN0RUEmUg

JvDCJ3EUGcXnJoNOB8AAE8InH2VNOaIHN5LIY1QRF5
JFQnKgOiEYkzTiVmHhErBMC8FHW2GBJtHfAjQqR7IKU1ZdU1DTKnTCQ5KOYtCsEhPpTmLcLdMQ1BZWg7

JCWeBvEjVpAoNxMiVYEoBsJxYxFtNYC5WXgoFSX0TeJlYJL6RFNkQVE4KgfcDhC8IVD5IiH6ShbyZgI5

ZBF4AcLiDXBsSJyfMmG1LpkyRbA9BHQ1PhZ7LkzlXp
y5QCL0UQArNotsFko5BTfvQpT4AjUaByc2WC9CIwh7KXa9XIZ8IlygQzs2INC8UPP9KqeoBnJmKKyoAd

A0FuBzWaCjVXL5TmU1ExwxFtUeBYW8JgU2WPOnNED6ADA4SaF0WGAdPRV4MQm6QAC1XTWkKTF9ONW1Yv

F4VMHzUBL7HQV7BOEtBenuLbp1XBF6ZGT9KLGtMMk4
TISkNNG7CPZ3GcI3OPNbPWY5PAL3WyT5GUqiVnPiIXQ1EcY0TZFfQXA6WMS2HfY4OJCjUZI7ZQElGXHr

JW0ETH8pi3CkEOwmXQCmAB5luy7AGHiIKrWxC5L0rWFpSw1quONdx7VovPG2f7CLDvBtY6AvsbEUFR3z

D0MisSCpECpeTM1Hy9HfvuZaEHE1C0RphNuylMwmnH
F5GQQrQEMrE1QcaVEwKpTrGCteKE0TbTVmuaCcSf1JAGGiDl7nwGEXh3etBbRuTRAxEqKqIIM3GXtoLG

0XVfJzC3f0UKriS9VbX2hhFRToQ0OafKPfNT3CZq2TUnQcCM1nti4WJLahMEFjGhiYOsx5UPfgHU6ZxO

FeV9RbogDwA2IsaTrbLD3CdnVgPEsiXU1PFDTaSm3v
sC5ExxlhmV2IeoQqYUydVy2EyK0RqhKdIX7ow0PsvqrXPoGyP0WgaoL8R5sfvsCjYY4CXZF7Z2kkptEo

PESFLqHtC0aaASEjviAlMwGiTKDHSoRlI6FjcqSZKICbozmrkG2lBTC8FSTyEj4CFr0HVsXlNE4edx8J

MjAgMCBvYmoNCltdDQplbmRvYmoNCjIgMCBvYmoNCj
k3CQluKL6Mqk0dQ6Z0CHaoAQUQE6ZlcKAlTL7fL1XJA1wvNEngNv8SMiBeG7BjqbGgQVerT5TeKZtoBI

WBUDuoXRTiW8CpHNYdNUFxQg6GTQSfSB2NDkOjVdEmUKVVHiLeSNGwRjEbUEzkOCHKFg5UBnCjC4lGDr

wtP2EpQHygUh8ADkHqP8D7tCzPkTV3ZOF0FJ6MVzWR
KfHrWUd0V7K1pEOdV8D0eAlVxBX0HF9QGM8LEHJhDA1+UnCnR0WMCAiKNLM7LM4OdOPaYM4LdZCRF0Nk

xBMyRj4hGDCjmFgtuQm+JqHhJ4AUNUCXGWO9OO4ZiKFpQR1KaBQII1OscUQmBc6nEXwoUbFrPY8vYO6+

LG8XUYOEXeHLOmHkSNagVLsaRWBdDKs5P3H3AYPeA2
TJP7I5V5b9w0hvhi5+SP2PGYAxW8FZAEGUNrIvJUihANibEERvEJf7D9J1WOXqL6ANL8jyJ3g5RO4+Pi

ANCiAgID4+DQo+Ka0JQR5pi7MgKZtiVFTgXG5pkw3UWItaAVQwE4YnNNBpSMjaP6YrvDykFB2HEJrcVN

qqHG4IXIKtQHG5VW4+JVcuzZBoUH6OCjt/pLFgS0kc
cHThFOjkyi5t93z/UbRlCN9dWrEVCV7lJ5WiqPb7kcLPog8BV4cgKjylOr0+YCepAYr3FrbafH9krAKj

rLf9cQA4mi5cLw1kWFtpCBpzyA3eubC2xD1cIKPuPoS4cvK9hDO3WC9kOa4PFEGpOZgpZSC7BqWNUXgj

eR4zArEnXs8juMD1xBtoP8w7sx43Vo7khadgCZg5GJ
2uJu2iYt7aETVfl5xuhGK0SP2bCkt+HCfdIJUqPM6jWQT0AcANXe4OMCM7I4w0tI4unMJ4KE6AIpAoHV

AgICAgICAgICAgICAgICAgICAgICAgICAgICAgICAgICAgICAgICAgICAgICAgICAgICAgICAgICAgIC

AgICAgICAgICAgICAgICAgICAgICAgICAgICAgICAg
ICAgICANCiAgICAgICAgICAgICAgICAgICAgICAgICAgICAgICAgICAgICAgICAgICAgICAgICAgICAg

ICAgICAgICAgICAgICAgICAgICAgICAgICAgICAgICAgICAgICAgICAgICAgICANCiAgICAgICAgICAg

ICAgICAgICAgICAgICAgICAgICAgICAgICAgICAgIC
AgICAgICAgICAgICAgICAgICAgICAgICAgICAgICAgICAgICAgICAgICAgICAgICAgICAgICAgICANCi

AgICAgICAgICAgICAgICAgICAgICAgICAgICAgICAgICAgICAgICAgICAgICAgICAgICAgICAgICAgIC

AgICAgICAgICAgICAgICAgICAgICAgICAgICAgICAg
ICAgICAgICANCiAgICAgICAgICAgICAgICAgICAgICAgICAgICAgICAgICAgICAgICAgICAgICAgICAg

ICAgICAgICAgICAgICAgICAgICAgICAgICAgICAgICAgICAgICAgICAgICAgICAgICANCiAgICAgICAg

ICAgICAgICAgICAgICAgICAgICAgICAgICAgICAgIC
AgICAgICAgICAgICAgICAgICAgICAgICAgICAgICAgICAgICAgICAgICAgICAgICAgICAgICAgICAgIC

ANCiAgICAgICAgICAgICAgICAgICAgICAgICAgICAgICAgICAgICAgICAgICAgICAgICAgICAgICAgIC

AgICAgICAgICAgICAgICAgICAgICAgICAgICAgICAg
ICAgICAgICAgICANCiAgICAgICAgICAgICAgICAgICAgICAgICAgICAgICAgICAgICAgICAgICAgICAg

ICAgICAgICAgICAgICAgICAgICAgICAgICAgICAgICAgICAgICAgICAgICAgICAgICAgICANCiAgICAg

ICAgICAgICAgICAgICAgICAgICAgICAgICAgICAgIC
AgICAgICAgICAgICAgICAgICAgICAgICAgICAgICAgICAgICAgICAgICAgICAgICAgICAgICAgICAgIC

AgICANCiAgICAgICAgICAgICAgICAgICAgICAgICAgICAgICAgICAgICAgICAgICAgICAgICAgICAgIC

AgICAgICAgICAgICAgICAgICAgICAgICAgICAgICAg
ICAgICAgICAgICAgICANCjw/tZToY8gwgCZmhzW7A4iyBw4QOq7AFH7tr5PzSCOhPYqyxgYiYvyAIeTd

ZAQbBloLGlz9QFnmNP3LdKUoP2VgG2JuPCekMG3HDOGnGIXgoIJqLNHfHQLzWbX4WLLqGThlOT3PrBCg

RUrfAZYjKERkPB9RCSIlB440icSwTY5QJt6KPoDsNO
6igc2CKaSrSOLgRgvVQav8QAdcLL5QjYXcrCYmCgReVNSSGsCtL6ihp8MzNrQgWYLECQyjZO0Ay1YbkX

AxDQo+Em5LVP6nr4EcDXqnAsSwOM9ivq9LMZkNWqDgL8DreFvdTUDyh0lvMXPfNV3oxCScZEB0OHoyqK

1yVNBOy3Xwfkd7D1dhCZ3VNHN3ICyjKdQfUwGvDUZa
VSk1QGBBCRjMIuXuO2Usi8IsAmZ5RGFbUyXbPSgxOBAlWbQ8EW12tIylZQ8MCRSkEARxIG95ZCFaUNVe

Py6XSi9VTwUbAJ0ngj2NKhIkXUWcGdhGYmv5LTflAO6SoCGzA8WcwVBdg7pTWpFhM7JZWOIoVEWvIt4Z

EFArPcVyNNWqGYlxET5pSWTrFOMTsKwlpjW5HH5SUG
8gxpVfCW9QAqGnSc2hPk7VIsKrI1GfY8NfFOLcNAHSFXhsCY3IDThoTE7aFW0Zk8YZvUVtzW4rjc9DVK

GxGTDzMmqxdk6QTkxoR8M0dCkaLJMdXkFeTIZSCRuwLA5HSXSlVRP5ZTXjINLuNHGWTxYaR54sXK2IO9

Wjc15pEvW6INZhQpAxLCpjDT51eHtqxgZxdAZrbBbc
OR4PZz7+ISmlytArJcaLRsvuDEFGBfBlOoKUCmCkIEXvHHNsKBFmHfA9YmBfOp5JFTFqDSWoTYPrEtRn

TTSeLAHeBGuqSTKrAKD8DGB3BZPsUZPuZI7APlPkYTQcOmR7YmNqCSEuWDFqar2KLJPkDNKeOWE8YbLg

SPDuTQEwFLllRSEwBTBkMDR8MUDqBVItJJ9UZePqSA
IfWRE6UqZhZIVrLTWtvz6QPEAnHDQzQsYwDOEwEJLuSMMjOTfkDYIhMBApITS2MWUiSUVdJU8RFzCgJV

MeOTI5PRqmUTRnBYGdlq5KHGCtMGAjDVj3NXUwAUEpTQQyCAjwADWeUEH6FVG6OOHoBZOxYM2ZQhVwPR

GeHYWiGCSeJAItZUBsdi5BELYpZGFmElM2OEPrAESo
HFIsSVfwLGQxRRJ5KHK7JVQeCHZjMT7VOxZhKYYuQHncIzAjVGXrUENavd2IMXCqCRXeBtD3UPOfLMBx

HDYbBYtdHYThIBN1RbKmAIGdSKGiCV6EZrFaGLEeSBq8ZRFbUHVdRBSkxv8NWGEbQTEyOIgsAURpYXKd

UIPcXPvgNAZhHQA8HNB9WPIySWAvPM8IMkXuNOBpSV
wpCrSkBCQuBQAkwr7ARFWaGUWoXJy7VLXgSJSjJHEhVTicQVPsQOXdWCRpARAkTCVyYJ1QZgAtRAVvNf

P8YUIwOGXcBKWjxt1XxHKefIsenm2BDCiAWg9WvFnjKWF3FUwdSj6ujOQeJUJpFOJQKu8MtkGgRKZcRH

FUABbtUYCuWHwzLyUyOsZvLyKdBHA3QoV9SLT7KuF2
N6JxBysxC4YcIvI0WnNaUGAkTSGpMAR7NHb2FwR2YlY6XVovQBAlXNDlMKW+GB2tEQy+Hs0No1WjakO2

ppKxPBgyZNGfZs0MPSWIZ8UTOk==









                    ID                  Date                Data Source

 

                    [id number]              2021 09:57:18 AM EDT [facility]









          Name      Value     Range     Interpretation Code Description Data [facility](s) Supporting 

Document(s)

 

          Color of Urine                                         [facility]  

 

          Clarity of Urine                                         [facility]  

 

           Glucose [Mass/volume] in Urine by Test strip            Negative     

                    [facility]                                 

 

           Bilirubin.total [Presence] in Urine by Test strip            Negative

                         [facility]                      

 

           Ketones [Mass/volume] in Urine by Test strip            Negative     

                    [facility]                                 

 

           Specific gravity of Urine by Test strip 1.025      1.005-1.025       

                [facility]                      

 

           Hemoglobin [Presence] in Urine by Test strip            Negative     

                    [facility]                                 

 

          pH of Urine by Test strip 6.5       5.0-8.0                       [facility]  

 

           Protein [Mass/volume] in Urine by Test strip            Negative     

                    [facility]                                 

 

             Urobilinogen [Units/volume] in Urine by Test strip 0.2 {Ehrlich_U}/

dL 0.2-1.0                   

                          [facility]  

 

           Nitrite [Presence] in Urine by Test strip            Negative        

                 [facility]                                 

 

           Leukocyte esterase [Presence] in Urine by Test strip            Negat

corrie                         [facility]                      









                    ID                  Date                Data Source

 

                    [id number]           2021 06:00:50 PM EDT [facility]









          Name      Value     Range     Interpretation Code Description Data [facility]

rce(s) Supporting 

Document(s)

 

          Progress Note                                         [facility] 

HGTYAo5jKqSIYyMv80/PIVxsWFRjb0CmYXbhTKk2ARzwDYCwF4YzABC7lO3rCMK4IZnKNfTuIbWlTvMm

lbm
QtVatHEoHsOBXmVnuFEwDgUZkjTfxqkKJeEE6GeEG7UHJjW61oHVDkPTEnE7VhEJE1ATT+Gp7JNPJttR

TeVI8NXjgM0Kfou+PG8XuB/rbQBhBRvFveh2kFQHuxKs0Plv49TnLCjQ9zFerjq9xZhL8252uXKhYF2z

PGQUvXbwlk2UIbsywqIskFUbST//nFIQtYd67Y/6s/
TSzZaMr++vjCHkZiJj59H75TXaECnXwLTB5svdtajLFckePfmUxES8LiPPtXgK8JWAJI2sRvKif4NwQM

DmUBlPknvCinsHrZiFMzUZbkEKcv8YlFys3CIyUDEACMN0RnpYdjKvrIWTdNkTWgaCUdjM5xcceANv7p

5XzOYJq8JY7RGVvtDkHjKYKNveqUJCgFEPz5OCLcAg
eq2eQobXlIdgGmi2qFmJGhKEfNoPNOkVE3lxduJ26RDyWRutHIxBbkRrZCM1rmiwAqKU4OJA4CiPFF5V

1f/DMg7TLNCQ5JkGa4hvPLdlMrmUGRxx5OrdsnTAjNygjoee1u1+T+Lhst/V5QsS4Z8vQ00Uso0dE2uR

usBKjT703l7PEuYQSSp0qWHFZ3VwfwjU8nkvsFE80p
VUvoSOYaHOvh7khl5Ge/EfHS37MI/LdaOvLFRebtABwUpdX5Y6/e+PNWjuJ3AtdHT8xUY4mig9kR5ag9

CDOkOUNMjoJ8IKyEkhQ7wmtnToFGR4y9OpJkiucCMKP7MOE+Qo/yFwawSADcfUr5FiXUzNEEt/cfzgYn

r9eP85Cc/AepReRpSk04ZOZtLAQrE0hesDrjxORLrz
4Zk27fK08rfNL23vW8rSu9HLUkPdgSYXg3yg7EMgPdscNUB2cufg8n5ndIzbwp+JvuDPfpQ4zqexW/gl

nyDZKdRUPUk9kofRU7/zRhBQCUO2Il5lkCRGDTppXKIr72wRfqqITKVSyOl/rbSwKnVEGyPMaSZcr+zo

aZ//tNDlNzIY9RttXUSY+EKYcB335hGV82x/n8MRun
ue/MdyMkA4umBLy7M1SDhIIoiPnFDdSmBZcMY5RDAeUkBVZNCpK9XK2K+rDrk33aKPjfeCcB+yRfTos6

jI0hKca4o8U5C65vNAKHPftKLOO+YJYBNYySAcQDrIogCzRNaAFbMPQDqZQZRcXJ/T1Ro5fRMdVNbuVR

+AqbIDNcDJrowkb2RDEBOFZPpM8tqlkSjS4iAkpu6a
qhydriotW5aJPzRXCDWEON4PAWpZXkSer7PywSqEHXJNIAOG1YKbYpwIAOCnqM7RbCnKyqHChL4NWQiD

NSytWR40cTcZ9Bwi93k3MDS6t8rETPm2o0cuwpOAlTbmM/NRJ3IHCAdcVR6RNSTtsylEpnIkW57PH/pR

JKUJiXdWQjzb//wfm1/Od/0WVr3yZMJUKuQ7TRf/vU
l7FDLxnXgFKk2jWyUYtx+oi4mF2J5acwj8gTnPcJ+T1JNTmkBU9VdOCgHgXalunqs/aW+ulsNH+YLdOc

utRqBxfOWvVuYfKM5uK2d9LC9aJmFD6zgTuHEZOndf+1qhLsjUzfLDG2Zr5Kca6nY+sW/9aWlJkBcj1F

QtLAVzge7IuaPCuUXWLbNc56yKrdikiWuaRHGgC6ya
+n9ODz9pt1bb4juxuhlTTaoUIPc3XMdyP62py5Duo30UcdvXupQ/egdq80wBcaHrScddy1mlbTkApWqJ

zsprMtr0u6zX3yD0YNXAM1gzstgEJ9owJrGEqaLNQCMhy9zcH/ALLDWJWJt55cag8U/Ltm0sFb5NTX8T

IYaS0ZKTZ0PyVSUB5xRRXPMFd2RMSMjqI6WSP3jW1B
q86GlcK9t4ygy3UmAMVHZcMeR80Z2P/TNFNP7HPurRS1B2snH/MiBKksBwEdWF7JkzWICE70Lbem3wug

I0jU+Tnot5dzCs/M9Zde9gctBb4V4S86oE2gDWovJuAmwXpucJvpDZjSVahRjgqNtvtQ/hWnW8X8JfGJ

VJxmRTdLbX6rrh2sBDDSQaaIevQZaGcDbktWJTJWxq
YIVQ+bwmwCroxrP2sj89TGzZJU3DZ8DS3ZD6kpMB9JFnCS66Wd8d1i/Uvt07Fv4CNdfERdi+NslEzY+2

xRlEFPxy+ejoyn7i/4jZrBWcpBjx2pFT0nMrRKDekFFEKNHGhoaecR9FZ1SF2WCf33J7VX5a8dquhacA

qW/V4ey/OAWv4AET4HHlC8hwbmgFqzUNLkgunIpU3i
qDEAHYOBvlwaSxQrKr7E/zVLl0++eY+LybvjvcZ+IEtp15w+2G2QsKvQdeT+6XkelrGtblOOq73qsuyt

IpQP1S6hcmJEvVSaJE07KmcBEMYx59obffFlDoLrkWXdIO/1cWefracw9NTeTGrkt2K5LX90Jeyr05wZ

crKnwGdHAhwcOw4kU3ZnI5rtwf2jpQy7MgzvOG/u6e
TDRJHsh4X4lOmSogitqh844I/kd2IqQ9mqxlM7Z5G8ntBxannAMQWdPnH5DSOsOPiDanMzDftwLSBdaf

t4oTCAeHpJxLRIVRAEN4+LN8FqlNBdrrsYUPpahqrJiAKf3xGa6esPZV/Uyt+qE1WesMjciNKXKnjnMV

Cr4ihJbJtGSCsH3SuudntoFbiFGBRDSNwiLwo3/rvZ
0iZYCIlmY8m9DPfZX973nAI5ZO2fo7bffeAALpqUHaPtv0zjoF0CBgHSaerF+yZ8UFh0po/qZzqmsAmH

Pxro8pTn9Ckog33lv67aFR465zGkUHtLdbvGg9KLhOSEwZMotwc8VBEpl/GkTwmNLqPEg7K5efsMK3EW

hQoKN3elv58k4SFBiXd0wPhKIK0I75sszq6kQjudDE
pk7cGXrgxsd2ck8JJXz8AmP2e9b8P0S08i7g2+2ByHkTrc2iYSp64jLtkRepdI460h5J4A763HBFbWf5

AOS4szNPpl8+dSqM92g9ZWwwx5fGwwtXaZw6wYocrbdTMWGRAi5G7K5SizWNaEYdoHvGOUD36MLdAURN

5ZqbSetfJiqoQyUKWm6uE0kft9lKlExauhTL4q9viB
hA7fMoJyQ2Cdncc7cFJtZ11stQWZOeerjE7huiI7tnBznNwf47GvVr22Y2wiK89TWG/5UsdCzpWTbCrO

QJr4GwERY8po5BiVRdGXaap0MggzsZzKQ98h4vDDzdw/1pvgOCFypTKcgmrFPFilW9fsltD2xREzjcqD

Tn/kZtOqbhJCJZPuS7uhWt2ehRS0JOPrgmoEwLNKqB
kw285hCLTn4B/ayGgb/7cpxHk22FC5Uob5N1pLA8yFxASO2zEprYxaeRlCAfaXXAxoq1mMmnK6aSfPr9

PRkHtub5BK0+ZypwU3qNVtcO8mkZZk3qRq557OgA0Pzyki3nF0IwlWmJOLrSWlR625pO6CXTv111tU/r

K3oyysZukNLCW9dAzg4pL7xVzA+HctLYLqheJF+pjm
1ZLIwsoOm2Jr8x+cmozmd/UAT2JJQ/ejhbaPlHMaV6kwcJHho3noAvcrN11GNWTZW5uJ/bAW15Kj7qjZ

aA5mNcyURwr2LQvGiiKXJXI/fMIo3NGUf3ZLziLvH6Mw9BMzfcaAgWwne1Z5SjCDssnKLdRUHCZUfNnF

o2GNLfBZ6WxFikTjRXBfG8wIxHYMSEAtd9opvSK8Bo
CvOPtLYz9VYhACyVIgwFsgKdbs1SWJIQyhkpwPSnPgV882qpeQ3qyzFNEYVty5Yhbyp5R0ZCxJ6fswhe

GiZbF7sWZ24lEhBFXAZxlifymxkVMG540ARNUa57RbR4ocRoEk9eCSwIujAzlvIWs/HDXvTS6w2f1Xtv

e/rTcV5zn0CKGgzb4x8y1NqGo2mDQLKhdo+dyUGnXM
uZ/fH+5GibbiV22t4Sh6EXeEWhmC66Nmpr+HoFunUhwpb1fL0xmmxwkw+/1zFl8hhtXNbfujwIm9O/tN

li/vev+ZNR1kfgOj/i9Df6mipJ6/KeZ+ZPAVda5UJ0uENQP62Z7VcUNkwicfwNXG2ufxrhPTJCCzrbax

K1lhyMYEPxW2Xhwn3Fg/NyNIQt8KGvIkTKE4znUbuL
3ONS7tp3YcWNi2ZTSsx1BlDQccBDj5OYjyAUMwI8Z2kIDhZTGxSM5SCOXpVZ9PSOEjlqSvKzLvJBBLMi

OdBZYzGnTov9KzU6XnKDLmBIFQAXwuEFJvW49vFFsaRj28OCmxNMEiZpYsJMz7Lp5DKvHnHJHyD91vaM

LrxSIaPPAgPWZXReEsBVUhA8OcwNIoRQscY8HoB8Qe
GW0wlBWeLD6mdPNqD5ZoU3XsdlzpQUCVNjLcARHgKPerNOKwCaEoKFnzHID+Rg9GMYE+Xy9PVU0wu4Qo

AGr2DYPiw7TuNOdtYWx1H0GzdCQacwJhLzrvmPWLBXCtKYUpM3nfsou3hQV6RUFpEr0NIyMek5OvQYUj

MDhBos1gKTRlppO+T1X+b9TcLoaY5X0U06OOMOzTCh
EiWZScqjgPPMDDJgmGBOUoPyu/DVOKBLUA1Lb8LmiVOsBssQJUjfX84/R7ss2044/zWgd1tp/N/rv9mR

Whd3Du/jFT9j71FAxozlkRswh3eg55abD5hIti1a51AcceEDB6rQH60W83xEu+/Qd68hqI9jojnfCPd8

3J6fhi++3N/oY9j57B6g5PE26+6z17/o5yCHMxQzii
n/1JlkpJT0m4mi/4zoqBkN1a3wNmD95r84ytt73y6Lv7X4m4b0lYUL9eMXzX49cjqy380nMVsM4kaww0

uywt1fiy3wjprlSO56sIgs6UiN6Jg0PS7gyQc25r52X3A4eQXKxfZwpacEcvb8v3acvz7zvvyz/JV+FI

kVxSIZuZbMaymcBmLpu/hIswg8c41oMvuXGH6+CWJq
YEOPIMBw+QZHvyXQSsGJukOEgdP9hmWeddRo75PrjPVhtY+DGmqu9SN2QPqugxV38sIIqCPJ1bv8uvf1

Xcu+yrGPIaNiGvqFknglfwNHLcfTnQkl+Lqr4/VnL+8yHM1htDPSkTEavBl2cLF/XOVj0NZ4lcBdtzqn

wBZOyP1JiIWgHg6FGhEpVXjdNHBP2D4HNzOoRMBRLJ
qaJOk8Ul1dvb8ZfLXahZESVnCCyIQxCY0GTuB98kZMKdquT9fjccp5Cb49s3JHyCIuzgTxQEuPPPzCfZ

Qar+7Ls3nAuOXvB5jDIvolfxVl3KoVwo14nPLq0hSRM/MnCQ+d6T0A+rEvYSCxJOSTay3csEpjcJQFAa

u0sQeU2UBZVySHG0LJhKgrJOU1UDMhcYZf08DwCOv6
Ouu9qXBip9mougv4Cb1O9M7mEO3V5jd5qUx/o1cMCmhj4ufUzECIhpAxWb/W3FLMN9oySKUUnPKiGr+G

VXhYxruQJFV/MCZmBg2gsB4oxDjyIalRkmhjX0TYkEm2zP95ckszhRJN/Q7GiRxryoPu40SXZz05ukK1

2X9RC3uUh78e75vR3Qmo78NsdPHDV/6s/tVNFUfAd+
iY80oXexF948n6uMWYdxJcbnsW1HaDbGgtDtOVyKR4f9PFWgDsuXrkq1z8GLcpvKlJrH1ZNT5DVWMJ5D

ZXyQXeVD+IVOlKPHePz8XouSiQEGAdtP6rV6FCqCvER3qIqGuqbAdWSjgZb+z5R0c988Bq7WAYwGtPmT

sD00Oazfle3moYcMAhIne991QlzTLTG8kjtiUCx/+r
j7ZKqm8fcPptDGKlyAIyLhLTULc9JMZhUHeZsMaBhIwPT27PdP7tC+P70+wGqqBah1N8q2jKaJcPGlu+

PUn3nAE4f9VJpK1+7sYhVjKtv9TXch09eoDzhwcoEJ3GthtWsJL9LDcV1o2wnchgeWsPIC8ytti2D8T5

I0cjNDObuwbwPkGtTgA9hrLLnyIex+rQhl4Ro0d6cj
5XaM6Nu82L24e3FP+HYDnIAJcuqhE07NjSQ2qmMBdgT4ZaOrzjOYd0vJc8vxzf9MqJ6Smg2r+xDiDvZV

06jtaGOd7iBja7i740V49SOYaw9ZpSSJ6KvKefdBgG0Z31lhibQe+RZbVW5v7QrNZyIgPK90wdjor62D

Mrb1Ktn1gBDrrNOjIITB0kjdIALEImatwSs1cqAWCJ
xxZbG9QL4Di6vqHy2vPPv2yXVffPq4VIYSBXoAd2DoZGXYoRQBKOKMXD3XxwYFOL6AYfJrmRfjzujrhZ

3Q+z47STuf7QNM6KJ0M6n8YyXvJIXaSM5mPXNIhIKbEL7NCZBbNtiZwhWav98maerFOOvxA+mRC0yCvn

TivQsUzjsgNozkcg4LzLOs3DmSGHcKHqSU1hU1I8Ta
CnZrvbR8nBsSceXPzLUeISSySDrRGbhtXpTgk6RSPCTKtGMGLudVoATzlBTevvfitiUncMXvUCJsy7ma

n4ZbHC3y5/xB45FoZNcHKeKvxDvdfDiAQuOBdC49Q1CAk67QK7G/b/GWJyiItHPypVdArPYkgiqiwr9q

bGgNyWHdKdvd+eAHYKUnLulVbCSUQZf8iw1UdzokGl
44ANKB7IGrIqCKwVSLtOFAb+cEXBJDl0csWcK6QzqJdHRDpKJMQNv7UUPU9cPRIJ7o8WQe6w/q3V6fQe

X+6E1ypzPV/DskR0uXcii5VDMGJKgYOkBtyntLtmQJWAkmxNEy0SQEFvkqPQEIPuaauCYMnBURlxzDXY

mxPp7DV2wDJSxDVZnBXcE0jWuPRqJEMODQLEWuGPIZ
Mwro0BYxM4shxlNAw46hmdxxDgjygFKCsqza0J/tQiS2pGkISXmgRWt0CllvQEhrppdxw9i0dh0yfPw+

4SIerWbcBW6B1yhScjuKSEgN5FyVYRX1v4sVgPpAHPQ8o1qYRrY7vwQOwLZQJWxb9us/PKNkKjsYehsO

hXZv3HAwcl2VssQJsOPZ/IYR0AMO8ALg3DKsD3l/w7
XcOUhvasAJUft5cE0otGhmYZBhaeDzpvipx6KPUtHJ5cnjQQiXcKRbvNIefFbcxpKm2IYrfdXjuY9jE2

Jmml0N9tywxMRDGRdPP96tJ36F6JSMIQnBkWQXzXCLfoE7K/qvvkT+ms0oHlJS9vRZIHBMJ1PqKfHcd0

rMytQ9hqGhNghZ5tRRaK9OBfjbXHZmBKLRWRjbaMNa
VH3Xa0MM3HigXFVKO+iyIRfL0PnZwEk/Fd42mJQTe82d1JvNLqnaMoMI/iGkU0aReXtRu2zYnyIgkr9J

tDULJY1Gxmn+uIXqNvKu3cPXTu83CiubZZziepNbnAP75e2RKySBHoj7kO5yBM4mo2Ng5uMGJ3nJN+gC

IMoDRaV8dBK1vN+FjVoDbOn55MF4wFRGjN6jxcS8y3
TuFDBuYsykOxeZT15YpUSEiLsvzXqn0+xQDyJXSZDy6N8sAlTiQZmOAIPfvCPFsUJZh+GJRDvGQdlkaK

4Y9mieLpLaFhFn+qHqvnoL4lfjydEFTp8YjZ2GYm45pPW2HryXaCEQzfpsWeoJ+P17sReJ/Kp8Y6bdFd

KcgHiaLMohHnL8jdpaEzXqFt0HPK363S0oEGCyEjzH
wCNNdUoiKB9ykwwqg0gJIJAV5KjK4nWJXakR0d9bGn3rh5x4VanxY8wV0pDcItX1ZEk6jY4vqsWWDyFm

ZA5z/IsgMRGeDMyaBDM42TmNHFd2AtlehSzqyctay86NydWGwF8evaeSrGjBJ85k1FDycpmeBgl0aBPb

nSUSkkQCC3yabM0ygu06tkL9riPE1xThHFKmJVpFfy
H2Ui22MMIUz2gABpNjrEo5u7/aMgtyrNO2juEiMsLxV90pPuK2lAxqOi8Eu8HwXNuAxPNt1rR9HVD/DT

z87DYuD8mxR8TPB2wq3xOpLBE/GtdCstVKwTB5ASScRlCh554emn4RNkhTtXiHzvxSNpCUzV+UB+jIlQ

SCTCRTp7nQdmtuAUFUXMzEVqfMQDJxeLcIjiZWKf1q
PG0/+uv2sjoANt8XWK2ZUhtLlLDe5gm+0r+WcM+xdLzTPdNVf3lvJPjyuQNsGROl+144x6c++i9mvfeJ

MwloRRFKCx1cCXTSW+6hjj1LjvorYujiuq5YQuY2CHNqvbBiZ9gbtrHXKXYLjSGtaPpjbVSIjaaWK/m1

lRibiErMV8fZXvpxnPaKrRzF2GaqtvpNiOiCBOfsDx
j1AQPbmBZKHf+SD79U6Pop6Mll4nO9Uegbw0yDd1eCCTHgNnZAA33lwkfIcHHOlyDacib14JckqtArM6

GUnGBMgAIC3DE8ficywT3m+N1vN6+6xirRItWvl5yWan1xm2WcWXhWeGzJ1NFYSD2JksFjEu2T0YjZcm

mGt1Mo8RHEJbr53PsqMoqjZIbn9ayxyCk2sP1mOAuP
HKSe6luaBxhwAGeFCRFqAcpcRZjH4OC7jgwg81OIwlT/q4OzYL6ciYGiMqIDbDndx1l8UwpULoKkjkEv

MQeqI99ydFYibimDTuv/F037uvfpFyDTU9E9x141YsVmjdyKYkpr71tMnp5vIYuO608rb/pNnWNH071H

IBZ6vTzc04deTkxT/3w7Im3nKDOSH5fYO37U5UGjaa
wvmm5xFq84s8Ea6W2+D9803rSrhj3/uknAsgCFMX28OlqvsAzsWEV+5zKiI1POEwnHjRQL2xFtdFbLIc

iBygmKKfBGNKTZCFN7GG5R3NleBTSkXwjrF0UVtARcXZ2Eu4gNhJOFaMjItjXNa/61sG9jePiL4f+huZ

mOBp0wt3zdBAQJ0kP+0fAL5FDIMZGFI+cEGJweStSR
hB997PgwD8Sc/mKpLtJkfqnfOZPms/j1VdXXWx3rrSPBvEr/WXtZ6KhjDsgmyKMTJ1eTSGxVQkeZGZU4

/rfPCXTX7st3tUbZ/wtNwkSLhhe/FyMT2Eg9MvjEGPRjw/dHRxUnLzCsc2eEEBj3p2gh4FZ6hFva8L2B

BYTHVX+YXYktcJ6ucgBvwDvENbqpx05SVyJHI8Iwvq
W/zi4dWGVM7+nr77qxzqQbfmDjXFktYajffSoVfzrG+nYzEAuqNjhfviXSUAGrVIb9OWGRRxH0nXugqI

Mzd3cq0BxDWwbnz7I7kMhksLWkV6EDQ8JNXE+d1e+y3ymYFa2sNvu4+FPvGBT0vss16AQ5MvAUwEBSWo

H8dDaw0ahHNUDF4BCJI4H45wZ838ledBuCOk5kN0CR
pkkFHwz+ihwbHSJsJ9cXsfT2FyGnOX4o8ZzCxuLnOnoBZmHW2PsIE9KQ+4vDXnXb4QKHqKBrO35owCek

4kMLjScvDHZ51i3FGkJCP5806jNERD3mMHolG2qYc8MfgHM1zRDvipcyNABOCE7dfNT8uegvbHvYUiSC

mS78SCgte8OROqgSXICHEBDK3KWXHIjfkCEisCA6Za
LGuvQTcWx8UtJ3aMgfL/XPhb6korrOFaJ5KEOdBMrNqBkrByF5tsMtzqmAh1vB+9kX65uxThDwQNgxzn

zZS1qDXW2DROfs43W6HLvdtmhLiDgujrH4otCiQkbqdHKqrM2/EAx37FgnyXoeal2NqVFqEeUmKqdrgM

Kbn2xpjafPSmUyWHWfnLK+OA9+eY4qfg4oSeQ6agdD
sBmk378co4c0I6Em2A19z7uHuliNgsLIK1PVHk1GGqFwapM2o3CYfQSGcHmLW9QJmMACF7LxjOSYqBrx

5i40e5D3RhvUOhLabfb240aJTHzg85aFXRSkk/tNLVUUdb+LxxBGJ/ukHQWvduKoxzTxiqFo7UfF+wIt

dJSMOo2r2MGoyRYdKocGOwaNwKdu51xbuhKO1rb713
k2npOpAje5wIr6eaGb9U+5Z48NpPa55J8PDpuvm0wCe5092xBU3z/Bom0v49FI3+eKGtm1bDXn5enZuj

lkzDfi5/8BTXG+kk3RHN6bf8YuMLJjVWrjnjSwDthBNcxwKNOrQrgBNgQdIEjXYvAeKUJqSHaxSQ6PUH

msLZxtBYXzZ7EywlUtcVPfISCcWf9OXIOhNP1BNCEx
fEGnCXYsTtByNGYIBtRrQPAsLBSelPESs0uaTlDcQCV0ETJbUezpJJ1AEEDnNU7Gv832FD96lbC5FJXz

Ls2LRMZmLU3Mnx43eSO8GNPkOyKoLCOydxPwOOLedoQ8TZ7ZGwVlQXM5dFFwEzaRWW3CXOGokDIfMO9P

IGZhbHNlID4+DQogID4+DQplbmRvYmoNCjggMCBvYm
nBGvVzCQzyVcwedEVsPG1KsPV1VGTcK04pSSNaTURcT4JzUET1AFZ+Rb3PMKTwjRZoHY6TYmuO2Niqw6

NIEH8/9p6VxPTZZypeimpwlWOGB2R4Z8MZX6lO86KNy7AILQW3c2ct76HZtk07hYmcgIvN9xNd7DrF+d

51lap+dojDEAI3Bq3/rR+TNFDDK/J2f7ljDMstfVcM
Mrpa0Uq7pY+f5kE7gw43zidg44AO0Yb40nAI8o+54TV33m9Bn13c5/SA8UWprCGSJ//rj3E+Gt+8eKFe

vn5FFw+2rtfKdxiT5f+7+PjA4v31czQWLXhM3Mayn3R6ypojhgiIgzks+7m2XTLWUMSFMgRCGlIPkM9Y

2XNO9aXTCJDBydIXJoB0yDSxoUTEiO8nKAbH0HGvwQ
WcgzP1IInvfI0D12tKqkA1+RoOEpdMzomJflknpNaMGbBIgpFMDsb9Biw8pUypSrmklKh6Qto6o95rBD

CsHCL3/IyzsCbTtVFzl3egwelD0AhzRmNtXg9QXcd40Q+nwFH5hcdS6sqPENrr9vw3C7jyflc667mQJQ

1DLBaWM8ZHbJRrA/OIKCTySiZG5MiMc71dlaSeOHEz
pgwNYla/QiK+9gIWuuzNrBkqAfMSW5Y+2VSzNdkvJfTzVJzXodOFI7OELbTxxMKYHtJFVmFxRn3xzvP3

pDJiEoSen+6D87TfUaE+zWeX6PGGhoWX7ZrB013bsqv6vzJ4yYZjs5vTzzIzMYZzTv2803t00FYPk9oK

Shns0IJiR9qlIKM195bbwGlKryge1tdmPLVFaX0kgt
AHpoqlxd5JsltzBEfgrHoHbQgDgnLVWprO1hVBsXUBUUJolfphhhjmVI1HafH8OcL0V4aWYgCTzchqbE

KwGTQS8m0o8du4T+MQttMEMT6KD5ireb+iBHFhcOcEhXNl5MftnFrmxsGbhGBW5S9JR7JODHD7nhYaBS

k2zGwEXCOgyhB9uqZ6QbNBhbSzhOzXxLs9k2tDrAb0
YNsIDVCybFvFHvNH3OwiufYoeQ/vyxA2FmDjMi6fNR8SZiQ2BX5sbh3gN1VVLQLcGLQWFWPQNvuQvKRm

TYhL2VTpK6U9NCZOxpcjURKmgaf6ErFYf3TffORVtxxWxQN21VBFFdfgAOvcbGetRpPb4+eu+kDAuS9g

RFHA8cHvTepg2jRDJEyLxkQhUabys9vTHb3pPLIiLE
Eqe1f+DI6Vzrf7yTQZOXpR6SAKM1qNKTsV5UaqZG4zqQ5HPKZbmTA/1jwiA8CURJ/S0Obfr4cb2OtNiC

kRVs9xljvZVNccH5bnjxxouv5IZKZyl0wX9AnjQMZcIQ0xBhryI3Fygy/uCbhRzTL2YwNhF6lziLj80B

hM3nxJyjETLBRouhRFFEuO9mqvmrQXGCxNOwZHZxHv
BS58V+Y9KYaQGDuEYKJn0vIXXBrtv1dyy9d+eMTrZ5SDQVjdM3x0fxdWduEXATBk0FwUZUbvmbn/gwDx

fLANeAn0o0o2SlDSFftuEh0GUoAdGgha2IPYtoMFBfXOFkj0EC8V/nWoUWwjp7Wq7wxYvgaHf3H1xTx8

7OTgBDU2dtL6a82f02bzvh26a9Cegs7iNjwQEpj4Th
3BEaXFI4rBugAdcDeJF3M5shWew1ZjYrkn3AlmuocLaS1tHRkBZjC8P0Cl3DMkyOE/YbJSswJ3jqLUmR

HYLoKcFfflRbz4809jhY+CFmHf/BRw/cvnwtLWssNAFUTJ/FfU60diJ6TXa0/2J/HBXTKYglHvGcrl3N

lYo9vSjw9tZHrymk57Z977UdkAqa1FYIKxaBJKzCzy
YSqhQYyuwbGhJUiML40a4BKOfkuDrPRfqcNxndrucB/OmXT74Ac2j9sDbyoLIGVhIekTlWQzUpUmpzXL

wvwAgTQKS43EA8Y/Q53KLXZW7o5hZzyInwCdmA1xLILqaRLryR9fHB6gikXHR35aAk7+A0jH5Z5z268e

6ahvY5/p73uf0/oBavGBil2wScwdNlhr0RDHVC02fd
p6cXbbdE0G6HlspzmxMfi58scjURa/8OydKjN/H4tpBkgZR/AWpJVvo2UcCAkFLtYDPjtLh+cv9jIKZW

h+O+YNdcLzxkeWXW0KR8T+W+R9yOW2/W5MRqfMKkRXL9yyVltA0hZNrDZ9TYWaSEdzzCcOQeIpBJsxNh

Kc0mek795bB7Cn1NhcwxrR8QZv/yfHoP42Z7hiF6EW
WLYYMaKuMSB0yiVwkF5HNW9lp1AiAAv3FGOaa8UdVJdnEJa4IVmaNBUjA1C0kUVnEUUkYW6UTUUaID8V

JBKvqgFuEcOwKSFBJdKnDRReBdZxb0QgC7ZoZHZhHBOFVByzOKWiD13mBGiuVq69BGkwZFFnUyBiOYr0

Ol5YTyXlFTOgL62lsGYewKMtIEQvJWANEgKzWMSyV1
MxyUWgEGtzG1VeU5ZoYW7saIBxMX7psZKnZ4XfC7AhicujAACARwOgZMYxKXnuREOrMpSjGDmfDNI+Pg

0KICA+Eh3IZO9me5BdYFrxQPUiLE6wid3EDIHlLUc6PPY5TWGfGto7UMU6YOHaHJQjBIA1ESA2AdY1FZ

kbYcH1YDO2TOAdPsAsMpPqBAA8XIG9YRHqGlm6AVAl
TqVkNkqzZuo4BGD9QtJ8JONdREX4DCZ7FhC3WARiYGX0SHJ2UtM2AJQsPVO1BDM6RhEkEmmvYrh0VWX4

SFAQApYlBBg7EEW5DQD0LODlXDOjCMZ8LokxSkO0OCzlHtE0LlSpQtC2YFNgNLD2OvubVxPdDGM8NNB7

REBjPjR6QWH0XxT8OwVxIrZkJVl1SRO5JwyuTnb2XC
ybSaX8MdsxHiOvVLgkDcJ4FyaaMAG4ODU7WrU1MqoxVeDeSJ5GHICdMryqDlr1CZQ4UCS2RboqCKN3PP

AxKkF9WPJuZEW9SKSsPJE7EKGuQXN3CIM5WBP8NEJdQRS8LZBvFqWfJuNlQQNdNHKpBoD8SfPmAPO1EZ

X3TkH1SENdGLA9IQNcCsP0RFXiWux0IIO2YnE6EPNg
FvJaKCOiTOAXOlFeRUUtWURwRSUsMeOmPwNqMQYtFKD1YHNhDfBrZSt4OVB8YCOoZeTjOVw3LOM6BVEo

KnGzXVi1COX5YQGxCoWwISm6MSB2QZOnPpMcQQt8WAI0YWYzEaWmXJy1KBI2BUSiDiUiUQi0YRK6IWMe

PcHbWHb1PDF8SIJuWGcuRGp5SEI7PZLyTjVkYGv0QV
Q6ZKUuXeXyHHt3EQI5TUDoSgJqQAG5LDBzEcLzTRA6LUF1FiC4EKLlBWI5ZNG7EDK1ZZYjIkGeRYesTn

JbOyJoGXL2CNY1PFUnJaXtLqK3OTI9SqR3FTZqTDQ2MRYiMkOwGvEhEwZqJR1HFNI8ItUhBPB8BBTaAf

SiXuXiYzYeTKA1CIA4OKDmXGS1NHyhJHH3KhXcCmPb
ETH8UiM5LntoAoQ2WUY2IkW5AtkdAnL1YJXsOOWeYgIjZYS1VuU5FdoxVuN6DWZ5BuSsWomlGwi4BIX4

CAUlPhdbLlTgVNjmOqE4MiedCVkvJDj5XZI8NiqqLtn2XBt7WKH6VEBqVxb8HUxnUvG1BrHdBzNnDAag

DpV5SeafPaH6LCHdFDV2NMPoBGW3IOO5VbK2STXyBB
Z4NWH1PjV6FFflAMWpDHG6EtM5KRGxSZK2YUH2CgAhBigtWom4HQC9EUPnRhgxPYJ2ZW2SNUH9YDPpTP

N7JLW1NlM6VHUrHAH6RDI3ZaF8YPpeSpSqHZJ2PcQ7OXQcBWU7YSS9QhO9OBVoRDU1MJIlZQWkYT6ROX

8fw6YtVBccWDUkGQ2chf6UMMO3JP0WSINaQN9QgCLy
T8IudxVIROPrjcrsoL7rAEcrWNKiC8MabfMYCU5wQ9SxwSJlGHljLNIjO8HbY6DrpBQ8ZMBqG7HiORKq

E1y0OQobSN9KUUSlLS31XL1qLGXIGvXtSCPvPaysA7KsDwMPVoOnXVZxJy1unCIPo4npXqWgMNEdZeFi

VSJ1AAxuYA7DOaLeOVAvGIUzaAlkJV8qjCLtSJ1SkY
VtViAwDQogID4+JOtdxkUaLwiVNzXaHXRrq0EfQWarNYb4TXrkLVZfF2Y0kHJfPf5rjO3GbKO6eEYjF7

GvsNTSwHZkE9Xkl4HKj911F5UkaFZtF2HuX75iuH5uU5elirXjh4zJgqVsPEzuJp5SFQLuNV3GnEJonK

EpVVTuBvMgOJRfmKEfFOAcBqC2TIbaTFEqB1wdNLMs
itEkJASgWZMUNhYnZCLeUo8xuASsi5HicFH0w6OyQFSwDEULOKbdWP9+PKpunbVaKneDJzOdPTRpj3Nq

UVnkQMweAmSySCx0WZWyFypqUiQsHBP0XRG2JEOoPNZ0UNn5GBC3WbHkRrZ1TTBaJkUsBrShDra1LYN9

SSAoKuugJqSbJEE2FOLoWmqhKWQ8IIX7AtZ4MTXsJH
W3PLP3JkN6CHAwKEZ9IFP9VkI0WFZpDNZ7XEEeNxShXeDmNXf8BB7WQFJ3XQSyCQs4SYNiRZA7AcErCa

RiRYgnKoB8HcQqCkLdWTO1CiU9AASdLse1PEitDvArHyzgLWS2NRfbXaN8BXVdDQBxYQbaOiV3AxvkWp

X5ANh1WHC5FrHzEfN5CEFsEJN0EpSmFeQ8TMz7CVY3
FwsiKqK3EFGzSBQVIbGfPjDyJYT3YOZtUtZzSHg7HXV0OkCsHjCoYGX2XIOxCNI7ZGSuKoFwIKE1MpXl

DmFqRhNtCJPsCSKyWmliPmh3DJV4KdMoIhkfJVa1CEFpBXB6LFAeQlLzSOBoUNJeQGurONK9FIWaCqU4

ISMtOWE5VUx7DRT4FRFgTZrfPPX7DtB6FMLlDht2GO
H6VLAhVAquENa8RRs5ZMQ4MKYhRnVaYWi8WQB2JYWaNlMpSTu5ADN0VHAsZvKkSXp0HIT2NRTpBvHxMK

v9DMH5ITYfVlOmVDb4HPW6HMAqOoKdTHy5NWX6LLLwGuXyFHd2HME7ZUMhQyOnYA9RFOB5WPPlGzTeTU

g4ISM1TRTnBkPqWKg3KGY7IYTuZnGmOGa6TCPyOjdl
WdYkULP7ExS0NNXuWLS7QCB3HxDcWRTrBOQ8RHOmKmL5LddeBwxaAPF7ZmR4XLIxFyXbEVrlLeB2EIPy

HWCjOMK9NPLjQsCfBgPbXYYxEbLPBdHiTIo3OKO4OmHuOzUjKjVpDKNdTiIpKyHeKRK2XMdvISM2IbBd

ZLF2GJZeJTH8YsGcFcTwOMsxZcH8NlKhPmVhEHujNk
LySYThNQiqBiR6DpkxBvF9XIA2AiU2PgirLee6YKI8DQMxTgdgGdf5XWgiJxB2JkPhPqs4NQ0NJFF8Zd

qnGds3ORz4AIW2VepiCLm4JRm3YVJ4EcOpWgFlOSrpOiO5McZuCmT1ZBF1VfF2JTNtRXZ2XZK5UlJ8UI

HlOTJ5LSI5BkU6NYJfYMq6XIS9NgY1GQPzKJJ7PKW9
AkG4AKTaOjq4MRS9YIAtTbreCok5XQDoBYXRCvOdSrDcDGBrJYQ0EXUnKePcRADeDQH1XCRrFLG1LWAc

UWS8XAYuWbKtPFWcFHJ2YQOmMHX6IZLiOQQ1ZEKtQDNOFeLiBB6afi3VJJNsDELmDlfTFtWsBRaRHlIt

TCQgHObgTL9Zm315TPYqL1PsiHTgwr4TBCSwVI2Tq0
24IhPeVA1DtfqyaJqLh7arEPorGNOhK2YzW4FvbCH6OKEqP5ToCOWkY3o4DAldMY9VJLQbOQ82DG1rNZ

TGMwIbUBDtAewkC5TwKxJXEnCiTIFvCa8xdHHDn6yuTfBzMHLlEkKjHJWlIMbdSG0VQsQcSTUvFJEsaF

kqUN8rdDJxPG9YxHMnOcYfCRrmKK8+DQplbmRvYmoN
PaM5EMQdm6NxEXmxDPt6YXivSLGlP1P7bCYtMo4ptS2ZqAM7sWSgD2UriRGMsIUmI3Kpe4TNy370S0Tz

lLZqOUCixZJoRI3ye2OvizjvW5gcTN3frEHvU10hhU5lTZdfJGKjV0XrzxL6K2rooyTiZO3UTQE5Y3uv

cvMcINLNYaCvMAMdP6kwdFllHHKvTKDjHr2EOOVyZA
6Qy922VCWkA2LyqKZpozEqRDWoTFUKZvQqLe6MZuGwVA1hxy3KAMCmQSBvXcbIZkBvSOgfEpwevZHxAU

5SmST3SABoN95mNKFgNGIvU4GrNOV0WQE8LW3KIL2vvRjmSDC8WTpaHr3MKtTto9LiSMXlUShSeHm1JG

YHFzIdZkC026dzw04xuozwl7EAAzrxXYIkwKRygjXT
YruomsNUOJMLglCsN2kLv9hOrbl5cRHJdYnAHIsQp3JhjzzstiweLcohvIklEiZz/2jflcWI3//9vi/N

n7vePz1V/eq9V++8yv9YJaiOAQQLgki2t6638alBsRXXrjek1Taq+RMmKfp1INIF8hvMJb7RTiWNtyC8

j82+fkXsvs/SH8hhsBjR92nub4U2+0zpDoSkB+Ghn8
+33ft8C1ikHLVqkymRqs65/sHOI4f71/OoNKejZhNRTYma4zj7O/ZOWGcrXOkg9mAwa0dzMSMbSO3Kgj

092wsSI164wVeyl3Er/u3lIpRpWDLOUpVjfmA6rLjmkW/+gvcka1gUpIP+X63L0HD67PqjROY/04OrBp

aS6p0IR/CJUIStNjKHHYApWR5DY6gbtKEdM2pIFS/8
dDAijKR7PN79O1hMGW8YwrnD+D8ES2hLSMpJYeUg1BgE2sFJ0LO3TES0nNZLHumfsZfYT+B8jFBmeAP7

lWozb4L+9Ao+QVflQ1vAP/qNYwHz3CP9So0X1hINFQnBT0HvLZYogwBROl8M+cmFPEzSbkgyQ9Ge8XpR

1BdoNbqD+wImkHdyrLE9FO0RE6Q8kRmiQnWKCoRrJ2
SqXKeILfJWhEuaj7yvLR8KoxP0qH+rygVAhgRgBU9kudh+mvo7Og/lpIy3ua3pS2GjwSDTdvSfb4Ek0P

jTeTWLeOWH9ER+koF5yLTb8AFyUax7+xe9BQ9iCwxQ3MRuRd2p/JVqdwkWvQWM6g6Z9gTvM5pFPtb5oW

dKfie22lAoJJLR9Cx+gq3gr7Gi5CIbNChzyloImU02
2GwjyPL95rSojOWJUPrAfdJcU7Wa9eafHN7XkgYSEKmTBJaDSzu5sMeBNFDbq6/BnZ/h/9Bu4WsOnnbP

KD3IMsTD/ZTa6PfoIxzG5Xg8rid9yBLyBLcMydDiADiuRguA3o/A0z/QZgiD5NNz4lK/wI5q7muUbtce

InH0S/Vj3CwnrXyD7kQ9D/g9/Tq0pGsuoyHmar/yz/
N/ux3Tc48WAMk5gg4jR5qST+OJ+HI8H6/C6/D9+FF8AB/Wb4cRTiHeYz4w89uq7pc2NKud68g891E2dx

1f6gQ99G1z/yT/u6pCjZOSaR/RFU8wSm/Am+4LY4L8xXaXH1DE06MTzoqCf2TceJMmcp/FT+Kt+HncDq

0cwh/jL/C3+N/9WINGXZAOYxqaDS0RahdxIZ4mx4HB
nAamV4FlpIQTeYV9aRei82SntL8g8hlFbUO9dI/zB/mN6FnZ0FfnOuHO82QWbIDbJcjHzRW6dk1QDsi7

YUAm1lo1RjwP5vjVqN/FORxAbIYGALHXzcEWmudnrAg21TlzT4zEtPe+GN7GfquLH5ZMJofOjY6/hp9h

cq938sfV+gv+HxtcSDAE509lITcZkdZaBIkOwreY2p
BpJ++ML0dU6YaU5+CCwJPzTynJT1TwgD3hZva5bQ+9q3xF4Qn8BubQI/kCPs4n+CC5OD65/gn+c/5zYY

bwlvCpqIqLxbVip/gFwOP0sKBLtgi1SrvJBgZ55dfpcdiXNcGtwusUA5c34RicE/n9mqSD9V/xd9YRG2

SN2PcDnTe62uPpIttSyzFN9fxxgAwYzlNqXHuJrJYq
speZymEs0PB5rTpRQ1482e9NDsg/ik7we+Hd/tzUQftz4w6lH3NTMkLrBmDh48o+zPL1Pr1ptkQC32V+

xqs4gE+Y93mSgUH/5V1WrjC25gG8q48F98D6bpOgzq/A6dYht7CffI8Qwpdcm4wPwXRccNC10um0VP8k

l7ArOJ2Xu4sv5iq59WXIiZsiv5RcFPY1jToOFeEH3u
AL+DtPO7wdes5Jo8cLmhYceZSzmYA2YYzNcL+dIfj7KH1RpWn0B8b+xX4qUtsE4TtNaEkDLgPFWuBxEN

kgd7JeYOcvCQM/LYSoMn6Yl3t9EHoXHsRYIlX5q8XyDDaTtEGlwV3Z6SvvgGCxo+XB4SP4nVGWtEEFFO

a5DpQEZ0yuvca2HTub0IXlYSji5juv0gKuorns4QAg
WTVK76Hs9Zoi2gziWhBC44R5AA4ZvEJ3Y+IxwkhyUBiZOo/cIe3lz2fI1bVH4N1Pft0QVX4QYscLj4Co

/F/ALRQHxst3YbA4MYoRD3BU5ZU8VH0yp9ygLc6SSZnVlJ6acxdPoM7msAFdsEAAb1v+4ua2DqnFCcHB

OIdgQAlx8Q/oaG2Ld0yb7IlgFpGB4RC+zEOF8LzI3Y
+dfBN/G/08WiDutBeZza2rJNYeRSkkCKqWuZbcYYWcwCttRcoRAeRZ23LVMxFeB6olX7qDB+FyW8+PGp

1cYZvbV+Vbcr9Vd6wphCYxBEzMsPqNG8WjeIskkXyRzpNgAiyWvC/aIJXPPCO8lfdiDpYtwVT6EfqHIL

pxHV0xZUuRp3UsOQhlWZ8XqFBleK8BvAODvvXBPDfI
TSZMcLhVqzMBrcN5FXzzNnop8WtHEzK83Tn1r5jDZwZNEo0Zu03gANnhbmTp62XN5/ccS4JPazLiStkD

F/cbls5L+eMaa7GGE22KJ2AGFW/uh2m7K2cGMkfDlyMANKvvN+gMAHNLA24Lr7L6MlYyOi5nlDP1jxTG

SsFmIw31FqDUsNnPiU+QG0XJ1+RE+rc55fbSMbYKnA
U3aTIT1AEttQkgdSG+UjkORQksNNbUUxYvWnvO4R0ITR3LxFUQJ5mEkOZ+SHUNgIOeRIAU2UL5YtLfK5

Dxxi4NH4EpBI4ZROEuvIzfe/Qrw5WZVD8Mdxdywk04qzJ4mnQUVnEgMpOmHyKKwoy9/RWd5E+t3/cBjo

wUEOOD3cLM4XQdpbccAH8S8KcchY7A7QIuqGFM6g8G
+8R7VQmGcR07Oa7/u8MCUjUmKAnXPWP4G2V2WUAFSkATJU5NvKTTiwWq7llA6kKcjh4JEtXNAFhwflOs

QiaU7djd9TZ/LX7LeCEVqeJfBkl/w6lZ6brG55v+KwS2yU6PZpzLj5TjJwqA0P9Udt5lxlkftE+1Fbmj

ifL40Yj1d6HhUkMWMQ0qMiI7RQOQWyboh1514XG15B
yvA9UUA/qC5c+uV9EqFAsa2TdTXKU8zN+2VJBYLvN5tLzP4/gonNIk/vMHAKNJ/GH1OXDR/OP8hLVxjk

56xNGafcWCiCfPlw3HTGmDf33VCVKP1fZEUIPB7jLAhPktb5tFXIlOdQAIXMwPrVMuYjo4isz8+2VrWR

wX5GMd9wxq4djrN+/HmZJx16A3jH1xhdJ1p6WParz5
ZxsccY9P7QjXur8jxJnIpLrukySu72QPBYKcB3CjW0l7JR8G9+Na4JmAn4K52kkt+t2YJGLaGJtynjIs

g/yAB6Z9rNp+BBQxvJ9vvTF+HV7KjHzXDOXnDguLeJqnV8VL1tNF+y5cLUBh5jsi+FeTw1wrHZUFD/Qq

uFOm2GUS6LBxAMwCZtE8TDlPsFTWvHGVtFk+lrogLQ
qnN4iNeSuzQiSGzjgtCWNOpJ0trmcFU87cyvz0CD1pfXMzMqxm39uTLjoEs2e0B8GglXg9IubOSRN/H6

CWxk6gjueK2C/Hd/3vBDVrYb9Y8aHrG0kVlD1t5/mkccEpqbZ74rA+/HAQ25QqgB7A27SYO3Lu0Wmksm

y7k8JHdc5txxPOVvwmem26ee9GLswJB/G3hWEeN4LI
LvpetR4rM1KsCN8Kzfsq5pgf20GgnQ1O4ugUmk96Z2Cos0vJ2MLODX9mUNL5GGv7lNE1Q620qyoYPoGR

XbqRIwjSRsdKX3ZOuIXuXk5Ikws6ilDAJK6Y3IiQWr6Z3qAkEnY9yRL2XfvKwcOMHwoaukcioYQFzHbc

yqmbguDN5eRmKFuQBACnCW+FAxrUdiZF8eViQ1O5vL
zhCHi0HQCy0HowdsdD8lW9ZyyPwnIkFEyLQCMAsxduCMTTANQiRu16LP03HS3k4qRrhv2m8iB4vpVmGf

dObxEKh+PY88u2aqrunhCE1U6DjaKLuEU7hfPu8am2VS68I2F8qYYwDI2O8xeHnWdLbOe7VsB0SEo2D+

0PcCpvFlm8IWXt//p7XL76Kq4c2UOtVXmIzbDzA9pP
l86QMNuuhEYFklasUnqSyc7Y9nFWhnIY84vTbA4pqJKe+D39o5q7o2jBghxrWb6aOrpg2HF2D6bfqlnM

/wn1gGP+prbSICaEucgv35ZkOGxiOaf9Z78jfoP1BGITbW4jbkT4CQq8pbybZdE2J7HQxeMGmuYlvxPD

JRsQw6YOd8zI8eW5pnyQoRb4mcDFk2UuIuFLPaMiwZ
fV135IpoWxj4MeZ0DtlXTtursqnNWO35IFdPi8btx8MOquIsY/+skiBTZG302zfK3ofRlYXlIPD9hXxW

Vy9/Jzmj7UG3NbJ7P21AynRe6+73yU94t+4YiAdlE9c8lhZi7E6Lk/ae3duwAfo943pCxnZRRWkQ0jtN

vWT156ic0AuFJejtXUO1qdv3T0F5ztznh/2aGP2TPN
xW3K6T4vwRtzUiCcidJfBsE5SryuFRsV3bvFjLdpBMrtluqxbL4GWKCVXHv3NcR46sOQCfRbda+8NWc3

MAngsH7C5ETbKZojMOGHjtg15wK8VGGKFyZEdtn1YIs5/Cnh0YomvRqOAb3yBLhhxG65lMv2KvTr8R/l

1EhNJ82hUB+nieX/7Q+ujWgxrhy+hKYTb1dEAv3khG
w2vfmYTCQDArRVNqPCOMkgoA6VB0ddQtELdDVvc4dMs3dqeiLUDQ42/Tonya+DQGDgyKuhSm6YYJbCiW59

B2yobURZBhYrdhjLfTqyW0SazP2H1jdzmOPHdpWaRqunJU3yhAdpXPS36rSBZQO6c4vDptsictOkbCRr

mWgucM7xkTiaCFKZaRKFOscGJ0lE3jF5REMB49GFyz
s3EBhrseQNJdLZtKcN0iUPc9JmqDQDGm6zks0iPefimy0wAbTuGiXLRM+ufr+UonGEFg3qbQ96XJQUVG

OAD6bGMalMhQJjn/Mmq8rPTRKQLT4NKchJ81wa8eyxiMQ6z6rBhD3mDdapSth/mc8gWJOKyvVpEuUfJj

3QQUxMEWrE9ZUwt4ZbUi2JfXW8ulg5Mz695fD1aHqG
twKF8al2/Uwt8Py0722LQQoc8/HEhVtV5tdcJlskEBC2vaThMGuD/NKQzYfN63vJqpfvbX6StNtfYoQk

kXiSKb0F0AB01hNJxXvzdnNYlswvs1+0hJ/YN+otbVPuBVm8GFC/W3XCE0FrlQZSuK1FLa3/AsV2JI4Z

I44n2dU++a/qez3Ye/Wj1cmREPFtU/tFpuB1vI94pl
QWixxR46fKhymbIQvULygESzmZKnonT0TIg2/2mAn57XW0hLkaOW4JozemUOtRY4LbbQPzUEBykMjDbr

GtDyEBkd12rAouPJ3M9HIhpQKHRBJ2Pu9i802SNPh1I0I6Bgwsvm4E1Rlnjqki9US7wdUHqmhUmKMGfu

an4WzkTCULe8pEUDzvxpl7n78ZIPt3EHOuXNoqoTpj
A5hvrIpPn7b6YeF7B0FMQsGBHO6AiLl2QLCwpuMonmVSKfHLGpsqjBmCP0GB3SBTN5kWA0wlJ8CJc0jk

SswoNNOTUeoJnbRyMd90FnwP8ZKEGJGwInnjj7zFey86WkGE8NZ+tcz4WCSTheMBJVbK3p+zriX91icd

K/+cJV0V+EflNnGYHw8EG7IIgHbAF/tBjIE1tRJeup
igsEacxMOBAQZcLD1HgNuOenFxyNdjPKOFBGPfQMKVRsnEYnlpy62JQCDbBeDNXDq7mP9rr8NGy7+2P2

5+8u1GEk3VsYwdfbxkZa0LEZNgLeQ6KIAyblbbdC43uRlKhwlFtlChN7PREYjj4Z3IkLJZ+qfCeZ+7Ig

lRUgxCr4tdPCt9x4SMR1Wp5rqAPTaCwoCHVROu90G2
ETFwRKcYrsKyMswEobGI8D3lHUrN65N6lq6DI69Has4QewMFZ1zcryIWRbb2knCQLqcH6Th3tsL1bv36

ngx7K6h5zeRDDijc6zW7btEO7H5ZzCvoNFBnlcxnYuw5efSpvfxUMG6iSkjrv239MqepAvxTqKXK+vhn

FCA/0FOR0eNNcNmAB4fk4T64WTGoppUj75q9zqb57X
nLIw1DjYHvhCakbsnBZga7uSyLl8lJ+RRCLDNZzYr0fHaYKNvfDsEI6hJ/r6AY+QQL6Jt47dRtzeqK2T

TbDWRgZuHX8zjGOBSv4ls1znWCR53OHJJ+gcL0kLiqdkinFalxiKp8kUl68o+4vEjFZj4bnPGlnVFgyQ

vnHznNimEPUpYeWjX3oMk/iw16fmmHQHk+RYOxU/lz
E6Zup2GBCgXmGesH7YS3aLCMFi6wS+MtWTMf44I4ESUJ2VEiuPgtrB+C+Jf2b/or+t8Yi1mKylKk4U9I

RbZr/OcVPgbnQXvodfK6+s4W1y90y71Hs+fwhbL3wICOkLXzhWMmAmlfQPuPFT0nV77IhOueio/h1ygP

axPqA8n/vgGBglZkMDVyr0Ssb0zbin8AVD2K1dLnrN
YieS8kNzOmv/k4QNQqkfEinVxTN32BwQc2W33eZh9qtuCoBNytB7lbq9qbY+U74UpOcD+ABWbT5l7Dek

XumbJRRTRIT4EU1moKymvBSoF0Z/Z0tmtLmWvE+ZRtbjsMQTyvwrnSJjiVgLE5zgVkdXHu1ygJWbRqXA

0UDq0QMHquxq/HCy1MEIKowPuDp3/scEORLi4hENZm
c682ukzUwp+Rev88bQMx+7N3n6/feTp++/au2i+XfcOXfe+yKEsLh9LfhNK3y1QLmM58RRqi2f9Gf63W

5996/fmwLPpeu+3+Ep82+/vwrlvploB50kWX2O4g+7oFQ28hMCMvTSVCq2AnKPPUSRoQm0X2qOotEdqf

qJXY4PRdbNQARNJEnsY/Jd5ehoR01f0/B4J+d0etEE
wWAkzWWRjgqSf5u9BtQ7LuVj/nNnNzGf69tZUwbkUnfP6dJ9Pzum/U6LVdaQJ55t4zWOe0xDKeVM5zl/

cBf9uB57UE7leOSoYHoL0ki90F4B6TjuAl/y8NjFd2X36Q2VecyqIj/z4b3hL/7dkE3XRNn0k/C/5OeG

4RetSGK1129yhjigOpzb+N6L6JzP4dwTHqKNQ7Q0QB
bAh3UTbbmCcu2bu54tsFmLs9BUg0FZB4hWeKAtUTAoEPyrD38syYwtYLxVHX7trdx66YXk0iR7faE9Jk

b3txMwaym1gWJka7p6J83cgcF4EyMOh3JJ3hfpZK3xp2+CxnNkDV8QkNZawXBB+fFCMrCqRBQLCyBHwM

UhVCFxVkhPWC8Cw5b+8G098jVvEfyCb/Bb3jdIvfyg
M5TsFk971dKF9P1xSjumsQ9JSQmoGG0xi8nt/utSe7B+EZb/egRrjyU/LqZd32Qjd/dxVb/s2P/4Pdu3

A5/cLuL4BQ2x2hVoC7INPRNJUysxKK3FuGWWkyC4KFupj3R6jn6toZ/vWmtW0cwhEykBp0wJ6CBugFKf

5xCSs49oAHNSoevk7jnfHlQqEJAR5uUHq3HUwst2t2
GVrHIhC+veXQgkRSxU8PY4/0c88Ek2bZWoyAB08cu5dVOC3LSb3rsQHyka7Qvwf88DyCoIe2nAERIfjr

PhwtIxpRfjrhzLg3Sz+m/wTuS8SbmHYx1PxWBlq1zT0dCqwjWStj/K6cTsU6J1lZlNMy3dWuQHRyzFOc

kriB2if+z/nfjHtpC95rY1AYgkXCcvlwakNRScuChh
PAKmNcghYPstqJv8M6hmPEyZgUCPp/N2B2eg6UoWmKcgQddXsmzHsp0dVZeUpyaERSJp5AWGnbDixqkq

B/akZCpCk1ctlpNqah8EVnLpGtqgyL0kDjtePSFDnAALhqH72k1cGneHgQ5bFMJ0qV84wVD5rzPHcaok

qYl+Sp5OY2DBS8QikiYEIq+AdbNO27/fuX//OiGdg3
XywqWU8lgbuRadAFo+yZ54ruZ3W4URrdD2km7+rKnx/gt4nBiCcypXi+c8+Xz7TAG7Bnk/IUfP06f/ue

V9/N6AMgKSAbKdW7jbotsdCQjbhMxLj++aBpaz9ByM6iGu0yEltwVIqhdV91kDEW9z06XCkpFYaDA7Fl

pA7Faa19Y5dXjNjktbxv2hr+S2tpD0ZDqwiogLwgun
0aRcTrtiPrlJOrtdcHr68lUsrHN1E5sanh/BUxp2MMMXM0759q14NsvLzxhBI/PAHh+2gwUweV6ehrIs

UVL3jic8w8HHU4idhPuNd7G7gmVLSoIyaEJHVuGXBM+2QoNQpEggzK011sE35uZsyto7v7oOJA11hjQP

wJizzHOpGy3hUpeOEWlVPkN9awkJeZcbBAQTB3jE28
cZPdf6eCMxBel8iG3iibb5Yh4KerudllFbydqfE/ZD73Eq4/pKhgTI4e+YrsehgN57EJyKi72+Dl/7p0

/f8G2F6nzd9vBJBi+5f0/tj9q7acJSrfPZBV89gZka0kKSylvGF0PbvQzGnXqA86EkUOvbCYdoI9kkNf

Do0L6tmYu3EFEMUDTAqesMyMc9EiZMtCQXDttVM9is
ZAaDmur5CvfarHjOgj3YnWZU0SvpGdQqU9zj8Ib3P9yJ0Q/fcFfC7Fsawseg9ktszfi3v0F1j5rk6HVX

ktcaqd8tsCCOqsNVsXtSXRqNjSd9PPfVa3E1sJsEnUXgj+/1e/ppdVLA1sXlEZnZquXay5liuhP42Dtr

kl2luVRzXsZdF2n1GgXvonO4HTDlY3VqwKNyhM9A/n
3p5ZvaRlejd8D7N+5X8oE50k6zLFUQh70L01TmzRK3U3nTLcsW+59LJp+/suKlQQNGfPHsZ/6iu9lLN8

HfliYQN6OuSpFchPfD92nXopPY2zTTmiV3yAFonRVgpVRldt/y6ky+c9Yr/0dKGjZGSrspTkmLoCctgq

roSdnshfJjxvfp7QFwvuAm5Gdiq5IPOyQ0+Qy2ch+c
+3Z8iQ0Ayr+RFVDR39lqyMbZtauCWUvheLE6j0vhT3ZJ2CUkR2p6hjFW3y5XUcQYaAo2aV2bZnY1Nz6R

slLfwKgpCR7cMUalcrLEFtsdl1JoB4XpqzZ3r1WjkHfyLq85h7f5bTu70tjOavEPTRPtkCVOGkBsBdsg

3qLTlrekGGjsk7kmwv/yPj7gehgEAa98VyLWupIDFq
hLWrcic6vOFypY/hvOF+HNtoWEbLRhpq6QJ0/G5WI+XlgcdOTmI7/Lkw+MpD9WFBfAHiK+xczNMc25AR

rRvx1p6yWhAKpsUN3It48eUsvrdJLALAO6IS9/BLcXYDaNuSDTbZNiiUi5GZCyorjEe/+x5IDykhm2pB

DRX9F471/UrxEQI30ZQ4cIo/vpJVwviMcFan0dffeP
vuKv7+Qs0kL3H+y/tHnsxNvHr9+0P/ar44LGRJvf/WOsrbVyZSoJx08ij+H3+Cg3Se5UU5jZECSRePbs

oRfaZizeXyDdPbQCmnftQBT10xuNQlAf+Y6lvxYWH9SdwfM+piwtzxEsBbL0tQRNsVI8ZmHiV8/jy/W2

vDmp4NIR2OG/StfhLtse3Jf/EAh9dQQQYFMeiizSDj
s5KbUUW5Zk4MAVR0p3UHGelxCquI0T+eAE+UQ8TxIfxQ+/Br747sg5VLhtygBZYwIHiGYDlQ5+gpDoAl

lcZXukPRZA40sPaJ6AzbB/sVyDWM4x0ugYOJK/0X0YWZNzeUI7S78isJhtmqIRVRsKmDkCxBPqlJ8hH0

2Dl+3gfPptyhOBohYwXjK5haDEyj5Udm/CcF290NP4
PdWPHJgmBae23V5iHKPog4BOgJiNUOnSkRoJVVtX2lE6k3Ak6KFNSOEEndAsP8TYKOHNuE6dgalsLJVw

UQKDR4ByFThhgb5BSuvzd3v1qjNW4LjKD7OU9xJwtaEoLGvsJh/DCjv74yb6kpFq16gYnaQ/YAW6Hb4B

S3Jk/gj7HrqjzWG1A+yXuLg+nCOut4Iemes/vWOlc5
1MjtAUqxAAec2pr0fdgyCAeIlwhm+ZI5gY7jV2K/ecJLqJy+yaQnIoRPAS2Cr+ggyobJ/kmoQNMPtkWQ

Hj1+DqGsX6KlSawZ0t3w27uyAIdZMeksvbsUKzuEnUsKeZlwYterx2jfg09CgPevVx2XQhwCceJqlyCF

jQLDjywtJQyAXpBDoa1L/RjAjZe0QKaLWwp301DvEO
yI69fR8Hdch4qZNv4xlzCoElmOqu0ePDP3EraJrvNAIhHJO4q7vbFK37mW01Udg0Jnom9eOQuQBSFc1M

1pkULJp8z7Id9iowGW1stqsmW824ZwKnsO0gUsHeyDw2XEmEunRNEWcbroamsaKVtTgrn0HQNf8U+cKF

WH8EF+HL+/tDA/LNLDqC0a5WTyJ4y/32/osn/JI7d2
Yk/9dXsnwFxgZmxH5FZUekhy/+qR6WSw6DlVxnNrynommekBN4jSatFTrubxR9HQYa+sUhuq2nDHrDOW

msfplIHCcrPCGKJPMcKL+zGeXHZSk/zirfWc/FRBFE+CsmgUJG+HtmZJjtIKWSYCVKzTode9SkOTvpx7

GSXwvdVgNoz0xf3iaqa/87e4D/psMA9HDGzqy4rnCA
8QO4GD/OpL7sRBPCgzbBsMYPu+SLo27e17aiTLLsOt5Yo2SLY6p7EPSM2yAShkxQ7cyPWAkvbNHIOaUC

XG2NJIfAAkrsUXXRW7KX4REDxPt1vM3MdJgtAVcYZ0S/2+XpaeQGjSMmJdIPPrJDGQsRx35U7e+fSwJb

kKRBP2o5k09Hk0i73CunDJ0iF5rGwmuupYo0Ye71M5
CTw/Iy68NIxSy904UI45jQXmYG4iEBsgNF98qFYCYklkMxJq5rHzLmQJFgVW++LOfuwKNEC+VxnDvPpv

h6zYC+IHxjDZMoS64gMjO041qPRrqYRmZ2OmxAKfbLxYYNwbXssRbJr0TTuXX5cLvsNxWzSGxlbcQRt2

HaeohC28qJivZgcil77UOTtOg5THcK0pIUKN+lMcMM
HrXGYfiEQO3O+Y9dEaAG5HX7VmWWlRPhZ0HqUwF8CxQcggfzRPthT4rvN2/H9C5uw9O6r/kbzrnxi/v/

zIjNuLFfov73TpnX747pG+/8A57+2+R/ffzhEk2cp2CwKB47/Ra+K2GWwRWKSwDt7Yz4PJJ76dy6irbP

a6gmHPZyGWUtK2w6AonoOEJ5FR2kq5hV3S7Q/nTs1V
pvm1KOlsENHrzYATz4sQRTCAQ/yKWzTbPdYfWBZr6r/6tO04awMg+hbG/DVqlLmW1likknWh6cjEhGIt

/zSPLALyT5+TpoOBiQ2Wnj0AMDXImrhSL2Au+198yBRUEbZ17q2Yf844Ll7bTi0e0Or/oenjLnfgPI3L

Cb7HEnzCJKnEfLaftlwN1QsutHwfPtOSt+bFJmqcTS
TlGDixDJaerXvMsYhZTogfhU12rjWliEbqMFGYb3WCqyKzDkb9eP3k+06ljM87Q+Nqm5kfENSNZTYrVR

aszwOtCJH6DoB922lm3V73auljot3tGzz84i1jAiI8pPm+zg3NDM2XtnF+uOUsevZnwNWVO9oVRjH/Db

WQmDZ9oKclFFZVPpdOtoRFI4xATzP1khDkHG1Do1ss
viDg2BOSb2EeXRYM6hU5ObKKQnK9HWt4yLtQGGSWlZ5R5NBkL/LduI/1VHF56/6ej2PmOFS7nCziiGY2

oYpqHFoeayDNpO6jdGsPbfi0Bsx8flDh8oZxYvgaYyGOStAT6jrI5lAyPojjHfyNQ5otmPd4TOsaC7tR

iNVuAFiAJklHADZVF/OvoPvrOnkOtHgEMLGbeykH/+
FnLX/fpjfyHJM1rbeknhblYraKkraw/0O0esqc1lZf4m4C/KRz8GCg7+Fl0t6hvugc90bY59MZQT7byw

llL4wvd/ls7i/PPk7JtjfAMbcfUagVOElLhiTlUBNRBwo32juk5NJBPKx2jfypIAZX6eZgjK5fAr9DXX

a4QGOPhKRjkpf4ba6NSud1VVy9Z9YqVpPkt9NciHPr
mc3Plyu70ccnlPZG+zRjaZtlbpE+FZY4VEMGIzakO1bINaml2444oMLkxBAo1Rm7d0zau9VgerusYCT3

f+Ii45Hau7YH7lyW/eHv/ba2vSKE45fk5sbpbYJ+CQ1fUBg9u0aPfMabISuV3vt8FQF7yiNgCAEDiVsB

0r5jlMhRnuTJd2ugMihiFd55sVIna6+VAlyUuiTZ92
ApqeJFJs74RxbSsbNgw5BXpAPMzwsp7O2u92w47AljcEAVM2zc4FeZdp69KAAUuvEhCDRBIffXNoT3gP

HBOpH4DrAcYCdra0u1tjM19vq4yOFzTSGxgBLBK97rF903OKCGfrHkyDCZosaT/BwfwaBSyMnWB+ACsz

fyC2KwmDJ6wzKBQKav3L+WbCxef+gByRlfMReF9sfm
965CcitKCmcLIx4VAIyQ1OQ7uLEAI16AabTAOyrVEm8irCFre+ud14S5xHPGVYtakzsG1XUrF67+VY2+

cx/y/Xhte3bHoCr8OmRDocl0NmSJ2M7ZyjAknF+gbfZ5XlcJ/LxnKYGitH1m2LOQ+gzqx3abQFoHBYz1

yqhq/pewl/sx93jQKlG83IjQ5+zv6G/YkE4bj5gyiU
tk33wNyWug0TjrqbdO/tOcg38siev21uqzzKGlr88wfPjuwJOfT/XO+9y8jFeqL9g4sijJajCuLpaLNn

ZCeAxBSialoWSmcmFf77QphFX4bnRaqU2VMWxC0KslsU5GOlCVRVaSsNO1tArxce34FobkDT6Lbni/Qs

PaDUOXOStoGJwuSlOL+Jqnb7IoEHa0rUYPCmYqzLse
vPUENdrcSI2A6B/jCaSO8hzC0vx8jjTDbWNMvveZa/+Jv9YZSpDbpDDU63te5LX5sPJdXfkohTUrRu4s

VW9a/gkiWwp1CSNWi1vV95RSg+KlE0KZHedRxif7pdygZOCzx40ZM4pEYshWg7UeF33oBGd3ObNfz5/e

jVD2cj5Kv0RKKvL3bdM6eHUWzVROLQBHRXdY0kWgpO
Hr6RqszBdO9mBhrLLAMKe+ITiU8/jbakhCYaDRb3onFkGKDnUzu8CcEVRGDY5LjFHQnFcqybdnbn2t1E

rB2XIXC8Up8CYFqmSnRX/I7a7e6jxCTh8NIeNMQcvYXteRns1m6pou47rYTmrbunmErMT+w4HX52rAn1

JzIoEjAewrCp2o0nZbyNB8r/CXf2zjiug29zow63b2
TMSiuEhc1kmfqbASAhi66mS+103sLKHp7AvE8ikBaaN1vbocC5+YdazNxtTiRK8qfrYGa/btyMBburSB

bhaBKMpztCnMIkQCWBdTWH0wiGwXsUG+uvDY/obup5um1Bit6Y/mJvmcI2pknqr/0Qmgpt07aUSu7MMi

P8Zioq4Beh7VayTgeN3j3eq4+jlbDHP5fUvQdX0fm/
82c0u+Rm2xJCqmUofXAcwRpUBfe1QGQFb4NUx9jzWZrzi7ZXoYsqPyAPOgn8cUFgxsXXUpUdUQort+ok

13GnVYOUihYqQXOVtNmjjiXIYH5JAGBcAfnvIx6QlhtD7v9Mnb+caM64e0PiPxta8z4pHJ6IVHmZckit

MutQZ4M+ohyuaDyhAlQCvjN4E2VgFU2YKHTeFO45I1
7EjDMxjblQkWBV/qVODWxUxA9yLl8lfhgJHbmO7g6AC9MI8RJc6J9jsOp8Du2bENpyPCQsi4sXaovdLH

pcOIULyDBjAdsr3xsGmX5H5m5YqycMWgoiwRK1t1o8lOJBiu/6F5+25hrKuz1j/75v/H1vLk9+k3zvV3

rotso0B8bh4WrOVWkTVP5yfyTA7lSZogndXRL9ai7Q
vwIFTbS8NXAiPsNg0L4roZfzImRso58wFb9Df8A8CI+EEvB52r4c1U/Qp4xb/UUsCpuNnVGK9gbQwsZ6

+kiXiGNJ7BSSR8F3cqaovr1zwlGS1YW/LSqGiLVg32XuBeFea3QbGADI5GrznJvI2xizHEY7xiUzZFd2

pxYbK3frKFbJJ5a9SVndPM4oaiLV1uz84Pz2TDyQ93
55uKDqqWyI7cAaOK9act1LDlGobPkiCz3LcfoQgQ3hG/VYvE5C36O7mpr69FaX72OkBtC2dy5Y2xk1rd

0+2xI7VNgSxEQws0ESoCDB+yUxhSflMb+LhLUXSx1leBnfTA5EaJmcpmuupH/WplejyrpF6D5jTxKzR0

XSSWOyGaDiyEmGLGKHNbNHcFcGNXwcsSio4Lu5x5+9
ygD3m90dHX0JKInffrwh0Cbnkx6up8xu6HPuINhHdQUWwNNaqv/95u9eO/f8nwvZl3BV4FP34PJg0zBA

scwqD5H4DjTNuCjnrg8OlyacvWNmurkDFwYTQUcgaDNjknsoB179L5m1Zr9pmUDTpe2z1fEAgf8MoQfJ

K02Ty0jAQlkmiqdT8qKL/NjlCAQm+Gf5l/b8h6L0Ni
tmTG7omcOkhSwSB8E26lAqcwtzg4dhtS/yRHgbXeY7+V+W+TIucsAAH+/jiDN0HHjfDQLrzHHenM4NTz

xoNxwZCD3NMWflfDpeJ8DEUwfa/r0F8N0I34/UvI7HTCpQfeRMrKGBMVnrqopGBqb37NCGsr8OlVFIKY

YQ0UOKGKFmjZS3dWq6N6cPIIQVcbZHFCilmheZ+kQ3
CZjhxxTa9uBckfaNjA7FCxa3RJAAO+npOIZXymfblHB+xO099ZF1+27028ruY4g695rwQ083drtnPcev

7vQhjf9f/H1aiyh4dwmmqMZAwa9QRf5Woy/Ftu+pnk4vq9F6A4X+9QCrNAt/MqM6sNtsPrtZIy/1Dxmh

fxYf0i80UF+Yd58UO4reN4EUYHTf4BiRcayc2B9Ggu
4j3gVtaLsIFU7pXRcBBQ317bMlTFxKknxDeYMNXzueMZAIO4vEBFaACK2ujxMZTAs0XlWNUYP/ZDhA8J

APmCpjcK0crIuJ0DUmzR3Bj7KvioWbEh5KEkGU2xetKr3sjr3GwhPlwCiuy2XVXmDY7fR3BJIPokfhZU

nQYN47Tl+ukbmfT6a42SQMA1NHBEVqnIV16HxXNbbt
mNEnmfwnnFfM5WBCcvBHjeAT0AXAatST31Dhj/wxHvxj9lFac91ChFqRFRUBExiLNDCW47BzfDg3v6ZA

qFDASisgKmuocpxFsWixjmwQrRoZVbHR0l12Wc8H1bjgsSLFsxrt4y7+xBC3Bh4TkKB47qU319jiJeFw

Pme/ib4jGzMmSXPcOAXmtG0pUytE+eW1SIbseL+iz5
R1IG2NFMZcN/zO9tm22+GS4/wwM77Szpz5hh+S5SlvkZxvNJieziMEKS9RloWs5TCPzzkyOE5eXHHKex

ruGkE62t+bTH3UTO72HGKIlZw0znDibCmeKN9YInKtRtYMolGKQh0lzZ3F6tnq1EIL2CQQNf2fxY4pc4

i5mlzH6aJBAB/OVh3GUg3HUHxaF0p58IvNuh9+sMKF
g5NT2IT1eFNnFPFJDnIZbw6ev87fliBKn5atJHOstNoCeWlk3/pFd4ySh4CQuUvFoCnuaWcXpmT6w+l5

GujEf8Y+OtVSnZ8N2ZRP1ItAyi3LoufJxkxaGo3p8ms5zg4r52PkglA89U3KjaMevA6F6wxEJjezMyUs

d4vcx87UmdLDQ8JXwgr0dn+dvPZ/3u3250Xp/rb5t1
8++uwtq7a+aWIGwxTPM2ygle3j6JY2vb5gBOpfrqM24BHbWo8yjsD4T5h+o9749ILwk8sYANg/HcOLt+

1PzqF4E0JWs25qw97V+WIhGO73j4bAqyWgGlDzew9RXHn2RhPewqa1DeC4HSkg4BZ2j6HH63kGp3TUEW

ySLKy3s6ten8XePrIr9eAgN0tjiABBQqsSLUG8DzZl
SxLBovd1oNsBlNESk9Qs82JrrGFdhB1nHq5dRSXpT+knS/2b/S3+lJ/3E6/EOm2qrJ0Kg9tL1qjHFyWh

JP2OLQYsfxTxLAhqBr7OGAGmY3hrJTZrWeJM2oBEhCkVwszWcb5RE7KCpWzLvRn2akqc6s0T5hbA2rTx

DJPR8Fnw4VKBmcL2Snhu3RjIucrkGAd8Ps3qzUB9/T
11YBczTPc7qj33pi7/45Dk5j4pzJ4zKJCnGvB24UFkB9sIlSvX/xmt5WojAroSNf9L7W78/I5umTfoLI

qq7khGzFZKDX03e+X09uBCFs5beHs9sVn2uoWIFiIBknlod5b33J21oa87a0Nft2VQEt4cPc5CRAuRAM

kgkZKCUXyTsYsag1cf5hFLVNeB07ADambiFblUfxed
GUeqBNAo6cmkMgmFAawTbSbCLPboeSJvw1Pf4yVN1yosOXLKxRjGpGaS/CZ+M9/Cd/ZStmJBZ4oWqUc1

D1EF73k/a7ofZ3tMZ5bwUlpRye1LsVFR3AnDmjtilPtZdb3d/2MqfhOpFoFQaOXrxSWzbmBq8Am9v36o

2gHvaxx51vr+VwtXlkYt6zUHbwPOA1rn1Q0V4j7ykA
l9pT3vy8L5Iusox8X2K6zWN/rUw/o9a5wYk5zhPZeZhepoh/Sp7nZJBCKTWqOQccjTjauTEo68NIT/Kn

hBAgacuJZDO2Wg93skSAa1QPN5R9qmnpLLl1HEmLQWH8Tkr3mpAOqNjknveG8qEmY+EaVnRVwoxqViuU

JmxFW5ewci+QSltyKt5WAcFEjplCm+gO3GCNY+If1H
/U75tSJP8FgvuIElOVZgVQRwALopSVG7nb3BYX1zaEsvQE0pJB6UbWhCWEwdQ2POFJJrne6UMChut9Gs

RIAvvaG7JYlPoq3eebnf2j7WzrZyKAtutYaSTytENBQ8lviHmuFmlmXidtFruzamOxVzR1H4nhwXkP1C

BI5+x6WLdpfNi/ZBja6ia5DqGlqIfg3uNIn7NSmYXl
DKISCf0EZUekxqrr3XSltt2RWN6lKpmBXcpNuomOaCeZZ4aUDS0WONBKetYfGqfmth9nu5OvWiv6zLku

elfxbdb2tgTWtIo31maeuVb1P5nmOY3arKK2gfsB4gljhs3SmhhN7RWgqAXv8h1/Hggyg1OE3afv6s41

ekVKLEHVwF2SshhiIcb+PGhnSYCDBTRNJLrjquxLgQ
SyAW+IUvkgvxvg+RP5TBj8/rtO0O92bmPzFugfjKydE5APvq9kRMx+vOTCD+ZwHsQlGjaxjV9B5n5Ube

p/5Tp34oReFvKY/9nyzzyH4/ezpgxJ40L9imHoCet5QIZJQ7PLUjw3KO8ZuRurQbesBIfOkEJDeWdraq

0bOauICuUs2hpG7XhZjcvV2735dO7/ZNy6kV33Qm5n
M2vnsf61TyTHdqnPfRsdvO52ilHirEmQCBauCvMU61lxour1DfO60Da6SGnVyJxuYpMPOkUS5vhkC4Qi

ocwkOtHXeQhdUoTYrAY1paxMi2mlPNbA0GFs6O0AnMqY9s3TlBm8CfZ2gcwPFw9wior1G4YoTZcqMera

G9AWfhgv/P8nK4Tkr1ypnaVdH0Ri1P1lJo61afsrH7
ZgJxaVMA0qRPrxvHr4k+hyrFXDuIvvQ0JAEiWYz3xSHRJpFh7UXb7amQyrIURFBXoIz5mry+LuY4BZJV

OfcxSeVgUWbZ24ztvgYl8HzcpodWvg3jrUDDau08HFILDKjIYliTrVOKOrky72g35mUUL3auEKoRUmPU

pLZVugV3v7n6ip969RHAXnVZBYGbierXTcdhNkzCbj
Hs776HLEKPdEsTgPz/Fnig0p15noL3zhOeYisvHo8xnp4K0mNYkZ76FEy+bA2sTrasoGcRvZYt4rheGC

kyKcdyazOZokSv5zVOpJ+0H9I3rrU5qjkxMh/yi7jwlEpub4lPA07m40xK+hHfIBZSYp8/MVuSOanZVu

BOkbaJQl5a3OB1s1emqNULtJrdrW368Ci4O/j4Q+Nm
XjVt9/jX1yFmp6wnAvty1y3YWqhMpDOry0/7fcuv+Ayk1I/9Ak5LGn9bk4obyjM45ndPxjxCJG72W0ET

u7HnT8oo1QOqRcdVKA/B67aWO1QNt1Y6NX8GBm1QP4sGdUrsHcXgLWkyBlEJ/Rwe1treyRBfPOvHA9ZJ

kgTSs0WcMigqlWDp/y8FwmxB+IOh7F0BM5RtpSu+2c
ATqO3QQ+8kuVSBynU7P4sDTQV+yJFKn9/iJL/VP46VWbEw6Tz3IhyEXnH/1VNfQ7Dm7Utyntb5tOcu/q

UKQw9HSyWK/CWngVUS5ya6YGX+0owdjKULmgqjNXtpcnudHvxesNJ19SbMSinqJPD6gxeZCsaUHml7dx

xOWffkdR/Y2qPCD3dUNr19/Dk+/xP3YEmXWbtCrBdr
fDpb5Iqt8j0WX5ha3HZ+ARsZxND8eg2vxR5Ov81pWt+klzz7PAwiQ84XAqdSfIpW5VUMRD1lBYbA4kR4

EP/O0OdhaGz4r1GzduMKnvY4rdd2i0RUpdWY1egd78H/2yGenZ9LVhsq4GBeznq8/RpujjBHvcauBiK8

pIM28/aKWHizotDeTMRCq/eI1gNaLK4oCeleSbiXDh
5OKvtfaUK3hfE9F0F/BTV2TKNol9LGYBq1aqYBBDkvfTCoea0zDfpqDVTc+xjTLjOBz8sj8TH4h1kcsG

2AYqg6X692rMcNDh/gvDoeZQOzKSQBT5IWw5iZvIlqAyQSbd1O6wLLawxbGbNUEWONPpXFLvqrZJQokD

lo7F761f4tIoGsp7LSlkfRGiYSGd/GI5lGm3FUicPp
n5he2SoY3Lkt/P3fsP/ar8m3ZFblT+k2wb3lm6uuZC2c7kAlkl11U/vRp0Qhyeo25zj/8/m86yKx8ttw

Hr9mqBlJ8iHVigb9yd05iRLp4+tiLTESjfWxF+ZW+Cpyh+OwrP8FoMYHI3MrZUep6wIdkt4SzCJpNQ3a

D3UhGpdK1Eh89huT0SPch/OOeY/fKL4hYs2UQdFhcE
P5IdpI/kIawyfb1C+1Tn4fJih/fX2Vbj0G49ibnL6QT1mjfZQLLYF9ziSESlSJO66vMvl7cYEKsJM//v

7MsFHmUYPm+NsT55ocP34xfwtHYegmt/ZT5SqnaE3NHEDFPKmuhNJ7sfyen00Aowb+qYgHG87QclkvAp

05yDD1oSzVnfy4XIYR89t6vik0WA6x+/HtF7b02cio
h9k89pDv99nC41wbCeqJKcjgGhRH/ZDDAu801Bvy8/XTn71+5bbnVizfkVwg/XpfyDIuCO0iWKDk7HVG

dJ/lnj6w/71927kgJ8R0sJPT6pMtHC78veop2hYvnyi9Lu4awmV/l1/Mw0zgT0Th1VaDK0RcitXWITfE

unEEhuRHmb4bHJxQdWmi9Y4O2G26KKsc418t9ZosNu
Qf80kmI1hMmZhfl5g35U2qLkJp1hqZXmZatPBp5Lwm0gFexVNUeof4eLj7qakHivFxRPAfj2qBIr9mw/

iZD4oZP/7Kgx5q97J37NTrTesYfWzN/P8jVK5hLFqWoISr+9jkB8BP7GUo0smDC+QbyPGs2B4b53f3qI

ZelIUXZuEFWXh+Nt4QWZ7w6jjYx6EO7DPpoFS7vJtG
VcrPlduLnvVs6/nA91AB9ZVi/+i+7wOIXPUPQS8Tc5EB6uyyeiqVLyJ+y7NJZvtpUGxrLyyBNxqf5KPp

azzYQVFpNJWD1Guy7iMyY1eEzufgDkuA+kl1fySI+z7U/4e5gevWBV+XtsV/H/eXWgZvgYUUWkiRhZSm

iz5aSWlCAuvLjmUZpl8ugIroxawrTbYKbfsLh/XLDd
WLmFKgO3jqKGFWrbjuvdaULaSOVvuzItE5CwTkvwsBSBLxOYLaiX0e3cD0yVmXm0Bt+PQfMFgTwATQ0s

QEfZSfszkTLjosVl22xZY29cF26GYOItt5uCxJwOX2HL/ipO68kGdDr4GSW4fRGF+vYEskwXRKdUDIT9

z4VRP1ksP5lmXHQBHHenD5HiSPngMoNGEglciUDfIZ
nxM6GgkEjrYx6mN1QXg8y7Om9ZMgIMaCx1F2cbv2zaliUEzOtesQ8dvByggOQ0LbCCahur4KExVsyOOa

wuPwrLudUU4zDC9mnHrPL3+/UtfoCibGrK9VVJGwmNfrjskeYsVtgKMPIf8cvbNRlrsUHVIH6UQdgu3y

ewtgjCxJ0P/unKEb9xuNHWSQmafrwe2hvC3yQxsWNP
C2N9GaSZTLtjt6O8T5HFRMeOhPVCq6skTUbHi6QtvreWHZZituUAB3RJN9aYhaILcDQPRBr0aOxlTyYB

riGavyRPFocy4R/vGICpheM9cpEyNKQVtkEgoy3EhmoiIepcBQOzW4sXGfWnSzcBz+nnzs9bYIpenTVX

/NtwwcjEwSzpFVn2nt8aECE2xfE/sC23qBT+zLF6xa
8IzY89q+7+H6Ba/ktcE2wNTh1tL5ds2eXYCYMGHl2ZwcX3ERy9+fA3z6S9H4VLCZltVZRm16X/qmy14E

rVOU+paUCY+cPK0ACapu/ng1wRURc7iZhHfLcnPGcIPr8+vegeOeNxUK7N3Op3qbGObXNqQhT1TrOzrB

K30vqbK/FXkY8vYRH4Z2HtznP9ptUNbkKF5H+bVZap
P9uEMVlJvhl/vWTzF+zdyVY9ywjH44LSBxtY6Q7AU26ZygFsOtgl8SYmMr8UZuQwZQQ+b+IKzBfnkoHf

Y/kwhOqz2128UBWf+f7W3N2x9zWDps6PA4Sv2x/bYK+0NKhgOB5K21Zi/bb2/gvwULXY4casn7fL7FFJ

A0YHungc507t4Y6xfXP8Cq+Y/LOm6MP1rcBE629yYq
EYzgq1oAMnxnn8dx1hXLknwvlj2/ISHITRiTEl49kEw/lq3jz/Ku/KRFMITVlzmDSfWoTzKuB4XTtF9I

BmiYJk2DwWzg7evpXHXxlQcqM+NitCjMeNgXP8uV6CM8rMzSXffvA1Okxj2lNXsP019wfEkLh5/P1W10

ORxgVTlSURMhcDWD1TMpvtXiU4P3jJUirGUfCKrrSa
KQKXznBprM9tTYBhchsvu5jgnzgkrrOt1lK5UY94iO5UQHMw0cAsjCaB5SPFQ1E1K3osavBIqOoFz4wf

ARxfpVR7VcNcmR3qvknyipyZe7Ngd54vyNtGjq62qpa5462lqIh9nC2R78Wwn9eaK503zO8uD6bi/TlT

hZNSVK4tMLculZl9zFtb1xrfiaECAkG9SIBXifst63
FVADJoWsD/kAtFNkJPmpjSQQyUTOrGC58R388zPY8zqVwOLv1PEmiOBtLXCyDy7BsxSyI28WoKDirjoL

OIRdyuhtwEioD6onjpGejJMRqQrZqUGnQpZnh46tM/gNaCkeQznfBFgX5Hp1EY5UjMHotJ5ifStxa/Fc

skBeoKxEN+AiqL8nMeHDkK1cJ8Tei2wm2dvCA6AKeT
uXV9QB4r/Vk5JO1I80v/Rocmg6Nm9zudWe8D7rHozI+mOIDOwe1eVTPITGlEywBCIQJlZzorD38YzN8J

x9RZhEscXnRCft7CvRFUCXIZHeRGHfChgS4ETU4ZfhKVVWLxVY6IIizCBuIHlWtcVHrodi2qjHR6YKQX

BHQSDGyibWehWsQCJNiJ0gU4OSPHDnuIsnPZixOYGU
HEWOFJXYwGV2Q/8E5mGAYEWd0Q9FSs721gm+sEYmdq/E0G3mdJRIOoOaAL7bdkt7pjx3QXgm44d2YeE+

zvBl1a6rahSBV+6ONdpUb6+an7KR4KF8SeXZKLFjIu2kZ5/xTB4A7D/1Un+bteDrMNcuzE/qaEk8oPg0

Jjau2QyZkaK1xql/liTNqZE9nfOP6+p14MDCEd23Jd
94nqcC1ec3gghHvfcrhjcGN4XIiDqaJ7s+WqRzb3YbI06CCzwLcBrgqUrce/04aHmkfbCYnmRXF3xWlW

hjGsz6PWT4l6qZB4qpRo8v8kg1hsjTuj4o8n7ImrxduoBihkSY2lefg1exu4xtxCO008wLDBE1yUCWgi

o9q1HrfL4ocuI9FUyWzBlrB4VjkD6kxh76/VnZp6f1
uX27EKtGiF2xT7pf9wNkqG3kROK2bOreAwSZ2ggsD5x1+uHX0f4hFPBvoziYnSTTlN4DVvrDQ/MZL0eL

uiaBqnqVFJNeQlxKrlbJ4vGjEwQqQxcz45jsoBNzLPbBZ90fVAJgGy/aLN0FWx3huMdwrzKjaF0y+3LX

RKxg8Ga5novK5LFhadxI3O1taS3tNKjN7fNMFAnzrL
v0Lwqm3fBp7y2WyCH+hHvY/eruThqplsXuHZ6yshhl+JaC33Ii/HHX5+4U4qzyRdQm5RjMtkvvzbKb90

NJh5juwdE6Jfwf7xSbZ8ytKkt56R0B04O4Rqya1l5pig4DQnZIGaiul6IiL2KgxaqD4PR9YafX/Rp9lX

5FsfwwY6kWOgx8OJvA0Th+nNIp+EuPvOH7WIqk4Z/H
3BEJTXKKOYzxTvvtfjYYxuSMVcxMmL0Z7wBFWUMzoF4lG3LK4s4kxLF5JGFtijlKY1nbs8Yh2u5QZgG6

jDoSLzwIHBf9xrlc859T7g05mPcnFTvMpnmr1Lm73Esak2J0LBqnpxLydo4TM+TUtEMqbKL5s8u3cBU0

HhFw8rgAy2aubPuhLMiABB1iQ/Fpz+c3aPcJsT1Z4F
gEoxv+UZXXGPwso+mfu5ed2Wu9Jtf1Jfae3U9LWq+9M+BK+dvN1EKtRgmzH4mQ5LZhlXmqLZfISkO1D8

kDIBqV7j+vFdI1mjrHsn9/Esx7fHebtzpAxjcEFow/FwR9v7h6ZPV/ENjikn0KOikL3x1/WkM/UTrUAS

8DXYwsosHVdI3tpcXZQgbKuAhehEwohC7wFg7zN3P9
DNHrDdav/lxG35T5b9AkMTdJ5iVjeo3JZEnX/uoAwWxg55f9w030pvWjhamOe8vJtmDIt0L2xc0px5w+

p9oXeMs7zlmp6xcx4j4bYfgjkR9phta9croEuHixZ6UWI09rrQWTprUd2rVUWIGmEJaovQwDMgL+C/Fb

nHygux8qQU5sHl8SGXvZ3XZ5GmJRQ2VSIMSqwDicdH
66NwWpqAoD5+UsFACkiro70CH0FFNmNZ+5mB/tCpUnwh+WrbxUBK7cZTlVKX8HdhkOJkOeEX4zXtVPc5

arnOEwXMQVK+4gecFFrwkielZjMeZT1Yykx+yDHAOdj+d3Reua45L/g7vB0+Xj6K3lWjA6DgltujM+k/

hs8o3Zd/L14Ys32dD+ou078fA06Wf6M7xek//t6Na+
10SliM1O0VnqYcJQIR8DAAWLb8p9vvpptgDyKuPTFmpoPlMWxzmsRqd9WEMym4lE2Gk1hVNnrodF45Ck

gvH28Ub5HpZ+DGA9BAuP9HdARIvf1+kQkeluZkDZmJX5KJdT9r5pPu1EQYiNxOqLoYsTSnM1J9R2/vbx

vv7RMZOuQEIH9anpZDPS977BXdWeWVAapt0GGXGM/W
g/oH37EgC/DgaoTRLJLZtUcCiI2ShsTXO69zODqsYQUdzeo5m7QZpMBtScBmD5tGTRk3vtUSUmLtXqGd

czgjDFmtclXGpv3GXm6jy/HmK2J9LDeBk5ArlH4ZUm5+SO2MTNdrUPO7062/y+dJlG1QezXq8+5YNEQT

TxSXvymqFF/VfVVyXnPa+HLqCtqgClcg7LQphv0oYw
0dk/bB/HSQi40pu+UNUmkcSZJuabVa43aCp7Nl7vRfP00tteWj9TnpSLsmoS41H4KGI1FTbgad/goYcW

zH+I/EUFeh7wMQy63Om+Y6vegcTwmYCZRvMW2Ja7pEwA4BTzRv5HIWNgwG+Tivg007esu9cJ//8aE1rI

X9AV/HIeS6eBbsM4WQMpYh7BvyIZ8DkYOL8d+BfRMq
p9ZvbLwGj1cZkeKhC7OgSYFD1ILwtWEprAEJgrD96s5Z80lgT0GCRjZ2IsTCtd6Jw2ny5AzeifIaHCaL

/Sv5P9ztPqJHr/Kecn8pMoimSfbt9pT+lBON2qsv+NblkimnbkJtAiPws8K6CLiopUmV9wHaizcJ9a4x

eLlY4Vx/jlqY/sUPpnzWcYlNM2RgZ+NcR75YRBIoix
PL2elhewf79E19Pb88W5udPoZMcEHaeFkDLk53V5FDM+G+Vm8Ov4cGaDYD8HTPqP2KHo9xv2Rxv/Zen3

TwhjptdjkKef9F/Rdn3Rgd+jswHa/A1AIalJfQq5ktW3H8JtP4BLuErUDPkigByAieQAWsyPQRjo9ajw

KeIoyAhROn0IcAF/SQvQ6ma5QNynG2/Vq3FdTHlslx
oE3VgoILqY512QF2J0JI00PqOoZgARh5C2XpBhDbz7KA3/UdPOgFUewe8MnqhBgv/IK/lPGQ+sVqgR0i

fWivQaFLjfuTRpOB8qb/+bHNw/UOHSxRhmeBR6A2H+RhJ8FUusp5ePzjuqqkxYMM1zFB6QqXBvC++HZx

SiwWdxP03jdIn8Kn3LFe8B+ZzcNTI2XkKlmh9fIzDU
AXIB+sV7AoQthWYAHYKvQKdfsNCkiiplhVyqhqR+ZN0SYkjaTp6Ba7lr5Wz7cytF1EA+h6lMA68BTilN

F52P2PHpTJMzf/IrEoNVY8vm4pmciwvn3/czEOCMKbj5u1JPdE5CMcI4fLhNPbUWbrCXhPxEm/xFdBvl

Wdo/I7o4macJxEImLab7Vm/ru5eS5LvOlFus30xwMm
0y+Zp7IJjqtgzI4ZHI3XW+EosHyl6slI2Un1o+qJ/SM0bsegKmP/sXwyI6oHvKm/Fw/rlM9syzXZ/ihU

CKaMn2rPy51DpF7gy+RWUQm2hQITzjDlCuY7Pd9NmW/yj/NyIa9RYzP1z+9n9b/e1N9O0cV+jMbakvhX

wOKHelX5zRRO/A/EHtDh0naVZFBXVr0UuxWvhQmfk8
zBVZRZsjEWWRDBB59uHt2dB5V2UL5REBv4U+7l50J/7s0q6oqMuHy4po0HBBan4r11Xz3a38bSV2Oj8p

oBC4fIuA/VFDzXng9z/mVOebPBWFXAT5+5CGp4y1IeTe2PAHlRUsRlbY/eqwK2JWKAwHsa3T2a217LAF

323x5w/4dOEP+NP+N571Iw8xFtZclzvOK9zp63/1I9
PdUBfGGPb0cXZ/h3nW/IqVM0IOxD2+sMdbwp0ynnCFl51E3Gz9BMT4KDwwoig0Y2qpuxFk5QvaFlV/Ag

ie59DCWoDj8oOW6eeG+1yuhbuk5em6OYhVe6343dAQF1+aS6g8Ro/1e6rs6XkkLZf17CDK865xFUU2lh

+L+LeU95tSAnQuGH0pfMIYS2uySNNjoTKWhls0YcSv
AeiPtdwhxXd1A8TQ2V6BWTsiAB30FHu+DdaDUSNmyOc5CZ1u/yDK2TAG37tqd+lY0feg/aFjL1+HHLIP

3NR6yHF/SORpQVTkuUM5PYPcsZ/xiQrEbbDDDnEOtIRz0PIU37IdJPRayLtVsZaekhXFRA6IVyDbXK+a

dIsTk4HIitQ9TkzPRnhDuWWffnr89TH0yvKf67KwB0
juqLbvg4EsOE/qLMyqY96WQA9OS18zulV1nQP2FfH5Z586fuJYZANxnJ52YD/Wseuo/YBxjZmlDaGm7F

VEteqJ1yJz4KklPpdQfc0VQX6Le8vO5z9lb/1Bdu+E+8Jj6qfR9l3WAc+ki8CPxQXpmZzZ1pmAoQ6YiY

/CGkF3wF/Qlv+wmpnNv4u31teqz+20V0QnBOTUuIPA
ZRlQZHNKE4UYRMJcQpBZe0EQYigN1IKoHz7m3Ey+WC7wJbKTZfUAauALCumF4mhVSbfI4Qef5dGuIh54

ClZEct6/1x0z2kTo0PS69/v6In/FKx4zLi7wf1B5OswlnC5FdbPT/4F+WEdFdEo/9gPq7/OKqkUH7il1

DxJlhCBfXbVYkDJrhLuC66SIs1oR1CKsqjhAsgCK1a
to7xgb9HQexgttdRTiiqqRnK2txE7JiRas+aw6cHG6zsFpuCZbJoyvhFnNvkWwo1Jum3INzwVGb1Kr02

L0RqJ9P4RyPIgxKGCrz9X3LeJi1MzKKkSoiM9LWiuyVyWLTuWxTt/N1bb9YZADXWeggTv/1UMGu1NtF/

yJ9+Shef9PUynPHWRAe9VhJEqboMepjvIt8neG0owL
Hk208XK2fkLM0b59XuW3WRYCMVHdL37WmPAaN8DWAKo67uJ8LlIZu08W+ogpnLo+4vyrSiHu3SOloQ43

xP5aGt9ul+IKocg5jC/MRoH2zleqKE365MQHxIbWbJw5IeVWDgrv7ykjlCQb5xLZE4d0imq/G+vpMRqq

k/96eQqgFZlG4+jAEup2UaAW2k2W1RcXcrwyvwTs7/
IOjcEkQ7mR/biG2zuxKXP6HkPdP466wRyr+rcxBwHEixDPcPrTGSekbxHpnvHDXCHuc2+6N+1d39bv1U

Zu3DYcW1La7VwzsfOolKlurPbJ0UEC+UmvojucB83mVz77Jq+sosNvHS52xc2DzdhrDOFbhFHn1oLO/E

CSQVWYajkeBFhRb7hdYCf6GBhqI0RnxyhnZ5J/6rfL
gFx9wTYnyy/4H9xocUgpntDee5vWXrsEfRn494vy2pyXqvd03utX2q57/spEQ49WksBY/sIk6qQoXbQC

yH3AF3uKVfUW5USj+HcPV7NpL47QdiFXSFH+wkLv6z9zxCAE+uRyg4M7YE0/ivAqpB+GvELou3GhwLSk

5acziwL7tRhT//alpE11Nxar5u/mPZpY7T7S7R+P/x
85S5AwLV3O8e8X+dJRQ7LU7LUIl3p/qVwBXD5Cv5F7cWRICXOb1D/TPW5cdk45sgEi6U6RvrOKcaWiMW

H4hG2Rm4KlBoCYFe236dbK+MejLp+Bh8p+gM30J+x2xti1EAtF48BLJ4cwCm53SdhmGoiJrYlv/iz0R0

WF/Jj7TFQSwMvQ0e5Xs2EzzyRI5ZzRi6Dc+KlhO1/w
fbJZi4w8MbFxDEaFjagCXZKYI32weOeXy/w+GqaixrerwAM9lCaQhT/O2hTOpD1w2K6H2AtVRnS95CjY

HXhpCkmS2xE+1vql4nMgJM0kf8WFs41WLBzWvVPz6hMes9k524qrj4nIi2683D2ig/B8CkoNgIjpEQi0

L1j9jZa8CF89xc73hjJd9bExyI3f9mO94TgnyUtBMz
GZOBQ44p4kqp1G42IN4q/hwbuOY+tMesVV2L0APrEfKwQk7TZJqJTG0ug9QgFeTP0R09SE3ocTFfFJG+

9HKNPczD643mv+zd5QXN2/GApxfCSBxIMksjwqtbPL7PHfE0PJxMmohXjSsSTD2GkqGS1spxgxTKC7EJ

Ipp0ji3AEsCB6OHk9oQg4W7pPqDInR4FVW9bwmO+R2
Mf2Y/fGgiaCJkO+VnT4++5G6bZwLXNpSC8NkgYnNxPIp+7J/Ltx5RHKTPZmoTWuhOd4n+OkQ78e+oUCI

/SV2m/DWFFniXY1jmO1pyuaYgUolzbHvRvvN+wndqidRmfIhZzq+RKCN2T7nymQ39xoj+3Txyd07iItR

STS2q7nf/4v+uxqn7nC+d30+AqzSb3xI7kKk6IxaHO
msHFV62qdin12pfcXj1xCTfJ1bCSthNy4+yntiD74/3sOkgKah1n15ZuKonoXBP0+Uf39eSiZJgxFE6R

aQjxc2te04+CcIMY7i4H781xlzv2G+yvLjo82J1/R4yluVPNflR0sR4gajUk3IReNTP4eTrpq1ipTyDu

XdDpo7DsHNwe5QatnnWsiIvN9HQs28akiqWNlc/hSs
1QLys5wlDb/uBlowFF5bwqfiESo6AMrl7RjEZoe/YypvCBFMNd2oCozqePah2Dkmy+0H4P6V37LjNOQU

dD8Gcv90tqGB5fYUwxcujsiR6SuNm2vZ4c9OOuzdlOpeaysVMLahpcmc8+p8Rvek9Yl/ib7NVHZ7xzsX

OmpdRZROJ0Yat3AqaZpoq75J7L5NEbzFvL6ZqK68Dm
OBRjAfEsM2k6OW/TLoL/lb39BadvVF1oD2Gv9SZQTzTZSey9kTvlu74MjlLUG0pjDP5z8QqbVhrIq3Ni

3gnKtJ/yGqN2eqvgGjraOKwzbgsb+uOiesV+BNWtn1z2xFfRPZ5cDLMXUl/xKr798Y9eCCoCKlgN4kSs

3o6Vh/OuGPSdT48o3iToDCIXjYwgA8XIx0lALei6uG
0QroSX4dhIoKH+lXm59B9jVMv+r2EaK431dkc3ygsa7Tobai/L93DnbeO5WI0aj5uRzFV2aHOpV3dzP4

Fa8jh0XFxxxY9/FG0YyUes6+9IwZuO6So5HpY8zq1z+wnq7DtC20nnJLbLjbPFEBg6xP8iJ5CGA40y9u

GZyF0RqsE6qofhxIP3Ho0DbQ6XxJtj9w2gv4KUV/8S
DoLzzx/hI9hc1ORylhjvEijRe2y29bRKky0xNdr3tVhdbSSx/btE9G33L+KCUoIqI9BcT/BvhQbB4xqe

m+TCpD5Po/8+T5gO74ZT3Qu5ZqZXMzm27T7UwCgLuqOVu6Bve91oKeM6+zy0P8laH6quQ9PvX4pF6Oiu

oDy3D/MlH+Hs6e8mvTstVrIzmPy4znFGWO/e/QYuNS
KtP3QC+fGIeMZmV8PF9Phoy8Eb3YbT4fvgzIsFJenNpLSrQzMafHDhezWg+F43pcO2c8ph542nC+GRiF

+Tpv98gEkj1j3TsdUuQO/Os3LgPw4I+ZaFickL2QkSUg40cNZGC/EXwUjlVXualDNt96yBa2QTrkV/S6

H1JA0Mn6bJLpP/kHkkG3FvnkYmSO+WFIvVi5Os/Qww
lB9smXwGB/r9DZduS/92UG493rtZKw/9+wd8+h/EACdK/YdSNlJA40U6X8cWQIR6W9/FMM75O4/CnwTR

xo83A2kz1vkh7+rqZEh/E1tPITpK42a4YA9Sm4fhkM1vwJjBCYRs5tA5e6roLVa1X0kDrRaGkOYW+D8z

6gvH/P9h7St50vtSk9sE/2owI+xxM+YL9QMTvxbY5I
W6quNgzivmVOdrmMi+upyn8O+iWDqgJPU9/ALBrA+tufJNH2D9nL7q+SSvdVqMonRaT7rD2qLTfz60Ct

Z5/gb0SzQERVGRn8DwIairYLt5Sn6JvostoMLHIfY3NDVmMkjwX86kdnp/FLwhn9t0HNkffNljaxr5Lt

q7HOi3reD5J95EH0uRkH+C5PZ4f8LinMbGWRs685V5
uHM4J9MGdYDv5ihlzmg7CSpFrdiBxBL/NqV74SI046G7x9p5TELTruB0c7untl2XfJvRxnm5iI8nwoPs

mwh5lapbepoH8xu5B25M+EAkePlkrcy8lYbMcmLi7y/o8IyQWc0JjAGIkO4nzSM51Lv+A3JO3FfA1ORO

cqFKvF3dIyx4TmPSzvnc1rSen/N9MpqjUkwd0I4Rbj
NgOo0sK+VKDn+6Za0i6uLflSZEFABBi6/zSOF0XFG3vxs1Kg83OeQiNEDqSdVyk7sZ8M9+5CGwq5otv3

F++7Idqy93828o03ar/woGos282w0a8Fb/Qyjap1dM58kynHyRlo6jLjpylOB/36BHA/8LzCFgbWShbf

ZvMIQW7bV/gEA4Y6nSSiBFvnQPgqruCBbZpZEALx2Q
9hJimydP4Rv+T8CwyX/LWl4Ihi3l17C4b2P1lgnc3nOLlaEdCFiplSNhx9hQ5XFF+v0B93BguS0wK0Gx

gn/Fwyu25UMeHn6w/lPp8X7OK5V//4rXgYmXq2sTOQL3TwIj+j0wNx1p9tveuRCwr3izqpWd2E4o8D52

I2wuTRo1qifJHqS/bqn+Q3ccLMob+6PAwIC5nOfOxN
0kD9Q+RDX+HcJb4YjuQ8F71W7Txq7Rqb2ygi4+MSnSFd6PeH+j7r2l21n/dpXNFg3VK3JRd7jXa/K+o7

9n2+9X7nAGq+Wihg4o5dD8zbxEC1hkKtGEjwKs+7fTcJNXqqh0N7qQ0V+Ub3ICbL8ayidmuWq8Ku/jX0

nX5I/lNgKjOACTKXvwH4kClOjfSszJtxbCeyQ3q/Ct
DN263YmKMFzbL7C8lhs9xPQhKjiysRWNO0EMkb4j1m6kvAE/G6cPRhBhhUOSU+Lhoodo7n9CC2A/sVuL

eFk9nvxGQZkEhgpfxzfo1BGyf4oWq66hi+TTUaqA/GNAWYgLE+sYoNtwpjDETd1c1Lp2q11uVyu1EO/Q

/VDd85VpRrtK7zRanNNVtc/4y2+WdQvrkA+8pCoSp2
w4kaA5u8sVuhr16n62DKG/0SnqFZGEPi+swX7l4y/u3P0hcJ0IrtZd6D2bjeC87r3a/EpFp00JXSMJq3

VEUkr9twy+M2EBDLnQ1V6gPLT+5lCWaGUn9nHmGPvqwfNnYb2mfzqylkvS9fMKC0yYh6x6qjZK5Yhke5

kZvqthvXw8Mwb/NyDKcmx7nVr7Dj/q/LeuPURBZq03
Cn2rzDc7oWWI4aFihbkatw8gGBxSyLAIRbw8Fabq7mvm2ha0wfH3C5fwHhkNl3NZlDngOQJb4vc7xY7d

UBlFAc1QEwR3J/6JEOi8KPZoFOPy+aAnoRKRTUT11/ab4qMC6M9i5dArcTRQ1EJYduGnn6Pjf/K76biP

ofa5KntjGgW3CduDb9VjsA4zEguHOQ0LkeMxkZiAxL
yDGZ4BcUcHbNGNDXZ6chHcwaiXPoXghy9AA0nXHXsYNi8ZEoxqH3o/8o6lStGOqAZ+8XWFC8Z1KZraRS

B3xWw/w9tKxJRwqO0B1kXUfgLXC2QTTO01GI+ymN1yB/MgDFV0I05p4p+oRD4+xOyq33ts427vdkUclU

knxJr6KCnXg1FtfCgDs6n88R9fmb1TSU1vbsP/52MU
9DM4e16/UqFEhsw1Qp2IPFa4prkgkykdX0KYlni2PFXtIdhiQbPPoS215X4aLTM2wnByUEq0vHhh0j73

gaRFOMHXS3/q7hZoX2LtPH35704zr2P5ZkpoR0h4qRXFi6C3Muddv6yT9kTBCs6VIwn3Xv0q483ik12X

D3S8we+i++P9Cx1/8m+qwZJqiZc1Ump7EV1WS8vl6l
NH2byMD+nXKwm+/ZaTmTB5Rz0c3JnzUSgJNOtKbT7uwvqo8W+jEFsFAMUYY5XAOOeE4JEm9muaxgbhrA

NzE1+kD0Q+c9BlK3qxgB6OrD+94NgrUJ24+GLvT/zwg8i8FlxKlmSnhk68iEx9e7loPHJVF542ERuGPY

Ya+tnroAY3Bi+6xjXgQ/RP97Pp3+G3mvT5aAcVs261
AoStFla88Q/KHx0B6JZondkTFW2X+Xr9SunsylX0Q/iaK/Q9olkA/gZJb9ryyIFFVcUrDpXexcKxKqYF

WUQNI1hrdAsQL4Q8H905QKxTx2Zh08KtY1ctCSgicKgVZ53ZL5N9gC1pWCmYzmoZq53B77e/jxtMfgvn

CD+3L0gGul/d8GrpAlsShixEC/adf+yeW3ocClY89U
HzrgvvSC+3MVkrytUhG69aN047h3ivXD5GRDuXmBZRf0k9WgNlM5q3wIUC80T5YexVVJIR/TfGwX81nB

sc4XRDvP2PG2/BHIAow9+yUAcZj9ikrOH2u1KG84XeuSM+1yu4nT5WS0TiivuT8Z5/86v2BttRea4iJy

/TUzllAiuB26wcvsyRmPHeiOEdaZAeH41OsCA/SXeY
J/3O5o9sc4wMs3501xvNta9GM9Bt/Mx7MGo/c5bJchICvLyaWH+SZAs094rIpaO085g04BxMKZ+NuwOc

CZUHqGCg1FoFLBEa/1A2AeZtb7uY6sQA71NQ9eRdD9A2AUZO4K92QUgHyBuQAcc2uMtRsPM+xjP6zlk7

wGvdRF9JHvGmakiy8i6Ba/0dc9C7j3N03ZJFUy7vix
ak61aK1IB1rqn+pvDOYfDAsNtbCYaVWGCWvlB1LGLA0nTbskHxEiGJmdukuw547QTFnySCgiEZ1Nq+s5

RaU3GEHOp16s0njI8sjt00ruq77e2tr06lep0ikchhP41gJ3ir/5r12E8Tc/2Xjb/yZgIYOAW0apNYN6

a04TeledHNQB/z/SuH7TPpX27ofq8/7U3c08stZBES
4ETbed4tKQ8KWlu2rfChrcpR6wx/Keyana+zw6wWPmoisAl/y8VmmJ2qxQ/V2gCtJ75jqJCBRKvXaV87SW

1xwM73/te547SKN0oysvGw/sbJ1jdYwrypt0o7WrvOHzqJkKPi0NF4M2+CUmJDxmK67NeXkqoSl5Cu3u

q+erLwmMzdQ7o6Wy26YIEB1yrUTWMcs6p17Bnzqub8
Z8wkr0r4edFuJBadLhDzlvSOKjLkp99gkU7cM4sCqDW9l1l9yOcswB0XinfyIR82rNyJFA2/ypD1umLT

b9hbD05MY+gLd3BLQQr8264Rr1CkSb3qdAkxn+Uja5moqgOgGdl7G6xNZ7Vi3LCKl+L2fS0ZJmQ6qydM

+KGGEawMfxSobMtndk7Dduh7Fpl07r+P+NshZfOPdL
8PHvjgE43GsJDhJhk+MTa7DEOvBZk4OT8hXGD5RrP2mZCcU+w1Ed7l1fYVcn0/VnHLriU9E1J8+gb9Wp

RosGqgJHkV6b25myTmaRUtnobz0rQRW8j+0v+bjkrf+PJ710FT4SQrUxKJ8p+RVrAeV6f7Kop62TF0q3

jifZtco5uhSp+KJo1pbZryX/xAuDIJkct1RzWezlWL
kSPyh+cNGxo+Cub8CSDrsZjnvOIB2O3dH2Xwh/V53AOxaXDT3bnRti0UjbNgGBYHljy5sGqq0SjIHE1j

RwNZT1DdLSVJfJpJT4KkspM3ohrHjwNjhrZmALSmyX16ONXyZnZAyeGy02EpvCbM74YTt/FMEwR2jRLE

JV7LxHgTkRdBoadzRDwksXPgJQcmU+KOGS3ZzQzmXZ
2XvmOHLe5zbmlGIG5ZXZqpZIRqYh710QeYvH7ZoWuKO78TJZxLlahicEc5ODTshvsG+yJgEc6Yliq32z

Jm9Ip6vfKTG7bLYroSgZWMXwHJtRG3H4rtPsY6J4aEy0Y2jPuC05j1hmOY8ezI4OXe48HJyMi+pJ7b6F

AZdadoJ42+e0BnFA/rL8m3uH7esHiLE3zNfAYd7QUo
dlbWuXNz+Hb2BL7HfclsQWO7jtD5yIWcAqEWnsmonc3zgWMMDD2Uohv0drmE8SuJyQGQParAlGqEA0mR

9CIPSOkxseaGg5EW92hXZyb8UW3pH+vgO9MS3SvSaguwPFacYV7ZHH9V0RnQYHom6nu1sWahnzdhr8zH

q6C6YmSBe3ppatAu6QUA0SpBTPagNSHL3gT1s4s7s4
yveqMPPuL81DkRl4dPeJa1Yvsbh+pKSI1kP/8u4iQJ6PjuYbN/7oNXmKRqgIkJyrG3zDOnJnLU29Weff

Zb8XybFyFQoTs8rT8mF2yR35Kc4dIc7QSHwD1kZX2rcI80Eq5lf7aSdRPqYN7OcZldBZpbBTht/TAVjy

5ae9LOaoga6GEk5Lg2Rc3psmTXH/xQ3LYHEvpJC/uX
lt4/b3eQWBcY/yTZy2T+5Wpw7ZaSspZ4tOV52yqxFzHHXjdOr6HZGntgbl7mkWwrq8nDfgEaqlpeN4uN

Mmtqtajx8LXNZiqGWZ5uIfU8xre/6xNzc0+sAnp3LglQyNLaWcYaACZ7TsNViXq1lYSiVWa82gemhcz8

hCdstrridS9dmQQYQHktQcUsCvtyYZ896dtrXirzvG
N9KErAbBqQPYateXS1Iuagt3mkPAVVdv3pgSIPyZgprHdekFXqCRPbfc1KpiDaux3l5zkZjIz7/YZsK7

IlGv38muxSb7Odhvjdc0Esc7ZsC1NM0h52MXdO9ZLImd2viXQgpjOg7xi8nwJhIeqHsPqIyLLRfC6HJ/

mwFhTls/xLM48XxRQXSXOzQm+RfISM0uArXe0ypxcK
4aCWhsXiu5N5tR0qX3dDmdXJxu5MwWMEDqhyhekAHQGN/hSgWh1sj368aNS9X1UMZlAWjVgBEiGRxLcW

jj8qAIt3sUEeuiqY7KH475yRM2EOkWUXrgVYlWxmgOSieQdkPavIW9OPmx+d9HeCrHOiknUPvaAKWmEj

GrFPUnSCEOYBut3N6dYPe5xHEtizCbJSU6+tgXvLdz
7/Fv8E+27RHZbss6XgQ34svcnTutQ4I2SKej0ID1ComBAHARre0rjID1ne+Jegba8igXBEEauTgwFv3I

qnEhIn4HZJR/eU1DKG3zNOdCGEHnZNEXb0jYMjCqFAKz7ogpkPUPxfGFxLLpnVnhaq5q7On2gb/y6Jke

0IELUBzBhGivjDdPqvsT91IMTSYrRDpcZYh001T99K
0OKL9rKK9bRZbHZBAVtL4o5kCctyp3zm1ei+hbDrXwiv/y88t+TsPIkfrrShtVDqJO3IPlSeMO2ilf7D

PAsaZNFuOoxKKsOtAXhkTassbWHtSL9SbIO7JSGnM79cDVTwYQYnH6TvNZE9EO8+UVyjDBI7ccNhnI9O

RNX5aRln8vNOKjtE+h2ftibTPjKJNdkRoxEJmKvcsT
6PaxMfELIb3RNIn4+KI4ial4XmKSiKaIBBEZJiFD+7R8mHbpF2Cv7ws8Y2GZtFJCSi5PIeu7SDA8+8bh

qlMD4qV1s46E/ryBDk8R5rTBwfCto2yA27hdbacy/RbVXdHuj2cQxOZiesUBW/UhNSFw/vPuzPw/ASji

KQUmN6C9DH0snAZlm6jxtnoicySzf00uBTLVmijsda
+yacBudDcvEQbupdTWsrNj+kvs7oAEbH/lAGEevW/8JxgkY0XLAlG8j2Zs9Z4iO0nKyvt7JqtghdQOjz

GLuaR2wEWoVGPzwI5J8cXPrFDpNPnonuWkPDGFjZ6EO6uLKl7tHZusDgdpAzTfjADilyjG8m7x8wtJJf

VIyUpAvy4xNEVOOqLWauDxQReF9y4sJjmRDqOYSzCE
GxXFKGTo3PIVn6qk8P3fFfKqWQTzeCTf5t6CtXHefsx3SsSUCTjs3P8sEdgxKhrekw6zZqBUEaDKQmCL

OzUo5E2CTsfvIVHcgd5fOTrIHAJF1uTg7rtEYZQ299gdsz8rew1+mPlyxCw9DoboBX62Q8C82ZEfiv1L

t/oUEmLr6CJT5hr3EtIGCoOPhqtqAgFuzOOmG1FMDo
b9PtIOn0BO0GDFGzPQtgVI5Fc180CEHaC2FxpRChjp2GGJSySo7deE6yaVXtYFZIZVFZO9XtuLReZKle

YZ0Jg0MmyiYsKRQ1T5EzvYkjqTumvYE5VIHmMARvF2KkdBWnCfRqRYbwKT2LhTEeurIcWs8YSTApUl7v

kSLHf4mnZqArZNKrIdXxHNDaCThtRS6RNkQyP6u8AL
qvQ2FjK9zwCUYnQ6KujQWkCZ3SSPOwXs3bzRMxgKEvUNP4RLOlDh8EKk0EQzHzHP6lrj2XPSoqWJTiUy

aQHtm4VZpgCK0WbCXvZ0ZionVzJ2GfaJhvYO1YHHQTz781ELvnMKTuZiAgRMLkkkVcGNFINSHSI2PqaJ

XlK1XUWFDoB8yPKZDbP1ptKN55eIG1CNocFD9DVUXG
pAD0VJ0NezYeTHw0G4IxV3kimFY8ATrXDH8gTJlmL1TpZVMjdyefCVipTV74aVB3TPMlG3LiiRqiiLVy

cJFqJv7yUDqxHI9Os178JUNrF0GekFApszAnCPQfTQUPCoFoV2ICCZNfEHHtS9mxSBa6HJXlOEFuLtS3

CJ6jBDYlJfDbO59nCYNvWzMgC00gCETfDtZ8KG2lFd
GzAqR9Ir5yFyMlImR7Hj6xHffqFyG8Cb5eUkpaPgO2Yj5wTcleInC4LZ2jTjExAdC8X09kVexxFrxmDb

6lKilkJwtbLb2kINDpLyGxID4iWUXsYtw7EH7sYDXpSbtgNn2dCRuzLuxaC54jGQcjIfzsCw5rJUGpPq

q8QF5eZAGoCyO0T69uSZSgDyjrGc6nRVEcIaPuIU7j
FeKuOtPkJX2wEunkUeA7Ka4pSkzyPtK0Em9aLgAcWpZ0Jc6eKaSiWsE8Az3eXdBlWoJ1PC8zReTuFcF2

Zz0rFiIzZmV6Av1vJcUlLyJgPv6gVydhHnEtCW2rYqvmNwVaBg9wDDEiMwecD30dIDJwEkC4Ah2pUEDa

UcS3Ag4mMHBvKiR4Zp5eRSAcMiQqH28yVEJwEeGsFP
2vMLllNfT8YL0rPCvnJoH5Vq4xYKpsYxEtAZ2gYWGvJplpNz2bDLXpFnAbNS8zLOW0LPmnHPVvWYC6ZE

QfZQHxXT3wBA6GOm6FGtHvXK5tuk8PCfWvCSRuQzgCKgq0KYveVS8RcFTfK1EimbHyR2UdlVslPT8ReC

AfTK0NIWUmHr7hnS8DLPAZMMUoIAMkOGwnFC6jd2Hg
macbFPCkcuHbkAsrCU9ALBDmYFRpV0QzHJCmaYIkabFrNNizKDWhNVRsTKihWY2Wk8LmeGHnYALnFEmu

MCBSDQo+Vr6RCH1pk0RoIGwgVBGkOR6vkm3YDtK3RWGfDbQyOEU8EEOcRdpmHfB6WLO5CqE0TYLpAWb7

QAN8DvIvOTTuJaMbOCTlJiQePLbyEZj5ONV7SUDwHm
ZkTkh6PDW2XJR3PFHxUCA2RSF7FcL0UKZmJQI9CGQ3KqJ4ZOXqTEW8NRQ0XcU9LTLxPqy3CZS5HHC4KV

UlLDa7VLYgFAi7LZD4ZpQsEYJ8YKH3OcL3NyagPyMiLUfyPlJ3HdezPoXuGFt2THT9EgAdOkn2LHYkJF

Q0LsivRIO1VCsfOhL1BzFzBtm5IAV0QaM4DplbPzGl
OLJ5IrG4HKCvPdTkOJO0NmB4KJYuKjU1GQI7VcO2ISAlMQdiIzukXmx2YWX0QZI8RxrmTKS2ESZwAqF0

GRLnFMS4EHUhDQA3ZQBiLUJ0ILI2ZME9WYFeIVO4KANfOyKiKkCoSNAwQSVaRaW2CsMyGSL8XPX1BjR0

WQXvEBH3VFHjTzT7GIZfDbx4HTC4YpM7QKRjPoGnCF
JwDSDKIwFqFJH0RGXdNpI5IMP8CCEzOvUhKVn1KBs5QQW5UQHuRyFfXDr1YUB8AAAjDgEkBLp9TWB9UU

CiXeVcQPw7HMP7UTJfJjKhTJc3YTA8YENzFbIwKKx4LNT5BZZpYvBqEGj6XJS9CZSnOsEtPFp4WDT9XA

UxPeIkVS6HJyJoSNs3LUS1DNAhEwYnPXh7KQL9QMUo
NkPsLLc7HJW4HNUnKxb1FERgVaJ1UKEvOBO8ZCX9XwX8BKDwZyZfTMB6FnDbPkAtOxE9UHY8QRU1KKVd

BCn8NOKjNzG3EkyeMNFfASXqQMS6TLveGrJqHCOhJeWeSuTnFUf7HhGgDZY2XLMvIuZtYkCvPmVyEPM7

MKX7GKOsLKU0ZXjmIHQ4NrGqZoFbWFV0YlE1YmgoAu
V8VTX8HoC7AwslYtU3NFIyADGiBdRxVSH7GhZ3JeruWqT0DIQ4QtNgMoryTmi6SLT0OOWrAtgpMfBlEB

omJiJ8PssyQZs7PesxItp4BAd4RBF0IpszPVv0XZh6CEB7WtYkYsBtVSkcTyZ6IsYqIuR9WMG2KyE2LB

WwRIC2SDF5SvM8WAHdDFC1CRF7XyA4PSFqEFw0BWJs
XSB3RVVaOVR1FMV6QfH0YFIlMxz7UNG2EFUoKwgiObr4TDJ6PfMLOkS9OsB0STPpCRB3EFG1KdD3JRXb

EPO5ZEB3HBZ9SRXaFMJ0ATJ2KvU4PQCsRDM6KWOlQAJ9ENDkEYEdIU2kSPqgmwKrBkcKHyIuHSYba2Qe

TJq9IA3FWZAvOPhsIO1Iv692APMjF8OvlNNxbc8HPJ
PvYf0ptP9ucDMpZTGqQOoMUbKnU3FdX1SxeVC7QWQtJ9RmGZZdE3n4RHduTH5FNYCgLT08MF3wCNBZAg

WmO7LgDYzeFOJvAXnlGO5Sq863EpOtcJJkVJSgRkRbHIIkBVJbKRI5HL4NZUWoLOCnuTizFK4obCSgRX

9TdGVtViAwDQo+Zj3ZRO1pj0PdXUoaEsQwVP2vdy5Z
IEbSZfJyM5S8fMNpZp2lpB0IfKV4eAKnG0HccGWAtIPyJ3Ndw3WCa571P0VusXEeL1LpF01gyI3xV7tb

hlAwq6yKshRrVKauIu4APHQuHlfoe7FBzIKwQOYfE8rrn1WHfDKoGKP1QF4AKODdZ7qbgYujGTOwRVKq

Qv7JRWQtVd8qgVPet7EiaOW5t4MuAuVuOFUORGg+Pg
3YEM2ci0WdOKbyQCPkPA9gda2NN44LLnUaXJ4shg6DRgJfBC6wzg7EBHfPBvZxH3Fms3HHMBUkJt9ZTF

AaPXJ1mA9QlWQyWEZtTE3cV4YXVR5IIWQrEu2reGU5ZAGrEoDxBJAhRAMYGQzbHVVpQ6MsKGS2UJRjNm

2IZXPeEH3RJbZlTHFzEXOOFcXcVKMgWtPpCvOvJZYQ
Wi3UWdNpD0cFShcmV3LjOQfaBe4JTaFoT6Q9tNwHfVS3WGK0CI8XLiCGHlHhLZe5G0I3yXSoP7M2aPyT

bYT0AB1LUL1VYXJtER2+NmGnR0MJADeQKMY3AZ4WxCYlWO2UcBKON9HioHGmQu8hLQOopMngoTm+PiAv

V0UJRDENHVP3JQ5UiYTgJF3WnLTWV0NtqLTsDj2vKV
lgWsGhMY4aWV1+HW9XDKGJWsJAKdGsEFbyJKvuEWLjEIg6D6S1ZNFyD8LKU5I2A6q5j8funl7+IA0KIC

AoD8JMWCPASxXyKLdsWRgoIQYjKSy8W2U5APXxC5ZGY7slZ1u8AT2+PiANCiAgID4+DQo+Ve7OZY1ct8

LmREllKVEqKD2ryc9NWScdPWWiY9QjBHGvFWhcA2Rt
tXtuLW4GYXooPAsmPE7CJWTgQOI3XJ6+BWksxUBnAD5AGqv/fIOeN3dzrYFjMAjyyd8k36l/YbOgYI0b

PiHVIN7lT5UyzUk1dpTOzl0TC5wgYrndAv2+EKfbTPw8FvibeQ5qwFJokCg9qEP4ox1yFm3sIAhoSJai

gI6zizV1pD2kRLTlVtI5nvK0iJY8KJ0lUg0VXSXgJV
pvTVT3BsRHFZyqsA3pXsJdIy8ntDO4gXxkG2g3xu73Yo0zqvgaCEi6OI9dPn4dAq0fCCKqq7ygpHL3YN

5zIyc+VHsxNNXaGV1zRAW5XbCHDf2UFDM2F3t5dO5lkAK4SS2YNsBtSYAfVUMbLMAxCHLeAGZjCVHwMK

AgICAgICAgICAgICAgICAgICAgICAgICAgICAgICAg
ICAgICAgICAgICAgICAgICAgICAgICAgICAgICAgICAgICAgICAgICAgICAgICANCiAgICAgICAgICAg

ICAgICAgICAgICAgICAgICAgICAgICAgICAgICAgICAgICAgICAgICAgICAgICAgICAgICAgICAgICAg

ICAgICAgICAgICAgICAgICAgICAgICAgICAgICANCi
AgICAgICAgICAgICAgICAgICAgICAgICAgICAgICAgICAgICAgICAgICAgICAgICAgICAgICAgICAgIC

AgICAgICAgICAgICAgICAgICAgICAgICAgICAgICAgICAgICAgICANCiAgICAgICAgICAgICAgICAgIC

AgICAgICAgICAgICAgICAgICAgICAgICAgICAgICAg
ICAgICAgICAgICAgICAgICAgICAgICAgICAgICAgICAgICAgICAgICAgICAgICAgICANCiAgICAgICAg

ICAgICAgICAgICAgICAgICAgICAgICAgICAgICAgICAgICAgICAgICAgICAgICAgICAgICAgICAgICAg

ICAgICAgICAgICAgICAgICAgICAgICAgICAgICAgIC
ANCiAgICAgICAgICAgICAgICAgICAgICAgICAgICAgICAgICAgICAgICAgICAgICAgICAgICAgICAgIC

AgICAgICAgICAgICAgICAgICAgICAgICAgICAgICAgICAgICAgICAgICANCiAgICAgICAgICAgICAgIC

AgICAgICAgICAgICAgICAgICAgICAgICAgICAgICAg
ICAgICAgICAgICAgICAgICAgICAgICAgICAgICAgICAgICAgICAgICAgICAgICAgICAgICANCiAgICAg

ICAgICAgICAgICAgICAgICAgICAgICAgICAgICAgICAgICAgICAgICAgICAgICAgICAgICAgICAgICAg

ICAgICAgICAgICAgICAgICAgICAgICAgICAgICAgIC
AgICANCiAgICAgICAgICAgICAgICAgICAgICAgICAgICAgICAgICAgICAgICAgICAgICAgICAgICAgIC

AgICAgICAgICAgICAgICAgICAgICAgICAgICAgICAgICAgICAgICAgICAgICANCiAgICAgICAgICAgIC

AgICAgICAgICAgICAgICAgICAgICAgICAgICAgICAg
ICAgICAgICAgICAgICAgICAgICAgICAgICAgICAgICAgICAgICAgICAgICAgICAgICAgICAgICANCjw/

iUNsA9itoHBpclI5O3usSx1CIw0RVR3zl0SxIZImWRmaevCtAkoFLcYsRFKqOpiTPca4SOzsNM6VrMJk

B5HhX1XyUMejEK8SOEGnYMBknQAeZWRpGWHgGdZ5LW
UrTBheSS0XvIIhOPseOOZsRMMgKxNdTBMmPGHlVYHfCT9EFMAmU340trXjBf7ADp0WOlDvCK5bqj6VUm

ClQQDlWcuYPau7PUldWY5IxJQqfGAbMbQqFSRWMfZuN5cac0GaNjrpRFFSNNpjWB9Ld2HqgEJuYMp+Pg

1TBT4ux8TaBVtrLqWhED2uwf1OXAlCTbMvN6RuqSzx
QHYwp9cqBOXhVR1xqBHiYIE5RShlrX6eRMCMn9Drpbn6K3tuBB3GIIS1KRwtLD1sXNUdMDWpAhEcVKPD

VB6IQQYwDIUdcBLeVKEwQUUXHN1TPSntHYN3OHPsqiLddPMyMWokXC3YSMJkfoBiTfWqUQKLJAd+Pg0K

WT2fl6UjPCewCQJxPS1lsu4AKVuYWwHgO4R2nZAbG8
C4CFxzGw4TQELsUZRnQuNzPCMCBKdzOR2YQE0gcyZ5YT2FxAOhHRElBZHdvIAgGCr2D29suQZuEUuwAK

0KICA+Kiesha+Jd9SQZPlSBNkXBUnJzZtFXSPBzQrK5QjT1ADs0HqI5WyQX84sFgyndBwJClpLM5RAM4yNQ

MtAFBNVZ1XzNXuvN1oqmPjFdSuXWMBPgYuQ03cgIGq
KCHiUUT1YERlFl5DIZYfZ4TajqIyuXiydxPtYWUnKWOPEW8UDZrsecHseGPujVupLL99sBcbKK3HEz3F

EmRuQA9vie1TdTBbLx7COJPtPQ6KGVVdYWIhCYBxKSO5UTFsVgQcQVmiRSElDYBmXXZ8GZNvIQVgPU1R

FsOyNKHcYLW9QFRoHGZhLFKywz1CSPAtLUL3EcLoTW
CcJEIlFBZkEGjgYFVrOAIhSXD8IDIdQRGxZT3FXjZtMLBdJAYwVILtWDCrLQTjzh0ZKGHqCBOiPaF9AF

KaEYUjSJVsWQizMZSmTBF9KPS2DKGnWSPdIB5PBgZbOQBoTRf7FeZyFYEoCZYxns4VYZOuPWZuLCKrEq

WwTJEbFREtWYcpSQKyJAO7HgYfOHHsHAHwES6AHnQo
VDKoZVR2GCEuQRKsTXPrci2NSAOuBTYaINn1AzEbOVHtJDIvGKvbKJDcJZVjHKUiWMExQWDmCM3ZFxLw

RRYlIDR2STphOFHhXVXjxa9KSXQjLGGwFjWhEJUiITGoPUAqXUlnFDDmFXWcLxW1TVIfAHAbRM9WBsTs

ECUyPmh6DkOcQEYgFJMkhj0EUYXqWAGnZBN3ZrZdDO
SbGMGjDNrvHYMuRMV2Yff7EDRwKAFfWS4UJlDzMRTyRupfEhYiVXAcYQMyze7EBXAqHAZuJXH4ThVwTK

IzWYUgGWomKQWaPPEeNGUaYPSxKWYeOR6GOwTbXDPqXZY1VFHpNCGpWUDiwx9VBUWpCDY5QVu0NlLtBT

UxXAXuHLjcLPNaMXWjEQluHQDjXHYnGJ0JXoFhKOVe
GKF9PpRzXBDkVBTwgu0JKIKnZES8EsfgDRBmXYOeESUwISbqYXQgPLEeMWBzZWPaPBDfIG0CDeBmYUzq

OXQZLys3SRtpX4f4HCVpZO3CJ6Dit1XrYwraSDTNRXxzGQ6ayfAxNKDaIb1KN4pWUztuZcW5A9BzMTcm

RMR4XZH6YvCiYVUjAoV4KMOaGIn2QB7iNYE1DkX5UR
Y6YTHpXln3UUBjYQWbAVW5QQI2UuNkFVF9ZlFkUG6BAw6GHtP1DQU4eOUmMz8AEBFtYWVZHsGtES7YRY

o=









                    ID                  Date                Data Source

 

                    [id number]              2021 12:12:10 PM EDT [facility]









          Name      Value     Range     Interpretation Code Description Data Scarlett

rce(s) Supporting 

Document(s)

 

          Color of Urine                                         [facility]  

 

          Clarity of Urine                                         [facility]  

 

           Glucose [Mass/volume] in Urine by Test strip            [facility]                                 

 

           Bilirubin.total [Presence] in Urine by Test strip            [facility]                      

 

           Ketones [Mass/volume] in Urine by Test strip            [facility]                                 

 

           Specific gravity of Urine by Test strip 1.015      1.005-1.025       

                [facility]                      

 

           Hemoglobin [Presence] in Urine by Test strip            [facility]                                 

 

          pH of Urine by Test strip 7.0       5.0-8.0                       [facility]

[facility]  

 

           Protein [Mass/volume] in Urine by Test strip            Negative     

                    [facility]                                 

 

             Urobilinogen [Units/volume] in Urine by Test strip 0.2 {Ehrlich_U}/

dL 0.2-1.0                   

                          [facility]  

 

           Nitrite [Presence] in Urine by Test strip            [facility]                                 

 

           Leukocyte esterase [Presence] in Urine by Test strip            Negat

corrie                         [facility]                      









                    ID                  Date                Data Source

 

                    [id number]     2021 02:30:00 PM EDT [facility] [address] [place] [address] PHONE: [phone]

 FAX: [phone]  Name .................. : ALBERT BERNARDO               Acct 
Number.................. : [id number] ROOM. ................. :                   
           MR Number ................... : [id number] Stay type ............. : O/P 
                         Discharge Date......... ... : 21 Admit Date 
......... : 21                        Admit Phys .................... : 
NAYA Date of Birth ....... : 2000                     Family Phys 
................... : NO PCP Phone .................. : [phone]            
    Age ................................ : 21 Film# .................. .:[id number] 
                     Sex ................................. : F  Unsigned 
transcriptions are preliminary reports and do not represent a medical or legal 
document         OB 1ST TRI W TV IF NEEDED [id number]      COMPLETE:21 21:59 
[facility] [id number]                            Reason for Exam: viability and dating     
FIRST TRIMESTER OB ULTRASOUND:  INDICATION: Dating and viability.  FINDINGS: 
There is a single live intrauterine pregnancy with a fetal heart rate of 179 
beats per minute.  Based on fetal measurements, the estimated gestational age is
 12 weeks 5 days with an estimated delivery date of 2022.  The 
ovaries are sonographically normal.  There is a forming anterior placenta.  
There is soft tissue edema of the fetus suspicious for hydrops fetalis.  
IMPRESSION: Single live intrauterine pregnancy of 12 weeks 5 days.  There is 
soft tissue edema of the fetus suspicious for hydrops fetalis.   
___________________________________ Electronically Reviewed and Signed By Umer Diaz M.D.              , 21 14:30, NHJAY  Transcribe Initials: GEOVANNY , 
Transcribe Date: 21 01:47, Dictation Date:   Copy for: RIVER KASHIF BILL      
         via fax Copy for: [facility] REC                                          
                                                            Page 1 of 1   









          Name      Value     Range     Interpretation Code Description Data Scarlett

rce(s) Supporting 

Document(s)

 

                                                                       







                                        Procedure

 

                                          



                                                                                
                                                                                
                                                                                
                                                                                
                  



Social History

          



           Code       Duration   Value      Status     Description Data Source(s

)

 

             Smoking      10/19/2021 12:00:00 AM EDT Never Smoked Cigarettes com

pleted    Never 

Smoked Cigarettes                       [facility] ([facility])

 

           Smoking    10/01/2021 12:00:00 AM EDT Never Smoker completed  Never S

moker eCW1 

([facility])

 

           Smoking    10/01/2021 12:00:00 AM EDT Never Smoker completed  Never S

moker eCW1 

([facility])

 

                Alcohol intake  2021 12:00:00 AM EDT Lifetime non-drinker 

(finding) 

completed                 Lifetime non-drinker (finding) [facility]

ital

 

             Tobacco use and exposure 2021 12:00:00 AM EDT Never used   co

mpleted    Never 

used                                    [facility]

 

           Smoking    2021 12:00:00 AM EDT Never smoker completed  Never s

[facility]

 

                Alcohol intake  2021 12:00:00 AM EDT Lifetime non-drinker 

(finding) 

completed                 Lifetime non-drinker (finding) [facility]

ital

 

                      2021 12:00:00 AM EDT Pregnant   completed  Pregnant 

  [facility]



                                                                                
                                                                                
        



Vital Signs

          



                    ID                  Date                Data Source

 

                    UNK                                      









           Name       Value      Range      Interpretation Code Description Data

 Source(s)

 

           Diastolic blood pressure 64 mm[Hg]                        64 mm[Hg]  

[facility] ([facility])

 

           Systolic blood pressure 126 mm[Hg]                       126 mm[Hg] M

[facility] ([facility])

 

           Heart rate 92 /min                          92 /min    [facility] ([facility])

 

           Body height 67.8 [in_i]                       67.8 [in_i] [facility] ([facility] )

 

                                        5'7.80" 

 

           Body weight 186.44 [lb_av]                       186.44 [lb_av] MED[facility]

T ([facility])

 

           Body mass index (BMI) [Ratio] 28.5 kg/m2                       28.5 k

g/m2 [facility] ([facility])

 

           Oxygen saturation in Arterial blood by Pulse oximetry 98 %           

                  98 %       [facility] 

([facility])

 

           Body weight 191.8 [lb_av]                       191.8 [lb_av] eCW1 ([facility])

 

           Body weight 87 kg                            87 kg      eCW1 (Counts include 234 beds at the [facility])

 

           Body height 68 [in_i]                        68 [in_i]  eCW1 (Counts include 234 beds at the [facility])

 

           Body mass index (BMI) [Ratio] 29.16 kg/m2                       29.16

 kg/m2 eCW1 ([facility])

 

           Heart rate 80 /min                          80 /min    eCW1 ([facility])

 

           Respiratory rate 18 /min                          18 /min    eCW1 (Replaced by [facility])

 

           Body temperature 98.6 [degF]                       98.6 [degF] eCW1 (

[facility])

 

           Systolic blood pressure 125 mm[Hg]                       125 mm[Hg] e

CW1 ([facility])

 

           Diastolic blood pressure 65 mm[Hg]                        65 mm[Hg]  

eCW1 ([facility])









                    ID                  Date                Data Source

 

                    [id number]          2021 04:29:34 PM [facility]









           Name       Value      Range      Interpretation Code Description Data

 Source(s)

 

           WEIGHT RECORDED 180.2 lb                         180.2 lb   [facility]

 

           Body height Measured 68 in                            68 in      [facility]



                                                                                
                            



Patient Treatment Plan of Care

          



             Planned Activity Planned Date Details      Description  Data Source

(s)

 

             Albuterol Sulfate  (90 Base) MCG/ACT 10/01/2021 12:00:00 AM 

EDT                           [facility] 

([facility])

 

             Albuterol Sulfate  (90 Base) MCG/ACT 10/01/2021 12:00:00 AM 

EDT                           [facility] 

([facility])

 

             Oxycodone Hydrochloride 5 MG Oral Tablet 2021 12:00:00 AM [facility]

 

             Acetaminophen 325 MG Oral Tablet 2021 12:00:00 AM [facility]

 

             Ibuprofen 600 MG Oral Tablet 2021 12:00:00 AM [facility]

 

                          Norethin Ace-Eth Estrad-FE 1-20 MG-MCG Oral Tablet (Ju

erik FE 20) 2021 

12:00:00 AM [facility]

ospital

 

             Prenatal 19 Oral Tablet Chewable                                   

     [facility]

## 2021-10-20 NOTE — CCD
Continuity of Care Document (CCD)

                             Created on: 10/19/2021



Alka Raya

External Reference #: [id number]

: 2000

Sex: Female



Demographics





                          Address                   [address] [place]  [address]

 

                          Home Phone                [phone]

 

                          Preferred Language        Unknown

 

                          Marital Status            Unknown

 

                          Holiness Affiliation     Unknown

 

                          Race                      Black or 

 

                          Ethnic Group              Unknown





Author





                          Author                    Alka RAINES NP

 

                          Organization              Unknown

 

                          Address                   [address] [place] [address]  [address]



 

                          Phone                     [phone]







Care Team Providers





                    Care Team Member Name Role                Phone

 

                    Halina Wong MD  [facility]                [phone]







Problems





                                        Description

 

                                        No Information Available







Social History





                Type            Date            Description     Comments

 

                Birth Sex                       Unknown          

 

                Tobacco Use     Start: Unknown  Never Smoked Cigarettes  

 

                Smoking Status  Reviewed: 10/19/21 Never Smoked Cigarettes  

 

                ETOH Use                        Never used alcohol  







Allergies and adverse reactions





                                        Description

 

                                        No Known Drug Allergies







Medications





           Active Medications SIG        Qnty       Indications Ordering Provide

r Date

 

                          Melatonin                     5mg Capsules            

       1 by mouth daily at

bedtime                                         Unknown         







Immunizations





                                        Description

 

                                        No Information Available







Vital Signs





                Date            Vital           Result          Comment

 

                10/19/2021 10:03am BP Systolic     126 mmHg         

 

                    BP Diastolic        64 mmHg              

 

                    Heart Rate          92 /min              

 

                    Height              67.8 inches         5'7.80"

 

                    Weight              186.44 lb            

 

                    BMI (Body Mass Index) 28.5 kg/m2           

 

                    O2 % BldC Oximetry  98 %                 







Results





                                        Description

 

                                        No Information Available







Procedures





                Date            Code            Description     Status

 

                10/19/2021      96966           Consultation Outpatient Level 5 

Completed







Medical Devices





                                        Description

 

                                        No Information Available







Encounters





           Type       Date       Location   Provider   Dx         Diagnosis

 

           Office Visit 10/19/2021 10:00a [place]

P R94.6      

Abnormal results of thyroid function studies







Assessments





                Date            Code            Description     Provider

 

                10/19/2021      R94.6           Abnormal results of thyroid func

tion studies Janet Raines NP







Plan of Treatment

10/19/2021 - Janet Raines NP* R94.6 Abnormal results of thyroid function 
  studies* New Labs:* TSH Highly Sensitive, Scheduled: 21

* T4 - Free, Scheduled: 21

* T3 Total, Scheduled: 21



* Follow up:* RTO 6 weeks. JL









Functional Status





                                        Description

 

                                        No Information Available







Mental Status





                                        Description

 

                                        No Information Available







Referrals





                                        Description

 

                                        No Information Available

## 2021-10-20 NOTE — CCD
Summarization of Episode Note

                             Created on: 10/06/2021



[employer]

External Reference #: [id number]

: 2000

Sex: Female



Demographics





                          Address                   [address]

[place]  [address]

 

                          Home Phone                [phone]

 

                          Preferred Language        Unknown

 

                          Marital Status            Unknown

 

                          Druze Affiliation     Unknown

 

                          Race                      Unknown

 

                          Ethnic Group              Not  or 





Author





                          Author                    [facility]

ems

 

                          Organization              [facility]

ems

 

                          Address                   Unknown

 

                          Phone                     Unavailable







Support





                Name            Relationship    Address         Phone

 

                    [place] [employer]     GUAR                [address]

[place], [place]  [id number] [phone]

 

                    [place] [employer]   ECON                [address]

[place], [place]  [id number] [phone]







Care Team Providers





                    Care Team Member Name Role                Phone

 

                    Halina Wong    Unavailable         [phone]







PROBLEMS





          Type      Condition ICD9-CM Code BHY55-BL Code Onset Dates Condition S

tatus W/U 

Status              Risk                SNOMED Code         Notes

 

          Problem   Symptoms consistent with irritable bowel syndrome           

K58.9               Active    

confirmed                               [id number]             

 

       Problem Exercise-induced asthma        J45.990        Active confirmed   

     [id number]  







ALLERGIES





                    Allergen (clinical drug ingredient) Drug/Non Drug Allergy do

cumented on EMR 

Reaction            Allergy Type        Onset Date          Status

 

           Pollen     Pollen     Unknown    Drug Allergy            Active







ENCOUNTERS from 1999 to 2021-10-05





             Encounter    Location     Date         Provider     Diagnosis

 

                    [address] [phone] [place] [address] 01 

Oct, 2021                 Halina Wong            Chronic diarrhea K52.9 and E

xercise-induced asthma 

J45.990







IMMUNIZATIONS

No Information



SOCIAL HISTORY

Tobacco Use:



                    Social History Observation Description         Date

 

                    Details (start date - stop date) Never Smoker         



Sex Assigned At Birth:



                          Social History Observation Description

 

                          Sex Assigned At Birth     Unknown



Education:



                    Question            Answer              Notes

 

                    Level of Education: Finished College     



Audit



                    Question            Answer              Notes

 

                    Total Score:        0                    

 

                    Interpretation:     Alcohol Education    



Language:



                    Question            Answer              Notes

 

                    Languages spoken:   English              



Congregational:



                    Question            Answer              Notes

 

                    Congregational            33 None              



Sexual Hx:



                    Question            Answer              Notes

 

                    Had sex in the last 12 months (vaginal, oral, or anal)? Yes 

                 

 

                    LMP:                2021           

 

                    Have you ever had an STD? Yes                  

 

                    with                Men only             

 

                    Syphilis?           Yes                  

 

                    Chlamydia?          Yes                  



Drug and Alcohol



                    Question            Answer              Notes

 

                    Total Score:        2                    

 

                    Interpretation:     Low level            



Alcohol Screening:



                    Question            Answer              Notes

 

                    Did you have a drink containing alcohol in the past year? No

                   

 

                    Points              0                    

 

                    Interpretation      Negative             



Tobacco Use:



                    Question            Answer              Notes

 

                    Are you a:          never smoker         







REASON FOR REFERRAL

No Information



VITAL SIGNS





                    Weight              191.8 lbs           01 Oct, 2021

 

                    Weight-kg           87 kg               01 Oct, 2021

 

                    Height              68 in               01 Oct, 2021

 

                    BMI                 29.16 kg/m2         01 Oct, 2021

 

                    Heart Rate          80 /min             01 Oct, 2021

 

                    Respiratory Rate    18 /min             01 Oct, 2021

 

                    Temperature         98.6 degrees Fahrenheit 01 Oct, 2021

 

                    Oximetry            90                  01 Oct, 2021

 

                    Blood pressure systolic 125 mm Hg           01 Oct, 2021

 

                    Blood pressure diastolic 65 mm Hg            01 Oct, 2021







MEDICATIONS





           Medication SIG (Take, Route, Frequency, Duration) Notes      Start Da

te End Date   

Status

 

                          Albuterol Sulfate  (90 Base) MCG/ACT 1 puff as 

needed Inhalation every 4 

hrs for 30 days                 01 Oct, 2021                    Active







PROCEDURES

No Information



RESULTS





                    Component           Value               Reference Range

 

                                        t-Transglutaminase (tTG) IgA

Reviewed date:10/05/2021 12:49:18

Interpretation:Negative

Performing Lab:[facility], Phone:[phone],[place] [address] 

 

                    t-Transglutaminase (tTG) IgA                      

 

                                        CBC with Differential

Reviewed date:10/05/2021 12:49:37

Interpretation:Normal

Performing Lab:[facility], Phone:[phone],[place] [address] 

 

                    CBC WITH DIFFERENTIAL                      

 

                    WBC                                      

 

                    CORRECTED WBC                            

 

                    RBC                                      

 

                    HEMOGLOBIN & HEMATOCRIT                      

 

                    HEMOGLOBIN                               

 

                    HEMATOCRIT                               

 

                    MCV                                      

 

                    MCH                                      

 

                    MCHC                                     

 

                    RDW                                      

 

                    PLATELET COUNT                           

 

                    LYMPH %                                  

 

                    MONO %                                   

 

                    NEUT %                                   

 

                    EOS %                                    

 

                    BASO %                                   

 

                    NEUT #                                   

 

                    LYMPH #                                  

 

                    MONO #                                   

 

                    EOS #                                    

 

                    BASO #                                   

 

                                        Comprehensive Metabolic Profile (CMP)

Reviewed date:10/05/2021 12:49:04

Interpretation:Normal

Performing Lab:[facility], Phone:[phone],[place] [address] 

 

                    COMPREHENSIVE METABOLIC PROFILE                      

 

                    ELECTROLYTE PROFILE                      

 

                    GLUCOSE                                  

 

                    BUN                                      

 

                    CREATININE WITH GFR                      

 

                    CREATININE                               

 

                    GLOMERULAR FILTRATION RATE                      

 

                    SODIUM                                   

 

                    POTASSIUM                                

 

                    CHLORIDE                                 

 

                    CARBON DIOXIDE                           

 

                    CALCIUM                                  

 

                    AST/SGOT                                 

 

                    ALT/SGPT                                 

 

                    ALK PHOS                                 

 

                    BILIRUBIN,TOTAL                          

 

                    TOTAL PROTEIN                            

 

                    ALBUMIN                                  

 

                    ALB/GLOB RATIO                           

 

                                        FREE T4 & TSH PANEL

Reviewed date:10/05/2021 12:52:39

Interpretation:TSH 0.46, FT4 0.91

Performing Lab:[facility], Phone:[phone],[place] [address] 

 

                    FT4&TSH PANEL                            

 

                    TSH ULTRASENSITIVE                       

 

                    FREE T4                                  







REASON FOR VISIT

est care, chronic abdominal issues



MEDICAL (GENERAL) HISTORY





                    Type                Description         Date

 

                    Medical History     exercise-induced asthma  

 

                    Medical History     anxiety              

 

                    Surgical History    tonsillectomy       2017

 

                    Surgical History    D & C               2021

 

                    Hospitalization History see above            







Goals Section

No Information



Health Concerns

No Information



MEDICAL EQUIPMENT

No Information



MENTAL STATUS

No Information



FUNCTIONAL STATUS

No Information



ASSESSMENTS





             Encounter Date Diagnosis    Assessment Notes Treatment Notes Treatm

ent Clinical 

Notes

 

                01 Oct, 2021    Chronic diarrhea (ICD-10 - K52.9)               

  



                                        



Clinical picture consistent with IBS vs Crohns, will obtain labs.  No dyspepsia 
or reflux symptoms reported.  No suspicion for infectious process.  Pt did not 
want to start any medication at this point.  Once lab results return will 
discuss further with pt options for treatment.  Pt expressed understanding and 
agreed with plan.

 

                01 Oct, 2021    Exercise-induced asthma (ICD-10 - J45.990)      

           



                                        



Pt requested refill of rescue inhaler to have on hand.  She reports only rare 
use of inhaler.







PLAN OF TREATMENT

Medication



                Medication Name Sig             Start Date      Stop Date

 

                          Albuterol Sulfate  (90 Base) MCG/ACT 1 puff as 

needed Inhalation every 4 

hrs for 30 days           01 Oct, 2021               



Treatment Notes



                    Assessment          Notes               Clinical Notes

 

                    Chronic diarrhea                        Clinical picture con

sistent with IBS vs Crohns, will obtain 

labs.  No dyspepsia or reflux symptoms reported.  No suspicion for infectious 
process.  Pt did not want to start any medication at this point.  Once lab 
results return will discuss further with pt options for treatment.  Pt expressed
understanding and agreed with plan.

 

                    Exercise-induced asthma                     Pt requested ref

ill of rescue inhaler to have on hand.

 She reports only rare use of inhaler.



Next Appt



                                        Details

 

                                        4 Weeks Reason:abd issues f/u

 

                                        Provider Name:Halina Wong, 2021

 07:30:00 AM, [address] [facility], 

[phone], [place], [address], [phone]



Follow Up:4 Weeksabd issues f/u



Insurance Providers





             Payer Name   Payer Address Payer Phone  Insured Name Patient Relati

onship to 

Insured                   Coverage Start Date       Coverage End Date

 

                    [address]

 [phone]

                [employer]  self

## 2021-10-20 NOTE — REP
INDICATION:

pelvic pain, discharge, blisters, dysparunia.



COMPARISON:

None.



TECHNIQUE:

Transvesical imaging only



FINDINGS:

The uterus measures 9.1 x 3.2 x 4.5 cm.  The parenchymal echo pattern is within normal

limits.  The endometrial echo complex is smooth and unremarkable appearing measuring 3

mm in its greatest thickness.



The right ovary measures 3.7 x 2.4 x 2.5 cm and is within normal limits with an RI 0.59



The left ovary measures 4.8 x 2.6 x 2.5 cm and is within normal limits with an RI 0.47.



There is no free fluid in the cul-de-sac.



IMPRESSION:

Transvesical pelvic ultrasonography is within normal limits as described above.





<Electronically signed by Kvng Gong > 10/20/21 [id number]

## 2021-10-20 NOTE — CCD
Summary of Care

                             Created on: 2021



Alka Raya

External Reference #: [id number]

: 2000

Sex: Female



Demographics





                          Address                   [address]

[place]  [address]

 

                          Home Phone                [phone]

 

                          Preferred Language        English

 

                          Marital Status            

 

                          Rastafarian Affiliation     NON

 

                          Race                      Black or 

 

                          Ethnic Group              Not  or 





Author





                          Author                    [facility]

 

                          Organization              [facility]

 

                          Address                   Unknown

 

                          Phone                     Unavailable







Support





                Name            Relationship    Address         Phone

 

                    Joseph Raya    [employer]                [address]

[place]  [address]                 [phone]







Care Team Providers





                    Care Team Member Name Role                Phone

 

                    Pcp, No             PCP                 Unavailable







Reason for Visit

* Auth/Cert



                          Referred By Contact       Referred To Contact



                 Status          Reason          Specialty       Diagnoses /  



                                         Procedures  

 

                                        



                                        



Barbara Collins MD



[address]



[address]



[place] [address]



Phone: [phone]



Fax: [phone]



Email: [email or web address]



                                         Diagnoses  



                                         Missed   



                                         with fetal demise  



                                         before 20  



                                         completed weeks of  



                                         gestation [O02.1]  











Encounter Details





                          Care Team                 Description



                     Date                Type                Department  

 

                                        



Wero Weber MD



[address]



[address]



[place] [address] [phone] (Fax)                       



                     2021          Hospital            [facility]

S  



                           Encounter                 [address] [place] [address]  







Allergies

No Known Active Allergiesdocumented as of this encounter (statuses as of 
2021)



Medications





                          End Date                  Status



              Medication   Sig          Dispensed    Refills      Start  



                                         Date  

 

                                                    Active



                     Ondansetron HCl 4 MG Oral  Take 4 mg by        0   



                           Tablet (ZOFRAN)           mouth every 8     



                                         (eight) hours     



                                         as needed     



                                         for Nausea     

 

                          2022                Active



              Norethin Ace-Eth  Take 1 tablet  30 tablet    11             



                     Estrad-FE 1-20 MG-MCG  by mouth            1  



                           Oral Tablet (Junel FE     daily     



                                         )      

 

                          2021                Active



              Ibuprofen 600 MG Oral  Take 1 tablet  30 tablet    0              



                     Tablet (MOTRIN)     by mouth            1  



                                         every 6 (six)     



                                         hours as     



                                         needed  for     



                                         Pain for up     



                                         to 10 days     

 

                          2021                Active



              Acetaminophen 325 MG Oral  Take 2       30 tablet    0            

  



                     Tablet (Tylenol)    tablets by          1  



                                         mouth every 6     



                                         (six) hours     



                                         as needed     



                                         for Pain for     



                                         up to 10 days     

 

                          2021                Discontinued (Stop Taking at

 Discharge)



                     Prenatal 19 Oral Tablet  Chew 1 tablet       0   



                           Chewable                  by Mouth     



                                         daily     



documented as of this encounter (statuses as of 2021)



Active Problems





 



                           Problem                   Noted Date

 

 



                           Hydrops fetalis in first trimester, antepartum  







  



                     Pregnant            Estimated Date of Delivery  Comments

 

  



                     Yes                 2021          Based on last menst

rual



                                         period of 3/26/2021



documented as of this encounter (statuses as of 2021)



Social History





                                        Date



                 Tobacco Use     Types           Packs/Day       Years Used 

 

                                         



                                         Never Smoker    

 

    



                                         Smokeless Tobacco: Never   



                                         Used   







                                        Comments



                           Alcohol Use               Standard Drinks/Week 

 

                                         



                           Never                     0 (1 standard drink = 0.6 o

z pure alcohol) 







  



                     Alcohol Habits      Answer              Date Recorded

 

  



                     How often do you have a drink containing alcohol?  Never   

            2021

 

  



                           How many drinks containing alcohol do you have on  No

t asked 



                                         a typical day when you are drinking?  

 

  



                           How often do you have six or more drinks on one  Not 

asked 



                                         occasion?  







  



                     Pregnant            Estimated Date of Delivery  Comments

 

  



                     Yes                 2021          Based on last menst

rual



                                         period of 3/26/2021







 



                           Sex Assigned at Birth     Date Recorded

 

 



                                         Not on file 







                                        Date Recorded



                           COVID-19 Exposure         Response 

 

                                        2021  9:54 AM EDT



                           In the last month, have you been in contact with  No 

/ Unsure 



                                         someone who was confirmed or suspected 

to have  



                                         Coronavirus / COVID-19?  



documented as of this encounter



Last Filed Vital Signs





                    Reading             Time Taken          Comments



                                         Vital Sign   

 

                    120/76              2021 11:20 AM EDT  



                                         Blood Pressure   

 

                    71                  2021 11:20 AM EDT  



                                         Pulse   

 

                                        36.5 

C (97.7 

F)                      2021 11:20 AM EDT    



                                         Temperature   

 

                    16                  2021 11:20 AM EDT  



                                         Respiratory Rate   

 

                    96%                 2021 11:20 AM EDT  



                                         Oxygen Saturation   

 

                    -                   -                    



                                         Inhaled Oxygen   



                                         Concentration   

 

                    81.7 kg (180 lb 3.2 oz) 2021  8:16 AM EDT  



                                         Weight   

 

                    172.7 cm (5' 8")    2021  8:16 AM EDT  



                                         Height   

 

                    27.4                2021  8:16 AM EDT  



                                         Body Mass Index   



documented in this encounter



Discharge Instructions

* Discharge Instr - Diet* 



 Chery Zuñiga MD - 2021 10:35 AM EDT



Formatting of this note might be different from the original.

Aftercare and Follow-Up



It is very important that you follow these instructions and take any prescriptio
ns you have been given. In order to reduce the risk of infection, you should lulú
id:



    Having vaginal intercourse

    Using tampons (use pads instead)

    Taking baths

    Douching

    Swimming



Your provider will give you a 24-hour telephone number to call if you become con
cerned about your condition. If you develop any of the following symptoms, you w
ill need to contact your provider:



    Severe cramps or pain not responding to pain medication

    Chills, or fever of 100.4 degrees or more

    Inability to void

    Bleeding that is heavier than the heaviest day of your normal menstrual brodie
od, or that soaks through more than two         maxi-pads an hour for more than 
two hours

    Bad-smelling discharge from your vagina

    Continuing symptoms of pregnancy



The provider will also give you an appointment or referral for a follow up exam 
within 2 weeks to confirm that you have not developed any complications. Even if
 you feel fine, you should go to your post-procedure appointment.



 



Electronically signed by Chery Zuñiga MD at 2021 10:35 AM EDT





documented in this encounter



H&P Notes

* Wero Weber MD - 2021  9:32 AM EDT



Formatting of this note is different from the original.

PREOPERATIVE HISTORY & PHYSICAL





21 y.o.  who presents for pre-operative H&P for a suction dilation & 
curettage. She has a missed  diagnosed on 2021. The patient was pr
eviously being followed in the  center due to fetal hydrops. This pregn
patti was unplanned. The patient desires combined oral contraception after her davis
rgery.



Patient's surgery is scheduled for tomorrow, 8/3/2021. She has been informed to 
have pre-op labs drawn after this visit.

She denies any recent changes in her health or history or her family health or h
istory.  She fells well today; no vaginal itching, burning, discharge or foul od
or; no urinary urgency, burning or frequency.



Complete ROS: negative 



Past Medical History: 

   

Past Medical History: 

Diagnosis Date 

 Anemia  

 Anxiety  

 Asthma  

 Ovarian cyst  





Past Surgical History: 

    

Past Surgical History: 

Procedure Laterality Date 

 TONSILLECTOMY N/A 2017 

 



Family History:

    

Family History 

Problem Relation Age of Onset 

 Diabetes Mother  

 Diabetes Father  





Medication:

     

Current Outpatient Medications on File Prior to Visit 

Medication Sig Dispense Refill 

 Ondansetron HCl 4 MG Oral Tablet (ZOFRAN) Take 4 mg by mouth every 8 (eig
ht) hours as needed  for Nausea   

 Prenatal 19 Oral Tablet Chewable Chew 1 tablet by Mouth daily (Patient no
t taking: Reported on 2021)   



No current facility-administered medications on file prior to visit. 



Encounter Medications 

     

Outpatient Encounter Medications as of 2021 

Medication Sig Dispense Refill 

 Ondansetron HCl 4 MG Oral Tablet (ZOFRAN) Take 4 mg by mouth every 8 (eig
ht) hours as needed  for Nausea   

 Prenatal 19 Oral Tablet Chewable Chew 1 tablet by Mouth daily (Patient no
t taking: Reported on 2021)   



No facility-administered encounter medications on file as of 2021. 

 

 



Allergies: 

No Known Allergies 



Social History:

Social History 

 



    

Socioeconomic History 

 Marital status:  

  Spouse name: Not on file 

 Number of children: Not on file 

 Years of education: Not on file 

 Highest education level: Not on file 

Occupational History 

 Not on file 

Tobacco Use 

 Smoking status: Never Smoker 

 Smokeless tobacco: Never Used 

Vaping Use 

 Vaping Use: Never used 

Substance and Sexual Activity 

 Alcohol use: Never 

 Drug use: Yes 

  Types: Marijuana 

  Comment: hx of mj use - no current use per pt  

 Sexual activity: Yes 

  Partners: Male 

  Birth control/protection: Condom 

Other Topics Concern 

 Not on file 

Social History Narrative 

 Not on file 



Social Determinants of Health 



  

Financial Resource Strain:  

 Difficulty of Paying Living Expenses:  

Food Insecurity:  

 Worried About Running Out of Food in the Last Year:  

 Ran Out of Food in the Last Year:  

Transportation Needs:  

 Lack of Transportation (Medical):  

 Lack of Transportation (Non-Medical):  

Physical Activity:  

 Days of Exercise per Week:  

 Minutes of Exercise per Session:  

Stress:  

 Feeling of Stress :  

Social Connections:  

 Frequency of Communication with Friends and Family:  

 Frequency of Social Gatherings with Friends and Family:  

 Attends Rastafarian Services:  

 Active Member of Clubs or Organizations:  

 Attends Club or Organization Meetings:  

 Marital Status:  

Intimate Partner Violence:  

 Fear of Current or Ex-Partner:  

 Emotionally Abused:  

 Physically Abused:  

 Sexually Abused:  

 

 



Review of Symptoms: 

Review of systems: A full 10 point review of systems was performed and was negat
crorie except as listed in the HPI. 

 

  

Vitals: 

 21 1008 

BP: 102/62 

Pulse: 90 





Physical Exam

Constitutional:  

   General: She is awake. 

   Appearance: Normal appearance. 

HENT: 

   Head: Normocephalic and atraumatic. 

Cardiovascular: 

   Rate and Rhythm: Normal rate and regular rhythm. 

   Heart sounds: Normal heart sounds, S1 normal and S2 normal. 

Pulmonary: 

   Effort: Pulmonary effort is normal. 

   Breath sounds: Normal breath sounds and air entry. 

Abdominal: 

   General: Abdomen is flat. Bowel sounds are normal. 

   Palpations: Abdomen is soft. 

Neurological: 

   Mental Status: She is alert. 

Skin:

   General: Skin is warm and dry. 

Psychiatric:    

   Attention and Perception: Attention normal.    

   Mood and Affect: Affect normal.    

   Speech: Speech normal.    

   Behavior: Behavior normal. Behavior is cooperative.    

   Thought Content: Thought content normal.    

   Cognition and Memory: Cognition normal.    

   Judgment: Judgment normal. 

Vitals and nursing note reviewed.  

 



            

A: This is a  at 18w3d who presents for a pre-op appointment for a suction d
ilation and curettage for a missed  measuring 13 weeks. The patient is h
emodynamically stable and is an appropriate candidate for this procedure. 

1. Missed  with fetal demise before 20 completed weeks of gestation  





Counseling: 

The patient was counseled by Dr Weber regarding the procedure and consent forms
 were signed with Dr. Weber. Discussed with the patient what the procedure enta
ils. Explained the risks, including but not limited to: injury to pelvic and abd
ominal organs, hemorrhage, need for more extensive surgery to repair or remove d
amaged or bleeding organs, need for transfusion, infection, pain, wound breakdow
n, scarring. Pt denies any social, Restorationist or personal objections to transfusi
ons and will accept these if judged necessary by medical team. 



Discussed the risks of surgery including but not limited to infection, bleeding,
 damage to surrounding structures (including bowel, bladder, ureters, nerves, ve
ssels, other internal organs), wound healing problems. Any operative procedure h
as additional risks of deep vein thromboembolism, pulmonary embolism, cardiac mo
rbidity, stroke, and death. Also reviewed briefly the risks of anesthesia includ
ing medication related side effects, aspiration and pneumonia. Likelihood of nee
ding blood transfusions was reviewed. While a blood transfusion will only be uti
lized if deemed clinically necessary, it carries risks of fever, infections such
 as HIV, Hepatitis B/C and other rare infectious diseases. Also discussed risks 
of transfusion related reactions with anaphylaxis, hemolysis and lung/ kidney in
juries.The patients questions were answered and she expressed understandin
g. 



Procedure consents were signed. 



Assessment/Plan:



1. CBC and Type and Screen Ordered



2. Follow up in 1-2 weeks for Post-op visit



3. Original plan was for laminaria placement; however due to the size measured o
n ultrasound, procedure will be a D&C, not a D&E. Will plan for pre-op cervical 
ripening with 400 mcg buccal misoprostol. No laminaria placed at this visit. 



Chery Zuñiga MD

OB/GYN PGY-2



No interval changes, ok for procedure. 

Wero Weber MD 







Electronically signed by Wero Weber MD at 2021  9:32 AM EDT

documented in this encounter



OR Notes

* OR PreOp - Edwige Low RN - 2021  8:14 AM EDT



Formatting of this note might be different from the original.

Images from the original note were not included.









Electronically signed by Edwige Low RN at 2021  8:14 AM EDT

documented in this encounter



Plan of Treatment





                          Care Team                 Description



                     Date                Type                Specialty  

 

                                                     



                     2021          Office Visit        Obstetrics and Gyne

cology  







                                        Order Schedule



                 Name            Type            Priority        Associated Diag

noses 

 

                                        Once for 1 Occurrences starting 20

21 until 2021



                     Surgical Pathology Exam  Pathology and       Routine  



                           ( Only)                 Cytology   







   



                 Health Maintenance  Due Date        Last Done       Comments

 

   



                           MMR Vaccines (1 of  -    2000  



                                         Standard series)   

 

   



                           Varicella Vaccines (1 of  2000  



                                         2 - 2-dose childhood   



                                         series)   

 

   



                           DTaP,Tdap,and Td Vaccines  2006  



                                         (1 - Tdap)   

 

   



                           HPV Vaccines (1 - 2-dose  2010  



                                         series)   

 

   



                           HIV Screening             2012  

 

   



                           Chlamydia Screening       2015  

 

   



                           Cervical Cancer Screening  2020  



                                         3 years   

 

   



                           Influenza Vaccine         10/01/2021  

 

   



                           Pneumococcal Vaccine: 65+  2064  



                                         Years (1 of  - PPSV23)   

 

   



                     HIB Vaccines        Aged Out            No longer eligible 

based on patient's age to



                                         complete this topic

 

   



                     Hepatitis A Vaccines  Aged Out            No longer eligibl

e based on patient's age to



                                         complete this topic

 

   



                     Hepatitis B Vaccines  Aged Out            No longer eligibl

e based on patient's age to



                                         complete this topic

 

   



                     IPV Vaccines        Aged Out            No longer eligible 

based on patient's age to



                                         complete this topic

 

   



                     Pneumococcal Vaccine:  Aged Out            No longer eligib

le based on patient's age to



                           Pediatrics (0 to 5 Years)    complete this topic



                                         and At-Risk Patients (6   



                                         to 64 Years)   



documented as of this encounter



Results

Not on filedocumented in this encounter



Administered Medications





                Action Date     Dose            Rate            Site



                           Medication Order          MAR Action    

 

                2021  8:56 AM  mcg                          



                           miSOPROStol (CYTOTEC) tablet 400 mcg  Given    



                                         400 mcg, Buccal, Once, On Tue 8/3/21 at

     



                                         0900, For 1 dose     

 

  

 

                2021 10:58 AM  mcg                          



                           rho(D) immune globulin (HYPERRHO/RHOGAM)  Given    



                                         injection 300 mcg = 1,500 units     



                                         300 mcg, Intramuscular, Once, On Tue   

  



                                         8/3/21 at 0915, For 1 dose, Refrigerate

     



                                         300 mcg = 1,500 units     

 

  



documented in this encounter



Active and Recently Administered Medications

Times are shown in EDT.



                          2021



                           Medication Order          2021  

 

                                        0934 (Given - Provider: Sanna nick CRNA)





                                         doxycycline (VIBRAMYCIN) injection 200 

  



                                         mg (COMPLETED)   



                                         200 mg, Intravenous, Once, On Tue 8/3/2

1   



                                         at 0900, For 1 dose   

 

                                        0856 (Given - Provider: Lila ding RN)





                                         miSOPROStol (CYTOTEC) tablet 400 mcg   



                                         (COMPLETED)   



                                         400 mcg, Buccal, Once, On Tue 8/3/21 at

   



                                         0900, For 1 dose   

 

                                        1058 (Given - Provider: Ivelisse su, FRANC)





                                         rho(D) immune globulin (HYPERRHO/RHOGAM

)   



                                         injection 300 mcg = 1,500 units   



                                         (COMPLETED)   



                                         300 mcg, Intramuscular, Once, On Tue   



                                         8/3/21 at 0915, For 1 dose, Refrigerate

   



                                         300 mcg = 1,500 units   







                          2021



                           Medication Order          2021  

 

                                        0953 (Given - Provider: Chery smart MD)





                                         vasopressin in lidocaine 1 % (INTRA-OP)

   



                                         0.5 mL infiltration (CANCELED)   



                                         PRN, Starting on Tue 8/3/21 at 0953,   



                                         Intra-op   



documented in this encounter